# Patient Record
Sex: FEMALE | Race: WHITE | NOT HISPANIC OR LATINO | Employment: OTHER | URBAN - NONMETROPOLITAN AREA
[De-identification: names, ages, dates, MRNs, and addresses within clinical notes are randomized per-mention and may not be internally consistent; named-entity substitution may affect disease eponyms.]

---

## 2017-02-20 ENCOUNTER — HISTORY (OUTPATIENT)
Dept: EMERGENCY MEDICINE | Facility: OTHER | Age: 82
End: 2017-02-20

## 2017-05-26 ENCOUNTER — HISTORY (OUTPATIENT)
Dept: INTERNAL MEDICINE | Facility: OTHER | Age: 82
End: 2017-05-26

## 2017-05-26 ENCOUNTER — OFFICE VISIT - GICH (OUTPATIENT)
Dept: INTERNAL MEDICINE | Facility: OTHER | Age: 82
End: 2017-05-26

## 2017-05-26 DIAGNOSIS — I10 ESSENTIAL (PRIMARY) HYPERTENSION: ICD-10-CM

## 2017-05-26 DIAGNOSIS — N32.81 OVERACTIVE BLADDER: ICD-10-CM

## 2017-05-26 DIAGNOSIS — E03.9 HYPOTHYROIDISM: ICD-10-CM

## 2017-05-26 LAB
T4 FREE SERPL-MCNC: 0.81 NG/DL (ref 0.58–1.64)
TSH - HISTORICAL: 1.97 UIU/ML (ref 0.34–5.6)

## 2017-06-09 ENCOUNTER — COMMUNICATION - GICH (OUTPATIENT)
Dept: INTERNAL MEDICINE | Facility: OTHER | Age: 82
End: 2017-06-09

## 2017-06-20 ENCOUNTER — COMMUNICATION - GICH (OUTPATIENT)
Dept: INTERNAL MEDICINE | Facility: OTHER | Age: 82
End: 2017-06-20

## 2017-06-20 DIAGNOSIS — I10 ESSENTIAL (PRIMARY) HYPERTENSION: ICD-10-CM

## 2017-06-20 DIAGNOSIS — E03.9 HYPOTHYROIDISM: ICD-10-CM

## 2017-06-20 DIAGNOSIS — I25.10 ATHEROSCLEROTIC HEART DISEASE OF NATIVE CORONARY ARTERY WITHOUT ANGINA PECTORIS: ICD-10-CM

## 2017-07-24 ENCOUNTER — OFFICE VISIT - GICH (OUTPATIENT)
Dept: INTERNAL MEDICINE | Facility: OTHER | Age: 82
End: 2017-07-24

## 2017-07-24 ENCOUNTER — HISTORY (OUTPATIENT)
Dept: INTERNAL MEDICINE | Facility: OTHER | Age: 82
End: 2017-07-24

## 2017-07-24 DIAGNOSIS — I95.1 ORTHOSTATIC HYPOTENSION: ICD-10-CM

## 2017-07-24 DIAGNOSIS — I10 ESSENTIAL (PRIMARY) HYPERTENSION: ICD-10-CM

## 2017-07-24 DIAGNOSIS — I25.10 ATHEROSCLEROTIC HEART DISEASE OF NATIVE CORONARY ARTERY WITHOUT ANGINA PECTORIS: ICD-10-CM

## 2017-07-24 DIAGNOSIS — R26.81 UNSTEADINESS ON FEET: ICD-10-CM

## 2017-07-24 DIAGNOSIS — M16.0 PRIMARY OSTEOARTHRITIS OF BOTH HIPS: ICD-10-CM

## 2017-07-24 DIAGNOSIS — R39.15 URGENCY OF URINATION: ICD-10-CM

## 2017-07-25 ENCOUNTER — COMMUNICATION - GICH (OUTPATIENT)
Dept: INTERNAL MEDICINE | Facility: OTHER | Age: 82
End: 2017-07-25

## 2017-08-07 ENCOUNTER — AMBULATORY - GICH (OUTPATIENT)
Dept: SCHEDULING | Facility: OTHER | Age: 82
End: 2017-08-07

## 2017-08-14 ENCOUNTER — HISTORY (OUTPATIENT)
Dept: INTERNAL MEDICINE | Facility: OTHER | Age: 82
End: 2017-08-14

## 2017-08-14 ENCOUNTER — OFFICE VISIT - GICH (OUTPATIENT)
Dept: INTERNAL MEDICINE | Facility: OTHER | Age: 82
End: 2017-08-14

## 2017-08-14 DIAGNOSIS — R42 DIZZINESS AND GIDDINESS: ICD-10-CM

## 2017-08-14 DIAGNOSIS — R26.81 UNSTEADINESS ON FEET: ICD-10-CM

## 2017-08-14 DIAGNOSIS — T14.8XXA OTHER INJURY OF UNSPECIFIED BODY REGION: ICD-10-CM

## 2017-08-14 DIAGNOSIS — R39.15 URGENCY OF URINATION: ICD-10-CM

## 2017-08-14 DIAGNOSIS — I10 ESSENTIAL (PRIMARY) HYPERTENSION: ICD-10-CM

## 2017-08-21 ENCOUNTER — OFFICE VISIT - GICH (OUTPATIENT)
Dept: UROLOGY | Facility: OTHER | Age: 82
End: 2017-08-21

## 2017-08-21 ENCOUNTER — HISTORY (OUTPATIENT)
Dept: UROLOGY | Facility: OTHER | Age: 82
End: 2017-08-21

## 2017-08-21 ENCOUNTER — OFFICE VISIT - GICH (OUTPATIENT)
Dept: INTERNAL MEDICINE | Facility: OTHER | Age: 82
End: 2017-08-21

## 2017-08-21 ENCOUNTER — HISTORY (OUTPATIENT)
Dept: INTERNAL MEDICINE | Facility: OTHER | Age: 82
End: 2017-08-21

## 2017-08-21 DIAGNOSIS — R42 DIZZINESS AND GIDDINESS: ICD-10-CM

## 2017-08-21 DIAGNOSIS — R39.15 URGENCY OF URINATION: ICD-10-CM

## 2017-08-21 DIAGNOSIS — N39.0 URINARY TRACT INFECTION: ICD-10-CM

## 2017-08-21 DIAGNOSIS — T14.8XXA OTHER INJURY OF UNSPECIFIED BODY REGION: ICD-10-CM

## 2017-08-21 DIAGNOSIS — I10 ESSENTIAL (PRIMARY) HYPERTENSION: ICD-10-CM

## 2017-08-21 LAB
BACTERIA URINE: ABNORMAL BACTERIA/HPF
BILIRUB UR QL: NEGATIVE
CLARITY, URINE: ABNORMAL CLARITY
COLOR UR: YELLOW COLOR
EPITHELIAL CELLS: ABNORMAL EPI/HPF
GLUCOSE URINE: NEGATIVE MG/DL
KETONES UR QL: NEGATIVE MG/DL
LEUKOCYTE ESTERASE URINE: ABNORMAL
NITRITE UR QL STRIP: POSITIVE
OCCULT BLOOD,URINE - HISTORICAL: NEGATIVE
PH UR: 5.5 [PH]
PROTEIN QUALITATIVE,URINE - HISTORICAL: NEGATIVE MG/DL
RBC - HISTORICAL: ABNORMAL /HPF
SP GR UR STRIP: 1.01
UROBILINOGEN,QUALITATIVE - HISTORICAL: NORMAL EU/DL
WBC - HISTORICAL: ABNORMAL /HPF

## 2017-08-23 LAB
CULTURE - HISTORICAL: ABNORMAL
CULTURE - HISTORICAL: ABNORMAL
SUSCEPTIBILITY RESULT - HISTORICAL: ABNORMAL

## 2017-09-08 ENCOUNTER — AMBULATORY - GICH (OUTPATIENT)
Dept: SCHEDULING | Facility: OTHER | Age: 82
End: 2017-09-08

## 2017-10-06 ENCOUNTER — AMBULATORY - GICH (OUTPATIENT)
Dept: SCHEDULING | Facility: OTHER | Age: 82
End: 2017-10-06

## 2017-12-08 ENCOUNTER — COMMUNICATION - GICH (OUTPATIENT)
Dept: INTERNAL MEDICINE | Facility: OTHER | Age: 82
End: 2017-12-08

## 2017-12-08 DIAGNOSIS — R42 DIZZINESS AND GIDDINESS: ICD-10-CM

## 2017-12-27 NOTE — PROGRESS NOTES
Patient Information     Patient Name MRN Sex Geno Velasco [id number] Female 1926      Progress Notes by Shawn Cole MD at 2017 11:30 AM     Author:  Shawn Cole MD Service:  (none) Author Type:  Physician     Filed:  2017 12:56 PM Encounter Date:  2017 Status:  Signed     :  Shawn Cole MD (Physician)            Type of Visit  EST    Chief Complaint  Urgency and frequency    HPI  Ms. Horn is a 89 y.o. female who follows up with urgency and frequency.  She previously tried Detrol but suffered side effects which prompted her to discontinue.  She has seen me for this complaint in the past and was identified with a UTI at the time.  She has somewhat of a difficult time describing her symptoms on a timeframe.  It seems the urgency and frequency are a bit worse now than they were in the short-term past.  She denies fevers or chills.      Family History  No family history of urologic cancers    Review of Systems  I reviewed the ROS the patient today.    Nursing Notes:   Gely Piper  2017 12:12 PM  Addendum  Patient presents to the clinic for consult on urinary urgency and incontinence.  Gely Piper LPN........................2017  12:02 PM    Review of Systems:    Weight loss:    No     Recent fever/chills:  No   Night sweats:   No  Current skin rash:  No   Recent hair loss:  No  Heat intolerance:  No   Cold intolerance:  No  Chest pain:   No   Palpitations:   No  Shortness of breath:  No   Wheezing:   No  Constipation:    No   Diarrhea:   No   Nausea:   No   Vomiting:   No   Kidney/side pain:  No   Back pain:   No  Frequent headaches:  No   Dizziness:     No  Leg swelling:   No   Calf pain:    No    Gely Piper LPN........................2017  12:12 PM        Physical Exam  Vitals:     17 1213   BP: 126/68   Pulse: 76   Weight: 56.9 kg (125 lb 6.4 oz)      Constitutional: NAD, WDWN  Head: NCAT  Eyes: Conjunctivae normal  Cardiovascular: Regular  rate  Pulmonary/Chest: Respirations are even and non-labored bilaterally  Abdominal: Soft with no distension, tenderness, masses, guarding or CVA tenderness  Neurological: A + O x 3,  cranial nerves II-XII grossly intact  Extremities: BERTRAND x 4, warm, no clubbing, no cyanosis  Skin: Pink, warm, dry with no rash  Psychiatric:  Normal mood and affect  Genitourinary: nonpalpable bladder    Labs  CREATININE (mg/dL)    Date Value   11/16/2016 0.66 (L)         Post-Void Residual  A post-void residual was measured by ultrasonic bladder scanner.  97 mL  35 mL (previously recorded)    Assessment & Plan  Ms. Horn is a 89 y.o. female with frequency and urgency.  UA suggests possible UTI.  We'll send urine for culture.  Bactrim DS ×3 days  follow up as needed

## 2017-12-27 NOTE — PROGRESS NOTES
Patient Information     Patient Name MRN Sex Geno Velasco [id number] Female 1926      Progress Notes by Fransisca Rocha NP at 2017  9:20 AM     Author:  Fransisca Rocha NP Service:  (none) Author Type:  PHYS- Nurse Practitioner     Filed:  2017  9:49 AM Encounter Date:  2017 Status:  Signed     :  Fransisca Rocha NP (PHYS- Nurse Practitioner)            SUBJECTIVE:    Geno Horn is a 89 y.o. female who presents for follow-up    HPI  she stopped Cardizem 1 week ago. She has not noticed any improvement in the dizziness. She states she still feels a little lightheaded with ambulation. She does agree that she feels it is related to her gait instability. She has an order for physical therapy and now has an appointment with the physical therapist. She has not checked her blood pressure at home. She did find that the aspirin she was taking at home was a 325 mg tablet rather than an 81 mg tablet. She stopped taking aspirin completely and is wondering if she should restart.  Allergies      Allergen   Reactions     Detrol [Tolterodine]  Intolerance-Can't Take     Was unable to sleep at night    ,   Current Outpatient Prescriptions on File Prior to Visit       Medication  Sig Dispense Refill     acetaminophen (TYLENOL EXTRA STRENGTH) 500 mg tablet Take 1 tablet by mouth every 6 hours if needed. Max acetaminophen dose: 4000mg in 24 hrs.  0     aspirin chewable 81 mg chewable tablet Take 81 mg by mouth once daily with a meal.       levothyroxine (SYNTHROID) 100 mcg tablet TAKE 1 TABLET BY MOUTH BEFORE BREAKFAST. 90 tablet 2     medication order composer Aleida for Hearing evaluation 1 unit 0     No current facility-administered medications on file prior to visit.     and   Past Medical History:     Diagnosis  Date     Myocardial infarction (HC)     ; angioplasty        REVIEW OF SYSTEMS:  Review of Systems   Respiratory: Negative for shortness of breath.   "  Cardiovascular: Negative for chest pain and leg swelling.   Musculoskeletal: Negative for falls.   Neurological: Positive for dizziness.       OBJECTIVE:  /74  Pulse 72  Temp 97.6  F (36.4  C) (Tympanic)  Ht 1.575 m (5' 2\")  Wt 56.2 kg (124 lb)  Breastfeeding? No  BMI 22.68 kg/m2    EXAM:   Physical Exam   Constitutional: She is well-developed, well-nourished, and in no distress. No distress.   Cardiovascular: Normal rate and regular rhythm.    Pulmonary/Chest: Effort normal and breath sounds normal.   Musculoskeletal: She exhibits no edema.   Neurological: She is alert.   Broad-based gait, uses cane   Skin: She is not diaphoretic.   There is still some fading ecchymosis on her skin   Psychiatric: Mood, affect and judgment normal.   Nursing note and vitals reviewed.      ASSESSMENT/PLAN:    ICD-10-CM    1. Essential hypertension with goal blood pressure less than 140/90 I10    2. Dizziness R42    3. Bruising T14.8         Plan:  1. Blood pressure well controlled without Cardizem. She will remain off the Cardizem. 2. Still suspect dizziness is related to her gait instability. She will continue to use a cane. She will start physical therapy. 3. The bruising has improved. She discontinued aspirin. We'll start her back on aspirin 81 mg daily.        "

## 2017-12-27 NOTE — PROGRESS NOTES
Patient Information     Patient Name MRN Sex Geno Velasco [id number] Female 1926      Progress Notes by Fransisca Rocha NP at 2017  7:40 AM     Author:  Fransisca Rocha NP Service:  (none) Author Type:  PHYS- Nurse Practitioner     Filed:  2017  8:29 AM Encounter Date:  2017 Status:  Signed     :  Fransisca Rocha NP (PHYS- Nurse Practitioner)            SUBJECTIVE:    Geno Horn is a 89 y.o. female who presents for discussion on several issues    HPI  her daughter accompanies her to this appointment. She has several concerns which she would like addressed once again. These are all concerned that it been addressed in the past.  1. Urinary urgency: Patient has reported urinary urgency and frequency. She awakens 2 or 3 times a night to urinate. Occasionally she will leak urine if she doesn't make it to the restroom on time. She was prescribed oxybutynin but pharmacy would not refill it as a stated she had a reaction to this in the past however patient cannot recall and we had no documentation of this. She has never had history of anaphylaxis. She would like to try alternative medication.  2. Hypertension and coronary artery disease: Patient reports that she has been on Cardizem for 28 years now. She had a heart attack at that time. She did not recall that she had high blood pressure. She feels that the Cardizem could be causing bilateral eye pain, balance issues and dry mouth. She would like to try reducing the medication and possibly coming off the medication.  3. Gait instability and orthostasis: She once again reports that she has balance issues. She has trouble walking. She hasn't physical therapy in the past but that was a couple of years ago. It is been recommended that she restart this but patient states she does bone builders 3 times weekly. She would be willing to do physical therapy and her daughter also is encouraging this. She has been using a cane  for ambulation. She has history of osteoarthritis of her hips. Also reports that occasionally if she stands she will feel a little dizzy but that is different than the balance issues. She has not had any falls.  No Known Allergies,   Current Outpatient Prescriptions on File Prior to Visit       Medication  Sig Dispense Refill     acetaminophen (TYLENOL EXTRA STRENGTH) 500 mg tablet Take 1 tablet by mouth every 6 hours if needed. Max acetaminophen dose: 4000mg in 24 hrs.  0     aspirin chewable 81 mg chewable tablet Take 81 mg by mouth once daily with a meal.       levothyroxine (SYNTHROID) 100 mcg tablet TAKE 1 TABLET BY MOUTH BEFORE BREAKFAST. 90 tablet 2     medication order composer Aleida for Hearing evaluation 1 unit 0     No current facility-administered medications on file prior to visit.     and   Past Medical History:     Diagnosis  Date     Myocardial infarction (HC)     1981; angioplasty        REVIEW OF SYSTEMS:  ROS  see history of present illness  OBJECTIVE:  /68  Pulse 60  Wt 56.2 kg (123 lb 12.8 oz)  Breastfeeding? No  BMI 22.64 kg/m2    EXAM:   Physical Exam  pleasant female without acute distress. Affect normal. Alert and oriented. Mild memory difficulties. Daughter accompanies her to this appointment. Skin color pink. Mucous membranes moist. Sclera nonicteric. Conjunctiva noninflamed. Lung fields clear to auscultation. Cardiovascular regular rate and rhythm. Extremities without edema. She is using a cane. She takes very small shuffling steps. She does not  her feet every high with walking.    ASSESSMENT/PLAN:    ICD-10-CM    1. Urinary urgency R39.15 tolterodine (DETROL) 1 mg tablet   2. Essential hypertension with goal blood pressure less than 140/90 I10 diltiazem (DILACOR XR; DILTIA XT) 180 mg Extended-Release capsule   3. Coronary artery disease involving native coronary artery of native heart without angina pectoris I25.10    4. Gait instability R26.81 AMB CONSULT TO PHYSICAL  THERAPY      CANCELED: AMB CONSULT TO PHYSICAL THERAPY   5. Osteoarthritis of both hips, unspecified osteoarthritis type M16.0 AMB CONSULT TO PHYSICAL THERAPY   6. Orthostatic hypotension I95.1         Plan:  These are all issues which have been reviewed, evaluated and addressed in the past.  1. Start Detrol 1 mg twice daily. Side effects of medication reviewed and discussed with patient and her daughter. 2. Reduce Cardizem CD down to 180 mg daily area did do not feel the Cardizem is the cause of her balance issues however it may be contributing to some orthostasis. She will stand with caution a pump calves before Standing. She will see ophthalmologist for eye evaluation. Daughter states she has seen ophthalmologist and could find no cause of her eye pain. They do have a follow-up visit. 3. Patient has arthritis of her hips, shuffling gait and balance issues. She will be referred back to physical therapy and in the meantime continue to use a cane. Discussed with patient that alternative assistive device may be more beneficial with ambulation. This can be determined by physical therapy. She will follow-up in clinic in 3 weeks to see how she's doing, sooner with problems.

## 2017-12-27 NOTE — PROGRESS NOTES
"Patient Information     Patient Name MRN Sex Geno Velasco [id number] Female 1926      Progress Notes by Fransisca Rocha NP at 2017  8:00 AM     Author:  Fransisca Rocha NP Service:  (none) Author Type:  PHYS- Nurse Practitioner     Filed:  2017  8:36 AM Encounter Date:  2017 Status:  Signed     :  Fransisca Rocha NP (PHYS- Nurse Practitioner)            SUBJECTIVE:    Geno Horn is a 89 y.o. female who presents for follow-up    HPI  urinary urgency: Patient Detrol for 1 day and had confusion and felt like she was \"high\". She did not take it any longer. Her daughter also noticed that change when she was on the medication. She has also been on dig her panty the past and didn't tolerate that medication either. She saw urology about 2 years ago. She reports she has both stress incontinence and urge incontinence.  Hypertension: Cardizem was reduced at last visit. She hasn't noticed any improvement in her dizziness. Her blood pressure continues to be well controlled. She feels that her dizziness is secondary to taking the Cardizem. She states that she'll be fine in the morning but then she takes the Cardizem and then a couple hours later for most of the morning she feels dizzy and off-balance. She would like to try holding the Cardizem. Her daughter would also like her to try that.  Gait instability: She is working with physical therapy. She is doing exercises at home. She states she is only able to do 5 reps of each exercise rather than the 15. She is working up to that. She has a follow-up appointment with his goal therapy on Wednesday.  Bruising: She notices that she bruises easily and is wondering if that is from the aspirin. She takes 1 pill daily. She believes she takes a full strength aspirin and not an 81 mg dose. She has no other bleeding issues.  Allergies      Allergen   Reactions     Detrol [Tolterodine]  Intolerance-Can't Take     Was unable to sleep " "at night    ,   Current Outpatient Prescriptions on File Prior to Visit       Medication  Sig Dispense Refill     acetaminophen (TYLENOL EXTRA STRENGTH) 500 mg tablet Take 1 tablet by mouth every 6 hours if needed. Max acetaminophen dose: 4000mg in 24 hrs.  0     aspirin chewable 81 mg chewable tablet Take 81 mg by mouth once daily with a meal.       diltiazem (DILACOR XR; DILTIA XT) 180 mg Extended-Release capsule Take 1 capsule by mouth once daily. 90 capsule 3     levothyroxine (SYNTHROID) 100 mcg tablet TAKE 1 TABLET BY MOUTH BEFORE BREAKFAST. 90 tablet 2     medication order composer Aleida for Hearing evaluation 1 unit 0     No current facility-administered medications on file prior to visit.     and   Past Medical History:     Diagnosis  Date     Myocardial infarction (HC)     1981; angioplasty        REVIEW OF SYSTEMS:  ROS  see history of present illness  OBJECTIVE:  /70  Pulse 64  Temp 97.5  F (36.4  C) (Tympanic)  Ht 1.575 m (5' 2\")  Wt 55.8 kg (123 lb)  Breastfeeding? No  BMI 22.5 kg/m2    EXAM:   Physical Exam  pleasant female without acute distress. Affect normal. Alert and oriented ×4. Her daughter accompanies her to this appointment. Skin color pink. Mucous membranes moist. She has bruising on her arms and legs and many different stages. Skin is thin. No skin tears noted. Lung fields clear to auscultation. Cardiovascular regular rate and rhythm. Extremities with trace bilateral edema. She walks with the use of a cane    ASSESSMENT/PLAN:    ICD-10-CM    1. Urinary urgency R39.15 AMB CONSULT TO UROLOGY   2. Essential hypertension with goal blood pressure less than 140/90 I10    3. Dizziness R42    4. Gait instability R26.81    5. Bruising T14.8         Plan:  1. She is referred to urology to see if there are any other options since she did not tolerate the trip and or Detrol. She also has stress incontinence which I explained would not improve with the medication if there is an alternative. " 2. Hold Cardizem and follow-up in one week to have blood pressure rechecked and to see if the dizziness resolves. 3. Continue to work with physical therapy for gait training. 4. Discussed with patient that she should be taking aspirin 81 mg daily and not a full strength aspirin 325 mg. She will check on this and reduce the dose to 81 mg daily.

## 2017-12-28 NOTE — TELEPHONE ENCOUNTER
Patient Information     Patient Name MRN Sex Geno Velasco [id number] Female 1926      Telephone Encounter by Mady Richmond RN at 2017  3:41 PM     Author:  Mady Richmond RN Service:  (none) Author Type:  NURS- Registered Nurse     Filed:  2017  3:44 PM Encounter Date:  2017 Status:  Signed     :  Mady Richmond RN (NURS- Registered Nurse)            Calcium Channel Blockers    Office visit in the past 12 months or per provider note.    Last visit with ELAN PRESTON was on: 2017 in [facility]  Next visit with ELAN PRESTON is on: No future appointment listed with this provider  Next visit with Internal Medicine is on: No future appointment listed in this department    Review last provider visit note.  If BP reviewed and plan is noted, can refill.  Max refill for 12 months from last office visit or per provider note.  Hypothyroidism    Office visit in the past 12 months or per provider note.      Lab testing requirements:  TSH annually  TSH (uIU/mL)    Date Value   2017 1.97       Max refill for 12 months from last office visit or per provider note.    Prescription refilled per RN Medication Refill Policy.................... MADY RICHMOND RN ....................  2017   3:43 PM

## 2017-12-28 NOTE — PROGRESS NOTES
Patient Information     Patient Name MRN Geno Willard [id number] Female 1926      Progress Notes by Gely Piper at 2017 11:30 AM     Author:  Gely Piper Service:  (none) Author Type:  (none)     Filed:  2017  3:52 PM Encounter Date:  2017 Status:  Signed     :  Gely Piper            Patient notified  Gely Piper LPN........2017  3:52 PM

## 2017-12-28 NOTE — TELEPHONE ENCOUNTER
Patient Information     Patient Name MRN Geno Willard [id number] Female 1926      Telephone Encounter by Jd Laura RN at 2017  1:08 PM     Author:  Jd Laura RN Service:  (none) Author Type:  NURS- Registered Nurse     Filed:  2017  1:25 PM Encounter Date:  2017 Status:  Signed     :  Jd Laura RN (NURS- Registered Nurse)            Writer returned call to [facility] as requested. Spoke to [facility] initially, and then to [facility]. wallace Cam states that they have not dispensed patient's oxybutynin that was ordered on 17 by PCP as of yet. wallace Cam reports that they have in their record that patient has an allergy to oxybutynin. Our record shows [facility]. wallace Cam states that he has discussed with patient her allergy to oxybutynin x 3, and patient hasn't been able to identify her allergy symptoms in relation to her oxybutynin allergy. Patient recalls the name of the rx, but doesn't recall the allergic reaction associated with taking rx. [facility] is wanting clarification from patient's PCP that rx is ok to dispense prior to actually dispensing. Pharmacist states that patient is aware that wallace Cam is calling for clarification. Pharmacist states that patient is ok with waiting for clarification from PCP. Writer advised wallace Cam that PCP is out of the office until 17, and that clarification of initiation of Rx will be decided at that time. wallace Cam states understanding. Will expect a call back next week with regards to concerns as noted above. Writer will route this encounter to PCP for her consideration/approval.     Jd Laura RN ....................  2017   1:24 PM

## 2017-12-28 NOTE — TELEPHONE ENCOUNTER
Patient Information     Patient Name MRN Geno Willard [id number] Female 1926      Telephone Encounter by Fransisca Rocha NP at 2017  7:25 AM     Author:  Fransisca Rocha NP Service:  (none) Author Type:  PHYS- Nurse Practitioner     Filed:  2017  7:27 AM Encounter Date:  2017 Status:  Signed     :  Fransisca Rocha NP (PHYS- Nurse Practitioner)            I have no record of patient having an allergy to the oxybutynin. Patient does not recall allergy reaction. I recommend that it is left up to the patient to determine if she wants to try the medication again or not.  If she does try it, she is at risk for developing an allergy, could be mild or could be severe. If her bladder symptoms are not concerning enough to her then I would recommend that she probably not try the oxybutynin nor any other bladder medication at this time.

## 2017-12-28 NOTE — TELEPHONE ENCOUNTER
Patient Information     Patient Name MRN Geno Willard [id number] Female 1926      Telephone Encounter by Mayra Bagley at 2017 10:32 AM     Author:  Mayra Bagley Service:  (none) Author Type:  (none)     Filed:  2017 10:33 AM Encounter Date:  2017 Status:  Signed     :  Mayra Bagley            Patient stopped in with questions regarding the new medications she was put on yesterday.  She was extremely restless last night and couldn't sleep at all.  She wants to know if she should keep taking them or go back to the other medications she was on.  Please call to discuss and advise.  Mayra Bagley ....................  2017   10:32 AM

## 2017-12-28 NOTE — TELEPHONE ENCOUNTER
Patient Information     Patient Name MRN Geno Willard [id number] Female 1926      Telephone Encounter by Leo Sharif RN at 2017  8:15 AM     Author:  eLo Sharif RN Service:  (none) Author Type:  NURS- Registered Nurse     Filed:  2017  8:18 AM Encounter Date:  2017 Status:  Signed     :  Leo Sharif RN (NURS- Registered Nurse)            Patient states she is not going to try the medication, as she is unsure if she has an allergy or not.  She does not want to take a chance if she does.  She states her urinary symptoms at this time only include urgency, denies fevers or pain.  She states she will come in to the clinic if she feels she needs to be seen, but at this time she does not feel she needs any treatment.  LEO SHARIF RN ....................  2017   8:17 AM

## 2017-12-28 NOTE — PATIENT INSTRUCTIONS
Patient Information     Patient Name MRN Sex Geno Velasco [id number] Female 1926      Patient Instructions by Fransisca Rocha NP at 2017  8:00 AM     Author:  Fransisca Rocha NP Service:  (none) Author Type:  PHYS- Nurse Practitioner     Filed:  2017  8:29 AM Encounter Date:  2017 Status:  Signed     :  Fransisca Rocha NP (PHYS- Nurse Practitioner)            Hold the Diltiazem for 1 week and follow up in clinic for BP recheck.  You will also be referred back to urology for bladder issues.  Continue with physical therapy for balance issues.  Your aspirin dose should be 81 mg daily.

## 2017-12-28 NOTE — TELEPHONE ENCOUNTER
Patient Information     Patient Name MRN Sex Geno Velasco [id number] Female 1926      Telephone Encounter by Fransisca Rocha NP at 2017 12:40 PM     Author:  Franssica Rocha NP Service:  (none) Author Type:  PHYS- Nurse Practitioner     Filed:  2017 12:40 PM Encounter Date:  2017 Status:  Signed     :  Fransisca Rocha NP (PHYS- Nurse Practitioner)            ok

## 2017-12-28 NOTE — TELEPHONE ENCOUNTER
Patient Information     Patient Name MRN Geno Willard [id number] Female 1926      Telephone Encounter by Jd Laura RN at 2017 11:31 AM     Author:  Jd Laura RN Service:  (none) Author Type:  NURS- Registered Nurse     Filed:  2017 11:40 AM Encounter Date:  2017 Status:  Signed     :  Jd Laura RN (NURS- Registered Nurse)            Writer returned call to patient as requested. Patient states that she took one dose of her detrol yesterday as prescribed. Took it at about 1730. States she didn't get any sleep last night, as felt as though she was higher than a kite. States she felt as though she was drunk. Patient states that today she feels groggy, and is limping along. Patient is wondering what she should do. Should she continue to take detrol or not? Is this a normal side effect of detrol?    Reports that she has not taken detrol today, but has taken her other medications as prescribed.    Patient would like a call back from Yulissa or her nurse today if possible with a plan. Writer will route this encounter to PCP as requested by patient. Write did tell patient to not take detrol until she hears back from PCP's office.    Patient states understanding.    Jd Laura RN ....................  2017   11:40 AM

## 2017-12-28 NOTE — TELEPHONE ENCOUNTER
Patient Information     Patient Name MRN Geno Willard [id number] Female 1926      Telephone Encounter by Fransisca Rocha NP at 2017 11:46 AM     Author:  Fransisca Rohca NP Service:  (none) Author Type:  PHYS- Nurse Practitioner     Filed:  2017 11:46 AM Encounter Date:  2017 Status:  Signed     :  Fransisca Rocha NP (PHYS- Nurse Practitioner)            May be a side effect of the Detrol. She does not need to take this any longer. If she is still concerned about the urinary frequency, I can refer her to our urologist.

## 2017-12-28 NOTE — PROGRESS NOTES
Patient Information     Patient Name MRN Sex Geno Velasco [id number] Female 1926      Progress Notes by Shawn Cole MD at 2017 11:30 AM     Author:  Shawn Cole MD Service:  (none) Author Type:  Physician     Filed:  2017 12:56 PM Encounter Date:  2017 Status:  Signed     :  Shawn Cole MD (Physician)            Disregard

## 2017-12-28 NOTE — TELEPHONE ENCOUNTER
Patient Information     Patient Name MRN Sex Geno Velasco [id number] Female 1926      Telephone Encounter by Benita Hogan LPN at 2017  1:09 PM     Author:  Benita Hogan LPN Service:  (none) Author Type:  NURS- Licensed Practical Nurse     Filed:  2017  1:10 PM Encounter Date:  2017 Status:  Signed     :  Benita Hogan LPN (NURS- Licensed Practical Nurse)            Verified patient's last name and date of birth. Notified of the information below.  Benita Hogan LPN.........2017   1:10 PM

## 2017-12-30 NOTE — NURSING NOTE
Patient Information     Patient Name MRN Geno Willard [id number] Female 1926      Nursing Note by Benita Hogan LPN at 2017  9:20 AM     Author:  Benita Hogan LPN Service:  (none) Author Type:  NURS- Licensed Practical Nurse     Filed:  2017  9:39 AM Encounter Date:  2017 Status:  Signed     :  Benita Hogan LPN (NURS- Licensed Practical Nurse)            Geno Horn is a 89 y.o. female here today for follow up blood pressure.  Benita Hogan LPN.........2017   9:16 AM

## 2017-12-30 NOTE — NURSING NOTE
Patient Information     Patient Name MRN Sex Geno Velasco [id number] Female 1926      Nursing Note by Gely Piper at 2017 11:30 AM     Author:  Gely Piper  Service:  (none) Author Type:  (none)     Filed:  2017 12:12 PM  Encounter Date:  2017 Status:  Addendum     :  Gely Piper        Related Notes: Original Note by Gely Piper filed at 2017 12:03 PM            Patient presents to the clinic for consult on urinary urgency and incontinence.  Gely Piper LPN........................2017  12:02 PM    Review of Systems:    Weight loss:    No     Recent fever/chills:  No   Night sweats:   No  Current skin rash:  No   Recent hair loss:  No  Heat intolerance:  No   Cold intolerance:  No  Chest pain:   No   Palpitations:   No  Shortness of breath:  No   Wheezing:   No  Constipation:    No   Diarrhea:   No   Nausea:   No   Vomiting:   No   Kidney/side pain:  No   Back pain:   No  Frequent headaches:  No   Dizziness:     No  Leg swelling:   No   Calf pain:    No    Gely Piper LPN........................2017  12:12 PM

## 2017-12-30 NOTE — NURSING NOTE
Patient Information     Patient Name MRN Geno Willard [id number] Female 1926      Nursing Note by Erica Cast at 2017  7:40 AM     Author:  Erica Cast Service:  (none) Author Type:  (none)     Filed:  2017  8:05 AM Encounter Date:  2017 Status:  Signed     :  Erica Cast            Patient is here today with c/o urinary frequency, denies burning. Patient also states she has dizziness and drowsiness. Also pain in her eyes, she states she has an apt. At Dr. Lozoya's office.  Erica Cast LPN......................2017 7:58 AM

## 2017-12-30 NOTE — NURSING NOTE
Patient Information     Patient Name MRN Sex Geno Velasco [id number] Female 1926      Nursing Note by Benita Hogan LPN at 2017  8:00 AM     Author:  Benita Hogan LPN Service:  (none) Author Type:  NURS- Licensed Practical Nurse     Filed:  2017  8:18 AM Encounter Date:  2017 Status:  Signed     :  Benita Hogan LPN (NURS- Licensed Practical Nurse)            Geno Horn is a 89 y.o. female here today for follow up of balance and urinary urgency. Reports that she was unable to take the detrol as she was not able to sleep at night.  Benita Hogan LPN.........2017   8:05 AM

## 2017-12-30 NOTE — NURSING NOTE
Patient Information     Patient Name MRN Geno Willard [id number] Female 1926      Nursing Note by Milagro Hobson RN at 2017 11:30 AM     Author:  Milagro Hobson RN Service:  (none) Author Type:  NURS- Registered Nurse     Filed:  2017  3:52 PM Encounter Date:  2017 Status:  Signed     :  Milagro Hobson RN (NURS- Registered Nurse)            Lab notified to run a urine culture on patient's urine.  Milagro Hobson RN.........2017...3:51 PM

## 2018-01-03 ENCOUNTER — COMMUNICATION - GICH (OUTPATIENT)
Dept: INTERNAL MEDICINE | Facility: OTHER | Age: 83
End: 2018-01-03

## 2018-01-03 DIAGNOSIS — E03.9 HYPOTHYROIDISM: ICD-10-CM

## 2018-01-05 NOTE — NURSING NOTE
Patient Information     Patient Name MRN Sex Geno Velasco [id number] Female 1926      Nursing Note by Benita Hogan LPN at 2017  3:20 PM     Author:  Benita Hogna LPN Service:  (none) Author Type:  NURS- Licensed Practical Nurse     Filed:  2017  3:01 PM Encounter Date:  2017 Status:  Signed     :  Benita Hogan LPN (NURS- Licensed Practical Nurse)            Geno Horn is a 89 y.o. female here today for thyroid check. She has concerns about some bladder control, she is getting up 3 times a night to void. She also has concerns about feeling light headed and dizzy which is affecting her balance. She is currently going to exercise class 2 hours a week.  Benita Hogan LPN.........2017   2:52 PM

## 2018-01-05 NOTE — PATIENT INSTRUCTIONS
Patient Information     Patient Name MRN Sex Geno Velasco [id number] Female 1926      Patient Instructions by Fransisca Rocha NP at 2017  3:20 PM     Author:  Fransisca Rocha NP Service:  (none) Author Type:  PHYS- Nurse Practitioner     Filed:  2017  3:12 PM Encounter Date:  2017 Status:  Signed     :  Fransisca Rocha NP (PHYS- Nurse Practitioner)            Start oxybutynin 1 tablet twice daily for overactive bladder.  If this helps with the urinary urgency then let me know and I can give refills.  If not improvement within 2 weeks then stop taking the medication.  Side effects can be constipation and dry mouth.

## 2018-01-05 NOTE — PROGRESS NOTES
Patient Information     Patient Name MRN Sex Geno Velasco [id number] Female 1926      Progress Notes by Fransisca Rocha NP at 2017  3:20 PM     Author:  Fransisca Rocha NP Service:  (none) Author Type:  PHYS- Nurse Practitioner     Filed:  2017  3:17 PM Encounter Date:  2017 Status:  Signed     :  Fransisca Rocha NP (PHYS- Nurse Practitioner)            SUBJECTIVE:    Geno Horn is a 89 y.o. female who presents for follow-up    HPI  she is due for thyroid testing. She had dose adjusted last  and then had follow-up labs in July that were stable. She would like to have labs checked again. She states she still has some gait instability and she noticed this since the thyroid medication was adjusted. She would feel more comfortable to have her levels checked. She also reports that she has been working with bone builders 2 hours a day several days per week. She does these exercises at home. She has also done physical therapy for her gait and has orthotics. She is not had any falls or injuries. She does have arthritis in her hips and knees.  He has overactive bladder. She has frequency of urination. No dysuria. She gets up twice during the night to urinate. No hematuria. She would like to try something for the bladder and is also wondering if she should start wearing poise pads.  She has not been checking her blood pressure. She does take Cardizem. She is not needing medication refill. No cardiopulmonary symptoms. Denies chest pain, chest pressure and shortness of breath. No palpitations.    No Known Allergies,   Current Outpatient Prescriptions on File Prior to Visit       Medication  Sig Dispense Refill     acetaminophen (TYLENOL EXTRA STRENGTH) 500 mg tablet Take 1 tablet by mouth every 6 hours if needed. Max acetaminophen dose: 4000mg in 24 hrs.  0     aspirin chewable 81 mg chewable tablet Take 81 mg by mouth once daily with a meal.       diltiazem CD  "(CARDIZEM CD) 240 mg extended release 24 hr capsule Take 1 capsule by mouth once daily. 90 capsule 3     levothyroxine (SYNTHROID) 100 mcg tablet TAKE 1 TABLET BY MOUTH BEFORE BREAKFAST. 90 tablet 2     medication order composer Aleida for Hearing evaluation 1 unit 0     No current facility-administered medications on file prior to visit.     and   Past Medical History:     Diagnosis  Date     Myocardial infarction (HC)     1981; angioplasty        REVIEW OF SYSTEMS:  ROS  see history of present illness  OBJECTIVE:  /78  Temp 98.1  F (36.7  C) (Tympanic)  Ht 1.575 m (5' 2\")  Wt 57.2 kg (126 lb)  Breastfeeding? No  BMI 23.05 kg/m2    EXAM:   Physical Exam  pleasant female without acute distress. Affect normal. Alert and oriented ×4. Mild forgetfulness. Sclera nonicteric. Conjunctiva noninflamed. No exophthalmos. Neck supple without adenopathy or thyromegaly. Lung fields clear to auscultation. Cardiovascular regular rate and rhythm. No suprapubic tenderness. Extremities without edema. Slight antalgic gait. Overall stable.  Thyroid labs from June and July of last year reviewed and discussed with patient.  ASSESSMENT/PLAN:    ICD-10-CM    1. Hypothyroidism, unspecified type E03.9 TSH      T4,FREE   2. Overactive bladder N32.81 oxybutynin (DITROPAN) 5 mg tablet   3. Essential hypertension with goal blood pressure less than 140/90 I10         Plan:  1. Check TSH and free T4. Adjustment in medication made depending upon results. Do not feel this is the cause of her gait instability. She will continue to work with bone builders. 2. Trial of oxybutynin 5 mg twice daily for overactive bladder. Side effects reviewed and discussed with patient. 3. Blood pressure was initially elevated but recheck was normal. Continue with the Cardizem. Follow-up as needed.        "

## 2018-01-08 ENCOUNTER — COMMUNICATION - GICH (OUTPATIENT)
Dept: INTERNAL MEDICINE | Facility: OTHER | Age: 83
End: 2018-01-08

## 2018-01-09 ENCOUNTER — COMMUNICATION - GICH (OUTPATIENT)
Dept: INTERNAL MEDICINE | Facility: OTHER | Age: 83
End: 2018-01-09

## 2018-01-09 DIAGNOSIS — E03.9 HYPOTHYROIDISM: ICD-10-CM

## 2018-01-24 ENCOUNTER — DOCUMENTATION ONLY (OUTPATIENT)
Dept: FAMILY MEDICINE | Facility: OTHER | Age: 83
End: 2018-01-24

## 2018-01-24 PROBLEM — R39.15 URINARY URGENCY: Status: ACTIVE | Noted: 2017-07-24

## 2018-01-24 RX ORDER — SULFAMETHOXAZOLE/TRIMETHOPRIM 800-160 MG
1 TABLET ORAL 2 TIMES DAILY
COMMUNITY
Start: 2017-08-21 | End: 2018-04-04

## 2018-01-24 RX ORDER — ACETAMINOPHEN 500 MG
500 TABLET ORAL EVERY 6 HOURS PRN
COMMUNITY
Start: 2015-03-13 | End: 2018-07-24

## 2018-01-24 RX ORDER — LEVOTHYROXINE SODIUM 100 UG/1
100 TABLET ORAL
COMMUNITY
Start: 2018-01-09 | End: 2018-04-04

## 2018-01-24 RX ORDER — ASPIRIN 81 MG/1
81 TABLET, CHEWABLE ORAL
Status: ON HOLD | COMMUNITY
End: 2020-11-05

## 2018-01-26 VITALS
SYSTOLIC BLOOD PRESSURE: 128 MMHG | SYSTOLIC BLOOD PRESSURE: 130 MMHG | BODY MASS INDEX: 22.82 KG/M2 | HEART RATE: 64 BPM | TEMPERATURE: 97.5 F | HEIGHT: 62 IN | DIASTOLIC BLOOD PRESSURE: 70 MMHG | HEART RATE: 72 BPM | HEIGHT: 62 IN | WEIGHT: 123 LBS | TEMPERATURE: 97.6 F | WEIGHT: 124 LBS | DIASTOLIC BLOOD PRESSURE: 74 MMHG | BODY MASS INDEX: 22.63 KG/M2

## 2018-01-26 VITALS
SYSTOLIC BLOOD PRESSURE: 136 MMHG | DIASTOLIC BLOOD PRESSURE: 78 MMHG | WEIGHT: 126 LBS | TEMPERATURE: 98.1 F | BODY MASS INDEX: 23.19 KG/M2 | HEIGHT: 62 IN

## 2018-01-26 VITALS
HEART RATE: 76 BPM | SYSTOLIC BLOOD PRESSURE: 126 MMHG | BODY MASS INDEX: 22.94 KG/M2 | DIASTOLIC BLOOD PRESSURE: 68 MMHG | WEIGHT: 125.4 LBS

## 2018-01-26 VITALS
SYSTOLIC BLOOD PRESSURE: 130 MMHG | DIASTOLIC BLOOD PRESSURE: 68 MMHG | WEIGHT: 123.8 LBS | HEART RATE: 60 BPM | BODY MASS INDEX: 22.64 KG/M2

## 2018-02-12 NOTE — TELEPHONE ENCOUNTER
Patient Information     Patient Name MRN Sex Geno Velasco [id number] Female 1926      Telephone Encounter by Karen Cline RN at 2018  9:28 AM     Author:  Karen Cline RN Service:  (none) Author Type:  NURS- Registered Nurse     Filed:  2018  9:32 AM Encounter Date:  1/3/2018 Status:  Signed     :  Karen Cline RN (NURS- Registered Nurse)            [facility] pharmacy and pt request a refill Rx for levothyroxine (Synthroid).    Hypothyroidism    Office visit in the past 12 months or per provider note.    Last visit with ELAN PRESTON was on: 2017 in [facility]  Next visit with ELAN PRESTON is on: No future appointment listed with this provider  Next visit with Internal Medicine is on: No future appointment listed in this department    Lab testing requirements:  TSH annually  TSH (uIU/mL)    Date Value   2017 1.97       Max refill for 12 months from last office visit or per provider note.    This RN declined / refused Rx refill request due to request for refill too soon.  Rx order was placed on 17 with Qty 90 Refill 2.    Unable to complete prescription refill per RN Medication Refill Policy.................... Karen Cline RN ....................  2018   9:31 AM

## 2018-02-12 NOTE — TELEPHONE ENCOUNTER
Patient Information     Patient Name MRN Sex Geno Velasco [id number] Female 1926      Telephone Encounter by Fransisca Rocha NP at 2017  7:57 AM     Author:  Fransisca Rocha NP Service:  (none) Author Type:  PHYS- Nurse Practitioner     Filed:  2017  7:57 AM Encounter Date:  2017 Status:  Signed     :  Fransisca Rocha NP (PHYS- Nurse Practitioner)            Ok.  Referral placed

## 2018-02-12 NOTE — TELEPHONE ENCOUNTER
Patient Information     Patient Name MRN Sex Geno Velasco [id number] Female 1926      Telephone Encounter by Benita Hogan LPN at 2017  8:52 AM     Author:  Benita Hogan LPN Service:  (none) Author Type:  NURS- Licensed Practical Nurse     Filed:  2017  8:52 AM Encounter Date:  2017 Status:  Signed     :  Benita Hogan LPN (NURS- Licensed Practical Nurse)            Notified of the referral placed.  Benita Hogan LPN.........2017   8:52 AM

## 2018-02-12 NOTE — TELEPHONE ENCOUNTER
Patient Information     Patient Name MRN Sex Geno Velasco [id number] Female 1926      Telephone Encounter by Fransisca Rocha NP at 2018  7:24 AM     Author:  Fransisca Rocha NP Service:  (none) Author Type:  PHYS- Nurse Practitioner     Filed:  2018  7:24 AM Encounter Date:  2018 Status:  Signed     :  Fransisca Rocha NP (PHYS- Nurse Practitioner)            Please check on this

## 2018-02-12 NOTE — TELEPHONE ENCOUNTER
Patient Information     Patient Name MRN Geno Willard [id number] Female 1926      Telephone Encounter by Usha Reyes at 2018  2:09 PM     Author:  Usha Reyes Service:  (none) Author Type:  (none)     Filed:  2018  2:11 PM Encounter Date:  2018 Status:  Signed     :  Usha Reyes            Pharmacy ([facility]) states they have sent 3 refill requests - no response (per patient) -  Synthroid -  please check on this and advise patient.

## 2018-02-12 NOTE — TELEPHONE ENCOUNTER
Patient Information     Patient Name MRN Sex Geno Velasco [id number] Female 1926      Telephone Encounter by Usha Reyes at 2017  3:50 PM     Author:  Usha Reyes Service:  (none) Author Type:  (none)     Filed:  2017  3:51 PM Encounter Date:  2017 Status:  Signed     :  Usha Reyes A            Patient's daughter is requesting a referral to an ENT (here at [facility]) to address the ongoing equilibrium issues.

## 2018-02-13 NOTE — TELEPHONE ENCOUNTER
Patient Information     Patient Name MRN Geno Willard [id number] Female 1926      Telephone Encounter by Karen Jett RN at 2018  8:39 AM     Author:  Karen Jett RN Service:  (none) Author Type:  NURS- Registered Nurse     Filed:  2018  8:41 AM Encounter Date:  2018 Status:  Signed     :  Karen Jett RN (NURS- Registered Nurse)            Hypothyroidism    Office visit in the past 12 months or per provider note.    Last visit with ELAN PRESTON was on: 2017 in [facility]  Next visit with ELAN PRESTON is on: No future appointment listed with this provider  Next visit with Internal Medicine is on: No future appointment listed in this department    Lab testing requirements:  TSH annually  TSH (uIU/mL)    Date Value   2017 1.97       Max refill for 12 months from last office visit or per provider note.  Prescription refilled per RN Medication Refill Policy.................... Karen Jett RN ....................  2018   8:41 AM

## 2018-02-13 NOTE — TELEPHONE ENCOUNTER
Patient Information     Patient Name MRN Geno Willard [id number] Female 1926      Telephone Encounter by Benita Hogan LPN at 2018  8:12 AM     Author:  Benita Hogan LPN Service:  (none) Author Type:  NURS- Licensed Practical Nurse     Filed:  2018  8:15 AM Encounter Date:  2018 Status:  Signed     :  Benita Hogan LPN (NURS- Licensed Practical Nurse)            Refill request was entered in on 1/3/18 for the synthroid and was denied per RN stating that it was too early to refill. Medication was last refilled on 17 for a 6 month supply. Patient does not have any refills remaining at pharmacy. Please refill the synthroid.  Benita Hogan LPN.........2018   8:13 AM

## 2018-04-04 ENCOUNTER — OFFICE VISIT (OUTPATIENT)
Dept: INTERNAL MEDICINE | Facility: OTHER | Age: 83
End: 2018-04-04
Attending: NURSE PRACTITIONER
Payer: MEDICARE

## 2018-04-04 VITALS
DIASTOLIC BLOOD PRESSURE: 64 MMHG | WEIGHT: 127 LBS | BODY MASS INDEX: 23.37 KG/M2 | SYSTOLIC BLOOD PRESSURE: 138 MMHG | TEMPERATURE: 97.6 F | HEIGHT: 62 IN | HEART RATE: 64 BPM

## 2018-04-04 DIAGNOSIS — E03.9 HYPOTHYROIDISM, UNSPECIFIED TYPE: Primary | ICD-10-CM

## 2018-04-04 DIAGNOSIS — I25.10 CORONARY ARTERY DISEASE INVOLVING NATIVE CORONARY ARTERY OF NATIVE HEART WITHOUT ANGINA PECTORIS: ICD-10-CM

## 2018-04-04 DIAGNOSIS — R39.15 URINARY URGENCY: ICD-10-CM

## 2018-04-04 DIAGNOSIS — I10 ESSENTIAL HYPERTENSION: ICD-10-CM

## 2018-04-04 DIAGNOSIS — Z98.890 H/O BASAL CELL CARCINOMA EXCISION: ICD-10-CM

## 2018-04-04 DIAGNOSIS — Z85.828 H/O BASAL CELL CARCINOMA EXCISION: ICD-10-CM

## 2018-04-04 DIAGNOSIS — L98.9 SKIN LESION: ICD-10-CM

## 2018-04-04 LAB
ANION GAP SERPL CALCULATED.3IONS-SCNC: 4 MMOL/L (ref 3–14)
BUN SERPL-MCNC: 18 MG/DL (ref 7–25)
CALCIUM SERPL-MCNC: 9.6 MG/DL (ref 8.6–10.3)
CHLORIDE SERPL-SCNC: 101 MMOL/L (ref 98–107)
CO2 SERPL-SCNC: 30 MMOL/L (ref 21–31)
CREAT SERPL-MCNC: 0.72 MG/DL (ref 0.6–1.2)
GFR SERPL CREATININE-BSD FRML MDRD: 76 ML/MIN/1.7M2
GLUCOSE SERPL-MCNC: 101 MG/DL (ref 70–105)
POTASSIUM SERPL-SCNC: 4 MMOL/L (ref 3.5–5.1)
SODIUM SERPL-SCNC: 135 MMOL/L (ref 134–144)
T4 FREE SERPL-MCNC: 0.56 NG/DL (ref 0.6–1.6)
TSH SERPL DL<=0.05 MIU/L-ACNC: 8.19 IU/ML (ref 0.34–5.6)

## 2018-04-04 PROCEDURE — 80048 BASIC METABOLIC PNL TOTAL CA: CPT | Performed by: NURSE PRACTITIONER

## 2018-04-04 PROCEDURE — 99215 OFFICE O/P EST HI 40 MIN: CPT | Performed by: NURSE PRACTITIONER

## 2018-04-04 PROCEDURE — G0463 HOSPITAL OUTPT CLINIC VISIT: HCPCS | Performed by: NURSE PRACTITIONER

## 2018-04-04 PROCEDURE — 84443 ASSAY THYROID STIM HORMONE: CPT | Performed by: NURSE PRACTITIONER

## 2018-04-04 PROCEDURE — 36415 COLL VENOUS BLD VENIPUNCTURE: CPT | Performed by: NURSE PRACTITIONER

## 2018-04-04 PROCEDURE — 84439 ASSAY OF FREE THYROXINE: CPT | Performed by: NURSE PRACTITIONER

## 2018-04-04 RX ORDER — LEVOTHYROXINE SODIUM 100 UG/1
100 TABLET ORAL
Qty: 90 TABLET | Refills: 3 | Status: SHIPPED | OUTPATIENT
Start: 2018-04-04 | End: 2018-04-09

## 2018-04-04 RX ORDER — DILTIAZEM HYDROCHLORIDE 180 MG/1
180 CAPSULE, EXTENDED RELEASE ORAL DAILY
Qty: 90 CAPSULE | Refills: 3 | Status: SHIPPED | OUTPATIENT
Start: 2018-04-04 | End: 2019-04-25

## 2018-04-04 ASSESSMENT — PAIN SCALES - GENERAL: PAINLEVEL: NO PAIN (0)

## 2018-04-04 NOTE — PROGRESS NOTES
Subjective:  She is here today for chronic disease management.  Hypothyroidism: Patient still feels that the Synthroid 100 mcg dose is causing her dizziness.  She did not take the Synthroid this morning and did not feel dizzy yet.  She has not tried taking the medication at bedtime which has been discussed with her in the past.  She does have some forgetfulness.  Her niece accompanies her to this appointment.  She states it also makes her eyes burn.  She did not experience these problems when she was on Synthroid 88 mcg.  She is really hoping she can go back to that dose.  She is due for thyroid labs today.  Last checked in May 2017 was euthyroid.  This was only 100 mcg daily dose.  Hypertension: Patient restarted her Cardizem.  She tried stopping this to see if it helped the dizziness with out any changes.  She denies symptoms of orthostasis.  No chest pain, chest pressure, shortness of breath or edema.  She has history of coronary artery disease.  She is not on a statin but she does take aspirin 81 mg daily.  Skin lesion: Patient has history of basal cell carcinoma.  There is a lesion at the base of her neck.  She has been scratching at that.  It is rather sore.  There is been a small amount of bloody drainage recently.  Urinary urgency: She still has problems with urinary urgency.  She goes to the bathroom every couple of hours even at nighttime.  She had side effects from Detrol.  She saw urology and she needs to report it was recommended that she consider electrostimulation.  She would like to see him again to rediscuss this.    Patient Active Problem List   Diagnosis     Allergic rhinitis     Coronary artery disease involving native coronary artery of native heart without angina pectoris     Frequency     Frontal sinusitis     H/O basal cell carcinoma excision     Hearing loss     Hypertension     Hypothyroidism     Osteoarthritis of both hips     Urinary urgency     Past Medical History:   Diagnosis Date      "ST elevation (STEMI) myocardial infarction (H)     ; angioplasty     Past Surgical History:   Procedure Laterality Date     ARTHROPLASTY HIP      Hip Replacement, Total Right, Dr Rodriguez     ARTHROPLASTY HIP      2/23/15,Hip Replacement, total left, Dr. Rodriguez     Social History     Social History     Marital status:      Spouse name: N/A     Number of children: N/A     Years of education: N/A     Occupational History     Not on file.     Social History Main Topics     Smoking status: Never Smoker     Smokeless tobacco: Never Used     Alcohol use No     Drug use: No     Sexual activity: Not on file     Other Topics Concern     Not on file     Social History Narrative    , was admitted to [facility] following her hospitalization in [place] 2/26/15     Family History   Problem Relation Age of Onset     HEART DISEASE Mother 60     Heart Disease, of mi     HEART DISEASE Father 60     Heart Disease, of mi     Current Outpatient Prescriptions   Medication Sig Dispense Refill     levothyroxine (SYNTHROID/LEVOTHROID) 100 MCG tablet Take 1 tablet (100 mcg) by mouth every morning (before breakfast) 90 tablet 3     diltiazem (DILACOR XR) 180 MG 24 hr capsule Take 1 capsule (180 mg) by mouth daily 90 capsule 3     acetaminophen (TYLENOL) 500 MG tablet Take 500 mg by mouth every 6 hours as needed Max acetaminophen dose 4000 mg in 24 hrs       aspirin 81 MG chewable tablet Take 81 mg by mouth daily with food       [DISCONTINUED] levothyroxine (SYNTHROID/LEVOTHROID) 100 MCG tablet Take 100 mcg by mouth every morning (before breakfast)       Tolterodine      Review of Systems:  Review of Systems  See HPI  Objective:   /64 (BP Location: Right arm, Patient Position: Sitting, Cuff Size: Adult Regular)  Pulse 64  Temp 97.6  F (36.4  C) (Tympanic)  Ht 5' 2.25\" (1.581 m)  Wt 127 lb (57.6 kg)  Breastfeeding? No  BMI 23.04 kg/m2  Physical Exam  Pleasant female, no acute distress.  Affect normal.  Alert " and oriented ×4.  Sclera nonicteric.  Conjunctiva noninflamed.  Neck supple without adenopathy.  Base of her neck there is a 0.75 cm in diameter raised lesion with a nodular border and central irritation.  Scant amount of bloody drainage.  Suspicious for basal cell carcinoma.  Lung fields clear to auscultation.  Cardiovascular regular rate and rhythm, no S3 or murmur auscultated.  Abdomen is without tenderness.  Extremities without edema.    Assessment:    ICD-10-CM    1. Hypothyroidism, unspecified type E03.9 levothyroxine (SYNTHROID/LEVOTHROID) 100 MCG tablet     Thyrotropin GH     T4 free   2. Essential hypertension I10 diltiazem (DILACOR XR) 180 MG 24 hr capsule     Basic metabolic panel   3. Coronary artery disease involving native coronary artery of native heart without angina pectoris I25.10    4. Skin lesion L98.9 GENERAL SURG ADULT REFERRAL   5. H/O basal cell carcinoma excision Z85.828 GENERAL SURG ADULT REFERRAL    Z98.890    6. Urinary urgency R39.15 UROLOGY ADULT REFERRAL       Plan:   1.  She will switch Synthroid and other medications to take at nighttime.  She will try this for the next week.  If the dizziness resolved and she will continue with the nighttime dose.  If no change in the dizziness in 1 week and she will let me know and will try to reduce her back to 88 mcg daily but then she would need to have thyroid labs checked again in 6-8 weeks.  2.  Blood pressure currently well controlled on the Cardizem.  She will continue with Cardizem 180 mg daily.  Stopping this medication did not seem to change her dizziness.  She does have some chronic gait instability and does use a cane.  Explained to her that the change in medications will not affect her gait instability.  3.  Skin lesion does appear suspicious for basal cell carcinoma.  She has a history of this.  She is referred back to Dr. Phillip for excision.  4.  Patient has chronic urinary frequency and urgency.  She failed Detrol.  Other  medications likely will cause the same side effects.  She has seen Dr. Cole in the past and she would like to follow-up with him to discuss electrical stimulation which she states he had recommended at previous visit.  Appointment has been scheduled for patient.    40 minutes of face-to-face time spent with patient with greater than 50% in care coordination and counseling    DRU Salcedo   4/4/2018  8:32 AM

## 2018-04-04 NOTE — MR AVS SNAPSHOT
After Visit Summary   4/4/2018    Geno Horn    MRN: [id number]           Patient Information     Date Of Birth          9/21/1926        Visit Information        Provider Department      4/4/2018 7:40 AM Fransisca Rocha NP [facility]        Today's Diagnoses     Hypothyroidism, unspecified type    -  1    Essential hypertension        Coronary artery disease involving native coronary artery of native heart without angina pectoris        Skin lesion        H/O basal cell carcinoma excision        Urinary urgency          Care Instructions    Switch synthroid to nighttime for the next week and if dizziness resolves then continue taking it at night. If not better then call and let me know and we can try reducing the dose back to 88 mcg daily.  If dose is reduced then will need to check thyroid labs again in 6-8 weeks.          Follow-ups after your visit        Additional Services     GENERAL SURG ADULT REFERRAL           UROLOGY ADULT REFERRAL                 Who to contact     If you have questions or need follow up information about today's clinic visit or your schedule please contact [facility] directly at [phone].  Normal or non-critical lab and imaging results will be communicated to you by SquaredOuthart, letter or phone within 4 business days after the clinic has received the results. If you do not hear from us within 7 days, please contact the clinic through Marketwired or phone. If you have a critical or abnormal lab result, we will notify you by phone as soon as possible.  Submit refill requests through Marketwired or call your pharmacy and they will forward the refill request to us. Please allow 3 business days for your refill to be completed.          Additional Information About Your Visit        [employer] Information     Marketwired lets you send messages to your doctor, view your test results, renew your prescriptions, schedule appointments and more.  "To sign up, go to [email or web address] . Click on \"Log in\" on the left side of the screen, which will take you to the Welcome page. Then click on \"Sign up Now\" on the right side of the page.     You will be asked to enter the access code listed below, as well as some personal information. Please follow the directions to create your username and password.     Your access code is: [email or web address]  Expires: 7/3/2018  8:28 AM     Your access code will  in 90 days. If you need help or a new code, please call your [place] clinic or [phone].        Care EveryWhere ID     This is your Care EveryWhere ID. This could be used by other organizations to access your [place] medical records  [id number]        Your Vitals Were     Pulse Temperature Height Breastfeeding? BMI (Body Mass Index)       64 97.6  F (36.4  C) (Tympanic) 5' 2.25\" (1.581 m) No 23.04 kg/m2        Blood Pressure from Last 3 Encounters:   18 138/64   17 126/68   17 130/74    Weight from Last 3 Encounters:   18 127 lb (57.6 kg)   17 125 lb 6.4 oz (56.9 kg)   17 124 lb (56.2 kg)              We Performed the Following     Basic metabolic panel     GENERAL SURG ADULT REFERRAL     T4 free     Thyrotropin GH     UROLOGY ADULT REFERRAL          Today's Medication Changes          These changes are accurate as of 18  8:28 AM.  If you have any questions, ask your nurse or doctor.               Start taking these medicines.        Dose/Directions    diltiazem 180 MG 24 hr capsule   Commonly known as:  DILACOR XR   Used for:  Essential hypertension   Started by:  Fransisca Rocha NP        Dose:  180 mg   Take 1 capsule (180 mg) by mouth daily   Quantity:  90 capsule   Refills:  3            Where to get your medicines      These medications were sent to [id number] - [place] - [address]  [address] [place] [address]     Phone:  [phone]     diltiazem " 180 MG 24 hr capsule    levothyroxine 100 MCG tablet                Primary Care Provider Office Phone # Fax #    Fransisca Rocha [phone] [address] [facility] [address]        Equal Access to Services     NANI SHULTZ : Hadii liana ruiz pamelao Soheatherali, waaxda luqadaha, qaybta kaalmada adeegyada, waxyovani charlton hayjimn shay gallitoelise foley. So [facility] [phone].    ATENCIÓN: Si habla español, tiene a hatch disposición servicios gratuitos de asistencia lingüística. Llame al [phone].    We comply with applicable federal civil rights laws and [place] laws. We do not discriminate on the basis of race, color, national origin, age, disability, sex, sexual orientation, or gender identity.            Thank you!     Thank you for choosing [facility]  for your care. Our goal is always to provide you with excellent care. Hearing back from our patients is one way we can continue to improve our services. Please take a few minutes to complete the written survey that you may receive in the mail after your visit with us. Thank you!             Your Updated Medication List - Protect others around you: Learn how to safely use, store and throw away your medicines at www.disposemymeds.org.          This list is accurate as of 4/4/18  8:28 AM.  Always use your most recent med list.                   Brand Name Dispense Instructions for use Diagnosis    acetaminophen 500 MG tablet    TYLENOL     Take 500 mg by mouth every 6 hours as needed Max acetaminophen dose 4000 mg in 24 hrs        aspirin 81 MG chewable tablet      Take 81 mg by mouth daily with food        diltiazem 180 MG 24 hr capsule    DILACOR XR    90 capsule    Take 1 capsule (180 mg) by mouth daily    Essential hypertension       levothyroxine 100 MCG tablet    SYNTHROID/LEVOTHROID    90 tablet    Take 1 tablet (100 mcg) by mouth every morning (before breakfast)    Hypothyroidism, unspecified type

## 2018-04-04 NOTE — PATIENT INSTRUCTIONS
Switch synthroid to nighttime for the next week and if dizziness resolves then continue taking it at night. If not better then call and let me know and we can try reducing the dose back to 88 mcg daily.  If dose is reduced then will need to check thyroid labs again in 6-8 weeks.

## 2018-04-04 NOTE — NURSING NOTE
Geno Horn is a 89 year old female who presents today for annual review. Stated that since her synthroid was increased she has felt dizzy. She also has concerns about a skin tag and bladder control.  Benita Hogan LPN.......4/4/2018.......7:54 AM

## 2018-04-05 ASSESSMENT — PATIENT HEALTH QUESTIONNAIRE - PHQ9: SUM OF ALL RESPONSES TO PHQ QUESTIONS 1-9: 0

## 2018-04-06 ENCOUNTER — OFFICE VISIT (OUTPATIENT)
Dept: UROLOGY | Facility: OTHER | Age: 83
End: 2018-04-06
Attending: NURSE PRACTITIONER
Payer: MEDICARE

## 2018-04-06 VITALS
SYSTOLIC BLOOD PRESSURE: 130 MMHG | DIASTOLIC BLOOD PRESSURE: 72 MMHG | WEIGHT: 128.4 LBS | HEART RATE: 80 BPM | RESPIRATION RATE: 12 BRPM | HEIGHT: 62 IN | BODY MASS INDEX: 23.63 KG/M2

## 2018-04-06 DIAGNOSIS — R39.15 URINARY URGENCY: Primary | ICD-10-CM

## 2018-04-06 LAB
ALBUMIN UR-MCNC: NEGATIVE MG/DL
APPEARANCE UR: CLEAR
BACTERIA #/AREA URNS HPF: ABNORMAL /HPF
BILIRUB UR QL STRIP: NEGATIVE
COLOR UR AUTO: YELLOW
GLUCOSE UR STRIP-MCNC: NEGATIVE MG/DL
HGB UR QL STRIP: NEGATIVE
KETONES UR STRIP-MCNC: NEGATIVE MG/DL
LEUKOCYTE ESTERASE UR QL STRIP: ABNORMAL
NITRATE UR QL: POSITIVE
NON-SQ EPI CELLS #/AREA URNS LPF: ABNORMAL /LPF
PH UR STRIP: 7 PH (ref 5–7)
RBC #/AREA URNS AUTO: ABNORMAL /HPF
SOURCE: ABNORMAL
SP GR UR STRIP: 1.01 (ref 1–1.03)
UROBILINOGEN UR STRIP-ACNC: 0.2 EU/DL (ref 0.2–1)
WBC #/AREA URNS AUTO: ABNORMAL /HPF

## 2018-04-06 PROCEDURE — G0463 HOSPITAL OUTPT CLINIC VISIT: HCPCS

## 2018-04-06 PROCEDURE — 99213 OFFICE O/P EST LOW 20 MIN: CPT | Performed by: UROLOGY

## 2018-04-06 PROCEDURE — 81001 URINALYSIS AUTO W/SCOPE: CPT | Performed by: UROLOGY

## 2018-04-06 PROCEDURE — G0463 HOSPITAL OUTPT CLINIC VISIT: HCPCS | Mod: 25

## 2018-04-06 ASSESSMENT — PAIN SCALES - GENERAL: PAINLEVEL: NO PAIN (0)

## 2018-04-06 NOTE — MR AVS SNAPSHOT
"              After Visit Summary   4/6/2018    Geno Horn    MRN: [id number]           Patient Information     Date Of Birth          9/21/1926        Visit Information        Provider Department      4/6/2018 8:45 AM Shawn Cole MD [facility]        Today's Diagnoses     Urinary urgency    -  1       Follow-ups after your visit        Your next 10 appointments already scheduled     Apr 13, 2018  1:50 PM CDT   PROCEDURE with Roberto Fajardo MD,   SURGERY   [facility] ([facility])    [address]  [place] [id number] [phone]              Who to contact     If you have questions or need follow up information about today's clinic visit or your schedule please contact [facility] directly at [phone].  Normal or non-critical lab and imaging results will be communicated to you by [email or web address], letter or phone within 4 business days after the clinic has received the results. If you do not hear from us within 7 days, please contact the clinic through MyChart or phone. If you have a critical or abnormal lab result, we will notify you by phone as soon as possible.  Submit refill requests through Incanthera or call your pharmacy and they will forward the refill request to us. Please allow 3 business days for your refill to be completed.          Additional Information About Your Visit        MyChart Information     Incanthera lets you send messages to your doctor, view your test results, renew your prescriptions, schedule appointments and more. To sign up, go to [email or web address] . Click on \"Log in\" on the left side of the screen, which will take you to the Welcome page. Then click on \"Sign up Now\" on the right side of the page.     You will be asked to enter the access code listed below, as well as some personal information. Please follow the directions to create your username and password.     Your access " "code is: [email or web address]  Expires: 7/3/2018  8:28 AM     Your access code will  in 90 days. If you need help or a new code, please call your [place] [facility] clinic or [phone].        Care EveryWhere ID     This is your Care EveryWhere ID. This could be used by other organizations to access your [facility] medical records  [id number]        Your Vitals Were     Pulse Respirations Height BMI (Body Mass Index)          80 12 1.575 m (5' 2\") 23.48 kg/m2         Blood Pressure from Last 3 Encounters:   18 130/72   18 138/64   17 126/68    Weight from Last 3 Encounters:   18 58.2 kg (128 lb 6.4 oz)   18 57.6 kg (127 lb)   17 56.9 kg (125 lb 6.4 oz)              We Performed the Following     UA reflex to Microscopic     Urine Microscopic        Primary Care Provider Office Phone # Fax #    Fransisca Rocha [phone]       [address] [facility] [address]        Equal Access to Services     [facility]: Hadii aad ku hadasho Soheatherali, waaxda luqadaha, qaybta kaalmada adeegyada, rikki foley. So [facility] [phone].    ATENCIÓN: Si habla español, tiene a hatch disposición servicios gratuitos de asistencia lingüística. Llame al [phone].    We comply with applicable federal civil rights laws and [place] laws. We do not discriminate on the basis of race, color, national origin, age, disability, sex, sexual orientation, or gender identity.            Thank you!     Thank you for choosing [facility] AND [facility]  for your care. Our goal is always to provide you with excellent care. Hearing back from our patients is one way we can continue to improve our services. Please take a few minutes to complete the written survey that you may receive in the mail after your visit with us. Thank you!             Your Updated Medication List - Protect others around you: Learn how to safely use, store and throw away your medicines " at www.disposemymeds.org.          This list is accurate as of 4/6/18 10:20 AM.  Always use your most recent med list.                   Brand Name Dispense Instructions for use Diagnosis    acetaminophen 500 MG tablet    TYLENOL     Take 500 mg by mouth every 6 hours as needed Max acetaminophen dose 4000 mg in 24 hrs        aspirin 81 MG chewable tablet      Take 81 mg by mouth daily with food        diltiazem 180 MG 24 hr capsule    DILACOR XR    90 capsule    Take 1 capsule (180 mg) by mouth daily    Essential hypertension       levothyroxine 100 MCG tablet    SYNTHROID/LEVOTHROID    90 tablet    Take 1 tablet (100 mcg) by mouth every morning (before breakfast)    Hypothyroidism, unspecified type

## 2018-04-06 NOTE — PROGRESS NOTES
"Type of Visit  EST    Chief Complaint  Nocturia  Urgency and frequency    HPI  Ms. Horn is a 89 y.o. female who follows up with nocturia.  She has had long-standing overactive bladder symptoms for the last 5-10 years.  She has tried Detrol in the past without benefit.  She is somewhat sensitive to side effects.  She denies dysuria or hematuria today.  He also denies fevers and chills      Family History  No family history of urologic cancers    Review of Systems  I reviewed the ROS the patient today.    Nursing Notes:   Milagro Hobson RN  4/6/2018  8:57 AM  Signed  Review of Systems:    Weight loss:    No     Recent fever/chills:  No   Night sweats:   No  Current skin rash:  No   Recent hair loss:  No  Heat intolerance:  No   Cold intolerance:  No  Chest pain:   No   Palpitations:   No  Shortness of breath:  No   Wheezing:   No  Constipation:    No   Diarrhea:   No   Nausea:   No   Vomiting:   No   Kidney/side pain:  No   Back pain:   Yes  Frequent headaches:  Yes   Dizziness:     Yes  Leg swelling:   No   Calf pain:    No      Physical Exam  /72 (BP Location: Right arm, Patient Position: Sitting, Cuff Size: Adult Regular)  Pulse 80  Resp 12  Ht 1.575 m (5' 2\")  Wt 58.2 kg (128 lb 6.4 oz)  BMI 23.48 kg/m2  Constitutional: NAD, WDWN  Head: NCAT  Eyes: Conjunctivae normal  Cardiovascular: Regular rate  Pulmonary/Chest: Respirations are even and non-labored bilaterally  Abdominal: Soft with no distension, tenderness, masses, guarding or CVA tenderness  Neurological: A + O x 3,  cranial nerves II-XII grossly intact  Extremities: BERTRAND x 4, warm, no clubbing, no cyanosis  Skin: Pink, warm, dry with no rash  Psychiatric:  Normal mood and affect  Genitourinary: nonpalpable bladder    Labs  CREATININE (mg/dL)   Date Value   11/16/2016 0.66 (L)     Results for SUSHIL HORN (MRN [id number]) as of 4/6/2018 10:16   4/6/2018 08:57   Color Urine Yellow   Appearance Urine Clear   Glucose Urine Negative   Bilirubin " Urine Negative   Ketones Urine Negative   Specific Gravity Urine 1.015   pH Urine 7.0   Protein Albumin Urine Negative   Urobilinogen Urine 0.2   Nitrite Urine Positive (A)   Blood Urine Negative   Leukocyte Esterase Urine Small (A)   Source Midstream Urine   WBC Urine 0 - 5   RBC Urine O - 2   Bacteria Urine Moderate (A)   Squamous Epithelial /LPF Urine Few       Post-Void Residual  A post-void residual was measured by ultrasonic bladder scanner.  97 mL (previously recorded)  35 mL (previously recorded)    Assessment & Plan  Ms. Horn is a 89 y.o. female with frequency and urgency.  Urinalysis appears to be negative as I believe the positive nitrate is a false positive given the lack of pyuria and clinical symptoms.  I recommended against an anticholinergic for her baseline urinary complaints given the potential for side effects at her age and the lack of benefit with the Detrol trial in the past.  She will follow-up with me as needed.

## 2018-04-06 NOTE — NURSING NOTE
Review of Systems:    Weight loss:    No     Recent fever/chills:  No   Night sweats:   No  Current skin rash:  No   Recent hair loss:  No  Heat intolerance:  No   Cold intolerance:  No  Chest pain:   No   Palpitations:   No  Shortness of breath:  No   Wheezing:   No  Constipation:    No   Diarrhea:   No   Nausea:   No   Vomiting:   No   Kidney/side pain:  No   Back pain:   Yes  Frequent headaches:  Yes   Dizziness:     Yes  Leg swelling:   No   Calf pain:    No

## 2018-04-09 DIAGNOSIS — E03.9 HYPOTHYROIDISM, UNSPECIFIED TYPE: Primary | ICD-10-CM

## 2018-04-09 RX ORDER — LEVOTHYROXINE SODIUM 88 UG/1
88 TABLET ORAL DAILY
Qty: 60 TABLET | Refills: 0 | Status: SHIPPED | OUTPATIENT
Start: 2018-04-09 | End: 2018-06-22

## 2018-04-13 ENCOUNTER — OFFICE VISIT (OUTPATIENT)
Dept: SURGERY | Facility: OTHER | Age: 83
End: 2018-04-13
Attending: NURSE PRACTITIONER
Payer: MEDICARE

## 2018-04-13 VITALS — WEIGHT: 130 LBS | DIASTOLIC BLOOD PRESSURE: 82 MMHG | SYSTOLIC BLOOD PRESSURE: 120 MMHG | BODY MASS INDEX: 23.78 KG/M2

## 2018-04-13 DIAGNOSIS — L98.9 SKIN LESION: Primary | ICD-10-CM

## 2018-04-13 DIAGNOSIS — Z85.828 H/O BASAL CELL CARCINOMA EXCISION: ICD-10-CM

## 2018-04-13 DIAGNOSIS — Z98.890 H/O BASAL CELL CARCINOMA EXCISION: ICD-10-CM

## 2018-04-13 PROCEDURE — G0463 HOSPITAL OUTPT CLINIC VISIT: HCPCS | Mod: 25

## 2018-04-13 PROCEDURE — 99212 OFFICE O/P EST SF 10 MIN: CPT | Performed by: SURGERY

## 2018-04-13 PROCEDURE — G0463 HOSPITAL OUTPT CLINIC VISIT: HCPCS

## 2018-04-13 ASSESSMENT — PAIN SCALES - GENERAL: PAINLEVEL: NO PAIN (0)

## 2018-04-13 NOTE — PROGRESS NOTES
Subjective:  This is a patient with skin lesion on neck. The patient has a skin lesion that was raised. It fell off and feels OK now.  Objective:/82 (BP Location: Right arm, Patient Position: Sitting, Cuff Size: Adult Regular)  Wt 130 lb (59 kg)  Breastfeeding? No  BMI 23.78 kg/m2   There is a 3 mm scabbed area on back of neck.     Assessment:    Skin lesion resolving on nape of neck    Plan:  Given literature on skin cancer. If worsens or recurs, she will follow-up for excision.

## 2018-04-13 NOTE — MR AVS SNAPSHOT
"              After Visit Summary   2018    Geno Horn    MRN: [id number]           Patient Information     Date Of Birth          1926        Visit Information        Provider Department      2018 1:50 PM Roberto Fajardo MD;   SURGERY [facility] [place] [facility]        Today's Diagnoses     Skin lesion    -  1    H/O basal cell carcinoma excision           Follow-ups after your visit        Follow-up notes from your care team     Return if symptoms worsen or fail to improve.      Who to contact     If you have questions or need follow up information about today's clinic visit or your schedule please contact [facility] directly at [phone].  Normal or non-critical lab and imaging results will be communicated to you by MyChart, letter or phone within 4 business days after the clinic has received the results. If you do not hear from us within 7 days, please contact the clinic through MyChart or phone. If you have a critical or abnormal lab result, we will notify you by phone as soon as possible.  Submit refill requests through Ark[employer] or call your pharmacy and they will forward the refill request to us. Please allow 3 business days for your refill to be completed.          Additional Information About Your Visit        MyChart Information     Ark[employer] lets you send messages to your doctor, view your test results, renew your prescriptions, schedule appointments and more. To sign up, go to [email or web address] . Click on \"Log in\" on the left side of the screen, which will take you to the Welcome page. Then click on \"Sign up Now\" on the right side of the page.     You will be asked to enter the access code listed below, as well as some personal information. Please follow the directions to create your username and password.     Your access code is: [email or web address]  Expires: 7/3/2018  8:28 AM     Your access code will  in 90 days. If you need help or a new code, " please call your [facility] clinic or [phone].        Care EveryWhere ID     This is your Care EveryWhere ID. This could be used by other organizations to access your [facility] medical records  [id number]        Your Vitals Were     Breastfeeding? BMI (Body Mass Index)                No 23.78 kg/m2           Blood Pressure from Last 3 Encounters:   04/13/18 120/82   04/06/18 130/72   04/04/18 138/64    Weight from Last 3 Encounters:   04/13/18 130 lb (59 kg)   04/06/18 128 lb 6.4 oz (58.2 kg)   04/04/18 127 lb (57.6 kg)              Today, you had the following     No orders found for display       Primary Care Provider Office Phone # Fax #    Fransisca GENTRY Rocha [phone]       [address] [facility] [address]        Equal Access to Services     [facility]: Hadii aad ku hadasho Soomaali, waaxda luqadaha, qaybta kaalmada adeegyada, rikki charlton hayjimn shay mack . So [facility] [phone].    ATENCIÓN: Si habla español, tiene a hatch disposición servicios gratuitos de asistencia lingüística. Llame al [phone].    We comply with applicable federal civil rights laws and [place] laws. We do not discriminate on the basis of race, color, national origin, age, disability, sex, sexual orientation, or gender identity.            Thank you!     Thank you for choosing [facility] AND [facility]  for your care. Our goal is always to provide you with excellent care. Hearing back from our patients is one way we can continue to improve our services. Please take a few minutes to complete the written survey that you may receive in the mail after your visit with us. Thank you!             Your Updated Medication List - Protect others around you: Learn how to safely use, store and throw away your medicines at www.disposemymeds.org.          This list is accurate as of 4/13/18  1:57 PM.  Always use your most recent med list.                   Brand Name Dispense Instructions for  use Diagnosis    acetaminophen 500 MG tablet    TYLENOL     Take 500 mg by mouth every 6 hours as needed Max acetaminophen dose 4000 mg in 24 hrs        aspirin 81 MG chewable tablet      Take 81 mg by mouth daily with food        diltiazem 180 MG 24 hr capsule    DILACOR XR    90 capsule    Take 1 capsule (180 mg) by mouth daily    Essential hypertension       levothyroxine 88 MCG tablet    SYNTHROID/LEVOTHROID    60 tablet    Take 1 tablet (88 mcg) by mouth daily    Hypothyroidism, unspecified type

## 2018-05-02 ENCOUNTER — TELEPHONE (OUTPATIENT)
Dept: INTERNAL MEDICINE | Facility: OTHER | Age: 83
End: 2018-05-02

## 2018-05-02 DIAGNOSIS — R32 URINARY INCONTINENCE, UNSPECIFIED TYPE: Primary | ICD-10-CM

## 2018-05-02 DIAGNOSIS — R26.81 UNSTEADY GAIT: ICD-10-CM

## 2018-05-02 NOTE — TELEPHONE ENCOUNTER
"Patient stopped by asking if she can have a couple of things sent to [facility].    She is asking for some \"spikes\" for her cane to be sent in. She says that they have these there. (order pending if appropriate)    She is also asking for a rx to be sent to [facility] for some kind of either rubber or disposable underwear. (I did not pend yet-)    She is going to be traveling to see her grandson graduate.   She says to call if there are problems or questions.   Chantal Alfaro LPN...................5/2/2018   11:39 AM   "

## 2018-05-21 ENCOUNTER — OFFICE VISIT (OUTPATIENT)
Dept: INTERNAL MEDICINE | Facility: OTHER | Age: 83
End: 2018-05-21
Attending: NURSE PRACTITIONER
Payer: MEDICARE

## 2018-05-21 VITALS
HEIGHT: 62 IN | WEIGHT: 127.6 LBS | SYSTOLIC BLOOD PRESSURE: 172 MMHG | BODY MASS INDEX: 23.48 KG/M2 | DIASTOLIC BLOOD PRESSURE: 90 MMHG | HEART RATE: 68 BPM | TEMPERATURE: 96.4 F

## 2018-05-21 DIAGNOSIS — E03.9 HYPOTHYROIDISM, UNSPECIFIED TYPE: Primary | ICD-10-CM

## 2018-05-21 DIAGNOSIS — I10 ESSENTIAL HYPERTENSION: ICD-10-CM

## 2018-05-21 DIAGNOSIS — L98.9 SKIN LESION: ICD-10-CM

## 2018-05-21 DIAGNOSIS — R42 DIZZINESS AND GIDDINESS: ICD-10-CM

## 2018-05-21 PROBLEM — R26.81 UNSTEADY GAIT: Status: ACTIVE | Noted: 2018-05-21

## 2018-05-21 LAB
T4 FREE SERPL-MCNC: 0.66 NG/DL (ref 0.6–1.6)
TSH SERPL DL<=0.05 MIU/L-ACNC: 5.58 IU/ML (ref 0.34–5.6)

## 2018-05-21 PROCEDURE — 36415 COLL VENOUS BLD VENIPUNCTURE: CPT | Performed by: NURSE PRACTITIONER

## 2018-05-21 PROCEDURE — 84443 ASSAY THYROID STIM HORMONE: CPT | Performed by: NURSE PRACTITIONER

## 2018-05-21 PROCEDURE — 99214 OFFICE O/P EST MOD 30 MIN: CPT | Performed by: NURSE PRACTITIONER

## 2018-05-21 PROCEDURE — G0463 HOSPITAL OUTPT CLINIC VISIT: HCPCS

## 2018-05-21 PROCEDURE — 84439 ASSAY OF FREE THYROXINE: CPT | Performed by: NURSE PRACTITIONER

## 2018-05-21 ASSESSMENT — PAIN SCALES - GENERAL: PAINLEVEL: NO PAIN (0)

## 2018-05-21 NOTE — MR AVS SNAPSHOT
"              After Visit Summary   2018    Geno Horn    MRN: [id number]           Patient Information     Date Of Birth          1926        Visit Information        Provider Department      2018 9:00 AM Fransisca Rocha NP [facility]        Today's Diagnoses     Hypothyroidism, unspecified type    -  1    Essential hypertension        Dizziness and giddiness        Skin lesion           Follow-ups after your visit        Who to contact     If you have questions or need follow up information about today's clinic visit or your schedule please contact [facility] directly at [phone].  Normal or non-critical lab and imaging results will be communicated to you by Mythoshart, letter or phone within 4 business days after the clinic has received the results. If you do not hear from us within 7 days, please contact the clinic through Mythoshart or phone. If you have a critical or abnormal lab result, we will notify you by phone as soon as possible.  Submit refill requests through Pontaba or call your pharmacy and they will forward the refill request to us. Please allow 3 business days for your refill to be completed.          Additional Information About Your Visit        MyChart Information     Pontaba lets you send messages to your doctor, view your test results, renew your prescriptions, schedule appointments and more. To sign up, go to [email or web address] . Click on \"Log in\" on the left side of the screen, which will take you to the Welcome page. Then click on \"Sign up Now\" on the right side of the page.     You will be asked to enter the access code listed below, as well as some personal information. Please follow the directions to create your username and password.     Your access code is: [email or web address]  Expires: 7/3/2018  8:28 AM     Your access code will  in 90 days. If you need help or a new code, please call your [facility] clinic or " "[phone].        Care EveryWhere ID     This is your Care EveryWhere ID. This could be used by other organizations to access your [place] medical records  [id number]        Your Vitals Were     Pulse Temperature Height Breastfeeding? BMI (Body Mass Index)       68 96.4  F (35.8  C) (Tympanic) 5' 2\" (1.575 m) No 23.34 kg/m2        Blood Pressure from Last 3 Encounters:   05/21/18 172/90   04/13/18 120/82   04/06/18 130/72    Weight from Last 3 Encounters:   05/21/18 127 lb 9.6 oz (57.9 kg)   04/13/18 130 lb (59 kg)   04/06/18 128 lb 6.4 oz (58.2 kg)              We Performed the Following     T4 free     Thyrotropin GH        Primary Care Provider Office Phone # Fax #    Fransisca RINALDI YARY Rocha [phone] [address] [facility] [address]        Equal Access to Services     [facility]: Hadii aad ku hadasho Soomaali, waaxda luqadaha, qaybta kaalmada adeegyada, waxay latesha mack . So [facility] [phone].    ATENCIÓN: Si habla español, tiene a hatch disposición servicios gratuitos de asistencia lingüística. [facility] [phone].    We comply with applicable federal civil rights laws and [place] laws. We do not discriminate on the basis of race, color, national origin, age, disability, sex, sexual orientation, or gender identity.            Thank you!     Thank you for choosing [facility] [place]  for your care. Our goal is always to provide you with excellent care. Hearing back from our patients is one way we can continue to improve our services. Please take a few minutes to complete the written survey that you may receive in the mail after your visit with us. Thank you!             Your Updated Medication List - Protect others around you: Learn how to safely use, store and throw away your medicines at www.disposemymeds.org.          This list is accurate as of 5/21/18  9:37 AM.  Always use your most recent med list.                   Brand " Name Dispense Instructions for use Diagnosis    acetaminophen 500 MG tablet    TYLENOL     Take 500 mg by mouth every 6 hours as needed Max acetaminophen dose 4000 mg in 24 hrs        aspirin 81 MG chewable tablet      Take 81 mg by mouth daily with food        diltiazem 180 MG 24 hr capsule    DILACOR XR    90 capsule    Take 1 capsule (180 mg) by mouth daily    Essential hypertension       levothyroxine 88 MCG tablet    SYNTHROID/LEVOTHROID    60 tablet    Take 1 tablet (88 mcg) by mouth daily    Hypothyroidism, unspecified type       order for DME     1 Units    Equipment being ordered: plastic underpants    Urinary incontinence, unspecified type       order for DME     1 Units    Equipment being ordered: Spikes for cane    Unsteady gait

## 2018-05-21 NOTE — NURSING NOTE
Patient is here for lab work, and would like to talk about balance issues.   Chantal Alfaro LPN...................5/21/2018   9:07 AM

## 2018-05-21 NOTE — PROGRESS NOTES
Subjective:  She is here today for follow-up on dizziness.  She was seen back in April.  She was convinced that the Synthroid dose was the cause of her dizziness.  She tried switching over to taking Synthroid 100 mcg at bedtime for 2 weeks without any improvement.  She had wanted to return back to the 88 mcg tablet.  Since switching back to the 88 mcg tablet once daily she has not noticed any improvement in her dizziness.  She actually now is saying that it is unsteadiness.  Unsteadiness is what has been described in the past however patient was concerned it was medication related.  She is using a cane.  She has not had any falls.  She did have workup back in 2016 with normal CBC chemistry panel.  She did have a fall and hit her head back in 2017.  Head CT was completed and that was negative.  Symptoms have not worsened since that time and were occurring prior to that head injury.  Denies syncope and presyncope.  Denies ROM spinning.  Reports balance feels off and that she is unsteady on her feet.  She also adds that her eyes burn constantly.  She seen her eye doctor with no abnormal findings.  This is been a concern for several years now as well.  Her blood pressure is elevated today.  She did not take Cardizem this morning.  Blood pressure last 3 visits have been within normal range.  Denies chest pain, chest pressure, shortness of breath, weight gain and edema  She also has a skin lesion on the back of her neck that she would like to have checked.  She has seen Dr. Phillip for this and he recommend that it just be followed and follow-up with any changes.  She states it does not feel any different.    Patient Active Problem List   Diagnosis     Allergic rhinitis     Coronary artery disease involving native coronary artery of native heart without angina pectoris     Frequency     Frontal sinusitis     H/O basal cell carcinoma excision     Hearing loss     Hypertension     Hypothyroidism     Osteoarthritis of both hips  "    Urinary urgency     Unsteady gait     Past Medical History:   Diagnosis Date     ST elevation (STEMI) myocardial infarction (H)     ; angioplasty     Past Surgical History:   Procedure Laterality Date     ARTHROPLASTY HIP      Hip Replacement, Total Right, Dr Rodriguez     ARTHROPLASTY HIP      2/23/15,Hip Replacement, total left, Dr. Rodriguez     Social History     Social History     Marital status:      Spouse name: N/A     Number of children: N/A     Years of education: N/A     Occupational History     Not on file.     Social History Main Topics     Smoking status: Never Smoker     Smokeless tobacco: Never Used     Alcohol use No     Drug use: No     Sexual activity: Not on file     Other Topics Concern     Not on file     Social History Narrative    , was admitted to [facility] following her hospitalization in [place] 2/26/15     Family History   Problem Relation Age of Onset     HEART DISEASE Mother 60     Heart Disease, of mi     HEART DISEASE Father 60     Heart Disease, of mi     Current Outpatient Prescriptions   Medication Sig Dispense Refill     acetaminophen (TYLENOL) 500 MG tablet Take 500 mg by mouth every 6 hours as needed Max acetaminophen dose 4000 mg in 24 hrs       aspirin 81 MG chewable tablet Take 81 mg by mouth daily with food       diltiazem (DILACOR XR) 180 MG 24 hr capsule Take 1 capsule (180 mg) by mouth daily 90 capsule 3     levothyroxine (SYNTHROID/LEVOTHROID) 88 MCG tablet Take 1 tablet (88 mcg) by mouth daily 60 tablet 0     order for DME Equipment being ordered: plastic underpants 1 Units 1     order for DME Equipment being ordered: Spikes for cane 1 Units 1     Tolterodine      Review of Systems:  Review of Systems  See HPI    Objective:   /90  Pulse 68  Temp 96.4  F (35.8  C) (Tympanic)  Ht 5' 2\" (1.575 m)  Wt 127 lb 9.6 oz (57.9 kg)  Breastfeeding? No  BMI 23.34 kg/m2  Physical Exam  Pleasant female, no acute distress.  Affect normal.  Alert " and oriented ×4.  Sclera nonicteric.  Conjunctival noninflamed.  Mucous membranes moist.  Neck supple without adenopathy.  No thyromegaly.  Lung fields clear to auscultation.  Cardiovascular regular rate and rhythm, no S3 auscultated.  Extremities without edema.  She is walking with a cane.  Gait is slightly broad-based.  No ataxia area and there is a raised lesion on the back of her neck that is flesh color and somewhat nodular.  No ulceration or irritation.  No change in size when compared to previous  February 2017 head CT and lab workup from April 2018 and November 2016 reviewed and discussed with patient  Assessment:    ICD-10-CM    1. Hypothyroidism, unspecified type E03.9 Thyrotropin GH     T4 free   2. Essential hypertension I10    3. Dizziness and giddiness R42    4. Skin lesion L98.9        Plan:   1.  Patient is on agreement that the Synthroid is not the cause of her symptoms.  Check TSH and free T4 and make adjustments to dose depending upon results  2.  Blood pressure has been well controlled however she did not take her blood pressure medication today.  She will take this when she gets home.  3.  Dizziness actually seems to be more unsteady gait.  She is not having any falls.  Continue to use cane with ambulation.  She has done physical therapy without any changes.  4.  Skin lesion unchanged.  Continue to monitor.      DRU Salcedo   5/21/2018  9:32 AM

## 2018-05-22 ASSESSMENT — PATIENT HEALTH QUESTIONNAIRE - PHQ9: SUM OF ALL RESPONSES TO PHQ QUESTIONS 1-9: 0

## 2018-06-22 DIAGNOSIS — E03.9 HYPOTHYROIDISM, UNSPECIFIED TYPE: ICD-10-CM

## 2018-06-25 RX ORDER — LEVOTHYROXINE SODIUM 88 UG/1
TABLET ORAL
Qty: 180 TABLET | Refills: 3 | Status: SHIPPED | OUTPATIENT
Start: 2018-06-25 | End: 2019-08-12

## 2018-06-25 NOTE — TELEPHONE ENCOUNTER
This is a Refill request from: [id number]  Name of Medication and Dose:  levothyroxine (SYNTHROID) 100 mcg tablet     Quantity requested:  60  Last fill date: 4/9/2018  Last Office Visit:  5/21/2018  Narcotic Agreement Signed:  SANJAY  PCP:  Yulissa Rocha  Associated Diagnosis: Hypothyroidism, unspecified type    Glenda Euceda LPN Supervisor 6/25/2018   7:22 AM

## 2018-07-19 ENCOUNTER — TELEPHONE (OUTPATIENT)
Dept: INTERNAL MEDICINE | Facility: OTHER | Age: 83
End: 2018-07-19

## 2018-07-19 NOTE — TELEPHONE ENCOUNTER
Spoke with patient who confirmed her last name and birth date. Patient was calling to update Fransisca Rocha C-FNMARY on some concerns she has been having in the past couple weeks. Patient stated she has been having increased lightheadedness, pain behind her eyes, nausea, and low back pain. Patient believes these are due to her medications. Patient denies any pain with urination or dysuria, but does state she has lost some bladder control. Patient also stated she cut her leg on a card ochoa at [employer] yesterday, she is aware that Fransisca JETT-HEIDI is out of clinic today and is fine with waiting for her return tomorrow. Also scheduled patient an appointment on Tuesday to discuss her concerns.  Amanda Diaz LPN 7/19/2018 2:56 PM

## 2018-07-24 ENCOUNTER — OFFICE VISIT (OUTPATIENT)
Dept: INTERNAL MEDICINE | Facility: OTHER | Age: 83
End: 2018-07-24
Attending: NURSE PRACTITIONER
Payer: MEDICARE

## 2018-07-24 VITALS
DIASTOLIC BLOOD PRESSURE: 72 MMHG | WEIGHT: 126.4 LBS | SYSTOLIC BLOOD PRESSURE: 118 MMHG | TEMPERATURE: 98.3 F | BODY MASS INDEX: 23.12 KG/M2 | HEART RATE: 64 BPM

## 2018-07-24 DIAGNOSIS — R26.81 UNSTEADY GAIT: ICD-10-CM

## 2018-07-24 DIAGNOSIS — I10 ESSENTIAL HYPERTENSION: Primary | ICD-10-CM

## 2018-07-24 DIAGNOSIS — E03.9 HYPOTHYROIDISM, UNSPECIFIED TYPE: ICD-10-CM
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
## 2018-07-24 DIAGNOSIS — S81.811A SKIN TEAR OF RIGHT LOWER LEG WITHOUT COMPLICATION, INITIAL ENCOUNTER: ICD-10-CM

## 2018-07-24 DIAGNOSIS — R39.15 URINARY URGENCY: ICD-10-CM

## 2018-07-24 PROCEDURE — G0463 HOSPITAL OUTPT CLINIC VISIT: HCPCS

## 2018-07-24 PROCEDURE — 99214 OFFICE O/P EST MOD 30 MIN: CPT | Performed by: NURSE PRACTITIONER

## 2018-07-24 ASSESSMENT — PAIN SCALES - GENERAL: PAINLEVEL: MODERATE PAIN (5)

## 2018-07-24 NOTE — PROGRESS NOTES
Subjective:  She is here today for follow-up and chronic disease management.  She takes Cardizem for hypertension.  She feels that this may be the medication that is causing her to feel unsteady.  She states this normally occurs around noon after she takes her medication.  At one time she thought this was related to Synthroid and changes were made with no improvement.  She states she has held the Cardizem for a day but did not notice any improvement.  She has not had any falls.  Thyroid last checked in May and well controlled with replacement.  She does not need medication refills.  She does have an injury at the right calf.  This occurred over a week ago when she was at [facility] and pulled 1 of the carts out of the cart carrier and scraped her leg.  She got a skin tear.  She leaned the area right away and put a bandage on it.  There was a large bruise surrounding it and that is getting better.  She states that the skin tear seems to have closed.  There is been no drainage.  It is only tender where the bruising remains.  She has been afebrile.  Also would like me to look at a bite on the  fourth digit on the left foot.  This occurred last week.  She states she woke up during the night and it was itching terribly.  The next day she saw that it was red and a blister around the area.  She has been  the 2 toes with a cottonball at night and it is getting better.  It does not itch any longer and there is been no drainage.  She also brings up urinary urgency.  She had been on Detrol before but it was causing side effects.  She gets up around 4 times during the night to urinate.  She does not want to restart the Detrol but just wanted to mention this again.    Patient Active Problem List   Diagnosis     Allergic rhinitis     Coronary artery disease involving native coronary artery of native heart without angina pectoris     Frequency     Frontal sinusitis     H/O basal cell carcinoma excision     Hearing loss      Hypertension     Hypothyroidism     Osteoarthritis of both hips     Urinary urgency     Unsteady gait     Past Medical History:   Diagnosis Date     ST elevation (STEMI) myocardial infarction (H)     ; angioplasty     Past Surgical History:   Procedure Laterality Date     ARTHROPLASTY HIP      Hip Replacement, Total Right, Dr Rodriguez     ARTHROPLASTY HIP      2/23/15,Hip Replacement, total left, Dr. Rodriguez     Social History     Social History     Marital status:      Spouse name: N/A     Number of children: N/A     Years of education: N/A     Occupational History     Not on file.     Social History Main Topics     Smoking status: Never Smoker     Smokeless tobacco: Never Used     Alcohol use No     Drug use: No     Sexual activity: Not on file     Other Topics Concern     Not on file     Social History Narrative    , was admitted to [facility] following her hospitalization in [place] 2/26/15     Family History   Problem Relation Age of Onset     HEART DISEASE Mother 60     Heart Disease, of mi     HEART DISEASE Father 60     Heart Disease, of mi     Current Outpatient Prescriptions   Medication Sig Dispense Refill     aspirin 81 MG chewable tablet Take 81 mg by mouth daily with food       diltiazem (DILACOR XR) 180 MG 24 hr capsule Take 1 capsule (180 mg) by mouth daily 90 capsule 3     levothyroxine (SYNTHROID/LEVOTHROID) 88 MCG tablet TAKE 1 TABLET (88 MCG) BY MOUTH DAILY 180 tablet 3     order for DME Equipment being ordered: plastic underpants 1 Units 1     order for DME Equipment being ordered: Spikes for cane 1 Units 1     Tolterodine      Review of Systems:  Review of Systems  See HPI  Objective:   /72 (BP Location: Right arm, Patient Position: Chair, Cuff Size: Adult Regular)  Pulse 64  Temp 98.3  F (36.8  C) (Tympanic)  Wt 126 lb 6.4 oz (57.3 kg)  Breastfeeding? No  BMI 23.12 kg/m2  Physical Exam  Pleasant female no acute distress.  Affect normal.  Alert and oriented ×4  but she has some mild forgetfulness.  Skin color pink.  Sclera nonicteric.  Mucous members moist.  Neck supple without adenopathy.  Lung fields clear to auscultation.  Cardiovascular regular rate and rhythm, no murmur or S3 auscultated.  Extremities without out edema.  Outer aspect of left fourth digit has a red firm dry blister without drainage.  No tenderness.  Lateral right calf has ecchymosis in different stages of healing.  There is a closed skin tear along the posterior lateral calf that is without drainage.  Trace edema of the right lower extremity.  Laboratory studies from May reviewed and discussed with patient.    Assessment:    ICD-10-CM    1. Essential hypertension I10    2. Hypothyroidism, unspecified type E03.9    3. Unsteady gait R26.81    4. Skin tear of right lower leg without complication, initial encounter S81.811A    5. Bug bite, initial encounter W57.XXXA    6. Urinary urgency R39.15        Plan:   She continues to be concerned the Cardizem is causing her unsteady gait.  She will hold Cardizem for the next 3 days and take her blood pressure without medication and write it down.  See her back again in 1 week to see if holding the Cardizem made a difference and to see how her blood pressure was affected by not taking Cardizem.  Reassurance is provided.  She has had workup in the past for this.  She has had Synthroid adjusted with no improvement.  She has not had any falls.  She has a skin tear that is healing on the right posterior lateral calf.  Ecchymosis continues around the area.  No tenderness.  No evidence of cellulitis.  The bug bite at the fourth digit left foot seems to be improving.  No further treatment at this time but to watch if there is any drainage or worsening.  Discussed urge incontinence.  Discussed medication side effect.  She does not want to be on these medications.    DRU Salcedo   7/24/2018  9:03 AM

## 2018-07-24 NOTE — NURSING NOTE
Patient is here for medications, would like to discuss that they make her dizzy at times. Also injured right leg 2 days ago.   Chantal Alfaro LPN...................7/24/2018   8:42 AM

## 2018-07-24 NOTE — PATIENT INSTRUCTIONS
Start holding the Cardizem today and do not take it tomorrow or Thursday.  Keep record of how you are feeling and see me back next week so we can discuss if you felt any better by not taking them cardizem.  Check your blood pressure tomorrow and Thursday and write them down and bring to next appointment

## 2018-07-24 NOTE — MR AVS SNAPSHOT
After Visit Summary   7/24/2018    Geno Horn    MRN: [id number]           Patient Information     Date Of Birth          9/21/1926        Visit Information        Provider Department      7/24/2018 8:40 AM Fransisca Rocha NP [facility]        Today's Diagnoses     Essential hypertension    -  1    Hypothyroidism, unspecified type        Unsteady gait        Skin tear of right lower leg without complication, initial encounter        Bug bite, initial encounter        Urinary urgency          Care Instructions    Start holding the Cardizem today and do not take it tomorrow or Thursday.  Keep record of how you are feeling and see me back next week so we can discuss if you felt any better by not taking them cardizem.  Check your blood pressure tomorrow and Thursday and write them down and bring to next appointment          Follow-ups after your visit        Your next 10 appointments already scheduled     Aug 01, 2018  1:00 PM CDT   [facility] with [facility]    [address]  [place] [id number] [phone]              Who to contact     If you have questions or need follow up information about today's clinic visit or your schedule please contact [facility] directly at [phone].  Normal or non-critical lab and imaging results will be communicated to you by MyChart, letter or phone within 4 business days after the clinic has received the results. If you do not hear from us within 7 days, please contact the clinic through MyChart or phone. If you have a critical or abnormal lab result, we will notify you by phone as soon as possible.  Submit refill requests through MDSave or call your pharmacy and they will forward the refill request to us. Please allow 3 business days for your refill to be completed.          Additional Information About Your Visit         Care EveryWhere ID     This is your Care EveryWhere ID. This could be used by other organizations to access your [place] medical records  [id number]        Your Vitals Were     Pulse Temperature Breastfeeding? BMI (Body Mass Index)          64 98.3  F (36.8  C) (Tympanic) No 23.12 kg/m2         Blood Pressure from Last 3 Encounters:   07/24/18 118/72   05/21/18 172/90   04/13/18 120/82    Weight from Last 3 Encounters:   07/24/18 57.3 kg (126 lb 6.4 oz)   05/21/18 57.9 kg (127 lb 9.6 oz)   04/13/18 59 kg (130 lb)              Today, you had the following     No orders found for display       Primary Care Provider Office Phone # Fax #    Fransisca GENTRY Rocha [phone] [address] [facility] [address]        Equal Access to Services     [facility] : Hadii aad ku hadasho Soomaali, waaxda luqadaha, qaybta kaalmada adeegyada, waxay latesha mack . So [facility] [phone].    ATENCIÓN: Si habla español, tiene a hatch disposición servicios gratuitos de asistencia lingüística. Vida al [phone].    We comply with applicable federal civil rights laws and [place] laws. We do not discriminate on the basis of race, color, national origin, age, disability, sex, sexual orientation, or gender identity.            Thank you!     Thank you for choosing [facility] AND [facility]  for your care. Our goal is always to provide you with excellent care. Hearing back from our patients is one way we can continue to improve our services. Please take a few minutes to complete the written survey that you may receive in the mail after your visit with us. Thank you!             Your Updated Medication List - Protect others around you: Learn how to safely use, store and throw away your medicines at www.disposemymeds.org.          This list is accurate as of 7/24/18  9:04 AM.  Always use your most recent med list.                   Brand Name Dispense Instructions for use  Diagnosis    aspirin 81 MG chewable tablet      Take 81 mg by mouth daily with food        diltiazem 180 MG 24 hr capsule    DILACOR XR    90 capsule    Take 1 capsule (180 mg) by mouth daily    Essential hypertension       levothyroxine 88 MCG tablet    SYNTHROID/LEVOTHROID    180 tablet    TAKE 1 TABLET (88 MCG) BY MOUTH DAILY    Hypothyroidism, unspecified type       order for DME     1 Units    Equipment being ordered: plastic underpants    Urinary incontinence, unspecified type       order for DME     1 Units    Equipment being ordered: Spikes for cane    Unsteady gait

## 2018-07-25 ASSESSMENT — PATIENT HEALTH QUESTIONNAIRE - PHQ9: SUM OF ALL RESPONSES TO PHQ QUESTIONS 1-9: 0

## 2018-08-01 ENCOUNTER — OFFICE VISIT (OUTPATIENT)
Dept: INTERNAL MEDICINE | Facility: OTHER | Age: 83
End: 2018-08-01
Attending: NURSE PRACTITIONER
Payer: MEDICARE

## 2018-08-01 VITALS
BODY MASS INDEX: 23.36 KG/M2 | HEART RATE: 76 BPM | WEIGHT: 127.7 LBS | SYSTOLIC BLOOD PRESSURE: 136 MMHG | DIASTOLIC BLOOD PRESSURE: 70 MMHG | TEMPERATURE: 98.4 F

## 2018-08-01 DIAGNOSIS — R26.81 UNSTEADY GAIT: ICD-10-CM

## 2018-08-01 DIAGNOSIS — S81.811D SKIN TEAR OF LOWER LEG WITHOUT COMPLICATION, RIGHT, SUBSEQUENT ENCOUNTER: ICD-10-CM

## 2018-08-01 DIAGNOSIS — I10 ESSENTIAL HYPERTENSION: Primary | ICD-10-CM

## 2018-08-01 PROCEDURE — G0463 HOSPITAL OUTPT CLINIC VISIT: HCPCS

## 2018-08-01 PROCEDURE — 99214 OFFICE O/P EST MOD 30 MIN: CPT | Performed by: NURSE PRACTITIONER

## 2018-08-01 NOTE — MR AVS SNAPSHOT
After Visit Summary   8/1/2018    Geno Horn    MRN: [id number]           Patient Information     Date Of Birth          9/21/1926        Visit Information        Provider Department      8/1/2018 1:00 PM Fransisca Rocha NP [facility]        Today's Diagnoses     Essential hypertension    -  1    Unsteady gait        Skin tear of lower leg without complication, right, subsequent encounter           Follow-ups after your visit        Your next 10 appointments already scheduled     Aug 15, 2018  9:40 AM CDT   SHORT with Fransisca Rocha NP   [facility] ([facility])    [address]  [place] [address] [phone]              Who to contact     If you have questions or need follow up information about today's clinic visit or your schedule please contact [facility] directly at [phone].  Normal or non-critical lab and imaging results will be communicated to you by MyChart, letter or phone within 4 business days after the clinic has received the results. If you do not hear from us within 7 days, please contact the clinic through MyChart or phone. If you have a critical or abnormal lab result, we will notify you by phone as soon as possible.  Submit refill requests through [employer] or call your pharmacy and they will forward the refill request to us. Please allow 3 business days for your refill to be completed.          Additional Information About Your Visit        Care EveryWhere ID     This is your Care EveryWhere ID. This could be used by other organizations to access your [place] medical records  [id number]        Your Vitals Were     Pulse Temperature Breastfeeding? BMI (Body Mass Index)          76 98.4  F (36.9  C) (Tympanic) No 23.36 kg/m2         Blood Pressure from Last 3 Encounters:   08/01/18 136/70   07/24/18 118/72   05/21/18 172/90    Weight from Last 3 Encounters:    08/01/18 127 lb 11.2 oz (57.9 kg)   07/24/18 126 lb 6.4 oz (57.3 kg)   05/21/18 127 lb 9.6 oz (57.9 kg)              Today, you had the following     No orders found for display       Primary Care Provider Office Phone # Fax #    Fransisca Rocha [phone]       [address]  [place] [address]        Equal Access to Services     [facility] HospitalGEOVANNI : Hadii aad ku hadasho Soomaali, waaxda luqadaha, qaybta kaalmada adeegyada, waxay idiin hayaan adeeg brandiaraelise mack . So [facility] [phone].    ATENCIÓN: Si habla español, tiene a hatch disposición servicios gratuitos de asistencia lingüística. LlSelect [facility]ty [facility] [phone].    We comply with applicable federal civil rights laws and [place] laws. We do not discriminate on the basis of race, color, national origin, age, disability, sex, sexual orientation, or gender identity.            Thank you!     Thank you for choosing [facility]  for your care. Our goal is always to provide you with excellent care. Hearing back from our patients is one way we can continue to improve our services. Please take a few minutes to complete the written survey that you may receive in the mail after your visit with us. Thank you!             Your Updated Medication List - Protect others around you: Learn how to safely use, store and throw away your medicines at www.disposemymeds.org.          This list is accurate as of 8/1/18  1:19 PM.  Always use your most recent med list.                   Brand Name Dispense Instructions for use Diagnosis    aspirin 81 MG chewable tablet      Take 81 mg by mouth daily with food        diltiazem 180 MG 24 hr capsule    DILACOR XR    90 capsule    Take 1 capsule (180 mg) by mouth daily    Essential hypertension       levothyroxine 88 MCG tablet    SYNTHROID/LEVOTHROID    180 tablet    TAKE 1 TABLET (88 MCG) BY MOUTH DAILY    Hypothyroidism, unspecified type       order for DME     1 Units    Equipment being  ordered: plastic underpants    Urinary incontinence, unspecified type       order for DME     1 Units    Equipment being ordered: Spikes for cane    Unsteady gait

## 2018-08-01 NOTE — NURSING NOTE
Patient is here for a follow up and would like to talk about right leg  Chantal Alfaro LPN...................8/1/2018   1:02 PM

## 2018-08-01 NOTE — PROGRESS NOTES
Subjective:  She is here today for follow-up.  She was seen last week and requesting to hold the Cardizem to see if her high discomfort, headache and unsteady gait was caused by the Cardizem.  She held the medication for 3 days.  She only checked her blood pressure 1 day and it was still well controlled.  She did not notice that any of the symptoms she is experience stopped with holding the Cardizem.  We have also changed her thyroid dose without any improvement.  She seen an eye doctor.  She has had other workup that is been negative.  She did have a fall a few weeks ago and scraped the back of her leg.  She has been applying hydrogen peroxide but now it started draining.  The leg is swollen from the injury.  It is not red or warm.  The drainage is not purulent.  Denies fever and chills.    Patient Active Problem List   Diagnosis     Allergic rhinitis     Coronary artery disease involving native coronary artery of native heart without angina pectoris     Frequency     Frontal sinusitis     H/O basal cell carcinoma excision     Hearing loss     Hypertension     Hypothyroidism     Osteoarthritis of both hips     Urinary urgency     Unsteady gait     Past Medical History:   Diagnosis Date     ST elevation (STEMI) myocardial infarction (H)     1981; angioplasty     Past Surgical History:   Procedure Laterality Date     ARTHROPLASTY HIP      Hip Replacement, Total Right, Dr Rodriguez     ARTHROPLASTY HIP      2/23/15,Hip Replacement, total left, Dr. Rodriguez     Social History     Social History     Marital status:      Spouse name: N/A     Number of children: N/A     Years of education: N/A     Occupational History     Not on file.     Social History Main Topics     Smoking status: Never Smoker     Smokeless tobacco: Never Used     Alcohol use No     Drug use: No     Sexual activity: Not on file     Other Topics Concern     Not on file     Social History Narrative    , was admitted to [facility] following her  hospitalization in [place] 2/26/15     Family History   Problem Relation Age of Onset     HEART DISEASE Mother 60     Heart Disease, of mi     HEART DISEASE Father 60     Heart Disease, of mi     Current Outpatient Prescriptions   Medication Sig Dispense Refill     aspirin 81 MG chewable tablet Take 81 mg by mouth daily with food       diltiazem (DILACOR XR) 180 MG 24 hr capsule Take 1 capsule (180 mg) by mouth daily 90 capsule 3     levothyroxine (SYNTHROID/LEVOTHROID) 88 MCG tablet TAKE 1 TABLET (88 MCG) BY MOUTH DAILY 180 tablet 3     order for DME Equipment being ordered: plastic underpants 1 Units 1     order for DME Equipment being ordered: Spikes for cane 1 Units 1     Tolterodine      Review of Systems:  Review of Systems  See HPI    Objective:   /70 (BP Location: Right arm, Patient Position: Chair, Cuff Size: Adult Regular)  Pulse 76  Temp 98.4  F (36.9  C) (Tympanic)  Wt 127 lb 11.2 oz (57.9 kg)  Breastfeeding? No  BMI 23.36 kg/m2  Physical Exam  Pleasant female no acute distress.  Affect normal.  Alert and oriented.  Mild forgetfulness.  Sclera nonicteric.  Conjunctiva noninflamed.  Lung fields clear to auscultation.  Cardiovascular regular rate and rhythm.  Right lower extremity with trace edema.  Left lower extremity without edema.  Right calf has ecchymosis and a skin tear which is covered in yellow brown necrotic tissue and some dry granulation tissue.  Wound measures 4 x 7.5 cm.  Scant amount of serosanguineous nonodorous drainage.  Skin tear is cleansed with saline wash, triple antibiotic ointment applied, Telfa applied and secured with paper tape.    Assessment:    ICD-10-CM    1. Essential hypertension I10    2. Unsteady gait R26.81    3. Skin tear of lower leg without complication, right, subsequent encounter S81.811D        Plan:   1.  She did not notice any relief in her unsteady gait and visual dysfunction with holding the Cardizem.  She will continue with taking the  Cardizem.  Will also continue with the Synthroid as currently prescribed.  Explained to patient that her symptoms that have been occurring for years or not related to her medication.  Reassurance once again provided.  2.  She will discontinue hydrogen peroxide ointment to right skin leg skin tear.  Instead cleanse wound during showering.  Apply triple antibiotic ointment followed by Telfa and paper tape.  Follow-up in 2 weeks.  Monitor closely for evidence of infection and to be seen with any concerns.    DRU Salcedo   8/1/2018  1:15 PM

## 2018-08-21 ENCOUNTER — OFFICE VISIT (OUTPATIENT)
Dept: INTERNAL MEDICINE | Facility: OTHER | Age: 83
End: 2018-08-21
Attending: NURSE PRACTITIONER
Payer: MEDICARE

## 2018-08-21 VITALS
TEMPERATURE: 98.5 F | SYSTOLIC BLOOD PRESSURE: 124 MMHG | HEART RATE: 76 BPM | WEIGHT: 128.1 LBS | BODY MASS INDEX: 23.43 KG/M2 | DIASTOLIC BLOOD PRESSURE: 72 MMHG

## 2018-08-21 DIAGNOSIS — S81.811D SKIN TEAR OF LOWER LEG WITHOUT COMPLICATION, RIGHT, SUBSEQUENT ENCOUNTER: Primary | ICD-10-CM

## 2018-08-21 DIAGNOSIS — R26.81 UNSTEADY GAIT: ICD-10-CM

## 2018-08-21 PROCEDURE — 99213 OFFICE O/P EST LOW 20 MIN: CPT | Performed by: NURSE PRACTITIONER

## 2018-08-21 PROCEDURE — G0463 HOSPITAL OUTPT CLINIC VISIT: HCPCS

## 2018-08-21 ASSESSMENT — PAIN SCALES - GENERAL: PAINLEVEL: NO PAIN (0)

## 2018-08-21 NOTE — NURSING NOTE
"Chief Complaint   Patient presents with     Follow Up For     Medications,Nasal Issues, Right Leg        Initial /72 (BP Location: Right arm, Patient Position: Chair, Cuff Size: Adult Regular)  Pulse 76  Temp 98.5  F (36.9  C) (Tympanic)  Wt 128 lb 1.6 oz (58.1 kg)  BMI 23.43 kg/m2 Estimated body mass index is 23.43 kg/(m^2) as calculated from the following:    Height as of 5/21/18: 5' 2\" (1.575 m).    Weight as of this encounter: 128 lb 1.6 oz (58.1 kg).  Medication Reconciliation: complete         Chantal Alfaro LPN on 8/21/2018 at 1:45 PM    "

## 2018-08-21 NOTE — PROGRESS NOTES
Subjective:  She is here today to follow-up on skin tear of right lower leg.  She has been applying antibiotic ointment and a dry gauze.  She states is almost all resolved.  There is no pain or drainage.  There was a scab there 3 days ago but that fell off.  She continues to have the unsteady gait.  She has history of bilateral hip replacement in the past.  She states since that time her gait has been unsteady.  She uses a cane.  She wants to be reassured that the Synthroid and diltiazem are not causing the  unsteady gait.  She does not have blurred vision, double vision, lightheadedness nor room spinning.  She does not have any difficulty with driving.  She just passed a safe driving course a couple of months ago.    Patient Active Problem List   Diagnosis     Allergic rhinitis     Coronary artery disease involving native coronary artery of native heart without angina pectoris     Frequency     Frontal sinusitis     H/O basal cell carcinoma excision     Hearing loss     Hypertension     Hypothyroidism     Osteoarthritis of both hips     Urinary urgency     Unsteady gait     Past Medical History:   Diagnosis Date     ST elevation (STEMI) myocardial infarction (H)     1981; angioplasty     Past Surgical History:   Procedure Laterality Date     ARTHROPLASTY HIP      Hip Replacement, Total Right, Dr Rodriguez     ARTHROPLASTY HIP      2/23/15,Hip Replacement, total left, Dr. Rodriguez     Social History     Social History     Marital status:      Spouse name: N/A     Number of children: N/A     Years of education: N/A     Occupational History     Not on file.     Social History Main Topics     Smoking status: Never Smoker     Smokeless tobacco: Never Used     Alcohol use No     Drug use: No     Sexual activity: Not on file     Other Topics Concern     Not on file     Social History Narrative    , was admitted to [facility] following her hospitalization in [place] 2/26/15     Family History   Problem Relation  Age of Onset     HEART DISEASE Mother 60     Heart Disease, of mi     HEART DISEASE Father 60     Heart Disease, of mi     Current Outpatient Prescriptions   Medication Sig Dispense Refill     aspirin 81 MG chewable tablet Take 81 mg by mouth daily with food       diltiazem (DILACOR XR) 180 MG 24 hr capsule Take 1 capsule (180 mg) by mouth daily 90 capsule 3     levothyroxine (SYNTHROID/LEVOTHROID) 88 MCG tablet TAKE 1 TABLET (88 MCG) BY MOUTH DAILY 180 tablet 3     order for DME Equipment being ordered: plastic underpants 1 Units 1     order for DME Equipment being ordered: Spikes for cane 1 Units 1     Tolterodine      Review of Systems:  Review of Systems  See HPI    Objective:   /72 (BP Location: Right arm, Patient Position: Chair, Cuff Size: Adult Regular)  Pulse 76  Temp 98.5  F (36.9  C) (Tympanic)  Wt 128 lb 1.6 oz (58.1 kg)  BMI 23.43 kg/m2  Physical Exam  Pleasant female no acute distress.  Affect normal.  Alert and oriented ×4.  Skin color pink.  Mucous members moist.  Lung fields clear to auscultation.  Cardiovascular regular.  Right lateral leg wound has healed.  Small amount of ecchymosis remaining.    Assessment:    ICD-10-CM    1. Skin tear of lower leg without complication, right, subsequent encounter S81.811D    2. Unsteady gait R26.81        Plan:   1.  Skin tear has healed.  No further treatment warranted at this time.  2.  Reassurance provided that her medications or not the cause of her unsteady gait.  She should continue to use cane with ambulation.    DRU Salcedo   2018  3:51 PM

## 2018-10-08 ENCOUNTER — OFFICE VISIT (OUTPATIENT)
Dept: INTERNAL MEDICINE | Facility: OTHER | Age: 83
End: 2018-10-08
Attending: NURSE PRACTITIONER
Payer: MEDICARE

## 2018-10-08 VITALS
SYSTOLIC BLOOD PRESSURE: 154 MMHG | WEIGHT: 129.2 LBS | BODY MASS INDEX: 23.63 KG/M2 | TEMPERATURE: 96.4 F | DIASTOLIC BLOOD PRESSURE: 90 MMHG

## 2018-10-08 DIAGNOSIS — H57.13 EYE PAIN, BILATERAL: ICD-10-CM

## 2018-10-08 DIAGNOSIS — I10 ESSENTIAL HYPERTENSION: Primary | ICD-10-CM

## 2018-10-08 DIAGNOSIS — R42 DIZZINESS AND GIDDINESS: ICD-10-CM

## 2018-10-08 DIAGNOSIS — R26.81 UNSTEADY GAIT: ICD-10-CM

## 2018-10-08 DIAGNOSIS — H93.8X3 SENSATION OF PLUGGED EAR ON BOTH SIDES: ICD-10-CM

## 2018-10-08 PROCEDURE — 69210 REMOVE IMPACTED EAR WAX UNI: CPT | Performed by: NURSE PRACTITIONER

## 2018-10-08 PROCEDURE — 99214 OFFICE O/P EST MOD 30 MIN: CPT | Mod: 25 | Performed by: NURSE PRACTITIONER

## 2018-10-08 PROCEDURE — G0463 HOSPITAL OUTPT CLINIC VISIT: HCPCS

## 2018-10-08 PROCEDURE — G0463 HOSPITAL OUTPT CLINIC VISIT: HCPCS | Mod: 25

## 2018-10-08 ASSESSMENT — PAIN SCALES - GENERAL: PAINLEVEL: NO PAIN (0)

## 2018-10-08 NOTE — MR AVS SNAPSHOT
After Visit Summary   10/8/2018    Geno Horn    MRN: [id number]           Patient Information     Date Of Birth          9/21/1926        Visit Information        Provider Department      10/8/2018 8:00 AM Fransisca Rocha NP [facility]        Today's Diagnoses     Essential hypertension    -  1    Dizziness and giddiness        Unsteady gait        Eye pain, bilateral        Sensation of plugged ear on both sides           Follow-ups after your visit        Additional Services     INTERNAL MEDICINE REFERRAL       Dr Flores, second opinion                  Who to contact     If you have questions or need follow up information about today's clinic visit or your schedule please contact [facility] directly at [phone].  Normal or non-critical lab and imaging results will be communicated to you by MyChart, letter or phone within 4 business days after the clinic has received the results. If you do not hear from us within 7 days, please contact the clinic through MyChart or phone. If you have a critical or abnormal lab result, we will notify you by phone as soon as possible.  Submit refill requests through [employer] or call your pharmacy and they will forward the refill request to us. Please allow 3 business days for your refill to be completed.          Additional Information About Your Visit        Care EveryWhere ID     This is your Care EveryWhere ID. This could be used by other organizations to access your [place] medical records  [id number]        Your Vitals Were     Temperature Breastfeeding? BMI (Body Mass Index)             96.4  F (35.8  C) (Tympanic) No 23.63 kg/m2          Blood Pressure from Last 3 Encounters:   10/08/18 154/90   08/21/18 124/72   08/01/18 136/70    Weight from Last 3 Encounters:   10/08/18 129 lb 3.2 oz (58.6 kg)   08/21/18 128 lb 1.6 oz (58.1 kg)   08/01/18 127 lb 11.2 oz (57.9 kg)              We Performed the  Following     INTERNAL MEDICINE REFERRAL     REMOVE HARVEY OCONNELL        Primary Care Provider Office Phone # Fax #    Fransisca RochaYARY [phone]       [address] [facility] [address]        Equal Access to Services     NANI SHULTZ : Hadii liana ruiz hadsalazaro Soomaali, waaxda luqadaha, qaybta kaalmada adeegyada, waxyovani latesha germann shay pazkanikaelise mack . So [facility] [phone].    ATENCIÓN: Si habla español, tiene a hatch disposición servicios gratuitos de asistencia lingüística. Llame al [phone].    We comply with applicable federal civil rights laws and [place] laws. We do not discriminate on the basis of race, color, national origin, age, disability, sex, sexual orientation, or gender identity.            Thank you!     Thank you for choosing [facility]  for your care. Our goal is always to provide you with excellent care. Hearing back from our patients is one way we can continue to improve our services. Please take a few minutes to complete the written survey that you may receive in the mail after your visit with us. Thank you!             Your Updated Medication List - Protect others around you: Learn how to safely use, store and throw away your medicines at www.disposemymeds.org.          This list is accurate as of 10/8/18  8:38 AM.  Always use your most recent med list.                   Brand Name Dispense Instructions for use Diagnosis    aspirin 81 MG chewable tablet      Take 81 mg by mouth daily with food        diltiazem 180 MG 24 hr capsule    DILACOR XR    90 capsule    Take 1 capsule (180 mg) by mouth daily    Essential hypertension       levothyroxine 88 MCG tablet    SYNTHROID/LEVOTHROID    180 tablet    TAKE 1 TABLET (88 MCG) BY MOUTH DAILY    Hypothyroidism, unspecified type       order for DME     1 Units    Equipment being ordered: plastic underpants    Urinary incontinence, unspecified type       order for DME     1 Units     Equipment being ordered: Spikes for cane    Unsteady gait

## 2018-10-08 NOTE — PROGRESS NOTES
Subjective:  She is here today to have her ears washed.  She states that she wears hearing aids and was told she needed to come to the clinic to have these cleaned because of cerumen buildup.  She also reports that she stopped taking the Cardizem and Synthroid.  She is convinced that these have caused her to have lightheadedness.  She stopped these a couple weeks ago.  She did not notice any improvement in the lightheadedness.  The lightheadedness only occurs with ambulation.  She feels off balance.  It does not feel like the room is spinning.  It does not feel like she is going to pass out or lose consciousness.  She does not blackout.  She states her balance feels off and she needs to use a cane.  We have stopped Cardizem and Synthroid multiple times in the past without any improvement in her symptoms.  She has been referred to physical therapy but she did not feel that made any improvement.  She feels better if she uses the cane.  She began reporting this in July 2016 and it seemed to occur after a left ankle injury.  She has had negative CT of the head and unremarkable lab workup.  She also reports there is pain behind her eyes.  She has seen Dr. Emmanuel who could not find any cause for her eye pain.  She does not have visual changes or blurred vision.  She does report that her symptoms seem to worsen when she was on Benadryl and over-the-counter antihistamines.  She states that she felt like she was floating when she stood up.  She stopped taking all antihistamines and that sensation resolved.  She only feels this way with ambulation as far as the unstable gait and what she calls lightheadedness.  She has not had any falls.  There has been no chest pain, chest pressure or shortness of breath.  She has had no GI or  bleeding.  No head injuries.      Patient Active Problem List   Diagnosis     Allergic rhinitis     Coronary artery disease involving native coronary artery of native heart without angina pectoris      Frequency     Frontal sinusitis     H/O basal cell carcinoma excision     Hearing loss     Hypertension     Hypothyroidism     Osteoarthritis of both hips     Urinary urgency     Unsteady gait     Past Medical History:   Diagnosis Date     ST elevation (STEMI) myocardial infarction (H)     ; angioplasty     Past Surgical History:   Procedure Laterality Date     ARTHROPLASTY HIP      Hip Replacement, Total Right, Dr Rodriguez     ARTHROPLASTY HIP      2/23/15,Hip Replacement, total left, Dr. Rodriguez     Social History     Social History     Marital status:      Spouse name: N/A     Number of children: N/A     Years of education: N/A     Occupational History     Not on file.     Social History Main Topics     Smoking status: Never Smoker     Smokeless tobacco: Never Used     Alcohol use No     Drug use: No     Sexual activity: Not on file     Other Topics Concern     Not on file     Social History Narrative    , was admitted to [facility] following her hospitalization in [place] 2/26/15     Family History   Problem Relation Age of Onset     HEART DISEASE Mother 60     Heart Disease, of mi     HEART DISEASE Father 60     Heart Disease, of mi     Current Outpatient Prescriptions   Medication Sig Dispense Refill     aspirin 81 MG chewable tablet Take 81 mg by mouth daily with food       diltiazem (DILACOR XR) 180 MG 24 hr capsule Take 1 capsule (180 mg) by mouth daily 90 capsule 3     levothyroxine (SYNTHROID/LEVOTHROID) 88 MCG tablet TAKE 1 TABLET (88 MCG) BY MOUTH DAILY 180 tablet 3     order for DME Equipment being ordered: plastic underpants 1 Units 1     order for DME Equipment being ordered: Spikes for cane 1 Units 1     Tolterodine      Review of Systems:  Review of Systems  See HPI  Objective:   /90 (BP Location: Right arm, Patient Position: Chair, Cuff Size: Adult Regular)  Temp 96.4  F (35.8  C) (Tympanic)  Wt 129 lb 3.2 oz (58.6 kg)  Breastfeeding? No  BMI 23.63  kg/m2  Physical Exam  Pleasant female no acute distress.  Alert and oriented x4 but somewhat forgetful.  No pain with palpation to the eyes or the periorbital region.  No pain with palpation to the sinuses.  TMs clear bilaterally.  Nasal mucosa is pink without swelling.  Oral mucosa pink and moist.  Throat without erythema.  Neck supple without adenopathy.  No thyromegaly.  Lung fields clear to auscultation throughout.  Cardiovascular regular rate and rhythm with no murmurs, clicks or rubs.  No S3.  Bilateral knees and ankles with full range of motion.  No pain with palpation to the toes or the feet.  Normal range of motion.  She does walk with a slight limp and short step.    Assessment:    ICD-10-CM    1. Essential hypertension I10    2. Dizziness and giddiness R42 INTERNAL MEDICINE REFERRAL   3. Unsteady gait R26.81 INTERNAL MEDICINE REFERRAL   4. Eye pain, bilateral H57.13 INTERNAL MEDICINE REFERRAL   5. Sensation of plugged ear on both sides H93.8X3 REMOVE IMPACTED CERUMEN       Plan:   1.  Patient's ears are flushed at her request.  2.  I encouraged her to restart the Cardizem and the Synthroid as we know this is not the cause of her symptoms.  This seems to be more of a balance issue which she did not find improvement with physical therapy according to her reports.  I would like her to get a second opinion regarding the eye pain, unsteady gait, what she refers to as lightheadedness.  I will set her up to see Dr. Flores.  Patient is in agreement and would like to proceed with second opinion.  She will avoid any over-the-counter antihistamines especially Benadryl.      DRU Salcedo   10/8/2018  8:29 AM

## 2018-10-08 NOTE — NURSING NOTE
"Chief Complaint   Patient presents with     Dizziness     Lightheaded,Balance Issue        Initial /80 (BP Location: Right arm, Patient Position: Chair, Cuff Size: Adult Regular)  Temp 96.4  F (35.8  C) (Tympanic)  Wt 129 lb 3.2 oz (58.6 kg)  Breastfeeding? No  BMI 23.63 kg/m2 Estimated body mass index is 23.63 kg/(m^2) as calculated from the following:    Height as of 5/21/18: 5' 2\" (1.575 m).    Weight as of this encounter: 129 lb 3.2 oz (58.6 kg).  Medication Reconciliation: complete      Chantal Alfaro LPN on 10/8/2018 at 8:01 AM    "

## 2018-10-08 NOTE — NURSING NOTE
Both canals were irrigated with body-temperaturetap water with the jet of water directed superiorly.  Both ear canals were then re-examined and cleared of the impaction.  The patient tolerated the procedure well.  Chantal Alfaro .Anupama..................  10/8/2018   8:55 AM

## 2018-10-09 ASSESSMENT — PATIENT HEALTH QUESTIONNAIRE - PHQ9: SUM OF ALL RESPONSES TO PHQ QUESTIONS 1-9: 0

## 2018-10-12 ENCOUNTER — OFFICE VISIT (OUTPATIENT)
Dept: INTERNAL MEDICINE | Facility: OTHER | Age: 83
End: 2018-10-12
Attending: NURSE PRACTITIONER
Payer: MEDICARE

## 2018-10-12 VITALS
DIASTOLIC BLOOD PRESSURE: 72 MMHG | HEART RATE: 76 BPM | HEIGHT: 68 IN | BODY MASS INDEX: 19.7 KG/M2 | SYSTOLIC BLOOD PRESSURE: 110 MMHG | WEIGHT: 130 LBS

## 2018-10-12 DIAGNOSIS — R26.81 UNSTEADY GAIT: Primary | ICD-10-CM

## 2018-10-12 DIAGNOSIS — H57.13 OCULAR PAIN, BILATERAL: ICD-10-CM

## 2018-10-12 DIAGNOSIS — I25.10 CORONARY ARTERY DISEASE INVOLVING NATIVE CORONARY ARTERY OF NATIVE HEART WITHOUT ANGINA PECTORIS: ICD-10-CM

## 2018-10-12 DIAGNOSIS — E03.9 HYPOTHYROIDISM, UNSPECIFIED TYPE: ICD-10-CM

## 2018-10-12 LAB
ALBUMIN SERPL-MCNC: 4.4 G/DL (ref 3.5–5.7)
ALP SERPL-CCNC: 41 U/L (ref 34–104)
ALT SERPL W P-5'-P-CCNC: 14 U/L (ref 7–52)
ANION GAP SERPL CALCULATED.3IONS-SCNC: 7 MMOL/L (ref 3–14)
AST SERPL W P-5'-P-CCNC: 21 U/L (ref 13–39)
BILIRUB SERPL-MCNC: 0.5 MG/DL (ref 0.3–1)
BUN SERPL-MCNC: 16 MG/DL (ref 7–25)
CALCIUM SERPL-MCNC: 9.1 MG/DL (ref 8.6–10.3)
CHLORIDE SERPL-SCNC: 98 MMOL/L (ref 98–107)
CO2 SERPL-SCNC: 27 MMOL/L (ref 21–31)
CREAT SERPL-MCNC: 0.76 MG/DL (ref 0.6–1.2)
ERYTHROCYTE [DISTWIDTH] IN BLOOD BY AUTOMATED COUNT: 13.9 % (ref 10–15)
ERYTHROCYTE [SEDIMENTATION RATE] IN BLOOD BY WESTERGREN METHOD: 22 MM/H (ref 1–15)
GFR SERPL CREATININE-BSD FRML MDRD: 71 ML/MIN/1.7M2
GLUCOSE SERPL-MCNC: 119 MG/DL (ref 70–105)
HCT VFR BLD AUTO: 35.8 % (ref 35–47)
HGB BLD-MCNC: 11.9 G/DL (ref 11.7–15.7)
MCH RBC QN AUTO: 33 PG (ref 26.5–33)
MCHC RBC AUTO-ENTMCNC: 33.2 G/DL (ref 31.5–36.5)
MCV RBC AUTO: 99 FL (ref 78–100)
PLATELET # BLD AUTO: 266 10E9/L (ref 150–450)
POTASSIUM SERPL-SCNC: 3.8 MMOL/L (ref 3.5–5.1)
PROT SERPL-MCNC: 6.9 G/DL (ref 6.4–8.9)
RBC # BLD AUTO: 3.61 10E12/L (ref 3.8–5.2)
SODIUM SERPL-SCNC: 132 MMOL/L (ref 134–144)
T4 FREE SERPL-MCNC: 0.45 NG/DL (ref 0.6–1.6)
TSH SERPL DL<=0.05 MIU/L-ACNC: 37.88 IU/ML (ref 0.34–5.6)
VIT B12 SERPL-MCNC: 495 PG/ML (ref 180–914)
WBC # BLD AUTO: 7.7 10E9/L (ref 4–11)

## 2018-10-12 PROCEDURE — 99215 OFFICE O/P EST HI 40 MIN: CPT | Performed by: INTERNAL MEDICINE

## 2018-10-12 PROCEDURE — 80053 COMPREHEN METABOLIC PANEL: CPT | Performed by: INTERNAL MEDICINE

## 2018-10-12 PROCEDURE — 85652 RBC SED RATE AUTOMATED: CPT | Performed by: INTERNAL MEDICINE

## 2018-10-12 PROCEDURE — 84443 ASSAY THYROID STIM HORMONE: CPT | Performed by: INTERNAL MEDICINE

## 2018-10-12 PROCEDURE — 82607 VITAMIN B-12: CPT | Performed by: INTERNAL MEDICINE

## 2018-10-12 PROCEDURE — G0463 HOSPITAL OUTPT CLINIC VISIT: HCPCS

## 2018-10-12 PROCEDURE — 84439 ASSAY OF FREE THYROXINE: CPT | Performed by: INTERNAL MEDICINE

## 2018-10-12 PROCEDURE — 36415 COLL VENOUS BLD VENIPUNCTURE: CPT | Performed by: INTERNAL MEDICINE

## 2018-10-12 PROCEDURE — 85027 COMPLETE CBC AUTOMATED: CPT | Performed by: INTERNAL MEDICINE

## 2018-10-12 ASSESSMENT — ENCOUNTER SYMPTOMS
DIZZINESS: 1
SHORTNESS OF BREATH: 0
ABDOMINAL PAIN: 0
ADENOPATHY: 0
COUGH: 0
NAUSEA: 0
EYE PAIN: 1
PALPITATIONS: 0
VOMITING: 0
HEADACHES: 0
CHILLS: 0
DIARRHEA: 0
JOINT SWELLING: 0
HEMATURIA: 0
DIAPHORESIS: 0
BACK PAIN: 0
DYSURIA: 0
FATIGUE: 0
BRUISES/BLEEDS EASILY: 0
DIFFICULTY URINATING: 0
SINUS PRESSURE: 0
FEVER: 0
LIGHT-HEADEDNESS: 1
TROUBLE SWALLOWING: 0
ABDOMINAL DISTENTION: 0
TREMORS: 0
AGITATION: 0
FACIAL SWELLING: 0
CHEST TIGHTNESS: 0
FLANK PAIN: 0
EYE REDNESS: 0
NECK STIFFNESS: 0
FREQUENCY: 0
HALLUCINATIONS: 0
RHINORRHEA: 0
CONFUSION: 0
NUMBNESS: 0
SINUS PAIN: 0
BLOOD IN STOOL: 0
CONSTIPATION: 0
SLEEP DISTURBANCE: 0
WHEEZING: 0
WEAKNESS: 0
SORE THROAT: 0
SEIZURES: 0
NECK PAIN: 0

## 2018-10-12 NOTE — PROGRESS NOTES
Chief Complaint:  This patient is here for a consultation regarding concerns and symptoms.    HPI: Consultation is requested by Yulissa Rocha.  This patient has a couple of concerns.  Most of her symptoms seem to start about 6 months ago when she had a significant sinus infection and inflammation.  She used Benadryl and Flonase and seemed to get better.  Ever since then she tells me that she has had bilateral eye pain.  She tells me that her vision seems normal is just that her eyes seem to hurt.  They are not red and inflamed or itchy.  She does not have any previous eye history or problems.  She has not been to the eye doctor for over 2 years, telling me that the last time she went Dr. Emmanuel told her that everything was fine and normal so she figured she did not have to worry about going again.    She also complains of being dizzy and unsteady.  She then goes on to explain that this is more of a problem of a walking issue.  She just seems to walk dragging her right foot a little bit more than usual.  She does not actually have any vertigo or lightheadedness per se.  She is on diltiazem and she did try going off of this for a short time but it did not seem to make any difference.  She has also tried going off of her levothyroxine because she tells me that she does not feel well when she takes it.  This did not make any difference and she is now back on both medications.    She has had bilateral hip replacement but she tells me she does not have any pain at all in her joints including her hips and her knees when she walks or stands.  She does not have any other focal neurologic deficits or weakness that she can identify.    Medications are reconciled.  Past medical history, past surgical history, family history and social histories are reviewed and updated.    Past Medical History:   Diagnosis Date     Hypertension      Hypothyroid      Osteoarthritis      ST elevation (STEMI) myocardial infarction (H)     1981;  angioplasty       Past Surgical History:   Procedure Laterality Date     ARTHROPLASTY HIP Right     Dr Rodriguez     ARTHROPLASTY HIP Left     Dr. Rodriguez       Current Outpatient Prescriptions   Medication Sig Dispense Refill     aspirin 81 MG chewable tablet Take 81 mg by mouth daily with food       diltiazem (DILACOR XR) 180 MG 24 hr capsule Take 1 capsule (180 mg) by mouth daily 90 capsule 3     levothyroxine (SYNTHROID/LEVOTHROID) 88 MCG tablet TAKE 1 TABLET (88 MCG) BY MOUTH DAILY 180 tablet 3     order for DME Equipment being ordered: plastic underpants 1 Units 1     order for DME Equipment being ordered: Spikes for cane 1 Units 1       Allergies   Allergen Reactions     Tolterodine      Other reaction(s): Intolerance-Can't Take  Was unable to sleep at night       Family History   Problem Relation Age of Onset     HEART DISEASE Mother 60      of mi     HEART DISEASE Father 60      of mi     HEART DISEASE Brother      HEART DISEASE Brother        Social History     Social History     Marital status:      Spouse name: N/A     Number of children: N/A     Years of education: N/A     Occupational History     Not on file.     Social History Main Topics     Smoking status: Never Smoker     Smokeless tobacco: Never Used     Alcohol use No     Drug use: No     Sexual activity: Not on file     Other Topics Concern     Not on file     Social History Narrative    , lives in [place].  Previously from [place], [place].  Worked in drug store,  was a pharmacist.       Review of Systems   Constitutional: Negative for chills, diaphoresis, fatigue and fever.   HENT: Negative for congestion, ear pain, facial swelling, mouth sores, nosebleeds, rhinorrhea, sinus pain, sinus pressure, sore throat and trouble swallowing.    Eyes: Positive for pain. Negative for redness and visual disturbance.   Respiratory: Negative for cough, chest tightness, shortness of breath and wheezing.    Cardiovascular: Negative  for chest pain, palpitations and leg swelling.   Gastrointestinal: Negative for abdominal distention, abdominal pain, blood in stool, constipation, diarrhea, nausea and vomiting.   Endocrine: Negative for cold intolerance and heat intolerance.   Genitourinary: Negative for difficulty urinating, dysuria, flank pain, frequency, hematuria, pelvic pain, vaginal bleeding, vaginal discharge and vaginal pain.   Musculoskeletal: Negative for back pain, gait problem, joint swelling, neck pain and neck stiffness.   Skin: Negative for rash.   Neurological: Positive for dizziness and light-headedness. Negative for tremors, seizures, syncope, weakness, numbness and headaches.   Hematological: Negative for adenopathy. Does not bruise/bleed easily.   Psychiatric/Behavioral: Negative for agitation, confusion, hallucinations and sleep disturbance.       Physical Exam   Constitutional: She is oriented to person, place, and time. She appears well-developed and well-nourished. No distress.   HENT:   Head: Normocephalic.   Nose: Nose normal.   Mouth/Throat: Oropharynx is clear and moist. No oropharyngeal exudate.   Eyes: Conjunctivae are normal. Pupils are equal, round, and reactive to light. Right eye exhibits no discharge. Left eye exhibits no discharge. No scleral icterus.   Neck: Normal range of motion. Neck supple. Normal carotid pulses and no JVD present. Carotid bruit is not present. No tracheal deviation present. No thyromegaly present.   Cardiovascular: Normal rate, regular rhythm and normal heart sounds.  Exam reveals no gallop and no friction rub.    No murmur heard.  Pulmonary/Chest: Effort normal and breath sounds normal. No respiratory distress. She has no wheezes. She has no rales.   Abdominal: Soft. Bowel sounds are normal. She exhibits no distension and no mass. There is no tenderness. No hernia.   Musculoskeletal: Normal range of motion. She exhibits no edema.   Lymphadenopathy:     She has no cervical adenopathy.    Neurological: She is alert and oriented to person, place, and time. No cranial nerve deficit. She exhibits normal muscle tone. She displays a negative Romberg sign. Gait abnormal. Coordination normal.   Reflex Scores:       Tricep reflexes are 2+ on the right side and 2+ on the left side.       Bicep reflexes are 2+ on the right side and 2+ on the left side.       Patellar reflexes are 3+ on the right side and 2+ on the left side.       Achilles reflexes are 1+ on the right side and 1+ on the left side.  Her strength is 5 out of 5 symmetrically.  No pronator drift.  No coordination impairment.  Finger-nose-finger testing normal.  Vibratory sensation intact.   Skin: Skin is warm and dry. She is not diaphoretic.   Psychiatric: She has a normal mood and affect.   Nursing note and vitals reviewed.      Assessment:      ICD-10-CM    1. Unsteady gait R26.81 MR Brain w/o & w Contrast   2. Ocular pain, bilateral H57.13 CBC W PLT No Diff     Sedimentation Rate (ESR)     Vitamin B12     MR Brain w/o & w Contrast   3. Hypothyroidism, unspecified type E03.9 TSH     T4, Free   4. Coronary artery disease involving native coronary artery of native heart without angina pectoris I25.10 Comprehensive Metabolic Panel        Plan: This 89-year-old patient is complaining of dizziness and unsteadiness as well as ocular pain.  She has not been to the eye doctor for greater than 2 years, she needs to schedule an appointment for a complete eye evaluation as I am not sure what the eye pain is from.    In regards to her dizziness and unsteadiness, it seems as though what she is complaining of is her gait.  She showed me how her gait is somewhat unsteady and what she basically does is drags her right foot more so than her left foot.  Nonetheless, there is no obvious weakness on exam.  Question slight increased reflex on the right patella compared to the left but otherwise neuro exam is basically nonfocal.  Therefore I am not sure if her  problems are more musculoskeletal i.e. related to her hip replacement, etc. or whether this is more neurologic.  Cannot rule out previous CNS ischemic event or even space-occupying lesion.  Complete lab is drawn and pending which will include repeat thyroid studies, sedimentation rate and B12 levels and I will send her a letter with the results.  MRI of the brain is also scheduled, I will let her know the results.  I think if all of these tests are normal it is more likely that her gait problem which she describes as dizziness and unsteadiness is related to a musculoskeletal issue.

## 2018-10-12 NOTE — NURSING NOTE
"The patient is here today to be seen for her eye pain and balance concerns.  Azra Villafana LPN on 10/12/2018 at 1:46 PM  Chief Complaint   Patient presents with     Balance/ Vestibular       Initial /72 (BP Location: Right arm, Patient Position: Sitting, Cuff Size: Adult Small)  Pulse 76  Ht 5' 8\" (1.727 m)  Wt 130 lb (59 kg)  Breastfeeding? No  BMI 19.77 kg/m2 Estimated body mass index is 19.77 kg/(m^2) as calculated from the following:    Height as of this encounter: 5' 8\" (1.727 m).    Weight as of this encounter: 130 lb (59 kg).  Medication Reconciliation: complete    Azra Villafana LPN    "

## 2018-10-12 NOTE — LETTER
October 15, 2018      Geno Horn  [address] [facility]  [address]  [place] [address]        Dear Geno Horn,    Below are the results of your recent labs:    Results for orders placed or performed in visit on 10/12/18   TSH   Result Value Ref Range    Thyrotropin 37.88 (H) 0.34 - 5.60 IU/mL   T4, Free   Result Value Ref Range    T4 Free 0.45 (L) 0.60 - 1.60 ng/dL   Comprehensive Metabolic Panel   Result Value Ref Range    Sodium 132 (L) 134 - 144 mmol/L    Potassium 3.8 3.5 - 5.1 mmol/L    Chloride 98 98 - 107 mmol/L    Carbon Dioxide 27 21 - 31 mmol/L    Anion Gap 7 3 - 14 mmol/L    Glucose 119 (H) 70 - 105 mg/dL    Urea Nitrogen 16 7 - 25 mg/dL    Creatinine 0.76 0.60 - 1.20 mg/dL    GFR Estimate 71 >60 mL/min/1.7m2    GFR Estimate If Black 86 >60 mL/min/1.7m2    Calcium 9.1 8.6 - 10.3 mg/dL    Bilirubin Total 0.5 0.3 - 1.0 mg/dL    Albumin 4.4 3.5 - 5.7 g/dL    Protein Total 6.9 6.4 - 8.9 g/dL    Alkaline Phosphatase 41 34 - 104 U/L    ALT 14 7 - 52 U/L    AST 21 13 - 39 U/L   CBC W PLT No Diff   Result Value Ref Range    WBC 7.7 4.0 - 11.0 10e9/L    RBC Count 3.61 (L) 3.8 - 5.2 10e12/L    Hemoglobin 11.9 11.7 - 15.7 g/dL    Hematocrit 35.8 35.0 - 47.0 %    MCV 99 78 - 100 fl    MCH 33.0 26.5 - 33.0 pg    MCHC 33.2 31.5 - 36.5 g/dL    RDW 13.9 10.0 - 15.0 %    Platelet Count 266 150 - 450 10e9/L   Sedimentation Rate (ESR)   Result Value Ref Range    Sed Rate 22 (H) 1 - 15 mm/h   Vitamin B12   Result Value Ref Range    Vitamin B12 495 180 - 914 pg/mL        The only real abnormality with your blood tests are your thyroid levels.  Your values suggest that you have not been taking your thyroid medication on a regular basis.  Make sure that you do this.  Everything else looks normal.  If you have any questions about your results, feel free to contact me.  After being on your thyroid medication daily, you should have your thyroid rechecked in 6 weeks.    Sincerely,        Song Flores MD  Internal Medicine  [facility]

## 2018-10-12 NOTE — MR AVS SNAPSHOT
"              After Visit Summary   10/12/2018    Geno Horn    MRN: [id number]           Patient Information     Date Of Birth          9/21/1926        Visit Information        Provider Department      10/12/2018 2:00 PM Song Flores MD [facility]        Today's Diagnoses     Unsteady gait    -  1    Ocular pain, bilateral        Hypothyroidism, unspecified type        Coronary artery disease involving native coronary artery of native heart without angina pectoris           Follow-ups after your visit        Future tests that were ordered for you today     Open Future Orders        Priority Expected Expires Ordered    MR Brain w/o & w Contrast Routine  10/12/2019 10/12/2018            Who to contact     If you have questions or need follow up information about today's clinic visit or your schedule please contact [facility]"" directly at [phone].  Normal or non-critical lab and imaging results will be communicated to you by MyChart, letter or phone within 4 business days after the clinic has received the results. If you do not hear from us within 7 days, please contact the clinic through MyChart or phone. If you have a critical or abnormal lab result, we will notify you by phone as soon as possible.  Submit refill requests through [employer] or call your pharmacy and they will forward the refill request to us. Please allow 3 business days for your refill to be completed.          Additional Information About Your Visit        Care EveryWhere ID     This is your Care EveryWhere ID. This could be used by other organizations to access your [place] medical records  [id number]        Your Vitals Were     Pulse Height Breastfeeding? BMI (Body Mass Index)          76 5' 8\" (1.727 m) No 19.77 kg/m2         Blood Pressure from Last 3 Encounters:   10/12/18 110/72   10/08/18 154/90   08/21/18 124/72    Weight from Last 3 Encounters:   10/12/18 130 lb (59 kg)   10/08/18 129 " lb 3.2 oz (58.6 kg)   08/21/18 128 lb 1.6 oz (58.1 kg)              We Performed the Following     CBC W PLT No Diff     Comprehensive Metabolic Panel     Sedimentation Rate (ESR)     T4, Free     TSH     Vitamin B12        Primary Care Provider Office Phone # Fax Golden Rocha [phone]       [address]   [facility] [address]        Equal Access to Services     MIKE SHULTZ : Hadii aad ku hadasho Soomaali, waaxda luqadaha, qaybta kaalmada adeegyada, waxay idiin hayaan adeeg kharash lasintia . So [facility] [phone].    ATENCIÓN: Si libertadla chiquita, tiene a hatch disposición servicios gratuitos de asistencia lingüística. Llame al [phone].    We comply with applicable federal civil rights laws and [place] laws. We do not discriminate on the basis of race, color, national origin, age, disability, sex, sexual orientation, or gender identity.            Thank you!     Thank you for choosing [facility]  for your care. Our goal is always to provide you with excellent care. Hearing back from our patients is one way we can continue to improve our services. Please take a few minutes to complete the written survey that you may receive in the mail after your visit with us. Thank you!             Your Updated Medication List - Protect others around you: Learn how to safely use, store and throw away your medicines at www.disposemymeds.org.          This list is accurate as of 10/12/18  2:50 PM.  Always use your most recent med list.                   Brand Name Dispense Instructions for use Diagnosis    aspirin 81 MG chewable tablet      Take 81 mg by mouth daily with food        diltiazem 180 MG 24 hr capsule    DILACOR XR    90 capsule    Take 1 capsule (180 mg) by mouth daily    Essential hypertension       levothyroxine 88 MCG tablet    SYNTHROID/LEVOTHROID    180 tablet    TAKE 1 TABLET (88 MCG) BY MOUTH DAILY    Hypothyroidism, unspecified type       order for  DME     1 Units    Equipment being ordered: plastic underpants    Urinary incontinence, unspecified type       order for DME     1 Units    Equipment being ordered: Spikes for cane    Unsteady gait

## 2018-10-13 ASSESSMENT — PATIENT HEALTH QUESTIONNAIRE - PHQ9: SUM OF ALL RESPONSES TO PHQ QUESTIONS 1-9: 0

## 2018-10-17 ENCOUNTER — TRANSFERRED RECORDS (OUTPATIENT)
Dept: HEALTH INFORMATION MANAGEMENT | Facility: OTHER | Age: 83
End: 2018-10-17

## 2018-10-17 ENCOUNTER — HOSPITAL ENCOUNTER (OUTPATIENT)
Dept: MRI IMAGING | Facility: OTHER | Age: 83
Discharge: HOME | End: 2018-10-17
Attending: INTERNAL MEDICINE | Admitting: INTERNAL MEDICINE
Payer: MEDICARE

## 2018-10-17 DIAGNOSIS — H57.13 OCULAR PAIN, BILATERAL: ICD-10-CM

## 2018-10-17 DIAGNOSIS — R26.81 UNSTEADY GAIT: ICD-10-CM

## 2018-10-17 PROCEDURE — 70553 MRI BRAIN STEM W/O & W/DYE: CPT

## 2018-10-17 PROCEDURE — 25500064 ZZH RX 255 OP 636: Performed by: INTERNAL MEDICINE

## 2018-10-17 PROCEDURE — A9575 INJ GADOTERATE MEGLUMI 0.1ML: HCPCS | Performed by: INTERNAL MEDICINE

## 2018-10-17 RX ADMIN — GADOTERATE MEGLUMINE 11 ML: 376.9 INJECTION INTRAVENOUS at 10:30

## 2018-11-09 ENCOUNTER — TELEPHONE (OUTPATIENT)
Dept: INTERNAL MEDICINE | Facility: OTHER | Age: 83
End: 2018-11-09

## 2018-11-09 DIAGNOSIS — H57.13 OCULAR PAIN, BILATERAL: ICD-10-CM

## 2018-11-09 DIAGNOSIS — R26.81 UNSTEADY GAIT: Primary | ICD-10-CM

## 2018-11-09 NOTE — TELEPHONE ENCOUNTER
Discussed with her daughter that we have stopped the Synthroid for a week without any improvement.  She is welcome to stop it again for 7-10 days but if no improvement in eye pain and unsteady gait symptoms then it should be restarted.  Do not feel comfortable switching her to [place] Thyroid as it is much harder to control thyroid levels.  Would recommend considering starting acetaminophen 500 mg 2 tablets twice daily to see if that helped with the eye pain.  Will refer her to PT and OT for unsteady gait evaluation

## 2018-11-09 NOTE — TELEPHONE ENCOUNTER
"Spoke with daughter. She tells me that patient is still experiencing eye pain and balance issues. Feels that both are getting worse, more so the eye pain than anything. Daughter reports that patient is \"super sensitive\" to any medications, and feels that side effects could possibly be rare side effects from patient's synthroid. Wondering if you feel the same, or if it would be worth switching the synthroid to another medication? Does not want synthroid or any generic of the synthroid, but wishes to try something completely different. (mentioned armour thyroid? ) Please advise or does she need to be seen?   Chantal Alfaro LPN...................11/9/2018   9:07 AM   "

## 2018-11-12 ENCOUNTER — TELEPHONE (OUTPATIENT)
Dept: INTERNAL MEDICINE | Facility: OTHER | Age: 83
End: 2018-11-12

## 2018-11-12 DIAGNOSIS — R26.81 UNSTEADY GAIT: Primary | ICD-10-CM

## 2018-11-12 NOTE — TELEPHONE ENCOUNTER
Can you place OT referral? The other order that was placed was an OCC med order. Scheduling will cancel this, and once OT referral placed, will schedule patient.   Chantal Alfaro LPN...................11/12/2018   10:33 AM

## 2018-11-14 ENCOUNTER — HOSPITAL ENCOUNTER (OUTPATIENT)
Dept: PHYSICAL THERAPY | Facility: OTHER | Age: 83
Setting detail: THERAPIES SERIES
End: 2018-11-14
Attending: NURSE PRACTITIONER
Payer: MEDICARE

## 2018-11-14 DIAGNOSIS — R26.81 UNSTEADY GAIT: ICD-10-CM

## 2018-11-14 PROCEDURE — G8978 MOBILITY CURRENT STATUS: HCPCS | Mod: GP,CJ | Performed by: PHYSICAL THERAPIST

## 2018-11-14 PROCEDURE — 40000185 ZZHC STATISTIC PT OUTPT VISIT: Performed by: PHYSICAL THERAPIST

## 2018-11-14 PROCEDURE — G8979 MOBILITY GOAL STATUS: HCPCS | Mod: GP,CI | Performed by: PHYSICAL THERAPIST

## 2018-11-14 PROCEDURE — 97161 PT EVAL LOW COMPLEX 20 MIN: CPT | Mod: GP | Performed by: PHYSICAL THERAPIST

## 2018-11-14 PROCEDURE — 97110 THERAPEUTIC EXERCISES: CPT | Mod: GP | Performed by: PHYSICAL THERAPIST

## 2018-11-14 PROCEDURE — 97112 NEUROMUSCULAR REEDUCATION: CPT | Mod: GP | Performed by: PHYSICAL THERAPIST

## 2018-11-16 NOTE — INITIAL ASSESSMENTS
" 11/14/18 1000   General Information   Type of Visit Initial OP Ortho PT Evaluation   Start of Care Date 11/14/18   Referring Physician Aj Salter Evaluate and Treat   Insurance Type Medicare   Medical Diagnosis Unsteady gait R26.81    Surgical/Medical history reviewed Yes   Precautions/Limitations fall precautions   General Information Comments Lives in apartment alone, has 9 steps to get up to her floor. tries to walk the length of the talley daily and perform some sort of exercises daily.    Presentation and Etiology   Pertinent history of current problem (include personal factors and/or comorbidities that impact the POC) weakness in hips, dragging feet with walking, has fear of falling. she tends to list and at times \"limp\" while walking in halls at apartment.    Impairments G. Impaired balance;H. Impaired gait   Functional Limitations perform activities of daily living;perform desired leisure / sports activities   Symptom Location hips   Chronicity Chronic   Pain rating (0-10 point scale) Denies pain   Pain quality C. Aching   Frequency of pain/symptoms A. Constant   Pain/symptoms exacerbated by B. Walking   Pain/symptoms eased by C. Rest   Prior Level of Function   Prior Level of Function-Mobility has been trying to get better with walking for a few years, since latest LORA on left side.   Prior Level of Function-ADLs is able to walk up to 30 minutes per day. performs bone builders class 2x/wk.    Current Level of Function   Living environment Apartment/condo   Home/community accessibility 9 steps to enter   Current equipment-Gait/Locomotion Quad cane   Fall Risk Screen   Fall screen completed by PT   Have you fallen 2 or more times in the past year? No   Have you fallen and had an injury in the past year? Yes   Timed Up and Go score (seconds) 15   Is patient a fall risk? Yes   Fall screen comments gait pattern and strength causes falls risk.   Abuse Risk Screen   QUESTION TO PATIENT:  Has a member of " your family or a partner(now or in the past) intimidated, hurt, manipulated, or controlled you in any way? no   QUESTION TO PATIENT: Do you feel safe going back to the place where you are living? yes   OBSERVATION: Is there reason to believe there has been maltreatment of a vulnerable adult (ie. Physical/Sexual/Emotional abuse, self neglect, lack of adequate food, shelter, medical care, or financial exploitation)? no   Hip Objective Findings   Side (if bilateral, select both right and left) Right;Left   Observation walks with cane and sligth trendelenberg hip limp from left sided weakness, right shoe is worn more on bootom due to decreased cnfidence of left leg stance.   Integumentary  intact   Posture fair to good for 89. can work on standing more upright.   Gait/Locomotion trendelenberg, left hip weakness   Balance/Proprioception (Single Leg Stance) unable to safely, TUG 15.   Hip ROM Comments bilateral hip ROM is WFL and mostly pain free.    Lumbar ROM not assessed   Pelvic Screen NA   Hip/Knee Strength Comments left is weaker than right, and with functional pattern of gait it is noticable.   Hip Special Tests Comments most special test NT due to age and total hip in past   Right Hip Flexion Strength 4+   Right Hip Abduction Strength 4   Right Hip Extension Strength 4   Right Knee Flexion Strength 4+   Right Knee Extension Strength 4+   Right Hamstring Flexibility mild tight   Left Hip Flexion Strength 4   Left Hip Abduction Strength 3+   Left Hip Extension Strength 4   Left Knee Flexion Strength 4   Left Knee Extension Strength 4   Left Hamstring Flexibility mild tight   Planned Therapy Interventions   Planned Therapy Interventions balance training;gait training;manual therapy;ROM;strengthening;stretching   Planned Modality Interventions   Planned Modality Interventions Cryotherapy;Hot packs   Clinical Impression   Criteria for Skilled Therapeutic Interventions Met yes, treatment indicated   PT Diagnosis hip  weakness, causing gait impairments/balance impairments.   Functional limitations due to impairments walking   Clinical Presentation Stable/Uncomplicated   Clinical Decision Making (Complexity) Low complexity   Predicted Duration of Therapy Intervention (days/wks) 1-2x/wk   Risk & Benefits of therapy have been explained Yes   Patient, Family & other staff in agreement with plan of care Yes   Ortho Goal 1   Goal Identifier walking   Goal Description patient will walk with less trendelenberg to have less foot drag of right. TUG 14.   Target Date 11/30/18   Ortho Goal 2   Goal Identifier walking   Goal Description patient will tolerate walking up to 30 min a day with no cane.   Target Date 12/21/18   Ortho Goal 3   Goal Identifier hip strength   Goal Description hip strength abduction to improve to 4 or greater.   Target Date 12/21/18   Total Evaluation Time   Total Evaluation Time 30   Therapy Certification   Certification date from 11/14/18   Certification date to 01/11/19   Medical Diagnosis Unsteady gait R26.81

## 2018-11-16 NOTE — PROGRESS NOTES
[facility]          OUTPATIENT PHYSICAL THERAPY ORTHOPEDIC EVALUATION  PLAN OF TREATMENT FOR OUTPATIENT REHABILITATION  (COMPLETE FOR INITIAL CLAIMS ONLY)  Patient's Last Name, First Name, M.I.  YOB: 1926  KoryGeno FREY    Provider s Name:  [facility]   Medical Record No.  [id number]   Start of Care Date:  11/14/18   Onset Date:      Type:     _X__PT   ___OT   ___SLP Medical Diagnosis:  Unsteady gait R26.81      PT Diagnosis:  hip weakness, causing gait impairments/balance impairments.   Visits from SOC:  1      _________________________________________________________________________________  Plan of Treatment/Functional Goals:  balance training, gait training, manual therapy, ROM, strengthening, stretching     Cryotherapy, Hot packs     Goals  Goal Identifier: walking  Goal Description: patient will walk with less trendelenberg to have less foot drag of right. TUG 14.  Target Date: 11/30/18    Goal Identifier: walking  Goal Description: patient will tolerate walking up to 30 min a day with no cane.  Target Date: 12/21/18    Goal Identifier: hip strength  Goal Description: hip strength abduction to improve to 4 or greater.  Target Date: 12/21/18              Therapy Frequency:     Predicted Duration of Therapy Intervention:  1-2x/wk    Parrish Osuna, PT                 I CERTIFY THE NEED FOR THESE SERVICES FURNISHED UNDER        THIS PLAN OF TREATMENT AND WHILE UNDER MY CARE     (Physician co-signature of this document indicates review and certification of the therapy plan).                       Certification Date From:  11/14/18   Certification Date To:  01/11/19    Referring Provider:  Aj    Initial Assessment        See Epic Evaluation Start of Care Date: 11/14/18

## 2018-11-26 ENCOUNTER — HOSPITAL ENCOUNTER (OUTPATIENT)
Dept: PHYSICAL THERAPY | Facility: OTHER | Age: 83
Setting detail: THERAPIES SERIES
End: 2018-11-26
Attending: NURSE PRACTITIONER
Payer: MEDICARE

## 2018-11-26 PROCEDURE — 97110 THERAPEUTIC EXERCISES: CPT | Mod: GP

## 2018-11-26 PROCEDURE — 40000185 ZZHC STATISTIC PT OUTPT VISIT

## 2018-11-26 PROCEDURE — 97112 NEUROMUSCULAR REEDUCATION: CPT | Mod: GP

## 2018-11-28 ENCOUNTER — HOSPITAL ENCOUNTER (OUTPATIENT)
Dept: PHYSICAL THERAPY | Facility: OTHER | Age: 83
Setting detail: THERAPIES SERIES
End: 2018-11-28
Attending: NURSE PRACTITIONER
Payer: MEDICARE

## 2018-11-28 PROCEDURE — 40000185 ZZHC STATISTIC PT OUTPT VISIT: Performed by: PHYSICAL THERAPIST

## 2018-11-28 PROCEDURE — 97112 NEUROMUSCULAR REEDUCATION: CPT | Mod: GP | Performed by: PHYSICAL THERAPIST

## 2018-11-28 PROCEDURE — 97110 THERAPEUTIC EXERCISES: CPT | Mod: GP | Performed by: PHYSICAL THERAPIST

## 2018-12-05 ENCOUNTER — HOSPITAL ENCOUNTER (OUTPATIENT)
Dept: PHYSICAL THERAPY | Facility: OTHER | Age: 83
Setting detail: THERAPIES SERIES
End: 2018-12-05
Attending: NURSE PRACTITIONER
Payer: MEDICARE

## 2018-12-05 PROCEDURE — 97110 THERAPEUTIC EXERCISES: CPT | Mod: GP | Performed by: PHYSICAL THERAPIST

## 2018-12-05 PROCEDURE — 40000185 ZZHC STATISTIC PT OUTPT VISIT: Performed by: PHYSICAL THERAPIST

## 2018-12-05 PROCEDURE — 97112 NEUROMUSCULAR REEDUCATION: CPT | Mod: GP | Performed by: PHYSICAL THERAPIST

## 2018-12-07 ENCOUNTER — TELEPHONE (OUTPATIENT)
Dept: INTERNAL MEDICINE | Facility: OTHER | Age: 83
End: 2018-12-07

## 2018-12-07 NOTE — TELEPHONE ENCOUNTER
Patient presented to [facility]. She would like to let you know that she is still having balance issues, and she says that it is from her Synthroid. She says that the balance issues are getting worse. She is still having eye pain. She would like to stop the Synthroid but has not. Did review last phone note when she was having this same issue. She also tells me that her energy level has greatly decreased. She would like to know what to do, if she can stop this, or does she need to come in and discuss with you?  Chantal Alfaro LPN...................12/7/2018   10:26 AM

## 2018-12-07 NOTE — TELEPHONE ENCOUNTER
Please call and remind patient that we have stopped her Synthroid for a week at a time and she did not have any improvement with her balance or eye pain.  I did have a conversation with her daughter a few weeks ago regarding this issue.

## 2018-12-12 ENCOUNTER — HOSPITAL ENCOUNTER (OUTPATIENT)
Dept: PHYSICAL THERAPY | Facility: OTHER | Age: 83
Setting detail: THERAPIES SERIES
End: 2018-12-12
Attending: NURSE PRACTITIONER
Payer: MEDICARE

## 2018-12-12 PROCEDURE — 97110 THERAPEUTIC EXERCISES: CPT | Mod: GP | Performed by: PHYSICAL THERAPIST

## 2018-12-12 PROCEDURE — 97112 NEUROMUSCULAR REEDUCATION: CPT | Mod: GP | Performed by: PHYSICAL THERAPIST

## 2019-01-23 ENCOUNTER — OFFICE VISIT (OUTPATIENT)
Dept: INTERNAL MEDICINE | Facility: OTHER | Age: 84
End: 2019-01-23
Attending: NURSE PRACTITIONER
Payer: MEDICARE

## 2019-01-23 ENCOUNTER — HOSPITAL ENCOUNTER (OUTPATIENT)
Dept: PHYSICAL THERAPY | Facility: OTHER | Age: 84
Setting detail: THERAPIES SERIES
End: 2019-01-23
Attending: NURSE PRACTITIONER
Payer: MEDICARE

## 2019-01-23 VITALS
DIASTOLIC BLOOD PRESSURE: 82 MMHG | TEMPERATURE: 96.4 F | BODY MASS INDEX: 19.68 KG/M2 | WEIGHT: 129.4 LBS | SYSTOLIC BLOOD PRESSURE: 136 MMHG | RESPIRATION RATE: 12 BRPM | HEART RATE: 60 BPM

## 2019-01-23 DIAGNOSIS — E03.9 HYPOTHYROIDISM, UNSPECIFIED TYPE: Primary | ICD-10-CM

## 2019-01-23 DIAGNOSIS — I10 ESSENTIAL HYPERTENSION: ICD-10-CM

## 2019-01-23 DIAGNOSIS — R26.81 UNSTEADY GAIT: ICD-10-CM

## 2019-01-23 LAB
T4 FREE SERPL-MCNC: 0.61 NG/DL (ref 0.6–1.6)
TSH SERPL DL<=0.05 MIU/L-ACNC: 6.4 IU/ML (ref 0.34–5.6)

## 2019-01-23 PROCEDURE — 84443 ASSAY THYROID STIM HORMONE: CPT | Performed by: NURSE PRACTITIONER

## 2019-01-23 PROCEDURE — 97112 NEUROMUSCULAR REEDUCATION: CPT | Mod: GP | Performed by: PHYSICAL THERAPIST

## 2019-01-23 PROCEDURE — 84439 ASSAY OF FREE THYROXINE: CPT | Performed by: NURSE PRACTITIONER

## 2019-01-23 PROCEDURE — G0463 HOSPITAL OUTPT CLINIC VISIT: HCPCS

## 2019-01-23 PROCEDURE — 97140 MANUAL THERAPY 1/> REGIONS: CPT | Mod: GP | Performed by: PHYSICAL THERAPIST

## 2019-01-23 PROCEDURE — 97110 THERAPEUTIC EXERCISES: CPT | Mod: GP | Performed by: PHYSICAL THERAPIST

## 2019-01-23 PROCEDURE — 36415 COLL VENOUS BLD VENIPUNCTURE: CPT | Performed by: NURSE PRACTITIONER

## 2019-01-23 PROCEDURE — 99214 OFFICE O/P EST MOD 30 MIN: CPT | Performed by: NURSE PRACTITIONER

## 2019-01-23 ASSESSMENT — PAIN SCALES - GENERAL: PAINLEVEL: NO PAIN (0)

## 2019-01-23 ASSESSMENT — PATIENT HEALTH QUESTIONNAIRE - PHQ9: SUM OF ALL RESPONSES TO PHQ QUESTIONS 1-9: 0

## 2019-01-23 NOTE — NURSING NOTE
"Chief Complaint   Patient presents with     Follow Up       Initial /82 (BP Location: Right arm, Patient Position: Chair, Cuff Size: Adult Regular)   Pulse 60   Temp 96.4  F (35.8  C) (Tympanic)   Resp 12   Wt 58.7 kg (129 lb 6.4 oz)   Breastfeeding? No   BMI 19.68 kg/m   Estimated body mass index is 19.68 kg/m  as calculated from the following:    Height as of 10/12/18: 1.727 m (5' 8\").    Weight as of this encounter: 58.7 kg (129 lb 6.4 oz).  Medication Reconciliation: complete      Chantal Alfaro LPN on 1/23/2019 at 12:43 PM    "

## 2019-01-23 NOTE — PROGRESS NOTES
Subjective:  She is here today for follow-up on hypertension, hypothyroidism and unsteady gait.  She has been working with physical therapy and does feel that her gait is getting better which is improving her balance.  She has been taking Synthroid as prescribed except she did not take it today.  She feels that she still gets a little off balance when taking the medication.  She has stopped taking the medication at times without any improvement.  She does feel that maybe it is related to her gait and balance.  Her blood pressures been well controlled with taking the Cardizem.  She is due for thyroid labs.  Back in October she had thyroid labs checked but had not been taking the medication regularly.  Her TSH was elevated at 33.  Denies current symptoms of thyroid dysfunction.  Denies chest pain, chest pressure and shortness of breath.  No weight changes.    Patient Active Problem List   Diagnosis     Allergic rhinitis     Coronary artery disease involving native coronary artery of native heart without angina pectoris     H/O basal cell carcinoma excision     Hearing loss     Hypertension     Hypothyroidism     Osteoarthritis of both hips     Urinary urgency     Unsteady gait     Past Medical History:   Diagnosis Date     Hypertension      Hypothyroid      Osteoarthritis      ST elevation (STEMI) myocardial infarction (H)     1981; angioplasty     Past Surgical History:   Procedure Laterality Date     ARTHROPLASTY HIP Right     Dr Rodriguez     ARTHROPLASTY HIP Left 2015    Dr. Rodriguez     Social History     Socioeconomic History     Marital status:      Spouse name: Not on file     Number of children: Not on file     Years of education: Not on file     Highest education level: Not on file   Social Needs     Financial resource strain: Not on file     Food insecurity - worry: Not on file     Food insecurity - inability: Not on file     Transportation needs - medical: Not on file     Transportation needs -  non-medical: Not on file   Occupational History     Not on file   Tobacco Use     Smoking status: Never Smoker     Smokeless tobacco: Never Used   Substance and Sexual Activity     Alcohol use: No     Alcohol/week: 0.0 oz     Drug use: No     Sexual activity: Not Currently     Birth control/protection: Post-menopausal   Other Topics Concern     Not on file   Social History Narrative    , lives in [place].  Previously from [place], [place].  Worked in drug store,  was a pharmacist.     Family History   Problem Relation Age of Onset     Heart Disease Mother 60         of mi     Heart Disease Father 60         of mi     Heart Disease Brother      Heart Disease Brother      Current Outpatient Medications   Medication Sig Dispense Refill     aspirin 81 MG chewable tablet Take 81 mg by mouth daily with food       diltiazem (DILACOR XR) 180 MG 24 hr capsule Take 1 capsule (180 mg) by mouth daily 90 capsule 3     levothyroxine (SYNTHROID/LEVOTHROID) 88 MCG tablet TAKE 1 TABLET (88 MCG) BY MOUTH DAILY 180 tablet 3     order for DME Equipment being ordered: plastic underpants 1 Units 1     order for DME Equipment being ordered: Spikes for cane 1 Units 1     Tolterodine      Review of Systems:  Review of Systems  See HPI    Objective:   /82 (BP Location: Right arm, Patient Position: Chair, Cuff Size: Adult Regular)   Pulse 60   Temp 96.4  F (35.8  C) (Tympanic)   Resp 12   Wt 58.7 kg (129 lb 6.4 oz)   Breastfeeding? No   BMI 19.68 kg/m    Physical Exam  Pleasant female no acute distress.  Affect normal.  Alert and oriented x4.  Skin color pink.  Mucous members moist.  Lung fields clear to auscultation.  Cardiovascular regular rate and rhythm.  She is not using her cane today.  She is does have a Trendelenburg gait.    Assessment:    ICD-10-CM    1. Hypothyroidism, unspecified type E03.9 Thyrotropin GH     T4 free   2. Essential hypertension I10    3. Unsteady gait R26.81        Plan:   1.   Check TSH and free T4.  Continue with Synthroid, dose adjustment depending upon lab results.  May take this at bedtime if she wishes.  2.  Blood pressure currently well controlled.  Continue Cardizem.  3.  Her gait is improving.  She will continue to work with physical therapy.    DRU Salcedo   1/23/2019  12:59 PM

## 2019-04-23 ENCOUNTER — OFFICE VISIT (OUTPATIENT)
Dept: INTERNAL MEDICINE | Facility: OTHER | Age: 84
End: 2019-04-23
Attending: NURSE PRACTITIONER
Payer: MEDICARE

## 2019-04-23 VITALS
WEIGHT: 129.4 LBS | BODY MASS INDEX: 19.68 KG/M2 | TEMPERATURE: 97.4 F | SYSTOLIC BLOOD PRESSURE: 130 MMHG | DIASTOLIC BLOOD PRESSURE: 84 MMHG | HEART RATE: 80 BPM | RESPIRATION RATE: 16 BRPM

## 2019-04-23 DIAGNOSIS — M51.369 DEGENERATIVE DISC DISEASE, LUMBAR: ICD-10-CM

## 2019-04-23 DIAGNOSIS — N39.46 MIXED STRESS AND URGE URINARY INCONTINENCE: Primary | ICD-10-CM

## 2019-04-23 DIAGNOSIS — M19.042 PRIMARY OSTEOARTHRITIS OF BOTH HANDS: ICD-10-CM

## 2019-04-23 DIAGNOSIS — M19.041 PRIMARY OSTEOARTHRITIS OF BOTH HANDS: ICD-10-CM

## 2019-04-23 DIAGNOSIS — E03.9 HYPOTHYROIDISM, UNSPECIFIED TYPE: ICD-10-CM

## 2019-04-23 PROCEDURE — 99214 OFFICE O/P EST MOD 30 MIN: CPT | Performed by: NURSE PRACTITIONER

## 2019-04-23 PROCEDURE — G0463 HOSPITAL OUTPT CLINIC VISIT: HCPCS

## 2019-04-23 RX ORDER — DIAPER,BRIEF,ADULT, DISPOSABLE
1 EACH MISCELLANEOUS DAILY
Qty: 30 EACH | Refills: 11 | Status: SHIPPED | OUTPATIENT
Start: 2019-04-23 | End: 2019-08-19

## 2019-04-23 ASSESSMENT — PATIENT HEALTH QUESTIONNAIRE - PHQ9: SUM OF ALL RESPONSES TO PHQ QUESTIONS 1-9: 0

## 2019-04-23 NOTE — PROGRESS NOTES
Subjective:  She is here today for a few concerns.  She states that she has urinary incontinence.  She has tried Detrol but it caused her to be dizzy and lightheaded.  She was having some memory difficulties with taking it as well.  She states she urinates about every 2 hours while she is awake.  A lot of times she will dribble urine before she makes it to the restroom.  She is wondering if she can get a prescription for urinary incontinence pads.  She only needs to wear maybe 1/day.  Also has hypothyroidism.  She had been taking her thyroid medication several months ago and then restarted the medication.  She was thinking it was causing some side effects.  She did not find those side effects cleared when she stopped taking the medication.  She has been taking it faithfully.  She just had her labs checked this past winter and they were back to baseline.  Currently feels she is euthyroid.  Also has arthritis of her hands and some pain at her low back.  She takes Tylenol half to 1 tablet at bedtime and that helps her to sleep and helps with the discomfort.  She cannot take Tylenol during the day because it makes her tired.  She does not want to take ibuprofen.  She is wondering what else she can do for her back in her hands.  She has not tried any topical analgesics.  Her back pain is across the lower back and does not new or worse than usual.  She does use a cane for ambulation.  She states it does not matter if she is sitting lying or walking the pain is all the same in her back.  It does not radiate down into her legs.    Patient Active Problem List   Diagnosis     Allergic rhinitis     Coronary artery disease involving native coronary artery of native heart without angina pectoris     H/O basal cell carcinoma excision     Hearing loss     Hypertension     Hypothyroidism     Osteoarthritis of both hips     Urinary urgency     Unsteady gait     Past Medical History:   Diagnosis Date     Hypertension      Hypothyroid       Osteoarthritis      ST elevation (STEMI) myocardial infarction (H)     ; angioplasty     Past Surgical History:   Procedure Laterality Date     ARTHROPLASTY HIP Right     Dr Rodriguez     ARTHROPLASTY HIP Left     Dr. Rodriguez     Social History     Socioeconomic History     Marital status:      Spouse name: Not on file     Number of children: Not on file     Years of education: Not on file     Highest education level: Not on file   Occupational History     Not on file   Social Needs     Financial resource strain: Not on file     Food insecurity:     Worry: Not on file     Inability: Not on file     Transportation needs:     Medical: Not on file     Non-medical: Not on file   Tobacco Use     Smoking status: Never Smoker     Smokeless tobacco: Never Used   Substance and Sexual Activity     Alcohol use: No     Alcohol/week: 0.0 oz     Drug use: No     Sexual activity: Not Currently     Birth control/protection: Post-menopausal   Lifestyle     Physical activity:     Days per week: Not on file     Minutes per session: Not on file     Stress: Not on file   Relationships     Social connections:     Talks on phone: Not on file     Gets together: Not on file     Attends Protestant service: Not on file     Active member of club or organization: Not on file     Attends meetings of clubs or organizations: Not on file     Relationship status: Not on file     Intimate partner violence:     Fear of current or ex partner: Not on file     Emotionally abused: Not on file     Physically abused: Not on file     Forced sexual activity: Not on file   Other Topics Concern     Not on file   Social History Narrative    , lives in [place].  Previously from [place], [place].  Worked in drug store,  was a pharmacist.     Family History   Problem Relation Age of Onset     Heart Disease Mother 60         of mi     Heart Disease Father 60         of mi     Heart Disease Brother      Heart Disease Brother       Current Outpatient Medications   Medication Sig Dispense Refill     aspirin 81 MG chewable tablet Take 81 mg by mouth daily with food       diltiazem (DILACOR XR) 180 MG 24 hr capsule Take 1 capsule (180 mg) by mouth daily 90 capsule 3     Incontinence Supply Disposable (POISE PAD) PADS 1 Units daily 30 each 11     levothyroxine (SYNTHROID/LEVOTHROID) 88 MCG tablet TAKE 1 TABLET (88 MCG) BY MOUTH DAILY 180 tablet 3     order for DME Equipment being ordered: plastic underpants 1 Units 1     order for DME Equipment being ordered: Spikes for cane 1 Units 1     Tolterodine      Review of Systems:  Review of Systems  See HPI  Objective:   /84 (BP Location: Right arm, Patient Position: Chair, Cuff Size: Adult Regular)   Pulse 80   Temp 97.4  F (36.3  C) (Tympanic)   Resp 16   Wt 58.7 kg (129 lb 6.4 oz)   Breastfeeding? No   BMI 19.68 kg/m    Physical Exam  Pleasant female in no acute distress.  Affect normal.  Alert and oriented x4.  Skin color pink.  Mucous memories moist.  Neck supple without adenopathy.  No thyromegaly.  No thyroid tenderness.  Lung fields clear to auscultation throughout.  Cardiovascular regular rate and rhythm.  Tenderness with palpation to the lower lumbar spine along the L4 L5-S1 region.  No sacroiliac tenderness.  She does have an impaired gait with the left foot turning inward.  She is a cane for ambulation.  She ambulates slowly.  This is not different than previous.  She also has Heberden and Yojana nodes of both hands with no active synovitis.    Assessment:    ICD-10-CM    1. Mixed stress and urge urinary incontinence N39.46 Incontinence Supply Disposable (POISE PAD) PADS   2. Hypothyroidism, unspecified type E03.9    3. Degenerative disc disease, lumbar M51.36    4. Primary osteoarthritis of both hands M19.041     M19.042        Plan:   1.  Prescription written for poise pads to use 1 daily and as needed.  Sent to pharmacy.  If these are not effective may need to move up to  an incontinent brief.  Avoid anticholinergics for urinary incontinence.  2.  Thyroid labs checked in January and back to baseline.  Continue with thyroid medication as currently prescribed and take daily.  3.  She may continue to take the acetaminophen at bedtime for the hand and back pain.  Continue to use the cane for ambulation.  Offered physical therapy however at this time she declines.  Recommend either BenGay or Biofreeze to her hands and low back to help with the pain.  She did not want to take any medication in the morning but is okay with using a topical.  She will avoid her eyes that she places on her hands.    DRU Salcedo   4/23/2019  11:23 AM

## 2019-04-23 NOTE — NURSING NOTE
"Chief Complaint   Patient presents with     Urinary Problem     Urinary Issues        Initial /84 (BP Location: Right arm, Patient Position: Chair, Cuff Size: Adult Regular)   Pulse 80   Temp 97.4  F (36.3  C) (Tympanic)   Resp 16   Wt 58.7 kg (129 lb 6.4 oz)   Breastfeeding? No   BMI 19.68 kg/m   Estimated body mass index is 19.68 kg/m  as calculated from the following:    Height as of 10/12/18: 1.727 m (5' 8\").    Weight as of this encounter: 58.7 kg (129 lb 6.4 oz).  Medication Reconciliation: complete      Chantal Alfaro LPN on 4/23/2019 at 11:02 AM    "

## 2019-04-25 DIAGNOSIS — I10 ESSENTIAL HYPERTENSION: ICD-10-CM

## 2019-04-25 RX ORDER — DILTIAZEM HYDROCHLORIDE 180 MG/1
180 CAPSULE, EXTENDED RELEASE ORAL DAILY
Qty: 90 CAPSULE | Refills: 3 | Status: SHIPPED | OUTPATIENT
Start: 2019-04-25 | End: 2020-03-02

## 2019-05-21 ENCOUNTER — OFFICE VISIT (OUTPATIENT)
Dept: FAMILY MEDICINE | Facility: OTHER | Age: 84
End: 2019-05-21
Attending: NURSE PRACTITIONER
Payer: MEDICARE

## 2019-05-21 VITALS
DIASTOLIC BLOOD PRESSURE: 72 MMHG | HEART RATE: 70 BPM | BODY MASS INDEX: 19.26 KG/M2 | OXYGEN SATURATION: 97 % | RESPIRATION RATE: 16 BRPM | HEIGHT: 68 IN | SYSTOLIC BLOOD PRESSURE: 130 MMHG | WEIGHT: 127.1 LBS | TEMPERATURE: 96.8 F

## 2019-05-21 DIAGNOSIS — L60.0 INGROWING NAIL, RIGHT GREAT TOE: Primary | ICD-10-CM

## 2019-05-21 PROCEDURE — G0463 HOSPITAL OUTPT CLINIC VISIT: HCPCS | Mod: 25

## 2019-05-21 PROCEDURE — G0463 HOSPITAL OUTPT CLINIC VISIT: HCPCS

## 2019-05-21 PROCEDURE — 99213 OFFICE O/P EST LOW 20 MIN: CPT | Mod: 25 | Performed by: PHYSICIAN ASSISTANT

## 2019-05-21 PROCEDURE — 11730 AVULSION NAIL PLATE SIMPLE 1: CPT | Performed by: PHYSICIAN ASSISTANT

## 2019-05-21 ASSESSMENT — PAIN SCALES - GENERAL: PAINLEVEL: EXTREME PAIN (8)

## 2019-05-21 ASSESSMENT — MIFFLIN-ST. JEOR: SCORE: 1035.02

## 2019-05-21 NOTE — PATIENT INSTRUCTIONS
Ingrown toe nail with infection great toe on right  Nail removed in clinic today  Foot soaks per AVS  Cover with topical antibiotic ointment and bandage to protect  Follow up with PCP if symptoms persist or worsen  Seek immediate care for    Increasing redness, pain, or swelling of the toe    Red streaks in the skin leading away from the wound    Continued pus or fluid drainage for more than 24 hours    Fever of 100.4 F (38 C) or higher, or as directed by your provider      Patient Education     Ingrown Toenail (Excised)  An ingrown toenail occurs when the nail grows sideways into the skin next to the nail. This can cause pain and may lead to an infection with redness, swelling, and sometimes drainage.  The most common cause of an ingrown toenail is trimming your toenails wrong. Most people trim the nails too close to the skin and try to round the nail too tightly around the shape of the toe. When you do this, the nail can grow into the skin of the toe. While it may look nice, your toenail can grow into the skin and cause infection. It is safer to trim the nail to end in a straight line rather than a curve.  Other causes include injury or wearing shoes that are too short or tight. This can cause the same problem that happens when trimming your toenails. Sometimes you are born with a toenail that grows too large for your toe.  The most common symptoms of an ingrown toenail include:    Pain    Redness    Swelling    Drainage  Treatment  It's important to treat an ingrown toenail as soon as you notice there is a problem. If the infection is mild, you may be able to take care of it at home. Home care includes:    Frequent warm water soaks    Keeping the nail clean    Wearing loose, comfortable shoes or open toe sandals  Another method to help the toe heal is to use a small piece of cotton or waxed dental floss to gently lift the corner of the problem nail. Change the cotton or floss frequently, especially if it gets  dirty.  If your infection is mild but home care isn't working, or the toenail is getting worse, see your healthcare provider. Signs of worsening infection include:    Swelling    Redness     Pus drainage  In some cases, part of the toenail needs to be removed by your healthcare provider so that the infection can be drained.  If there is a lot of redness and swelling, then an antibiotic may also be used. The redness and pain should go away within 48 hours. It will take about 2 weeks for the exposed nail bed to become dry and for the swelling to go down.  If only the side of the nail was removed, it will begin to grow back in a few months. To prevent recurrence, sometimes the side of the nail bed may be treated with a strong chemical to prevent the nail from growing back.  Home care  Wound care    Twice a day for the first 3 days, clean and soak the toe as follows:  ? Soak your foot in a tub of warm water for 5 minutes. Or, hold your toe under a faucet of warm running water for 5 minutes.  ? Clean any remaining crust away with soap and water using a cotton swab.  ? Put a small amount of antibiotic ointment on the infected area.  ? Cover with a bandage until the exposed nail bed is dry and there is no more drainage.    Change the dressing or bandage every time you soak or clean it, or whenever it becomes wet or dirty.    If you were prescribed antibiotics, take them as directed until they are all gone.    While your toe is healing wear comfortable shoes with a lot of toe room. Or wear open-toe sandals.  Medicines    You can take over-the-counter medicine for pain, unless you were given a different pain medicine to use. Note: Talk with your healthcare provider before using these medicines if you have chronic liver or kidney disease, have ever had a stomach ulcer or GI (gastrointestinal) bleeding, or are taking blood thinner medicines.    If you were given antibiotics, take them until they are all gone. It is important  to finish the antibiotics even if the wound looks better. This ensures that the infection clears.  Prevention  To prevent ingrown toenails:    Wear shoes that fit well. Avoid shoes that pinch the toes together.    When you trim your toenails, don t cut them too short. Cut straight across at the top and don t round the edges.    Don t use a sharp object to clean under your nail since this might cause an infection.    If the toenail starts to grow into the skin again, put a small piece of cotton under that side of the nail to help it grow out straight.  Follow-up care  Follow up as advised by your healthcare provider. If the ingrown toenail recurs, follow up with a foot specialist (podiatrist) for nail bed ablation.  When to seek medical care  Call your healthcare provider right away if any of these occur:    Increasing redness, pain, or swelling of the toe    Red streaks in the skin leading away from the wound    Continued pus or fluid drainage for more than 24 hours    Fever of 100.4 F (38 C) or higher, or as directed by your provider  Date Last Reviewed: 11/1/2016 2000-2018 [employer]. [address] [place] [id number]. All rights reserved. This information is not intended as a substitute for professional medical care. Always follow your healthcare professional's instructions.

## 2019-05-21 NOTE — NURSING NOTE
Patient presents to the clinic for right great toe ingrown toenail.     Medication Reconciliation: complete   Lor Garzon LPN............. May 21, 2019 3:54 PM

## 2019-05-21 NOTE — NURSING NOTE
Lidocaine ordered by AICHA Johnson.  Medication administered per order in [facility]   Lot # [id number]  Exp. 10/2022  NDC [id number]  Patient tolerated well. All 5 mL's used for procedure.   Lor Garzon LPN............. May 21, 2019 4:42 PM       Prior to the start of the procedure and with procedural staff participation, I verbally confirmed the patient s identity using two indicators, relevant allergies, that the procedure was appropriate and matched the consent or emergent situation, and that the correct equipment/implants were available. Immediately prior to starting the procedure I conducted the Time Out with the procedural staff and re-confirmed the patient s name, procedure, and site/side. (The Joint Commission universal protocol was followed.)  Yes    Sedation (Moderate or Deep): None

## 2019-05-21 NOTE — PROGRESS NOTES
"HPI:    Geno Horn is a 89 year old female who presents to clinic today for evaluation of toe infection from an ingrown toe nail  Location right great toe  Onset - onset a week ago, course is gradually worsening  Precipitating: she has been trying to trim her nail  Associated symptoms: redness, swelling, pain 9/10  No prior episodes    Past Medical History:   Diagnosis Date     Hypertension      Hypothyroid      Osteoarthritis      ST elevation (STEMI) myocardial infarction (H)     1981; angioplasty         Current Outpatient Medications   Medication Sig Dispense Refill     aspirin 81 MG chewable tablet Take 81 mg by mouth daily with food       diltiazem ER (DILT-XR) 180 MG 24 hr capsule Take 1 capsule (180 mg) by mouth daily 90 capsule 3     Incontinence Supply Disposable (POISE PAD) PADS 1 Units daily 30 each 11     levothyroxine (SYNTHROID/LEVOTHROID) 88 MCG tablet TAKE 1 TABLET (88 MCG) BY MOUTH DAILY 180 tablet 3     order for DME Equipment being ordered: plastic underpants 1 Units 1     order for DME Equipment being ordered: Spikes for cane 1 Units 1       Allergies   Allergen Reactions     Tolterodine      Other reaction(s): Intolerance-Can't Take  Was unable to sleep at night       ROS:  General: feels well, no fever  Musculoskeletal: ingrown nail with suspected infection  Per HPI    EXAM:  Vitals:    05/21/19 1555   BP: 130/72   BP Location: Left arm   Patient Position: Sitting   Cuff Size: Adult Regular   Pulse: 70   Resp: 16   Temp: 96.8  F (36  C)   TempSrc: Tympanic   SpO2: 97%   Weight: 57.7 kg (127 lb 1.6 oz)   Height: 1.727 m (5' 8\")     General appearance: well appearing female, in no acute distress  Musculoskeletal: ingrown nail on great toe right foot, lateral edge. Redness, swelling next to nail and over distal phalanx.  TTP. No drainage or fluctuance noted  Psychological: normal affect, alert and pleasant    ASSESSMENT AND PLAN:    1. Ingrowing nail, right great toe      Toe nail removal in " clinic today  Provided written and verbal consent after discussing risks and benefits  Toe block - great toe on right with 5 ml lidocaine without epi  Wedge toe nail resection completed without complication  Nursing dressed nail with mupirocin and non adherent dressing  Dicussed at home nail care  Follow up with PCP if symptoms persist or worsen  Patient received verbal and written instruction including review of warning signs    Maria Randall PA-C on 5/23/2019 at 10:37 AM

## 2019-06-06 ENCOUNTER — OFFICE VISIT (OUTPATIENT)
Dept: UROLOGY | Facility: OTHER | Age: 84
End: 2019-06-06
Attending: UROLOGY
Payer: MEDICARE

## 2019-06-06 VITALS
BODY MASS INDEX: 19.28 KG/M2 | WEIGHT: 126.8 LBS | DIASTOLIC BLOOD PRESSURE: 80 MMHG | SYSTOLIC BLOOD PRESSURE: 132 MMHG | RESPIRATION RATE: 16 BRPM

## 2019-06-06 DIAGNOSIS — R39.15 URINARY URGENCY: Primary | ICD-10-CM

## 2019-06-06 DIAGNOSIS — N39.41 URGE INCONTINENCE: ICD-10-CM

## 2019-06-06 LAB
ALBUMIN UR-MCNC: NEGATIVE MG/DL
APPEARANCE UR: CLEAR
BACTERIA #/AREA URNS HPF: ABNORMAL /HPF
BILIRUB UR QL STRIP: NEGATIVE
COLOR UR AUTO: YELLOW
GLUCOSE UR STRIP-MCNC: NEGATIVE MG/DL
HGB UR QL STRIP: NEGATIVE
KETONES UR STRIP-MCNC: NEGATIVE MG/DL
LEUKOCYTE ESTERASE UR QL STRIP: ABNORMAL
NITRATE UR QL: NEGATIVE
PH UR STRIP: 6.5 PH (ref 5–9)
RBC #/AREA URNS AUTO: ABNORMAL /HPF
SOURCE: ABNORMAL
SP GR UR STRIP: <1.005 (ref 1–1.03)
UROBILINOGEN UR STRIP-ACNC: 0.2 EU/DL (ref 0.2–1)
WBC #/AREA URNS AUTO: ABNORMAL /HPF

## 2019-06-06 PROCEDURE — 51798 US URINE CAPACITY MEASURE: CPT | Performed by: UROLOGY

## 2019-06-06 PROCEDURE — G0463 HOSPITAL OUTPT CLINIC VISIT: HCPCS | Mod: 25

## 2019-06-06 PROCEDURE — 81001 URINALYSIS AUTO W/SCOPE: CPT | Mod: ZL | Performed by: UROLOGY

## 2019-06-06 PROCEDURE — 99214 OFFICE O/P EST MOD 30 MIN: CPT | Performed by: UROLOGY

## 2019-06-06 RX ORDER — MIRABEGRON 25 MG/1
25 TABLET, FILM COATED, EXTENDED RELEASE ORAL DAILY
Qty: 90 TABLET | Refills: 3 | Status: ON HOLD | OUTPATIENT
Start: 2019-06-06 | End: 2019-06-17

## 2019-06-06 NOTE — PROGRESS NOTES
Type of Visit  EST    Chief Complaint  Nocturia  Urgency and frequency    HPI  Ms. Horn is a 89 y.o. female who follows up with nocturia, urgency and frequency.  She has had long-standing overactive bladder symptoms for over 10 years.  She is somewhat sensitive to side effects.  At the last visit I had recommended observation with follow-up as needed, however today she follows up with progressively worsening symptoms.  She would like to discuss the next steps in therapy.    She presents today complaining of progressively worsening urgency and urge incontinence.  She has tried both oxybutynin and Detrol in the past.  She is unable to tolerate the medications even at lower doses.  She discontinued Detrol after 1 dose due to the development of delirium.  Oxybutynin caused severe constipation and dry mouth.      Family History  No family history of urologic cancers    Review of Systems  I reviewed the ROS the patient today.    Nursing Notes:   iKm Hull LPN  6/6/2019 11:45 AM  Addendum  Pt presents to clinic for follow up for incontinence    Review of Systems:    Weight loss:    No     Recent fever/chills:  No   Night sweats:   No  Current skin rash:  No   Recent hair loss:  No  Heat intolerance:  No   Cold intolerance:  No  Chest pain:   No   Palpitations:   No  Shortness of breath:  No   Wheezing:   No  Constipation:    No   Diarrhea:   No   Nausea:   No   Vomiting:   No   Kidney/side pain:  No   Back pain:   No  Frequent headaches:  No   Dizziness:     No  Leg swelling:   No   Calf pain:    No    Post-Void Residual  A post-void residual was measured by ultrasonic bladder scanner.  98 mL      Physical Exam  /80 (BP Location: Right arm, Patient Position: Sitting, Cuff Size: Adult Regular)   Resp 16   Wt 57.5 kg (126 lb 12.8 oz)   BMI 19.28 kg/m    Constitutional: NAD, WDWN  Head: NCAT  Eyes: Conjunctivae normal  Cardiovascular: Regular rate  Pulmonary/Chest: Respirations are even and non-labored  bilaterally  Abdominal: Soft with no distension, tenderness, masses, guarding or CVA tenderness  Neurological: A + O x 3,  cranial nerves II-XII grossly intact  Extremities: BERTRAND x 4, warm, no clubbing, no cyanosis  Skin: Pink, warm, dry with no rash  Psychiatric:  Normal mood and affect  Genitourinary: nonpalpable bladder    Labs  CREATININE (mg/dL)   Date Value   11/16/2016 0.66 (L)     Results for SUSHIL HORN (MRN [id number]) as of 4/6/2018 10:16   4/6/2018 08:57   Color Urine Yellow   Appearance Urine Clear   Glucose Urine Negative   Bilirubin Urine Negative   Ketones Urine Negative   Specific Gravity Urine 1.015   pH Urine 7.0   Protein Albumin Urine Negative   Urobilinogen Urine 0.2   Nitrite Urine Positive (A)   Blood Urine Negative   Leukocyte Esterase Urine Small (A)   Source Midstream Urine   WBC Urine 0 - 5   RBC Urine O - 2   Bacteria Urine Moderate (A)   Squamous Epithelial /LPF Urine Few       Post-Void Residual  A post-void residual was measured by ultrasonic bladder scanner.  98 mL today  97 mL (previously recorded)  35 mL (previously recorded)    Assessment & Plan  Ms. Horn is a 89 y.o. female with progressively worsening urgent continence, frequency and urgency.    We discussed treatment options for irritative voiding symptoms such as urinary urgency and frequency and urge incontinence.    We discussed 3 steps of therapy.  The first step includes conservative measures such as weight loss, timed voiding, avoidance of constipation and avoidance of dietary triggers (elimination diet).  The second step includes medications such as anticholinergics and/or mirabegron.  The third step includes Botox intradetrusor injections, Interstim and/or PTNS therapy.    We discussed the importance of clinical bother when deciding the approach to treatment.    Patient complains of significant urinary frequency, urgency and urge incontinence.    Prior treatments include attempted behavior modification and  anticholinergic medication.  Failure of anticholinergics due to poor efficacy and intolerable side effects.  Treatment options were discussed: Botox injection, posterior tibial nerve stimulation, and Interstim neuromodulation.      Patient elected to proceed with intradetrusor Botox injections.  Discussed risks of UTI and/or urinary retention (6%) requiring self cath or indwelling catheter for a temporary period of time.  The benefit lasts about 6 months on average and I explained the need for retreatment.    Plan  Intravesical Botox 100 units in the clinic

## 2019-06-06 NOTE — NURSING NOTE
Pt presents to clinic for follow up for incontinence    Review of Systems:    Weight loss:    No     Recent fever/chills:  No   Night sweats:   No  Current skin rash:  No   Recent hair loss:  No  Heat intolerance:  No   Cold intolerance:  No  Chest pain:   No   Palpitations:   No  Shortness of breath:  No   Wheezing:   No  Constipation:    No   Diarrhea:   No   Nausea:   No   Vomiting:   No   Kidney/side pain:  No   Back pain:   No  Frequent headaches:  No   Dizziness:     No  Leg swelling:   No   Calf pain:    No    Post-Void Residual  A post-void residual was measured by ultrasonic bladder scanner.  98 mL

## 2019-06-17 ENCOUNTER — ANESTHESIA (OUTPATIENT)
Dept: SURGERY | Facility: OTHER | Age: 84
End: 2019-06-17

## 2019-06-17 ENCOUNTER — ANESTHESIA (OUTPATIENT)
Dept: INTENSIVE CARE | Facility: OTHER | Age: 84
DRG: 853 | End: 2019-06-17
Payer: MEDICARE

## 2019-06-17 ENCOUNTER — APPOINTMENT (OUTPATIENT)
Dept: GENERAL RADIOLOGY | Facility: OTHER | Age: 84
DRG: 853 | End: 2019-06-17
Attending: FAMILY MEDICINE
Payer: MEDICARE

## 2019-06-17 ENCOUNTER — APPOINTMENT (OUTPATIENT)
Dept: CT IMAGING | Facility: OTHER | Age: 84
DRG: 853 | End: 2019-06-17
Attending: FAMILY MEDICINE
Payer: MEDICARE

## 2019-06-17 ENCOUNTER — ANESTHESIA EVENT (OUTPATIENT)
Dept: SURGERY | Facility: OTHER | Age: 84
End: 2019-06-17

## 2019-06-17 ENCOUNTER — ANESTHESIA EVENT (OUTPATIENT)
Dept: INTENSIVE CARE | Facility: OTHER | Age: 84
DRG: 853 | End: 2019-06-17
Payer: MEDICARE

## 2019-06-17 ENCOUNTER — APPOINTMENT (OUTPATIENT)
Dept: GENERAL RADIOLOGY | Facility: OTHER | Age: 84
DRG: 853 | End: 2019-06-17
Attending: UROLOGY
Payer: MEDICARE

## 2019-06-17 ENCOUNTER — HOSPITAL ENCOUNTER (INPATIENT)
Facility: OTHER | Age: 84
LOS: 2 days | Discharge: HOME HEALTH | DRG: 853 | End: 2019-06-19
Attending: FAMILY MEDICINE | Admitting: FAMILY MEDICINE
Payer: MEDICARE

## 2019-06-17 DIAGNOSIS — A41.89 SEPSIS DUE TO OTHER ETIOLOGY (H): ICD-10-CM

## 2019-06-17 DIAGNOSIS — I10 ESSENTIAL HYPERTENSION, BENIGN: ICD-10-CM

## 2019-06-17 DIAGNOSIS — R41.0 DELIRIUM: ICD-10-CM

## 2019-06-17 DIAGNOSIS — I10 ESSENTIAL HYPERTENSION: ICD-10-CM

## 2019-06-17 DIAGNOSIS — I25.10 ATHEROSCLEROSIS OF NATIVE CORONARY ARTERY WITHOUT ANGINA PECTORIS, UNSPECIFIED WHETHER NATIVE OR TRANSPLANTED HEART: ICD-10-CM

## 2019-06-17 DIAGNOSIS — H91.93 BILATERAL DEAFNESS: ICD-10-CM

## 2019-06-17 DIAGNOSIS — H91.93 BILATERAL HEARING LOSS, UNSPECIFIED HEARING LOSS TYPE: ICD-10-CM

## 2019-06-17 DIAGNOSIS — N10 ACUTE PYELONEPHRITIS: Primary | ICD-10-CM

## 2019-06-17 DIAGNOSIS — E03.9 HYPOTHYROIDISM, UNSPECIFIED TYPE: ICD-10-CM

## 2019-06-17 DIAGNOSIS — E03.8 OTHER SPECIFIED HYPOTHYROIDISM: ICD-10-CM

## 2019-06-17 DIAGNOSIS — I25.10 CORONARY ARTERY DISEASE INVOLVING NATIVE CORONARY ARTERY OF NATIVE HEART WITHOUT ANGINA PECTORIS: ICD-10-CM

## 2019-06-17 DIAGNOSIS — R41.0 DELIRIUM, ACUTE: ICD-10-CM

## 2019-06-17 PROBLEM — A41.9 SEPSIS (H): Status: ACTIVE | Noted: 2019-06-17

## 2019-06-17 LAB
ALBUMIN SERPL-MCNC: 3.9 G/DL (ref 3.5–5.7)
ALBUMIN UR-MCNC: NEGATIVE MG/DL
ALP SERPL-CCNC: 66 U/L (ref 34–104)
ALT SERPL W P-5'-P-CCNC: 34 U/L (ref 7–52)
ANION GAP SERPL CALCULATED.3IONS-SCNC: 15 MMOL/L (ref 3–14)
APPEARANCE UR: CLEAR
AST SERPL W P-5'-P-CCNC: 54 U/L (ref 13–39)
BACTERIA #/AREA URNS HPF: ABNORMAL /HPF
BASOPHILS # BLD AUTO: 0 10E9/L (ref 0–0.2)
BASOPHILS NFR BLD AUTO: 0 %
BILIRUB SERPL-MCNC: 1.2 MG/DL (ref 0.3–1)
BILIRUB UR QL STRIP: NEGATIVE
BUN SERPL-MCNC: 18 MG/DL (ref 7–25)
CALCIUM SERPL-MCNC: 8.8 MG/DL (ref 8.6–10.3)
CHLORIDE SERPL-SCNC: 100 MMOL/L (ref 98–107)
CO2 SERPL-SCNC: 18 MMOL/L (ref 21–31)
COLOR UR AUTO: YELLOW
CREAT SERPL-MCNC: 0.98 MG/DL (ref 0.6–1.2)
CREAT SERPL-MCNC: 1.12 MG/DL (ref 0.6–1.2)
DEPRECATED S PYO AG THROAT QL EIA: NORMAL
DIFFERENTIAL METHOD BLD: ABNORMAL
EOSINOPHIL # BLD AUTO: 0 10E9/L (ref 0–0.7)
EOSINOPHIL NFR BLD AUTO: 0 %
ERYTHROCYTE [DISTWIDTH] IN BLOOD BY AUTOMATED COUNT: 13.2 % (ref 10–15)
GFR SERPL CREATININE-BSD FRML MDRD: 46 ML/MIN/{1.73_M2}
GFR SERPL CREATININE-BSD FRML MDRD: 53 ML/MIN/{1.73_M2}
GLUCOSE SERPL-MCNC: 167 MG/DL (ref 70–105)
GLUCOSE UR STRIP-MCNC: NEGATIVE MG/DL
HCT VFR BLD AUTO: 33.6 % (ref 35–47)
HGB BLD-MCNC: 11.3 G/DL (ref 11.7–15.7)
HGB UR QL STRIP: ABNORMAL
INR PPP: 1.1 (ref 0–1.3)
KETONES UR STRIP-MCNC: NEGATIVE MG/DL
LACTATE SERPL-SCNC: 2.1 MMOL/L (ref 0.5–2.2)
LACTATE SERPL-SCNC: 5.7 MMOL/L (ref 0.5–2.2)
LEUKOCYTE ESTERASE UR QL STRIP: ABNORMAL
LYMPHOCYTES # BLD AUTO: 0.5 10E9/L (ref 0.8–5.3)
LYMPHOCYTES NFR BLD AUTO: 3 %
MCH RBC QN AUTO: 33.6 PG (ref 26.5–33)
MCHC RBC AUTO-ENTMCNC: 33.6 G/DL (ref 31.5–36.5)
MCV RBC AUTO: 100 FL (ref 78–100)
MONOCYTES # BLD AUTO: 0.2 10E9/L (ref 0–1.3)
MONOCYTES NFR BLD AUTO: 1 %
NEUTROPHILS # BLD AUTO: 15.6 10E9/L (ref 1.6–8.3)
NEUTROPHILS NFR BLD AUTO: 96 %
NITRATE UR QL: NEGATIVE
NON-SQ EPI CELLS #/AREA URNS LPF: ABNORMAL /LPF
PH UR STRIP: 5 PH (ref 5–9)
PLATELET # BLD AUTO: 134 10E9/L (ref 150–450)
PLATELET # BLD AUTO: 197 10E9/L (ref 150–450)
PLATELET # BLD EST: ABNORMAL 10*3/UL
POTASSIUM SERPL-SCNC: 3.9 MMOL/L (ref 3.5–5.1)
PROT SERPL-MCNC: 6.3 G/DL (ref 6.4–8.9)
RBC # BLD AUTO: 3.36 10E12/L (ref 3.8–5.2)
RBC #/AREA URNS AUTO: ABNORMAL /HPF
RBC MORPH BLD: ABNORMAL
SODIUM SERPL-SCNC: 133 MMOL/L (ref 134–144)
SOURCE: ABNORMAL
SP GR UR STRIP: 1.02 (ref 1–1.03)
SPECIMEN SOURCE: NORMAL
TOXIC GRANULES BLD QL SMEAR: PRESENT
TROPONIN I SERPL-MCNC: 0.04 UG/L (ref 0–0.03)
TSH SERPL DL<=0.05 MIU/L-ACNC: 3.51 IU/ML (ref 0.34–5.6)
UROBILINOGEN UR STRIP-ACNC: 1 EU/DL (ref 0.2–1)
WBC # BLD AUTO: 16.3 10E9/L (ref 4–11)
WBC #/AREA URNS AUTO: ABNORMAL /HPF

## 2019-06-17 PROCEDURE — 87880 STREP A ASSAY W/OPTIC: CPT | Performed by: FAMILY MEDICINE

## 2019-06-17 PROCEDURE — C2617 STENT, NON-COR, TEM W/O DEL: HCPCS | Performed by: UROLOGY

## 2019-06-17 PROCEDURE — BT1D1ZZ FLUOROSCOPY OF RIGHT KIDNEY, URETER AND BLADDER USING LOW OSMOLAR CONTRAST: ICD-10-PCS | Performed by: UROLOGY

## 2019-06-17 PROCEDURE — 87040 BLOOD CULTURE FOR BACTERIA: CPT | Performed by: FAMILY MEDICINE

## 2019-06-17 PROCEDURE — C1758 CATHETER, URETERAL: HCPCS | Performed by: UROLOGY

## 2019-06-17 PROCEDURE — 0T768DZ DILATION OF RIGHT URETER WITH INTRALUMINAL DEVICE, VIA NATURAL OR ARTIFICIAL OPENING ENDOSCOPIC: ICD-10-PCS | Performed by: UROLOGY

## 2019-06-17 PROCEDURE — 93010 ELECTROCARDIOGRAM REPORT: CPT | Performed by: INTERNAL MEDICINE

## 2019-06-17 PROCEDURE — 84484 ASSAY OF TROPONIN QUANT: CPT | Performed by: FAMILY MEDICINE

## 2019-06-17 PROCEDURE — 71045 X-RAY EXAM CHEST 1 VIEW: CPT

## 2019-06-17 PROCEDURE — 81001 URINALYSIS AUTO W/SCOPE: CPT | Performed by: FAMILY MEDICINE

## 2019-06-17 PROCEDURE — C1769 GUIDE WIRE: HCPCS | Performed by: UROLOGY

## 2019-06-17 PROCEDURE — 20000006 ZZH R&B ICU - GICH

## 2019-06-17 PROCEDURE — 87086 URINE CULTURE/COLONY COUNT: CPT | Performed by: UROLOGY

## 2019-06-17 PROCEDURE — 25800030 ZZH RX IP 258 OP 636: Performed by: FAMILY MEDICINE

## 2019-06-17 PROCEDURE — 99223 1ST HOSP IP/OBS HIGH 75: CPT | Mod: AI | Performed by: FAMILY MEDICINE

## 2019-06-17 PROCEDURE — 99291 CRITICAL CARE FIRST HOUR: CPT | Mod: Z6 | Performed by: FAMILY MEDICINE

## 2019-06-17 PROCEDURE — 96366 THER/PROPH/DIAG IV INF ADDON: CPT | Performed by: FAMILY MEDICINE

## 2019-06-17 PROCEDURE — 96365 THER/PROPH/DIAG IV INF INIT: CPT | Performed by: FAMILY MEDICINE

## 2019-06-17 PROCEDURE — 36000060 ZZH SURGERY LEVEL 3 W FLUORO 1ST 30 MIN: Performed by: UROLOGY

## 2019-06-17 PROCEDURE — 74177 CT ABD & PELVIS W/CONTRAST: CPT

## 2019-06-17 PROCEDURE — 25800025 ZZH RX 258: Performed by: UROLOGY

## 2019-06-17 PROCEDURE — 99291 CRITICAL CARE FIRST HOUR: CPT | Mod: 25 | Performed by: FAMILY MEDICINE

## 2019-06-17 PROCEDURE — 74420 UROGRAPHY RTRGR +-KUB: CPT | Mod: 26 | Performed by: UROLOGY

## 2019-06-17 PROCEDURE — 82565 ASSAY OF CREATININE: CPT | Performed by: FAMILY MEDICINE

## 2019-06-17 PROCEDURE — 25000128 H RX IP 250 OP 636: Performed by: FAMILY MEDICINE

## 2019-06-17 PROCEDURE — 25000128 H RX IP 250 OP 636: Performed by: NURSE ANESTHETIST, CERTIFIED REGISTERED

## 2019-06-17 PROCEDURE — 36415 COLL VENOUS BLD VENIPUNCTURE: CPT | Performed by: FAMILY MEDICINE

## 2019-06-17 PROCEDURE — 25000125 ZZHC RX 250: Performed by: FAMILY MEDICINE

## 2019-06-17 PROCEDURE — 83605 ASSAY OF LACTIC ACID: CPT | Performed by: FAMILY MEDICINE

## 2019-06-17 PROCEDURE — A9270 NON-COVERED ITEM OR SERVICE: HCPCS | Mod: GY | Performed by: FAMILY MEDICINE

## 2019-06-17 PROCEDURE — 25000132 ZZH RX MED GY IP 250 OP 250 PS 637: Mod: GY | Performed by: FAMILY MEDICINE

## 2019-06-17 PROCEDURE — 93005 ELECTROCARDIOGRAM TRACING: CPT | Mod: 76

## 2019-06-17 PROCEDURE — 25500064 ZZH RX 255 OP 636: Performed by: UROLOGY

## 2019-06-17 PROCEDURE — 84443 ASSAY THYROID STIM HORMONE: CPT | Performed by: FAMILY MEDICINE

## 2019-06-17 PROCEDURE — 96368 THER/DIAG CONCURRENT INF: CPT | Performed by: FAMILY MEDICINE

## 2019-06-17 PROCEDURE — 85049 AUTOMATED PLATELET COUNT: CPT | Performed by: FAMILY MEDICINE

## 2019-06-17 PROCEDURE — 27210794 ZZH OR GENERAL SUPPLY STERILE: Performed by: UROLOGY

## 2019-06-17 PROCEDURE — 99223 1ST HOSP IP/OBS HIGH 75: CPT | Mod: 57 | Performed by: UROLOGY

## 2019-06-17 PROCEDURE — 80053 COMPREHEN METABOLIC PANEL: CPT | Performed by: FAMILY MEDICINE

## 2019-06-17 PROCEDURE — 85025 COMPLETE CBC W/AUTO DIFF WBC: CPT | Performed by: FAMILY MEDICINE

## 2019-06-17 PROCEDURE — 25800030 ZZH RX IP 258 OP 636: Performed by: NURSE ANESTHETIST, CERTIFIED REGISTERED

## 2019-06-17 PROCEDURE — 87077 CULTURE AEROBIC IDENTIFY: CPT | Performed by: FAMILY MEDICINE

## 2019-06-17 PROCEDURE — 36000058 ZZH SURGERY LEVEL 3 EA 15 ADDTL MIN: Performed by: UROLOGY

## 2019-06-17 PROCEDURE — 52332 CYSTOSCOPY AND TREATMENT: CPT | Performed by: UROLOGY

## 2019-06-17 PROCEDURE — 25500064 ZZH RX 255 OP 636: Performed by: FAMILY MEDICINE

## 2019-06-17 PROCEDURE — 93005 ELECTROCARDIOGRAM TRACING: CPT | Performed by: FAMILY MEDICINE

## 2019-06-17 PROCEDURE — 85610 PROTHROMBIN TIME: CPT | Performed by: FAMILY MEDICINE

## 2019-06-17 PROCEDURE — 25000125 ZZHC RX 250: Performed by: NURSE ANESTHETIST, CERTIFIED REGISTERED

## 2019-06-17 DEVICE — STENT URETERAL CONTOUR SOFT PERCUFLEX 6FRX26CM
Type: IMPLANTABLE DEVICE | Site: KIDNEY | Status: NON-FUNCTIONAL
Removed: 2019-07-23

## 2019-06-17 RX ORDER — SODIUM CHLORIDE 9 MG/ML
INJECTION, SOLUTION INTRAVENOUS CONTINUOUS PRN
Status: DISCONTINUED | OUTPATIENT
Start: 2019-06-17 | End: 2019-06-19 | Stop reason: HOSPADM

## 2019-06-17 RX ORDER — NOREPINEPHRINE BITARTRATE 0.06 MG/ML
.03-.4 INJECTION, SOLUTION INTRAVENOUS CONTINUOUS
Status: DISCONTINUED | OUTPATIENT
Start: 2019-06-17 | End: 2019-06-18

## 2019-06-17 RX ORDER — PROCHLORPERAZINE 25 MG
12.5 SUPPOSITORY, RECTAL RECTAL EVERY 12 HOURS PRN
Status: DISCONTINUED | OUTPATIENT
Start: 2019-06-17 | End: 2019-06-19 | Stop reason: HOSPADM

## 2019-06-17 RX ORDER — HYDROMORPHONE HYDROCHLORIDE 1 MG/ML
0.2 INJECTION, SOLUTION INTRAMUSCULAR; INTRAVENOUS; SUBCUTANEOUS
Status: DISCONTINUED | OUTPATIENT
Start: 2019-06-17 | End: 2019-06-19 | Stop reason: HOSPADM

## 2019-06-17 RX ORDER — AMOXICILLIN 250 MG
2 CAPSULE ORAL 2 TIMES DAILY PRN
Status: DISCONTINUED | OUTPATIENT
Start: 2019-06-17 | End: 2019-06-19 | Stop reason: HOSPADM

## 2019-06-17 RX ORDER — FENTANYL CITRATE 50 UG/ML
25-50 INJECTION, SOLUTION INTRAMUSCULAR; INTRAVENOUS
Status: CANCELLED | OUTPATIENT
Start: 2019-06-17

## 2019-06-17 RX ORDER — IODIXANOL 320 MG/ML
100 INJECTION, SOLUTION INTRAVASCULAR ONCE
Status: COMPLETED | OUTPATIENT
Start: 2019-06-17 | End: 2019-06-17

## 2019-06-17 RX ORDER — SODIUM CHLORIDE 9 MG/ML
1000 INJECTION, SOLUTION INTRAVENOUS CONTINUOUS
Status: DISCONTINUED | OUTPATIENT
Start: 2019-06-17 | End: 2019-06-17

## 2019-06-17 RX ORDER — ACETAMINOPHEN 325 MG/1
975 TABLET ORAL ONCE
Status: DISCONTINUED | OUTPATIENT
Start: 2019-06-17 | End: 2019-06-17

## 2019-06-17 RX ORDER — NALOXONE HYDROCHLORIDE 0.4 MG/ML
.1-.4 INJECTION, SOLUTION INTRAMUSCULAR; INTRAVENOUS; SUBCUTANEOUS
Status: DISCONTINUED | OUTPATIENT
Start: 2019-06-17 | End: 2019-06-19 | Stop reason: HOSPADM

## 2019-06-17 RX ORDER — LIDOCAINE HYDROCHLORIDE 20 MG/ML
INJECTION, SOLUTION INFILTRATION; PERINEURAL PRN
Status: DISCONTINUED | OUTPATIENT
Start: 2019-06-17 | End: 2019-06-17

## 2019-06-17 RX ORDER — PROPOFOL 10 MG/ML
INJECTION, EMULSION INTRAVENOUS CONTINUOUS PRN
Status: DISCONTINUED | OUTPATIENT
Start: 2019-06-17 | End: 2019-06-17

## 2019-06-17 RX ORDER — ONDANSETRON 4 MG/1
4 TABLET, ORALLY DISINTEGRATING ORAL EVERY 30 MIN PRN
Status: CANCELLED | OUTPATIENT
Start: 2019-06-17

## 2019-06-17 RX ORDER — PROPOFOL 10 MG/ML
INJECTION, EMULSION INTRAVENOUS PRN
Status: DISCONTINUED | OUTPATIENT
Start: 2019-06-17 | End: 2019-06-17

## 2019-06-17 RX ORDER — CEFTRIAXONE SODIUM 1 G/50ML
1 INJECTION, SOLUTION INTRAVENOUS ONCE
Status: COMPLETED | OUTPATIENT
Start: 2019-06-17 | End: 2019-06-17

## 2019-06-17 RX ORDER — ACETAMINOPHEN 500 MG
500 TABLET ORAL EVERY 4 HOURS PRN
Status: DISCONTINUED | OUTPATIENT
Start: 2019-06-17 | End: 2019-06-19 | Stop reason: HOSPADM

## 2019-06-17 RX ORDER — ACETAMINOPHEN 500 MG
500 TABLET ORAL EVERY 6 HOURS PRN
COMMUNITY
End: 2020-11-20

## 2019-06-17 RX ORDER — ACETAMINOPHEN 650 MG/1
650 SUPPOSITORY RECTAL ONCE
Status: COMPLETED | OUTPATIENT
Start: 2019-06-17 | End: 2019-06-17

## 2019-06-17 RX ORDER — POLYETHYLENE GLYCOL 3350 17 G/17G
17 POWDER, FOR SOLUTION ORAL DAILY PRN
Status: DISCONTINUED | OUTPATIENT
Start: 2019-06-17 | End: 2019-06-19 | Stop reason: HOSPADM

## 2019-06-17 RX ORDER — SODIUM CHLORIDE, SODIUM LACTATE, POTASSIUM CHLORIDE, CALCIUM CHLORIDE 600; 310; 30; 20 MG/100ML; MG/100ML; MG/100ML; MG/100ML
INJECTION, SOLUTION INTRAVENOUS CONTINUOUS
Status: DISCONTINUED | OUTPATIENT
Start: 2019-06-17 | End: 2019-06-19

## 2019-06-17 RX ORDER — SODIUM CHLORIDE 9 MG/ML
INJECTION, SOLUTION INTRAVENOUS CONTINUOUS
Status: CANCELLED | OUTPATIENT
Start: 2019-06-17

## 2019-06-17 RX ORDER — ONDANSETRON 2 MG/ML
4 INJECTION INTRAMUSCULAR; INTRAVENOUS EVERY 30 MIN PRN
Status: CANCELLED | OUTPATIENT
Start: 2019-06-17

## 2019-06-17 RX ORDER — ONDANSETRON 4 MG/1
4 TABLET, ORALLY DISINTEGRATING ORAL EVERY 6 HOURS PRN
Status: DISCONTINUED | OUTPATIENT
Start: 2019-06-17 | End: 2019-06-19 | Stop reason: HOSPADM

## 2019-06-17 RX ORDER — ASPIRIN 81 MG/1
81 TABLET, CHEWABLE ORAL
Status: DISCONTINUED | OUTPATIENT
Start: 2019-06-17 | End: 2019-06-19 | Stop reason: HOSPADM

## 2019-06-17 RX ORDER — HYDROCODONE BITARTRATE AND ACETAMINOPHEN 5; 325 MG/1; MG/1
1-2 TABLET ORAL EVERY 4 HOURS PRN
Status: DISCONTINUED | OUTPATIENT
Start: 2019-06-17 | End: 2019-06-19 | Stop reason: HOSPADM

## 2019-06-17 RX ORDER — ONDANSETRON 2 MG/ML
4 INJECTION INTRAMUSCULAR; INTRAVENOUS EVERY 6 HOURS PRN
Status: DISCONTINUED | OUTPATIENT
Start: 2019-06-17 | End: 2019-06-19 | Stop reason: HOSPADM

## 2019-06-17 RX ORDER — PROCHLORPERAZINE MALEATE 5 MG
5 TABLET ORAL EVERY 6 HOURS PRN
Status: DISCONTINUED | OUTPATIENT
Start: 2019-06-17 | End: 2019-06-19 | Stop reason: HOSPADM

## 2019-06-17 RX ORDER — AMOXICILLIN 250 MG
1 CAPSULE ORAL 2 TIMES DAILY PRN
Status: DISCONTINUED | OUTPATIENT
Start: 2019-06-17 | End: 2019-06-19 | Stop reason: HOSPADM

## 2019-06-17 RX ORDER — SODIUM CHLORIDE 9 MG/ML
INJECTION, SOLUTION INTRAVENOUS CONTINUOUS
Status: DISCONTINUED | OUTPATIENT
Start: 2019-06-17 | End: 2019-06-17

## 2019-06-17 RX ORDER — NALOXONE HYDROCHLORIDE 0.4 MG/ML
.1-.4 INJECTION, SOLUTION INTRAMUSCULAR; INTRAVENOUS; SUBCUTANEOUS
Status: CANCELLED | OUTPATIENT
Start: 2019-06-17 | End: 2019-06-18

## 2019-06-17 RX ADMIN — PROPOFOL 40 MG: 10 INJECTION, EMULSION INTRAVENOUS at 18:34

## 2019-06-17 RX ADMIN — ACETAMINOPHEN 650 MG: 650 SUPPOSITORY RECTAL at 08:11

## 2019-06-17 RX ADMIN — VANCOMYCIN HYDROCHLORIDE 1000 MG: 1 INJECTION, POWDER, LYOPHILIZED, FOR SOLUTION INTRAVENOUS at 09:47

## 2019-06-17 RX ADMIN — PROPOFOL 50 MCG/KG/MIN: 10 INJECTION, EMULSION INTRAVENOUS at 18:27

## 2019-06-17 RX ADMIN — ACETAMINOPHEN 500 MG: 500 TABLET, FILM COATED ORAL at 16:57

## 2019-06-17 RX ADMIN — PROPOFOL 50 MG: 10 INJECTION, EMULSION INTRAVENOUS at 18:27

## 2019-06-17 RX ADMIN — SODIUM CHLORIDE 0.09 MCG/KG/MIN: 900 INJECTION, SOLUTION INTRAVENOUS at 18:24

## 2019-06-17 RX ADMIN — ASPIRIN 81 MG 81 MG: 81 TABLET ORAL at 12:26

## 2019-06-17 RX ADMIN — LIDOCAINE HYDROCHLORIDE 40 MG: 20 INJECTION, SOLUTION INFILTRATION; PERINEURAL at 18:27

## 2019-06-17 RX ADMIN — SODIUM CHLORIDE, POTASSIUM CHLORIDE, SODIUM LACTATE AND CALCIUM CHLORIDE: 600; 310; 30; 20 INJECTION, SOLUTION INTRAVENOUS at 21:32

## 2019-06-17 RX ADMIN — CEFTRIAXONE SODIUM 1 G: 1 INJECTION, SOLUTION INTRAVENOUS at 08:29

## 2019-06-17 RX ADMIN — SODIUM CHLORIDE, POTASSIUM CHLORIDE, SODIUM LACTATE AND CALCIUM CHLORIDE: 600; 310; 30; 20 INJECTION, SOLUTION INTRAVENOUS at 12:13

## 2019-06-17 RX ADMIN — TAZOBACTAM SODIUM AND PIPERACILLIN SODIUM 3.38 G: 375; 3 INJECTION, SOLUTION INTRAVENOUS at 23:56

## 2019-06-17 RX ADMIN — SODIUM CHLORIDE 1000 ML: 9 INJECTION, SOLUTION INTRAVENOUS at 07:46

## 2019-06-17 RX ADMIN — Medication 0.03 MCG/KG/MIN: at 13:09

## 2019-06-17 RX ADMIN — FAMOTIDINE 20 MG: 10 INJECTION, SOLUTION INTRAVENOUS at 12:26

## 2019-06-17 RX ADMIN — IODIXANOL 100 ML: 320 INJECTION, SOLUTION INTRAVASCULAR at 13:41

## 2019-06-17 RX ADMIN — ENOXAPARIN SODIUM 40 MG: 40 INJECTION SUBCUTANEOUS at 12:26

## 2019-06-17 RX ADMIN — TAZOBACTAM SODIUM AND PIPERACILLIN SODIUM 3.38 G: 375; 3 INJECTION, SOLUTION INTRAVENOUS at 11:59

## 2019-06-17 RX ADMIN — SODIUM CHLORIDE 1000 ML: 9 INJECTION, SOLUTION INTRAVENOUS at 09:29

## 2019-06-17 RX ADMIN — TAZOBACTAM SODIUM AND PIPERACILLIN SODIUM 3.38 G: 375; 3 INJECTION, SOLUTION INTRAVENOUS at 17:43

## 2019-06-17 ASSESSMENT — ACTIVITIES OF DAILY LIVING (ADL)
RETIRED_COMMUNICATION: 0-->UNDERSTANDS/COMMUNICATES WITHOUT DIFFICULTY
ADLS_ACUITY_SCORE: 22
RETIRED_EATING: 0-->INDEPENDENT
AMBULATION: 0-->INDEPENDENT
TRANSFERRING: 0-->INDEPENDENT
TOILETING: 0-->INDEPENDENT
DRESS: 0-->INDEPENDENT
SWALLOWING: 0-->SWALLOWS FOODS/LIQUIDS WITHOUT DIFFICULTY
ADLS_ACUITY_SCORE: 16
FALL_HISTORY_WITHIN_LAST_SIX_MONTHS: NO
COGNITION: 0 - NO COGNITION ISSUES REPORTED
BATHING: 0-->INDEPENDENT
ADLS_ACUITY_SCORE: 22

## 2019-06-17 ASSESSMENT — ENCOUNTER SYMPTOMS
CONFUSION: 1
BACK PAIN: 1
FEVER: 1
COUGH: 0
GASTROINTESTINAL NEGATIVE: 1
CARDIOVASCULAR NEGATIVE: 1
RESPIRATORY NEGATIVE: 1
EYES NEGATIVE: 1

## 2019-06-17 ASSESSMENT — MIFFLIN-ST. JEOR: SCORE: 1039.56

## 2019-06-17 NOTE — PROGRESS NOTES
BP's trending down.  Dr Stockton notified.  Orders to proceed with levofed.  See correlating flow sheets

## 2019-06-17 NOTE — PROGRESS NOTES
Over from ER via cart.  Transferred to bed with 3.  Pantera well.  Drowsy but arouses easily.  Daughter Adri here.  Admit data completed with pt and her.  Oriented to room.  No c/o at this time.

## 2019-06-17 NOTE — PROGRESS NOTES
Met with Geno and her daughter and son.  Discussed CT results.  There is a large obstructing stone on the right with hydro-nephrosis.  Spoke with Dr. Cole.  He will come in for emergent stent placement.  Patient and family aware of anticipated procedure.  We will make her n.p.o. for now.

## 2019-06-17 NOTE — ED NOTES
Patient report given to HAILEY Meyer on ICU. Patient transferred with Iv fluids infusing and on monitors. Patient tolerated transfer.

## 2019-06-17 NOTE — PHARMACY-VANCOMYCIN DOSING SERVICE
"Pharmacy Consult- Vancomycin Assessment, Day # 1     Geno Horn is a 89 year old female admitted on 2019.    Vancomycin has been ordered per MD, for the indication of: Sepsis    Current Antibiotic Regimen Includes: Vancomycin & Zosyn    Patient Active Problem List   Diagnosis     Allergic rhinitis     Coronary artery disease involving native coronary artery of native heart without angina pectoris     H/O basal cell carcinoma excision     Hearing loss     Hypertension     Hypothyroidism     Osteoarthritis of both hips     Urinary urgency     Unsteady gait     Sepsis (H)       Allergies (and reaction): Tolterodine    Most recent flowsheet Weight: 58.1 kg (128 lb 1.6 oz)  Most recent flowsheet Height: 172.7 cm (5' 8\")    No intake or output data in the 24 hours ending 19 1151    Tmax = Temp (24hrs), Av.2  F (39  C), Min:100  F (37.8  C), Max:104.2  F (40.1  C)      estimated creatinine clearance is 29.4 mL/min (based on SCr of 1.12 mg/dL).    Creatinine for last 3 days  2019:  7:50 AM Creatinine 1.12 mg/dL     Nephrotoxins and other renal medications (From now, onward)    Start     Dose/Rate Route Frequency Ordered Stop    19 1200  piperacillin-tazobactam (ZOSYN) infusion 3.375 g      3.375 g  100 mL/hr over 30 Minutes Intravenous EVERY 6 HOURS 19 1126      19 1130  norepinephrine (LEVOPHED) 16 mg in  mL infusion      0.03-0.4 mcg/kg/min × 57.2 kg  1.6-21.5 mL/hr  Intravenous CONTINUOUS 19 1126            Contrast Orders - past 72 hours (72h ago, onward)    None          Vancomycin IV Administrations (past 72 hours)                   vancomycin (VANCOCIN) 1,000 mg in sodium chloride 0.9 % 250 mL intermittent infusion (mg) 1,000 mg New Bag 19 0947                Cultures Pending:   (19) Blood    Culture Results:   (19) Rapid strep negative    Assessment/Plan: Patient admitted from home with probable sepsis (lactic acid = 5.7). Loading dose of 1000 mg " (17 mg/kg) given in ED.     Recommended Dose: 750 mg, which provides 13 mg/kg/dose  Interval:Q24H  Goal Trough Level: 15-20 mg/L       Thank You for the consult. Will continue to follow.    Reagan Dillon [facility] ....................  6/17/2019   11:51 AM

## 2019-06-17 NOTE — ED PROVIDER NOTES
History     Chief Complaint   Patient presents with     Generalized Weakness     Fever     Fall     HPI  Geno Horn is a 89 year old female who lives at home with daughter reporting she was up all night and couldn't sleep - reporting right sided LBP and sciatica and suprapubic pain.  She tried to get up this morning and slid down from bed to floor apparently without injury.  She couldn't get up.  ?her neighbor heard her and found her and called EMS.  She is febrile to 102.  She has stool soiling of her underpants.     Allergies:  Allergies   Allergen Reactions     Tolterodine      Other reaction(s): Intolerance-Can't Take  Was unable to sleep at night       Problem List:    Patient Active Problem List    Diagnosis Date Noted     Unsteady gait 2018     Priority: Medium     Urinary urgency 2017     Priority: Medium     H/O basal cell carcinoma excision 2016     Priority: Medium     Coronary artery disease involving native coronary artery of native heart without angina pectoris 2016     Priority: Medium     Hearing loss 2015     Priority: Medium     Osteoarthritis of both hips 2015     Priority: Medium     Allergic rhinitis 2012     Priority: Medium     Hypertension 2012     Priority: Medium     Hypothyroidism 2012     Priority: Medium     Overview:   tsh and t4 normal on 2015           Past Medical History:    Past Medical History:   Diagnosis Date     Hypertension      Hypothyroid      Osteoarthritis      ST elevation (STEMI) myocardial infarction (H)        Past Surgical History:    Past Surgical History:   Procedure Laterality Date     ARTHROPLASTY HIP Right     Dr Rodriguez     ARTHROPLASTY HIP Left     Dr. Rodriguez       Family History:    Family History   Problem Relation Age of Onset     Heart Disease Mother 60         of mi     Heart Disease Father 60         of mi     Heart Disease Brother      Heart Disease Brother        Social  History:  Marital Status:   [5]  Social History     Tobacco Use     Smoking status: Never Smoker     Smokeless tobacco: Never Used   Substance Use Topics     Alcohol use: No     Alcohol/week: 0.0 oz     Drug use: No        Medications:      aspirin 81 MG chewable tablet   diltiazem ER (DILT-XR) 180 MG 24 hr capsule   Incontinence Supply Disposable (POISE PAD) PADS   levothyroxine (SYNTHROID/LEVOTHROID) 88 MCG tablet   mirabegron (MYRBETRIQ) 25 MG 24 hr tablet   order for DME   order for DME         Review of Systems   Constitutional: Positive for fever.   HENT: Negative.    Eyes: Negative.    Respiratory: Negative.  Negative for cough.    Cardiovascular: Negative.    Gastrointestinal: Negative.    Genitourinary: Positive for pelvic pain (suprapubic pain/tenderness.).   Musculoskeletal: Positive for back pain.   Psychiatric/Behavioral: Positive for confusion.       Physical Exam   BP: (!) 143/93  Pulse: 106  Temp: 102.6  F (39.2  C)  Resp: 16  Weight: 57.2 kg (126 lb)  SpO2: 90 %    Vitals:    06/17/19 0743 06/17/19 0843 06/17/19 0912   BP: (!) 143/93     Pulse: 106     Resp: 16     Temp: 102.6  F (39.2  C) 104.2  F (40.1  C) 101.9  F (38.8  C)   TempSrc: Tympanic     SpO2: 90%     Weight: 57.2 kg (126 lb)       Physical Exam   Constitutional: She appears well-developed and well-nourished. She appears distressed.   Elderly female with some confusion and unable to provide consistent/detailed history.  Stool soiled underpants/PJs.   HENT:   Head: Normocephalic and atraumatic.   Right Ear: External ear normal.   Left Ear: External ear normal.   Mouth/Throat: Oropharynx is clear and moist.   Eyes: Pupils are equal, round, and reactive to light. Conjunctivae and EOM are normal. Right eye exhibits no discharge. Left eye exhibits no discharge. No scleral icterus.   Neck: Normal range of motion. Neck supple. No tracheal deviation present. No thyromegaly present.   Cardiovascular: Normal rate, regular rhythm and normal  heart sounds.   Pulmonary/Chest: Effort normal and breath sounds normal. No respiratory distress.   Abdominal: Soft. Bowel sounds are normal. She exhibits no distension. There is tenderness (mild suprapubic tenderness.).   Musculoskeletal: Normal range of motion. She exhibits tenderness (LBP). She exhibits no edema or deformity.   Lymphadenopathy:     She has no cervical adenopathy.   Skin: Skin is warm. She is not diaphoretic.   Nursing note and vitals reviewed.      ED Course        Procedures          EKG: Sinus tachycardia at 110 bpm with occasional PVCs left axis deviation left bundle branch block    Critical Care time:  was 45 minutes for this patient excluding procedures.               Results for orders placed or performed during the hospital encounter of 06/17/19 (from the past 24 hour(s))   CBC with platelets differential   Result Value Ref Range    WBC 16.3 (H) 4.0 - 11.0 10e9/L    RBC Count 3.36 (L) 3.8 - 5.2 10e12/L    Hemoglobin 11.3 (L) 11.7 - 15.7 g/dL    Hematocrit 33.6 (L) 35.0 - 47.0 %     78 - 100 fl    MCH 33.6 (H) 26.5 - 33.0 pg    MCHC 33.6 31.5 - 36.5 g/dL    RDW 13.2 10.0 - 15.0 %    Platelet Count 197 150 - 450 10e9/L    Diff Method Manual Differential     % Neutrophils 96.0 %    % Lymphocytes 3.0 %    % Monocytes 1.0 %    % Eosinophils 0.0 %    % Basophils 0.0 %    Absolute Neutrophil 15.6 (H) 1.6 - 8.3 10e9/L    Absolute Lymphocytes 0.5 (L) 0.8 - 5.3 10e9/L    Absolute Monocytes 0.2 0.0 - 1.3 10e9/L    Absolute Eosinophils 0.0 0.0 - 0.7 10e9/L    Absolute Basophils 0.0 0.0 - 0.2 10e9/L    Platelet Estimate       Automated count confirmed.  Platelet morphology is normal.    RBC Morphology Consistent with reported results     Toxic Granulation Present    INR   Result Value Ref Range    INR 1.10 0 - 1.3   Comprehensive metabolic panel   Result Value Ref Range    Sodium 133 (L) 134 - 144 mmol/L    Potassium 3.9 3.5 - 5.1 mmol/L    Chloride 100 98 - 107 mmol/L    Carbon Dioxide 18 (L)  21 - 31 mmol/L    Anion Gap 15 (H) 3 - 14 mmol/L    Glucose 167 (H) 70 - 105 mg/dL    Urea Nitrogen 18 7 - 25 mg/dL    Creatinine 1.12 0.60 - 1.20 mg/dL    GFR Estimate 46 (L) >60 mL/min/[1.73_m2]    GFR Estimate If Black 55 (L) >60 mL/min/[1.73_m2]    Calcium 8.8 8.6 - 10.3 mg/dL    Bilirubin Total 1.2 (H) 0.3 - 1.0 mg/dL    Albumin 3.9 3.5 - 5.7 g/dL    Protein Total 6.3 (L) 6.4 - 8.9 g/dL    Alkaline Phosphatase 66 34 - 104 U/L    ALT 34 7 - 52 U/L    AST 54 (H) 13 - 39 U/L   Lactic acid   Result Value Ref Range    Lactic Acid 5.7 (HH) 0.5 - 2.2 mmol/L   Troponin I   Result Value Ref Range    Troponin I ES 0.039 (H) 0.000 - 0.034 ug/L   TSH Reflex GH   Result Value Ref Range    TSH Reflex 3.51 0.34 - 5.60 IU/mL   UA reflex to Microscopic and Culture   Result Value Ref Range    Color Urine Yellow     Appearance Urine Clear     Glucose Urine Negative NEG^Negative mg/dL    Bilirubin Urine Negative NEG^Negative    Ketones Urine Negative NEG^Negative mg/dL    Specific Gravity Urine 1.020 1.000 - 1.030    Blood Urine Large (A) NEG^Negative    pH Urine 5.0 5.0 - 9.0 pH    Protein Albumin Urine Negative NEG^Negative mg/dL    Urobilinogen Urine 1.0 0.2 - 1.0 EU/dL    Nitrite Urine Negative NEG^Negative    Leukocyte Esterase Urine Trace (A) NEG^Negative    Source Catheterized Urine    Urine Microscopic   Result Value Ref Range    WBC Urine 5-10 (A) OTO5^0 - 5 /HPF    RBC Urine 2-5 (A) OTO2^O - 2 /HPF    Squamous Epithelial /LPF Urine Few FEW^Few /LPF    Bacteria Urine Many (A) NEG^Negative /HPF   XR Chest Port 1 View    Narrative    PROCEDURE: XR CHEST PORT 1 VW 6/17/2019 8:57 AM    HISTORY: sepsis.    COMPARISONS: 2/26/2016.    TECHNIQUE: Single view.    FINDINGS: Heart is not enlarged. There is some mild patchy atelectasis  or infiltrate at both lung bases. Upper lungs are relatively clear.  There is no pleural effusion.    There is a stable nodule at the right lung base laterally.         Impression    IMPRESSION: Mild  atelectasis or infiltrate at both lung bases.    SUSHIL BHANDARI MD       Medications   0.9% sodium chloride BOLUS (1,000 mLs Intravenous Started 6/17/19 0746)     Followed by   sodium chloride 0.9% infusion (has no administration in time range)   0.9% sodium chloride BOLUS (has no administration in time range)   sodium chloride 0.9% infusion (has no administration in time range)   acetaminophen (TYLENOL) Suppository 650 mg (650 mg Rectal Given 6/17/19 0811)   cefTRIAXone in d5w (ROCEPHIN) intermittent infusion 1 g (1 g Intravenous New Bag 6/17/19 0829)     8:22 AM lab calls with critical lactate of 5.7.    9:21 AM I discussed patient with Dr. Stockton, Hospitalist accepting patient for admission for sepsis.  Reviewed no obvious source and will add vancomycin.      Assessments & Plan (with Medical Decision Making)   89-year-old female previously living at home and doing well now with acute fever up to 104 some delirium and sepsis without obvious cause but consider urinary versus lung source.  Patient is started on antibiotics and IV fluid resuscitation and will be admitted to the hospital for continued cares.    I have reviewed the nursing notes.    I have reviewed the findings, diagnosis, plan and need for follow up with the patient.          Medication List      There are no discharge medications for this visit.         Final diagnoses:   Sepsis due to other etiology (H)   Delirium   Other specified hypothyroidism   Essential hypertension   Coronary artery disease involving native coronary artery of native heart without angina pectoris   Bilateral hearing loss, unspecified hearing loss type       6/17/2019   [facility] AND [facility]     Porfirio Kc MD  06/17/19 6740       Porfirio Kc MD  06/17/19 8678

## 2019-06-17 NOTE — CONSULTS
I was asked to see this patient by Dr Stockton and provide my opinion about the following:  Ureteral stone with sepsis    Type of Visit  Consult    Chief Complaint  Ureteral stone with sepsis    HPI  Ms. Horn is a 89 year old female who presents with symptoms of UTI.   Patient underwent CT scan revealing an obstructing right ureteral stone  The patient initially presented to the ED today days with lightheadedness and right flank pain.  Patient is having fevers, chills, nausea and vomiting.  The patient has not undergone surgery in the past for stones.    She has been admitted to the ICU for medical mgmt including pressors.  She has been started on Zosyn and IV fluids as well.    Pain ROS  Location:  Right flank  Quality:    Dull  Provocative factors:  Nothing makes it worse  Palliative factors:   Nothing makes it better  Radiation:   None  Severity:   3/10 currently and 6/10 at its worst  Time:     Pain started 2-3 weeks ago      Past Medical History  She  has a past medical history of Hypertension, Hypothyroid, Osteoarthritis, and ST elevation (STEMI) myocardial infarction (H).  Patient Active Problem List   Diagnosis     Allergic rhinitis     Coronary artery disease involving native coronary artery of native heart without angina pectoris     H/O basal cell carcinoma excision     Hearing loss     Hypertension     Hypothyroidism     Osteoarthritis of both hips     Urinary urgency     Unsteady gait     Sepsis (H)       Past Surgical History  She  has a past surgical history that includes Arthroplasty hip (Right) and Arthroplasty hip (Left, 2015).    Medications    Current Facility-Administered Medications:      acetaminophen (TYLENOL) tablet 500 mg, 500 mg, Oral, Q4H PRN, Anitra Stockton DO, 500 mg at 06/17/19 1657     aspirin (ASA) chewable tablet 81 mg, 81 mg, Oral, Daily with breakfast, Anitra Stockton DO, 81 mg at 06/17/19 1226     enoxaparin (LOVENOX) injection 40 mg, 40 mg, Subcutaneous, Daily, Anitra Stockton  DO, 40 mg at 06/17/19 1226     famotidine (PEPCID) injection 20 mg, 20 mg, Intravenous, Daily, Anitra Stockton, DO, 20 mg at 06/17/19 1226     HYDROcodone-acetaminophen (NORCO) 5-325 MG per tablet 1-2 tablet, 1-2 tablet, Oral, Q4H PRN, Anitra Stockton, DO     HYDROmorphone (PF) (DILAUDID) injection 0.2 mg, 0.2 mg, Intravenous, Q2H PRN, Anitra Stockton, DO     lactated ringers infusion, , Intravenous, Continuous, Anitra Stockton, DO, Last Rate: 100 mL/hr at 06/17/19 1213     naloxone (NARCAN) injection 0.1-0.4 mg, 0.1-0.4 mg, Intravenous, Q2 Min PRN, Anitra Stockton,      norepinephrine (LEVOPHED) 16 mg in  mL infusion, 0.03-0.4 mcg/kg/min, Intravenous, Continuous, Anitra Stockton, DO, Last Rate: 4.8 mL/hr at 06/17/19 1526, 0.09 mcg/kg/min at 06/17/19 1526     ondansetron (ZOFRAN-ODT) ODT tab 4 mg, 4 mg, Oral, Q6H PRN **OR** ondansetron (ZOFRAN) injection 4 mg, 4 mg, Intravenous, Q6H PRN, Anitra Stockton,      piperacillin-tazobactam (ZOSYN) infusion 3.375 g, 3.375 g, Intravenous, Q6H, Anitra Stockton, DO, Last Rate: 100 mL/hr at 06/17/19 1159, 3.375 g at 06/17/19 1159     polyethylene glycol (MIRALAX/GLYCOLAX) Packet 17 g, 17 g, Oral, Daily PRN, Anitra Stockton, DO     prochlorperazine (COMPAZINE) injection 5 mg, 5 mg, Intravenous, Q6H PRN **OR** prochlorperazine (COMPAZINE) tablet 5 mg, 5 mg, Oral, Q6H PRN **OR** prochlorperazine (COMPAZINE) Suppository 12.5 mg, 12.5 mg, Rectal, Q12H PRN, Anitra Stockton DO     senna-docusate (SENOKOT-S/PERICOLACE) 8.6-50 MG per tablet 1 tablet, 1 tablet, Oral, BID PRN **OR** senna-docusate (SENOKOT-S/PERICOLACE) 8.6-50 MG per tablet 2 tablet, 2 tablet, Oral, BID PRN, Anitra Stockton DO     sodium chloride 0.9% infusion, , Intravenous, Continuous PRN, Anitra Stockton DO     [START ON 6/18/2019] vancomycin (VANCOCIN) 750 mg in sodium chloride 0.9 % 250 mL intermittent infusion, 750 mg, Intravenous, Q24H, Anitra Stockton DO    Allergies  Allergies   Allergen Reactions      Tolterodine      Other reaction(s): Intolerance-Can't Take  Was unable to sleep at night       Social History  She  reports that she has never smoked. She has never used smokeless tobacco. She reports that she does not drink alcohol or use drugs.  No drug abuse.    Family History  Family History   Problem Relation Age of Onset     Heart Disease Mother 60         of mi     Heart Disease Father 60         of mi     Heart Disease Brother      Heart Disease Brother        Review of Systems  I personally reviewed the ROS with the patient.    Unintentional weight loss:  No  Recent fever/chills: Yes  Night sweats: No   Current skin rash: No   Recent hair loss: No   Heat intolerance: No   Cold intolerance: No   Chest pain: No   Palpitations: No   Shortness of breath: No  Wheezing: No   Constipation: No   Diarrhea: No   Nausea: Yes    Vomiting: Yes   Kidney/side pain: Yes   Back pain: No  Frequent headaches: No  Dizziness: Yes   Leg swelling: No   Calf pain: No    Physical Exam  Vitals:    19 1645 19 1700 19 1715 19 1730   BP: 97/48 94/49 95/59 95/48   Pulse: 87 90 87 90   Resp: 20 16     Temp:       TempSrc:       SpO2: 97% 96% 98% 98%   Weight:       Height:         Constitutional: Nasal cannula.  Alert and cooperative   Head: NCAT  Eyes: Conjunctivae normal  Cardiovascular: Regular rate.  Pulmonary/Chest: Respirations are even and non-labored bilaterally, no audible wheezing  Abdominal: Soft. No distension, tenderness, masses or guarding.   Neurological: A + O x 3.  Cranial Nerves II-XII grossly intact.  Extremities: BERTRAND x 4, Warm. No clubbing.  No cyanosis.    Skin: Pink, warm and dry.  No visible rashes noted.  Psychiatric:  Normal mood and affect  Back:  - left CVA tenderness.  + right CVA tenderness.  Genitourinary: nonpalpable bladder    Labs  Results for orders placed or performed during the hospital encounter of 19   XR Chest Port 1 View    Narrative    PROCEDURE: XR CHEST PORT  1 VW 6/17/2019 8:57 AM    HISTORY: sepsis.    COMPARISONS: 2/26/2016.    TECHNIQUE: Single view.    FINDINGS: Heart is not enlarged. There is some mild patchy atelectasis  or infiltrate at both lung bases. Upper lungs are relatively clear.  There is no pleural effusion.    There is a stable nodule at the right lung base laterally.         Impression    IMPRESSION: Mild atelectasis or infiltrate at both lung bases.    SUSHIL BHANDARI MD   CT Abdomen Pelvis w Contrast    Narrative    PROCEDURE:  CT ABDOMEN PELVIS W CONTRAST    HISTORY: Abd pain, appendicitis suspected    TECHNIQUE:  Helical CT of the abdomen and pelvis was performed  following injection of intravenous contrast.  Ingested oral contrast  partially opacifies the bowel.     COMPARISON:  None.    MEDS/CONTRAST: Visipaque 320 100 mL    FINDINGS:      Limited images through the lung bases demonstrate trace pleural fluid,  mostly on the right. There is a small hiatal hernia. The heart size is  within normal limits.    There is mild right hydronephrosis with urothelial thickening and  hyperenhancement proximal to a 8 x 5 mm proximal right ureteral stone.  There is a slightly delayed right nephrogram. The left kidney is  unremarkable.    There is mild periportal edema. The gallbladder is distended,  containing some nonspecific dense material, potentially stones. The  pancreas and spleen are unremarkable. There is nodularity of both  adrenal glands. The small bowel is normal in caliber. Diffuse  prominent formed stool is present throughout the colon and rectum. The  appendix is normal.    A small amount of free fluid is nonspecific. No free air or gross  adenopathy is identified. No suspicious osseous lesions are  identified.      Impression    IMPRESSION:      Mild right hydronephrosis as well as right urothelial thickening are  present secondary to a 8 x 5 mm proximal right ureteral stone. The  latter can be seen in ascending infection. There is a slightly  delayed  right nephrogram.     Probable constipation. Normal appendix.    KATELIN CLEMENTS MD   CBC with platelets differential   Result Value Ref Range    WBC 16.3 (H) 4.0 - 11.0 10e9/L    RBC Count 3.36 (L) 3.8 - 5.2 10e12/L    Hemoglobin 11.3 (L) 11.7 - 15.7 g/dL    Hematocrit 33.6 (L) 35.0 - 47.0 %     78 - 100 fl    MCH 33.6 (H) 26.5 - 33.0 pg    MCHC 33.6 31.5 - 36.5 g/dL    RDW 13.2 10.0 - 15.0 %    Platelet Count 197 150 - 450 10e9/L    Diff Method Manual Differential     % Neutrophils 96.0 %    % Lymphocytes 3.0 %    % Monocytes 1.0 %    % Eosinophils 0.0 %    % Basophils 0.0 %    Absolute Neutrophil 15.6 (H) 1.6 - 8.3 10e9/L    Absolute Lymphocytes 0.5 (L) 0.8 - 5.3 10e9/L    Absolute Monocytes 0.2 0.0 - 1.3 10e9/L    Absolute Eosinophils 0.0 0.0 - 0.7 10e9/L    Absolute Basophils 0.0 0.0 - 0.2 10e9/L    Platelet Estimate       Automated count confirmed.  Platelet morphology is normal.    RBC Morphology Consistent with reported results     Toxic Granulation Present    INR   Result Value Ref Range    INR 1.10 0 - 1.3   Comprehensive metabolic panel   Result Value Ref Range    Sodium 133 (L) 134 - 144 mmol/L    Potassium 3.9 3.5 - 5.1 mmol/L    Chloride 100 98 - 107 mmol/L    Carbon Dioxide 18 (L) 21 - 31 mmol/L    Anion Gap 15 (H) 3 - 14 mmol/L    Glucose 167 (H) 70 - 105 mg/dL    Urea Nitrogen 18 7 - 25 mg/dL    Creatinine 1.12 0.60 - 1.20 mg/dL    GFR Estimate 46 (L) >60 mL/min/[1.73_m2]    GFR Estimate If Black 55 (L) >60 mL/min/[1.73_m2]    Calcium 8.8 8.6 - 10.3 mg/dL    Bilirubin Total 1.2 (H) 0.3 - 1.0 mg/dL    Albumin 3.9 3.5 - 5.7 g/dL    Protein Total 6.3 (L) 6.4 - 8.9 g/dL    Alkaline Phosphatase 66 34 - 104 U/L    ALT 34 7 - 52 U/L    AST 54 (H) 13 - 39 U/L   Lactic acid   Result Value Ref Range    Lactic Acid 5.7 (HH) 0.5 - 2.2 mmol/L   Troponin I   Result Value Ref Range    Troponin I ES 0.039 (H) 0.000 - 0.034 ug/L   TSH Reflex GH   Result Value Ref Range    TSH Reflex 3.51 0.34 -  5.60 IU/mL   UA reflex to Microscopic and Culture   Result Value Ref Range    Color Urine Yellow     Appearance Urine Clear     Glucose Urine Negative NEG^Negative mg/dL    Bilirubin Urine Negative NEG^Negative    Ketones Urine Negative NEG^Negative mg/dL    Specific Gravity Urine 1.020 1.000 - 1.030    Blood Urine Large (A) NEG^Negative    pH Urine 5.0 5.0 - 9.0 pH    Protein Albumin Urine Negative NEG^Negative mg/dL    Urobilinogen Urine 1.0 0.2 - 1.0 EU/dL    Nitrite Urine Negative NEG^Negative    Leukocyte Esterase Urine Trace (A) NEG^Negative    Source Catheterized Urine    Urine Microscopic   Result Value Ref Range    WBC Urine 5-10 (A) OTO5^0 - 5 /HPF    RBC Urine 2-5 (A) OTO2^O - 2 /HPF    Squamous Epithelial /LPF Urine Few FEW^Few /LPF    Bacteria Urine Many (A) NEG^Negative /HPF   Lactic acid   Result Value Ref Range    Lactic Acid 2.1 0.5 - 2.2 mmol/L   Platelet count   Result Value Ref Range    Platelet Count 134 (L) 150 - 450 10e9/L   Creatinine   Result Value Ref Range    Creatinine 0.98 0.60 - 1.20 mg/dL    GFR Estimate 53 (L) >60 mL/min/[1.73_m2]    GFR Estimate If Black 64 >60 mL/min/[1.73_m2]   Blood culture   Result Value Ref Range    Specimen Description Blood     Culture Micro Gram negative rods (A)     Culture Micro 4 BOTTLES OUT OF 4 BOTTLES ARE POSITIVE    Blood culture   Result Value Ref Range    Specimen Description Blood     Culture Micro (A)      Gram negative rods  SEE OTHER BLOOD CULTURE FOR IDENTIFICATION AND SENITIVITIES      Culture Micro 4 BOTTLES OUT OF 4 BOTTLES ARE POSITIVE    Rapid strep screen   Result Value Ref Range    Specimen Description Throat     Rapid Strep A Screen       Negative presumptive for Group A Beta Streptococcus       Imaging  I personally reviewed and interpreted the images and report.  CT a/p   6/17/2019  IMPRESSION:    Mild right hydronephrosis as well as right urothelial thickening are  present secondary to a 8 x 5 mm proximal right ureteral stone.  The  latter can be seen in ascending infection. There is a slightly delayed  right nephrogram.      Probable constipation. Normal appendix.    Assessment  Ms. Horn is a 89 year old female who presents with obstructing right ureteral stone in the presence of sepsis with source - urinary tract.  Explained that this situation needs to be managed urgently due to risks of progression of the infection.  Explained that options include stents and/or nephrostomy tubes.  I recommend urgent placement of a ureteral stent    Plan  Emergent placement of a right ureteral stent in the OR under MAC sedation.  The stone will be treated definitively on a delayed timeframe after culture appropriate antibiotics have been administered.

## 2019-06-17 NOTE — ED TRIAGE NOTES
Patient was found by neighbor sitting on floor by bed. Patient stating she slipped out of bed, didn't fall. Patient reporting increased weakness and fever that has been progressively getting worse. IV started by EMS.  Patient neighbor called ambulance. Patient reporting chronic pain, otherwise denies pain or hitting head. Patient resting comfortably in room. EKG done, patient placed on monitors.

## 2019-06-17 NOTE — ANESTHESIA PREPROCEDURE EVALUATION
Anesthesia Pre-Procedure Evaluation    Patient: Geno Horn   MRN: [id number] : 1926          Preoperative Diagnosis: Hydronephrosis    Procedure(s):  CYSTOSCOPY, WITH RETROGRADE PYELOGRAM AND URETERAL STENT INSERTION    Past Medical History:   Diagnosis Date     Hypertension      Hypothyroid      Osteoarthritis      ST elevation (STEMI) myocardial infarction (H)     ; angioplasty     Past Surgical History:   Procedure Laterality Date     ARTHROPLASTY HIP Right     Dr Rodriguez     ARTHROPLASTY HIP Left     Dr. Rodriguez       Anesthesia Evaluation     . Pt has had prior anesthetic.     No history of anesthetic complications          ROS/MED HX    ENT/Pulmonary:     (+)allergic rhinitis, , . .    Neurologic:  - neg neurologic ROS     Cardiovascular:     (+) hypertension--CAD, -past MI,-. : . . . :. .       METS/Exercise Tolerance:     Hematologic:  - neg hematologic  ROS       Musculoskeletal:   (+) arthritis,  -       GI/Hepatic:  - neg GI/hepatic ROS       Renal/Genitourinary:     (+) Nephrolithiasis ,       Endo:     (+) thyroid problem hypothyroidism, .      Psychiatric:  - neg psychiatric ROS       Infectious Disease:   (+) Other Infectious Disease Sepsis      Malignancy:   (+) Malignancy History of Skin  Skin CA Remission status post,         Other:                          Physical Exam  Normal systems: pulmonary and dental    Airway   Mallampati: II  TM distance: >3 FB  Neck ROM: full    Dental     Cardiovascular   Rhythm and rate: regular and normal      Pulmonary    breath sounds clear to auscultation            Lab Results   Component Value Date    WBC 16.3 (H) 2019    HGB 11.3 (L) 2019    HCT 33.6 (L) 2019     (L) 2019    SED 22 (H) 10/12/2018     (L) 2019    POTASSIUM 3.9 2019    CHLORIDE 100 2019    CO2 18 (L) 2019    BUN 18 2019    CR 0.98 2019     (H) 2019    BARB 8.8 2019    MAG 2.0 2016     "ALBUMIN 3.9 06/17/2019    PROTTOTAL 6.3 (L) 06/17/2019    ALT 34 06/17/2019    AST 54 (H) 06/17/2019    ALKPHOS 66 06/17/2019    BILITOTAL 1.2 (H) 06/17/2019    INR 1.10 06/17/2019    T4 0.61 01/23/2019       Preop Vitals  BP Readings from Last 3 Encounters:   06/17/19 (!) 87/65   06/06/19 132/80   05/21/19 130/72    Pulse Readings from Last 3 Encounters:   06/17/19 87   05/21/19 70   04/23/19 80      Resp Readings from Last 3 Encounters:   06/17/19 16   06/06/19 16   05/21/19 16    SpO2 Readings from Last 3 Encounters:   06/17/19 97%   05/21/19 97%   02/18/15 97%      Temp Readings from Last 1 Encounters:   06/17/19 98  F (36.7  C) (Temporal)    Ht Readings from Last 1 Encounters:   06/17/19 1.727 m (5' 8\")      Wt Readings from Last 1 Encounters:   06/17/19 58.1 kg (128 lb 1.6 oz)    Estimated body mass index is 19.48 kg/m  as calculated from the following:    Height as of this encounter: 1.727 m (5' 8\").    Weight as of this encounter: 58.1 kg (128 lb 1.6 oz).       Anesthesia Plan      History & Physical Review      ASA Status:  4 emergent.    NPO Status:  > 6 hours    Plan for MAC          Postoperative Care      Consents  Anesthetic plan, risks, benefits and alternatives discussed with:  Patient.  Use of blood products discussed: Yes.   Use of blood products discussed with Patient.  Consented to blood products.  .                 LARISA Lindsey CRNA  "

## 2019-06-17 NOTE — OP NOTE
Preoperative diagnosis  Right hydronephrosis, obstructing stone and sepsis    Postoperative diagnosis  Right hydronephrosis, obstructing stone and sepsis    Procedure performed  Cystoscopy with right retrograde pyelogram and ureteral stent placement, 6 x 26  Interpretation of retrograde    Surgeon  Shawn Cole MD    Surgeon(s)/Proceduralist(s) and Assistants (if any)  Surgeon(s):  Shawn Cole MD  Circulator: Adri Molina RN  Scrub Person: Curtis Laura  2nd Scrub: Felipa Hobson    Specimen(s)  Yes - urine culture    (EBL) Estimated blood loss (ml)  0    Anesthesia  General    Complications  None    Findings  Cystoscopy revealed no tumors, stones or other mucosal abnormalities.  Right  retrograde pyelogram revealed a moderately dilated system with a filling defect c/w stone on CT scan.    Indications  89 year old female agreed to undergo the above named procedure after discussion of the alternatives, risks and benefits.  Informed consent was obtained.      Procedure  The patient was taken to the operating room and placed supine on the operating table.  Pre-operative antibiotics were administered.  Bilateral lower extremity SCDs were placed.  After induction of general anesthesia the patient was positioned in dorsal lithotomy, prepped and draped in a sterile fashion.  A time-out was performed.      I passed a [id number] Guamanian flexible cystoscope via urethra into the bladder.  A Sensor wire was passed retrograde through the right ureteral orifice.  A 5-Guamanian open-ended catheter was passed over the wire and urine was aspirated for culture and to decrease intra-renal pressure.  A retrograde pyelogram was performed by slowly injecting 3mL of Omnipaque contrast via the 5 Guamanian catheter with findings described above. A superstiff wire was then placed into the upper pole and a 6 x 26 Gu[place]ian ureteral stent was placed in retrograde fashion under fluoroscopic guidance with good coil confirmed to be in the  upper pole of the kidney and in the bladder. Indwelling catheter was placed for monitoring.  The patient tolerated the procedure well.

## 2019-06-17 NOTE — PROGRESS NOTES
Sat up at edge of bed for ~ 15 mins.  Pantera well then resettled per request.  Conts lower abd pain with some relief after sitting up.  Doesn't want narcotics at this time.  Will try tylenol.  See MAR.

## 2019-06-17 NOTE — ED NOTES
Attempted to give oral tylenol, patient unable to follow commands to swallow pills. Patient was switched to suppository tylenol. Patient was impacted with hard stool when attempted to insert suppository. Digitally removed impaction, patient tolerated. Patient given suppository.

## 2019-06-17 NOTE — H&P
[facility]    History and Physical  Hospitalist       Date of Admission:  6/17/2019    Assessment & Plan   Geno Horn is a 89 year old female who presents with mental status change and fever.    Fever  Encephalopathy from fever and infection  Sepsis with shock  Sepsis (H).  Source unclear.  Patient has had vomiting and abdominal pain with right lower quadrant pain.  I have ordered additional CT of the abdomen and pelvis imaging which is pending at this time.  Initial chest x-ray negative for infection.  She has had recent issues with her urinary incontinence and has been followed by Dr. Cole of urology.  Her urinalysis is concerning for infection with 5-10 white blood cells and many bacteria.  She was given a dose of Rocephin along with vancomycin in the emergency room.  -Admit to inpatient ICU  -Follow-up blood cultures  -Continue broad-spectrum antibiotics with vancomycin and Zosyn until blood cultures positive or source of infection is identified  Oxygen as needed to maintain sats greater than 92%-  -anti-emetics and antipyretics as needed  -CT of abdomen and pelvis pending.  I will follow-up results.  -Continue IV fluids.  -Pressors ordered if needed.  If blood pressure is unable to maintain map with IV fluids we will start levo fed.  Patient and family aware of the plan.  -Vital signs per unit  -Ambulate with assistance      Hypertension.  Blood pressure trending down in the emergency room.  She did not receive her home dose of medication this morning.  -Continue to hold home antihypertensive medications given hypotension  -Admit to intensive care unit.  Levophed as needed  -Continue IV fluid for now.       DVT Prophylaxis: Enoxaparin (Lovenox) SQ  Code Status: No Order    Anitra Stockton    Primary Care Physician   Fransisca Rocha    Chief Complaint   Found by neighbor unable to get out of bed with altered mental status.    History is obtained from the patient and her daughter  and chart review.    History of Present Illness   Geno Horn is a 89 year old female who presents from home via EMS after neighbor found her today.  She had a follow-up appointment scheduled with Yulissa Rocha.  Daughter was going to pick her up.  Normally she is functional independent walks with a cane.  When friend went to make sure she was up and out of bed ready for her appointment she found her seemingly lethargic and slightly confused from her normal baseline.  EMS was called and patient was brought to the hospital.  Temperature here was noted to be greater than 104.  Initially patient was not alert or awake on my exam.  Daughter was at bedside.  History from daughter states that yesterday she complained of abdominal pain back pain and vomited one time.  The pain kept her up much of the night and so she is feeling more sleepy this morning.  She does not complain of any other infectious type symptoms recently.  Specifically denies tick bite, recent travel, cough, sore throat, runny nose,.  She does have a history of urinary issues including urinary incontinence is and is followed by urology.  Throughout exam patient does become more alert and is able to give me more history.  Tells me that currently she is not having any back pain.  Complains more of bilateral hip pain.  Of note she is had bilateral hip replacements.  She does complain of ongoing right lower quadrant abdominal pain.  She has not had any nausea or vomiting here but should be noted she has not yet had anything to eat.  She has no prior bowel surgeries or abdominal surgeries.  She is more alert and able to follow commands on my exam.    Past Medical History    I have reviewed this patient's medical history and updated it with pertinent information if needed.   Past Medical History:   Diagnosis Date     Hypertension      Hypothyroid      Osteoarthritis      ST elevation (STEMI) myocardial infarction (H)     1981; angioplasty       Past  Surgical History   I have reviewed this patient's surgical history and updated it with pertinent information if needed.  Past Surgical History:   Procedure Laterality Date     ARTHROPLASTY HIP Right     Dr Rodriguez     ARTHROPLASTY HIP Left     Dr. Rodriguez       Prior to Admission Medications   Prior to Admission Medications   Prescriptions Last Dose Informant Patient Reported? Taking?   Incontinence Supply Disposable (POISE PAD) PADS NA at NA  No No   Si Units daily   aspirin 81 MG chewable tablet   Yes No   Sig: Take 81 mg by mouth daily with food   diltiazem ER (DILT-XR) 180 MG 24 hr capsule   No No   Sig: Take 1 capsule (180 mg) by mouth daily   levothyroxine (SYNTHROID/LEVOTHROID) 88 MCG tablet   No No   Sig: TAKE 1 TABLET (88 MCG) BY MOUTH DAILY   mirabegron (MYRBETRIQ) 25 MG 24 hr tablet   No No   Sig: Take 1 tablet (25 mg) by mouth daily   order for DME NA at NA  No No   Sig: Equipment being ordered: plastic underpants   order for DME NA at NA  No No   Sig: Equipment being ordered: Spikes for cane      Facility-Administered Medications: None     Allergies   Allergies   Allergen Reactions     Tolterodine      Other reaction(s): Intolerance-Can't Take  Was unable to sleep at night       Social History   I have reviewed this patient's social history and updated it with pertinent information if needed. Geno Horn  reports that she has never smoked. She has never used smokeless tobacco. She reports that she does not drink alcohol or use drugs.    Family History   I have reviewed this patient's family history and updated it with pertinent information if needed.   Family History   Problem Relation Age of Onset     Heart Disease Mother 60         of mi     Heart Disease Father 60         of mi     Heart Disease Brother      Heart Disease Brother        Review of Systems     REVIEW OF SYSTEMS:    Constitutional: Decreased energy and appetite.  No recent sick contacts  Eyes: no changes in vision  Ears,  nose, mouth, throat, and face: no mouth sores, dysphagia, or odynophagia  Respiratory: no shortness of breath, cough, or wheezing. No aspiration symptoms.   Cardiovascular: no chest pain, palpitations, orthopnea, increased lower extremity edema, or syncope.   Gastrointestinal: no constipation, diarrhea.  Positive for right lower quadrant abdominal pain and nausea and vomiting.  Genitourinary: no dysuria, hematuria, urgency or frequency.   Hematologic/lymphatic: no unintentional weight loss or night sweats.  Musculoskeletal: no pain to extremities or falls.   Neurological: no new weakness, tingling, numbness.   Psychiatric: no hallucinations ordelusions.  Endocrine:  not a known diabetic.     Additions to the above include: see HPI.    Physical Exam   Temp: 101.9  F (38.8  C) Temp src: Tympanic BP: 113/62 Pulse: 101 Heart Rate: 99 Resp: 10 SpO2: 93 % O2 Device: None (Room air)    Vital Signs with Ranges  Temp:  [101.9  F (38.8  C)-104.2  F (40.1  C)] 101.9  F (38.8  C)  Pulse:  [] 101  Heart Rate:  [] 99  Resp:  [8-40] 10  BP: ()/(56-93) 113/62  SpO2:  [90 %-95 %] 93 %  126 lbs 0 oz    Constitutional: Initially somnolent but throughout exam becomes more arousable, alert, cooperative, and interactive.  Appears stated age.  Does appear acutely ill.  Eyes: Extraocular muscles intact.  Nonicteric, noninjected.  Pupils equal round reactive to light.  HEENT: Moist mucous members.  No oral pharyngeal lesions.  Tongue protrudes midline.  Respiratory: Clear to auscultation bilaterally.  No wheezing, rhonchi, or rales.  Good inspiratory effort.  She is breathing comfortably at room air oxygen.  Cardiovascular: Regular rate  to tachycardic.  Positive systolic ejection murmur.  No edema.  No JVD.  GI: Abdomen is soft, nondistended.  There is tenderness to palpation of the right lower quadrant.  There is no rebound, guarding, rigidity.  Bowel sounds are present.  Lymph/Hematologic: No cervical  adenopathy  Genitourinary: Deferred  Skin: Very warm with tactile fever.  No concerning rashes or lesions.  Musculoskeletal: Moves arms and legs equally normally.  Neurologic: No focal deficits.  Psychiatric: Appropriate affect.    Data   Data reviewed today:  I personally reviewed the chest x-ray image(s) showing atelectasis vs infiltrate at lung bases.   Recent Labs   Lab 06/17/19  0750   WBC 16.3*   HGB 11.3*         INR 1.10   *   POTASSIUM 3.9   CHLORIDE 100   CO2 18*   BUN 18   CR 1.12   ANIONGAP 15*   BARB 8.8   *   ALBUMIN 3.9   PROTTOTAL 6.3*   BILITOTAL 1.2*   ALKPHOS 66   ALT 34   AST 54*   TROPI 0.039*       Recent Results (from the past 24 hour(s))   XR Chest Port 1 View    Narrative    PROCEDURE: XR CHEST PORT 1 VW 6/17/2019 8:57 AM    HISTORY: sepsis.    COMPARISONS: 2/26/2016.    TECHNIQUE: Single view.    FINDINGS: Heart is not enlarged. There is some mild patchy atelectasis  or infiltrate at both lung bases. Upper lungs are relatively clear.  There is no pleural effusion.    There is a stable nodule at the right lung base laterally.         Impression    IMPRESSION: Mild atelectasis or infiltrate at both lung bases.    SUSHIL BHANDARI MD

## 2019-06-17 NOTE — PHARMACY-ADMISSION MEDICATION HISTORY
Pharmacy -- Admission Medication Reconciliation    Prior to admission (PTA) medications were reviewed and the patient's PTA medication list was updated.    Sources Consulted: Daughter interview, [facility] refill history    The reliability of this Medication Reconciliation is: Reliability: Reliable    The following significant changes were made:    Added APAP-takes for wrist pain, daughter not sure of strength, only knows she can only take one tablet at a time or else she gets knocked out and very sleepy throughout the day    Removed Myrbetriq--this was prescribed 6/6 but not covered under insurance.  Patient has not taken it, removed from file due to inactivity.    In addition, the patient's allergies were reviewed with the patient and updated as follows:   Allergies: Tolterodine    The pharmacist has reviewed with the patient that all personal medications should be removed from the building or locked in the belongings safe.  Patient shall only take medications ordered by the physician and administered by the nursing staff.       Medication barriers identified: yes, insurance not covering myrbetriq, patient having urinary problems    Medication adherence concerns: none   Understanding of emergency medications: SANJAY Ledesma RP, 6/17/2019,  2:46 PM

## 2019-06-18 ENCOUNTER — APPOINTMENT (OUTPATIENT)
Dept: PHYSICAL THERAPY | Facility: OTHER | Age: 84
DRG: 853 | End: 2019-06-18
Attending: FAMILY MEDICINE
Payer: MEDICARE

## 2019-06-18 LAB
ANION GAP SERPL CALCULATED.3IONS-SCNC: 9 MMOL/L (ref 3–14)
BUN SERPL-MCNC: 21 MG/DL (ref 7–25)
CALCIUM SERPL-MCNC: 7.7 MG/DL (ref 8.6–10.3)
CHLORIDE SERPL-SCNC: 100 MMOL/L (ref 98–107)
CO2 SERPL-SCNC: 23 MMOL/L (ref 21–31)
CREAT SERPL-MCNC: 1.12 MG/DL (ref 0.6–1.2)
ERYTHROCYTE [DISTWIDTH] IN BLOOD BY AUTOMATED COUNT: 13.5 % (ref 10–15)
GFR SERPL CREATININE-BSD FRML MDRD: 46 ML/MIN/{1.73_M2}
GLUCOSE SERPL-MCNC: 106 MG/DL (ref 70–105)
HCT VFR BLD AUTO: 31 % (ref 35–47)
HGB BLD-MCNC: 10.3 G/DL (ref 11.7–15.7)
MCH RBC QN AUTO: 33.7 PG (ref 26.5–33)
MCHC RBC AUTO-ENTMCNC: 33.2 G/DL (ref 31.5–36.5)
MCV RBC AUTO: 101 FL (ref 78–100)
PLATELET # BLD AUTO: 124 10E9/L (ref 150–450)
POTASSIUM SERPL-SCNC: 4.2 MMOL/L (ref 3.5–5.1)
RBC # BLD AUTO: 3.06 10E12/L (ref 3.8–5.2)
SODIUM SERPL-SCNC: 132 MMOL/L (ref 134–144)
WBC # BLD AUTO: 44.3 10E9/L (ref 4–11)

## 2019-06-18 PROCEDURE — A9270 NON-COVERED ITEM OR SERVICE: HCPCS | Mod: GY | Performed by: FAMILY MEDICINE

## 2019-06-18 PROCEDURE — 25000128 H RX IP 250 OP 636: Performed by: FAMILY MEDICINE

## 2019-06-18 PROCEDURE — 99233 SBSQ HOSP IP/OBS HIGH 50: CPT | Performed by: FAMILY MEDICINE

## 2019-06-18 PROCEDURE — 80048 BASIC METABOLIC PNL TOTAL CA: CPT | Performed by: FAMILY MEDICINE

## 2019-06-18 PROCEDURE — 12000000 ZZH R&B MED SURG/OB

## 2019-06-18 PROCEDURE — 85027 COMPLETE CBC AUTOMATED: CPT | Performed by: FAMILY MEDICINE

## 2019-06-18 PROCEDURE — 25000132 ZZH RX MED GY IP 250 OP 250 PS 637: Mod: GY | Performed by: FAMILY MEDICINE

## 2019-06-18 PROCEDURE — 36415 COLL VENOUS BLD VENIPUNCTURE: CPT | Performed by: FAMILY MEDICINE

## 2019-06-18 PROCEDURE — 97161 PT EVAL LOW COMPLEX 20 MIN: CPT | Mod: GP

## 2019-06-18 PROCEDURE — 97530 THERAPEUTIC ACTIVITIES: CPT | Mod: GP

## 2019-06-18 PROCEDURE — 25000125 ZZHC RX 250: Performed by: FAMILY MEDICINE

## 2019-06-18 PROCEDURE — 25000131 ZZH RX MED GY IP 250 OP 636 PS 637: Mod: GY | Performed by: FAMILY MEDICINE

## 2019-06-18 PROCEDURE — 97116 GAIT TRAINING THERAPY: CPT | Mod: GP

## 2019-06-18 PROCEDURE — 25800030 ZZH RX IP 258 OP 636: Performed by: FAMILY MEDICINE

## 2019-06-18 RX ADMIN — FAMOTIDINE 20 MG: 10 INJECTION, SOLUTION INTRAVENOUS at 09:06

## 2019-06-18 RX ADMIN — TAZOBACTAM SODIUM AND PIPERACILLIN SODIUM 3.38 G: 375; 3 INJECTION, SOLUTION INTRAVENOUS at 11:54

## 2019-06-18 RX ADMIN — ASPIRIN 81 MG 81 MG: 81 TABLET ORAL at 09:06

## 2019-06-18 RX ADMIN — ENOXAPARIN SODIUM 40 MG: 40 INJECTION SUBCUTANEOUS at 09:06

## 2019-06-18 RX ADMIN — TAZOBACTAM SODIUM AND PIPERACILLIN SODIUM 3.38 G: 375; 3 INJECTION, SOLUTION INTRAVENOUS at 17:21

## 2019-06-18 RX ADMIN — SENNOSIDES,DOCUSATE SODIUM 2 TABLET: 8.6; 5 TABLET, FILM COATED ORAL at 12:28

## 2019-06-18 RX ADMIN — HYDROCODONE BITARTRATE AND ACETAMINOPHEN 1 TABLET: 5; 325 TABLET ORAL at 04:18

## 2019-06-18 RX ADMIN — POLYETHYLENE GLYCOL 3350 17 G: 17 POWDER, FOR SOLUTION ORAL at 12:28

## 2019-06-18 RX ADMIN — ONDANSETRON 4 MG: 4 TABLET, ORALLY DISINTEGRATING ORAL at 16:40

## 2019-06-18 RX ADMIN — TAZOBACTAM SODIUM AND PIPERACILLIN SODIUM 3.38 G: 375; 3 INJECTION, SOLUTION INTRAVENOUS at 06:03

## 2019-06-18 RX ADMIN — SODIUM CHLORIDE, POTASSIUM CHLORIDE, SODIUM LACTATE AND CALCIUM CHLORIDE: 600; 310; 30; 20 INJECTION, SOLUTION INTRAVENOUS at 17:24

## 2019-06-18 RX ADMIN — SODIUM CHLORIDE, POTASSIUM CHLORIDE, SODIUM LACTATE AND CALCIUM CHLORIDE: 600; 310; 30; 20 INJECTION, SOLUTION INTRAVENOUS at 08:24

## 2019-06-18 ASSESSMENT — ACTIVITIES OF DAILY LIVING (ADL)
ADLS_ACUITY_SCORE: 20
ADLS_ACUITY_SCORE: 21
ADLS_ACUITY_SCORE: 16
ADLS_ACUITY_SCORE: 22
ADLS_ACUITY_SCORE: 21
ADLS_ACUITY_SCORE: 13

## 2019-06-18 ASSESSMENT — MIFFLIN-ST. JEOR: SCORE: 1071.31

## 2019-06-18 NOTE — PROGRESS NOTES
Pt resting comfortably at this time with no complaints of pain or nausea. Riggs patent... riddish colored urine out.

## 2019-06-18 NOTE — PROGRESS NOTES
1. Have you been to the ER, urgent care clinic or hospitalized since your last visit? NO.     2. Have you seen or consulted any other health care providers outside of the [address] [place] [address] since your last visit (Include any pap smears or colon screening)? NO      Do you have an Advanced Directive? NO    Would you like information on Advanced Directives?  YES Pt transferred from ICU to Room 331, Report received from Mitch HART. Pt orientated to the room and safety.

## 2019-06-18 NOTE — ANESTHESIA CARE TRANSFER NOTE
Patient: Geno Horn    * No procedures listed *    Diagnosis: * No pre-op diagnosis entered *  Diagnosis Additional Information: No value filed.    Anesthesia Type:   No value filed.     Note:  Airway :Nasal Cannula  Patient transferred to:ICU  ICU Handoff: Call for PAUSE to initiate/utilize ICU HANDOFF, Identified Patient, Identified Responsible Provider, Reviewed the Pertinent Medical History, Discussed Surgical Course, Reviewed Intra-OP Anesthesia Management and Issues during Anesthesia, Set Expectations for Post Procedure Period and Allowed Opportunity for Questions and Acknowledgement of Understanding      Vitals: (Last set prior to Anesthesia Care Transfer)    CRNA VITALS  6/17/2019 1817 - 6/17/2019 1904      6/17/2019             Pulse:  84    Ht Rate:  84    SpO2:  98 %    Resp Rate (set):  10                Electronically Signed By: LARISA Lindsey CRNA  June 17, 2019  7:04 PM

## 2019-06-18 NOTE — PHARMACY
Pharmacy - Transfer Medication Reconciliation     The patient's transfer medication orders have been compared to the medication administration record and to the Prior to Admissions Medications list - any noted discrepancies were resolved with the MD.     Thank you. Pharmacy will continue to monitor.     Reagan Dillon [facility] ....................  6/18/2019   2:18 PM

## 2019-06-18 NOTE — PLAN OF CARE
Pt with dark ruiz pink tinged urine in catheter bag. Denies pain. Iv running in Rt forearm. Daughter here patient and daughter orientated to room and floor.

## 2019-06-18 NOTE — PROGRESS NOTES
"Up to BSC x2 this am with with no results.  \"Feels like I have to but I can't get anything out.\"  See MAR for bowel meds given.  Pt without c/o otherwise.  MSP charge aware of transfer orders.  "

## 2019-06-18 NOTE — PROGRESS NOTES
Pt returns from Surgery with Mauro FLORES and Oliva Yeh to transport. Pt has no complaints of pain or nausea at this time

## 2019-06-18 NOTE — PLAN OF CARE
Discharge Planner PT   Patient plan for discharge: return home  Current status: minimal assist for bed mobilities; CGA for transfers and ambulation with Fww for stability and energy conservation  Barriers to return to prior living situation: fatigue  Recommendations for discharge: patient may benefit from home care PT  Rationale for recommendations: to promote strength, stability and return to safe mobility       Entered by: Garland Ramos 06/18/2019 4:31 PM

## 2019-06-18 NOTE — PROGRESS NOTES
Up to w/c with 1 assist.  C/O slight nausea. Gemini HART notified to let Zeina know.  Off floor to MSP

## 2019-06-18 NOTE — PROGRESS NOTES
Pt resting in bed.  Denies pain.  Small bites of breakfast but not very hungry. Tired.  Just wants to sleep.  Dtr here.  Updated on pt plan of care.  No questions. Cont to monitor.

## 2019-06-18 NOTE — PROGRESS NOTES
Pt has no complaints at this time. States she is comfortable. No pain. Urine lightening up to a light pink color. Riggs patent

## 2019-06-18 NOTE — PROGRESS NOTES
[facility]  Hospitalist Progress Note      Assessment & Plan   Geno Horn is a 89 year old female who was admitted on 6/17/2019 for abdominal pain, CT consistent with stone, sepsis, underwent cystoscopy with ureteral stent placement on 6/17/19 POD#1.     Fever, improved overnight after surgery.   Encephalopathy from fever and infection, improved.   Sepsis with shock, BP improved with pressors yesterday.   Sepsis (H).  4/4 bottles positive for lactose fermenting gm- rods. Final C and S pending. Sepsis due to UTI with infected stone and hydronephrosis.   -Follow-up blood cultures  -stop vancomycin. Continue zosyn.   -Oxygen as needed to maintain sats greater than 92%  -anti-emetics and antipyretics as needed  -Continue IV fluids.  -Ambulate with assistance  -White blood cells are higher today likely in part due to procedure from yesterday as well as ongoing infection.  Repeat labs in the morning.     Hypertension.  BP low normal overnight.   -Continue to hold home antihypertensive medications given hypotension  -She is off the Levophed drip.  Will stop telemetry and transfer to neuro medical floor.   -Continue IV fluid for now.      DVT Prophylaxis: Enoxaparin (Lovenox) SQ  Code Status: DNR/DNI    Anitra Stockton    Interval History   Did well overnight.  Went to the OR.  Pain improved after surgery.  Has no pain this morning.  Tells me that she slept well all night long.  Not yet ready to eat but does not feel nauseated or have any abdominal or back pain this morning.  No fevers overnight since her admission.  Riggs was left in place after procedure.  She only otherwise complains of stiffness from being in the bed.  No other new acute concerns.  No chest pain or shortness of breath.    -Data reviewed today: I reviewed all new labs and imaging results over the last 24 hours. I personally reviewed tele.   Physical Exam   Temp: 97.9  F (36.6  C) Temp src: Tympanic BP: (!) 84/46 Pulse: 74 Heart  Rate: 73 Resp: 22 SpO2: 93 % O2 Device: None (Room air) Oxygen Delivery: 1 LPM  Vitals:    06/17/19 0743 06/17/19 1120   Weight: 57.2 kg (126 lb) 58.1 kg (128 lb 1.6 oz)     Vital Signs with Ranges  Temp:  [97.8  F (36.6  C)-104.2  F (40.1  C)] 97.9  F (36.6  C)  Pulse:  [] 74  Heart Rate:  [] 73  Resp:  [8-33] 22  BP: ()/(39-81) 84/46  SpO2:  [82 %-99 %] 93 %  I/O last 3 completed shifts:  In: 6504.4 [I.V.:5504.4; Other:1000]  Out: 525 [Urine:525]    Constitutional: Sleeping on my exam but she easily arouses.  Oriented.  Appears stated age.  No acute distress.  Comfortable.  Respiratory: Clear to auscultation bilaterally.  No wheezing, rhonchi, rales.  Cardiovascular: Regular rate.  No murmur.  GI: Soft, nontender, nondistended.  There is significant stool burden on palpation  Skin/Integumen: Warm, dry, intact.  : No CVA tenderness.  Riggs in place.  Other:      Medications     lactated ringers 100 mL/hr at 06/17/19 2132     norepinephrine Stopped (06/18/19 0334)     sodium chloride         aspirin  81 mg Oral Daily with breakfast     enoxaparin  40 mg Subcutaneous Daily     famotidine  20 mg Intravenous Daily     piperacillin-tazobactam  3.375 g Intravenous Q6H       Data   Recent Labs   Lab 06/18/19  0400 06/17/19  1140 06/17/19  0750   WBC 44.3*  --  16.3*   HGB 10.3*  --  11.3*   *  --  100   * 134* 197   INR  --   --  1.10   *  --  133*   POTASSIUM 4.2  --  3.9   CHLORIDE 100  --  100   CO2 23  --  18*   BUN 21  --  18   CR 1.12 0.98 1.12   ANIONGAP 9  --  15*   BARB 7.7*  --  8.8   *  --  167*   ALBUMIN  --   --  3.9   PROTTOTAL  --   --  6.3*   BILITOTAL  --   --  1.2*   ALKPHOS  --   --  66   ALT  --   --  34   AST  --   --  54*   TROPI  --   --  0.039*       Recent Results (from the past 24 hour(s))   XR Chest Port 1 View    Narrative    PROCEDURE: XR CHEST PORT 1 VW 6/17/2019 8:57 AM    HISTORY: sepsis.    COMPARISONS: 2/26/2016.    TECHNIQUE: Single  view.    FINDINGS: Heart is not enlarged. There is some mild patchy atelectasis  or infiltrate at both lung bases. Upper lungs are relatively clear.  There is no pleural effusion.    There is a stable nodule at the right lung base laterally.         Impression    IMPRESSION: Mild atelectasis or infiltrate at both lung bases.    SUSHIL BHANDARI MD   CT Abdomen Pelvis w Contrast    Narrative    PROCEDURE:  CT ABDOMEN PELVIS W CONTRAST    HISTORY: Abd pain, appendicitis suspected    TECHNIQUE:  Helical CT of the abdomen and pelvis was performed  following injection of intravenous contrast.  Ingested oral contrast  partially opacifies the bowel.     COMPARISON:  None.    MEDS/CONTRAST: Visipaque 320 100 mL    FINDINGS:      Limited images through the lung bases demonstrate trace pleural fluid,  mostly on the right. There is a small hiatal hernia. The heart size is  within normal limits.    There is mild right hydronephrosis with urothelial thickening and  hyperenhancement proximal to a 8 x 5 mm proximal right ureteral stone.  There is a slightly delayed right nephrogram. The left kidney is  unremarkable.    There is mild periportal edema. The gallbladder is distended,  containing some nonspecific dense material, potentially stones. The  pancreas and spleen are unremarkable. There is nodularity of both  adrenal glands. The small bowel is normal in caliber. Diffuse  prominent formed stool is present throughout the colon and rectum. The  appendix is normal.    A small amount of free fluid is nonspecific. No free air or gross  adenopathy is identified. No suspicious osseous lesions are  identified.      Impression    IMPRESSION:      Mild right hydronephrosis as well as right urothelial thickening are  present secondary to a 8 x 5 mm proximal right ureteral stone. The  latter can be seen in ascending infection. There is a slightly delayed  right nephrogram.     Probable constipation. Normal appendix.    KATELIN  MD TYREE   XR Surgery MITCHELL L/T 5 Min Fluoro    Narrative    This exam was marked as non-reportable because it will not be read by a   radiologist or a [place] non-radiologist provider.

## 2019-06-18 NOTE — PROGRESS NOTES
06/18/19 1600   Quick Adds   Type of Visit Initial PT Evaluation   Living Environment   Lives With alone   Living Arrangements apartment   Home Accessibility stairs within home   Number of Stairs, Within Home, Primary other (see comments)  (one flight)   Stair Railings, Within Home, Primary railings on both sides of stairs   Transportation Anticipated family or friend will provide   Self-Care   Usual Activity Tolerance moderate   Current Activity Tolerance moderate   Regular Exercise Yes   Activity/Exercise Type   (bone builders)   Exercise Amount/Frequency 2 times/wk   Functional Level Prior   Ambulation 1-uses a SEC   Transferring 0-->independent   Toileting 0-->independent   Bathing 0-->independent   Communication 0-->understands/communicates without difficulty   Cognition 1 - attention or memory deficits   Fall history within last six months yes   Number of times patient has fallen within last six months 1   Which of the above functional risks had a recent onset or change? ambulation   General Information   Referring Physician Kath   Patient/Family Goals Statement return home   Precautions/Limitations fall precautions  (Riggs catheter)   Weight-Bearing Status - LUE full weight-bearing   Weight-Bearing Status - RUE full weight-bearing   Weight-Bearing Status - LLE full weight-bearing   Weight-Bearing Status - RLE full weight-bearing   Cognitive Status Examination   Orientation person;place   Level of Consciousness alert   Follows Commands and Answers Questions 100% of the time   Personal Safety and Judgment intact   Memory impaired   Pain Assessment   Patient Currently in Pain   (minimal left hip pain with ambulation)   Integumentary/Edema   Integumentary/Edema Comments appears intact without notable edema in LEs   Posture    Posture Forward head position;Protracted shoulders   Range of Motion (ROM)   ROM Comment WFL LEs   Strength   Strength Comments WFL LEs   Bed Mobility   Bed Mobility Comments minimal  assist   Transfer Skills   Transfer Comments CGA   Gait   Gait Comments CGA with Fww   Balance   Balance Comments fair with and without use of Fww   Sensory Examination   Sensory Perception Comments appears intact to light touch in LEs   Coordination   Coordination no deficits were identified   General Therapy Interventions   Planned Therapy Interventions bed mobility training;gait training;transfer training   Clinical Impression   Criteria for Skilled Therapeutic Intervention yes, treatment indicated   PT Diagnosis impaired mobility   Influenced by the following impairments fatigue   Functional limitations due to impairments activity tolerance   Clinical Presentation Stable/Uncomplicated   Clinical Decision Making (Complexity) Low complexity   Therapy Frequency Daily   Predicted Duration of Therapy Intervention (days/wks) 2-3 days   Anticipated Equipment Needs at Discharge walker   Anticipated Discharge Disposition Home with Assist;Home with Home Therapy   Risk & Benefits of therapy have been explained Yes   Patient, Family & other staff in agreement with plan of care Yes   Total Evaluation Time   Total Evaluation Time (Minutes) 15

## 2019-06-19 ENCOUNTER — APPOINTMENT (OUTPATIENT)
Dept: OCCUPATIONAL THERAPY | Facility: OTHER | Age: 84
DRG: 853 | End: 2019-06-19
Attending: FAMILY MEDICINE
Payer: MEDICARE

## 2019-06-19 ENCOUNTER — APPOINTMENT (OUTPATIENT)
Dept: PHYSICAL THERAPY | Facility: OTHER | Age: 84
DRG: 853 | End: 2019-06-19
Payer: MEDICARE

## 2019-06-19 VITALS
SYSTOLIC BLOOD PRESSURE: 135 MMHG | RESPIRATION RATE: 18 BRPM | HEART RATE: 80 BPM | BODY MASS INDEX: 20.88 KG/M2 | OXYGEN SATURATION: 95 % | WEIGHT: 137.79 LBS | HEIGHT: 68 IN | TEMPERATURE: 98.7 F | DIASTOLIC BLOOD PRESSURE: 62 MMHG

## 2019-06-19 LAB
ANION GAP SERPL CALCULATED.3IONS-SCNC: 7 MMOL/L (ref 3–14)
BACTERIA SPEC CULT: ABNORMAL
BACTERIA SPEC CULT: NORMAL
BUN SERPL-MCNC: 22 MG/DL (ref 7–25)
CALCIUM SERPL-MCNC: 7.7 MG/DL (ref 8.6–10.3)
CHLORIDE SERPL-SCNC: 100 MMOL/L (ref 98–107)
CO2 SERPL-SCNC: 24 MMOL/L (ref 21–31)
CREAT SERPL-MCNC: 0.84 MG/DL (ref 0.6–1.2)
ERYTHROCYTE [DISTWIDTH] IN BLOOD BY AUTOMATED COUNT: 13.4 % (ref 10–15)
GFR SERPL CREATININE-BSD FRML MDRD: 63 ML/MIN/{1.73_M2}
GLUCOSE SERPL-MCNC: 87 MG/DL (ref 70–105)
HCT VFR BLD AUTO: 29.1 % (ref 35–47)
HGB BLD-MCNC: 10.1 G/DL (ref 11.7–15.7)
MCH RBC QN AUTO: 33.8 PG (ref 26.5–33)
MCHC RBC AUTO-ENTMCNC: 34.7 G/DL (ref 31.5–36.5)
MCV RBC AUTO: 97 FL (ref 78–100)
PLATELET # BLD AUTO: 105 10E9/L (ref 150–450)
POTASSIUM SERPL-SCNC: 3.7 MMOL/L (ref 3.5–5.1)
RBC # BLD AUTO: 2.99 10E12/L (ref 3.8–5.2)
SODIUM SERPL-SCNC: 131 MMOL/L (ref 134–144)
SPECIMEN SOURCE: ABNORMAL
SPECIMEN SOURCE: ABNORMAL
SPECIMEN SOURCE: NORMAL
WBC # BLD AUTO: 35 10E9/L (ref 4–11)

## 2019-06-19 PROCEDURE — 97535 SELF CARE MNGMENT TRAINING: CPT | Mod: GO

## 2019-06-19 PROCEDURE — 80048 BASIC METABOLIC PNL TOTAL CA: CPT | Performed by: FAMILY MEDICINE

## 2019-06-19 PROCEDURE — A9270 NON-COVERED ITEM OR SERVICE: HCPCS | Mod: GY | Performed by: FAMILY MEDICINE

## 2019-06-19 PROCEDURE — 36415 COLL VENOUS BLD VENIPUNCTURE: CPT | Performed by: FAMILY MEDICINE

## 2019-06-19 PROCEDURE — 97116 GAIT TRAINING THERAPY: CPT | Mod: GP

## 2019-06-19 PROCEDURE — 85027 COMPLETE CBC AUTOMATED: CPT | Performed by: FAMILY MEDICINE

## 2019-06-19 PROCEDURE — 25000128 H RX IP 250 OP 636: Performed by: FAMILY MEDICINE

## 2019-06-19 PROCEDURE — 25000125 ZZHC RX 250: Performed by: FAMILY MEDICINE

## 2019-06-19 PROCEDURE — 97165 OT EVAL LOW COMPLEX 30 MIN: CPT | Mod: GO

## 2019-06-19 PROCEDURE — 97530 THERAPEUTIC ACTIVITIES: CPT | Mod: GO

## 2019-06-19 PROCEDURE — 25000132 ZZH RX MED GY IP 250 OP 250 PS 637: Mod: GY | Performed by: FAMILY MEDICINE

## 2019-06-19 PROCEDURE — 97530 THERAPEUTIC ACTIVITIES: CPT | Mod: GP

## 2019-06-19 PROCEDURE — 25800030 ZZH RX IP 258 OP 636: Performed by: FAMILY MEDICINE

## 2019-06-19 PROCEDURE — 99239 HOSP IP/OBS DSCHRG MGMT >30: CPT | Performed by: FAMILY MEDICINE

## 2019-06-19 RX ORDER — LEVOFLOXACIN 750 MG/1
750 TABLET, FILM COATED ORAL
Status: DISCONTINUED | OUTPATIENT
Start: 2019-06-19 | End: 2019-06-19 | Stop reason: HOSPADM

## 2019-06-19 RX ORDER — LEVOTHYROXINE SODIUM 88 UG/1
88 TABLET ORAL
Status: DISCONTINUED | OUTPATIENT
Start: 2019-06-19 | End: 2019-06-19 | Stop reason: HOSPADM

## 2019-06-19 RX ORDER — LEVOFLOXACIN 750 MG/1
750 TABLET, FILM COATED ORAL
Qty: 5 TABLET | Refills: 0 | Status: SHIPPED | OUTPATIENT
Start: 2019-06-21 | End: 2019-07-03

## 2019-06-19 RX ORDER — LEVOFLOXACIN 750 MG/1
750 TABLET, FILM COATED ORAL
Qty: 5 TABLET | Refills: 0 | OUTPATIENT
Start: 2019-06-21 | End: 2024-08-08

## 2019-06-19 RX ADMIN — TAZOBACTAM SODIUM AND PIPERACILLIN SODIUM 3.38 G: 375; 3 INJECTION, SOLUTION INTRAVENOUS at 05:09

## 2019-06-19 RX ADMIN — LEVOFLOXACIN 750 MG: 750 TABLET, FILM COATED ORAL at 09:47

## 2019-06-19 RX ADMIN — ASPIRIN 81 MG 81 MG: 81 TABLET ORAL at 07:59

## 2019-06-19 RX ADMIN — FAMOTIDINE 20 MG: 10 INJECTION, SOLUTION INTRAVENOUS at 09:47

## 2019-06-19 RX ADMIN — ENOXAPARIN SODIUM 40 MG: 40 INJECTION SUBCUTANEOUS at 09:47

## 2019-06-19 RX ADMIN — ACETAMINOPHEN 500 MG: 500 TABLET, FILM COATED ORAL at 00:47

## 2019-06-19 RX ADMIN — TAZOBACTAM SODIUM AND PIPERACILLIN SODIUM 3.38 G: 375; 3 INJECTION, SOLUTION INTRAVENOUS at 01:16

## 2019-06-19 RX ADMIN — SODIUM CHLORIDE, POTASSIUM CHLORIDE, SODIUM LACTATE AND CALCIUM CHLORIDE: 600; 310; 30; 20 INJECTION, SOLUTION INTRAVENOUS at 05:09

## 2019-06-19 RX ADMIN — LEVOTHYROXINE SODIUM 88 MCG: 88 TABLET ORAL at 08:34

## 2019-06-19 ASSESSMENT — ACTIVITIES OF DAILY LIVING (ADL)
ADLS_ACUITY_SCORE: 12
ADLS_ACUITY_SCORE: 14
ADLS_ACUITY_SCORE: 12
ADLS_ACUITY_SCORE: 13

## 2019-06-19 ASSESSMENT — MIFFLIN-ST. JEOR: SCORE: 1083.5

## 2019-06-19 NOTE — DISCHARGE SUMMARY
[facility]    Discharge Summary  Hospitalist    Date of Admission:  6/17/2019  Date of Discharge:  6/19/2019  Discharging Provider: Anitra Stockton  Date of Service (when I saw the patient): 06/19/19    Discharge Diagnoses   Principal Problem:    Sepsis (H) (6/17/2019), and on admission.  Active Problems:    Hypertension (3/16/2012)  Infected knee stone.  Present on admission.  Urinary tract infection.  Present on admission.    History of Present Illness   Geno Horn is an 89 year old female who presented with abdominal pain and fevers.    Hospital Course   Geno Horn was admitted on 6/17/2019.  The following problems were addressed during her hospitalization:    Geno Horn is a 89 year old female who was admitted on 6/17/2019 for abdominal pain, CT consistent with stone, sepsis, underwent cystoscopy with ureteral stent placement on 6/17/19.      Fever, improved overnight after surgery.   Encephalopathy from fever and infection, improved.   Sepsis with shock.  Patient initially required admission to the intensive care unit with levophed pressors.   4/4 bottles positive for lactose fermenting gm- rods.  Final culture and sensitivity was E. coli pansensitive.  She was initially given vancomycin and Zosyn prior to knowing source of infection.  Given her abdominal pain on admission I did check a CT.  CT showed a large obstructing stone with hydronephrosis.  Urology was consulted.  Dr. Cole saw patient and took her to the OR on 6/17/2019 for cystoscopy and ureteral stent placement.  Riggs catheter was placed after her procedure.  She tolerated void trial and will follow-up with urology as scheduled.  She will be prescribed Levaquin to complete course of antibiotic therapy in the outpatient setting.  She had significant improvement in fevers and pain after procedure.  She is able to come off of pressors.  She was tolerating regular diet at time of discharge.        Anitra Stockton,  DO    Significant Results and Procedures   cystoscopy with ureteral stent placement on 6/17/19  Consulting: Dr. Cole    Pending Results   These results will be followed up by PCP and Dr. Cole  Unresulted Labs Ordered in the Past 30 Days of this Admission     No orders found from 4/18/2019 to 6/18/2019.          Code Status   DNR / DNI       Primary Care Physician   Fransisca Rocha    Physical Exam   Temp: 98.7  F (37.1  C) Temp src: Tympanic BP: 135/62 Pulse: 80 Heart Rate: 78 Resp: 18 SpO2: 95 % O2 Device: None (Room air)    Vitals:    06/17/19 1120 06/18/19 0900 06/19/19 0700   Weight: 58.1 kg (128 lb 1.6 oz) 61.3 kg (135 lb 1.6 oz) 62.5 kg (137 lb 12.6 oz)     Vital Signs with Ranges  Temp:  [96.2  F (35.7  C)-98.7  F (37.1  C)] 98.7  F (37.1  C)  Pulse:  [76-82] 80  Heart Rate:  [75-84] 78  Resp:  [14-28] 18  BP: ()/(51-87) 135/62  SpO2:  [92 %-97 %] 95 %  I/O last 3 completed shifts:  In: 2779 [P.O.:480; I.V.:2299]  Out: 1000 [Urine:1000]    Constitutional: Awake alert and oriented.  No acute distress.  Bright affect.  Eyes: Extraocular muscles intact.  Lids normal.  Nonicteric, noninjected.  ENT: Moist mucous membranes.  No rhinorrhea.  Respiratory: Clear to auscultation bilaterally.  No wheezing, rhonchi, rales.  Cardiovascular: Regular rate.  No murmur.  GI: Abdomen soft, nontender, nondistended.  Skin: Warm, dry, intact.    Discharge Disposition   Discharged to home  Condition at discharge: Stable    Consultations This Hospital Stay   PHARMACY TO DOSE VANCO  PHYSICAL THERAPY ADULT IP CONSULT  PHARMACY TO DOSE VANCO  PHARMACY TO DOSE MEDICATION  OCCUPATIONAL THERAPY ADULT IP CONSULT  SOCIAL WORK IP CONSULT    Time Spent on this Encounter   IAnitra, personally saw the patient today and spent greater than 30 minutes discharging this patient.    Discharge Orders      Home care nursing referral      Reason for your hospital stay    Kidney stone infection     Follow-up and recommended labs  and tests     Follow up with primary care provider, Fransisca Rocha, within 7 days for hospital follow- up.  No follow up labs or test are needed.  Follow-up with Dr. Cole  as scheduled     Activity    Your activity upon discharge: activity as tolerated     MD face to face encounter    Documentation of Face to Face and Certification for Home Health Services    I certify that patient: Geno oHrn is under my care and that I, or a nurse practitioner or physician's assistant working with me, had a face-to-face encounter that meets the physician face-to-face encounter requirements with this patient on: 6/19/2019.    This encounter with the patient was in whole, or in part, for the following medical condition, which is the primary reason for home health care: infection, weakness.    I certify that, based on my findings, the following services are medically necessary home health services: Nursing and Physical Therapy.    My clinical findings support the need for the above services because: Nurse is needed: Recent sepsis from infected kidney stone status post urologic procedure requiring ongoing education. and Physical Therapy Services are needed to assess and treat the following functional impairments: Weakness and deconditioning requiring walker which is new for patient.    Further, I certify that my clinical findings support that this patient is homebound (i.e. absences from home require considerable and taxing effort and are for medical reasons or Jew services or infrequently or of short duration when for other reasons) because: Leaving home is medically contraindicated for the following reason(s): Unable to ambulate more than 100 feet without requiring to stop    Based on the above findings. I certify that this patient is confined to the home and needs intermittent skilled nursing care, physical therapy and/or speech therapy.  The patient is under my care, and I have initiated the establishment of the  plan of care.  This patient will be followed by a physician who will periodically review the plan of care.  Physician/Provider to provide follow up care: Fransisca Rocha    Attending hospital physician (the Medicare certified [facility] provider): Anitra Stockton  Physician Signature: See electronic signature associated with these discharge orders.  Date: 6/19/2019     DNR/DNI     Diet    Follow this diet upon discharge: Orders Placed This Encounter      Regular Diet Adult     Discharge Medications   Current Discharge Medication List      START taking these medications    Details   levofloxacin (LEVAQUIN) 750 MG tablet Take 1 tablet (750 mg) by mouth every 48 hours for 10 days  Qty: 5 tablet, Refills: 0    Associated Diagnoses: Acute pyelonephritis         CONTINUE these medications which have NOT CHANGED    Details   acetaminophen (TYLENOL) 500 MG tablet Take 500 mg by mouth every 6 hours as needed for mild pain      aspirin 81 MG chewable tablet Take 81 mg by mouth daily with food      diltiazem ER (DILT-XR) 180 MG 24 hr capsule Take 1 capsule (180 mg) by mouth daily  Qty: 90 capsule, Refills: 3    Associated Diagnoses: Essential hypertension      levothyroxine (SYNTHROID/LEVOTHROID) 88 MCG tablet TAKE 1 TABLET (88 MCG) BY MOUTH DAILY  Qty: 180 tablet, Refills: 3    Associated Diagnoses: Hypothyroidism, unspecified type      Incontinence Supply Disposable (POISE PAD) PADS 1 Units daily  Qty: 30 each, Refills: 11    Associated Diagnoses: Mixed stress and urge urinary incontinence      !! order for DME Equipment being ordered: plastic underpants  Qty: 1 Units, Refills: 1    Associated Diagnoses: Urinary incontinence, unspecified type      !! order for DME Equipment being ordered: Spikes for cane  Qty: 1 Units, Refills: 1    Associated Diagnoses: Unsteady gait       !! - Potential duplicate medications found. Please discuss with provider.        Allergies   Allergies   Allergen Reactions     Tolterodine      Other  reaction(s): Intolerance-Can't Take  Was unable to sleep at night     Data   Most Recent 3 CBC's:  Recent Labs   Lab Test 06/19/19  0424 06/18/19  0400 06/17/19  1140 06/17/19  0750   WBC 35.0* 44.3*  --  16.3*   HGB 10.1* 10.3*  --  11.3*   MCV 97 101*  --  100   * 124* 134* 197      Most Recent 3 BMP's:  Recent Labs   Lab Test 06/19/19  0424 06/18/19  0400 06/17/19  1140 06/17/19  0750   * 132*  --  133*   POTASSIUM 3.7 4.2  --  3.9   CHLORIDE 100 100  --  100   CO2 24 23  --  18*   BUN 22 21  --  18   CR 0.84 1.12 0.98 1.12   ANIONGAP 7 9  --  15*   BARB 7.7* 7.7*  --  8.8   GLC 87 106*  --  167*     Most Recent 2 LFT's:  Recent Labs   Lab Test 06/17/19  0750 10/12/18  1425   AST 54* 21   ALT 34 14   ALKPHOS 66 41   BILITOTAL 1.2* 0.5     Most Recent INR's and Anticoagulation Dosing History:  Anticoagulation Dose History     Recent Dosing and Labs Latest Ref Rng & Units 6/17/2019    INR 0 - 1.3 1.10        Most Recent 3 Troponin's:  Recent Labs   Lab Test 06/17/19  0750   TROPI 0.039*     Most Recent Cholesterol Panel:No lab results found.  Most Recent 6 Bacteria Isolates From Any Culture (See EPIC Reports for Culture Details):  Recent Labs   Lab Test 06/17/19  1845 06/17/19  0750   CULT 10,000 to 50,000 colonies/mL  mixed urogenital kojo   Gram negative rods  SEE OTHER BLOOD CULTURE FOR IDENTIFICATION AND SENITIVITIES  *  4 BOTTLES OUT OF 4 BOTTLES ARE POSITIVE  Escherichia coli*  4 BOTTLES OUT OF 4 BOTTLES ARE POSITIVE     Most Recent TSH, T4 and A1c Labs:  Recent Labs   Lab Test 01/23/19  1304   T4 0.61     Results for orders placed or performed during the hospital encounter of 06/17/19   XR Chest Port 1 View    Narrative    PROCEDURE: XR CHEST PORT 1 VW 6/17/2019 8:57 AM    HISTORY: sepsis.    COMPARISONS: 2/26/2016.    TECHNIQUE: Single view.    FINDINGS: Heart is not enlarged. There is some mild patchy atelectasis  or infiltrate at both lung bases. Upper lungs are relatively clear.  There is  no pleural effusion.    There is a stable nodule at the right lung base laterally.         Impression    IMPRESSION: Mild atelectasis or infiltrate at both lung bases.    SUSHIL BHANDARI MD   CT Abdomen Pelvis w Contrast    Narrative    PROCEDURE:  CT ABDOMEN PELVIS W CONTRAST    HISTORY: Abd pain, appendicitis suspected    TECHNIQUE:  Helical CT of the abdomen and pelvis was performed  following injection of intravenous contrast.  Ingested oral contrast  partially opacifies the bowel.     COMPARISON:  None.    MEDS/CONTRAST: Visipaque 320 100 mL    FINDINGS:      Limited images through the lung bases demonstrate trace pleural fluid,  mostly on the right. There is a small hiatal hernia. The heart size is  within normal limits.    There is mild right hydronephrosis with urothelial thickening and  hyperenhancement proximal to a 8 x 5 mm proximal right ureteral stone.  There is a slightly delayed right nephrogram. The left kidney is  unremarkable.    There is mild periportal edema. The gallbladder is distended,  containing some nonspecific dense material, potentially stones. The  pancreas and spleen are unremarkable. There is nodularity of both  adrenal glands. The small bowel is normal in caliber. Diffuse  prominent formed stool is present throughout the colon and rectum. The  appendix is normal.    A small amount of free fluid is nonspecific. No free air or gross  adenopathy is identified. No suspicious osseous lesions are  identified.      Impression    IMPRESSION:      Mild right hydronephrosis as well as right urothelial thickening are  present secondary to a 8 x 5 mm proximal right ureteral stone. The  latter can be seen in ascending infection. There is a slightly delayed  right nephrogram.     Probable constipation. Normal appendix.    KATELIN CLEMENTS MD   XR Surgery [facility]CHELL L/T 5 Min Fluoro    Narrative    This exam was marked as non-reportable because it will not be read by a   radiologist or a [facility]  non-radiologist provider.

## 2019-06-19 NOTE — PROGRESS NOTES
Per Dr. Cole, will complete void trial prior to discharge in order to remove miller cath.     Vic Kc RN 06/19/19 10:49 AM

## 2019-06-19 NOTE — PROGRESS NOTES
:    Further coordination with Zeina from home care.  They can admit patient to home care tomorrow.      Met with patient and updated on home care.  Reviewed home bound status.  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  Her daughter in law Diana was present and is aware of plans.  She will transport patient home today.    No further discharge planning needs.      MADELYN Peoples on 6/19/2019 at 1:45 PM

## 2019-06-19 NOTE — PROGRESS NOTES
Void trial completed. Instilled 360 mL's into the bladder. Stopped per patient request. Removed miller and patient able to go 250 mL's. Bladder scan with 23 mL's in bladder. Provider aware. Okay to discharge.     Vic Kc RN 06/19/19 2:41 PM

## 2019-06-19 NOTE — PLAN OF CARE
"Patient here POD#2 ureteral stent placement. Tolerating fluids. Urine is yellow in color and clear. Riggs still intact. Will perform void trial prior to discharge today. Patient able to take a shower today. Lung sounds are clear. Bowel sounds are active. Denies any pain. Able to eat breakfast and sipping on fluids. Patient reports that she anticipates discharging home today. Vitals stable, will continue to monitor. /62   Pulse 80   Temp 98.7  F (37.1  C) (Tympanic)   Resp 18   Ht 1.727 m (5' 8\")   Wt 62.5 kg (137 lb 12.6 oz)   SpO2 95%   Breastfeeding? No   BMI 20.95 kg/m      Vic Kc RN 06/19/19 11:21 AM      "

## 2019-06-19 NOTE — PROGRESS NOTES
Discharge Note      Data:  Geno Horn discharged to home at 2:35 pm via wheel chair. Accompanied by daughter and staff.    Action:  Written discharge/follow-up instructions were provided to patient. Prescriptions sent to patients preferred pharmacy. All belongings sent with patient.    Response:  Geno verbalized understanding of discharge instructions, reason for discharge, and necessary follow-up appointments. No further questions or concerns at this time.    Vic Kc RN 06/19/19 2:40 PM

## 2019-06-19 NOTE — PLAN OF CARE
Physical Therapy Discharge Summary    Reason for therapy discharge:    Discharged to home with home therapy.    Progress towards therapy goal(s). See goals on Care Plan in [facility] electronic health record for goal details.  Goals partially met.  Barriers to achieving goals:   discharge from facility.    Therapy recommendation(s):    Continued therapy is recommended.  Rationale/Recommendations:  to promote strength, stability and safe mobility including up/down stairs and new use of Fww.

## 2019-06-19 NOTE — PHARMACY - DISCHARGE MEDICATION RECONCILIATION AND EDUCATION
Pharmacy:  Discharge Counseling and Medication Reconciliation    Geno Horn  [address]   [facility] [id number] [phone] (home)   89 year old female  PCP: Fransisca Rocha    Allergies: Tolterodine    Discharge Counseling:    Pharmacist met with patient (and/or family) today to review the medication portion of the After Visit Summary (with an emphasis on NEW medications) and to address patient's questions/concerns.    Summary of Education: Provided education with patient and daughter on Levaquin    Materials Provided:  MedCounselor sheets printed from Clinical Pharmacology on: Levaquin    Discharge Medication Reconciliation:    Reagan Dillon [facility] has reviewed the patient's discharge medication orders and has compared them to the inpatient medication administration record and to what the patient was taking prior to admission - any discrepancies have been resolved.    It has been determined that the patient has an adequate supply of medications available or which can be obtained from the patient's preferred pharmacy, which HE/SHE has confirmed as: [facility]    Thank you for the consult.    Reagan Dillon RPH........June 19, 2019 2:38 PM

## 2019-06-19 NOTE — PLAN OF CARE
Discharge Planner OT   Patient plan for discharge: Pt discharging today to apartment with support of family and homecare therapy  Current status: Pt is a 89 y.o. female who resides alone with 9 steps up to her 2nd story apartment, pt had been indep with ADL an dmobility prior to hospitalization. In OT session today pt was assisted with shower, dressing, transfers and walker mobility. She demonstrated mild weakness and unsteadiness with mobility, and moments of impulsiveness which compromised her balance during ADL. Pt would benefit from therapy to address safety and strengthening following discharge from hospital.  Barriers to return to prior living situation: weakness, decline in ADL and mobility  Recommendations for discharge: homecare therapy  Rationale for recommendations: pt not at baseline       Entered by: Kamilla Sadler 06/19/2019 12:04 PM

## 2019-06-19 NOTE — PROGRESS NOTES
:    Met with patient to discuss discharge planning.  Offered home care services and patient was agreeable.  Patient was provided with a list of local options and selected [facility].     Faxed referral to [facility] and left a message for Zeina notifying of new referral.      MADELYN Peoples on 6/19/2019 at 10:07 AM      Addendum:    [facility] accepted patient.  They cannot see patient until early next week.      OT mentioned patient would like information on Meals on Wheels.  Met with patient and she wasn't sure if they wanted to give up cooking yet, but was receptive to taking information the program if needed.      MADELYN Peoples on 6/19/2019 at 11:29 AM

## 2019-06-19 NOTE — PROGRESS NOTES
"   06/19/19 1100   Quick Adds   Type of Visit Initial Occupational Therapy Evaluation   Living Environment   Lives With alone   Living Arrangements apartment   Home Accessibility stairs within home   Stair Railings, Within Home, Primary railings on both sides of stairs   Transportation Anticipated family or friend will provide   Self-Care   Usual Activity Tolerance moderate   Current Activity Tolerance moderate   Regular Exercise Yes   Exercise Amount/Frequency 2 times/wk   Functional Level   Ambulation 0-->independent   Transferring 0-->independent   Toileting 0-->independent   Bathing 0-->independent   Dressing 0-->independent   Eating 0-->independent   Which of the above functional risks had a recent onset or change? toileting;bathing;dressing;ambulation;transferring   General Information   Patient/Family Goals Statement Pt goal is to return to apartment with family support and homecare therapy   General Observations Pt is a 89 y.o. female who resides alone with 9 steps up to her 2nd story apartment, pt had been indep with ADL an dmobility prior to hospitalization. In OT session today pt was assisted with shower, dressing, transfers and walker mobility. She demonstrated mild weakness and unsteadiness with mobility, and moments of impulsiveness which compromised her balance during ADL. P twould benefit from therapy to address safety and strengthening following discharge from hospital.   Cognitive Status Examination   Orientation orientation to person, place and time   Level of Consciousness alert   Follows Commands (Cognition) follows two step commands   Memory intact   Attention No deficits were identified   Organization/Problem Solving   (mild difficulty w/ sequencing and safety awareness, min A)   Pain Assessment   Patient Currently in Pain   (pan in L hip \"stiffness\" with mobility)   Range of Motion (ROM)   ROM Comment BUE WFL   Coordination   Coordination Comments mild unsteadiness with mob while turning and " transferring sit<>stand   Transfer Skills   Transfer Transfer Skill: Stand to Sit   Transfer Comments CGA   Tub/Shower Transfer   Tub/Shower Transfer Comments   (shower transfer with CGA)   Balance   Balance Comments   (periodic unsteadiness with transitions, turning, sit<>stand)   Upper Body Dressing   Level of [place]: Dress Upper Body minimum assist (75% patients effort)   Physical Assist/Nonphysical Assist: Dress Upper Body   (assist needed due to BUE IV access)   Lower Body Dressing   Level of [place]: Dress Lower Body maximum assist (25% patients effort)   Physical Assist/Nonphysical Assist: Dress Lower Body 1 person assist   Grooming   Level of [place]: Grooming stand-by assist   Physical Assist/Nonphysical Assist: Grooming set-up required   Clinical Impression   Criteria for Skilled Therapeutic Interventions Met yes, treatment indicated   OT Diagnosis decline in ADL function   Influenced by the following impairments weakness   Assessment of Occupational Performance 1-3 Performance Deficits   Identified Performance Deficits self cares, mobility   Clinical Decision Making (Complexity) Low complexity   Therapy Frequency Daily   Predicted Duration of Therapy Intervention (days/wks) LOS   Anticipated Equipment Needs at Discharge other (see comments)  (4WW)   Anticipated Discharge Disposition Home with Home Therapy   Risks and Benefits of Treatment have been explained. Yes   Patient, Family & other staff in agreement with plan of care Yes   Total Evaluation Time   Total Evaluation Time (Minutes) 15

## 2019-06-20 ENCOUNTER — TELEPHONE (OUTPATIENT)
Dept: INTERNAL MEDICINE | Facility: OTHER | Age: 84
End: 2019-06-20

## 2019-06-20 PROCEDURE — G0180 MD CERTIFICATION HHA PATIENT: HCPCS | Performed by: INTERNAL MEDICINE

## 2019-06-20 NOTE — TELEPHONE ENCOUNTER
"Request for Home Care Occupational Therapy orders as follows:    OT eval and treat date:  6/21/19        Interventions include:      Eval and treat  Bathing Program                                    Acknowledging this order with an \"OK\" will suffice. Thank you for your time.  Please call if you have any questions or concerns.      Zeina Avilez LPN  "

## 2019-06-20 NOTE — TELEPHONE ENCOUNTER
Request for Home Care Skilled Nursing orders as follows:    SN eval and treat date: 6-  Continuation frequency =  _1 X week X 1weeks , 2 x week x 2 weeks, 3 PRN            Effective date=6-    Code status = full    Interventions include:    Assessment  Medication management  O2 sat monitoring (all disciplines as needed)  Patient/caregiver education  Fall risk: instruct on fall prevention strategies, home safety, assess environment Observe for signs and symptoms of depression, assess effectiveness of medication, if at risk  Instruct on pain relief measures and assess pain with each visit, if has pain. Assess effectiveness of medications.  Instruct on pressure relief methods to prevent pressure ulcers    Physician specific parameters as follows:---------------------------------------------------------------------------------------------------------------------------------------------------------------------------------------------------------------------------------------------------------------------------------------------------------------------------------------      Thank you for your time,     Eden David RN on 6/20/2019 at 2:30 PM

## 2019-06-20 NOTE — TELEPHONE ENCOUNTER
"Patient's daughter, Adri, called back to say that her mother has swollen ankles and feet. Appears like \"2 rolls around ankles\", both sides equal. Feet with mild-mod swelling - tennis shoes fit but not so comfortably. Skin on feet look whitish. Denies pain or soreness. No redness, SOB or chest pains.     Patient is not active right now, sitting all the time. They are trying elevation of feet. Will call back if worsens or other new symptoms. Karen Jett RN on 6/20/2019 at 3:34 PM      Transitional Care Management Phone Call    Summary of hospitalization:  [facility] discharge summary reviewed    DISCHARGE DIAGNOSIS: kidney stone infection    DATE OF DISCHARGE: 6-    Diagnostic Tests/Treatments reviewed.  Follow up needed: Urology/Dr. Cole scheduled on 7-3-19    Post Discharge Medication Reconciliation: discharge medications reconciled, continue medications without change.    Medications reviewed by: by myself    Problems taking medications regularly:  None    Problems adhering to non-medication therapy:  None    Other Healthcare Providers Involved in Patient s Care:         Homecare, Physical Therapy and Urology     Update since discharge: improved. Reports feeling better. Started Levaquin every other day as ordered. Had home care consult today to start with PT and using new walker. No current complaints    Plan of care communicated with patient and daughterAdri as requested by patient    Just a friendly reminder that you appointment is   Next 5 appointments (look out 90 days)    Jul 03, 2019 11:30 AM CDT  [facility] [address]  [place] [address]  [phone]   Jul 03, 2019 12:40 PM CDT  Office Visit with Fransisca Rocha NP  [facility] [address]  [place] [address]  [phone]    "   .  We encourage you to keep this appointment.    Please remember to bring all of your pills in their bottles (including any vitamins or over the counter pills) with you to your appointment.     The patient indicates understanding of these issues and agrees with the plan of care.   Yes, plans to follow plan of care and keep the scheduled appointment.    Was the patient contacted within the 2 business days or other approved timeframe?  Yes    Was the Medication reconciliation and management done since the patient was discharged? Yes    Karen Jett RN  6/20/2019 3:15 PM

## 2019-06-24 ENCOUNTER — TELEPHONE (OUTPATIENT)
Dept: INTERNAL MEDICINE | Facility: OTHER | Age: 84
End: 2019-06-24

## 2019-06-24 NOTE — TELEPHONE ENCOUNTER
"Request for Home Care Physical Therapy orders as follows:    PT eval and treat date:  6/21/19( Eval)    Continuation frequency =  2 x week x 4____ weeks              Effective date = 6/24/19    Interventions include:    Therapeutic Exercise  Gait Training  Gait/Balance/Dysfunction  Home Exercise Program  Home Safety Assessment      Acknowledging this order with an \"OK\" will suffice. Thank you for your time.  Please call if you have any questions or concerns.    Therapist: Felipa Mckeon PT    Request for Home Care Occupational Therapy orders as follows:    OT eval and treat date:  6/21/19 (eval)    Continuation frequency =  2 x week x _4___ weeks               Effective date = 6/24/19    Interventions include:    Therapeutic Exercise  ADL training  Adaptive equipment  Home safety assessment                                     Acknowledging this order with an \"OK\" will suffice. Thank you for your time.  Please call if you have any questions or concerns.    For therapist: Kamilla Sadler, OT  "

## 2019-06-28 ENCOUNTER — TELEPHONE (OUTPATIENT)
Dept: INTERNAL MEDICINE | Facility: OTHER | Age: 84
End: 2019-06-28

## 2019-06-28 NOTE — TELEPHONE ENCOUNTER
Patient had skilled nurse visit today and reported she had not had a good bowel movement for two weeks.  She has had no complaints about constipation before today's visit.  She has no current pain or abdominal discomfort.  Does admit to intermittent cramping and passing 5 small hard stools about 5 days ago.      Patient has Bisacodyl 5mg tabs in the home that she has taken previously.  Instructed patient to take 2 tabs tonight and if no results may use her glycerin suppository tomorrow if no results by 10am.  Instructed to repeat 2 tabs of Bisacodyl Saturday evening if she continues with no results.  Reviewed  Diet choices to promote regular bowel movements.  Patient is receptive to plan.     Assessment: BP: 112/57, pulse: 78, Temp. 96.4 oral.  Mild lower abdominal distention.  Bowel sounds positive x4.  Positive for flatus. No acute distress.    Next home care skilled nurse visit will be Monday July 1st.     Please respond with any change in treatments you may recommend.  Thank you for your time,     Eden David RN on 6/28/2019 at 1:37 PM

## 2019-07-02 DIAGNOSIS — N10: Primary | ICD-10-CM

## 2019-07-02 DIAGNOSIS — N17.2: Primary | ICD-10-CM

## 2019-07-03 ENCOUNTER — OFFICE VISIT (OUTPATIENT)
Dept: UROLOGY | Facility: OTHER | Age: 84
End: 2019-07-03
Attending: UROLOGY
Payer: MEDICARE

## 2019-07-03 ENCOUNTER — OFFICE VISIT (OUTPATIENT)
Dept: INTERNAL MEDICINE | Facility: OTHER | Age: 84
End: 2019-07-03
Attending: NURSE PRACTITIONER
Payer: MEDICARE

## 2019-07-03 VITALS
RESPIRATION RATE: 16 BRPM | WEIGHT: 120.6 LBS | DIASTOLIC BLOOD PRESSURE: 74 MMHG | BODY MASS INDEX: 18.34 KG/M2 | SYSTOLIC BLOOD PRESSURE: 134 MMHG | HEART RATE: 84 BPM

## 2019-07-03 VITALS
HEIGHT: 68 IN | HEART RATE: 65 BPM | WEIGHT: 120 LBS | OXYGEN SATURATION: 98 % | DIASTOLIC BLOOD PRESSURE: 60 MMHG | TEMPERATURE: 98.7 F | SYSTOLIC BLOOD PRESSURE: 112 MMHG | RESPIRATION RATE: 16 BRPM | BODY MASS INDEX: 18.19 KG/M2

## 2019-07-03 DIAGNOSIS — I25.10 CORONARY ARTERY DISEASE INVOLVING NATIVE CORONARY ARTERY OF NATIVE HEART WITHOUT ANGINA PECTORIS: ICD-10-CM

## 2019-07-03 DIAGNOSIS — N17.2: Primary | ICD-10-CM

## 2019-07-03 DIAGNOSIS — A41.51 SEPSIS DUE TO ESCHERICHIA COLI (H): ICD-10-CM

## 2019-07-03 DIAGNOSIS — I10 ESSENTIAL HYPERTENSION: ICD-10-CM

## 2019-07-03 DIAGNOSIS — N20.1 RIGHT URETERAL STONE: Primary | ICD-10-CM

## 2019-07-03 DIAGNOSIS — N17.2: ICD-10-CM

## 2019-07-03 DIAGNOSIS — N10: Primary | ICD-10-CM

## 2019-07-03 DIAGNOSIS — N10: ICD-10-CM

## 2019-07-03 LAB
ALBUMIN UR-MCNC: 30 MG/DL
ANION GAP SERPL CALCULATED.3IONS-SCNC: 6 MMOL/L (ref 3–14)
APPEARANCE UR: CLEAR
BILIRUB UR QL STRIP: NEGATIVE
BUN SERPL-MCNC: 11 MG/DL (ref 7–25)
CALCIUM SERPL-MCNC: 9.1 MG/DL (ref 8.6–10.3)
CHLORIDE SERPL-SCNC: 99 MMOL/L (ref 98–107)
CO2 SERPL-SCNC: 28 MMOL/L (ref 21–31)
COLOR UR AUTO: YELLOW
CREAT SERPL-MCNC: 0.66 MG/DL (ref 0.6–1.2)
ERYTHROCYTE [DISTWIDTH] IN BLOOD BY AUTOMATED COUNT: 14.4 % (ref 10–15)
GFR SERPL CREATININE-BSD FRML MDRD: 84 ML/MIN/{1.73_M2}
GLUCOSE SERPL-MCNC: 111 MG/DL (ref 70–105)
GLUCOSE UR STRIP-MCNC: NEGATIVE MG/DL
HCT VFR BLD AUTO: 32.9 % (ref 35–47)
HGB BLD-MCNC: 10.8 G/DL (ref 11.7–15.7)
HGB UR QL STRIP: ABNORMAL
KETONES UR STRIP-MCNC: NEGATIVE MG/DL
LEUKOCYTE ESTERASE UR QL STRIP: ABNORMAL
MCH RBC QN AUTO: 33 PG (ref 26.5–33)
MCHC RBC AUTO-ENTMCNC: 32.8 G/DL (ref 31.5–36.5)
MCV RBC AUTO: 101 FL (ref 78–100)
NITRATE UR QL: NEGATIVE
PH UR STRIP: 5.5 PH (ref 5–9)
PLATELET # BLD AUTO: 423 10E9/L (ref 150–450)
POTASSIUM SERPL-SCNC: 3.8 MMOL/L (ref 3.5–5.1)
RBC # BLD AUTO: 3.27 10E12/L (ref 3.8–5.2)
RBC #/AREA URNS AUTO: ABNORMAL /HPF
SODIUM SERPL-SCNC: 133 MMOL/L (ref 134–144)
SOURCE: ABNORMAL
SP GR UR STRIP: 1.01 (ref 1–1.03)
UROBILINOGEN UR STRIP-ACNC: 0.2 EU/DL (ref 0.2–1)
WBC # BLD AUTO: 7 10E9/L (ref 4–11)
WBC #/AREA URNS AUTO: ABNORMAL /HPF

## 2019-07-03 PROCEDURE — 36415 COLL VENOUS BLD VENIPUNCTURE: CPT | Mod: ZL | Performed by: NURSE PRACTITIONER

## 2019-07-03 PROCEDURE — 80048 BASIC METABOLIC PNL TOTAL CA: CPT | Mod: ZL | Performed by: NURSE PRACTITIONER

## 2019-07-03 PROCEDURE — G0463 HOSPITAL OUTPT CLINIC VISIT: HCPCS

## 2019-07-03 PROCEDURE — 87088 URINE BACTERIA CULTURE: CPT | Mod: ZL | Performed by: NURSE PRACTITIONER

## 2019-07-03 PROCEDURE — 87086 URINE CULTURE/COLONY COUNT: CPT | Mod: ZL | Performed by: NURSE PRACTITIONER

## 2019-07-03 PROCEDURE — G0463 HOSPITAL OUTPT CLINIC VISIT: HCPCS | Mod: 25

## 2019-07-03 PROCEDURE — 99214 OFFICE O/P EST MOD 30 MIN: CPT | Performed by: UROLOGY

## 2019-07-03 PROCEDURE — 99495 TRANSJ CARE MGMT MOD F2F 14D: CPT | Performed by: NURSE PRACTITIONER

## 2019-07-03 PROCEDURE — 85027 COMPLETE CBC AUTOMATED: CPT | Mod: ZL | Performed by: NURSE PRACTITIONER

## 2019-07-03 PROCEDURE — 81001 URINALYSIS AUTO W/SCOPE: CPT | Mod: ZL | Performed by: NURSE PRACTITIONER

## 2019-07-03 ASSESSMENT — ENCOUNTER SYMPTOMS
CHEST TIGHTNESS: 0
VOMITING: 0
FLANK PAIN: 0
CHILLS: 0
UNEXPECTED WEIGHT CHANGE: 0
NAUSEA: 0
DIFFICULTY URINATING: 0
DIAPHORESIS: 0
COUGH: 0
APPETITE CHANGE: 0
FATIGUE: 1
DYSPHORIC MOOD: 0
FEVER: 0
ABDOMINAL DISTENTION: 0
DIZZINESS: 0
DIARRHEA: 0
ABDOMINAL PAIN: 0
HEMATURIA: 0
CARDIOVASCULAR NEGATIVE: 1
DYSURIA: 0
ACTIVITY CHANGE: 1
WEAKNESS: 1
SHORTNESS OF BREATH: 0

## 2019-07-03 ASSESSMENT — PAIN SCALES - GENERAL
PAINLEVEL: NO PAIN (1)
PAINLEVEL: NO PAIN (0)

## 2019-07-03 ASSESSMENT — MIFFLIN-ST. JEOR: SCORE: 1002.82

## 2019-07-03 NOTE — PROGRESS NOTES
Type of Visit  Established    Chief Complaint  Ureteral stone  Pyelonephritis    HPI  Ms. Honr is a 89 year old female who follows up after recent hospitalization in the ICU due to the development of pyelonephritis in the setting of an obstructing stone.  A stent was placed urgently and she was managed medically with supportive care.  She has completed a culture specific course of antibiotics.  Today she states she is close to her baseline may be 80%.  She feels well overall and denies fevers and chills.  She follows up to discuss definitive treatment plan for the ureteral stone.  She presents today with her daughter who is her primary caregiver.      Review of Systems  I reviewed the ROS with the patient.    Nursing Notes:   Milagro Hobson RN  7/3/2019 11:40 AM  Signed  Review of Systems:    Weight loss:    No     Recent fever/chills:  No   Night sweats:   No  Current skin rash:  No   Recent hair loss:  No  Heat intolerance:  No   Cold intolerance:  No  Chest pain:   No   Palpitations:   No  Shortness of breath:  No   Wheezing:   No  Constipation:    No   Diarrhea:   No   Nausea:   No   Vomiting:   No   Kidney/side pain:  No   Back pain:   Yes  Frequent headaches:  No   Dizziness:     No  Leg swelling:   No   Calf pain:    No    Family History  Family History   Problem Relation Age of Onset     Heart Disease Mother 60         of mi     Heart Disease Father 60         of mi     Heart Disease Brother      Heart Disease Brother        Physical Exam  Vitals:    19 1136   BP: 134/74   BP Location: Left arm   Patient Position: Sitting   Cuff Size: Adult Regular   Pulse: 84   Resp: 16   Weight: 54.7 kg (120 lb 9.6 oz)     Constitutional: NAD, WDWN.   Cardiovascular: Regular rate.  Pulmonary/Chest: Respirations are even and non-labored bilaterally.  Abdominal: Soft. No distension, tenderness, masses or guarding. No CVA tenderness.  Genitourinary: Nonpalpable bladder.  Extremities: BERTRAND x 4, Warm. No  clubbing.  No cyanosis.    Skin: Pink, warm and dry.  No rashes noted.    Labs  Results for orders placed or performed during the hospital encounter of 06/17/19   XR Chest Port 1 View    Narrative    PROCEDURE: XR CHEST PORT 1 VW 6/17/2019 8:57 AM    HISTORY: sepsis.    COMPARISONS: 2/26/2016.    TECHNIQUE: Single view.    FINDINGS: Heart is not enlarged. There is some mild patchy atelectasis  or infiltrate at both lung bases. Upper lungs are relatively clear.  There is no pleural effusion.    There is a stable nodule at the right lung base laterally.         Impression    IMPRESSION: Mild atelectasis or infiltrate at both lung bases.    SUSHIL BHANDARI MD   CT Abdomen Pelvis w Contrast    Narrative    PROCEDURE:  CT ABDOMEN PELVIS W CONTRAST    HISTORY: Abd pain, appendicitis suspected    TECHNIQUE:  Helical CT of the abdomen and pelvis was performed  following injection of intravenous contrast.  Ingested oral contrast  partially opacifies the bowel.     COMPARISON:  None.    MEDS/CONTRAST: Visipaque 320 100 mL    FINDINGS:      Limited images through the lung bases demonstrate trace pleural fluid,  mostly on the right. There is a small hiatal hernia. The heart size is  within normal limits.    There is mild right hydronephrosis with urothelial thickening and  hyperenhancement proximal to a 8 x 5 mm proximal right ureteral stone.  There is a slightly delayed right nephrogram. The left kidney is  unremarkable.    There is mild periportal edema. The gallbladder is distended,  containing some nonspecific dense material, potentially stones. The  pancreas and spleen are unremarkable. There is nodularity of both  adrenal glands. The small bowel is normal in caliber. Diffuse  prominent formed stool is present throughout the colon and rectum. The  appendix is normal.    A small amount of free fluid is nonspecific. No free air or gross  adenopathy is identified. No suspicious osseous lesions are  identified.       Impression    IMPRESSION:      Mild right hydronephrosis as well as right urothelial thickening are  present secondary to a 8 x 5 mm proximal right ureteral stone. The  latter can be seen in ascending infection. There is a slightly delayed  right nephrogram.     Probable constipation. Normal appendix.    KATELIN CLEMENTS MD   XR Surgery [facility] L/T 5 Min Fluoro    Narrative    This exam was marked as non-reportable because it will not be read by a   radiologist or a [place] non-radiologist provider.             CBC with platelets differential   Result Value Ref Range    WBC 16.3 (H) 4.0 - 11.0 10e9/L    RBC Count 3.36 (L) 3.8 - 5.2 10e12/L    Hemoglobin 11.3 (L) 11.7 - 15.7 g/dL    Hematocrit 33.6 (L) 35.0 - 47.0 %     78 - 100 fl    MCH 33.6 (H) 26.5 - 33.0 pg    MCHC 33.6 31.5 - 36.5 g/dL    RDW 13.2 10.0 - 15.0 %    Platelet Count 197 150 - 450 10e9/L    Diff Method Manual Differential     % Neutrophils 96.0 %    % Lymphocytes 3.0 %    % Monocytes 1.0 %    % Eosinophils 0.0 %    % Basophils 0.0 %    Absolute Neutrophil 15.6 (H) 1.6 - 8.3 10e9/L    Absolute Lymphocytes 0.5 (L) 0.8 - 5.3 10e9/L    Absolute Monocytes 0.2 0.0 - 1.3 10e9/L    Absolute Eosinophils 0.0 0.0 - 0.7 10e9/L    Absolute Basophils 0.0 0.0 - 0.2 10e9/L    Platelet Estimate       Automated count confirmed.  Platelet morphology is normal.    RBC Morphology Consistent with reported results     Toxic Granulation Present    INR   Result Value Ref Range    INR 1.10 0 - 1.3   Comprehensive metabolic panel   Result Value Ref Range    Sodium 133 (L) 134 - 144 mmol/L    Potassium 3.9 3.5 - 5.1 mmol/L    Chloride 100 98 - 107 mmol/L    Carbon Dioxide 18 (L) 21 - 31 mmol/L    Anion Gap 15 (H) 3 - 14 mmol/L    Glucose 167 (H) 70 - 105 mg/dL    Urea Nitrogen 18 7 - 25 mg/dL    Creatinine 1.12 0.60 - 1.20 mg/dL    GFR Estimate 46 (L) >60 mL/min/[1.73_m2]    GFR Estimate If Black 55 (L) >60 mL/min/[1.73_m2]    Calcium 8.8 8.6 - 10.3 mg/dL    Bilirubin  Total 1.2 (H) 0.3 - 1.0 mg/dL    Albumin 3.9 3.5 - 5.7 g/dL    Protein Total 6.3 (L) 6.4 - 8.9 g/dL    Alkaline Phosphatase 66 34 - 104 U/L    ALT 34 7 - 52 U/L    AST 54 (H) 13 - 39 U/L   Lactic acid   Result Value Ref Range    Lactic Acid 5.7 (HH) 0.5 - 2.2 mmol/L   Troponin I   Result Value Ref Range    Troponin I ES 0.039 (H) 0.000 - 0.034 ug/L   TSH Reflex GH   Result Value Ref Range    TSH Reflex 3.51 0.34 - 5.60 IU/mL   UA reflex to Microscopic and Culture   Result Value Ref Range    Color Urine Yellow     Appearance Urine Clear     Glucose Urine Negative NEG^Negative mg/dL    Bilirubin Urine Negative NEG^Negative    Ketones Urine Negative NEG^Negative mg/dL    Specific Gravity Urine 1.020 1.000 - 1.030    Blood Urine Large (A) NEG^Negative    pH Urine 5.0 5.0 - 9.0 pH    Protein Albumin Urine Negative NEG^Negative mg/dL    Urobilinogen Urine 1.0 0.2 - 1.0 EU/dL    Nitrite Urine Negative NEG^Negative    Leukocyte Esterase Urine Trace (A) NEG^Negative    Source Catheterized Urine    Urine Microscopic   Result Value Ref Range    WBC Urine 5-10 (A) OTO5^0 - 5 /HPF    RBC Urine 2-5 (A) OTO2^O - 2 /HPF    Squamous Epithelial /LPF Urine Few FEW^Few /LPF    Bacteria Urine Many (A) NEG^Negative /HPF   Lactic acid   Result Value Ref Range    Lactic Acid 2.1 0.5 - 2.2 mmol/L   Platelet count   Result Value Ref Range    Platelet Count 134 (L) 150 - 450 10e9/L   Creatinine   Result Value Ref Range    Creatinine 0.98 0.60 - 1.20 mg/dL    GFR Estimate 53 (L) >60 mL/min/[1.73_m2]    GFR Estimate If Black 64 >60 mL/min/[1.73_m2]   CBC with platelets   Result Value Ref Range    WBC 44.3 (H) 4.0 - 11.0 10e9/L    RBC Count 3.06 (L) 3.8 - 5.2 10e12/L    Hemoglobin 10.3 (L) 11.7 - 15.7 g/dL    Hematocrit 31.0 (L) 35.0 - 47.0 %     (H) 78 - 100 fl    MCH 33.7 (H) 26.5 - 33.0 pg    MCHC 33.2 31.5 - 36.5 g/dL    RDW 13.5 10.0 - 15.0 %    Platelet Count 124 (L) 150 - 450 10e9/L   Basic metabolic panel   Result Value Ref Range     Sodium 132 (L) 134 - 144 mmol/L    Potassium 4.2 3.5 - 5.1 mmol/L    Chloride 100 98 - 107 mmol/L    Carbon Dioxide 23 21 - 31 mmol/L    Anion Gap 9 3 - 14 mmol/L    Glucose 106 (H) 70 - 105 mg/dL    Urea Nitrogen 21 7 - 25 mg/dL    Creatinine 1.12 0.60 - 1.20 mg/dL    GFR Estimate 46 (L) >60 mL/min/[1.73_m2]    GFR Estimate If Black 55 (L) >60 mL/min/[1.73_m2]    Calcium 7.7 (L) 8.6 - 10.3 mg/dL   CBC with platelets   Result Value Ref Range    WBC 35.0 (H) 4.0 - 11.0 10e9/L    RBC Count 2.99 (L) 3.8 - 5.2 10e12/L    Hemoglobin 10.1 (L) 11.7 - 15.7 g/dL    Hematocrit 29.1 (L) 35.0 - 47.0 %    MCV 97 78 - 100 fl    MCH 33.8 (H) 26.5 - 33.0 pg    MCHC 34.7 31.5 - 36.5 g/dL    RDW 13.4 10.0 - 15.0 %    Platelet Count 105 (L) 150 - 450 10e9/L   Basic metabolic panel   Result Value Ref Range    Sodium 131 (L) 134 - 144 mmol/L    Potassium 3.7 3.5 - 5.1 mmol/L    Chloride 100 98 - 107 mmol/L    Carbon Dioxide 24 21 - 31 mmol/L    Anion Gap 7 3 - 14 mmol/L    Glucose 87 70 - 105 mg/dL    Urea Nitrogen 22 7 - 25 mg/dL    Creatinine 0.84 0.60 - 1.20 mg/dL    GFR Estimate 63 >60 mL/min/[1.73_m2]    GFR Estimate If Black 77 >60 mL/min/[1.73_m2]    Calcium 7.7 (L) 8.6 - 10.3 mg/dL   EKG 12-lead, tracing only    Impression    Sinus tachycardia.  Rate 110 bpm.  Left axis deviation.  PVCs noted.  Normal CO interval.  Prolonged QTC measuring 476 ms.  Left bundle branch block.  Abnormal EKG.  Compared to previous tracing dated November 16, 2016 tachycardia is new.  Left bundle branch block is new  Owen Mcmahon MD   EKG 12-lead, tracing only    Impression    Sinus rhythm.  Rate 97.  Left axis deviation.  PVCs noted.  Nonspecific ST-T changes.  Prolonged QTC measuring 447 ms.  Abnormal EKG.  Compared to tracing earlier today at 7:49 AM, left bundle branch block no longer noted, tachycardia has resolved.    Owen Mcmahon MD   Blood culture   Result Value Ref Range    Specimen Description Blood     Culture Micro Escherichia  coli (A)     Culture Micro 4 BOTTLES OUT OF 4 BOTTLES ARE POSITIVE        Susceptibility    Escherichia coli - XIN     AMPICILLIN <=8 Sensitive ug/mL     AZTREONAM <=8 Sensitive ug/mL     CEFEPIME <=8 Sensitive ug/mL     CEFTRIAXONE <=8 Sensitive ug/mL     CEFTAZIDIME <=1 Sensitive ug/mL     CEFOTAXIME <=2 Sensitive ug/mL     CIPROFLOXACIN <=1 Sensitive ug/mL     CEFUROXIME <=4 Sensitive ug/mL     GENTAMICIN <=4 Sensitive ug/mL     IMIPENEM <=1 Sensitive ug/mL     LEVOFLOXACIN <=2 Sensitive ug/mL     Piperacillin/Tazo <=16 Sensitive ug/mL     Trimethoprim/Sulfa <=2/38 Sensitive ug/mL     TETRACYCLINE <=4 Sensitive ug/mL   Blood culture   Result Value Ref Range    Specimen Description Blood     Culture Micro (A)      Gram negative rods  SEE OTHER BLOOD CULTURE FOR IDENTIFICATION AND SENITIVITIES      Culture Micro 4 BOTTLES OUT OF 4 BOTTLES ARE POSITIVE    Rapid strep screen   Result Value Ref Range    Specimen Description Throat     Rapid Strep A Screen       Negative presumptive for Group A Beta Streptococcus   Urine Culture Aerobic Bacterial   Result Value Ref Range    Specimen Description Catheterized Urine     Culture Micro       10,000 to 50,000 colonies/mL  mixed urogenital kojo         Assessment  Ms. Horn is a 89 year old female with recently placed right ureteral stent for an obstructing stone in the setting of pyelonephritis.  She is close to her baseline in terms of recovery.    Discussed the treatment options for the right stone including ESWL and ureteroscopy with laser lithotripsy.    After explaining the risks, benefits, and alternatives a decision was made to proceed with ureteroscopy/laser lithotripsy.    The patient was explained the specific risks of bleeding, pain, infection, and ureteral injury.    In addition, the patient was told a stent may need to be placed which could result in urinary frequency, urgency, dysuria, and flank pain with voiding.    It would require an office based procedure  "to remove it at a later date.    Finally, the patient was told if the stone was very impacted, that we would place a stent and return at a later date to treat the stone.      Plan  The patient was scheduled and consented for \"right ureteroscopy with holmium laser lithotripsy and stent placement\" in the OR   (LMA general anesthesia).  "

## 2019-07-03 NOTE — NURSING NOTE
Review of Systems:    Weight loss:    No     Recent fever/chills:  No   Night sweats:   No  Current skin rash:  No   Recent hair loss:  No  Heat intolerance:  No   Cold intolerance:  No  Chest pain:   No   Palpitations:   No  Shortness of breath:  No   Wheezing:   No  Constipation:    No   Diarrhea:   No   Nausea:   No   Vomiting:   No   Kidney/side pain:  No   Back pain:   Yes  Frequent headaches:  No   Dizziness:     No  Leg swelling:   No   Calf pain:    No

## 2019-07-05 DIAGNOSIS — B96.20 E-COLI UTI: Primary | ICD-10-CM

## 2019-07-05 DIAGNOSIS — N39.0 E-COLI UTI: Primary | ICD-10-CM

## 2019-07-05 LAB
BACTERIA SPEC CULT: ABNORMAL
SPECIMEN SOURCE: ABNORMAL

## 2019-07-05 RX ORDER — SULFAMETHOXAZOLE/TRIMETHOPRIM 800-160 MG
1 TABLET ORAL DAILY
Qty: 7 TABLET | Refills: 0 | Status: SHIPPED | OUTPATIENT
Start: 2019-07-05 | End: 2019-07-17

## 2019-07-05 NOTE — PROGRESS NOTES
Triage forward urine culture results from 7/3.  Resistance E. coli UTI to Levaquin/ciprofloxacin.    Start Bactrim DS 1 tablet daily x7 days.  Prescription sent to pharmacy.    She was recently hospitalized with E. coli sepsis.  Discharged on oral Levaquin.    Owen Mcmahon MD

## 2019-07-10 ENCOUNTER — OFFICE VISIT (OUTPATIENT)
Dept: INTERNAL MEDICINE | Facility: OTHER | Age: 84
End: 2019-07-10
Attending: NURSE PRACTITIONER
Payer: MEDICARE

## 2019-07-10 VITALS
DIASTOLIC BLOOD PRESSURE: 70 MMHG | HEIGHT: 62 IN | HEART RATE: 78 BPM | WEIGHT: 120.5 LBS | BODY MASS INDEX: 22.18 KG/M2 | SYSTOLIC BLOOD PRESSURE: 122 MMHG | TEMPERATURE: 97.8 F | OXYGEN SATURATION: 92 % | RESPIRATION RATE: 16 BRPM

## 2019-07-10 DIAGNOSIS — I25.10 CORONARY ARTERY DISEASE INVOLVING NATIVE CORONARY ARTERY OF NATIVE HEART WITHOUT ANGINA PECTORIS: ICD-10-CM

## 2019-07-10 DIAGNOSIS — I10 ESSENTIAL HYPERTENSION: ICD-10-CM

## 2019-07-10 DIAGNOSIS — R79.89 ELEVATED TROPONIN: ICD-10-CM

## 2019-07-10 DIAGNOSIS — A41.51 SEPSIS DUE TO ESCHERICHIA COLI (H): ICD-10-CM

## 2019-07-10 DIAGNOSIS — E03.8 OTHER SPECIFIED HYPOTHYROIDISM: ICD-10-CM

## 2019-07-10 DIAGNOSIS — B96.20 E-COLI UTI: ICD-10-CM

## 2019-07-10 DIAGNOSIS — Z01.818 PRE-OP EXAM: Primary | ICD-10-CM

## 2019-07-10 DIAGNOSIS — N39.0 E-COLI UTI: ICD-10-CM

## 2019-07-10 PROCEDURE — G0463 HOSPITAL OUTPT CLINIC VISIT: HCPCS

## 2019-07-10 PROCEDURE — 99214 OFFICE O/P EST MOD 30 MIN: CPT | Performed by: NURSE PRACTITIONER

## 2019-07-10 ASSESSMENT — MIFFLIN-ST. JEOR: SCORE: 916.21

## 2019-07-10 ASSESSMENT — PAIN SCALES - GENERAL: PAINLEVEL: SEVERE PAIN (6)

## 2019-07-10 NOTE — PATIENT INSTRUCTIONS
Hold aspirin 81 mg 1 week prior to surgery.  Avoid NSAIDs.  Acetaminophen ok for pain if needed.  You will be scheduled for a stress test of your heart prior to having your surgical procedure.

## 2019-07-10 NOTE — PROGRESS NOTES
----------------- PREOPERATIVE EXAM ------------------  7/10/2019    SUBJECTIVE:  Geno Horn is a 89 year old female here for preoperative optimization.    I was asked to see Geno Horn by Dr. Cole for a preoperative optimization for cystoscopy, ureteroscopy and laser stone removal on July 23, 2019.    Patient has a history of recent sepsis due to E. coli urinary tract infection.  She was hospitalized and was placed on Levaquin at the time of discharge.  She had follow-up UA UC on July 5 and was started on Bactrim as there was still some bacteria present.  She currently has no symptoms of dysuria, hematuria, flank pain, fever or chills.  She states she is feeling back to her baseline and appetite is good and she is getting stronger.  She continues to ambulate with a cane.  She still is not driving and is hoping in the near future that she will be able to return back to driving.  She has known coronary artery disease and had STEMI back in the 1990s with angioplasty completed.  While hospitalized with sepsis she did have a slightly elevated troponin at 0.039.  She denies chest pain, chest pressure and shortness of breath.  She does have some stairs in her apartment which she is able to walk up without cardiopulmonary symptoms.  She has known hypertension which is well controlled with diltiazem daily.  She also has a known history of hypothyroidism with recent normal thyroid labs during hospitalization.  CBC and basic metabolic panel were completed last week at hospital follow-up appointment.  Hemoglobin had improved up to 10.8 g/dL and remaining CBC and basic metabolic panel were otherwise unremarkable.  She does not take NSAIDs but does take aspirin 81 mg a daily.    Nursing Notes:   Vla Cardenas LPN  7/10/2019 10:12 AM  Signed  Chief Complaint   Patient presents with     Pre-Op Exam       Medication Reconciliation: complete  Val Cardenas LPN  ..................7/10/2019   9:48 AM   Date of  "Surgery: 7-23-19  Type of Surgery: kidney stone  Surgeon: Dr. Cole  Hospital:  [facility]    Fever/Chills or otherinfectious symptoms in past month: no  >10lb weight loss in past two months: no    Health Care Directive/Code status:  yes  Hx of bloodtransfusions:   no   Td up to date:  unknown  History of VRE/MRSA:  no    Preoperative Evaluation: Obstructive Sleep Apnea screening    S:Snore -  Do you snore loudly? (louder than talking or loud enough to be heard through closed doors)no  T: Tired - Do you often feeltired, fatigued, or sleepy during the daytime?no  O: Observed - Has anyone ever observed you stop breathing during your sleep?no  P: Pressure - Do you have or areyou being treated for high blood pressure? yes  B: BMI - BMI greater than 35kg/m2?no 21.8  A: Age - Age over 50 years old?yes  N: Neck - Neck circumferencegreater than 40 cm?no 33  G:Gender - Gender: Male?no    Totalnumber of \"YES\" responses:  2    Scoring: Low risk of KATHLEEN 0-2  At Risk of KATHLEEN: >3 High Risk ofOSA: 5-8    Val Cardenas LPN....................  7/10/2019   9:51 AM        Patient Active Problem List    Diagnosis Date Noted     Sepsis (H) 06/17/2019     Priority: Medium     Unsteady gait 05/21/2018     Priority: Medium     Urinary urgency 07/24/2017     Priority: Medium     H/O basal cell carcinoma excision 07/20/2016     Priority: Medium     Coronary artery disease involving native coronary artery of native heart without angina pectoris 06/21/2016     Priority: Medium     Hearing loss 01/21/2015     Priority: Medium     Osteoarthritis of both hips 01/21/2015     Priority: Medium     Allergic rhinitis 03/16/2012     Priority: Medium     Hypertension 03/16/2012     Priority: Medium     Hypothyroidism 03/16/2012     Priority: Medium     Overview:   tsh and t4 normal on 1/2015          Past Medical History:   Diagnosis Date     Hypertension      Hypothyroid      Osteoarthritis      ST elevation (STEMI) myocardial infarction (H)     1981; " angioplasty       Past Surgical History:   Procedure Laterality Date     ARTHROPLASTY HIP Right     Dr Rodriguez     ARTHROPLASTY HIP Left     Dr. Rodriguez     CYSTOSCOPY, RETROGRADES, INSERT STENT URETER(S), COMBINED Right 2019    Procedure: CYSTOSCOPY, WITH RETROGRADE PYELOGRAM AND URETERAL STENT INSERTION;  Surgeon: Shawn Cole MD;  Location: [facility] OR       Current Outpatient Medications   Medication Sig Dispense Refill     acetaminophen (TYLENOL) 500 MG tablet Take 500 mg by mouth every 6 hours as needed for mild pain       aspirin 81 MG chewable tablet Take 81 mg by mouth daily with food       diltiazem ER (DILT-XR) 180 MG 24 hr capsule Take 1 capsule (180 mg) by mouth daily 90 capsule 3     Incontinence Supply Disposable (POISE PAD) PADS 1 Units daily 30 each 11     levothyroxine (SYNTHROID/LEVOTHROID) 88 MCG tablet TAKE 1 TABLET (88 MCG) BY MOUTH DAILY 180 tablet 3     order for DME Equipment being ordered: plastic underpants 1 Units 1     order for DME Equipment being ordered: Spikes for cane 1 Units 1     sulfamethoxazole-trimethoprim (BACTRIM DS/SEPTRA DS) 800-160 MG tablet Take 1 tablet by mouth daily for 7 days - for Ecoli UTI 7 tablet 0       Recent use of ibuprofen, aspirin or aleve: Yes, aspirin    Allergies:  Allergies   Allergen Reactions     Tolterodine      Other reaction(s): Intolerance-Can't Take  Was unable to sleep at night       Latex allergy  No    Family History   Problem Relation Age of Onset     Heart Disease Mother 60         of mi     Heart Disease Father 60         of mi     Heart Disease Brother      Heart Disease Brother        Denies family hx of bleeding tendencies, anesthesia complications, or other problems with surgery.      Social History     Socioeconomic History     Marital status:      Spouse name: Not on file     Number of children: Not on file     Years of education: Not on file     Highest education level: Not on file   Occupational History     Not on  file   Social Needs     Financial resource strain: Not on file     Food insecurity:     Worry: Not on file     Inability: Not on file     Transportation needs:     Medical: Not on file     Non-medical: Not on file   Tobacco Use     Smoking status: Never Smoker     Smokeless tobacco: Never Used   Substance and Sexual Activity     Alcohol use: No     Alcohol/week: 0.0 oz     Drug use: No     Sexual activity: Not Currently     Partners: Male     Birth control/protection: Post-menopausal   Lifestyle     Physical activity:     Days per week: Not on file     Minutes per session: Not on file     Stress: Not on file   Relationships     Social connections:     Talks on phone: Not on file     Gets together: Not on file     Attends Jewish service: Not on file     Active member of club or organization: Not on file     Attends meetings of clubs or organizations: Not on file     Relationship status: Not on file     Intimate partner violence:     Fear of current or ex partner: Not on file     Emotionally abused: Not on file     Physically abused: Not on file     Forced sexual activity: Not on file   Other Topics Concern     Not on file   Social History Narrative    , lives in [place].  Previously from [place], [place].  Worked in drug store,  was a pharmacist.         Surgical ROS:    surgical:  patient denies previous complications from prior surgeries including but not limited to prolonged bleeding, anesthesia complications, dysrhythmias, surgical wound infections, or prolonged hospital stay.      Comprehensive REVIEW OF SYSTEMS:      Constitutional: Negative for fever, chills and fatigue .   Eyes: Negative for irritation and redness .  Ears, nose, mouth, throat, and face: Negative for ear drainage, nasal congestion, sore throat and hoarseness .  Respiratory: Negative for cough, sputum production , hemoptysis, dyspnea  and wheezing .  Cardiovascular: Negative for chest discomfort, palpitations and  "lightheadedness .  Gastrointestinal: Negative for dysphagia, nausea, vomiting, melena, diarrhea, constipation and abdominal pain.  Genitourinary: Negative for frequency, dysuria, urinary incontinence, hematuria.  Integument/breast: Negative for rash.   Hematologic/lymphatic: Negative for easy bruising, bleeding, lymphadenopathy and petechiae.   Musculoskeletal: Negative for myalgias and arthralgias .  Neurological: Negative for headaches, dizziness, vertigo, seizures and weakness.   Psychiatric: Negative for anxiety, depression, panic attacks and suicidal ideations.       -------------------------------------------------------------    PHYSICAL EXAM:  /70 (BP Location: Right arm, Patient Position: Sitting, Cuff Size: Adult Regular)   Pulse 78   Temp 97.8  F (36.6  C) (Temporal)   Resp 16   Ht 1.585 m (5' 2.4\")   Wt 54.7 kg (120 lb 8 oz)   SpO2 92%   BMI 21.76 kg/m      EXAM:  General Appearance: Pleasant, alert, appropriate appearance for age. No acute distress  Head Exam: Normal.Normocephalic, atraumatic.  Eye Exam: Sclera non-icteric. Conjunctiva non-inflamed.  Ear Exam: Normal TM's bilaterally. Normal auditory canals and external ears.   OroPharynx Exam: Oral mucosa pink and moist. No erythema or exudate of throat.  Neck Exam: Supple, no masses or lymphadenopathy  Thyroid Exam:  No nodules or thyromegaly, non-tender.  Chest/Respiratory Exam: Normal chest wall and respirations. Clear to auscultation.  Cardiovascular Exam: Regular rate and rhythm. S1, S2, no murmur or S3 gallop.  Gastrointestinal Exam: Soft, nontender, no masses or organomegaly. BS x 4. No abdominal bruit or pulsatile masses.  Lymphatic Exam: No axillary or inguinal adenopathy  Musculoskeletal Exam: No active synovitis  Skin: no rash or abnormalities  Neurologic Exam: symmetric DTRs, normal gross motor movement, tone, strength and coordination. No tremor.  Psychiatric Exam: Alert and oriented, appropriate affect.    PHQ Depression " Screen  PHQ-9 SCORE 10/12/2018 1/23/2019 4/23/2019   PHQ-9 Total Score 0 0 0       Patient can walk up a flight of stairs without becoming short of breath or having chest pain: YES       LABS:    Results for orders placed or performed in visit on 07/03/19   Basic metabolic panel   Result Value Ref Range    Sodium 133 (L) 134 - 144 mmol/L    Potassium 3.8 3.5 - 5.1 mmol/L    Chloride 99 98 - 107 mmol/L    Carbon Dioxide 28 21 - 31 mmol/L    Anion Gap 6 3 - 14 mmol/L    Glucose 111 (H) 70 - 105 mg/dL    Urea Nitrogen 11 7 - 25 mg/dL    Creatinine 0.66 0.60 - 1.20 mg/dL    GFR Estimate 84 >60 mL/min/[1.73_m2]    GFR Estimate If Black >90 >60 mL/min/[1.73_m2]    Calcium 9.1 8.6 - 10.3 mg/dL   CBC with platelets   Result Value Ref Range    WBC 7.0 4.0 - 11.0 10e9/L    RBC Count 3.27 (L) 3.8 - 5.2 10e12/L    Hemoglobin 10.8 (L) 11.7 - 15.7 g/dL    Hematocrit 32.9 (L) 35.0 - 47.0 %     (H) 78 - 100 fl    MCH 33.0 26.5 - 33.0 pg    MCHC 32.8 31.5 - 36.5 g/dL    RDW 14.4 10.0 - 15.0 %    Platelet Count 423 150 - 450 10e9/L              EKG: Sinus rhythm.  Rate 97.  Left axis deviation.  PVCs noted.  Nonspecific ST-T changes.  Prolonged QTC measuring 447 ms.  Abnormal EKG.  Compared to tracing earlier today at 7:49 AM, left bundle branch block no longer noted, tachycardia has resolved.    Owen Mcmahon MD    ---------------------------------------------------------------    No family or personal history of problems with bleeding or anesthetia. Patient's perioperative risk is minimized and  further cardiopulmonary workup is neccesary due to elevated troponin at the time of hospital stay and history of coronary disease.      ICD-10-CM    1. Pre-op exam Z01.818 NM Lexiscan stress test   2. Sepsis due to Escherichia coli (H) A41.51    3. E-coli UTI N39.0     B96.20    4. Coronary artery disease involving native coronary artery of native heart without angina pectoris I25.10 NM Lexiscan stress test   5. Elevated troponin  R74.8 NM Lexiscan stress test   6. Essential hypertension I10    7. Other specified hypothyroidism E03.8          ASSESSEMNT AND PLAN:  Patient will need to have cardiac stress testing completed prior to surgical approval.  She is scheduled for a Lexiscan and this should be done before surgery.  She otherwise will continue with routine medications as prescribed except will hold the aspirin 1 week prior to surgical procedure.  She has 1 dose of Bactrim left for treatment of urinary tract infection and currently asymptomatic.    Patient Instructions   Hold aspirin 81 mg 1 week prior to surgery.  Avoid NSAIDs.  Acetaminophen ok for pain if needed.  You will be scheduled for a stress test of your heart prior to having your surgical procedure.      Other:  Patient was advised to call our office and the surgical services with any change in condition or new symptoms if they were to develop between today and their surgical date.  Especially any cardiopulmonary symptoms or symptoms concerning for an infection.       Greater than 25 minutes were spent in counseling andcoordination of care.    Fransisca Rocha..................7/10/2019 10:26 AM

## 2019-07-10 NOTE — H&P (VIEW-ONLY)
----------------- PREOPERATIVE EXAM ------------------  7/10/2019    SUBJECTIVE:  Geno Horn is a 89 year old female here for preoperative optimization.    I was asked to see Geno Horn by Dr. Cole for a preoperative optimization for cystoscopy, ureteroscopy and laser stone removal on July 23, 2019.    Patient has a history of recent sepsis due to E. coli urinary tract infection.  She was hospitalized and was placed on Levaquin at the time of discharge.  She had follow-up UA UC on July 5 and was started on Bactrim as there was still some bacteria present.  She currently has no symptoms of dysuria, hematuria, flank pain, fever or chills.  She states she is feeling back to her baseline and appetite is good and she is getting stronger.  She continues to ambulate with a cane.  She still is not driving and is hoping in the near future that she will be able to return back to driving.  She has known coronary artery disease and had STEMI back in the 1990s with angioplasty completed.  While hospitalized with sepsis she did have a slightly elevated troponin at 0.039.  She denies chest pain, chest pressure and shortness of breath.  She does have some stairs in her apartment which she is able to walk up without cardiopulmonary symptoms.  She has known hypertension which is well controlled with diltiazem daily.  She also has a known history of hypothyroidism with recent normal thyroid labs during hospitalization.  CBC and basic metabolic panel were completed last week at hospital follow-up appointment.  Hemoglobin had improved up to 10.8 g/dL and remaining CBC and basic metabolic panel were otherwise unremarkable.  She does not take NSAIDs but does take aspirin 81 mg a daily.    Nursing Notes:   Val Cardenas LPN  7/10/2019 10:12 AM  Signed  Chief Complaint   Patient presents with     Pre-Op Exam       Medication Reconciliation: complete  Val Cardenas LPN  ..................7/10/2019   9:48 AM   Date of  "Surgery: 7-23-19  Type of Surgery: kidney stone  Surgeon: Dr. Cole  Hospital[facility]  [facility]    Fever/Chills or otherinfectious symptoms in past month: no  >10lb weight loss in past two months: no    Health Care Directive/Code status:  yes  Hx of bloodtransfusions:   no   Td up to date:  unknown  History of VRE/MRSA:  no    Preoperative Evaluation: Obstructive Sleep Apnea screening    S:Snore -  Do you snore loudly? (louder than talking or loud enough to be heard through closed doors)no  T: Tired - Do you often feeltired, fatigued, or sleepy during the daytime?no  O: Observed - Has anyone ever observed you stop breathing during your sleep?no  P: Pressure - Do you have or areyou being treated for high blood pressure? yes  B: BMI - BMI greater than 35kg/m2?no 21.8  A: Age - Age over 50 years old?yes  N: Neck - Neck circumferencegreater than 40 cm?no 33  G:Gender - Gender: Male?no    Totalnumber of \"YES\" responses:  2    Scoring: Low risk of KATHLEEN 0-2  At Risk of KATHLEEN: >3 High Risk ofOSA: 5-8    Val Cardenas LPN....................  7/10/2019   9:51 AM        Patient Active Problem List    Diagnosis Date Noted     Sepsis (H) 06/17/2019     Priority: Medium     Unsteady gait 05/21/2018     Priority: Medium     Urinary urgency 07/24/2017     Priority: Medium     H/O basal cell carcinoma excision 07/20/2016     Priority: Medium     Coronary artery disease involving native coronary artery of native heart without angina pectoris 06/21/2016     Priority: Medium     Hearing loss 01/21/2015     Priority: Medium     Osteoarthritis of both hips 01/21/2015     Priority: Medium     Allergic rhinitis 03/16/2012     Priority: Medium     Hypertension 03/16/2012     Priority: Medium     Hypothyroidism 03/16/2012     Priority: Medium     Overview:   tsh and t4 normal on 1/2015          Past Medical History:   Diagnosis Date     Hypertension      Hypothyroid      Osteoarthritis      ST elevation (STEMI) myocardial infarction (H)     1981; " angioplasty       Past Surgical History:   Procedure Laterality Date     ARTHROPLASTY HIP Right     Dr Rodriguez     ARTHROPLASTY HIP Left     Dr. Rodriguez     CYSTOSCOPY, RETROGRADES, INSERT STENT URETER(S), COMBINED Right 2019    Procedure: CYSTOSCOPY, WITH RETROGRADE PYELOGRAM AND URETERAL STENT INSERTION;  Surgeon: Shawn Cole MD;  Location: [facility] OR       Current Outpatient Medications   Medication Sig Dispense Refill     acetaminophen (TYLENOL) 500 MG tablet Take 500 mg by mouth every 6 hours as needed for mild pain       aspirin 81 MG chewable tablet Take 81 mg by mouth daily with food       diltiazem ER (DILT-XR) 180 MG 24 hr capsule Take 1 capsule (180 mg) by mouth daily 90 capsule 3     Incontinence Supply Disposable (POISE PAD) PADS 1 Units daily 30 each 11     levothyroxine (SYNTHROID/LEVOTHROID) 88 MCG tablet TAKE 1 TABLET (88 MCG) BY MOUTH DAILY 180 tablet 3     order for DME Equipment being ordered: plastic underpants 1 Units 1     order for DME Equipment being ordered: Spikes for cane 1 Units 1     sulfamethoxazole-trimethoprim (BACTRIM DS/SEPTRA DS) 800-160 MG tablet Take 1 tablet by mouth daily for 7 days - for Ecoli UTI 7 tablet 0       Recent use of ibuprofen, aspirin or aleve: Yes, aspirin    Allergies:  Allergies   Allergen Reactions     Tolterodine      Other reaction(s): Intolerance-Can't Take  Was unable to sleep at night       Latex allergy  No    Family History   Problem Relation Age of Onset     Heart Disease Mother 60         of mi     Heart Disease Father 60         of mi     Heart Disease Brother      Heart Disease Brother        Denies family hx of bleeding tendencies, anesthesia complications, or other problems with surgery.      Social History     Socioeconomic History     Marital status:      Spouse name: Not on file     Number of children: Not on file     Years of education: Not on file     Highest education level: Not on file   Occupational History     Not on  file   Social Needs     Financial resource strain: Not on file     Food insecurity:     Worry: Not on file     Inability: Not on file     Transportation needs:     Medical: Not on file     Non-medical: Not on file   Tobacco Use     Smoking status: Never Smoker     Smokeless tobacco: Never Used   Substance and Sexual Activity     Alcohol use: No     Alcohol/week: 0.0 oz     Drug use: No     Sexual activity: Not Currently     Partners: Male     Birth control/protection: Post-menopausal   Lifestyle     Physical activity:     Days per week: Not on file     Minutes per session: Not on file     Stress: Not on file   Relationships     Social connections:     Talks on phone: Not on file     Gets together: Not on file     Attends Synagogue service: Not on file     Active member of club or organization: Not on file     Attends meetings of clubs or organizations: Not on file     Relationship status: Not on file     Intimate partner violence:     Fear of current or ex partner: Not on file     Emotionally abused: Not on file     Physically abused: Not on file     Forced sexual activity: Not on file   Other Topics Concern     Not on file   Social History Narrative    , lives in [place].  Previously from [place], [place].  Worked in drug store,  was a pharmacist.         Surgical ROS:    surgical:  patient denies previous complications from prior surgeries including but not limited to prolonged bleeding, anesthesia complications, dysrhythmias, surgical wound infections, or prolonged hospital stay.      Comprehensive REVIEW OF SYSTEMS:      Constitutional: Negative for fever, chills and fatigue .   Eyes: Negative for irritation and redness .  Ears, nose, mouth, throat, and face: Negative for ear drainage, nasal congestion, sore throat and hoarseness .  Respiratory: Negative for cough, sputum production , hemoptysis, dyspnea  and wheezing .  Cardiovascular: Negative for chest discomfort, palpitations and  "lightheadedness .  Gastrointestinal: Negative for dysphagia, nausea, vomiting, melena, diarrhea, constipation and abdominal pain.  Genitourinary: Negative for frequency, dysuria, urinary incontinence, hematuria.  Integument/breast: Negative for rash.   Hematologic/lymphatic: Negative for easy bruising, bleeding, lymphadenopathy and petechiae.   Musculoskeletal: Negative for myalgias and arthralgias .  Neurological: Negative for headaches, dizziness, vertigo, seizures and weakness.   Psychiatric: Negative for anxiety, depression, panic attacks and suicidal ideations.       -------------------------------------------------------------    PHYSICAL EXAM:  /70 (BP Location: Right arm, Patient Position: Sitting, Cuff Size: Adult Regular)   Pulse 78   Temp 97.8  F (36.6  C) (Temporal)   Resp 16   Ht 1.585 m (5' 2.4\")   Wt 54.7 kg (120 lb 8 oz)   SpO2 92%   BMI 21.76 kg/m      EXAM:  General Appearance: Pleasant, alert, appropriate appearance for age. No acute distress  Head Exam: Normal.Normocephalic, atraumatic.  Eye Exam: Sclera non-icteric. Conjunctiva non-inflamed.  Ear Exam: Normal TM's bilaterally. Normal auditory canals and external ears.   OroPharynx Exam: Oral mucosa pink and moist. No erythema or exudate of throat.  Neck Exam: Supple, no masses or lymphadenopathy  Thyroid Exam:  No nodules or thyromegaly, non-tender.  Chest/Respiratory Exam: Normal chest wall and respirations. Clear to auscultation.  Cardiovascular Exam: Regular rate and rhythm. S1, S2, no murmur or S3 gallop.  Gastrointestinal Exam: Soft, nontender, no masses or organomegaly. BS x 4. No abdominal bruit or pulsatile masses.  Lymphatic Exam: No axillary or inguinal adenopathy  Musculoskeletal Exam: No active synovitis  Skin: no rash or abnormalities  Neurologic Exam: symmetric DTRs, normal gross motor movement, tone, strength and coordination. No tremor.  Psychiatric Exam: Alert and oriented, appropriate affect.    PHQ Depression " Screen  PHQ-9 SCORE 10/12/2018 1/23/2019 4/23/2019   PHQ-9 Total Score 0 0 0       Patient can walk up a flight of stairs without becoming short of breath or having chest pain: YES       LABS:    Results for orders placed or performed in visit on 07/03/19   Basic metabolic panel   Result Value Ref Range    Sodium 133 (L) 134 - 144 mmol/L    Potassium 3.8 3.5 - 5.1 mmol/L    Chloride 99 98 - 107 mmol/L    Carbon Dioxide 28 21 - 31 mmol/L    Anion Gap 6 3 - 14 mmol/L    Glucose 111 (H) 70 - 105 mg/dL    Urea Nitrogen 11 7 - 25 mg/dL    Creatinine 0.66 0.60 - 1.20 mg/dL    GFR Estimate 84 >60 mL/min/[1.73_m2]    GFR Estimate If Black >90 >60 mL/min/[1.73_m2]    Calcium 9.1 8.6 - 10.3 mg/dL   CBC with platelets   Result Value Ref Range    WBC 7.0 4.0 - 11.0 10e9/L    RBC Count 3.27 (L) 3.8 - 5.2 10e12/L    Hemoglobin 10.8 (L) 11.7 - 15.7 g/dL    Hematocrit 32.9 (L) 35.0 - 47.0 %     (H) 78 - 100 fl    MCH 33.0 26.5 - 33.0 pg    MCHC 32.8 31.5 - 36.5 g/dL    RDW 14.4 10.0 - 15.0 %    Platelet Count 423 150 - 450 10e9/L              EKG: Sinus rhythm.  Rate 97.  Left axis deviation.  PVCs noted.  Nonspecific ST-T changes.  Prolonged QTC measuring 447 ms.  Abnormal EKG.  Compared to tracing earlier today at 7:49 AM, left bundle branch block no longer noted, tachycardia has resolved.    Owen Mcmahon MD    ---------------------------------------------------------------    No family or personal history of problems with bleeding or anesthetia. Patient's perioperative risk is minimized and  further cardiopulmonary workup is neccesary due to elevated troponin at the time of hospital stay and history of coronary disease.      ICD-10-CM    1. Pre-op exam Z01.818 NM Lexiscan stress test   2. Sepsis due to Escherichia coli (H) A41.51    3. E-coli UTI N39.0     B96.20    4. Coronary artery disease involving native coronary artery of native heart without angina pectoris I25.10 NM Lexiscan stress test   5. Elevated troponin  R74.8 NM Lexiscan stress test   6. Essential hypertension I10    7. Other specified hypothyroidism E03.8          ASSESSEMNT AND PLAN:  Patient will need to have cardiac stress testing completed prior to surgical approval.  She is scheduled for a Lexiscan and this should be done before surgery.  She otherwise will continue with routine medications as prescribed except will hold the aspirin 1 week prior to surgical procedure.  She has 1 dose of Bactrim left for treatment of urinary tract infection and currently asymptomatic.    Patient Instructions   Hold aspirin 81 mg 1 week prior to surgery.  Avoid NSAIDs.  Acetaminophen ok for pain if needed.  You will be scheduled for a stress test of your heart prior to having your surgical procedure.      Other:  Patient was advised to call our office and the surgical services with any change in condition or new symptoms if they were to develop between today and their surgical date.  Especially any cardiopulmonary symptoms or symptoms concerning for an infection.       Greater than 25 minutes were spent in counseling andcoordination of care.    Fransisca Rocha..................7/10/2019 10:26 AM

## 2019-07-10 NOTE — NURSING NOTE
"Chief Complaint   Patient presents with     Pre-Op Exam       Medication Reconciliation: complete  Val Cardenas LPN  ..................7/10/2019   9:48 AM   Date of Surgery: 7-23-19  Type of Surgery: kidney stone  Surgeon: Dr. Cole  Hospital:  [facility]    Fever/Chills or otherinfectious symptoms in past month: no  >10lb weight loss in past two months: no    Health Care Directive/Code status:  yes  Hx of bloodtransfusions:   no   Td up to date:  unknown  History of VRE/MRSA:  no    Preoperative Evaluation: Obstructive Sleep Apnea screening    S:Snore -  Do you snore loudly? (louder than talking or loud enough to be heard through closed doors)no  T: Tired - Do you often feeltired, fatigued, or sleepy during the daytime?no  O: Observed - Has anyone ever observed you stop breathing during your sleep?no  P: Pressure - Do you have or areyou being treated for high blood pressure? yes  B: BMI - BMI greater than 35kg/m2?no 21.8  A: Age - Age over 50 years old?yes  N: Neck - Neck circumferencegreater than 40 cm?no 33  G:Gender - Gender: Male?no    Totalnumber of \"YES\" responses:  2    Scoring: Low risk of KATHLEEN 0-2  At Risk of KATHLEEN: >3 High Risk ofOSA: 5-8    Val Cardenas LPN....................  7/10/2019   9:51 AM      "

## 2019-07-16 ENCOUNTER — TELEPHONE (OUTPATIENT)
Dept: CARDIAC REHAB | Facility: OTHER | Age: 84
End: 2019-07-16

## 2019-07-16 NOTE — TELEPHONE ENCOUNTER
Called to remind patient of stress test and review instructions. Patient aware not to have breakfast or caffeine and to check in at diagnostics. Our direct phone number given for questions.

## 2019-07-17 ENCOUNTER — TELEPHONE (OUTPATIENT)
Dept: CARDIOLOGY | Facility: OTHER | Age: 84
End: 2019-07-17

## 2019-07-17 ENCOUNTER — HOSPITAL ENCOUNTER (OUTPATIENT)
Dept: NUCLEAR MEDICINE | Facility: OTHER | Age: 84
Discharge: HOME | End: 2019-07-17
Attending: NURSE PRACTITIONER | Admitting: NURSE PRACTITIONER
Payer: MEDICARE

## 2019-07-17 ENCOUNTER — HOSPITAL ENCOUNTER (OUTPATIENT)
Dept: NUCLEAR MEDICINE | Facility: OTHER | Age: 84
End: 2019-07-17
Attending: NURSE PRACTITIONER
Payer: MEDICARE

## 2019-07-17 VITALS — SYSTOLIC BLOOD PRESSURE: 130 MMHG | HEART RATE: 54 BPM | DIASTOLIC BLOOD PRESSURE: 64 MMHG

## 2019-07-17 DIAGNOSIS — R79.89 ELEVATED TROPONIN: ICD-10-CM

## 2019-07-17 DIAGNOSIS — I25.10 CORONARY ARTERY DISEASE INVOLVING NATIVE CORONARY ARTERY OF NATIVE HEART WITHOUT ANGINA PECTORIS: ICD-10-CM

## 2019-07-17 DIAGNOSIS — Z01.818 PRE-OP EXAM: ICD-10-CM

## 2019-07-17 PROCEDURE — 25000128 H RX IP 250 OP 636: Performed by: NURSE PRACTITIONER

## 2019-07-17 PROCEDURE — 78452 HT MUSCLE IMAGE SPECT MULT: CPT

## 2019-07-17 PROCEDURE — A9500 TC99M SESTAMIBI: HCPCS | Performed by: NURSE PRACTITIONER

## 2019-07-17 PROCEDURE — 93017 CV STRESS TEST TRACING ONLY: CPT

## 2019-07-17 PROCEDURE — 34300033 ZZH RX 343: Performed by: NURSE PRACTITIONER

## 2019-07-17 PROCEDURE — 93016 CV STRESS TEST SUPVJ ONLY: CPT

## 2019-07-17 PROCEDURE — 93018 CV STRESS TEST I&R ONLY: CPT

## 2019-07-17 RX ORDER — AMINOPHYLLINE 25 MG/ML
50 INJECTION, SOLUTION INTRAVENOUS
Status: DISCONTINUED | OUTPATIENT
Start: 2019-07-17 | End: 2019-07-18 | Stop reason: HOSPADM

## 2019-07-17 RX ORDER — REGADENOSON 0.08 MG/ML
0.4 INJECTION, SOLUTION INTRAVENOUS ONCE
Status: COMPLETED | OUTPATIENT
Start: 2019-07-17 | End: 2019-07-17

## 2019-07-17 RX ADMIN — KIT FOR THE PREPARATION OF TECHNETIUM TC99M SESTAMIBI 32.3 MILLICURIE: 1 INJECTION, POWDER, LYOPHILIZED, FOR SOLUTION PARENTERAL at 09:55

## 2019-07-17 RX ADMIN — REGADENOSON 0.4 MG: 0.08 INJECTION, SOLUTION INTRAVENOUS at 09:54

## 2019-07-17 RX ADMIN — KIT FOR THE PREPARATION OF TECHNETIUM TC99M SESTAMIBI 7.84 MILLICURIE: 1 INJECTION, POWDER, LYOPHILIZED, FOR SOLUTION PARENTERAL at 08:10

## 2019-07-17 NOTE — TELEPHONE ENCOUNTER
Patient called re: her surgical instructions.  She verified her  and stated she needed clarifications on her surgical instructions.  She gave me verbal ok to have nurse talk to her daughter Adri Chaudhary at [phone].  I informed her I would have surgical department nurse call the daughter to update her on instructions.    RN called surgical department and given message to Flor Wang RN she will call daughter.

## 2019-07-17 NOTE — PROGRESS NOTES
0800The patient arrived for a Lexiscan Cardiolite stress test.  The procedure, risks, and benefits were discussed with the patient and her son,and the consent was signed.  A saline lock was started,and the Cardiolite was injected by x-ray.  The patient was taken to the waiting area, to await resting images at 0840.  0940The patient returned from x-ray and was prepped for the stress test.   Yulissa Rocha NP arrived, and the patient was administered the Lexiscan per procedure.  The patient felt a little light headed after the Lexiscan, which resolved with drinking some Coke.  She was given a snack and taken to x-ray in stable condition for stress images.  The saline lock will be removed by x-ray for proper disposal.  The patient was instructed that the ordering MD will call with results in one to two days. She has 2 follow up appointments with Urology. I gave her a card with times and dates, for them. Please see the chart for the complete test results.

## 2019-07-19 ENCOUNTER — TELEPHONE (OUTPATIENT)
Dept: INTERNAL MEDICINE | Facility: OTHER | Age: 84
End: 2019-07-19

## 2019-07-19 NOTE — TELEPHONE ENCOUNTER
"Request for Home Care Physical Therapy orders as follows:    PT eval and treat date:  6/21/19    Continuation frequency =  2 x week x __2__ weeks              Effective date = 7/22/19    Interventions include:    Therapeutic Exercise  Gait Training  Gait/Balance/Dysfunction  Home Exercise Program  Home Safety Assessment  Neuro Rehab  Manual Therapy  Ultrasound  Eval & Treat                                    Lymphedema          Wound Care     Acknowledging this order with an \"OK\" will suffice. Thank you for your time.  Please call if you have any questions or concerns.    Therapist: Felipa Mckeon, PT  "

## 2019-07-22 ENCOUNTER — ANESTHESIA EVENT (OUTPATIENT)
Dept: SURGERY | Facility: OTHER | Age: 84
End: 2019-07-22
Payer: MEDICARE

## 2019-07-22 ENCOUNTER — TELEPHONE (OUTPATIENT)
Dept: INTERNAL MEDICINE | Facility: OTHER | Age: 84
End: 2019-07-22

## 2019-07-22 NOTE — TELEPHONE ENCOUNTER
"Request for Home Care Occupational Therapy orders as follows:    OT eval and treat date:      Continuation frequency =  2 x week x __4__ weeks               Effective date = 7/22/19    Interventions include:    Therapeutic Exercise  ADL training  Adaptive equipment  Home safety assessment                                     Acknowledging this order with an \"OK\" will suffice. Thank you for your time.  Please call if you have any questions or concerns.    For therapist:Kamilla Sadler, OT  "

## 2019-07-23 ENCOUNTER — HOSPITAL ENCOUNTER (OUTPATIENT)
Dept: GENERAL RADIOLOGY | Facility: OTHER | Age: 84
End: 2019-07-23
Attending: UROLOGY | Admitting: UROLOGY
Payer: MEDICARE

## 2019-07-23 ENCOUNTER — ANESTHESIA (OUTPATIENT)
Dept: SURGERY | Facility: OTHER | Age: 84
End: 2019-07-23
Payer: MEDICARE

## 2019-07-23 ENCOUNTER — HOSPITAL ENCOUNTER (OUTPATIENT)
Facility: OTHER | Age: 84
Discharge: HOME | End: 2019-07-23
Attending: UROLOGY | Admitting: UROLOGY
Payer: MEDICARE

## 2019-07-23 VITALS
TEMPERATURE: 97.9 F | DIASTOLIC BLOOD PRESSURE: 77 MMHG | SYSTOLIC BLOOD PRESSURE: 164 MMHG | RESPIRATION RATE: 16 BRPM | HEART RATE: 62 BPM | OXYGEN SATURATION: 96 %

## 2019-07-23 DIAGNOSIS — Z85.51 HX OF BLADDER CANCER: ICD-10-CM

## 2019-07-23 PROCEDURE — 25000128 H RX IP 250 OP 636: Performed by: NURSE ANESTHETIST, CERTIFIED REGISTERED

## 2019-07-23 PROCEDURE — 52356 CYSTO/URETERO W/LITHOTRIPSY: CPT | Performed by: UROLOGY

## 2019-07-23 PROCEDURE — 37000008 ZZH ANESTHESIA TECHNICAL FEE, 1ST 30 MIN: Performed by: UROLOGY

## 2019-07-23 PROCEDURE — 25800025 ZZH RX 258: Performed by: UROLOGY

## 2019-07-23 PROCEDURE — 71000027 ZZH RECOVERY PHASE 2 EACH 15 MINS: Performed by: UROLOGY

## 2019-07-23 PROCEDURE — C1769 GUIDE WIRE: HCPCS | Performed by: UROLOGY

## 2019-07-23 PROCEDURE — C2617 STENT, NON-COR, TEM W/O DEL: HCPCS | Performed by: UROLOGY

## 2019-07-23 PROCEDURE — C1758 CATHETER, URETERAL: HCPCS | Performed by: UROLOGY

## 2019-07-23 PROCEDURE — 74420 UROGRAPHY RTRGR +-KUB: CPT | Mod: 26 | Performed by: UROLOGY

## 2019-07-23 PROCEDURE — 25000125 ZZHC RX 250: Performed by: NURSE ANESTHETIST, CERTIFIED REGISTERED

## 2019-07-23 PROCEDURE — 36000060 ZZH SURGERY LEVEL 3 W FLUORO 1ST 30 MIN: Performed by: UROLOGY

## 2019-07-23 PROCEDURE — 25800030 ZZH RX IP 258 OP 636: Performed by: NURSE ANESTHETIST, CERTIFIED REGISTERED

## 2019-07-23 PROCEDURE — 36000058 ZZH SURGERY LEVEL 3 EA 15 ADDTL MIN: Performed by: UROLOGY

## 2019-07-23 PROCEDURE — 52356 CYSTO/URETERO W/LITHOTRIPSY: CPT | Performed by: NURSE ANESTHETIST, CERTIFIED REGISTERED

## 2019-07-23 PROCEDURE — C1894 INTRO/SHEATH, NON-LASER: HCPCS | Performed by: UROLOGY

## 2019-07-23 PROCEDURE — 40000306 ZZH STATISTIC PRE PROC ASSESS II: Performed by: UROLOGY

## 2019-07-23 PROCEDURE — 74420 UROGRAPHY RTRGR +-KUB: CPT

## 2019-07-23 PROCEDURE — 37000009 ZZH ANESTHESIA TECHNICAL FEE, EACH ADDTL 15 MIN: Performed by: UROLOGY

## 2019-07-23 PROCEDURE — 27210794 ZZH OR GENERAL SUPPLY STERILE: Performed by: UROLOGY

## 2019-07-23 PROCEDURE — 25000128 H RX IP 250 OP 636: Performed by: UROLOGY

## 2019-07-23 PROCEDURE — 71000014 ZZH RECOVERY PHASE 1 LEVEL 2 FIRST HR: Performed by: UROLOGY

## 2019-07-23 PROCEDURE — 25500064 ZZH RX 255 OP 636: Performed by: UROLOGY

## 2019-07-23 PROCEDURE — 99100 ANES PT EXTEME AGE<1 YR&>70: CPT | Performed by: NURSE ANESTHETIST, CERTIFIED REGISTERED

## 2019-07-23 PROCEDURE — 76499 UNLISTED DX RADIOGRAPHIC PX: CPT

## 2019-07-23 DEVICE — STENT URETERAL CONTOUR SOFT PERCUFLEX 6FRX26CM: Type: IMPLANTABLE DEVICE | Site: KIDNEY | Status: FUNCTIONAL

## 2019-07-23 RX ORDER — ONDANSETRON 4 MG/1
4 TABLET, ORALLY DISINTEGRATING ORAL EVERY 30 MIN PRN
Status: DISCONTINUED | OUTPATIENT
Start: 2019-07-23 | End: 2019-07-23 | Stop reason: HOSPADM

## 2019-07-23 RX ORDER — ONDANSETRON 2 MG/ML
4 INJECTION INTRAMUSCULAR; INTRAVENOUS EVERY 30 MIN PRN
Status: DISCONTINUED | OUTPATIENT
Start: 2019-07-23 | End: 2019-07-23 | Stop reason: HOSPADM

## 2019-07-23 RX ORDER — LIDOCAINE HYDROCHLORIDE 20 MG/ML
INJECTION, SOLUTION INFILTRATION; PERINEURAL PRN
Status: DISCONTINUED | OUTPATIENT
Start: 2019-07-23 | End: 2019-07-23

## 2019-07-23 RX ORDER — LIDOCAINE 40 MG/G
CREAM TOPICAL
Status: DISCONTINUED | OUTPATIENT
Start: 2019-07-23 | End: 2019-07-23 | Stop reason: HOSPADM

## 2019-07-23 RX ORDER — SODIUM CHLORIDE 9 MG/ML
INJECTION, SOLUTION INTRAVENOUS CONTINUOUS
Status: DISCONTINUED | OUTPATIENT
Start: 2019-07-23 | End: 2019-07-23 | Stop reason: HOSPADM

## 2019-07-23 RX ORDER — PROPOFOL 10 MG/ML
INJECTION, EMULSION INTRAVENOUS PRN
Status: DISCONTINUED | OUTPATIENT
Start: 2019-07-23 | End: 2019-07-23

## 2019-07-23 RX ORDER — FENTANYL CITRATE 50 UG/ML
25-50 INJECTION, SOLUTION INTRAMUSCULAR; INTRAVENOUS
Status: DISCONTINUED | OUTPATIENT
Start: 2019-07-23 | End: 2019-07-23 | Stop reason: HOSPADM

## 2019-07-23 RX ORDER — ONDANSETRON 2 MG/ML
INJECTION INTRAMUSCULAR; INTRAVENOUS PRN
Status: DISCONTINUED | OUTPATIENT
Start: 2019-07-23 | End: 2019-07-23

## 2019-07-23 RX ORDER — SODIUM CHLORIDE, SODIUM LACTATE, POTASSIUM CHLORIDE, CALCIUM CHLORIDE 600; 310; 30; 20 MG/100ML; MG/100ML; MG/100ML; MG/100ML
INJECTION, SOLUTION INTRAVENOUS CONTINUOUS
Status: DISCONTINUED | OUTPATIENT
Start: 2019-07-23 | End: 2019-07-23 | Stop reason: HOSPADM

## 2019-07-23 RX ORDER — PROPOFOL 10 MG/ML
INJECTION, EMULSION INTRAVENOUS CONTINUOUS PRN
Status: DISCONTINUED | OUTPATIENT
Start: 2019-07-23 | End: 2019-07-23

## 2019-07-23 RX ORDER — OXYCODONE AND ACETAMINOPHEN 5; 325 MG/1; MG/1
1-2 TABLET ORAL EVERY 4 HOURS PRN
Status: DISCONTINUED | OUTPATIENT
Start: 2019-07-23 | End: 2019-07-23 | Stop reason: HOSPADM

## 2019-07-23 RX ORDER — NALOXONE HYDROCHLORIDE 0.4 MG/ML
.1-.4 INJECTION, SOLUTION INTRAMUSCULAR; INTRAVENOUS; SUBCUTANEOUS
Status: DISCONTINUED | OUTPATIENT
Start: 2019-07-23 | End: 2019-07-23 | Stop reason: HOSPADM

## 2019-07-23 RX ORDER — ACETAMINOPHEN 10 MG/ML
INJECTION, SOLUTION INTRAVENOUS PRN
Status: DISCONTINUED | OUTPATIENT
Start: 2019-07-23 | End: 2019-07-23

## 2019-07-23 RX ORDER — CEFTRIAXONE SODIUM 1 G/50ML
1 INJECTION, SOLUTION INTRAVENOUS
Status: COMPLETED | OUTPATIENT
Start: 2019-07-23 | End: 2019-07-23

## 2019-07-23 RX ADMIN — ONDANSETRON 4 MG: 2 INJECTION INTRAMUSCULAR; INTRAVENOUS at 09:40

## 2019-07-23 RX ADMIN — ACETAMINOPHEN 1000 MG: 10 INJECTION, SOLUTION INTRAVENOUS at 09:51

## 2019-07-23 RX ADMIN — LIDOCAINE HYDROCHLORIDE 40 MG: 20 INJECTION, SOLUTION INFILTRATION; PERINEURAL at 09:40

## 2019-07-23 RX ADMIN — PROPOFOL 160 MCG/KG/MIN: 10 INJECTION, EMULSION INTRAVENOUS at 09:43

## 2019-07-23 RX ADMIN — PROPOFOL 90 MG: 10 INJECTION, EMULSION INTRAVENOUS at 09:40

## 2019-07-23 RX ADMIN — SODIUM CHLORIDE: 9 INJECTION, SOLUTION INTRAVENOUS at 09:20

## 2019-07-23 RX ADMIN — CEFTRIAXONE SODIUM 1 G: 1 INJECTION, SOLUTION INTRAVENOUS at 09:40

## 2019-07-23 NOTE — ANESTHESIA PREPROCEDURE EVALUATION
Anesthesia Pre-Procedure Evaluation    Patient: Geno Horn   MRN: [id number] : 1926          Preoperative Diagnosis: kidney/Ureteral stone    Procedure(s):  COMBINED CYSTOSCOPY, URETEROSCOPY, LASER HOLMIUM LITHOTRIPSY URETER(S)    Past Medical History:   Diagnosis Date     Hypertension      Hypothyroid      Osteoarthritis      ST elevation (STEMI) myocardial infarction (H)     ; angioplasty     Past Surgical History:   Procedure Laterality Date     ARTHROPLASTY HIP Right     Dr Rodriguez     ARTHROPLASTY HIP Left     Dr. Rodriguez     CYSTOSCOPY, RETROGRADES, INSERT STENT URETER(S), COMBINED Right 2019    Procedure: CYSTOSCOPY, WITH RETROGRADE PYELOGRAM AND URETERAL STENT INSERTION;  Surgeon: Shawn Cole MD;  Location:  OR       Anesthesia Evaluation     . Pt has had prior anesthetic.     No history of anesthetic complications          ROS/MED HX    ENT/Pulmonary:  - neg pulmonary ROS     Neurologic:  - neg neurologic ROS     Cardiovascular:     (+) hypertension--CAD, --. : . . . :. .       METS/Exercise Tolerance:  4 - Raking leaves, gardening   Hematologic:  - neg hematologic  ROS       Musculoskeletal:  - neg musculoskeletal ROS       GI/Hepatic:  - neg GI/hepatic ROS       Renal/Genitourinary:     (+) Nephrolithiasis ,       Endo:     (+) thyroid problem hypothyroidism, .      Psychiatric:  - neg psychiatric ROS       Infectious Disease:  - neg infectious disease ROS       Malignancy:      - no malignancy   Other:    - neg other ROS                      Physical Exam  Normal systems: cardiovascular, pulmonary and dental    Airway   Mallampati: I  TM distance: >3 FB  Neck ROM: full    Dental     Cardiovascular   Rhythm and rate: regular and normal      Pulmonary    breath sounds clear to auscultation            Lab Results   Component Value Date    WBC 7.0 2019    HGB 10.8 (L) 2019    HCT 32.9 (L) 2019     2019    SED 22 (H) 10/12/2018     (L) 2019  "   POTASSIUM 3.8 07/03/2019    CHLORIDE 99 07/03/2019    CO2 28 07/03/2019    BUN 11 07/03/2019    CR 0.66 07/03/2019     (H) 07/03/2019    BARB 9.1 07/03/2019    MAG 2.0 02/26/2016    ALBUMIN 3.9 06/17/2019    PROTTOTAL 6.3 (L) 06/17/2019    ALT 34 06/17/2019    AST 54 (H) 06/17/2019    ALKPHOS 66 06/17/2019    BILITOTAL 1.2 (H) 06/17/2019    INR 1.10 06/17/2019    T4 0.61 01/23/2019       Preop Vitals  BP Readings from Last 3 Encounters:   07/17/19 130/64   07/10/19 122/70   07/03/19 112/60    Pulse Readings from Last 3 Encounters:   07/17/19 54   07/10/19 78   07/03/19 65      Resp Readings from Last 3 Encounters:   07/10/19 16   07/03/19 16   07/03/19 16    SpO2 Readings from Last 3 Encounters:   07/10/19 92%   07/03/19 98%   06/19/19 95%      Temp Readings from Last 1 Encounters:   07/10/19 97.8  F (36.6  C) (Temporal)    Ht Readings from Last 1 Encounters:   07/10/19 1.585 m (5' 2.4\")      Wt Readings from Last 1 Encounters:   07/10/19 54.7 kg (120 lb 8 oz)    Estimated body mass index is 21.76 kg/m  as calculated from the following:    Height as of 7/10/19: 1.585 m (5' 2.4\").    Weight as of 7/10/19: 54.7 kg (120 lb 8 oz).       Anesthesia Plan      History & Physical Review      ASA Status:  3 .    NPO Status:  > 6 hours    Plan for General and LMA with Intravenous induction. Maintenance will be Balanced.    PONV prophylaxis:  Ondansetron (or other 5HT-3)       Postoperative Care      Consents  Anesthetic plan, risks, benefits and alternatives discussed with:  Patient..                 LARISA AMBROSIO CRNA  "

## 2019-07-23 NOTE — OR NURSING
PACU Transfer Note    Geno Horn was transferred to DSU via cart.  Equipment used for transport:  None.  Accompanied by:  Mary Kate Garibay RN  Prescriptions were: None    PACU Respiratory Event Documentation     1) Episodes of Apnea greater than or equal to 10 seconds: 0    2) Bradypnea - less than 8 breaths per minute: 0    3) Pain score on 0 to 10 scale: 0    4) Pain-sedation mismatch (yes or no): No    5) Repeated 02 desaturation less than 90% (yes or no): No    Anesthesia notified? (yes or no): No    Any of the above events occuring repeatedly in separate 30 minute intervals may be considered recurrent PACU respiratory events.    Patient stable and meets phase 1 discharge criteria for transport from PACU.

## 2019-07-23 NOTE — DISCHARGE INSTRUCTIONS
[facility]  Adult Discharge Orders & Instructions      For 24 hours after surgery:  1. Get plenty of rest.  A responsible adult must stay with you for at least 24 hours after you leave the hospital.   2. You may feel lightheaded.  IF so, sit for a few minutes before standing.  Have someone help you get up.   3. You may have a slight fever. Call the doctor if your fever is over 101 F (38.3 C) (taken under the tongue) or lasts longer than 24 hours.  4. You may have a dry mouth, a sore throat, muscle aches or trouble sleeping.  These should go away after 24 hours.  5. Do not make important or legal decisions.  6.   Do not drive or use heavy equipment.  If you have weakness or tingling, don't drive or use heavy equipment until this feeling goes away.                                                                                                                                                                         To contact a doctor, call    [phone]______________

## 2019-07-23 NOTE — OP NOTE
Preoperative diagnosis  Right  ureteral stone    Postoperative diagnosis  Right  ureteral stone    Procedure performed  Right  ureteroscopy with holmium-YAG laser lithotripsy and basket extraction of stone fragments  Cystoscopy with right retrograde pyelogram and ureteral stent placement  Interpretation of retrograde    Surgeon  Shawn Cole MD    Surgeon(s)/Proceduralist(s) and Assistants (if any)  Surgeon(s):  Shawn Cole MD  Circulator: Adri Molina RN  : Loulou Pappas  Scrub Person: Carrie Small RN  2nd Scrub: Curtis Laura  Pre-Op Nurse: Felicita Maza RN    Specimen(s)  Yes  Stone fragments for biochemical analysis    (EBL) Estimated blood loss (ml)  0    Anesthesia  General    Complications  None    Findings  Cystoscopy revealed no tumors, stones or other mucosal abnormalities.  Retrograde pyelogram revealed a delicate system with identification of the stones seen on imaging.    Indications  89 year old female agreed to undergo the above named procedure after discussion of the alternatives, risks and benefits.    Informed consent was obtained.      Procedure  The patient was taken to the operating room and placed supine on the operating table.  Pre-operative antibiotics were administered.  Bilateral lower extremity SCDs were placed.  After induction of general anesthesia the patient was positioned in dorsal lithotomy, prepped and draped in a sterile fashion.  A time-out was performed.      I passed a 14 Greek flexible cystoscope carefully via urethra into the bladder.  The right ureteral orifice was identified and a Sensor wire was passed retrograde to the level of the kidney and confirmed by fluoroscopy.  The flexible scope was off-loaded and the bladder emptied with a straight catheter.  An 8-10 coaxial dilator was passed without difficulty and then removed.  The MR6A semirigid ureteroscope was passed carefully along the Sensor wire through the urethra and into the  distal ureter.  The stone was encountered and fragmented with a 200-micron holmium YAG laser fiber at laser settings of 0.8 joules and a frequency of 8 Hz.  The fragments were basket extracted.  I performed a retrograde pyelogram through the semirigid scope then removed the scope over a Superstff wire which was passed to the level of the kidney confirmed by fluoroscopy.  The ureteroscope was withdrawn.      A 11-13, 36-cm access sheath was advanced over the super-stiff wire to level of the UPJ under direct fluoroscopic guidance. The inner stylet and super-stiff wire were removed.  The kidney was entered with the Olympus URF-P6 flexible ureteroscope.  The kidney stone was identified and fragmented with a 200-micron holmium YAG laser fiber at laser settings of 0.8 joules and a frequency of 8 Hz. The fragments were basket extracted. At the completion of the procedure, all clinically significant fragments were removed and only dust-like fragments remained.  The access sheath was removed under direct vision.  Ureteral edema but no obvious obstruction was present.  A 5-Ukrainian catheter was passed over the safety wire and the wire withdrawn.   Contrast was injected into the renal pelvis via the 5-Ukrainian open-ended catheter.  A super-stiff wire was placed and confirmed by fluoroscopy.  A 6 x 26 Ukrainian stent was positioned with the upper end in the upper pole and the lower in the bladder confirmed by fluoroscopy. The bladder was emptied and the procedure was complete. The patient tolerated the procedure well and was stable throughout    Plan  Follow up in clinic for stent pull in the next week or so.

## 2019-07-23 NOTE — ANESTHESIA POSTPROCEDURE EVALUATION
Patient: Geno Horn    Procedure(s):  CYSTOSCOPY, RIGHT STENT REMOVAL, URETEROSCOPY, LASER HOLMIUM LITHOTRIPSY RIGHT URETER WITH STENT REPLACEMENT    Diagnosis:kidney/Ureteral stone  Diagnosis Additional Information: No value filed.    Anesthesia Type:  General, LMA    Note:  Anesthesia Post Evaluation    Patient location during evaluation: PACU  Patient participation: Able to fully participate in evaluation  Level of consciousness: awake and alert  Pain management: adequate  Airway patency: patent  Cardiovascular status: acceptable  Respiratory status: acceptable  Hydration status: acceptable  PONV: none     Anesthetic complications: None          Last vitals:  Vitals:    07/23/19 1052 07/23/19 1100 07/23/19 1115   BP: 154/86 122/68 164/77   Pulse: 62 62 62   Resp: 16 16 16   Temp: 96.9  F (36.1  C)  97.9  F (36.6  C)   SpO2: 97%           Electronically Signed By: LARISA Lindsey CRNA  July 23, 2019  12:24 PM

## 2019-07-23 NOTE — ANESTHESIA CARE TRANSFER NOTE
Patient: Geno Horn    Procedure(s):  CYSTOSCOPY, RIGHT STENT REMOVAL, URETEROSCOPY, LASER HOLMIUM LITHOTRIPSY RIGHT URETER WITH STENT REPLACEMENT    Diagnosis: kidney/Ureteral stone  Diagnosis Additional Information: No value filed.    Anesthesia Type:   General, LMA     Note:  Airway :Face Mask  Patient transferred to:PACU  Handoff Report: Identifed the Patient, Identified the Reponsible Provider, Reviewed the pertinent medical history, Discussed the surgical course, Reviewed Intra-OP anesthesia mangement and issues during anesthesia, Set expectations for post-procedure period and Allowed opportunity for questions and acknowledgement of understanding      Vitals: (Last set prior to Anesthesia Care Transfer)    CRNA VITALS  7/23/2019 0951 - 7/23/2019 1024      7/23/2019             Resp Rate (set):  10                Electronically Signed By: LARISA Lindsey CRNA  July 23, 2019  10:24 AM

## 2019-07-31 ENCOUNTER — OFFICE VISIT (OUTPATIENT)
Dept: UROLOGY | Facility: OTHER | Age: 84
End: 2019-07-31
Attending: UROLOGY
Payer: MEDICARE

## 2019-07-31 VITALS — WEIGHT: 118.6 LBS | HEART RATE: 76 BPM | BODY MASS INDEX: 21.41 KG/M2 | RESPIRATION RATE: 12 BRPM

## 2019-07-31 DIAGNOSIS — N20.0 KIDNEY STONES: Primary | ICD-10-CM

## 2019-07-31 PROCEDURE — 52310 CYSTOSCOPY AND TREATMENT: CPT | Performed by: UROLOGY

## 2019-07-31 PROCEDURE — G0463 HOSPITAL OUTPT CLINIC VISIT: HCPCS | Mod: 25

## 2019-07-31 PROCEDURE — 99212 OFFICE O/P EST SF 10 MIN: CPT | Mod: 25 | Performed by: UROLOGY

## 2019-07-31 ASSESSMENT — PAIN SCALES - GENERAL: PAINLEVEL: MODERATE PAIN (5)

## 2019-07-31 NOTE — PATIENT INSTRUCTIONS
Home Care after Cystoscopy  Follow these guidelines for your care after your procedure.    Activity  No limitations    Bathing or showering  No limitations    Symptoms  You may notice some burning with urination but this usually resolves after 1-2 days.  You may also notice small amounts of blood in your urine.  Please increase water intake for the next few days to help with these symptoms.    Contacts  General Questions: [phone]  Appointments:  [phone]  Emergencies:  [phone]    When to call the clinic  If you develop any of the following symptoms please call the clinic immediately.  If the clinic is closed please be seen at an urgent care clinic or the Emergency Department.  - Burning with urination that worsens after 2 days  - Unable to urinate causing severe pelvic pain  - Fevers of greater than 101 degrees F  - Flank pain that is not responding to pain medication    Follow up  Please follow up as discussed at the appointment.      Please follow the below generic recommendations to help prevent stones.    If you are interested in undergoing a work up (including blood and urine tests) to determine your patient specific factors for why you form stones and ways to specifically prevent stones in the future, ask Dr Cole if he hasn't already discussed this with you.      Fluids (Increase)  Please increase your fluid intake   Recommend about ten 10-ounce glasses of fluid per day (avoid dark paulina).  Please try and keep your urine clear or pale yellow.    If it is dark yellow or cloudy it is concentrated and you need to drink more fluid.  Try drinking a tall glass of water prior to every snack/meal.    Citrate (Increase)  Citrate prevents stone formation and is naturally found in urine.    It can be increased by adding concentrated lemon juice (4 ounces daily) and/or drinking diluted orange juice (50/50 with water).    Both MinuteMaid Light and Crystal Light also contain citrate and can be helpful for stone  prevention.    If you plan to drink sodas, I would recommend Diet/Sunkist and/or Diet/7UP as they contain the most citrate (which is good) compared to the paulina such as Coke/Pepsi.      Salt (Decrease)  Try to limit salt intake.  Total daily sodium intake should be less than 1500mg.  If you use salt with cooking don't add any additional salt at the table.    Protein  Limit protein intake to small to moderate portions.  For instance, a steak should be no larger than about the size of a deck of cards.  High protein intake leads to stone formation.

## 2019-07-31 NOTE — PROGRESS NOTES
Preoperative diagnosis  Nephrolithiasis    Postoperative diagnosis  Nephrolithiasis    Procedure  Flexible cystourethroscopy with stent removal    Surgeon  Shawn Cole MD    Anesthesia  2% lidocaine jelly intraurethrally    Complications  None    Indications  89 year old female who is status post ureteroscopy with holmium laser lithotripsy who presents for stent removal.    Procedure  The patient was given one dose of antibiotics. The patient was placed in supine position and was prepped and draped in sterile fashion.  2% lidocaine jelly was bluntly injected per urethra without difficulty. I passed the 14 French flexible cystoscope through the urethra and into the bladder.  With the aid of a stent grasper I grasped and removed the stent in its entirety.  The patient tolerated the procedure well.    Plan  Discussed generic dietary approach to stone prevention- provided hand out  F/u as needed              I spent 10 minutes on this patient's visit (exclusive of separately billed services/procedures) and over half of this time was spent in face-to-face counseling regarding stone prevention:  Prevention strategies with fluids and diet, rationale for a metabolic work up, prognosis and importance of compliance.

## 2019-07-31 NOTE — NURSING NOTE
Cefuroxime 500mg tablet (2-250mg tablets with same lot # and expiration date) by mouth one time now ordered by Shawn Cole MD.  Medication administered per verbal order   Lot # [id number]  Exp. 2/28/2020  Patient tolerated well.  Milagro Hobson RN......July 31, 2019...9:08 AM

## 2019-07-31 NOTE — NURSING NOTE
Patient positioned in frog leg position prior to Shawn Cole MD prepping patient with chlorhexidine gluconate cleanser.    [facility] Protocol    A. Pre-procedure verification complete Yes   1-relevant information / documentation available, reviewed and properly matched to the patient; 2-consent accurate and complete, 3-equipment and supplies available    B. Site marking complete N/A  Site marked if not in continuous attendance with patient    C. TIME OUT completed Yes  Time Out was conducted just prior to starting procedure to verify the eight required elements: 1-patient identity, 2-consent accurate and complete, 3-position, 4-correct side/site marked (if applicable), 5-procedure, 6-relevant images / results properly labeled and displayed (if applicable), 7-antibiotics / irrigation fluids (if applicable), 8-safety precautions.    After procedure perineum area rinsed. Discharge instructions reviewed with patient. Patient verbalized understanding of discharge instructions and discharged ambulatory.  Milagro Hobson..................7/31/2019  9:42 AM

## 2019-08-08 ENCOUNTER — TELEPHONE (OUTPATIENT)
Dept: INTERNAL MEDICINE | Facility: OTHER | Age: 84
End: 2019-08-08

## 2019-08-08 NOTE — TELEPHONE ENCOUNTER
"Request for Home Care Physical Therapy orders as follows:    PT eval and treat date:      Continuation frequency =  2 x week x __2__ weeks              Effective date = 8/5/19    Interventions include:    Therapeutic Exercise  Gait Training  Gait/Balance/Dysfunction  Home Exercise Program  Home Safety Assessment  Manual Therapy      Acknowledging this order with an \"OK\" will suffice. Thank you for your time.  Please call if you have any questions or concerns.    Therapist: Felipa Mckeon, PT  "

## 2019-08-12 DIAGNOSIS — E03.9 HYPOTHYROIDISM, UNSPECIFIED TYPE: ICD-10-CM

## 2019-08-14 RX ORDER — LEVOTHYROXINE SODIUM 88 UG/1
TABLET ORAL
Qty: 90 TABLET | Refills: 2 | Status: SHIPPED | OUTPATIENT
Start: 2019-08-14 | End: 2020-02-17

## 2019-08-14 NOTE — TELEPHONE ENCOUNTER
Prescription approved per [facility] [place] [place] Refill Protocol.  LOV: 7/10/19  Last TSH: 6/17/19  Mady Richmond RN on 8/14/2019 at 2:54 PM

## 2019-08-15 ENCOUNTER — MEDICAL CORRESPONDENCE (OUTPATIENT)
Dept: HEALTH INFORMATION MANAGEMENT | Facility: OTHER | Age: 84
End: 2019-08-15

## 2019-08-19 ENCOUNTER — HOSPITAL ENCOUNTER (OUTPATIENT)
Dept: GENERAL RADIOLOGY | Facility: OTHER | Age: 84
Discharge: HOME | End: 2019-08-19
Attending: NURSE PRACTITIONER | Admitting: NURSE PRACTITIONER
Payer: MEDICARE

## 2019-08-19 ENCOUNTER — OFFICE VISIT (OUTPATIENT)
Dept: INTERNAL MEDICINE | Facility: OTHER | Age: 84
End: 2019-08-19
Attending: NURSE PRACTITIONER
Payer: MEDICARE

## 2019-08-19 VITALS
DIASTOLIC BLOOD PRESSURE: 78 MMHG | TEMPERATURE: 97.5 F | RESPIRATION RATE: 16 BRPM | WEIGHT: 122.4 LBS | SYSTOLIC BLOOD PRESSURE: 128 MMHG | BODY MASS INDEX: 22.1 KG/M2 | HEART RATE: 76 BPM

## 2019-08-19 DIAGNOSIS — R26.81 UNSTEADY GAIT: ICD-10-CM

## 2019-08-19 DIAGNOSIS — G89.29 CHRONIC MIDLINE LOW BACK PAIN WITHOUT SCIATICA: ICD-10-CM

## 2019-08-19 DIAGNOSIS — L98.9 SKIN LESION: ICD-10-CM

## 2019-08-19 DIAGNOSIS — M54.50 CHRONIC MIDLINE LOW BACK PAIN WITHOUT SCIATICA: ICD-10-CM

## 2019-08-19 DIAGNOSIS — N39.46 MIXED STRESS AND URGE URINARY INCONTINENCE: Primary | ICD-10-CM

## 2019-08-19 DIAGNOSIS — R32 URINARY INCONTINENCE, UNSPECIFIED TYPE: ICD-10-CM

## 2019-08-19 PROCEDURE — 72100 X-RAY EXAM L-S SPINE 2/3 VWS: CPT

## 2019-08-19 PROCEDURE — 99214 OFFICE O/P EST MOD 30 MIN: CPT | Performed by: NURSE PRACTITIONER

## 2019-08-19 PROCEDURE — G0463 HOSPITAL OUTPT CLINIC VISIT: HCPCS

## 2019-08-19 PROCEDURE — G0463 HOSPITAL OUTPT CLINIC VISIT: HCPCS | Mod: 25

## 2019-08-19 RX ORDER — DIAPER,BRIEF,ADULT, DISPOSABLE
1 EACH MISCELLANEOUS DAILY
Qty: 30 EACH | Refills: 11 | Status: SHIPPED | OUTPATIENT
Start: 2019-08-19 | End: 2021-09-02

## 2019-08-19 ASSESSMENT — PAIN SCALES - GENERAL: PAINLEVEL: MODERATE PAIN (5)

## 2019-08-19 NOTE — PROGRESS NOTES
Subjective:  She is here today for follow-up.  She has mixed incontinence.  She needs incontinence supplies.  She is decided not to have Botox injections.  She recently had a kidney stone that was removed through urology.  She does not have right flank pain any longer but has chronic low back pain.  Her daughter is concerned she has arthritis and is requesting an x-ray.  She does not take anything for pain except for some acetaminophen.  She uses a cane to assist with ambulation.  She reports the pain is located across the low back and does not go down into the buttocks or down the back of her legs.  Also has a skin lesion on the back of her neck which is been there quite some time.  She seen Dr. Phillip in the past who did not feel that it needed to be removed however this continues to bother her.  It open spontaneously and then closes completely.    Patient Active Problem List   Diagnosis     Allergic rhinitis     Coronary artery disease involving native coronary artery of native heart without angina pectoris     H/O basal cell carcinoma excision     Hearing loss     Hypertension     Hypothyroidism     Osteoarthritis of both hips     Urinary urgency     Unsteady gait     Sepsis (H)     Past Medical History:   Diagnosis Date     Hypertension      Hypothyroid      Osteoarthritis      ST elevation (STEMI) myocardial infarction (H)     1981; angioplasty     Past Surgical History:   Procedure Laterality Date     ARTHROPLASTY HIP Right     Dr Rodriguez     ARTHROPLASTY HIP Left 2015    Dr. Rodriguez     COMBINED CYSTOSCOPY, URETEROSCOPY, LASER HOLMIUM LITHOTRIPSY URETER(S) Right 7/23/2019    Procedure: CYSTOSCOPY, RIGHT STENT REMOVAL, URETEROSCOPY, LASER HOLMIUM LITHOTRIPSY RIGHT URETER WITH STENT REPLACEMENT;  Surgeon: Shawn Cole MD;  Location:  OR     CYSTOSCOPY, RETROGRADES, INSERT STENT URETER(S), COMBINED Right 6/17/2019    Procedure: CYSTOSCOPY, WITH RETROGRADE PYELOGRAM AND URETERAL STENT INSERTION;  Surgeon: Douglas  Shawn CAMPBELL MD;  Location:  OR     Social History     Socioeconomic History     Marital status:      Spouse name: Not on file     Number of children: Not on file     Years of education: Not on file     Highest education level: Not on file   Occupational History     Not on file   Social Needs     Financial resource strain: Not on file     Food insecurity:     Worry: Not on file     Inability: Not on file     Transportation needs:     Medical: Not on file     Non-medical: Not on file   Tobacco Use     Smoking status: Never Smoker     Smokeless tobacco: Never Used   Substance and Sexual Activity     Alcohol use: Not Currently     Alcohol/week: 0.0 oz     Drug use: No     Sexual activity: Not Currently     Partners: Male     Birth control/protection: Post-menopausal   Lifestyle     Physical activity:     Days per week: Not on file     Minutes per session: Not on file     Stress: Not on file   Relationships     Social connections:     Talks on phone: Not on file     Gets together: Not on file     Attends Confucianist service: Not on file     Active member of club or organization: Not on file     Attends meetings of clubs or organizations: Not on file     Relationship status: Not on file     Intimate partner violence:     Fear of current or ex partner: Not on file     Emotionally abused: Not on file     Physically abused: Not on file     Forced sexual activity: Not on file   Other Topics Concern     Parent/sibling w/ CABG, MI or angioplasty before 65F 55M? Not Asked   Social History Narrative    , lives in [place].  Previously from [place], [place].  Worked in drug store,  was a pharmacist.     Family History   Problem Relation Age of Onset     Heart Disease Mother 60         of mi     Heart Disease Father 60         of mi     Heart Disease Brother      Heart Disease Brother      Current Outpatient Medications   Medication Sig Dispense Refill     Incontinence Supply Disposable (POISE PAD) PADS 1  Units daily 30 each 11     order for DME Equipment being ordered: Spikes for cane 1 Units 1     order for DME Equipment being ordered: plastic underpants 1 Units 1     acetaminophen (TYLENOL) 500 MG tablet Take 500 mg by mouth every 6 hours as needed for mild pain       aspirin 81 MG chewable tablet Take 81 mg by mouth daily with food       diltiazem ER (DILT-XR) 180 MG 24 hr capsule Take 1 capsule (180 mg) by mouth daily 90 capsule 3     levothyroxine (SYNTHROID/LEVOTHROID) 88 MCG tablet TAKE 1 TABLET (88 MCG) BY MOUTH DAILY 90 tablet 2     Tolterodine      Review of Systems:  Review of Systems  See HPI  Objective:   /78 (BP Location: Right arm, Patient Position: Sitting, Cuff Size: Adult Regular)   Pulse 76   Temp 97.5  F (36.4  C) (Tympanic)   Resp 16   Wt 55.5 kg (122 lb 6.4 oz)   BMI 22.10 kg/m    Physical Exam  Pleasant elderly female no acute distress.  Affect normal.  Alert and oriented x4.  Skin color pink.  Mucous memories moist.  Neck supple without adenopathy.  Lung fields clear to auscultation throughout.  Cardiovascular regular rate and rhythm.  Abdomen is without tenderness.  No CVA tenderness.  There is tenderness with palpation to the umbo sacral spine along the L4-L5 and S1 region.  No sacroiliac tenderness.  At the back of the neck there is a raised slightly ulcerated lesion with serosanguineous drainage.    Assessment:    ICD-10-CM    1. Mixed stress and urge urinary incontinence N39.46 Incontinence Supply Disposable (POISE PAD) PADS   2. Unsteady gait R26.81 order for DME   3. Urinary incontinence, unspecified type R32 order for DME   4. Chronic midline low back pain without sciatica M54.5 XR Lumbar Spine 2/3 Views    G89.29    5. Skin lesion L98.9 GENERAL SURG ADULT REFERRAL       Plan:   1.  Incontinence supplies renewed.  Supplies for her walking cane also renewed.  2.  She is decided not to have Botox injections for the bladder.  3.  Will obtain x-ray of the lumbosacral spine.   Likely has arthritis and some spinal stenosis.  She has no symptoms of nerve entrapment.  This should be treated with acetaminophen.  She has done physical therapy.  4.  She has a skin lesion on the back of her neck.  It is having some bloody drainage at this time.  Refer to surgeon to have this removed.    DRU Salcedo   8/19/2019  8:36 AM

## 2019-08-19 NOTE — NURSING NOTE
Chief Complaint   Patient presents with     Back Pain     WOUND CARE     neck       Medication Reconciliation: complete  Val Cardenas LPN  ..................8/19/2019   8:04 AM

## 2019-08-20 ENCOUNTER — TELEPHONE (OUTPATIENT)
Dept: INTERNAL MEDICINE | Facility: OTHER | Age: 84
End: 2019-08-20

## 2019-08-20 NOTE — TELEPHONE ENCOUNTER
----- Message from Fransisca Rocha NP sent at 8/19/2019 10:39 AM CDT -----  Please call and let her know that she has moderate arthritis of the lower back

## 2019-08-26 ENCOUNTER — OFFICE VISIT (OUTPATIENT)
Dept: SURGERY | Facility: OTHER | Age: 84
End: 2019-08-26
Attending: NURSE PRACTITIONER
Payer: MEDICARE

## 2019-08-26 VITALS
RESPIRATION RATE: 20 BRPM | SYSTOLIC BLOOD PRESSURE: 110 MMHG | DIASTOLIC BLOOD PRESSURE: 70 MMHG | WEIGHT: 124 LBS | BODY MASS INDEX: 22.39 KG/M2 | HEART RATE: 65 BPM | TEMPERATURE: 97 F

## 2019-08-26 DIAGNOSIS — L98.9 SKIN LESION: Primary | ICD-10-CM

## 2019-08-26 PROCEDURE — 12042 INTMD RPR N-HF/GENIT2.6-7.5: CPT | Performed by: SURGERY

## 2019-08-26 PROCEDURE — 88305 TISSUE EXAM BY PATHOLOGIST: CPT

## 2019-08-26 PROCEDURE — G0463 HOSPITAL OUTPT CLINIC VISIT: HCPCS

## 2019-08-26 PROCEDURE — 11424 EXC H-F-NK-SP B9+MARG 3.1-4: CPT | Performed by: SURGERY

## 2019-08-26 PROCEDURE — 11622 EXC S/N/H/F/G MAL+MRG 1.1-2: CPT | Mod: 51 | Performed by: SURGERY

## 2019-08-26 ASSESSMENT — PAIN SCALES - GENERAL: PAINLEVEL: NO PAIN (0)

## 2019-08-26 NOTE — PROGRESS NOTES
Procedure Note     Pre/Post Operative Diagnosis:   Posterior neck skin lesion     Procedure:    Excision of  Posterior neck skin lesion      Surgeon: RAFAELA Cruz MD     Local Anesthesia: 1% lidocaine with0.25%Marcaine with epinephrine    Indication for the procedure:    This is a 89 year old female patient with posterior neck skin lesion. This presents as a non-healing wound that bleeds. No history of skin lesion excision or skin cancer.   After explaining the risks to include bleeding, infection, recurrence or need for re-excision, and scarring the patient wished to proceed.    Procedure:   The area was prepped and draped in usual sterile fashion with ChloraPrep. After adequate local anesthesia, an elliptical skin incision was made to encompass the lesion, 3.1cm x 1.8 cm with margins. The subcutaneous tissues were reapproximated with 3-0 vicryl in inverted interrupted fashion. The skin was closed with 4-0 monocryl suture in a running fashion.  Dermabond was applied.     Plan:  The patient will be called with pathology results.  Patient will followup if there any problems with the wound including redness or drainage.      RAFAELA Cruz MD           
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

## 2019-08-26 NOTE — PATIENT INSTRUCTIONS
Your incision was closed with stitches that will dissolve.      The stitches will take about 6 weeks to fully dissolve.      The wound is covered with a glue.  Do not put any gel (ie: neosporin, polysporin) on the wound.    It is ok to get the incision wet in the shower on the day after your procedure.     Don't soak in a tub, pool or lake for 14 days.

## 2019-08-26 NOTE — NURSING NOTE
"Chief Complaint   Patient presents with     Lesion Removal     from the back of her neck       Initial /70 (BP Location: Right arm, Patient Position: Sitting, Cuff Size: Adult Regular)   Pulse 65   Temp 97  F (36.1  C) (Temporal)   Resp 20   Wt 56.2 kg (124 lb)   Breastfeeding? No   BMI 22.39 kg/m   Estimated body mass index is 22.39 kg/m  as calculated from the following:    Height as of 7/10/19: 1.585 m (5' 2.4\").    Weight as of this encounter: 56.2 kg (124 lb).  Medication Reconciliation: complete  TIMEOUT  Universal Protocol    A. Pre-procedure verification complete: yes   1-relevant information / documentation available, reviewed and properly matched to the patient; 2-consent accurate and complete, 3-equipment and supplies available    B. Site marking complete: N/A  Site marked if not in continuous attendance with patient    C. TIME OUT completed:  Yes  Time Out was conducted just prior to starting procedure to verify the eight required elements: 1-patient identity, 2-consent accurate and complete, 3-position, 4-correct side/site marked (if applicable), 5-procedure, 6-relevant images / results properly labeled and displayed (if applicable), 7-antibiotics / irrigation fluids (if applicable), 8-safety precautions.    Usha Warren LPN  "

## 2019-09-25 ENCOUNTER — TELEPHONE (OUTPATIENT)
Dept: INTERNAL MEDICINE | Facility: OTHER | Age: 84
End: 2019-09-25

## 2019-09-25 DIAGNOSIS — M54.50 CHRONIC MIDLINE LOW BACK PAIN WITHOUT SCIATICA: Primary | ICD-10-CM

## 2019-09-25 DIAGNOSIS — G89.29 CHRONIC MIDLINE LOW BACK PAIN WITHOUT SCIATICA: Primary | ICD-10-CM

## 2019-09-25 RX ORDER — CELECOXIB 100 MG/1
100 CAPSULE ORAL DAILY
Qty: 90 CAPSULE | Refills: 0 | Status: SHIPPED | OUTPATIENT
Start: 2019-09-25 | End: 2019-11-15 | Stop reason: SINTOL

## 2019-09-25 NOTE — TELEPHONE ENCOUNTER
Patient called stating her back pain is still as bas as before and wondering if Celebrex is something she should/could try.     Please call patient back at [phone]

## 2019-09-25 NOTE — TELEPHONE ENCOUNTER
Prescription was sent over, side effects of Celexa will be on package insert.  She can always schedule an appointment with me and we can discuss further.

## 2019-09-25 NOTE — TELEPHONE ENCOUNTER
Patient is concerned over the side effects. Patient is wanting something to take care of her arthritis but unsure of what.   Patient would like to try the lowest dose possible and also a small quanity as a trial.  Patient uses [facility]. Val Cardenas LPN .............9/25/2019  3:09 PM

## 2019-11-01 ENCOUNTER — TELEPHONE (OUTPATIENT)
Dept: INTERNAL MEDICINE | Facility: OTHER | Age: 84
End: 2019-11-01

## 2019-11-01 NOTE — TELEPHONE ENCOUNTER
Contacted the patient she reports that she would like to get physical therapy for her lower back due to arthritis. The order has been teed up below.  Azra Villafana LPN on 11/1/2019 at 9:09 AM

## 2019-11-01 NOTE — TELEPHONE ENCOUNTER
Tried to contact the patients EC and no answer and no voicemail to leave a message.  Azra Villafana LPN on 11/1/2019 at 8:56 AM

## 2019-11-04 NOTE — TELEPHONE ENCOUNTER
I think that would be fine however she likely will need an appointment with me so that we can get this set up

## 2019-11-04 NOTE — TELEPHONE ENCOUNTER
Patient notified and transferred to scheduling.  Gely Piper LPN........................11/4/2019  8:38 AM

## 2019-11-15 ENCOUNTER — OFFICE VISIT (OUTPATIENT)
Dept: INTERNAL MEDICINE | Facility: OTHER | Age: 84
End: 2019-11-15
Attending: NURSE PRACTITIONER
Payer: MEDICARE

## 2019-11-15 VITALS
OXYGEN SATURATION: 98 % | DIASTOLIC BLOOD PRESSURE: 80 MMHG | HEART RATE: 70 BPM | SYSTOLIC BLOOD PRESSURE: 130 MMHG | TEMPERATURE: 97.4 F | BODY MASS INDEX: 22.12 KG/M2 | WEIGHT: 122.5 LBS | RESPIRATION RATE: 16 BRPM

## 2019-11-15 DIAGNOSIS — R32 URINARY INCONTINENCE, UNSPECIFIED TYPE: ICD-10-CM

## 2019-11-15 DIAGNOSIS — Z23 NEED FOR IMMUNIZATION AGAINST INFLUENZA: ICD-10-CM

## 2019-11-15 DIAGNOSIS — R26.81 UNSTEADY GAIT: Primary | ICD-10-CM

## 2019-11-15 DIAGNOSIS — H90.6 MIXED HEARING LOSS, BILATERAL: ICD-10-CM

## 2019-11-15 DIAGNOSIS — Z23 NEED FOR PNEUMOCOCCAL VACCINATION: ICD-10-CM

## 2019-11-15 PROCEDURE — 99214 OFFICE O/P EST MOD 30 MIN: CPT | Performed by: NURSE PRACTITIONER

## 2019-11-15 PROCEDURE — 90662 IIV NO PRSV INCREASED AG IM: CPT

## 2019-11-15 PROCEDURE — G0463 HOSPITAL OUTPT CLINIC VISIT: HCPCS | Mod: 25

## 2019-11-15 PROCEDURE — 69209 REMOVE IMPACTED EAR WAX UNI: CPT | Mod: 50 | Performed by: NURSE PRACTITIONER

## 2019-11-15 PROCEDURE — 90670 PCV13 VACCINE IM: CPT

## 2019-11-15 PROCEDURE — G0463 HOSPITAL OUTPT CLINIC VISIT: HCPCS

## 2019-11-15 PROCEDURE — G0008 ADMIN INFLUENZA VIRUS VAC: HCPCS

## 2019-11-15 PROCEDURE — G0009 ADMIN PNEUMOCOCCAL VACCINE: HCPCS

## 2019-11-15 ASSESSMENT — ENCOUNTER SYMPTOMS
SHORTNESS OF BREATH: 0
LIGHT-HEADEDNESS: 0
BACK PAIN: 1
CHEST TIGHTNESS: 0

## 2019-11-15 ASSESSMENT — PAIN SCALES - GENERAL: PAINLEVEL: EXTREME PAIN (8)

## 2019-11-15 NOTE — NURSING NOTE
The bilateral canal was irrigated with body-temperature tap water with the jet of water directed superiorly.  The ear canal was then re-examined and cleared of the impaction.  The patient tolerated the procedure well.   Val Cardenas LPN .............11/15/2019  9:34 AM

## 2019-11-15 NOTE — NURSING NOTE
Chief Complaint   Patient presents with     Back Pain       Medication Reconciliation: complete  Val Cardenas LPN  ..................11/15/2019   8:37 AM

## 2019-11-15 NOTE — PROGRESS NOTES
Immunization Documentation    Prior to Immunization administration, verified patients identity using patient's name and date of birth. Please see IMMUNIZATIONS  and order for additional information.  Patient / Parent instructed to remain in clinic for 15 minutes and report any adverse reaction to staff immediately.    Was entire vial of medication used? Yes  Vial/Syringe: Syringe    Val Cardenas LPN  11/15/2019   9:36 AM

## 2019-11-15 NOTE — PROGRESS NOTES
Subjective:  She is here today with a few concerns.  She has a chronic unsteady gait.  She has chronic low back and hip pain.  She did work with physical therapy last year and did find it effective.  She is found that her balance has become more poor.  He would like to physical therapy once again.  She has not had any falls or injuries.  She uses a cane for ambulation.  She also states she stopped Celebrex as she felt lightheaded when taking it.  Also has urinary incontinence.  She is trying to decide if she should have Botox.  She is concerned of the potential side effect of urinary retention.  She is planning to discuss this further with Dr. Cole.  She also has reduction of her hearing.  She is noticed this over the past several months.  She does wear hearing aids.  She is concerned that she needs her ears clears.  No pain or drainage from the ears.  She is in need of influenza vaccine and Prevnar 13.  Has not had Tdap nor Shingrix.      Patient Active Problem List   Diagnosis     Allergic rhinitis     Coronary artery disease involving native coronary artery of native heart without angina pectoris     H/O basal cell carcinoma excision     Hearing loss     Hypertension     Hypothyroidism     Osteoarthritis of both hips     Urinary urgency     Unsteady gait     Sepsis (H)     Past Medical History:   Diagnosis Date     Hypertension      Hypothyroid      Osteoarthritis      ST elevation (STEMI) myocardial infarction (H)     1981; angioplasty     Past Surgical History:   Procedure Laterality Date     ARTHROPLASTY HIP Right     Dr Rodriguez     ARTHROPLASTY HIP Left 2015    Dr. Rodriguez     COMBINED CYSTOSCOPY, URETEROSCOPY, LASER HOLMIUM LITHOTRIPSY URETER(S) Right 7/23/2019    Procedure: CYSTOSCOPY, RIGHT STENT REMOVAL, URETEROSCOPY, LASER HOLMIUM LITHOTRIPSY RIGHT URETER WITH STENT REPLACEMENT;  Surgeon: Shawn Cole MD;  Location:  OR     CYSTOSCOPY, RETROGRADES, INSERT STENT URETER(S), COMBINED Right 6/17/2019     Procedure: CYSTOSCOPY, WITH RETROGRADE PYELOGRAM AND URETERAL STENT INSERTION;  Surgeon: Shawn Cole MD;  Location:  OR     Social History     Socioeconomic History     Marital status:      Spouse name: Not on file     Number of children: Not on file     Years of education: Not on file     Highest education level: Not on file   Occupational History     Not on file   Social Needs     Financial resource strain: Not on file     Food insecurity:     Worry: Not on file     Inability: Not on file     Transportation needs:     Medical: Not on file     Non-medical: Not on file   Tobacco Use     Smoking status: Never Smoker     Smokeless tobacco: Never Used   Substance and Sexual Activity     Alcohol use: Not Currently     Alcohol/week: 0.0 standard drinks     Drug use: No     Sexual activity: Not Currently     Partners: Male     Birth control/protection: Post-menopausal   Lifestyle     Physical activity:     Days per week: Not on file     Minutes per session: Not on file     Stress: Not on file   Relationships     Social connections:     Talks on phone: Not on file     Gets together: Not on file     Attends Shinto service: Not on file     Active member of club or organization: Not on file     Attends meetings of clubs or organizations: Not on file     Relationship status: Not on file     Intimate partner violence:     Fear of current or ex partner: Not on file     Emotionally abused: Not on file     Physically abused: Not on file     Forced sexual activity: Not on file   Other Topics Concern     Parent/sibling w/ CABG, MI or angioplasty before 65F 55M? Not Asked   Social History Narrative    , lives in [place].  Previously from [place], [place].  Worked in drug store,  was a pharmacist.     Family History   Problem Relation Age of Onset     Heart Disease Mother 60         of mi     Heart Disease Father 60         of mi     Heart Disease Brother      Heart Disease Brother      Current  Outpatient Medications   Medication Sig Dispense Refill     acetaminophen (TYLENOL) 500 MG tablet Take 500 mg by mouth every 6 hours as needed for mild pain       aspirin 81 MG chewable tablet Take 81 mg by mouth daily with food       diltiazem ER (DILT-XR) 180 MG 24 hr capsule Take 1 capsule (180 mg) by mouth daily 90 capsule 3     Incontinence Supply Disposable (POISE PAD) PADS 1 Units daily 30 each 11     levothyroxine (SYNTHROID/LEVOTHROID) 88 MCG tablet TAKE 1 TABLET (88 MCG) BY MOUTH DAILY 90 tablet 2     order for DME Equipment being ordered: Spikes for cane 1 Units 1     order for DME Equipment being ordered: plastic underpants 1 Units 1     Celebrex [celecoxib] and Tolterodine      Review of Systems:  Review of Systems   Respiratory: Negative for chest tightness and shortness of breath.    Musculoskeletal: Positive for back pain and gait problem.   Neurological: Negative for syncope and light-headedness.       Objective:   /80 (BP Location: Right arm, Patient Position: Sitting, Cuff Size: Adult Regular)   Pulse 70   Temp 97.4  F (36.3  C) (Tympanic)   Resp 16   Wt 55.6 kg (122 lb 8 oz)   SpO2 98%   BMI 22.12 kg/m    Physical Exam  Pleasant female no acute distress accompanied by her daughter.  Alert and oriented.  Skin color pink.  Mucous memories moist.  Lung fields clear to auscultation.  Cardiovascular regular.  She has chronic Trendelenburg gait.  Uses cane for ambulation.  Moderate cerumen in both ear canals, erythema without redness.  No drainage.  Ears are flushed by nursing and well-tolerated by patient with good results.    Assessment:    ICD-10-CM    1. Unsteady gait R26.81 PHYSICAL THERAPY REFERRAL   2. Urinary incontinence, unspecified type R32    3. Mixed hearing loss, bilateral H90.6    4. Need for immunization against influenza Z23 HC FLU VACCINE, INCREASED ANTIGEN, PRESV FREE   5. Need for pneumococcal vaccination Z23 PNEUMOCOCCAL CONJ VACCINE 13 VALENT IM       Plan:   1.   Referral back to physical therapy.  2.  Follow-up with Dr. Cole to discuss treatment plan for urinary incontinence.  3.  Continue hearing aid use and follow-up as needed for cerumen issues.  4.  Influenza vaccine and Prevnar 13 provided.  Consider Tdap and Shingrix, may receive this at local pharmacy.      DRU Salcedo   11/15/2019  9:10 AM

## 2019-12-10 ENCOUNTER — TELEPHONE (OUTPATIENT)
Dept: UROLOGY | Facility: OTHER | Age: 84
End: 2019-12-10

## 2019-12-10 NOTE — TELEPHONE ENCOUNTER
Patient was scheduled for botox on 08/07/19 but had to cancel this appointment. She would like to proceed with the appointment at this time. Please advise.    Taina Green on 12/10/2019 at 3:17 PM

## 2019-12-11 ENCOUNTER — HOSPITAL ENCOUNTER (OUTPATIENT)
Dept: PHYSICAL THERAPY | Facility: OTHER | Age: 84
Setting detail: THERAPIES SERIES
End: 2019-12-11
Attending: NURSE PRACTITIONER
Payer: MEDICARE

## 2019-12-11 DIAGNOSIS — R26.81 UNSTEADY GAIT: ICD-10-CM

## 2019-12-11 PROCEDURE — 97110 THERAPEUTIC EXERCISES: CPT | Mod: GP | Performed by: PHYSICAL THERAPIST

## 2019-12-11 PROCEDURE — 97161 PT EVAL LOW COMPLEX 20 MIN: CPT | Mod: GP | Performed by: PHYSICAL THERAPIST

## 2019-12-11 NOTE — TELEPHONE ENCOUNTER
6/6/19  Plan  Intravesical Botox 100 units in the clinic    Prior authorization faxed to  [facility] 12/11/19  Kim Hull LPN.......12/11/2019 7:52 AM'

## 2019-12-12 DIAGNOSIS — G89.29 CHRONIC MIDLINE LOW BACK PAIN WITHOUT SCIATICA: ICD-10-CM

## 2019-12-12 DIAGNOSIS — M54.50 CHRONIC MIDLINE LOW BACK PAIN WITHOUT SCIATICA: ICD-10-CM

## 2019-12-12 NOTE — PROGRESS NOTES
[facility]          OUTPATIENT PHYSICAL THERAPY ORTHOPEDIC EVALUATION  PLAN OF TREATMENT FOR OUTPATIENT REHABILITATION  (COMPLETE FOR INITIAL CLAIMS ONLY)  Patient's Last Name, First Name, M.I.  YOB: 1926  Geno Horn    Provider s Name:  [facility]   Medical Record No.  [id number]   Start of Care Date:  12/11/19   Onset Date:      Type:     _X__PT   ___OT   ___SLP Medical Diagnosis:        PT Diagnosis:  weakness, gait/balance issues, decreased tolerance to activities.   Visits from SOC:  1      _________________________________________________________________________________  Plan of Treatment/Functional Goals:  balance training, gait training, joint mobilization, manual therapy, neuromuscular re-education, ROM, strengthening, stretching     Cryotherapy, Hot packs, Ultrasound     Goals  Goal Identifier: hip strength  Goal Description: patietn will improve hip strength to 4 or 4+ abduction/ext, as to reduce falls risk and hip pain and shifting when wlaking  Target Date: 02/12/20    Goal Identifier: walk  Goal Description: patient will walk in [place] with walker 1000ft. to improve [place] to outings to store  Target Date: 01/24/20    Goal Identifier: pain  Goal Description: pain will decrease by 50% with kitchen tasks.  Target Date: 01/31/20              Therapy Frequency:     Predicted Duration of Therapy Intervention:  1-2x/wk    Parrish Osuna, PT                 I CERTIFY THE NEED FOR THESE SERVICES FURNISHED UNDER        THIS PLAN OF TREATMENT AND WHILE UNDER MY CARE     (Physician co-signature of this document indicates review and certification of the therapy plan).                       Certification Date From:  12/11/19   Certification Date To:  02/12/20    Referring Provider:  Aj    Initial Assessment        See Epic Evaluation Start of Care Date: 12/11/19

## 2019-12-12 NOTE — INITIAL ASSESSMENTS
12/12/19 0800   General Information   Type of Visit Initial OP Ortho PT Evaluation   Start of Care Date 12/11/19   Referring Physician Aj   Patient/Family Goals Statement walk better, less hip/back pain   Orders Evaluate and Treat   Certification Required? Yes   Medical Diagnosis weakness   Surgical/Medical history reviewed Yes   Precautions/Limitations fall precautions   General Information Comments patient lives in apartment, alone, but has good neighbors that check on her regularly.    Body Part(s)   Body Part(s) Hip   Presentation and Etiology   Pertinent history of current problem (include personal factors and/or comorbidities that impact the POC) history of weakness, fall, kidney stone removal. daughter and maribeth check on her often, she uses walker for walking but also uses cane at times.    Impairments A. Pain   Functional Limitations perform activities of daily living;perform desired leisure / sports activities   Symptom Location back and hips   Chronicity Chronic   Pain rating (0-10 point scale) Best (/10);Worst (/10)   Best (/10) 2   Worst (/10) 7   Pain quality C. Aching   Frequency of pain/symptoms A. Constant   Pain/symptoms are: Worse during the day   Pain/symptoms exacerbated by B. Walking;G. Certain positions   Pain/symptoms eased by E. Changing positions;G. Heat   Prior Level of Function   Prior Level of Function-Mobility walking in halls for exercise, with walker.   Functional Level Prior Comment baking cookies, helps her neighbors.   Current Level of Function   Current Community Support Family/friend caregiver   Patient role/employment history F. Retired   Living environment Apartment/condo   Home/community accessibility aprtement, stairs to other levels   Current equipment-Gait/Locomotion Walker;Standard cane   Fall Risk Screen   Fall screen completed by PT   Have you fallen 2 or more times in the past year? Yes   Have you fallen and had an injury in the past year? No   Is patient  a fall risk? Yes   Fall screen comments TUG 19   Abuse Screen (yes response referral indicated)   Feels Unsafe at Home or Work/School no   Feels Threatened by Someone no   Does Anyone Try to Keep You From Having Contact with Others or Doing Things Outside Your Home? no   Physical Signs of Abuse Present no   Hip Objective Findings   Side (if bilateral, select both right and left) Right;Left   Observation walks with walker, has bilateral shifting during gait to offset weakness of hips.    Integumentary  NT   Hip ROM Comments She has good ROM, minimal pain with ROM   Lumbar ROM decreased extension to netural in standing.   Hip/Knee Strength Comments left is weaker than right   Palpation tender at GT and tendon/bursa   Right Hip Flexion Strength 4   Right Hip Abduction Strength 3+   Right Hip Extension Strength 4   Right Knee Flexion Strength 4   Right Knee Extension Strength 4+   Right Hamstring Flexibility good   Left Hip Flexion Strength 3+   Left Hip Abduction Strength 3+   Left Hip Extension Strength 3+   Left Knee Flexion Strength 3+   Left Knee Extension Strength 3+   Left Hamstring Flexibility good   Planned Therapy Interventions   Planned Therapy Interventions balance training;gait training;joint mobilization;manual therapy;neuromuscular re-education;ROM;strengthening;stretching   Planned Modality Interventions   Planned Modality Interventions Cryotherapy;Hot packs;Ultrasound   Clinical Impression   Criteria for Skilled Therapeutic Interventions Met yes, treatment indicated   PT Diagnosis weakness, gait/balance issues, decreased tolerance to activities.   Influenced by the following impairments balance, gait, ADLs, home tasks   Clinical Presentation Stable/Uncomplicated   Clinical Decision Making (Complexity) Low complexity   Predicted Duration of Therapy Intervention (days/wks) 1-2x/wk   Risk & Benefits of therapy have been explained Yes   Patient, Family & other staff in agreement with plan of care Yes    ORTHO GOALS   PT Ortho Eval Goals 1;2;3   Ortho Goal 1   Goal Identifier hip strength   Goal Description patietn will improve hip strength to 4 or 4+ abduction/ext, as to reduce falls risk and hip pain and shifting when wlaking   Target Date 02/12/20   Ortho Goal 2   Goal Identifier walk   Goal Description patient will walk in [place] with walker 1000ft. to improve [place] to outings to store   Target Date 01/24/20   Ortho Goal 3   Goal Identifier pain   Goal Description pain will decrease by 50% with kitchen tasks.   Target Date 01/31/20   Total Evaluation Time   PT Eval, Low Complexity Minutes (91484) 30   Therapy Certification   Certification date from 12/11/19   Certification date to 02/12/20

## 2019-12-13 ENCOUNTER — HOSPITAL ENCOUNTER (OUTPATIENT)
Dept: PHYSICAL THERAPY | Facility: OTHER | Age: 84
Setting detail: THERAPIES SERIES
End: 2019-12-13
Attending: NURSE PRACTITIONER
Payer: MEDICARE

## 2019-12-13 PROCEDURE — 97110 THERAPEUTIC EXERCISES: CPT | Mod: GP | Performed by: PHYSICAL THERAPIST

## 2019-12-16 RX ORDER — CELECOXIB 100 MG/1
100 CAPSULE ORAL DAILY
Qty: 90 CAPSULE | Refills: 0 | OUTPATIENT
Start: 2019-12-16

## 2019-12-16 NOTE — TELEPHONE ENCOUNTER
"Celebrex noted as discontinued on 11/15/19  Per LOV  \"She also states she stopped Celebrex as she felt lightheaded when taking it.\"  Mady Richmond RN on 12/16/2019 at 2:12 PM    "

## 2019-12-18 ENCOUNTER — HOSPITAL ENCOUNTER (OUTPATIENT)
Dept: PHYSICAL THERAPY | Facility: OTHER | Age: 84
Setting detail: THERAPIES SERIES
End: 2019-12-18
Attending: NURSE PRACTITIONER
Payer: MEDICARE

## 2019-12-18 PROCEDURE — 97140 MANUAL THERAPY 1/> REGIONS: CPT | Mod: GP | Performed by: PHYSICAL THERAPIST

## 2019-12-18 PROCEDURE — 97110 THERAPEUTIC EXERCISES: CPT | Mod: GP | Performed by: PHYSICAL THERAPIST

## 2019-12-20 ENCOUNTER — TELEPHONE (OUTPATIENT)
Dept: INTERNAL MEDICINE | Facility: OTHER | Age: 84
End: 2019-12-20

## 2019-12-20 ENCOUNTER — HOSPITAL ENCOUNTER (OUTPATIENT)
Dept: PHYSICAL THERAPY | Facility: OTHER | Age: 84
Setting detail: THERAPIES SERIES
End: 2019-12-20
Attending: NURSE PRACTITIONER
Payer: MEDICARE

## 2019-12-20 PROCEDURE — 97110 THERAPEUTIC EXERCISES: CPT | Mod: GP | Performed by: PHYSICAL THERAPIST

## 2019-12-20 NOTE — TELEPHONE ENCOUNTER
ALICEV-spoke with daughter-Adri. Pt has been complaining of severe back pain. Daughter seeking advise because of mothers age. Please call Adri. Thank you.  Felipa Cardona

## 2019-12-23 NOTE — TELEPHONE ENCOUNTER
If she is having a lot of pain in the back then a steroid injection may help.  She would need an MRI in order to get a steroid injection.  If she is not having significant pain in the back then I would not recommend an injection

## 2019-12-23 NOTE — TELEPHONE ENCOUNTER
Patients daughter is wondering if she should have a different scan. Patient has since see PT and was informed degenerative disc with bone on bone. They are satisfied about this.  PT was wondering if spinal injections would help. Val Cardenas LPN .............12/23/2019  9:09 AM

## 2020-01-07 ENCOUNTER — TELEPHONE (OUTPATIENT)
Dept: INTERNAL MEDICINE | Facility: OTHER | Age: 85
End: 2020-01-07

## 2020-01-07 NOTE — TELEPHONE ENCOUNTER
Spoke with daughter. Patient does not drive but could use a disability parking certificate for the days she goes shopping with her daughter. Val Cardenas LPN .............1/7/2020  3:25 PM

## 2020-01-15 ENCOUNTER — OFFICE VISIT (OUTPATIENT)
Dept: UROLOGY | Facility: OTHER | Age: 85
End: 2020-01-15
Attending: UROLOGY
Payer: MEDICARE

## 2020-01-15 VITALS — BODY MASS INDEX: 21.96 KG/M2 | WEIGHT: 121.6 LBS | HEART RATE: 80 BPM | RESPIRATION RATE: 12 BRPM

## 2020-01-15 DIAGNOSIS — N32.81 OVERACTIVE BLADDER: Primary | ICD-10-CM

## 2020-01-15 LAB
ALBUMIN UR-MCNC: NEGATIVE MG/DL
APPEARANCE UR: CLEAR
BILIRUB UR QL STRIP: NEGATIVE
COLOR UR AUTO: YELLOW
GLUCOSE UR STRIP-MCNC: NEGATIVE MG/DL
HGB UR QL STRIP: NEGATIVE
KETONES UR STRIP-MCNC: NEGATIVE MG/DL
LEUKOCYTE ESTERASE UR QL STRIP: NEGATIVE
NITRATE UR QL: NEGATIVE
PH UR STRIP: 6 PH (ref 5–9)
SOURCE: NORMAL
SP GR UR STRIP: <1.005 (ref 1–1.03)
UROBILINOGEN UR STRIP-ACNC: 0.2 EU/DL (ref 0.2–1)

## 2020-01-15 PROCEDURE — 99213 OFFICE O/P EST LOW 20 MIN: CPT | Mod: 25 | Performed by: UROLOGY

## 2020-01-15 PROCEDURE — 81003 URINALYSIS AUTO W/O SCOPE: CPT | Mod: ZL | Performed by: UROLOGY

## 2020-01-15 PROCEDURE — 25000576 ZZH RX IP 250 OP 636 J0585: Performed by: UROLOGY

## 2020-01-15 PROCEDURE — G0463 HOSPITAL OUTPT CLINIC VISIT: HCPCS | Mod: 25

## 2020-01-15 PROCEDURE — 52287 CYSTOSCOPY CHEMODENERVATION: CPT | Performed by: UROLOGY

## 2020-01-15 RX ADMIN — ONABOTULINUMTOXINA 100 UNITS: 100 INJECTION, POWDER, LYOPHILIZED, FOR SOLUTION INTRADERMAL; INTRAMUSCULAR at 11:48

## 2020-01-15 ASSESSMENT — PAIN SCALES - GENERAL: PAINLEVEL: NO PAIN (0)

## 2020-01-15 NOTE — NURSING NOTE
Cefuroxime 500mg tablet (2-250mg tablets with same lot # and expiration date) by mouth one time now ordered by Shawn Cole MD.  Medication administered per verbal order   Lot # [id number]  Exp. 06/30/2020  Patient tolerated well.  Milagro Hobson RN..................1/15/2020  11:32 AM

## 2020-01-15 NOTE — PATIENT INSTRUCTIONS
Home Care after Botox Treatment  Follow these guidelines for your care after your procedure.    Activity  No limitations    Bathing or showering  No limitations    Symptoms  You may notice some burning with urination but this usually resolves after 1-2 days.  You may also notice small amounts of blood in your urine.  Please increase water intake for the next few days to help with these symptoms.    Contacts  General Questions: [phone]  Appointments:  [phone]  Emergencies:  [phone]    When to call the clinic  If you develop any of the following symptoms please call the clinic immediately.  If the clinic is closed please be seen at an urgent care clinic or the Emergency Department.  - Burning with urination that worsens after 2 days  - Unable to urinate causing severe pelvic pain  - Fevers of greater than 101 degrees F  - Flank pain that is not responding to pain medication    Follow up  If you are currently taking an anticholinergic for urgency (such as oxybutynin, Detrol or Sanctura) please continue for 1 week then stop.  Please follow up in 6 weeks for a bladder scan.

## 2020-01-15 NOTE — NURSING NOTE
Patient unable to void for urine sample.  Straight catheterized to obtain urine sample with 185mL out.  Milagro Hobson RN......January 15, 2020...11:20 AM

## 2020-01-15 NOTE — NURSING NOTE
Botox  Verbal Order per Shawn Cole MD Read Back to prep patient.  Perineum area prepped with chlorhexidene Gluconate and patient draped per sterile technique.    Sodium Chloride 0.9%, 10mL mixed with Botox  Lot #: [id number]  Expiration date: 1/1/2021  : [employer]  NDC: 8103-6442-94    [place] Protocol    A. Pre-procedure verification complete Yes  1-relevant information / documentation available, reviewed and properly matched to the patient; 2-consent accurate and complete, 3-equipment and supplies available    B. Site marking complete N/A  Site marked if not in continuous attendance with patient    C. TIME OUT completed Yes  Time Out was conducted just prior to starting procedure to verify the eight required elements: 1-patient identity, 2-consent accurate and complete, 3-position, 4-correct side/site marked (if applicable), 5-procedure, 6-relevant images / results properly labeled and displayed (if applicable), 7-antibiotics / irrigation fluids (if applicable), 8-safety precautions.    After procedure perineum area rinsed.  Discharge instructions reviewed with patient.  Patient verbalized understanding of discharge instructions and discharged ambulatory.

## 2020-01-15 NOTE — PROGRESS NOTES
Type of Visit  Established    Chief Complaint  Urge incontinence    HPI  Ms. Horn is a 89 year old female with urge incontinence.  The patient had failed anticholinergic approach to her urgency and urge incontinence.  We discussed treatment options and she elected to proceed with Botox.  She presents today for Botox retreatment.  She denies dysuria or hematuria today.      Review of Systems  I reviewed the ROS the patient today.    Unintentional weight loss:  No  Recent fever/chills: No  Night sweats: No   Current skin rash: No   Recent hair loss: No   Heat intolerance: No   Cold intolerance: No   Chest pain: No   Palpitations: No   Shortness of breath: No  Wheezing: No   Constipation: No   Diarrhea: No   Nausea: No    Vomiting: No   Kidney/side pain: No    Back pain: No  Frequent headaches: No  Dizziness: No   Leg swelling: No   Calf pain: No    Nursing Notes:   Milagro Hobson RN  1/15/2020 11:21 AM  Signed  Patient unable to void for urine sample.  Straight catheterized to obtain urine sample with 185mL out.  Milagro Hobson RN......January 15, 2020...11:20 AM     Milagro Hobson RN  1/15/2020 11:34 AM  Signed  Cefuroxime 500mg tablet (2-250mg tablets with same lot # and expiration date) by mouth one time now ordered by Shawn Cole MD.  Medication administered per verbal order   Lot # [id number]  Exp. 06/30/2020  Patient tolerated well.  Milagro Hobson RN..................1/15/2020  11:32 AM    Milagro Hobson RN  1/15/2020 11:48 AM  Signed  Botox  Verbal Order per Shawn Cole MD Read Back to prep patient.  Perineum area prepped with chlorhexidene Gluconate and patient draped per sterile technique.    Sodium Chloride 0.9%, 10mL mixed with Botox  Lot #: [id number]  Expiration date: 1/1/2021  : [employer]  NDC: 7158-8405-26    [place] Protocol    A. Pre-procedure verification complete Yes  1-relevant information / documentation available, reviewed and properly matched to the patient;  2-consent accurate and complete, 3-equipment and supplies available    B. Site marking complete N/A  Site marked if not in continuous attendance with patient    C. TIME OUT completed Yes  Time Out was conducted just prior to starting procedure to verify the eight required elements: 1-patient identity, 2-consent accurate and complete, 3-position, 4-correct side/site marked (if applicable), 5-procedure, 6-relevant images / results properly labeled and displayed (if applicable), 7-antibiotics / irrigation fluids (if applicable), 8-safety precautions.    After procedure perineum area rinsed.  Discharge instructions reviewed with patient.  Patient verbalized understanding of discharge instructions and discharged ambulatory.     Physical Exam  Vitals:    01/15/20 1118   Pulse: 80   Resp: 12   Weight: 55.2 kg (121 lb 9.6 oz)     Constitutional: NAD, WDWN.   Cardiovascular: Regular rate.  Pulmonary/Chest: Respirations are even and non-labored bilaterally.  Abdominal: Soft. No distension, tenderness, masses or guarding. No CVA tenderness.  Genitourinary: Nonpalpable bladder.  Extremities: BERTRAND x 4, Warm. No clubbing.  No cyanosis.    Skin: Pink, warm and dry.  No rashes noted.      ^^^^^^^^^^^^^^^^^^^^^^^^^^^^^^^^^^^^^^^^^^^^^^^^^^^^^^^^^^^^^^^  Preprocedure diagnosis  Urge incontinence with hypertonicity of bladder    Postprocedure diagnosis  Urge incontinence with hypertonicity of bladder    Procedure  Cystourethroscopy with intradetrusor injection of Botox, 100 units    Surgeon  Shawn Cole MD    Anesthesia  None    Complications  None    Indications  The patient previously failed anticholinergic medication for treatment of the above named indication.  Informed consent was obtained.  We discussed the risks of Botox including, but not limited to, the risk of urinary retention and UTI.  Discussed performing the procedure in the OR versus clinic- risks and benefits.    Findings  Cystoscopy revealed one right and left ureteral  orifice in the normal anatomic position, normal bladder mucosa and no tumors, masses or stones.    Procedure  The patient was taken to the procedure room and placed supine on the procedure table.    The patient was positioned in dorsal lithotomy, prepped and draped in a sterile fashion.    A time-out was performed.    The cystoscope was passed through the urethra and into the bladder.    The injection needle was passed through the working port of the cystoscope and 10 units/mL/injection x 10 injections for a total of 100 international units of Botox was injected submucosally.   I injected 4 doses from the left to right lateral wall just above the trigonal ridge.    Two radial injections of 3 doses were then used rising toward the dome.    I encountered minimal bleeding from the sites and avoided injection of the trigone.   Once the injections were completed, the bladder was emptied and the scope was removed.    ^^^^^^^^^^^^^^^^^^^^^^^^^^^^^^^^^^^^^^^^^^^^^^^^^^^^^^^^^^^^^^^    UA  Results for orders placed or performed in visit on 01/15/20   *UA reflex to Microscopic     Status: None   Result Value Ref Range    Color Urine Yellow     Appearance Urine Clear     Glucose Urine Negative NEG^Negative mg/dL    Bilirubin Urine Negative NEG^Negative    Ketones Urine Negative NEG^Negative mg/dL    Specific Gravity Urine <1.005 1.000 - 1.030    Blood Urine Negative NEG^Negative    pH Urine 6.0 5.0 - 9.0 pH    Protein Albumin Urine Negative NEG^Negative mg/dL    Urobilinogen Urine 0.2 0.2 - 1.0 EU/dL    Nitrite Urine Negative NEG^Negative    Leukocyte Esterase Urine Negative NEG^Negative    Source Midstream Urine      Post-Void Residual  A post-void residual was measured by ultrasonic bladder scanner.  90 mL    Assessment & Plan  Ms. Horn is a 89 year old female with urge incontinence who underwent repeat Botox treatment.  UA and PVR were appropriate for retreatment today.  Follow up when symptoms return for repeat  treatment

## 2020-01-17 ENCOUNTER — HOSPITAL ENCOUNTER (OUTPATIENT)
Dept: PHYSICAL THERAPY | Facility: OTHER | Age: 85
Setting detail: THERAPIES SERIES
End: 2020-01-17
Attending: NURSE PRACTITIONER
Payer: MEDICARE

## 2020-01-17 PROCEDURE — 97140 MANUAL THERAPY 1/> REGIONS: CPT | Mod: GP | Performed by: PHYSICAL THERAPIST

## 2020-01-17 PROCEDURE — 97110 THERAPEUTIC EXERCISES: CPT | Mod: GP | Performed by: PHYSICAL THERAPIST

## 2020-01-22 ENCOUNTER — HOSPITAL ENCOUNTER (OUTPATIENT)
Dept: PHYSICAL THERAPY | Facility: OTHER | Age: 85
Setting detail: THERAPIES SERIES
End: 2020-01-22
Attending: NURSE PRACTITIONER
Payer: MEDICARE

## 2020-01-22 PROCEDURE — 97110 THERAPEUTIC EXERCISES: CPT | Mod: GP | Performed by: PHYSICAL THERAPIST

## 2020-01-24 ENCOUNTER — HOSPITAL ENCOUNTER (OUTPATIENT)
Dept: PHYSICAL THERAPY | Facility: OTHER | Age: 85
Setting detail: THERAPIES SERIES
End: 2020-01-24
Attending: NURSE PRACTITIONER
Payer: MEDICARE

## 2020-01-24 ENCOUNTER — TELEPHONE (OUTPATIENT)
Dept: INTERNAL MEDICINE | Facility: OTHER | Age: 85
End: 2020-01-24

## 2020-01-24 PROCEDURE — 97110 THERAPEUTIC EXERCISES: CPT | Mod: GP | Performed by: PHYSICAL THERAPIST

## 2020-01-24 PROCEDURE — 97140 MANUAL THERAPY 1/> REGIONS: CPT | Mod: GP | Performed by: PHYSICAL THERAPIST

## 2020-01-24 NOTE — TELEPHONE ENCOUNTER
Received refill request from [facility] for Celebrex.  Per current med list and OV on 11/15/19  Patient no longer taking Celebrex due to side effects.    Mady Richmond RN on 1/24/2020 at 9:55 AM

## 2020-01-29 ENCOUNTER — HOSPITAL ENCOUNTER (OUTPATIENT)
Dept: PHYSICAL THERAPY | Facility: OTHER | Age: 85
Setting detail: THERAPIES SERIES
End: 2020-01-29
Attending: NURSE PRACTITIONER
Payer: MEDICARE

## 2020-01-29 PROCEDURE — 97140 MANUAL THERAPY 1/> REGIONS: CPT | Mod: GP | Performed by: PHYSICAL THERAPIST

## 2020-01-29 PROCEDURE — 97112 NEUROMUSCULAR REEDUCATION: CPT | Mod: GP | Performed by: PHYSICAL THERAPIST

## 2020-01-29 PROCEDURE — 97110 THERAPEUTIC EXERCISES: CPT | Mod: GP | Performed by: PHYSICAL THERAPIST

## 2020-01-31 ENCOUNTER — HOSPITAL ENCOUNTER (OUTPATIENT)
Dept: PHYSICAL THERAPY | Facility: OTHER | Age: 85
Setting detail: THERAPIES SERIES
End: 2020-01-31
Attending: NURSE PRACTITIONER
Payer: MEDICARE

## 2020-01-31 PROCEDURE — 97110 THERAPEUTIC EXERCISES: CPT | Mod: GP | Performed by: PHYSICAL THERAPIST

## 2020-01-31 PROCEDURE — 97140 MANUAL THERAPY 1/> REGIONS: CPT | Mod: GP | Performed by: PHYSICAL THERAPIST

## 2020-02-12 ENCOUNTER — HOSPITAL ENCOUNTER (OUTPATIENT)
Dept: PHYSICAL THERAPY | Facility: OTHER | Age: 85
Setting detail: THERAPIES SERIES
End: 2020-02-12
Attending: NURSE PRACTITIONER
Payer: MEDICARE

## 2020-02-12 PROCEDURE — 97110 THERAPEUTIC EXERCISES: CPT | Mod: GP | Performed by: PHYSICAL THERAPIST

## 2020-02-12 PROCEDURE — 97140 MANUAL THERAPY 1/> REGIONS: CPT | Mod: GP | Performed by: PHYSICAL THERAPIST

## 2020-02-14 ENCOUNTER — HOSPITAL ENCOUNTER (OUTPATIENT)
Dept: PHYSICAL THERAPY | Facility: OTHER | Age: 85
Setting detail: THERAPIES SERIES
End: 2020-02-14
Attending: NURSE PRACTITIONER
Payer: MEDICARE

## 2020-02-14 PROCEDURE — 97110 THERAPEUTIC EXERCISES: CPT | Mod: GP | Performed by: PHYSICAL THERAPIST

## 2020-02-14 PROCEDURE — 97012 MECHANICAL TRACTION THERAPY: CPT | Mod: GP | Performed by: PHYSICAL THERAPIST

## 2020-02-15 DIAGNOSIS — E03.9 HYPOTHYROIDISM, UNSPECIFIED TYPE: ICD-10-CM

## 2020-02-17 ENCOUNTER — OFFICE VISIT (OUTPATIENT)
Dept: INTERNAL MEDICINE | Facility: OTHER | Age: 85
End: 2020-02-17
Attending: NURSE PRACTITIONER
Payer: MEDICARE

## 2020-02-17 VITALS
OXYGEN SATURATION: 99 % | DIASTOLIC BLOOD PRESSURE: 80 MMHG | WEIGHT: 125.4 LBS | SYSTOLIC BLOOD PRESSURE: 128 MMHG | TEMPERATURE: 97.4 F | RESPIRATION RATE: 16 BRPM | HEART RATE: 74 BPM | BODY MASS INDEX: 22.64 KG/M2

## 2020-02-17 DIAGNOSIS — R29.898 WEAKNESS OF RIGHT FOOT: ICD-10-CM

## 2020-02-17 DIAGNOSIS — R26.81 GAIT INSTABILITY: ICD-10-CM

## 2020-02-17 DIAGNOSIS — E03.9 HYPOTHYROIDISM, UNSPECIFIED TYPE: ICD-10-CM

## 2020-02-17 DIAGNOSIS — G89.29 CHRONIC BILATERAL LOW BACK PAIN WITHOUT SCIATICA: ICD-10-CM

## 2020-02-17 DIAGNOSIS — R42 LIGHTHEADEDNESS: Primary | ICD-10-CM

## 2020-02-17 DIAGNOSIS — M54.50 CHRONIC BILATERAL LOW BACK PAIN WITHOUT SCIATICA: ICD-10-CM

## 2020-02-17 PROCEDURE — G0463 HOSPITAL OUTPT CLINIC VISIT: HCPCS

## 2020-02-17 PROCEDURE — 99214 OFFICE O/P EST MOD 30 MIN: CPT | Performed by: NURSE PRACTITIONER

## 2020-02-17 RX ORDER — LEVOTHYROXINE SODIUM 88 UG/1
TABLET ORAL
Qty: 90 TABLET | Refills: 2 | OUTPATIENT
Start: 2020-02-17

## 2020-02-17 RX ORDER — LEVOTHYROXINE SODIUM 88 UG/1
TABLET ORAL
Qty: 90 TABLET | Refills: 3 | Status: SHIPPED | OUTPATIENT
Start: 2020-02-17 | End: 2021-01-15

## 2020-02-17 ASSESSMENT — PAIN SCALES - GENERAL: PAINLEVEL: MODERATE PAIN (5)

## 2020-02-17 NOTE — NURSING NOTE
"Patient presents to the clinic today to follow up on lightheadedness and dizzy.  Debora Davison LPN 2/17/2020   8:59 AM    Chief Complaint   Patient presents with     RECHECK     Lightheaded       Initial /80 (BP Location: Right arm, Patient Position: Sitting, Cuff Size: Adult Regular)   Pulse 74   Temp 97.4  F (36.3  C) (Tympanic)   Resp 16   Wt 56.9 kg (125 lb 6.4 oz)   SpO2 99%   Breastfeeding No   BMI 22.64 kg/m   Estimated body mass index is 22.64 kg/m  as calculated from the following:    Height as of 7/10/19: 1.585 m (5' 2.4\").    Weight as of this encounter: 56.9 kg (125 lb 6.4 oz).  Medication Reconciliation: complete  Debora Davison LPN    "

## 2020-02-17 NOTE — PROGRESS NOTES
Subjective:  She is here today for follow-up on lightheadedness.  She has been evaluated by this provider and also has had a second opinion by Dr. Flores and both agreed that this is related to her gait instability.  She states it does not occur until she stands up.  If she is standing and walking she feels off balance.  She does not feel like the room is spinning or that she is going to blackout.  She just feels off balance.  She does have gait instability and is working with physical therapy.  She drags her right foot slightly.  This is chronic and has been occurring since she had hip surgery years ago.  She does have some chronic low back pain.  Her daughter accompanies her to this appointment and is concerned maybe she has obstructive carotids.  Her uncle needed to have carotid surgery.  Patient has not had any unilateral weakness, facial drooping, paresthesias, etc.  The (lightheadedness) always occurs with walking.  She states she just does not feel strong on her feet.  She denies heart palpitation, chest pain and chest pressure.  She did try reaching for an object above her head and started walking and tripped over her foot.  She was able to grab the wall and her entire arm has hurt since.  It hurts from the wrist all the way up to the shoulder.  Usually only hurts if she puts pressure on it.  Does not hurt with movement.  She does feel that her back pain is limiting to her activity level.  Denies pain radiating down her back or legs.  It is across the low back and into the buttocks.    Patient Active Problem List   Diagnosis     Allergic rhinitis     Coronary artery disease involving native coronary artery of native heart without angina pectoris     H/O basal cell carcinoma excision     Hearing loss     Hypertension     Hypothyroidism     Osteoarthritis of both hips     Urinary urgency     Unsteady gait     Sepsis (H)     Past Medical History:   Diagnosis Date     Hypertension      Hypothyroid       Osteoarthritis      ST elevation (STEMI) myocardial infarction (H)     1981; angioplasty     Past Surgical History:   Procedure Laterality Date     ARTHROPLASTY HIP Right     Dr Rodriguez     ARTHROPLASTY HIP Left 2015    Dr. Rodriguez     COMBINED CYSTOSCOPY, URETEROSCOPY, LASER HOLMIUM LITHOTRIPSY URETER(S) Right 7/23/2019    Procedure: CYSTOSCOPY, RIGHT STENT REMOVAL, URETEROSCOPY, LASER HOLMIUM LITHOTRIPSY RIGHT URETER WITH STENT REPLACEMENT;  Surgeon: Shawn Cole MD;  Location:  OR     CYSTOSCOPY, RETROGRADES, INSERT STENT URETER(S), COMBINED Right 6/17/2019    Procedure: CYSTOSCOPY, WITH RETROGRADE PYELOGRAM AND URETERAL STENT INSERTION;  Surgeon: Shawn Cole MD;  Location:  OR     Social History     Socioeconomic History     Marital status:      Spouse name: Not on file     Number of children: Not on file     Years of education: Not on file     Highest education level: Not on file   Occupational History     Not on file   Social Needs     Financial resource strain: Not on file     Food insecurity:     Worry: Not on file     Inability: Not on file     Transportation needs:     Medical: Not on file     Non-medical: Not on file   Tobacco Use     Smoking status: Never Smoker     Smokeless tobacco: Never Used   Substance and Sexual Activity     Alcohol use: Not Currently     Alcohol/week: 0.0 standard drinks     Drug use: No     Sexual activity: Not Currently     Partners: Male     Birth control/protection: Post-menopausal   Lifestyle     Physical activity:     Days per week: Not on file     Minutes per session: Not on file     Stress: Not on file   Relationships     Social connections:     Talks on phone: Not on file     Gets together: Not on file     Attends Islam service: Not on file     Active member of club or organization: Not on file     Attends meetings of clubs or organizations: Not on file     Relationship status: Not on file     Intimate partner violence:     Fear of current or ex partner:  Not on file     Emotionally abused: Not on file     Physically abused: Not on file     Forced sexual activity: Not on file   Other Topics Concern     Parent/sibling w/ CABG, MI or angioplasty before 65F 55M? Not Asked   Social History Narrative    , lives in [place].  Previously from [place], [place].  Worked in drug store,  was a pharmacist.     Family History   Problem Relation Age of Onset     Heart Disease Mother 60         of mi     Heart Disease Father 60         of mi     Heart Disease Brother      Heart Disease Brother      Current Outpatient Medications   Medication Sig Dispense Refill     acetaminophen (TYLENOL) 500 MG tablet Take 500 mg by mouth every 6 hours as needed for mild pain       aspirin 81 MG chewable tablet Take 81 mg by mouth daily with food       diltiazem ER (DILT-XR) 180 MG 24 hr capsule Take 1 capsule (180 mg) by mouth daily 90 capsule 3     Incontinence Supply Disposable (POISE PAD) PADS 1 Units daily 30 each 11     levothyroxine (SYNTHROID/LEVOTHROID) 88 MCG tablet TAKE 1 TABLET (88 MCG) BY MOUTH DAILY 90 tablet 3     order for DME Equipment being ordered: Spikes for cane 1 Units 1     order for DME Equipment being ordered: plastic underpants 1 Units 1     Celebrex [celecoxib] and Tolterodine      Review of Systems:  Review of Systems  See HPI  Objective:   /80 (BP Location: Right arm, Patient Position: Sitting, Cuff Size: Adult Regular)   Pulse 74   Temp 97.4  F (36.3  C) (Tympanic)   Resp 16   Wt 56.9 kg (125 lb 6.4 oz)   SpO2 99%   Breastfeeding No   BMI 22.64 kg/m    Physical Exam  Blood pressure checked by this provider with patient sitting is 128/78 and standing 140/80.  After patient is standing and when she starts walking is when she states she feels unsafe on her feet and (lightheadedness).  Skin color pink.  Mucous membranes moist.  No carotid bruits.  Lung fields clear to auscultation.  Cardiovascular regular rate and rhythm.  She does have  an antalgic gait and does drag the right foot slightly with ambulation.  Assessment:    ICD-10-CM    1. Lightheadedness R42 US Carotid Bilateral   2. Chronic bilateral low back pain without sciatica M54.5 MR Lumbar Spine w/o & w Contrast    G89.29    3. Gait instability R26.81 MR Lumbar Spine w/o & w Contrast   4. Weakness of right foot R29.898 MR Lumbar Spine w/o & w Contrast   5. Hypothyroidism, unspecified type E03.9 levothyroxine (SYNTHROID/LEVOTHROID) 88 MCG tablet       Plan:   Do not feel that her symptoms are cardiogenic nor vascular in origin.  Will obtain ultrasound of carotid as her daughter would like to have this evaluated further.  She did have an MRI of her brain which showed chronic age-related changes.  She may continue to work with physical therapy.  No evidence of orthostasis on examination today.  Will schedule an MRI of her low back as her back pain could be contributing to her gait instability.  May need steroid injections.  We will see her back in 2-3 weeks to follow-up on carotid ultrasound and MRI.  Also would recommend that she be evaluated by a neurologist as she still feels that her symptoms are best described as being lightheaded and not related to her gait.  I personally do not feel that is the case however.    DRU Salcedo   2/17/2020  11:34 AM

## 2020-02-18 ENCOUNTER — HOSPITAL ENCOUNTER (OUTPATIENT)
Dept: MRI IMAGING | Facility: OTHER | Age: 85
Discharge: HOME | End: 2020-02-18
Attending: NURSE PRACTITIONER | Admitting: NURSE PRACTITIONER
Payer: MEDICARE

## 2020-02-18 DIAGNOSIS — R29.898 WEAKNESS OF RIGHT FOOT: ICD-10-CM

## 2020-02-18 DIAGNOSIS — G89.29 CHRONIC BILATERAL LOW BACK PAIN WITHOUT SCIATICA: ICD-10-CM

## 2020-02-18 DIAGNOSIS — M54.50 CHRONIC BILATERAL LOW BACK PAIN WITHOUT SCIATICA: ICD-10-CM

## 2020-02-18 DIAGNOSIS — R26.81 GAIT INSTABILITY: ICD-10-CM

## 2020-02-18 PROCEDURE — 72148 MRI LUMBAR SPINE W/O DYE: CPT

## 2020-02-19 ENCOUNTER — HOSPITAL ENCOUNTER (OUTPATIENT)
Dept: ULTRASOUND IMAGING | Facility: OTHER | Age: 85
Discharge: HOME | End: 2020-02-19
Attending: NURSE PRACTITIONER | Admitting: UROLOGY
Payer: MEDICARE

## 2020-02-19 ENCOUNTER — OFFICE VISIT (OUTPATIENT)
Dept: UROLOGY | Facility: OTHER | Age: 85
End: 2020-02-19
Attending: UROLOGY
Payer: MEDICARE

## 2020-02-19 ENCOUNTER — HOSPITAL ENCOUNTER (OUTPATIENT)
Dept: PHYSICAL THERAPY | Facility: OTHER | Age: 85
Setting detail: THERAPIES SERIES
End: 2020-02-19
Attending: NURSE PRACTITIONER
Payer: MEDICARE

## 2020-02-19 VITALS
WEIGHT: 125 LBS | BODY MASS INDEX: 22.57 KG/M2 | SYSTOLIC BLOOD PRESSURE: 122 MMHG | DIASTOLIC BLOOD PRESSURE: 78 MMHG | HEART RATE: 78 BPM | RESPIRATION RATE: 16 BRPM

## 2020-02-19 DIAGNOSIS — N32.81 OVERACTIVE BLADDER: Primary | ICD-10-CM

## 2020-02-19 DIAGNOSIS — R42 LIGHTHEADEDNESS: ICD-10-CM

## 2020-02-19 DIAGNOSIS — M48.061 SPINAL STENOSIS OF LUMBAR REGION, UNSPECIFIED WHETHER NEUROGENIC CLAUDICATION PRESENT: Primary | ICD-10-CM

## 2020-02-19 PROCEDURE — G0463 HOSPITAL OUTPT CLINIC VISIT: HCPCS | Mod: 25

## 2020-02-19 PROCEDURE — 51798 US URINE CAPACITY MEASURE: CPT | Performed by: UROLOGY

## 2020-02-19 PROCEDURE — 99213 OFFICE O/P EST LOW 20 MIN: CPT | Performed by: UROLOGY

## 2020-02-19 PROCEDURE — 97012 MECHANICAL TRACTION THERAPY: CPT | Mod: GP | Performed by: PHYSICAL THERAPIST

## 2020-02-19 PROCEDURE — 93880 EXTRACRANIAL BILAT STUDY: CPT

## 2020-02-19 PROCEDURE — 97110 THERAPEUTIC EXERCISES: CPT | Mod: GP | Performed by: PHYSICAL THERAPIST

## 2020-02-19 ASSESSMENT — PAIN SCALES - GENERAL: PAINLEVEL: MILD PAIN (3)

## 2020-02-19 NOTE — NURSING NOTE
Pt presents to clinic for follow up after Botox    Review of Systems:    Weight loss:    No     Recent fever/chills:  No   Night sweats:   No  Current skin rash:  No   Recent hair loss:  No  Heat intolerance:  No   Cold intolerance:  No  Chest pain:   No   Palpitations:   No  Shortness of breath:  No   Wheezing:   No  Constipation:    No   Diarrhea:   No   Nausea:   No   Vomiting:   No   Kidney/side pain:  No   Back pain:   Yes  Frequent headaches:  No   Dizziness:     Yes  Leg swelling:   No   Calf pain:    No    Post-Void Residual  A post-void residual was measured by ultrasonic bladder scanner.  >476 mL

## 2020-02-19 NOTE — PROGRESS NOTES
Type of Visit  Established    Chief Complaint  Urgency  Urge incontinence    HPI  Ms. Horn is a 89 year old female with history of urge urinary incontinence s/p Botox 100 units 5 weeks ago performed in clinic.  She reports significant improvement since undergoing the Botox treatment.  She reports her incontinence has improved by 90% since the Botox treatment.  She did not experience post-op retention or UTIs.  She denies difficulty emptying bladder and weak stream.      Review of Systems  I reviewed the ROS with the patient.    Nursing Notes:   Kim Hull LPN  2/19/2020 11:35 AM  Signed  Pt presents to clinic for follow up after Botox    Review of Systems:    Weight loss:    No     Recent fever/chills:  No   Night sweats:   No  Current skin rash:  No   Recent hair loss:  No  Heat intolerance:  No   Cold intolerance:  No  Chest pain:   No   Palpitations:   No  Shortness of breath:  No   Wheezing:   No  Constipation:    No   Diarrhea:   No   Nausea:   No   Vomiting:   No   Kidney/side pain:  No   Back pain:   Yes  Frequent headaches:  No   Dizziness:     Yes  Leg swelling:   No   Calf pain:    No    Post-Void Residual  A post-void residual was measured by ultrasonic bladder scanner.  >476 mL          Physical Exam  Vitals:    02/19/20 1103   BP: 122/78   BP Location: Right arm   Patient Position: Sitting   Cuff Size: Adult Regular   Pulse: 78   Resp: 16   Weight: 56.7 kg (125 lb)     Constitutional: NAD, WDWN.  Cardiovascular: Regular rate.  Pulmonary/Chest: Respirations are even and non-labored bilaterally.  Abdominal: Soft. No distension, tenderness, masses or guarding. No CVA tenderness.  Extremities: BERTRAND x 4, Warm. No clubbing.  No cyanosis.    Skin: Pink, warm and dry.  No rashes noted.    Labs  Results for orders placed or performed during the hospital encounter of 02/19/20   US Carotid Bilateral     Status: None    Narrative    US CAROTID BILATERAL    HISTORY:  Presyncope.    TECHNIQUE: Grayscale,  color Doppler and spectral Doppler assessment of  the major cervical arteries was performed.     COMPARISON: None.    FINDINGS:    The caliber of the common carotid arteries is preserved. No elevation  of systolic velocities within the common carotids is seen.    There is mild mixed atherosclerotic plaque at the carotid  bifurcations.    Peak systolic velocity within the proximal internal carotid arteries  measures up to 81 cm/s on the right and 83 cm/s on the left. No  downstream tardus parvus waveforms are identified.    Antegrade flow is seen in the vertebral and external carotid arteries.      Impression    IMPRESSION:     No evidence of flow-limiting atherosclerotic disease of the neck.      KATELIN CLEMENTS MD       Assessment  Ms. Horn is a 89 year old female s/p Botox 100 units 5 weeks ago  Her symptoms are improving.    Plan  Continue Botox therapy  Follow up when symptoms return to schedule retreatment

## 2020-02-19 NOTE — PROGRESS NOTES
[facility]          OUTPATIENT PHYSICAL THERAPY ORTHOPEDIC EVALUATION  PLAN OF TREATMENT FOR OUTPATIENT REHABILITATION  (COMPLETE FOR INITIAL CLAIMS ONLY)  Patient's Last Name, First Name, M.I.  YOB: 1926  KoryGeno  F    Provider s Name:  [facility]   Medical Record No.  [id number]   Start of Care Date:      Onset Date:      Type:     _X__PT   ___OT   ___SLP Medical Diagnosis:        PT Diagnosis:  weakness, gait/balance issues, decreased tolerance to activities.   Visits from SOC:  1      _________________________________________________________________________________  Plan of Treatment/Functional Goals:              Goals  Goal Identifier: hip strength  Goal Description: patietn will improve hip strength to 4 or 4+ abduction/ext, as to reduce falls risk and hip pain and shifting when wlaking  Target Date: 03/13/20(goal extended, slow progress)    Goal Identifier: walk  Goal Description: patient will walk in [place] with walker 1000ft. to improve [place] to outings to store  Target Date: 03/13/20    Goal Identifier: pain  Goal Description: pain will decrease by 50% with kitchen tasks.  Target Date: 03/13/20(improving, but still sore in lumbar spine. )           Therapy Frequency:   1-2x/wk  Predicted Duration of Therapy Intervention:   60 days from cert.     Parrish Osuna, PT                 I CERTIFY THE NEED FOR THESE SERVICES FURNISHED UNDER        THIS PLAN OF TREATMENT AND WHILE UNDER MY CARE     (Physician co-signature of this document indicates review and certification of the therapy plan).                       Certification Date From:    2/12/20  Certification Date To:   4/10/20    Referring Provider:   Aj    Initial Assessment        See Epic Evaluation

## 2020-02-24 ENCOUNTER — HOSPITAL ENCOUNTER (OUTPATIENT)
Dept: PHYSICAL THERAPY | Facility: OTHER | Age: 85
Setting detail: THERAPIES SERIES
End: 2020-02-24
Attending: NURSE PRACTITIONER
Payer: MEDICARE

## 2020-02-24 PROCEDURE — 97110 THERAPEUTIC EXERCISES: CPT | Mod: GP,CQ

## 2020-02-24 PROCEDURE — 97012 MECHANICAL TRACTION THERAPY: CPT | Mod: GP,CQ

## 2020-02-25 ENCOUNTER — HOSPITAL ENCOUNTER (OUTPATIENT)
Dept: GENERAL RADIOLOGY | Facility: OTHER | Age: 85
Discharge: HOME | End: 2020-02-25
Attending: NURSE PRACTITIONER | Admitting: NURSE PRACTITIONER
Payer: MEDICARE

## 2020-02-25 DIAGNOSIS — M48.061 SPINAL STENOSIS OF LUMBAR REGION, UNSPECIFIED WHETHER NEUROGENIC CLAUDICATION PRESENT: ICD-10-CM

## 2020-02-25 PROCEDURE — 25000125 ZZHC RX 250: Performed by: RADIOLOGY

## 2020-02-25 PROCEDURE — 25500064 ZZH RX 255 OP 636: Performed by: RADIOLOGY

## 2020-02-25 PROCEDURE — 25000128 H RX IP 250 OP 636: Performed by: RADIOLOGY

## 2020-02-25 PROCEDURE — 62323 NJX INTERLAMINAR LMBR/SAC: CPT

## 2020-02-25 RX ORDER — METHYLPREDNISOLONE ACETATE 80 MG/ML
80 INJECTION, SUSPENSION INTRA-ARTICULAR; INTRALESIONAL; INTRAMUSCULAR; SOFT TISSUE ONCE
Status: COMPLETED | OUTPATIENT
Start: 2020-02-25 | End: 2020-02-25

## 2020-02-25 RX ORDER — LIDOCAINE HYDROCHLORIDE 10 MG/ML
2 INJECTION, SOLUTION EPIDURAL; INFILTRATION; INTRACAUDAL; PERINEURAL ONCE
Status: COMPLETED | OUTPATIENT
Start: 2020-02-25 | End: 2020-02-25

## 2020-02-25 RX ADMIN — IOHEXOL 2 ML: 240 INJECTION, SOLUTION INTRATHECAL; INTRAVASCULAR; INTRAVENOUS; ORAL at 11:33

## 2020-02-25 RX ADMIN — LIDOCAINE HYDROCHLORIDE 2 ML: 10 INJECTION, SOLUTION EPIDURAL; INFILTRATION; INTRACAUDAL; PERINEURAL at 11:34

## 2020-02-25 RX ADMIN — METHYLPREDNISOLONE ACETATE 80 MG: 80 INJECTION, SUSPENSION INTRA-ARTICULAR; INTRALESIONAL; INTRAMUSCULAR; SOFT TISSUE at 11:34

## 2020-02-25 RX ADMIN — LIDOCAINE HYDROCHLORIDE 2 ML: 10 INJECTION, SOLUTION INFILTRATION; PERINEURAL at 11:34

## 2020-02-27 ENCOUNTER — HOSPITAL ENCOUNTER (OUTPATIENT)
Dept: PHYSICAL THERAPY | Facility: OTHER | Age: 85
Setting detail: THERAPIES SERIES
End: 2020-02-27
Attending: NURSE PRACTITIONER
Payer: MEDICARE

## 2020-02-27 PROCEDURE — 97110 THERAPEUTIC EXERCISES: CPT | Mod: GP,CQ

## 2020-02-27 PROCEDURE — 97012 MECHANICAL TRACTION THERAPY: CPT | Mod: GP,CQ

## 2020-03-02 ENCOUNTER — OFFICE VISIT (OUTPATIENT)
Dept: INTERNAL MEDICINE | Facility: OTHER | Age: 85
End: 2020-03-02
Attending: NURSE PRACTITIONER
Payer: MEDICARE

## 2020-03-02 VITALS
HEART RATE: 72 BPM | RESPIRATION RATE: 16 BRPM | SYSTOLIC BLOOD PRESSURE: 138 MMHG | OXYGEN SATURATION: 97 % | TEMPERATURE: 96.2 F | DIASTOLIC BLOOD PRESSURE: 72 MMHG

## 2020-03-02 DIAGNOSIS — M48.061 SPINAL STENOSIS OF LUMBAR REGION, UNSPECIFIED WHETHER NEUROGENIC CLAUDICATION PRESENT: ICD-10-CM

## 2020-03-02 DIAGNOSIS — J30.89 NON-SEASONAL ALLERGIC RHINITIS, UNSPECIFIED TRIGGER: ICD-10-CM

## 2020-03-02 DIAGNOSIS — I10 ESSENTIAL HYPERTENSION: Primary | ICD-10-CM

## 2020-03-02 PROCEDURE — 99213 OFFICE O/P EST LOW 20 MIN: CPT | Performed by: NURSE PRACTITIONER

## 2020-03-02 PROCEDURE — G0463 HOSPITAL OUTPT CLINIC VISIT: HCPCS

## 2020-03-02 RX ORDER — DILTIAZEM HYDROCHLORIDE 120 MG/1
120 CAPSULE, EXTENDED RELEASE ORAL DAILY
Qty: 30 CAPSULE | Refills: 3 | Status: SHIPPED | OUTPATIENT
Start: 2020-03-02 | End: 2020-03-11

## 2020-03-02 RX ORDER — CETIRIZINE HYDROCHLORIDE 10 MG/1
10 TABLET ORAL DAILY
Qty: 90 TABLET | Refills: 3 | Status: SHIPPED | OUTPATIENT
Start: 2020-03-02 | End: 2020-03-11

## 2020-03-02 ASSESSMENT — PAIN SCALES - GENERAL: PAINLEVEL: EXTREME PAIN (8)

## 2020-03-02 NOTE — PROGRESS NOTES
Subjective:  She is here today to follow-up on gait instability and lightheadedness.  She is finding improvement with physical therapy as far as low back pain.  There is really been no change in her gait or lightheadedness.  She did have an epidural lumbar injection and did not find any improvement with her back pain.  She is convinced that the Cardizem is causing symptoms of fatigue and lightheadedness.  She states it is written on her bottle.  This medication has been held before with no improvement in her symptoms.  Her daughter is wondering if at least the dose can be reduced.  She also has allergic rhinitis with nasal congestion and drainage and a scratchy throat.  She would like a prescription for an over-the-counter allergy medication.    Patient Active Problem List   Diagnosis     Allergic rhinitis     Coronary artery disease involving native coronary artery of native heart without angina pectoris     H/O basal cell carcinoma excision     Hearing loss     Hypertension     Hypothyroidism     Osteoarthritis of both hips     Urinary urgency     Unsteady gait     Sepsis (H)     Spinal stenosis of lumbar region, unspecified whether neurogenic claudication present     Past Medical History:   Diagnosis Date     Hypertension      Hypothyroid      Osteoarthritis      ST elevation (STEMI) myocardial infarction (H)     1981; angioplasty     Past Surgical History:   Procedure Laterality Date     ARTHROPLASTY HIP Right     Dr Rodriguez     ARTHROPLASTY HIP Left 2015    Dr. Rodriguez     COMBINED CYSTOSCOPY, URETEROSCOPY, LASER HOLMIUM LITHOTRIPSY URETER(S) Right 7/23/2019    Procedure: CYSTOSCOPY, RIGHT STENT REMOVAL, URETEROSCOPY, LASER HOLMIUM LITHOTRIPSY RIGHT URETER WITH STENT REPLACEMENT;  Surgeon: Shawn Cole MD;  Location: [facility] OR     CYSTOSCOPY, RETROGRADES, INSERT STENT URETER(S), COMBINED Right 6/17/2019    Procedure: CYSTOSCOPY, WITH RETROGRADE PYELOGRAM AND URETERAL STENT INSERTION;  Surgeon: Shawn Cole MD;   Location:  OR     Social History     Socioeconomic History     Marital status:      Spouse name: Not on file     Number of children: Not on file     Years of education: Not on file     Highest education level: Not on file   Occupational History     Not on file   Social Needs     Financial resource strain: Not on file     Food insecurity:     Worry: Not on file     Inability: Not on file     Transportation needs:     Medical: Not on file     Non-medical: Not on file   Tobacco Use     Smoking status: Never Smoker     Smokeless tobacco: Never Used     Tobacco comment: no ecig   Substance and Sexual Activity     Alcohol use: Not Currently     Alcohol/week: 0.0 standard drinks     Drug use: No     Sexual activity: Not Currently     Partners: Male     Birth control/protection: Post-menopausal   Lifestyle     Physical activity:     Days per week: Not on file     Minutes per session: Not on file     Stress: Not on file   Relationships     Social connections:     Talks on phone: Not on file     Gets together: Not on file     Attends Adventism service: Not on file     Active member of club or organization: Not on file     Attends meetings of clubs or organizations: Not on file     Relationship status: Not on file     Intimate partner violence:     Fear of current or ex partner: Not on file     Emotionally abused: Not on file     Physically abused: Not on file     Forced sexual activity: Not on file   Other Topics Concern     Parent/sibling w/ CABG, MI or angioplasty before 65F 55M? Not Asked   Social History Narrative    , lives in [place].  Previously from [place], [place].  Worked in drug store,  was a pharmacist.     Family History   Problem Relation Age of Onset     Heart Disease Mother 60         of mi     Heart Disease Father 60         of mi     Heart Disease Brother      Heart Disease Brother      Current Outpatient Medications   Medication Sig Dispense Refill     cetirizine (ZYRTEC) 10  MG tablet Take 1 tablet (10 mg) by mouth daily 90 tablet 3     diltiazem ER (DILT-XR) 120 MG 24 hr capsule Take 1 capsule (120 mg) by mouth daily 30 capsule 3     acetaminophen (TYLENOL) 500 MG tablet Take 500 mg by mouth every 6 hours as needed for mild pain       aspirin 81 MG chewable tablet Take 81 mg by mouth daily with food       Incontinence Supply Disposable (POISE PAD) PADS 1 Units daily 30 each 11     levothyroxine (SYNTHROID/LEVOTHROID) 88 MCG tablet TAKE 1 TABLET (88 MCG) BY MOUTH DAILY 90 tablet 3     order for DME Equipment being ordered: Spikes for cane 1 Units 1     order for DME Equipment being ordered: plastic underpants 1 Units 1     Celebrex [celecoxib] and Tolterodine      Review of Systems:  Review of Systems  See HPI  Objective:   /72 (BP Location: Right arm, Patient Position: Sitting, Cuff Size: Adult Regular)   Pulse 72   Temp 96.2  F (35.7  C) (Temporal)   Resp 16   SpO2 97%   Physical Exam  Pleasant female no acute distress.  Affect normal.  Alert and pleasant.  Skin color pink.  Mucous memories moist.  Lung fields clear to auscultation.  Cardiovascular regular.  She has antalgic gait and a slightly drag of the right foot that is chronic.  She is using a cane today.    Assessment:    ICD-10-CM    1. Essential hypertension I10 diltiazem ER (DILT-XR) 120 MG 24 hr capsule   2. Non-seasonal allergic rhinitis, unspecified trigger J30.89 cetirizine (ZYRTEC) 10 MG tablet   3. Spinal stenosis of lumbar region, unspecified whether neurogenic claudication present M48.061        Plan:   1.  She is concerned by the symptoms of drowsiness and lightheadedness that are on the bottle of the Cardizem.  Her daughter would like a trial of dose reduction.  We will reduce this down to diltiazem  mg daily.  She was taking 180 mg daily.  She has a nurse who is in her building who will check her blood pressure twice weekly and she will bring these into follow-up visit in a couple of weeks.  2.   Start Zyrtec 10 mg daily.  3.  Did not notice any improvement with back injection.  Continue with physical therapy.  Did not want neurology appointment for her lightheadedness and gait impairment nor did she want follow-up visit with Dr. He to see if a different area would be appropriate for injection.    DRU Salcedo   3/2/2020  9:35 AM

## 2020-03-02 NOTE — NURSING NOTE
Chief Complaint   Patient presents with     Follow Up     light headed       Medication Reconciliation complete.   Val Cardenas LPN  ..................3/2/2020   8:59 AM

## 2020-03-05 ENCOUNTER — HOSPITAL ENCOUNTER (OUTPATIENT)
Dept: PHYSICAL THERAPY | Facility: OTHER | Age: 85
Setting detail: THERAPIES SERIES
End: 2020-03-05
Attending: NURSE PRACTITIONER
Payer: MEDICARE

## 2020-03-05 PROCEDURE — 97110 THERAPEUTIC EXERCISES: CPT | Mod: GP,CQ

## 2020-03-05 PROCEDURE — 97012 MECHANICAL TRACTION THERAPY: CPT | Mod: GP,CQ

## 2020-03-06 ENCOUNTER — ALLIED HEALTH/NURSE VISIT (OUTPATIENT)
Dept: FAMILY MEDICINE | Facility: OTHER | Age: 85
End: 2020-03-06
Attending: NURSE PRACTITIONER
Payer: MEDICARE

## 2020-03-06 VITALS — SYSTOLIC BLOOD PRESSURE: 159 MMHG | DIASTOLIC BLOOD PRESSURE: 76 MMHG

## 2020-03-06 DIAGNOSIS — I10 HYPERTENSION: Primary | ICD-10-CM

## 2020-03-06 NOTE — NURSING NOTE
"Chief Complaint   Patient presents with     Hypertension     Nurse only BP check    Initial There were no vitals taken for this visit. Estimated body mass index is 22.57 kg/m  as calculated from the following:    Height as of 7/10/19: 1.585 m (5' 2.4\").    Weight as of 2/19/20: 56.7 kg (125 lb).    Medication Reconciliation: complete      Matt Hull LPN  "

## 2020-03-11 ENCOUNTER — OFFICE VISIT (OUTPATIENT)
Dept: INTERNAL MEDICINE | Facility: OTHER | Age: 85
End: 2020-03-11
Attending: NURSE PRACTITIONER
Payer: MEDICARE

## 2020-03-11 ENCOUNTER — HOSPITAL ENCOUNTER (OUTPATIENT)
Dept: PHYSICAL THERAPY | Facility: OTHER | Age: 85
Setting detail: THERAPIES SERIES
End: 2020-03-11
Attending: NURSE PRACTITIONER
Payer: MEDICARE

## 2020-03-11 VITALS
OXYGEN SATURATION: 98 % | DIASTOLIC BLOOD PRESSURE: 78 MMHG | HEART RATE: 72 BPM | SYSTOLIC BLOOD PRESSURE: 142 MMHG | RESPIRATION RATE: 16 BRPM | TEMPERATURE: 97.9 F

## 2020-03-11 DIAGNOSIS — R42 LIGHTHEADEDNESS: ICD-10-CM

## 2020-03-11 DIAGNOSIS — I10 ESSENTIAL HYPERTENSION: Primary | ICD-10-CM

## 2020-03-11 DIAGNOSIS — R26.81 UNSTEADY GAIT: ICD-10-CM

## 2020-03-11 PROCEDURE — G0463 HOSPITAL OUTPT CLINIC VISIT: HCPCS

## 2020-03-11 PROCEDURE — 99213 OFFICE O/P EST LOW 20 MIN: CPT | Performed by: NURSE PRACTITIONER

## 2020-03-11 PROCEDURE — 97112 NEUROMUSCULAR REEDUCATION: CPT | Mod: GP | Performed by: PHYSICAL THERAPIST

## 2020-03-11 PROCEDURE — 97110 THERAPEUTIC EXERCISES: CPT | Mod: GP | Performed by: PHYSICAL THERAPIST

## 2020-03-11 PROCEDURE — 97140 MANUAL THERAPY 1/> REGIONS: CPT | Mod: GP | Performed by: PHYSICAL THERAPIST

## 2020-03-11 RX ORDER — LORATADINE 10 MG/1
10 TABLET ORAL DAILY
Status: ON HOLD | COMMUNITY
End: 2020-11-02

## 2020-03-11 RX ORDER — DILTIAZEM HYDROCHLORIDE 180 MG/1
180 CAPSULE, EXTENDED RELEASE ORAL DAILY
Qty: 90 CAPSULE | Refills: 3 | Status: SHIPPED | OUTPATIENT
Start: 2020-03-11 | End: 2021-09-07

## 2020-03-11 ASSESSMENT — PAIN SCALES - GENERAL: PAINLEVEL: SEVERE PAIN (7)

## 2020-03-11 NOTE — PROGRESS NOTES
Subjective:  She is here today for follow-up on hypertension, lightheadedness and unsteady gait.  She has lumbar spinal stenosis, arthritis and walks with a limp due to slightly dragging her right foot.  Her daughter wanted us to try a lower dose of diltiazem to make sure that was not the cause of her symptoms.  Patient continues to describe this as an off balance.  She calls it lightheadedness but with further discussion it is more of a balance issue.  The diltiazem dose was dropped from 180 mg down to 120 mg.  She has been checking her blood pressures and systolic blood pressure ranges from 142-159.  Her reported symptoms did not improve at all with reduction of the dose.  A couple of years ago at patient's request her diltiazem actually was placed on hold for 2 weeks and she did not have improvement in symptoms at that time either.  Her daughter is requesting that she see a neurologist.  She is concerned that the reports of lightheadedness could be neurologic.  She also continues to complain of eye discomfort which is also been a chronic complaint over the last several years with negative eye exams.    Patient Active Problem List   Diagnosis     Allergic rhinitis     Coronary artery disease involving native coronary artery of native heart without angina pectoris     H/O basal cell carcinoma excision     Hearing loss     Hypertension     Hypothyroidism     Osteoarthritis of both hips     Urinary urgency     Unsteady gait     Sepsis (H)     Spinal stenosis of lumbar region, unspecified whether neurogenic claudication present     Past Medical History:   Diagnosis Date     Hypertension      Hypothyroid      Osteoarthritis      ST elevation (STEMI) myocardial infarction (H)     1981; angioplasty     Past Surgical History:   Procedure Laterality Date     ARTHROPLASTY HIP Right     Dr Rodriguez     ARTHROPLASTY HIP Left 2015    Dr. Rodriguez     COMBINED CYSTOSCOPY, URETEROSCOPY, LASER HOLMIUM LITHOTRIPSY URETER(S) Right  7/23/2019    Procedure: CYSTOSCOPY, RIGHT STENT REMOVAL, URETEROSCOPY, LASER HOLMIUM LITHOTRIPSY RIGHT URETER WITH STENT REPLACEMENT;  Surgeon: Shawn Cole MD;  Location:  OR     CYSTOSCOPY, RETROGRADES, INSERT STENT URETER(S), COMBINED Right 6/17/2019    Procedure: CYSTOSCOPY, WITH RETROGRADE PYELOGRAM AND URETERAL STENT INSERTION;  Surgeon: Shawn Cole MD;  Location:  OR     Social History     Socioeconomic History     Marital status:      Spouse name: Not on file     Number of children: Not on file     Years of education: Not on file     Highest education level: Not on file   Occupational History     Not on file   Social Needs     Financial resource strain: Not on file     Food insecurity     Worry: Not on file     Inability: Not on file     Transportation needs     Medical: Not on file     Non-medical: Not on file   Tobacco Use     Smoking status: Never Smoker     Smokeless tobacco: Never Used     Tobacco comment: no ecig   Substance and Sexual Activity     Alcohol use: Not Currently     Alcohol/week: 0.0 standard drinks     Drug use: No     Sexual activity: Not Currently     Partners: Male     Birth control/protection: Post-menopausal   Lifestyle     Physical activity     Days per week: Not on file     Minutes per session: Not on file     Stress: Not on file   Relationships     Social connections     Talks on phone: Not on file     Gets together: Not on file     Attends [employer] service: Not on file     Active member of club or organization: Not on file     Attends meetings of clubs or organizations: Not on file     Relationship status: Not on file     Intimate partner violence     Fear of current or ex partner: Not on file     Emotionally abused: Not on file     Physically abused: Not on file     Forced sexual activity: Not on file   Other Topics Concern     Parent/sibling w/ CABG, MI or angioplasty before 65F 55M? Not Asked   Social History Narrative    , lives in [place].   Previously from [place], [place].  Worked in drug store,  was a pharmacist.     Family History   Problem Relation Age of Onset     Heart Disease Mother 60         of mi     Heart Disease Father 60         of mi     Heart Disease Brother      Heart Disease Brother      Current Outpatient Medications   Medication Sig Dispense Refill     diltiazem ER (DILT-XR) 180 MG 24 hr capsule Take 1 capsule (180 mg) by mouth daily 90 capsule 3     loratadine (CLARITIN) 10 MG tablet Take 10 mg by mouth daily       acetaminophen (TYLENOL) 500 MG tablet Take 500 mg by mouth every 6 hours as needed for mild pain       aspirin 81 MG chewable tablet Take 81 mg by mouth daily with food       Incontinence Supply Disposable (POISE PAD) PADS 1 Units daily 30 each 11     levothyroxine (SYNTHROID/LEVOTHROID) 88 MCG tablet TAKE 1 TABLET (88 MCG) BY MOUTH DAILY 90 tablet 3     order for DME Equipment being ordered: Spikes for cane 1 Units 1     order for DME Equipment being ordered: plastic underpants 1 Units 1     Celebrex [celecoxib] and Tolterodine      Review of Systems:  Review of Systems  See HPI  Objective:   BP (!) 142/78 (BP Location: Right arm, Patient Position: Sitting, Cuff Size: Adult Regular)   Pulse 72   Temp 97.9  F (36.6  C) (Tympanic)   Resp 16   SpO2 98%   Physical Exam  Pleasant female no acute distress.  Affect normal.  Alert and oriented x4.  Skin color pink.  Mucous membranes moist.  Lung fields clear to auscultation.  Cardiovascular regular rate and rhythm.  Assessment:    ICD-10-CM    1. Essential hypertension  I10 diltiazem ER (DILT-XR) 180 MG 24 hr capsule   2. Lightheadedness  R42 NEUROLOGY ADULT REFERRAL   3. Unsteady gait  R26.81 NEUROLOGY ADULT REFERRAL       Plan:   1.  Reported symptoms did not improve with lowering the dose of diltiazem ER therefore will go back to the 180 mg daily dose.  2.  She continues to complain of lightheadedness, unsteady gait and eye pain with negative findings after  "extensive work-up.  It is been felt that her \" lightheadedness\" is due to her gait instability from spinal stenosis, arthritis etc. at her request I did place a referral to neurology for a consulting opinion.    DRU Salcedo   3/11/2020  8:59 AM  "

## 2020-03-11 NOTE — NURSING NOTE
Chief Complaint   Patient presents with     Follow Up     blood pressure       Medication Reconciliation complete.   Val Cradenas LPN  ..................3/11/2020   8:17 AM

## 2020-03-12 ENCOUNTER — TELEPHONE (OUTPATIENT)
Dept: INTERNAL MEDICINE | Facility: OTHER | Age: 85
End: 2020-03-12

## 2020-03-12 DIAGNOSIS — H90.5 SENSORINEURAL HEARING LOSS (SNHL), UNSPECIFIED LATERALITY: Primary | ICD-10-CM

## 2020-03-13 ENCOUNTER — HOSPITAL ENCOUNTER (OUTPATIENT)
Dept: PHYSICAL THERAPY | Facility: OTHER | Age: 85
Setting detail: THERAPIES SERIES
End: 2020-03-13
Attending: NURSE PRACTITIONER
Payer: MEDICARE

## 2020-03-13 PROCEDURE — 97112 NEUROMUSCULAR REEDUCATION: CPT | Mod: GP | Performed by: PHYSICAL THERAPIST

## 2020-03-13 PROCEDURE — 97140 MANUAL THERAPY 1/> REGIONS: CPT | Mod: GP,XU | Performed by: PHYSICAL THERAPIST

## 2020-03-13 PROCEDURE — 97110 THERAPEUTIC EXERCISES: CPT | Mod: GP | Performed by: PHYSICAL THERAPIST

## 2020-03-13 PROCEDURE — 97012 MECHANICAL TRACTION THERAPY: CPT | Mod: GP | Performed by: PHYSICAL THERAPIST

## 2020-03-17 ENCOUNTER — TELEPHONE (OUTPATIENT)
Dept: INTERNAL MEDICINE | Facility: OTHER | Age: 85
End: 2020-03-17
Payer: MEDICARE

## 2020-03-17 DIAGNOSIS — R26.81 UNSTEADY GAIT: Primary | ICD-10-CM

## 2020-03-17 NOTE — TELEPHONE ENCOUNTER
I put the order in for a referral.  Not positive this is the correct order.    Please review and sign if okay.    Usha Warren LPN  3/17/2020  4:00 PM

## 2020-03-17 NOTE — TELEPHONE ENCOUNTER
"Left message for staff at the \"dizziness and balance center\" at [facility] to call us back to tell us how to put the order into Epic.    Usha Warren LPN  3/17/2020  2:15 PM    "

## 2020-03-17 NOTE — TELEPHONE ENCOUNTER
[facility] faxed back the referral that had been sent to their neurology department.  They are requesting that a new one be placed for their balance center.  Would Yulissa please place?  Thank you!  Mayra Bagley on 3/17/2020 at 9:06 AM

## 2020-04-10 NOTE — MR AVS SNAPSHOT
After Visit Summary   8/21/2018    Geno Horn    MRN: [id number]           Patient Information     Date Of Birth          9/21/1926        Visit Information        Provider Department      8/21/2018 1:40 PM Fransisca Rocha NP [facility]        Today's Diagnoses     Skin tear of lower leg without complication, right, subsequent encounter    -  1    Unsteady gait           Follow-ups after your visit        Who to contact     If you have questions or need follow up information about today's clinic visit or your schedule please contact [facility] directly at [phone].  Normal or non-critical lab and imaging results will be communicated to you by MyChart, letter or phone within 4 business days after the clinic has received the results. If you do not hear from us within 7 days, please contact the clinic through MyChart or phone. If you have a critical or abnormal lab result, we will notify you by phone as soon as possible.  Submit refill requests through [employer] or call your pharmacy and they will forward the refill request to us. Please allow 3 business days for your refill to be completed.          Additional Information About Your Visit        Care EveryWhere ID     This is your Care EveryWhere ID. This could be used by other organizations to access your [facility] medical records  [id number]        Your Vitals Were     Pulse Temperature BMI (Body Mass Index)             76 98.5  F (36.9  C) (Tympanic) 23.43 kg/m2          Blood Pressure from Last 3 Encounters:   08/21/18 124/72   08/01/18 136/70   07/24/18 118/72    Weight from Last 3 Encounters:   08/21/18 128 lb 1.6 oz (58.1 kg)   08/01/18 127 lb 11.2 oz (57.9 kg)   07/24/18 126 lb 6.4 oz (57.3 kg)              Today, you had the following     No orders found for display       Primary Care Provider Office Phone # Fax #    Fransisca Rocha [phone] [address]   [facility] [address]        Equal Access to Services     NANI SHULTZ : Hadii aad ku hadsalazarkimberly Altamirano, waservandoda keiko, qaybta katiuskamarikki chadwick. So [facility] [phone].    ATENCIÓN: Si habla español, tiene a hatch disposición servicios gratuitos de asistencia lingüística. Llame al [phone].    We comply with applicable federal civil rights laws and [place] laws. We do not discriminate on the basis of race, color, national origin, age, disability, sex, sexual orientation, or gender identity.            Thank you!     Thank you for choosing [facility] AND [facility]  for your care. Our goal is always to provide you with excellent care. Hearing back from our patients is one way we can continue to improve our services. Please take a few minutes to complete the written survey that you may receive in the mail after your visit with us. Thank you!             Your Updated Medication List - Protect others around you: Learn how to safely use, store and throw away your medicines at www.disposemymeds.org.          This list is accurate as of 8/21/18  3:52 PM.  Always use your most recent med list.                   Brand Name Dispense Instructions for use Diagnosis    aspirin 81 MG chewable tablet      Take 81 mg by mouth daily with food        diltiazem 180 MG 24 hr capsule    DILACOR XR    90 capsule    Take 1 capsule (180 mg) by mouth daily    Essential hypertension       levothyroxine 88 MCG tablet    SYNTHROID/LEVOTHROID    180 tablet    TAKE 1 TABLET (88 MCG) BY MOUTH DAILY    Hypothyroidism, unspecified type       order for DME     1 Units    Equipment being ordered: plastic underpants    Urinary incontinence, unspecified type       order for DME     1 Units    Equipment being ordered: Spikes for cane    Unsteady gait          comfort care, no further HD

## 2020-04-23 ENCOUNTER — TELEPHONE (OUTPATIENT)
Dept: INTERNAL MEDICINE | Facility: OTHER | Age: 85
End: 2020-04-23

## 2020-04-23 DIAGNOSIS — M48.061 SPINAL STENOSIS OF LUMBAR REGION, UNSPECIFIED WHETHER NEUROGENIC CLAUDICATION PRESENT: Primary | ICD-10-CM

## 2020-04-23 NOTE — TELEPHONE ENCOUNTER
RKV-Patients daughter called and stated her mom wants to try another back injection with Dr. Valdovinos and would need an order.  Ha Iglesias on 4/23/2020 at 10:52 AM

## 2020-04-27 NOTE — TELEPHONE ENCOUNTER
Order has been placed, radiology will decide if she is a candidate to have one done now.  Sometimes they recommend waiting 3 months.

## 2020-05-04 ENCOUNTER — HOSPITAL ENCOUNTER (OUTPATIENT)
Dept: GENERAL RADIOLOGY | Facility: OTHER | Age: 85
Discharge: HOME | End: 2020-05-04
Attending: NURSE PRACTITIONER | Admitting: NURSE PRACTITIONER
Payer: MEDICARE

## 2020-05-04 DIAGNOSIS — M48.061 SPINAL STENOSIS OF LUMBAR REGION, UNSPECIFIED WHETHER NEUROGENIC CLAUDICATION PRESENT: ICD-10-CM

## 2020-05-04 PROCEDURE — 25500064 ZZH RX 255 OP 636: Performed by: RADIOLOGY

## 2020-05-04 PROCEDURE — 25000128 H RX IP 250 OP 636: Performed by: RADIOLOGY

## 2020-05-04 PROCEDURE — 25000125 ZZHC RX 250: Performed by: RADIOLOGY

## 2020-05-04 PROCEDURE — 62323 NJX INTERLAMINAR LMBR/SAC: CPT

## 2020-05-04 RX ORDER — LIDOCAINE HYDROCHLORIDE 10 MG/ML
2 INJECTION, SOLUTION EPIDURAL; INFILTRATION; INTRACAUDAL; PERINEURAL ONCE
Status: COMPLETED | OUTPATIENT
Start: 2020-05-04 | End: 2020-05-04

## 2020-05-04 RX ORDER — METHYLPREDNISOLONE ACETATE 80 MG/ML
80 INJECTION, SUSPENSION INTRA-ARTICULAR; INTRALESIONAL; INTRAMUSCULAR; SOFT TISSUE ONCE
Status: COMPLETED | OUTPATIENT
Start: 2020-05-04 | End: 2020-05-04

## 2020-05-04 RX ORDER — LIDOCAINE HYDROCHLORIDE 10 MG/ML
2 INJECTION, SOLUTION INFILTRATION; PERINEURAL ONCE
Status: COMPLETED | OUTPATIENT
Start: 2020-05-04 | End: 2020-05-04

## 2020-05-04 RX ADMIN — LIDOCAINE HYDROCHLORIDE 2 ML: 10 INJECTION, SOLUTION EPIDURAL; INFILTRATION; INTRACAUDAL; PERINEURAL at 11:39

## 2020-05-04 RX ADMIN — IOHEXOL 2 ML: 240 INJECTION, SOLUTION INTRATHECAL; INTRAVASCULAR; INTRAVENOUS; ORAL at 11:39

## 2020-05-04 RX ADMIN — METHYLPREDNISOLONE ACETATE 80 MG: 80 INJECTION, SUSPENSION INTRA-ARTICULAR; INTRALESIONAL; INTRAMUSCULAR; SOFT TISSUE at 11:39

## 2020-05-04 RX ADMIN — LIDOCAINE HYDROCHLORIDE 2 ML: 10 INJECTION, SOLUTION INFILTRATION; PERINEURAL at 11:39

## 2020-05-19 ENCOUNTER — TRANSFERRED RECORDS (OUTPATIENT)
Dept: HEALTH INFORMATION MANAGEMENT | Facility: OTHER | Age: 85
End: 2020-05-19

## 2020-05-27 ENCOUNTER — HOSPITAL ENCOUNTER (OUTPATIENT)
Dept: PHYSICAL THERAPY | Facility: OTHER | Age: 85
Setting detail: THERAPIES SERIES
End: 2020-05-27
Attending: NURSE PRACTITIONER
Payer: MEDICARE

## 2020-05-27 PROCEDURE — 97110 THERAPEUTIC EXERCISES: CPT | Mod: GP | Performed by: PHYSICAL THERAPIST

## 2020-05-27 PROCEDURE — 97140 MANUAL THERAPY 1/> REGIONS: CPT | Mod: GP | Performed by: PHYSICAL THERAPIST

## 2020-05-27 PROCEDURE — 97112 NEUROMUSCULAR REEDUCATION: CPT | Mod: GP | Performed by: PHYSICAL THERAPIST

## 2020-05-27 NOTE — PROGRESS NOTES
[facility]      OUTPATIENT PHYSICAL THERAPY  PLAN OF TREATMENT FOR OUTPATIENT REHABILITATION    Patient's Last Name, First Name, M.I.                YOB: 1926  Geno Horn                        Provider's Name  [facility] Medical Record No.  [id number]                               Onset Date: 12/12/19   Start of Care Date: 12/12/29   Type:     _X_PT   ___OT   ___SLP Medical Diagnosis: deconditioning, weakness, hip weakness, falls risk.                       PT Diagnosis: weakness, falls risk.      _________________________________________________________________________________  Plan of Treatment:    Frequency/Duration: 1-2x/wk     Goals:  Goal Identifier hip strength   Goal Description patient will improve hip strength to 4 abduction/ext, as to reduce falls risk and hip pain and shifting when walking   Target Date 06/26/20   Date Met      Progress:     Goal Identifier walk   Goal Description patient will walk in [place] with walker 1000ft. to improve [place] to outings to store   Target Date 06/26/20   Date Met      Progress:     Goal Identifier pain   Goal Description pain will decrease by 50% with kitchen tasks.   Target Date 06/26/20   Date Met      Progress:     Goal Identifier     Goal Description     Target Date     Date Met      Progress:     Goal Identifier     Goal Description     Target Date     Date Met      Progress:     Goal Identifier     Goal Description     Target Date     Date Met      Progress:     Goal Identifier     Goal Description     Target Date     Date Met      Progress:     Goal Identifier     Goal Description     Target Date     Date Met      Progress:     Progress Toward Goals:   Progress limited due to COVID 19 pause in PT          Certification date from 5/27/2020  to 7/25/20            Parrish Osuna, PT          I CERTIFY THE NEED FOR THESE  SERVICES FURNISHED UNDER        THIS PLAN OF TREATMENT AND WHILE UNDER MY CARE     (Physician co-signature of this document indicates review and certification of the therapy plan).                Referring Provider: Yulissa Rocha

## 2020-05-28 ENCOUNTER — OFFICE VISIT (OUTPATIENT)
Dept: UROLOGY | Facility: OTHER | Age: 85
End: 2020-05-28
Attending: UROLOGY
Payer: MEDICARE

## 2020-05-28 VITALS
SYSTOLIC BLOOD PRESSURE: 138 MMHG | WEIGHT: 129 LBS | BODY MASS INDEX: 23.29 KG/M2 | DIASTOLIC BLOOD PRESSURE: 72 MMHG | HEART RATE: 78 BPM

## 2020-05-28 DIAGNOSIS — R39.15 URINARY URGENCY: ICD-10-CM

## 2020-05-28 DIAGNOSIS — N32.81 OVERACTIVE BLADDER: Primary | ICD-10-CM

## 2020-05-28 LAB
ALBUMIN UR-MCNC: NEGATIVE MG/DL
APPEARANCE UR: CLEAR
BILIRUB UR QL STRIP: NEGATIVE
COLOR UR AUTO: NORMAL
GLUCOSE UR STRIP-MCNC: NEGATIVE MG/DL
HGB UR QL STRIP: NEGATIVE
KETONES UR STRIP-MCNC: NEGATIVE MG/DL
LEUKOCYTE ESTERASE UR QL STRIP: NEGATIVE
NITRATE UR QL: NEGATIVE
PH UR STRIP: 6.5 PH (ref 5–7)
SOURCE: NORMAL
SP GR UR STRIP: 1.01 (ref 1–1.03)
UROBILINOGEN UR STRIP-MCNC: NORMAL MG/DL (ref 0–2)

## 2020-05-28 PROCEDURE — G0463 HOSPITAL OUTPT CLINIC VISIT: HCPCS

## 2020-05-28 PROCEDURE — G0463 HOSPITAL OUTPT CLINIC VISIT: HCPCS | Mod: 25

## 2020-05-28 PROCEDURE — 99213 OFFICE O/P EST LOW 20 MIN: CPT | Performed by: UROLOGY

## 2020-05-28 PROCEDURE — 81003 URINALYSIS AUTO W/O SCOPE: CPT | Mod: ZL | Performed by: UROLOGY

## 2020-05-28 PROCEDURE — 51798 US URINE CAPACITY MEASURE: CPT | Performed by: UROLOGY

## 2020-05-28 ASSESSMENT — PAIN SCALES - GENERAL: PAINLEVEL: NO PAIN (0)

## 2020-05-28 NOTE — NURSING NOTE
Pt presents today for a 3 month follow up after Botox, also may have bladder infection    Review of Systems:    Weight loss:    No     Recent fever/chills:  No   Night sweats:   No  Current skin rash:  No   Recent hair loss:  No  Heat intolerance:  No   Cold intolerance:  No  Chest pain:   No   Palpitations:   No  Shortness of breath:  No   Wheezing:   No  Constipation:    No   Diarrhea:   No   Nausea:   No   Vomiting:   No   Kidney/side pain:  Yes   Back pain:   Yes  Frequent headaches:  No   Dizziness:     Yes  Leg swelling:   No   Calf pain:    No  Post-Void Residual  A post-void residual was measured by ultrasonic bladder scanner.  498 mL

## 2020-05-28 NOTE — PROGRESS NOTES
Type of Visit  Established    Chief Complaint  Urgency  Urge incontinence    HPI  Ms. Horn is a 89 year old female with history of urge urinary incontinence s/p Botox 100 units 5 months ago performed in clinic.  Following treatment she reported approximately 90% improvement in symptoms.  In the last few weeks she has noticed progressively worsening symptoms similar to her baseline irritative urinary symptoms including urinary urgency and urge incontinence.  She denies dysuria and gross hematuria.  She denies fevers and chills.      Review of Systems  I reviewed the ROS with the patient.    Nursing Notes:   Kim Hull, LPN  5/28/2020 11:49 AM  Signed  Pt presents today for a 3 month follow up after Botox, also may have bladder infection    Review of Systems:    Weight loss:    No     Recent fever/chills:  No   Night sweats:   No  Current skin rash:  No   Recent hair loss:  No  Heat intolerance:  No   Cold intolerance:  No  Chest pain:   No   Palpitations:   No  Shortness of breath:  No   Wheezing:   No  Constipation:    No   Diarrhea:   No   Nausea:   No   Vomiting:   No   Kidney/side pain:  Yes   Back pain:   Yes  Frequent headaches:  No   Dizziness:     Yes  Leg swelling:   No   Calf pain:    No  Post-Void Residual  A post-void residual was measured by ultrasonic bladder scanner.  498 mL            Physical Exam  Vitals:    05/28/20 1141   BP: 138/72   BP Location: Left arm   Patient Position: Sitting   Cuff Size: Adult Regular   Pulse: 78   Weight: 58.5 kg (129 lb)     Constitutional: NAD, WDWN.  Cardiovascular: Regular rate.  Pulmonary/Chest: Respirations are even and non-labored bilaterally.  Abdominal: Soft. No distension, tenderness, masses or guarding. No CVA tenderness.  Extremities: BERTRAND x 4, Warm. No clubbing.  No cyanosis.    Skin: Pink, warm and dry.  No rashes noted.    Labs  Results for orders placed or performed in visit on 05/28/20   UA reflex to Microscopic     Status: None   Result Value  Ref Range    Color Urine Light Yellow     Appearance Urine Clear     Glucose Urine Negative NEG^Negative mg/dL    Bilirubin Urine Negative NEG^Negative    Ketones Urine Negative NEG^Negative mg/dL    Specific Gravity Urine 1.011 1.003 - 1.035    Blood Urine Negative NEG^Negative    pH Urine 6.5 5.0 - 7.0 pH    Protein Albumin Urine Negative NEG^Negative mg/dL    Urobilinogen mg/dL Normal 0.0 - 2.0 mg/dL    Nitrite Urine Negative NEG^Negative    Leukocyte Esterase Urine Negative NEG^Negative    Source Midstream Urine      Post-Void Residual  A post-void residual was measured by ultrasonic bladder scanner.  498 mL today    Assessment  Ms. Horn is a 89 year old female s/p Botox 100 units 5 months ago  She responded quite well to Botox which was assessed at her follow-up 1 month appointment.  The progression in symptoms differential includes UTI, post Botox retention, diminished benefit from Botox as it wears off and her return to baseline urinary symptoms.    The UA today rules out a UTI.  It is quite rare to develop symptomatic Botox retention 5 months after treatment when she was doing so well 1 month after treatment.  I am unsure if the Botox has led to her newer symptoms today however she does have a significantly elevated residual.    She is not interested in intermittent catheterization or undergoing indwelling catheter placement.    Plan  Patient will follow-up if symptoms progress

## 2020-06-10 ENCOUNTER — HOSPITAL ENCOUNTER (OUTPATIENT)
Dept: PHYSICAL THERAPY | Facility: OTHER | Age: 85
Setting detail: THERAPIES SERIES
End: 2020-06-10
Attending: NURSE PRACTITIONER
Payer: MEDICARE

## 2020-06-10 PROCEDURE — 97110 THERAPEUTIC EXERCISES: CPT | Mod: GP | Performed by: PHYSICAL THERAPIST

## 2020-06-10 PROCEDURE — 97140 MANUAL THERAPY 1/> REGIONS: CPT | Mod: GP | Performed by: PHYSICAL THERAPIST

## 2020-06-10 PROCEDURE — 97012 MECHANICAL TRACTION THERAPY: CPT | Mod: GP | Performed by: PHYSICAL THERAPIST

## 2020-06-12 ENCOUNTER — HOSPITAL ENCOUNTER (OUTPATIENT)
Dept: PHYSICAL THERAPY | Facility: OTHER | Age: 85
Setting detail: THERAPIES SERIES
End: 2020-06-12
Attending: NURSE PRACTITIONER
Payer: MEDICARE

## 2020-06-12 PROCEDURE — 97012 MECHANICAL TRACTION THERAPY: CPT | Mod: GP | Performed by: PHYSICAL THERAPIST

## 2020-06-12 PROCEDURE — 97110 THERAPEUTIC EXERCISES: CPT | Mod: GP | Performed by: PHYSICAL THERAPIST

## 2020-06-17 ENCOUNTER — HOSPITAL ENCOUNTER (OUTPATIENT)
Dept: PHYSICAL THERAPY | Facility: OTHER | Age: 85
Setting detail: THERAPIES SERIES
End: 2020-06-17
Attending: NURSE PRACTITIONER
Payer: MEDICARE

## 2020-06-17 PROCEDURE — 97110 THERAPEUTIC EXERCISES: CPT | Mod: GP | Performed by: PHYSICAL THERAPIST

## 2020-06-17 PROCEDURE — 97140 MANUAL THERAPY 1/> REGIONS: CPT | Mod: GP | Performed by: PHYSICAL THERAPIST

## 2020-06-17 NOTE — PROGRESS NOTES
Outpatient Physical Therapy Progress Note     Patient: Geno Horn  : 1926    Beginning/End Dates of Reporting Period:  20 to 2020    Referring Provider: Aj Calvert Diagnosis: weakness, falls risk, back pain     Client Self Report: back still hurts, shoulder is sore, especially during sleep, always feeling slightly light headed.     Objective Measurements:  Objective Measure: Subjective pain rating   Details: 7/10 - back          Goals:  Goal Identifier hip strength   Goal Description patient will improve hip strength to 4 abduction/ext, as to reduce falls risk and hip pain and shifting when walking   Target Date 20   Date Met      Progress:     Goal Identifier walk   Goal Description patient will walk in [place] with walker 1000ft. to improve [place] to outings to store   Target Date 20   Date Met      Progress:     Goal Identifier pain   Goal Description pain will decrease by 50% with kitchen tasks.   Target Date 20   Date Met      Progress:         Progress Toward Goals:   Progress this reporting period: limited due to break in PT from COVID 19 pause in care.          Plan:  Continue therapy per current plan of care.    Discharge:  No

## 2020-06-19 ENCOUNTER — HOSPITAL ENCOUNTER (OUTPATIENT)
Dept: PHYSICAL THERAPY | Facility: OTHER | Age: 85
Setting detail: THERAPIES SERIES
End: 2020-06-19
Attending: NURSE PRACTITIONER
Payer: MEDICARE

## 2020-06-19 PROCEDURE — 97140 MANUAL THERAPY 1/> REGIONS: CPT | Mod: GP | Performed by: PHYSICAL THERAPIST

## 2020-06-19 PROCEDURE — 97110 THERAPEUTIC EXERCISES: CPT | Mod: GP | Performed by: PHYSICAL THERAPIST

## 2020-06-26 ENCOUNTER — TELEPHONE (OUTPATIENT)
Dept: INTERNAL MEDICINE | Facility: OTHER | Age: 85
End: 2020-06-26

## 2020-06-26 NOTE — TELEPHONE ENCOUNTER
I do not have a lot of experience with CBD oil, there are many different variations in formulations.  This may be something that she wants to discuss with her pharmacist.

## 2020-07-25 ENCOUNTER — OFFICE VISIT (OUTPATIENT)
Dept: FAMILY MEDICINE | Facility: OTHER | Age: 85
End: 2020-07-25
Attending: NURSE PRACTITIONER
Payer: MEDICARE

## 2020-07-25 VITALS
TEMPERATURE: 98.2 F | DIASTOLIC BLOOD PRESSURE: 72 MMHG | OXYGEN SATURATION: 100 % | RESPIRATION RATE: 16 BRPM | HEIGHT: 61 IN | WEIGHT: 124.2 LBS | HEART RATE: 62 BPM | BODY MASS INDEX: 23.45 KG/M2 | SYSTOLIC BLOOD PRESSURE: 136 MMHG

## 2020-07-25 DIAGNOSIS — J30.9 ALLERGIC RHINITIS, UNSPECIFIED SEASONALITY, UNSPECIFIED TRIGGER: Primary | ICD-10-CM

## 2020-07-25 DIAGNOSIS — R42 DIZZINESS: ICD-10-CM

## 2020-07-25 PROCEDURE — G0463 HOSPITAL OUTPT CLINIC VISIT: HCPCS

## 2020-07-25 PROCEDURE — 99213 OFFICE O/P EST LOW 20 MIN: CPT | Performed by: NURSE PRACTITIONER

## 2020-07-25 RX ORDER — DILTIAZEM HYDROCHLORIDE 120 MG/1
CAPSULE, EXTENDED RELEASE ORAL
COMMUNITY
Start: 2020-03-02 | End: 2020-11-01

## 2020-07-25 RX ORDER — CETIRIZINE HYDROCHLORIDE 5 MG/1
TABLET ORAL
Qty: 60 TABLET | Refills: 0 | Status: ON HOLD | OUTPATIENT
Start: 2020-07-25 | End: 2020-11-02

## 2020-07-25 RX ORDER — TRIAMCINOLONE ACETONIDE 55 UG/1
2 SPRAY, METERED NASAL DAILY
Qty: 1 BOTTLE | Refills: 0 | Status: SHIPPED | OUTPATIENT
Start: 2020-07-25 | End: 2020-08-08

## 2020-07-25 ASSESSMENT — VISUAL ACUITY: OU: 1

## 2020-07-25 ASSESSMENT — PAIN SCALES - GENERAL: PAINLEVEL: MODERATE PAIN (5)

## 2020-07-25 ASSESSMENT — MIFFLIN-ST. JEOR: SCORE: 905.75

## 2020-07-25 NOTE — PROGRESS NOTES
"SUBJECTIVE:   Geno Horn is a 89 year old female presenting with a chief complaint of   Chief Complaint   Patient presents with     Dizziness     Throat Problem     scratchy       She is a new patient of [facility].    URI Adult    Onset of symptoms was 2 week(s) ago.  Course of illness is same.    Severity moderately severe at times, denies falls  Current and Associated symptoms: sore throat, facial pain/pressure and diarrhea   Treatment measures tried include None tried.  Predisposing factors include None eye pain is not new.   Lives in an apartment complex independently, but had water therapy at [facility] for PT for the first time 48 hrs ago. Also noted slight upset stomach/diarrhea Monday for the day, but resolved. Also with scratchy throat for last 2  days. Also ears have been plugged on and off. Eyes hurt, \"for years\" but has seen eye care professional for this and eyes were fine. Dizziness is worse some times and improved others.  Has a referral in for a neurology appointment but has not been called. No fever and no chills.       Review of Systems   Constitutional: Negative for chills, diaphoresis, fatigue and fever.        Denies falls, or near falls. Moves slow.    HENT: Positive for sinus pressure and sore throat. Negative for dental problem, ear discharge, ear pain, facial swelling, hearing loss, mouth sores, tinnitus, trouble swallowing and voice change.         Reports pressure at inner canthus both eyes as a long standing symptom. Throat feels scratchy. Is [place] and wears bilateral hearing aides.   Eyes: Positive for pain. Negative for photophobia, discharge, itching and visual disturbance.   Respiratory: Negative.  Negative for cough, shortness of breath and wheezing.    Cardiovascular: Negative.    Gastrointestinal: Positive for diarrhea. Negative for abdominal distention, abdominal pain, constipation, nausea and vomiting.        Recent diarrhea 6 days ago but resolved.    Genitourinary: Negative " for dysuria and frequency.   Musculoskeletal: Negative for arthralgias and myalgias.   Skin: Negative for rash.   Neurological: Positive for dizziness and light-headedness. Negative for syncope, facial asymmetry, speech difficulty, weakness and numbness.        Denies vertigo, light headed at times. Sylvester this is consistent with standing.    Hematological:        Has bruising on lower extremities. Reports this is not new nor have the areas changed.    Psychiatric/Behavioral: Negative for agitation, behavioral problems and confusion.       Past Medical History:   Diagnosis Date     Hypertension      Hypothyroid      Osteoarthritis      ST elevation (STEMI) myocardial infarction (H)     ; angioplasty     Family History   Problem Relation Age of Onset     Heart Disease Mother 60         of mi     Heart Disease Father 60         of mi     Heart Disease Brother      Heart Disease Brother      Current Outpatient Medications   Medication Sig Dispense Refill     acetaminophen (TYLENOL) 500 MG tablet Take 500 mg by mouth every 6 hours as needed for mild pain       aspirin 81 MG chewable tablet Take 81 mg by mouth daily with food       cetirizine (ZYRTEC) 5 MG tablet Take daily for 14 days as a trial, and then as needed. You can continue to take it daily if it helps. 60 tablet 0     DILT- MG 24 hr capsule        diltiazem ER (DILT-XR) 180 MG 24 hr capsule Take 1 capsule (180 mg) by mouth daily 90 capsule 3     Incontinence Supply Disposable (POISE PAD) PADS 1 Units daily 30 each 11     levothyroxine (SYNTHROID/LEVOTHROID) 88 MCG tablet TAKE 1 TABLET (88 MCG) BY MOUTH DAILY 90 tablet 3     loratadine (CLARITIN) 10 MG tablet Take 10 mg by mouth daily       order for DME Equipment being ordered: Spikes for cane 1 Units 1     order for DME Equipment being ordered: plastic underpants 1 Units 1     triamcinolone (NASACORT) 55 MCG/ACT nasal aerosol Spray 2 sprays into both nostrils daily for 14 days 1 Bottle 0  "    Social History     Tobacco Use     Smoking status: Never Smoker     Smokeless tobacco: Never Used     Tobacco comment: no ecig   Substance Use Topics     Alcohol use: Not Currently     Alcohol/week: 0.0 standard drinks       OBJECTIVE  /72 (BP Location: Right arm, Patient Position: Sitting, Cuff Size: Adult Regular)   Pulse 62   Temp 98.2  F (36.8  C) (Temporal)   Resp 16   Ht 1.549 m (5' 1\")   Wt 56.3 kg (124 lb 3.2 oz)   SpO2 100%   Breastfeeding No   BMI 23.47 kg/m      Physical Exam  Vitals signs and nursing note reviewed. Exam conducted with a chaperone present.   Constitutional:       General: She is not in acute distress.     Appearance: She is normal weight. She is not ill-appearing, toxic-appearing or diaphoretic.   HENT:      Head: Normocephalic and atraumatic.      Right Ear: External ear normal. A middle ear effusion is present. There is no impacted cerumen.      Left Ear: Tympanic membrane and external ear normal. There is no impacted cerumen.      Nose: No congestion or rhinorrhea.      Right Sinus: No maxillary sinus tenderness or frontal sinus tenderness.      Left Sinus: No maxillary sinus tenderness or frontal sinus tenderness.      Comments: Does have ethmoid area tenderness. Orbits are non tender.  Nasal turbinates with blue hue.      Mouth/Throat:      Mouth: Mucous membranes are moist.      Pharynx: Posterior oropharyngeal erythema present.      Comments: Mild erythema, no exudate or overt swelling.   Eyes:      General: Lids are normal. Vision grossly intact.         Right eye: No discharge.         Left eye: No discharge.      Extraocular Movements: Extraocular movements intact.      Conjunctiva/sclera: Conjunctivae normal.      Comments: Minimal ethmoid tenderness bilaterally. No tenderness of eye directly.    Neck:      Musculoskeletal: Neck supple. No muscular tenderness.   Cardiovascular:      Rate and Rhythm: Normal rate and regular rhythm.      Pulses: Normal pulses. "      Heart sounds: Normal heart sounds.      Comments: No pedal edema bilaterally.   Pulmonary:      Effort: Pulmonary effort is normal.      Breath sounds: Normal breath sounds.   Lymphadenopathy:      Cervical: No cervical adenopathy.   Skin:     General: Skin is warm and dry.      Findings: Bruising present.      Comments: Echymosis bilateral LE reports these are very old, developed following a procedure but never resolved.    Neurological:      General: No focal deficit present.      Mental Status: She is alert and oriented to person, place, and time.      Sensory: No sensory deficit.      Motor: No weakness.   Psychiatric:         Mood and Affect: Mood normal.         Behavior: Behavior normal.         Thought Content: Thought content normal.         Labs:  No results found for this or any previous visit (from the past 24 hour(s)).    X-Ray was not done.    ASSESSMENT:      ICD-10-CM    1. Allergic rhinitis, unspecified seasonality, unspecified trigger  J30.9 cetirizine (ZYRTEC) 5 MG tablet     triamcinolone (NASACORT) 55 MCG/ACT nasal aerosol        Medical Decision Making:    Differential Diagnosis:  URI Adult/Peds:  Allergic rhinitis    Serious Comorbid Conditions:  Adult:  Hx of heart disease    PLAN:    URI Adult:  Fluids, OTC 2nd generation antihistamine, nasocort, saline gargles and saline nasal spray.  Discussed care measures with pt and daughter. Pt inquired about use of benadryl and instructed not to use that as it can cause issues in elders.  I will send a not to her PCP about no follow up call from neurologist.   Pt and daughter have no further questions and verbalize agreement with the plan.     Followup:    If not improving or if condition worsens, follow up with your Primary Care Provider    Patient Instructions   I think that this may be allergic symptoms that are impacting your throat, and maybe your ears contributing to the dizzy feeling.     I want you to do a trial of Zyrtec take one daily  for at least 14 days and see if it helps your sore throat and dizziness.     I will send a note to Yulissa and also put a referal in for the neurologist too.     I also want you to get some nasal saline and put that in your nose twice a day.    I also ordered nasocort, 1 spray each nostril daily after the saline on time a day.     Do a salt water gargle at least twice a day.

## 2020-07-25 NOTE — PATIENT INSTRUCTIONS
I think that this may be allergic symptoms that are impacting your throat, and maybe your ears contributing to the dizzy feeling.     I want you to do a trial of Zyrtec take one daily for at least 14 days and see if it helps your sore throat and dizziness.     I will send a note to Yulissa and also put a referal in for the neurologist too.     I also want you to get some nasal saline and put that in your nose twice a day.    I also ordered nasocort, 1 spray each nostril daily after the saline one time a day.     Do a salt water gargle at least twice a day.

## 2020-07-25 NOTE — NURSING NOTE
Patient in clinic for dizziness that is ongoing worsening today and referred to neurology by pcp. Patient has throat problem x 2 days. Monday patient was experiencing diarrhea and vomiting.   Tx with dramamine for dizziness and ibuprofen without relief.    Sheila Palencia LPN LPN....................  7/25/2020   1:06 PM    Chief Complaint   Patient presents with     Dizziness     Throat Problem     scratchy       Medication Reconciliation: complete    Sheila Palencia LPN

## 2020-07-25 NOTE — Clinical Note
Yulissa, I had the privlidge of meeting this gene lady over the weekend. Her new symptoms was a scratchy throat. She had diarrhea 5 days prior also. She indicated you had sent in a referral to Neurology, but no one had contacted them yet.  I reordered it to see if it would go through. I just wanted you to know to be able to follow up. Thank you so much.   Sincerely,     Adelia HOWELL .............7/27/2020    6:28 PM

## 2020-07-27 ASSESSMENT — ENCOUNTER SYMPTOMS
ARTHRALGIAS: 0
RESPIRATORY NEGATIVE: 1
EYE PAIN: 1
DYSURIA: 0
DIZZINESS: 1
VOMITING: 0
WHEEZING: 0
SINUS PRESSURE: 1
CARDIOVASCULAR NEGATIVE: 1
COUGH: 0
TROUBLE SWALLOWING: 0
DIARRHEA: 1
ABDOMINAL DISTENTION: 0
FATIGUE: 0
FREQUENCY: 0
CONFUSION: 0
FEVER: 0
CHILLS: 0
AGITATION: 0
EYE DISCHARGE: 0
FACIAL SWELLING: 0
NUMBNESS: 0
SORE THROAT: 1
FACIAL ASYMMETRY: 0
LIGHT-HEADEDNESS: 1
VOICE CHANGE: 0
EYE ITCHING: 0
ABDOMINAL PAIN: 0
SPEECH DIFFICULTY: 0
SHORTNESS OF BREATH: 0
CONSTIPATION: 0
DIAPHORESIS: 0
MYALGIAS: 0
NAUSEA: 0
WEAKNESS: 0
PHOTOPHOBIA: 0

## 2020-08-12 ENCOUNTER — TELEPHONE (OUTPATIENT)
Dept: INTERNAL MEDICINE | Facility: OTHER | Age: 85
End: 2020-08-12

## 2020-08-12 DIAGNOSIS — R42 DIZZINESS: Primary | ICD-10-CM

## 2020-08-12 DIAGNOSIS — R26.89 BALANCE PROBLEMS: ICD-10-CM

## 2020-08-12 NOTE — TELEPHONE ENCOUNTER
Reason for call: Request for a referral.    Referral requested for what concern?  Dizziness and balance clinic    Have you already been seen by the specialty you need the referral for?      If yes, Date:   ,  Location:   ,  Provider:       If no,  Where do you want to go?   [facility]    Additional comments:       Preferred method for responding to this message: Telephone Call    Phone number patient can be reached at? Cell number on file:    Telephone Information:   Mobile [phone]    or Other phone number:  [phone]    If we can't reach you directly, may we leave a detailed response at the number you provided? Yes

## 2020-08-13 NOTE — TELEPHONE ENCOUNTER
I tried to call patient.  Unable to leave message to let her know referral was made.  Isabel Wolf LPN ....................8/13/2020   5:20 PM

## 2020-10-06 ENCOUNTER — ALLIED HEALTH/NURSE VISIT (OUTPATIENT)
Dept: FAMILY MEDICINE | Facility: OTHER | Age: 85
End: 2020-10-06
Payer: MEDICARE

## 2020-10-06 DIAGNOSIS — Z23 NEED FOR PROPHYLACTIC VACCINATION AND INOCULATION AGAINST INFLUENZA: Primary | ICD-10-CM

## 2020-10-06 PROCEDURE — G0008 ADMIN INFLUENZA VIRUS VAC: HCPCS

## 2020-10-06 NOTE — NURSING NOTE
Immunization Documentation    Prior to Immunization administration, verified patients identity using patient's name and date of birth. Please see IMMUNIZATIONS  and order for additional information.  Patient / Parent instructed to remain in clinic for 15 minutes and report any adverse reaction to staff immediately.    Was entire vial of medication used? Yes  Vial/Syringe: [facility]  10/6/2020   11:45 AM

## 2020-10-12 ENCOUNTER — THERAPY VISIT (OUTPATIENT)
Dept: CHIROPRACTIC MEDICINE | Facility: OTHER | Age: 85
End: 2020-10-12
Attending: CHIROPRACTOR
Payer: MEDICARE

## 2020-10-12 DIAGNOSIS — G89.29 CHRONIC BILATERAL LOW BACK PAIN, UNSPECIFIED WHETHER SCIATICA PRESENT: ICD-10-CM

## 2020-10-12 DIAGNOSIS — M54.50 CHRONIC BILATERAL LOW BACK PAIN, UNSPECIFIED WHETHER SCIATICA PRESENT: ICD-10-CM

## 2020-10-12 DIAGNOSIS — M48.061 SPINAL STENOSIS OF LUMBAR REGION, UNSPECIFIED WHETHER NEUROGENIC CLAUDICATION PRESENT: ICD-10-CM

## 2020-10-12 DIAGNOSIS — M99.04 SEGMENTAL AND SOMATIC DYSFUNCTION OF SACRAL REGION: Primary | ICD-10-CM

## 2020-10-12 DIAGNOSIS — M41.9 SCOLIOSIS: ICD-10-CM

## 2020-10-12 PROCEDURE — 98940 CHIROPRACT MANJ 1-2 REGIONS: CPT | Performed by: CHIROPRACTOR

## 2020-10-12 PROCEDURE — G0463 HOSPITAL OUTPT CLINIC VISIT: HCPCS | Mod: 25

## 2020-10-12 PROCEDURE — 97810 ACUP 1/> WO ESTIM 1ST 15 MIN: CPT | Mod: GA | Performed by: CHIROPRACTOR

## 2020-10-12 PROCEDURE — 99213 OFFICE O/P EST LOW 20 MIN: CPT | Mod: GY | Performed by: CHIROPRACTOR

## 2020-10-12 PROCEDURE — G0463 HOSPITAL OUTPT CLINIC VISIT: HCPCS

## 2020-10-12 NOTE — PROGRESS NOTES
PATIENT:  Geno Horn is a 89 year old female presenting for chronic low back pain    PROBLEM:   Date of Initial Visit for this Episode:  10/12/2020     Visit #1    SUBJECTIVE / HPI: Patient presents for chronic low back pain.  Patient states that symptoms have been present and ongoing for years and have been progressively worsening.  In that timeframe patient has attempted many courses of treatment without much success.  Patient worked with physical therapy both at [facility] and at [facility].  Patient is also attempted injections which provided little if any relief of symptoms.    It was recommended to the patient by her neighbors to consider chiropractic acupuncture which is why she is presenting to our office today.    Patient is accompanied by her daughter on today's visit    Description and onset:  Duration and Frequency of Pain: years and constantly hurting quality  Radiation of pain: none reported  Pain rated at it's worst: 8/10  Pain rated currently:  8/10  Pain course: Gradually getting worse  Worse with:  General movement  Improved by:  Laying supine on a heating pad  Additional Features: balance issues. Patient has been evaluated by [facility] [place].  Other Health Care Providers seen for this: Mitch Shah PT, Yulissa Rocha NP, [facility] PT   Previous treatment: physical therapy, injection  Previous injury:on going history         See flowsheets in chart for details.  10/12/2020    Oswestry (TINY) Questionnaire    OSWESTRY DISABILITY INDEX 10/12/2020   Count 9   Sum 23   Oswestry Score (%) 51.11   Some recent data might be hidden        Functional limitations:  walking, sitting >30 minutes, standing >10 minutes      Past D.C. Care: no         PAST MEDICAL HISTORY:  Past Medical History:   Diagnosis Date     Hypertension      Hypothyroid      Osteoarthritis      ST elevation (STEMI) myocardial infarction (H)     1981; angioplasty       PAST SURGICAL HISTORY:  Past Surgical History:    Procedure Laterality Date     ARTHROPLASTY HIP Right     Dr Rodriguez     ARTHROPLASTY HIP Left 2015    Dr. Rodriguez     COMBINED CYSTOSCOPY, URETEROSCOPY, LASER HOLMIUM LITHOTRIPSY URETER(S) Right 7/23/2019    Procedure: CYSTOSCOPY, RIGHT STENT REMOVAL, URETEROSCOPY, LASER HOLMIUM LITHOTRIPSY RIGHT URETER WITH STENT REPLACEMENT;  Surgeon: Shawn Cole MD;  Location:  OR     CYSTOSCOPY, RETROGRADES, INSERT STENT URETER(S), COMBINED Right 6/17/2019    Procedure: CYSTOSCOPY, WITH RETROGRADE PYELOGRAM AND URETERAL STENT INSERTION;  Surgeon: Shawn Cole MD;  Location: [facility] OR       ALLERGIES:  Allergies   Allergen Reactions     Celebrex [Celecoxib] Other (See Comments)     Light headed     Tolterodine      Other reaction(s): Intolerance-Can't Take  Was unable to sleep at night       CURRENT MEDICATIONS:  Current Outpatient Medications   Medication Sig Dispense Refill     acetaminophen (TYLENOL) 500 MG tablet Take 500 mg by mouth every 6 hours as needed for mild pain       aspirin 81 MG chewable tablet Take 81 mg by mouth daily with food       cetirizine (ZYRTEC) 5 MG tablet Take daily for 14 days as a trial, and then as needed. You can continue to take it daily if it helps. 60 tablet 0     DILT- MG 24 hr capsule        diltiazem ER (DILT-XR) 180 MG 24 hr capsule Take 1 capsule (180 mg) by mouth daily 90 capsule 3     Incontinence Supply Disposable (POISE PAD) PADS 1 Units daily 30 each 11     levothyroxine (SYNTHROID/LEVOTHROID) 88 MCG tablet TAKE 1 TABLET (88 MCG) BY MOUTH DAILY 90 tablet 3     loratadine (CLARITIN) 10 MG tablet Take 10 mg by mouth daily       order for DME Equipment being ordered: Spikes for cane 1 Units 1     order for DME Equipment being ordered: plastic underpants 1 Units 1       SOCIAL HISTORY:  Marital Status: .  Children: yes.  Occupation: Retired.  Alcohol use:none.  Tobacco use: Smoker: no.      FAMILY HISTORY:  Family History   Problem Relation Age of Onset     Heart Disease  Mother 60         of mi     Heart Disease Father 60         of mi     Heart Disease Brother      Heart Disease Brother        Patient Active Problem List   Diagnosis     Allergic rhinitis     Coronary artery disease involving native coronary artery of native heart without angina pectoris     H/O basal cell carcinoma excision     Hearing loss     Hypertension     Hypothyroidism     Osteoarthritis of both hips     Urinary urgency     Unsteady gait     Sepsis (H)     Spinal stenosis of lumbar region, unspecified whether neurogenic claudication present         ROS:  The patient denies any fevers, chills, nausea, vomiting, diarrhea, constipation,dysuria, hematuria, or urinary hesitancy or incontinence.  No shortness of breath, chest pain, or rashes.    OBJECTIVE:    DIAGNOSTICS:  No current spinal imaging taken.   Study Result    PROCEDURE: MR LUMBAR SPINE W/O CONTRAST, 2020 3:39 PM     HISTORY:Female, age 89 years, Back pain, > 6wks conservative tx,  persistent sx; Chronic bilateral low back pain without sciatica;  Chronic bilateral low back pain without sciatica; Gait instability;  Weakness of right foot     COMPARISON: Lumbar spine x-rays 2019     TECHNIQUE: Sagittal T1, proton density, T2 with fat saturation; axial  T2.     FINDINGS:  Curvature: Lumbar spine demonstrates normal lordotic curvature.  Scoliotic curvature however is seen, convex right with the apex at L2.     Conus Medullaris: Normal in contour.      Mild to moderate disc and endplate degenerative changes are seen at  T10-11, T11-12 and minimal disc and endplate degenerative changes seen  at T12-L1. Facet joints at these levels appear to be grossly normal.     L1-2: Mild disc bulge. Mild endplate and minimal facet joint  degenerative changes.              L2-3: Mild annular bulge. Left lateral disc osteophyte complex mildly  narrows the left neural foramen with left L2 ganglionic abutment. Mild  bilateral facet joint degenerative  changes.             L3-4: Chronic disc bulge. Mild/moderate endplate osteophytic spurring.  Mild/moderate bilateral facet joint degeneration with ligamentum  flavum hypertrophy. Right L4 foraminal narrowing with right L3  ganglionic encroachment.     L4-5: Chronic annular bulge with broad-based right lateral disc  osteophyte complex. The neural foramen is moderate to severely  narrowed with right L4 ganglionic impingement. Severe degenerative  changes are seen within the facet joints with lateral recess narrowing  and compression of the intrathecal portions of the L5 nerves, worse on  the right. Central canal is also moderate to severely narrowed.            L5-S1: Mild disc degeneration with slight bulge and right foraminal  narrowing with right L5 ganglionic abutment. Severe degenerative  changes are seen within the facet joints with small effusions.     SI Joints: Congruent. Mild degenerative changes.     Paraspinous musculature: Mildly atrophic.     Retroperitoneal soft tissues: Large cystic structure in the  retroperitoneal region of the pelvis may represents fluid-filled  distended urinary bladder. Cortical cyst is seen on the right kidney.                                                                      IMPRESSION:   1.  Severe facet joint degenerative changes at L4-5 and L5-S1  contributes to moderately severe to severe central canal stenosis and  lateral recess narrowing at L4-5 with mass effect upon the intrathecal  and exiting L5 nerve roots.     2.  Chronic disc bulge and right lateral disc osteophyte complex  narrows the right neural foramen at L4-5 with right L4 ganglionic  impingement.     3.  Chronic bulge and endplate spurring narrows the right neural  foramen at L5-S1 with right L5 ganglionic abutment.     4.  Prominent scoliosis of the lumbar spine, convex right with the  apex at L2 is not significantly changed dating back to the lumbar  spine x-rays from 8/19/2019.     LUKE FORBES MD      Study Result    XR LUMBAR SPINE 2-3 VIEWS     HISTORY: low back pain; Chronic midline low back pain without  sciatica; Chronic midline low back pain without sciatica .     TECHNIQUE: 3 views of the lumbosacral spine.     COMPARISON: CT abdomen pelvis 6/17/2019.     FINDINGS:     There is moderate to advanced rotatory dextroscoliosis of the lumbar  spine. There are associated asymmetric endplate degenerative changes  related to asymmetric disc height loss. There is diffuse facet  degeneration. No acute fracture is identified although  osteopenia/osteoporosis limits assessment. Right greater than left SI  joint degeneration is seen. Bilateral total hip arthroplasties are  partially visualized.                                                                        IMPRESSION:      Diffuse degenerative changes in part reflecting rotatory  dextroscoliosis of the lumbar spine.       KATELIN CLEMENTS MD         PHYSICAL EXAM:     GENERAL APPEARANCE: healthy, alert, mild distress, moderate distress and cooperative   GAIT: walker, short steps, slight limping      MUSCULOSKELETAL:   Posture: Anterior head carriage, rounded shoulders.  Right lateral curvature of the lumbar spine noted   Gait: see prior comment          Thoracic and Lumbar performed actively, measured approximately  ROM:  50/60 flexion 50/60 extension    30/45 RLF    40/45 LLF   40/45 RR      35/45 LR    +Kemps:left SI joint  - Straight leg raise  +Leg length inequality: negative Restricted left heel to buttock   Other:  Ely's: -bilaterally    +Tenderness: left PSIS  +Muscle spasm: taut/tender fibers noted along left quadratus lumborum and lumbar paraspinals bilaterally into the lower thoracic region  +Joint asymmetry and restriction: Sacrum P-L listing    ASSESSMENT: Geno Horn is a 89 year old female presenting with chronic low back pain.  There is evidence to suggest segmental and somatic dysfunction present of the sacrum.  Based off of patient's  history I do believe that she is a candidate for acupuncture therapy.  Due to advanced stages of degeneration I am unsure of how much effect it will have however.  I did inform this to the patient and her daughter today but stated that within 2 weeks we would know if treatment was providing any benefit.  No contraindications present precluding patient from receiving care today     1. Segmental and somatic dysfunction of sacral region    2. Chronic bilateral low back pain, unspecified whether sciatica present    3. Spinal stenosis of lumbar region, unspecified whether neurogenic claudication present    4. Scoliosis        PLAN    Evaluation and Management:  75844 Moderate exam established patient 20 min    Procedures:  Modalities:  56446: Acupuncture, for 15 minutes:  Points: Bl 23, 24, 25, 26, 46, 47  For 15 minutes    CMT:  11611 Chiropractic manipulative treatment 1-2 regions performed   Pelvis: Drop Table, Sacrum , Prone    Therapeutic procedures:  None    Response to Treatment  Reduction in symptoms as reported by patient    Prognosis: Guarded    10/12/2020 Plan of Care:  6-8 visits of Chiropractic Care including Spinal Adjustments, acupuncture and/or physiotherapy and active rehabilitation, to include exercises in the office and/or at home to meet care plan goals.     Frequency: 2xweek for up 2weeks, if progress is being seen continue 2x for an additional 2 weeks. A reevaluation would be clinically appropriate in 6-8 visits, to determine progress and further course of care.    POC discussed and patient agreeable to plan of care.      10/12/2020 Goals:      Patient will report improved lower back pain.   Patient will report able to sit >30 minutes.   Patient will report able to stand >10 minutes.   Patient will demonstrate an improved ability to complete Activities of Daily Living  as shown by a reported 10-30% reduced score on  back index.    Patient will demonstrate improved ROM.        INSTRUCTIONS   heat 15  minutes every other hour as needed    Follow-up:  Return to care in 3 days.        Disclaimer: This note consists of symbols derived from keyboarding, dictation and/or voice recognition software. As a result, there may be errors in the script that have gone undetected. Please consider this when interpreting information found in this chart.

## 2020-10-14 ENCOUNTER — THERAPY VISIT (OUTPATIENT)
Dept: CHIROPRACTIC MEDICINE | Facility: OTHER | Age: 85
End: 2020-10-14
Attending: CHIROPRACTOR
Payer: MEDICARE

## 2020-10-14 DIAGNOSIS — G89.29 CHRONIC BILATERAL LOW BACK PAIN, UNSPECIFIED WHETHER SCIATICA PRESENT: ICD-10-CM

## 2020-10-14 DIAGNOSIS — M99.04 SEGMENTAL AND SOMATIC DYSFUNCTION OF SACRAL REGION: Primary | ICD-10-CM

## 2020-10-14 DIAGNOSIS — M54.50 CHRONIC BILATERAL LOW BACK PAIN, UNSPECIFIED WHETHER SCIATICA PRESENT: ICD-10-CM

## 2020-10-14 DIAGNOSIS — M41.9 SCOLIOSIS: ICD-10-CM

## 2020-10-14 DIAGNOSIS — M48.061 SPINAL STENOSIS OF LUMBAR REGION, UNSPECIFIED WHETHER NEUROGENIC CLAUDICATION PRESENT: ICD-10-CM

## 2020-10-14 PROCEDURE — G0463 HOSPITAL OUTPT CLINIC VISIT: HCPCS | Mod: 25

## 2020-10-14 PROCEDURE — 97810 ACUP 1/> WO ESTIM 1ST 15 MIN: CPT | Mod: GA | Performed by: CHIROPRACTOR

## 2020-10-14 PROCEDURE — 98940 CHIROPRACT MANJ 1-2 REGIONS: CPT | Performed by: CHIROPRACTOR

## 2020-10-14 NOTE — PROGRESS NOTES
Visit #:  2    Subjective:  Geno Horn is a 89 year old female who is seen in f/u up for:        Segmental and somatic dysfunction of sacral region  Chronic bilateral low back pain, unspecified whether sciatica present  Spinal stenosis of lumbar region, unspecified whether neurogenic claudication present  Scoliosis.     Since last visit on 10/12/2020,  Geno Horn reports: Noticing little if any change following last visit.  Patient is accompanied by her daughter again today.    Initially following treatment patient symptoms did show some signs of improvement.  Does not appear that these improvements lasted however.    Area of chief complaint:  Lumbar :  Symptoms are graded at 8/10. The quality is described as constantly hurting.      Objective:  The following was observed:    P: palpatory tenderness left PSIS:    A: static palpation demonstrates intersegmental asymmetry , pelvis  R: motion palpation notes restricted motion, Sacrum   T: No spasms apparent.  Taut and tender fibers noted of the lumbar paraspinals and quadratus lumborum bilaterally left greater than right    Segmental spinal dysfunction/restrictions found at:  :  Sacrum P-L listing.      Assessment: Symptoms showing little if any change at this time.  Form patient and her daughter that this can be the case.  We will continue at twice a week care for 1 additional week.  Just as planned originally if symptoms fail to improve within 1 week discontinue course of treatment.  If symptoms are showing signs of improvement recommend continuation of treatment.    Diagnoses:      1. Segmental and somatic dysfunction of sacral region    2. Chronic bilateral low back pain, unspecified whether sciatica present    3. Spinal stenosis of lumbar region, unspecified whether neurogenic claudication present    4. Scoliosis        Patient's condition:  Symptoms are unchanged    Treatment effectiveness:  Post manipulation there is better intersegmental movement and  Patient claims to feel looser post manipulation      Procedures:  CMT:  08399 Chiropractic manipulative treatment 1-2 regions performed   Pelvis: Drop Table and SOT blocks, Sacrum , Prone    Modalities:  70083: Acupuncture, for 15 minutes:  Points: Bl 23, 24, 25, 26, 46, 47  For 15 minutes    Therapeutic procedures:  None    Response to Treatment  Reduction in symptoms patient reports feeling slight improvement after treatment.    Prognosis: Guarded    Progress towards Goals:Patient will report improved lower back pain.              Patient will report able to sit >30 minutes.              Patient will report able to stand >10 minutes.              Patient will demonstrate an improved ability to complete Activities of Daily Living   as shown by a reported 10-30% reduced score on  back index.               Patient will demonstrate improved ROM.      Recommendations:    Instructions:heat 15 minutes every other hour as needed    Follow-up:  Return to care in 5 days.

## 2020-10-19 ENCOUNTER — THERAPY VISIT (OUTPATIENT)
Dept: CHIROPRACTIC MEDICINE | Facility: OTHER | Age: 85
End: 2020-10-19
Attending: CHIROPRACTOR
Payer: MEDICARE

## 2020-10-19 DIAGNOSIS — M99.04 SEGMENTAL AND SOMATIC DYSFUNCTION OF SACRAL REGION: Primary | ICD-10-CM

## 2020-10-19 DIAGNOSIS — M54.50 CHRONIC BILATERAL LOW BACK PAIN, UNSPECIFIED WHETHER SCIATICA PRESENT: ICD-10-CM

## 2020-10-19 DIAGNOSIS — M48.061 SPINAL STENOSIS OF LUMBAR REGION, UNSPECIFIED WHETHER NEUROGENIC CLAUDICATION PRESENT: ICD-10-CM

## 2020-10-19 DIAGNOSIS — M41.9 SCOLIOSIS: ICD-10-CM

## 2020-10-19 DIAGNOSIS — G89.29 CHRONIC BILATERAL LOW BACK PAIN, UNSPECIFIED WHETHER SCIATICA PRESENT: ICD-10-CM

## 2020-10-19 PROCEDURE — 98940 CHIROPRACT MANJ 1-2 REGIONS: CPT | Performed by: CHIROPRACTOR

## 2020-10-19 PROCEDURE — 97810 ACUP 1/> WO ESTIM 1ST 15 MIN: CPT | Mod: GA | Performed by: CHIROPRACTOR

## 2020-10-19 PROCEDURE — G0463 HOSPITAL OUTPT CLINIC VISIT: HCPCS | Mod: 25

## 2020-10-19 NOTE — PROGRESS NOTES
Visit #:  3    Subjective:  Geno Horn is a 89 year old female who is seen in f/u up for:        Segmental and somatic dysfunction of sacral region  Chronic bilateral low back pain, unspecified whether sciatica present  Spinal stenosis of lumbar region, unspecified whether neurogenic claudication present  Scoliosis.     Since last visit on 10/14/2020,  Geno Horn reports: Not noticing much change in improvement of symptoms since last visit.  Patient states that she often will have relief of symptoms temporarily on the day of treatment.    Patient's daughter is accompanying the patient on today's visit.  Both patient and her daughter inquire about alternative treatments should acupuncture and chiropractic care fail to improve.  Patient's daughter interested in medical cannabis.    Area of chief complaint:  Lumbar :  Symptoms are graded at 8/10. The quality is described as constantly hurts.      Objective:       The following was observed:     P: palpatory tenderness left PSIS:    A: static palpation demonstrates intersegmental asymmetry , pelvis  R: motion palpation notes restricted motion, Sacrum   T: No spasms apparent.  Taut and tender fibers noted of the lumbar paraspinals and quadratus lumborum bilaterally left greater than right     Segmental spinal dysfunction/restrictions found at:  :  Sacrum P-L listing.      Assessment: Limited improvement if any is being shown by the patient at this time.  Encouraged patient to discuss alternative treatment options with primary care provider Yulissa Rocha NP, especially regarding medical cannabis. Also encouraged patient to ask about PRP injection treatment. We will follow up with patient for one additional visit this week. If symptoms are showing signs of improvement recommend continuation of chiropractic acupuncture course of treatment, if no improvement is noted recommend discontinue course of treatment.    Diagnoses:      1. Segmental and somatic dysfunction of  sacral region    2. Chronic bilateral low back pain, unspecified whether sciatica present    3. Spinal stenosis of lumbar region, unspecified whether neurogenic claudication present    4. Scoliosis        Patient's condition:  Patient had restrictions pre-manipulation    Treatment effectiveness:  Post manipulation there is better intersegmental movement and Patient claims to feel looser post manipulation      CMT:  25960 Chiropractic manipulative treatment 1-2 regions performed   Pelvis: Drop Table and SOT blocks, Sacrum , Prone     Modalities:  95357: Acupuncture, for 15 minutes:  Points: Bl 23, 24, 25, 26, 46, 47  For 15 minutes     Therapeutic procedures:  None     Response to Treatment  Reduction in symptoms patient reports feeling slight improvement after treatment.     Prognosis: Guarded     Progress towards Goals:Patient will report improved lower back pain.              Patient will report able to sit >30 minutes.              Patient will report able to stand >10 minutes.              Patient will demonstrate an improved ability to complete Activities of Daily Living   as shown by a reported 10-30% reduced score on  back index.               Patient will demonstrate improved ROM.       Recommendations:     Instructions:heat 15 minutes every other hour as needed     Follow-up:  Return to care in 3 days.

## 2020-10-22 ENCOUNTER — THERAPY VISIT (OUTPATIENT)
Dept: CHIROPRACTIC MEDICINE | Facility: OTHER | Age: 85
End: 2020-10-22
Attending: CHIROPRACTOR
Payer: MEDICARE

## 2020-10-22 DIAGNOSIS — M54.50 CHRONIC BILATERAL LOW BACK PAIN, UNSPECIFIED WHETHER SCIATICA PRESENT: Primary | ICD-10-CM

## 2020-10-22 DIAGNOSIS — M41.9 SCOLIOSIS: ICD-10-CM

## 2020-10-22 DIAGNOSIS — G89.29 CHRONIC BILATERAL LOW BACK PAIN, UNSPECIFIED WHETHER SCIATICA PRESENT: Primary | ICD-10-CM

## 2020-10-22 DIAGNOSIS — M48.061 SPINAL STENOSIS OF LUMBAR REGION, UNSPECIFIED WHETHER NEUROGENIC CLAUDICATION PRESENT: ICD-10-CM

## 2020-10-22 PROCEDURE — 97810 ACUP 1/> WO ESTIM 1ST 15 MIN: CPT | Mod: GA | Performed by: CHIROPRACTOR

## 2020-10-22 PROCEDURE — G0463 HOSPITAL OUTPT CLINIC VISIT: HCPCS

## 2020-10-22 NOTE — PROGRESS NOTES
Visit #:  4    Subjective:  Geno Horn is a 89 year old female who is seen in f/u up for:        Chronic bilateral low back pain, unspecified whether sciatica present  Spinal stenosis of lumbar region, unspecified whether neurogenic claudication present  Scoliosis.     Since last visit on 10/19/2020,  Geno Horn reports: Not noticing much change in symptoms since last visit.  Patient stated noticing improvement of back pain for approximately half a day before symptoms began to return.  Patient continues to walk with a walker and have various balance related issues.    Patient accompanied by her daughter on today's visit    Area of chief complaint:  Lumbar :  Symptoms are graded at 10/10. The quality is described as constant.       Objective:  The following was observed:.  Oswestry (TINY) Questionnaire    OSWESTRY DISABILITY INDEX 10/22/2020   Count 9   Sum 23   Oswestry Score (%) 51.11   Some recent data might be hidden      Score is identical. Index score shows improvement with sitting, however traveling appears to be more difficult    P: palpatory tendernessPresent lumbar spinal processes L1-5.  None elicited over the PSIS:    A: static palpation demonstrates intersegmental asymmetry , none apparent  R: motion palpation notes restricted motion, None apparent  T: Taut and tender fibers noted lumbar paraspinals and quadratus lumborum bilaterally.    Segmental spinal dysfunction/restrictions found at:  None apparent.      Assessment: Patient continues to show a little if any progress with course of chiropractic acupuncture.  Discussed other treatment options again on today's visit.  Encourage patient to follow-up with primary care provider Yulissa Rocha NP regarding medication questions.    I was able to consult with patient's previous physical therapist Mitch Shah PT regarding course of treatment for the patient.  At this time I do not believe that additional visits will provide any more benefit the  patient.    Diagnoses:      1. Chronic bilateral low back pain, unspecified whether sciatica present    2. Spinal stenosis of lumbar region, unspecified whether neurogenic claudication present    3. Scoliosis        Patient's condition:  Symptoms are unchanged    Treatment effectiveness:  Intersegmental motion appears to be improving left SI joint      Procedures:  CMT:  None performed    Modalities:  80904: Acupuncture, for 15 minutes:  Points: BL 23, 24, 25, 26, 46, 47  For 15 minutes    Therapeutic procedures:  None    Response to Treatment  Reduction in symptoms as reported by patient    Prognosis: Guarded    Progress towards Goals: Patient has not made progress towards goal.     Recommendations:    Instructions:ice 20 minutes every other hour as needed    Follow-up:  Return to care if symptoms persist.

## 2020-10-29 ENCOUNTER — HOSPITAL ENCOUNTER (EMERGENCY)
Facility: OTHER | Age: 85
Discharge: HOME | DRG: 177 | End: 2020-10-29
Attending: PHYSICIAN ASSISTANT | Admitting: PHYSICIAN ASSISTANT
Payer: MEDICARE

## 2020-10-29 ENCOUNTER — APPOINTMENT (OUTPATIENT)
Dept: CT IMAGING | Facility: OTHER | Age: 85
DRG: 177 | End: 2020-10-29
Attending: PHYSICIAN ASSISTANT
Payer: MEDICARE

## 2020-10-29 VITALS
HEART RATE: 70 BPM | RESPIRATION RATE: 18 BRPM | OXYGEN SATURATION: 90 % | DIASTOLIC BLOOD PRESSURE: 53 MMHG | SYSTOLIC BLOOD PRESSURE: 113 MMHG | WEIGHT: 124 LBS | BODY MASS INDEX: 23.43 KG/M2 | TEMPERATURE: 98 F

## 2020-10-29 DIAGNOSIS — R42 DIZZINESS: ICD-10-CM

## 2020-10-29 DIAGNOSIS — E86.0 DEHYDRATION: ICD-10-CM
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
## 2020-10-29 DIAGNOSIS — E83.42 HYPOMAGNESEMIA: ICD-10-CM

## 2020-10-29 DIAGNOSIS — E87.1 HYPONATREMIA: ICD-10-CM

## 2020-10-29 DIAGNOSIS — S81.811A SKIN TEAR OF LOWER LEG WITHOUT COMPLICATION, RIGHT, INITIAL ENCOUNTER: ICD-10-CM

## 2020-10-29 LAB
ALBUMIN SERPL-MCNC: 3.9 G/DL (ref 3.5–5.7)
ALBUMIN UR-MCNC: NEGATIVE MG/DL
ALP SERPL-CCNC: 62 U/L (ref 34–104)
ALT SERPL W P-5'-P-CCNC: 17 U/L (ref 7–52)
ANION GAP SERPL CALCULATED.3IONS-SCNC: 9 MMOL/L (ref 3–14)
APPEARANCE UR: ABNORMAL
AST SERPL W P-5'-P-CCNC: 28 U/L (ref 13–39)
BACTERIA #/AREA URNS HPF: ABNORMAL /HPF
BASOPHILS # BLD AUTO: 0 10E9/L (ref 0–0.2)
BASOPHILS NFR BLD AUTO: 0.2 %
BILIRUB SERPL-MCNC: 0.3 MG/DL (ref 0.3–1)
BILIRUB UR QL STRIP: NEGATIVE
BUN SERPL-MCNC: 15 MG/DL (ref 7–25)
CALCIUM SERPL-MCNC: 8.6 MG/DL (ref 8.6–10.3)
CHLORIDE SERPL-SCNC: 93 MMOL/L (ref 98–107)
CO2 SERPL-SCNC: 25 MMOL/L (ref 21–31)
COLOR UR AUTO: YELLOW
CREAT SERPL-MCNC: 0.63 MG/DL (ref 0.6–1.2)
DIFFERENTIAL METHOD BLD: ABNORMAL
EOSINOPHIL # BLD AUTO: 0 10E9/L (ref 0–0.7)
EOSINOPHIL NFR BLD AUTO: 0 %
ERYTHROCYTE [DISTWIDTH] IN BLOOD BY AUTOMATED COUNT: 14 % (ref 10–15)
GFR SERPL CREATININE-BSD FRML MDRD: 88 ML/MIN/{1.73_M2}
GLUCOSE SERPL-MCNC: 119 MG/DL (ref 70–105)
GLUCOSE UR STRIP-MCNC: NEGATIVE MG/DL
HCT VFR BLD AUTO: 31.4 % (ref 35–47)
HGB BLD-MCNC: 10.7 G/DL (ref 11.7–15.7)
HGB UR QL STRIP: NEGATIVE
IMM GRANULOCYTES # BLD: 0 10E9/L (ref 0–0.4)
IMM GRANULOCYTES NFR BLD: 0.6 %
KETONES UR STRIP-MCNC: 10 MG/DL
LEUKOCYTE ESTERASE UR QL STRIP: NEGATIVE
LYMPHOCYTES # BLD AUTO: 0.4 10E9/L (ref 0.8–5.3)
LYMPHOCYTES NFR BLD AUTO: 9.1 %
MAGNESIUM SERPL-MCNC: 1.7 MG/DL (ref 1.9–2.7)
MCH RBC QN AUTO: 33.2 PG (ref 26.5–33)
MCHC RBC AUTO-ENTMCNC: 34.1 G/DL (ref 31.5–36.5)
MCV RBC AUTO: 98 FL (ref 78–100)
MONOCYTES # BLD AUTO: 0.2 10E9/L (ref 0–1.3)
MONOCYTES NFR BLD AUTO: 4.3 %
MUCOUS THREADS #/AREA URNS LPF: PRESENT /LPF
NEUTROPHILS # BLD AUTO: 4 10E9/L (ref 1.6–8.3)
NEUTROPHILS NFR BLD AUTO: 85.8 %
NITRATE UR QL: NEGATIVE
PH UR STRIP: 6.5 PH (ref 5–7)
PLATELET # BLD AUTO: 213 10E9/L (ref 150–450)
POTASSIUM SERPL-SCNC: 3.9 MMOL/L (ref 3.5–5.1)
PROT SERPL-MCNC: 6.3 G/DL (ref 6.4–8.9)
RBC # BLD AUTO: 3.22 10E12/L (ref 3.8–5.2)
RBC #/AREA URNS AUTO: 7 /HPF (ref 0–2)
SODIUM SERPL-SCNC: 127 MMOL/L (ref 134–144)
SOURCE: ABNORMAL
SP GR UR STRIP: 1.02 (ref 1–1.03)
TROPONIN I SERPL-MCNC: 5 PG/ML
UROBILINOGEN UR STRIP-MCNC: NORMAL MG/DL (ref 0–2)
WBC # BLD AUTO: 4.7 10E9/L (ref 4–11)
WBC #/AREA URNS AUTO: 7 /HPF (ref 0–5)

## 2020-10-29 PROCEDURE — U0003 INFECTIOUS AGENT DETECTION BY NUCLEIC ACID (DNA OR RNA); SEVERE ACUTE RESPIRATORY SYNDROME CORONAVIRUS 2 (SARS-COV-2) (CORONAVIRUS DISEASE [COVID-19]), AMPLIFIED PROBE TECHNIQUE, MAKING USE OF HIGH THROUGHPUT TECHNOLOGIES AS DESCRIBED BY CMS-2020-01-R: HCPCS | Performed by: PHYSICIAN ASSISTANT

## 2020-10-29 PROCEDURE — 90715 TDAP VACCINE 7 YRS/> IM: CPT | Performed by: PHYSICIAN ASSISTANT

## 2020-10-29 PROCEDURE — 70450 CT HEAD/BRAIN W/O DYE: CPT

## 2020-10-29 PROCEDURE — 97161 PT EVAL LOW COMPLEX 20 MIN: CPT | Mod: GP

## 2020-10-29 PROCEDURE — 93005 ELECTROCARDIOGRAM TRACING: CPT | Performed by: PHYSICIAN ASSISTANT

## 2020-10-29 PROCEDURE — C9803 HOPD COVID-19 SPEC COLLECT: HCPCS | Performed by: PHYSICIAN ASSISTANT

## 2020-10-29 PROCEDURE — 97116 GAIT TRAINING THERAPY: CPT | Mod: GP

## 2020-10-29 PROCEDURE — 36415 COLL VENOUS BLD VENIPUNCTURE: CPT | Performed by: PHYSICIAN ASSISTANT

## 2020-10-29 PROCEDURE — 99284 EMERGENCY DEPT VISIT MOD MDM: CPT | Performed by: PHYSICIAN ASSISTANT

## 2020-10-29 PROCEDURE — 84484 ASSAY OF TROPONIN QUANT: CPT | Performed by: PHYSICIAN ASSISTANT

## 2020-10-29 PROCEDURE — 80053 COMPREHEN METABOLIC PANEL: CPT | Performed by: PHYSICIAN ASSISTANT

## 2020-10-29 PROCEDURE — 99285 EMERGENCY DEPT VISIT HI MDM: CPT | Mod: 25 | Performed by: PHYSICIAN ASSISTANT

## 2020-10-29 PROCEDURE — 81001 URINALYSIS AUTO W/SCOPE: CPT | Performed by: PHYSICIAN ASSISTANT

## 2020-10-29 PROCEDURE — 250N000013 HC RX MED GY IP 250 OP 250 PS 637: Mod: XU | Performed by: PHYSICIAN ASSISTANT

## 2020-10-29 PROCEDURE — 93010 ELECTROCARDIOGRAM REPORT: CPT | Performed by: INTERNAL MEDICINE

## 2020-10-29 PROCEDURE — 250N000011 HC RX IP 250 OP 636: Performed by: PHYSICIAN ASSISTANT

## 2020-10-29 PROCEDURE — 96365 THER/PROPH/DIAG IV INF INIT: CPT | Performed by: PHYSICIAN ASSISTANT

## 2020-10-29 PROCEDURE — 85025 COMPLETE CBC W/AUTO DIFF WBC: CPT | Performed by: PHYSICIAN ASSISTANT

## 2020-10-29 PROCEDURE — 83735 ASSAY OF MAGNESIUM: CPT | Performed by: PHYSICIAN ASSISTANT

## 2020-10-29 PROCEDURE — 90471 IMMUNIZATION ADMIN: CPT | Performed by: PHYSICIAN ASSISTANT

## 2020-10-29 PROCEDURE — 74176 CT ABD & PELVIS W/O CONTRAST: CPT

## 2020-10-29 PROCEDURE — 258N000003 HC RX IP 258 OP 636: Performed by: PHYSICIAN ASSISTANT

## 2020-10-29 RX ORDER — ACETAMINOPHEN 500 MG
1000 TABLET ORAL ONCE
Status: COMPLETED | OUTPATIENT
Start: 2020-10-29 | End: 2020-10-29

## 2020-10-29 RX ORDER — MAGNESIUM SULFATE HEPTAHYDRATE 40 MG/ML
2 INJECTION, SOLUTION INTRAVENOUS ONCE
Status: COMPLETED | OUTPATIENT
Start: 2020-10-29 | End: 2020-10-29

## 2020-10-29 RX ADMIN — ACETAMINOPHEN 1000 MG: 500 TABLET, FILM COATED ORAL at 13:53

## 2020-10-29 RX ADMIN — SODIUM CHLORIDE 1000 ML: 9 INJECTION, SOLUTION INTRAVENOUS at 12:17

## 2020-10-29 RX ADMIN — MAGNESIUM SULFATE HEPTAHYDRATE 2 G: 40 INJECTION, SOLUTION INTRAVENOUS at 12:18

## 2020-10-29 RX ADMIN — TETANUS TOXOID, REDUCED DIPHTHERIA TOXOID AND ACELLULAR PERTUSSIS VACCINE, ADSORBED 0.5 ML: 5; 2.5; 8; 8; 2.5 SUSPENSION INTRAMUSCULAR at 12:50

## 2020-10-29 NOTE — ED NOTES
Social Service to see pt, pt states she wants to go home and thinks she can navigate just fine.  Social service states will contact pt daughter.  PT will be contacted to evaluate pt.

## 2020-10-29 NOTE — ED AVS SNAPSHOT
[facility]  [address]  [place] [address]  Phone: [phone]  Fax: [phone]                                    Geno Horn   MRN: [id number]    Department: [facility]   Date of Visit: 10/29/2020           After Visit Summary Signature Page    I have received my discharge instructions, and my questions have been answered. I have discussed any challenges I see with this plan with the nurse or doctor.    ..........................................................................................................................................  Patient/Patient Representative Signature      ..........................................................................................................................................  Patient Representative Print Name and Relationship to Patient    ..................................................               ................................................  Date                                   Time    ..........................................................................................................................................  Reviewed by Signature/Title    ...................................................              ..............................................  Date                                               Time          22EPIC Rev 08/18

## 2020-10-29 NOTE — ED NOTES
[facility] reports pt daughter removed her life alert bracelet prior to transport, leaving in on a table in pt apartment.  Pt informed of this.

## 2020-10-29 NOTE — ED NOTES
Update called to daughter, will come to  pt, and states she will stay in pts home for the next couple days for safety.

## 2020-10-29 NOTE — ED NOTES
Social work and PT contacted re: consults. Daughter Maria Antonia, ([phone]) updated.  Daughter states pt has [facility] , [facility].  Daughter had been looking into NH placement, as pt has 9 stairs to get into her home.

## 2020-10-29 NOTE — PROGRESS NOTES
10/29/20 1700   Quick Adds   Type of Visit Initial PT Evaluation   Living Environment   People in home alone   Current Living Arrangements apartment   Home Accessibility stairs within home   Number of Stairs, Within Home, Primary 9   Stair Railings, Within Home, Primary railings on both sides of stairs   Transportation Anticipated family or friend will provide   Living Environment Comments patient lives in apartment building where neighbors are attentive and helpful to patient   Self-Care   Usual Activity Tolerance moderate   Current Activity Tolerance moderate   Regular Exercise No   Equipment Currently Used at Home walker, rolling   Disability/Function   Hearing Difficulty or Deaf yes   Describe hearing loss bilateral hearing loss   Wear Glasses or Blind no   Concentrating, Remembering or Making Decisions Difficulty no   Difficulty Communicating no   Walking or Climbing Stairs Difficulty no   Fall history within last six months yes   Number of times patient has fallen within last six months 3   General Information   Onset of Illness/Injury or Date of Surgery 10/29/20   Referring Physician Prakash   Patient/Family Therapy Goals Statement (PT) return home   Weight-Bearing Status - LLE full weight-bearing   Weight-Bearing Status - RLE full weight-bearing   Cognition   Orientation Status (Cognition) oriented x 4   Affect/Mental Status (Cognition) WFL   Follows Commands (Cognition) WFL   Memory Deficit (Cognition) minimal deficit   Pain Assessment   Patient Currently in Pain No   Integumentary/Edema   Integumentary/Edema Comments appears intact without notable LE edema   Posture    Posture Forward head position;Protracted shoulders   Range of Motion (ROM)   ROM Quick Adds ROM WFL   Strength   Manual Muscle Testing Quick Adds Strength WFL   Strength Comments however, patient fatigues with increased activity   Bed Mobility   Bed Mobility supine-sit;sit-supine   Supine-Sit [place] (Bed Mobility) supervision    Sit-Supine [place] (Bed Mobility) supervision   Transfers   Transfers sit-stand transfer;bed-chair transfer   Bed-Chair Transfer   Bed-Chair [place] (Transfers) supervision   Assistive Device (Bed-Chair Transfers) walker, 4-wheeled   Sit-Stand Transfer   Sit-Stand [place] (Transfers) supervision   Assistive Device (Sit-Stand Transfers) walker, 4-wheeled   Gait/Stairs (Locomotion)   [place] Level (Gait) supervision   Assistive Device (Gait) walker, 4-wheeled   Distance in Feet (Required for LE Total Joints) 150    Pattern (Gait) 3-point   Maintains Weight-bearing Status (Gait) able to maintain   Balance   Balance Comments fair with 4ww   Sensory Examination   Sensory Perception Comments appears intact to light touch in LEs   Coordination   Coordination no deficits were identified   Muscle Tone   Muscle Tone no deficits were identified   Clinical Impression   Criteria for Skilled Therapeutic Intervention evaluation only   PT Diagnosis (PT) impaired mobility   Influenced by the following impairments fatigue   Functional limitations due to impairments activity/gait tolerance   Clinical Presentation Stable/Uncomplicated   Clinical Decision Making (Complexity) low complexity   Therapy Frequency (PT) Evaluation only   Anticipated Equipment Needs at Discharge (PT) walker, rolling   Risk & Benefits of therapy have been explained risks/benefits reviewed   PT Discharge Planning    PT Discharge Recommendation (DC Rec) home with assist;home with home care physical therapy   PT Rationale for DC Rec to promote strength,incrased gait stability and safe mobility   Total Evaluation Time   Total Evaluation Time (Minutes) 15

## 2020-10-29 NOTE — ED NOTES
Pt seems to be getting more confused and is able to be redirected. Bed alarm is on and monitoring continued.

## 2020-10-29 NOTE — PROGRESS NOTES
10/29/20 1700   Signing Clinician's Name / Credentials   Signing clinician's name / credentials Bro Ramos MPT   Quick Adds   Rehab Discipline PT   Gait Training   Distance in Feet (Required for LE Total Joints) 150    PT Discharge Planning    PT Discharge Recommendation (DC Rec) home with assist;home with home care physical therapy   PT Rationale for DC Rec to promote strength,incrased gait stability and safe mobility   PT Brief overview of current status  patient demonstrating functional mobility and fair stability with her own 4ww, however, she does fatigue with activity   Additional Documentation   Rehab Comments patient will benefit from continued PT through home care; PT strongly recommended that patient's family provide close supervision over the next few days to ensure patient's return to her prior level of functional mobility   PT Plan PT assessment given to provider

## 2020-10-29 NOTE — ED PROVIDER NOTES
History     Chief Complaint   Patient presents with     Dizziness     Fall     HPI  Geno Horn is a 89 year old female who presents to the emergency department of this evening for evaluation because she has not been feeling well for quite some time with some intermittent dizziness and she states her balance is off.  She says that she is always been dizzy spent forever but at times she cannot walk appropriately because of the dizziness.  She has had multiple falls at home.  Family members advised that she has had some kidney stones previous and would like that to be looked at as well although the patient does not complain in back pain or abdominal pain.  Does have a skin tear to the right lower extremity the patient is afebrile no headaches visual disturbances no runny nose nasal congestion sinus pressure sore throat or cough no chest pain or shortness of breath no abdominal pain diarrhea loss of bowel bladder function or additional trauma that was described above appreciate memory is brisk and she is doing quite well at this time and answers questions appropriate.  Allergies:  Allergies   Allergen Reactions     Celebrex [Celecoxib] Other (See Comments)     Light headed     Tolterodine      Other reaction(s): Intolerance-Can't Take  Was unable to sleep at night       Problem List:    Patient Active Problem List    Diagnosis Date Noted     Spinal stenosis of lumbar region, unspecified whether neurogenic claudication present 02/19/2020     Priority: Medium     Sepsis (H) 06/17/2019     Priority: Medium     Unsteady gait 05/21/2018     Priority: Medium     Urinary urgency 07/24/2017     Priority: Medium     H/O basal cell carcinoma excision 07/20/2016     Priority: Medium     Coronary artery disease involving native coronary artery of native heart without angina pectoris 06/21/2016     Priority: Medium     Hearing loss 01/21/2015     Priority: Medium     Osteoarthritis of both hips 01/21/2015     Priority: Medium      Allergic rhinitis 2012     Priority: Medium     Hypertension 2012     Priority: Medium     Hypothyroidism 2012     Priority: Medium     Overview:   tsh and t4 normal on 2015           Past Medical History:    Past Medical History:   Diagnosis Date     Hypertension      Hypothyroid      Osteoarthritis      ST elevation (STEMI) myocardial infarction (H)        Past Surgical History:    Past Surgical History:   Procedure Laterality Date     ARTHROPLASTY HIP Right     Dr Rodriguez     ARTHROPLASTY HIP Left     Dr. Rodriguez     COMBINED CYSTOSCOPY, URETEROSCOPY, LASER HOLMIUM LITHOTRIPSY URETER(S) Right 2019    Procedure: CYSTOSCOPY, RIGHT STENT REMOVAL, URETEROSCOPY, LASER HOLMIUM LITHOTRIPSY RIGHT URETER WITH STENT REPLACEMENT;  Surgeon: Shawn Cole MD;  Location:  OR     CYSTOSCOPY, RETROGRADES, INSERT STENT URETER(S), COMBINED Right 2019    Procedure: CYSTOSCOPY, WITH RETROGRADE PYELOGRAM AND URETERAL STENT INSERTION;  Surgeon: Shawn Cole MD;  Location:  OR       Family History:    Family History   Problem Relation Age of Onset     Heart Disease Mother 60         of mi     Heart Disease Father 60         of mi     Heart Disease Brother      Heart Disease Brother        Social History:  Marital Status:   [5]  Social History     Tobacco Use     Smoking status: Never Smoker     Smokeless tobacco: Never Used     Tobacco comment: no ecig   Substance Use Topics     Alcohol use: Not Currently     Alcohol/week: 0.0 standard drinks     Drug use: No        Medications:         acetaminophen (TYLENOL) 500 MG tablet       aspirin 81 MG chewable tablet       cetirizine (ZYRTEC) 5 MG tablet       diltiazem ER (DILT-XR) 180 MG 24 hr capsule       levothyroxine (SYNTHROID/LEVOTHROID) 88 MCG tablet       loratadine (CLARITIN) 10 MG tablet       DILT- MG 24 hr capsule       Incontinence Supply Disposable (POISE PAD) PADS       order for DME       order for  DME          Review of Systems     Pertinent positives and negatives are as above in the HPI. 10 point review of systems is otherwise negative.      Physical Exam   BP: 130/89  Pulse: 76  Temp: 98.9  F (37.2  C)  Resp: 20  Weight: 56.2 kg (124 lb)  SpO2: 94 %      Physical Exam  Exam:  Constitutional: healthy, alert and no distress  Head: Normocephalic. No masses, lesions, tenderness or abnormalities  Neck: Neck supple. No adenopathy. Thyroid symmetric, normal size,, Carotids without bruits.  ENT: ENT exam normal, no neck nodes or sinus tenderness  Cardiovascular: negative, PMI normal. No lifts, heaves, or thrills. RRR. No murmurs, clicks gallops or rub  Respiratory: negative, Percussion normal. Good diaphragmatic excursion. Lungs clear  Gastrointestinal: Abdomen soft, non-tender. BS normal. No masses, organomegaly  : Deferred  Musculoskeletal: extremities normal- no gross deformities noted, gait normal and normal muscle tone  Skin: Skin tear noted right lower extremity superficial.  Neurologic: Gait normal. Reflexes normal and symmetric. Sensation grossly WNL.  Psychiatric: mentation appears normal and affect normal/bright  Hematologic/Lymphatic/Immunologic: Normal cervical lymph nodes    ED Course        Procedures               EKG Interpretation:      Interpreted by Cedrick Randall PA-C  Time reviewed: 11:21  Symptoms at time of EKG: Dizzy   Rhythm: NSR   Rate: 76  ST Segments/ T Waves: No ST-T wave changes  Clinical Impression: NSR, age undetermined anteroseptal infarct.           Results for orders placed or performed during the hospital encounter of 10/29/20 (from the past 24 hour(s))   CBC with platelets differential   Result Value Ref Range    WBC 4.7 4.0 - 11.0 10e9/L    RBC Count 3.22 (L) 3.8 - 5.2 10e12/L    Hemoglobin 10.7 (L) 11.7 - 15.7 g/dL    Hematocrit 31.4 (L) 35.0 - 47.0 %    MCV 98 78 - 100 fl    MCH 33.2 (H) 26.5 - 33.0 pg    MCHC 34.1 31.5 - 36.5 g/dL    RDW 14.0 10.0 - 15.0 %     Platelet Count 213 150 - 450 10e9/L    Diff Method Automated Method     % Neutrophils 85.8 %    % Lymphocytes 9.1 %    % Monocytes 4.3 %    % Eosinophils 0.0 %    % Basophils 0.2 %    % Immature Granulocytes 0.6 %    Absolute Neutrophil 4.0 1.6 - 8.3 10e9/L    Absolute Lymphocytes 0.4 (L) 0.8 - 5.3 10e9/L    Absolute Monocytes 0.2 0.0 - 1.3 10e9/L    Absolute Eosinophils 0.0 0.0 - 0.7 10e9/L    Absolute Basophils 0.0 0.0 - 0.2 10e9/L    Abs Immature Granulocytes 0.0 0 - 0.4 10e9/L   Comprehensive metabolic panel   Result Value Ref Range    Sodium 127 (L) 134 - 144 mmol/L    Potassium 3.9 3.5 - 5.1 mmol/L    Chloride 93 (L) 98 - 107 mmol/L    Carbon Dioxide 25 21 - 31 mmol/L    Anion Gap 9 3 - 14 mmol/L    Glucose 119 (H) 70 - 105 mg/dL    Urea Nitrogen 15 7 - 25 mg/dL    Creatinine 0.63 0.60 - 1.20 mg/dL    GFR Estimate 88 >60 mL/min/[1.73_m2]    GFR Estimate If Black >90 >60 mL/min/[1.73_m2]    Calcium 8.6 8.6 - 10.3 mg/dL    Bilirubin Total 0.3 0.3 - 1.0 mg/dL    Albumin 3.9 3.5 - 5.7 g/dL    Protein Total 6.3 (L) 6.4 - 8.9 g/dL    Alkaline Phosphatase 62 34 - 104 U/L    ALT 17 7 - 52 U/L    AST 28 13 - 39 U/L   Magnesium   Result Value Ref Range    Magnesium 1.7 (L) 1.9 - 2.7 mg/dL   Troponin GH (now)   Result Value Ref Range    Troponin 5.0 <34.0 pg/mL   CT Head w/o Contrast    Narrative    PROCEDURE: CT HEAD W/O CONTRAST     HISTORY: dizzy.    COMPARISON: 10/17/2019    TECHNIQUE:  Helical images of the head from the foramen magnum to the  vertex were obtained without contrast.    FINDINGS: The ventricles and sulci are prominent, compatible with  moderate, generalized volume loss. No acute intracranial hemorrhage,  mass effect, midline shift, hydrocephalus or basilar cystern  effacement are present.    The grey-white matter interface is preserved. Confluent  hypoattenuation within the supratentorial subcortical and  periventricular white matter is most compatible with advanced chronic  microvascular ischemic  change.     The calvarium is intact.       Impression    IMPRESSION: Severe chronic microvascular ischemic changes.      KATELIN CLEMENTS MD   CT Abdomen Pelvis w/o Contrast    Narrative    CT ABDOMEN PELVIS W/O CONTRAST    CLINICAL HISTORY: Female, age 89 years,  flank pain ';    Comparison:  CT scan abdomen pelvis 6/17/2019    TECHNIQUE:  CT was performed of the abdomen and pelvis without   contrast. Sagittal, coronal and axial reconstructions were reviewed.     FINDINGS:  Very mild centrilobular emphysema within the lung bases is not  significantly different. Minimal atelectasis persists in the dependent  portions of the right lung. There is improved aeration of both the  left and right lower lobe.    Moderate size hiatal hernia is not significantly changed. No acute  abnormality.    Radiodense gallstone/sludge is not significantly changed. The liver  and biliary tree are grossly normal.    Spleen, pancreas are normal. Enlargement of the adrenal glands is not  significantly different.    The kidneys and visualized portions of the left and right ureter are  grossly normal. Streak artifact limits evaluation of the distal  ureters and urinary bladder. No hydronephrosis.    Large volume of stool is seen throughout the colon. Visualized  portions of the small bowel are unremarkable. The appendix is not  reliably identified.    Retroperitoneal and mesenteric nodes are normal.    Generalized osteopenia and scattered degenerative changes are again  seen throughout the lumbar spine. Scoliotic curvature is not  significant changed, convex right with the apex at L2.    Inguinal lymph nodes are normal.      Impression    IMPRESSION:   Large volume of stool throughout the colon without distinct evidence  of an acute inflammatory process.    No evidence of radiodense calculus of the kidneys or visualized  portions of the ureters.    Unchanged radio dense gallstones/sludge.    Improved aeration at the lung bases. Minimal  atelectasis persists at  the right lung base. No evidence of pneumonia. Pleural effusions have  resolved.    LUKE FORBES MD   UA reflex to Microscopic and Culture    Specimen: Urine clean catch; Unspecified Urine   Result Value Ref Range    Color Urine Yellow     Appearance Urine Slightly Cloudy     Glucose Urine Negative NEG^Negative mg/dL    Bilirubin Urine Negative NEG^Negative    Ketones Urine 10 (A) NEG^Negative mg/dL    Specific Gravity Urine 1.019 1.003 - 1.035    Blood Urine Negative NEG^Negative    pH Urine 6.5 5.0 - 7.0 pH    Protein Albumin Urine Negative NEG^Negative mg/dL    Urobilinogen mg/dL Normal 0.0 - 2.0 mg/dL    Nitrite Urine Negative NEG^Negative    Leukocyte Esterase Urine Negative NEG^Negative    Source Unspecified Urine     RBC Urine 7 (H) 0 - 2 /HPF    WBC Urine 7 (H) 0 - 5 /HPF    Bacteria Urine Few (A) NEG^Negative /HPF    Mucous Urine Present (A) NEG^Negative /LPF       Medications   Tdap (tetanus-diptheria-acell pertussis) (BOOSTRIX) injection 0.5 mL (0.5 mLs Intramuscular Given 10/29/20 1250)   magnesium sulfate 2 g in water intermittent infusion (0 g Intravenous Stopped 10/29/20 1339)   0.9% sodium chloride BOLUS (0 mLs Intravenous Stopped 10/29/20 1441)   acetaminophen (TYLENOL) tablet 1,000 mg (1,000 mg Oral Given 10/29/20 1353)       Assessments & Plan (with Medical Decision Making)     I have reviewed the nursing notes.    I have reviewed the findings, diagnosis, plan and need for follow up with the patient.  Differential diagnosis considerations included concussion, intracranial bleed, migraine headache, tension headache, meningitis/encephalitis, cephalgia, hypoglycemia, metabolic derangements, overdose-substance abuse, seizure, CVA-TIA, sepsis, hepatic encephalopathy, Bell's palsy, labyrinthitis, cardiac dysrhythmias, vasovagal episode, dementia-delirium, acute blood loss.  Pleasant female who presents for evaluation of intermittent dizzy when I evaluate the bedside does not  have any dizziness.  She says she has had dizziness intermittently for quite some time.  It is also noted that she has been having some falls.  Her CT scan is nonrevealing for any acute pathology she does have a large burden of stool noted on imaging.  Patient was found to have some hyponatremia, hypomagnesemia and she received a bolus of saline with 2 g of magnesium IV.  Patient's wounds were cleansed dressing was applied to the skin tear she received a tetanus shot.  We will follow cultures.  The patient was evaluated by both the  here in the emergency department as well as physical therapy patient will need further assistance via nursing home placement family is aware of that and they have been coordinating that on an outpatient basis and they will continue to coordinate that based on the above evaluation at this time the patient can be discharged home in the care of family members the family members will stay with her. I would encourage fall precautions at home.  Close follow-up with the clinic and return if symptoms worsen.              Documentation of a Face-to-Face Physician Encounter October 29, 2020    Geno Horn  9/21/1926  [id number]    I certify that this patient is under my care and that I, or a nurse practitioner or physician's assistant working with me, had a face-to-face encounter that meets the physician face-to-face encounter requirements with this patient on: 10/29/2020.    The encounter with the patient was in whole, or in part, for the following medical condition, which is the primary reason for home health care:  Patient Active Problem List   Diagnosis     Allergic rhinitis     Coronary artery disease involving native coronary artery of native heart without angina pectoris     H/O basal cell carcinoma excision     Hearing loss     Hypertension     Hypothyroidism     Osteoarthritis of both hips     Urinary urgency     Unsteady gait     Sepsis (H)     Spinal stenosis of lumbar  region, unspecified whether neurogenic claudication present       I certify that, based on my findings, the following services are medically necessary home health services: Nursing    My clinical findings support the need for the above services because: As noted in chart and documentation from  and physical therapy    Further, I certify that my clinical findings support that this patient is homebound (i.e. absences from home require considerable and taxing effort and are for medical reasons or [facility] services or infrequently or of short duration when for other reasons) because: The patient is frail and needs further assistance prior to skilled nursing facility placement.    Physician signature ___________________________________   October 29, 2020  Physician name: Cedrick Randall PA-C  Fax ([phone]) or scan/email ([email or web address]) this completed document to [facility] within 24 hours of the referral date.  Questions: [phone].  I explained my diagnostic considerations and recommendations and the patient voiced an understanding and was in agreement with the treatment plan. All questions were answered. We discussed potential side effects of any prescribed or recommended therapies, as well as expectations for response to treatments.      New Prescriptions    No medications on file       Final diagnoses:   Hypomagnesemia   Hyponatremia   Dehydration   Dizziness   Fall, initial encounter   Skin tear of lower leg without complication, right, initial encounter       10/29/2020   [facility]     Cedrick Randall PA-C  10/29/20 5889

## 2020-10-29 NOTE — PROGRESS NOTES
:     Consulted with Beth at [facility] on patient's needs and possible need for SNF.  Sounds like patient would not meet medicare criteria and would likely be a social admit.  They have no ability to admit today.    Met with patient in the emergency department to discuss discharge planning.  Patient lives alone in her own apartment.  Patient stated she has a very supportive daughter.  Per patient daughter checks on her daily.  Daughter helps with shopping.  She stated that she has a PALS unit and housekeeping services.   Discussed discharging to an SNF for rehab.  Patient wants to go home.  She stated that she thinks she can get around her apartment okay and that she can get up the 9 steps into her apartment.  She denies feeling dizzy.  Discussed home care services for home PT.  Patient is agreeable to Home Care.  Updated RN.  PT to evaluate mobility as patient wants to go home.    Phone contact with patient's daughter Maria Antonia to discuss discharge planning.  Maria Antonia is able to transport if patient can safely return home.  Discussion regarding short term rehab, assisted living, and long term care resources.  Maria Antonia is aware that if patient needs to go to SNF it would likely be private pay as a social admit due to not meeting medicare criteria.    Awaiting PT evaluation.    MADELYN Peoples on 10/29/2020 at 3:14 PM

## 2020-10-29 NOTE — ED NOTES
Pt has equal strength to all 4 extremities. Pt is A&Ox4 and is very appropriate. Pt sat up and denied feeling dizzy.

## 2020-10-29 NOTE — ED NOTES
Daughter updated on pt condition.  Daughter states concern about pt having kidney stones as she has had significant back pain and hx of stones.

## 2020-10-29 NOTE — ED TRIAGE NOTES
"Pt arrives to the ED via [facility].  Pt's dtr called 911 because the pt has not been feeling well and dizzy.  Pt describes it as \"my balance is off\".  Pt states she has been dizzy \"oh, forever\".  Pt states that the difference now is that she cannot walk appropriately, which is contributing to her falls.  EMS VS-BP-170/92, 93% on RA, HR-78, 22g PIV in right wrist.  Pt is [place] but does have her hearing aids in.  Pt denies blood thinners.  "

## 2020-10-29 NOTE — ED NOTES
Pt up with 2 RNs and walker to attempt ambulation.  Pt states she feels very weak and c/o severe low back pain, she states doesn't feel like she can walk safely. Not able to take more than one step. Provider aware, see MAR, will continue to monitor.

## 2020-10-29 NOTE — ED NOTES
"Enter room to find pt sitting on edge of bed.  Pt c/o chronic back pain, states \"I just have to get out of here, Stephanie been laying in this bed for 1.5 hours!\" States she cant find her wrist life alert band, searched room, will call EMS.   "

## 2020-10-30 LAB — INTERPRETATION ECG - MUSE: NORMAL

## 2020-10-31 LAB
SARS-COV-2 RNA SPEC QL NAA+PROBE: ABNORMAL
SPECIMEN SOURCE: ABNORMAL

## 2020-11-01 ENCOUNTER — APPOINTMENT (OUTPATIENT)
Dept: GENERAL RADIOLOGY | Facility: OTHER | Age: 85
DRG: 177 | End: 2020-11-01
Attending: PHYSICIAN ASSISTANT
Payer: MEDICARE

## 2020-11-01 ENCOUNTER — TELEPHONE (OUTPATIENT)
Dept: LAB | Facility: OTHER | Age: 85
End: 2020-11-01

## 2020-11-01 ENCOUNTER — NURSE TRIAGE (OUTPATIENT)
Dept: NURSING | Facility: CLINIC | Age: 85
End: 2020-11-01

## 2020-11-01 ENCOUNTER — HOSPITAL ENCOUNTER (INPATIENT)
Facility: OTHER | Age: 85
LOS: 4 days | Discharge: HOME HEALTH | DRG: 177 | End: 2020-11-05
Attending: PHYSICIAN ASSISTANT | Admitting: INTERNAL MEDICINE
Payer: MEDICARE

## 2020-11-01 DIAGNOSIS — E83.51 HYPOCALCEMIA: ICD-10-CM

## 2020-11-01 DIAGNOSIS — E87.1 HYPONATREMIA: ICD-10-CM

## 2020-11-01 DIAGNOSIS — U07.1 PNEUMONIA DUE TO 2019 NOVEL CORONAVIRUS: ICD-10-CM

## 2020-11-01 DIAGNOSIS — E83.42 HYPOMAGNESEMIA: ICD-10-CM

## 2020-11-01 DIAGNOSIS — U07.1 COVID-19 VIRUS INFECTION: Primary | ICD-10-CM

## 2020-11-01 DIAGNOSIS — J12.82 PNEUMONIA DUE TO 2019 NOVEL CORONAVIRUS: ICD-10-CM

## 2020-11-01 DIAGNOSIS — U07.1 2019 NOVEL CORONAVIRUS DISEASE (COVID-19): ICD-10-CM

## 2020-11-01 LAB
ABO + RH BLD: NORMAL
ABO + RH BLD: NORMAL
ANION GAP SERPL CALCULATED.3IONS-SCNC: 6 MMOL/L (ref 3–14)
APTT PPP: 35 SEC (ref 22–37)
BASOPHILS # BLD AUTO: 0 10E9/L (ref 0–0.2)
BASOPHILS NFR BLD AUTO: 0.2 %
BUN SERPL-MCNC: 9 MG/DL (ref 7–25)
CALCIUM SERPL-MCNC: 7.7 MG/DL (ref 8.6–10.3)
CHLORIDE SERPL-SCNC: 92 MMOL/L (ref 98–107)
CK SERPL-CCNC: 37 U/L (ref 30–223)
CO2 SERPL-SCNC: 26 MMOL/L (ref 21–31)
CREAT SERPL-MCNC: 0.5 MG/DL (ref 0.6–1.2)
CRP SERPL-MCNC: 124.7 MG/L
D DIMER PPP FEU-MCNC: 1.9 UG/ML FEU (ref 0–0.5)
DIFFERENTIAL METHOD BLD: ABNORMAL
EOSINOPHIL # BLD AUTO: 0 10E9/L (ref 0–0.7)
EOSINOPHIL NFR BLD AUTO: 0 %
ERYTHROCYTE [DISTWIDTH] IN BLOOD BY AUTOMATED COUNT: 14.4 % (ref 10–15)
FERRITIN SERPL-MCNC: 225 NG/ML (ref 23.9–336.2)
FIBRINOGEN PPP-MCNC: 569 MG/DL (ref 200–420)
GFR SERPL CREATININE-BSD FRML MDRD: >90 ML/MIN/{1.73_M2}
GLUCOSE SERPL-MCNC: 105 MG/DL (ref 70–105)
HCT VFR BLD AUTO: 31 % (ref 35–47)
HGB BLD-MCNC: 10.6 G/DL (ref 11.7–15.7)
IMM GRANULOCYTES # BLD: 0.1 10E9/L (ref 0–0.4)
IMM GRANULOCYTES NFR BLD: 1.1 %
INR PPP: 0.96 (ref 0.86–1.14)
LACTATE BLD-SCNC: 0.7 MMOL/L (ref 0.7–2)
LDH SERPL L TO P-CCNC: 228 U/L (ref 140–271)
LYMPHOCYTES # BLD AUTO: 0.6 10E9/L (ref 0.8–5.3)
LYMPHOCYTES NFR BLD AUTO: 13.7 %
MAGNESIUM SERPL-MCNC: 1.6 MG/DL (ref 1.9–2.7)
MAGNESIUM SERPL-MCNC: 1.7 MG/DL (ref 1.9–2.7)
MCH RBC QN AUTO: 33.1 PG (ref 26.5–33)
MCHC RBC AUTO-ENTMCNC: 34.2 G/DL (ref 31.5–36.5)
MCV RBC AUTO: 97 FL (ref 78–100)
MONOCYTES # BLD AUTO: 0.2 10E9/L (ref 0–1.3)
MONOCYTES NFR BLD AUTO: 4.6 %
NEUTROPHILS # BLD AUTO: 3.7 10E9/L (ref 1.6–8.3)
NEUTROPHILS NFR BLD AUTO: 80.4 %
PLATELET # BLD AUTO: 274 10E9/L (ref 150–450)
POTASSIUM SERPL-SCNC: 3.5 MMOL/L (ref 3.5–5.1)
POTASSIUM SERPL-SCNC: 3.5 MMOL/L (ref 3.5–5.1)
RBC # BLD AUTO: 3.2 10E12/L (ref 3.8–5.2)
RETICS # AUTO: 23 10E9/L (ref 25–95)
RETICS/RBC NFR AUTO: 0.7 % (ref 0.5–2)
SODIUM SERPL-SCNC: 124 MMOL/L (ref 134–144)
SPECIMEN EXP DATE BLD: NORMAL
TROPONIN I SERPL-MCNC: 6.4 PG/ML
WBC # BLD AUTO: 4.5 10E9/L (ref 4–11)

## 2020-11-01 PROCEDURE — 36415 COLL VENOUS BLD VENIPUNCTURE: CPT | Mod: XU | Performed by: INTERNAL MEDICINE

## 2020-11-01 PROCEDURE — XW033E5 INTRODUCTION OF REMDESIVIR ANTI-INFECTIVE INTO PERIPHERAL VEIN, PERCUTANEOUS APPROACH, NEW TECHNOLOGY GROUP 5: ICD-10-PCS | Performed by: INTERNAL MEDICINE

## 2020-11-01 PROCEDURE — 99285 EMERGENCY DEPT VISIT HI MDM: CPT | Mod: 25 | Performed by: PHYSICIAN ASSISTANT

## 2020-11-01 PROCEDURE — 93005 ELECTROCARDIOGRAM TRACING: CPT | Performed by: PHYSICIAN ASSISTANT

## 2020-11-01 PROCEDURE — 83520 IMMUNOASSAY QUANT NOS NONAB: CPT | Performed by: INTERNAL MEDICINE

## 2020-11-01 PROCEDURE — 80048 BASIC METABOLIC PNL TOTAL CA: CPT | Performed by: PHYSICIAN ASSISTANT

## 2020-11-01 PROCEDURE — 250N000009 HC RX 250: Performed by: INTERNAL MEDICINE

## 2020-11-01 PROCEDURE — 83615 LACTATE (LD) (LDH) ENZYME: CPT | Performed by: INTERNAL MEDICINE

## 2020-11-01 PROCEDURE — 84132 ASSAY OF SERUM POTASSIUM: CPT | Performed by: INTERNAL MEDICINE

## 2020-11-01 PROCEDURE — 86901 BLOOD TYPING SEROLOGIC RH(D): CPT | Performed by: INTERNAL MEDICINE

## 2020-11-01 PROCEDURE — 87040 BLOOD CULTURE FOR BACTERIA: CPT | Mod: 91 | Performed by: PHYSICIAN ASSISTANT

## 2020-11-01 PROCEDURE — 83605 ASSAY OF LACTIC ACID: CPT | Performed by: PHYSICIAN ASSISTANT

## 2020-11-01 PROCEDURE — 99223 1ST HOSP IP/OBS HIGH 75: CPT | Mod: AI | Performed by: INTERNAL MEDICINE

## 2020-11-01 PROCEDURE — 85300 ANTITHROMBIN III ACTIVITY: CPT | Performed by: INTERNAL MEDICINE

## 2020-11-01 PROCEDURE — 85384 FIBRINOGEN ACTIVITY: CPT | Performed by: INTERNAL MEDICINE

## 2020-11-01 PROCEDURE — 96374 THER/PROPH/DIAG INJ IV PUSH: CPT | Performed by: PHYSICIAN ASSISTANT

## 2020-11-01 PROCEDURE — 99285 EMERGENCY DEPT VISIT HI MDM: CPT | Performed by: PHYSICIAN ASSISTANT

## 2020-11-01 PROCEDURE — 85045 AUTOMATED RETICULOCYTE COUNT: CPT | Performed by: INTERNAL MEDICINE

## 2020-11-01 PROCEDURE — 85379 FIBRIN DEGRADATION QUANT: CPT | Performed by: INTERNAL MEDICINE

## 2020-11-01 PROCEDURE — 258N000003 HC RX IP 258 OP 636: Performed by: PHYSICIAN ASSISTANT

## 2020-11-01 PROCEDURE — 82550 ASSAY OF CK (CPK): CPT | Performed by: INTERNAL MEDICINE

## 2020-11-01 PROCEDURE — 85730 THROMBOPLASTIN TIME PARTIAL: CPT | Performed by: INTERNAL MEDICINE

## 2020-11-01 PROCEDURE — 258N000003 HC RX IP 258 OP 636: Performed by: INTERNAL MEDICINE

## 2020-11-01 PROCEDURE — 120N000001 HC R&B MED SURG/OB

## 2020-11-01 PROCEDURE — 83735 ASSAY OF MAGNESIUM: CPT | Performed by: INTERNAL MEDICINE

## 2020-11-01 PROCEDURE — 85025 COMPLETE CBC W/AUTO DIFF WBC: CPT | Performed by: PHYSICIAN ASSISTANT

## 2020-11-01 PROCEDURE — 86140 C-REACTIVE PROTEIN: CPT | Performed by: INTERNAL MEDICINE

## 2020-11-01 PROCEDURE — 250N000011 HC RX IP 250 OP 636: Performed by: PHYSICIAN ASSISTANT

## 2020-11-01 PROCEDURE — 84484 ASSAY OF TROPONIN QUANT: CPT | Performed by: PHYSICIAN ASSISTANT

## 2020-11-01 PROCEDURE — 86900 BLOOD TYPING SEROLOGIC ABO: CPT | Performed by: INTERNAL MEDICINE

## 2020-11-01 PROCEDURE — 83735 ASSAY OF MAGNESIUM: CPT | Performed by: PHYSICIAN ASSISTANT

## 2020-11-01 PROCEDURE — 250N000013 HC RX MED GY IP 250 OP 250 PS 637: Performed by: INTERNAL MEDICINE

## 2020-11-01 PROCEDURE — 250N000013 HC RX MED GY IP 250 OP 250 PS 637: Performed by: PHYSICIAN ASSISTANT

## 2020-11-01 PROCEDURE — 85610 PROTHROMBIN TIME: CPT | Performed by: INTERNAL MEDICINE

## 2020-11-01 PROCEDURE — 250N000011 HC RX IP 250 OP 636: Performed by: INTERNAL MEDICINE

## 2020-11-01 PROCEDURE — 71045 X-RAY EXAM CHEST 1 VIEW: CPT

## 2020-11-01 PROCEDURE — 93010 ELECTROCARDIOGRAM REPORT: CPT | Performed by: INTERNAL MEDICINE

## 2020-11-01 PROCEDURE — 82728 ASSAY OF FERRITIN: CPT | Performed by: INTERNAL MEDICINE

## 2020-11-01 RX ORDER — LEVOTHYROXINE SODIUM 88 UG/1
88 TABLET ORAL
Status: DISCONTINUED | OUTPATIENT
Start: 2020-11-02 | End: 2020-11-05 | Stop reason: HOSPADM

## 2020-11-01 RX ORDER — LORATADINE 10 MG/1
10 TABLET ORAL DAILY
Status: DISCONTINUED | OUTPATIENT
Start: 2020-11-02 | End: 2020-11-02

## 2020-11-01 RX ORDER — SODIUM CHLORIDE 9 MG/ML
INJECTION, SOLUTION INTRAVENOUS CONTINUOUS
Status: DISCONTINUED | OUTPATIENT
Start: 2020-11-01 | End: 2020-11-05 | Stop reason: HOSPADM

## 2020-11-01 RX ORDER — LIDOCAINE 40 MG/G
CREAM TOPICAL
Status: DISCONTINUED | OUTPATIENT
Start: 2020-11-01 | End: 2020-11-05 | Stop reason: HOSPADM

## 2020-11-01 RX ORDER — PROCHLORPERAZINE MALEATE 5 MG
5 TABLET ORAL EVERY 6 HOURS PRN
Status: DISCONTINUED | OUTPATIENT
Start: 2020-11-01 | End: 2020-11-05 | Stop reason: HOSPADM

## 2020-11-01 RX ORDER — AZITHROMYCIN 250 MG/1
500 TABLET, FILM COATED ORAL ONCE
Status: COMPLETED | OUTPATIENT
Start: 2020-11-01 | End: 2020-11-01

## 2020-11-01 RX ORDER — ACETAMINOPHEN 325 MG/1
650 TABLET ORAL EVERY 4 HOURS PRN
Status: DISCONTINUED | OUTPATIENT
Start: 2020-11-01 | End: 2020-11-05 | Stop reason: HOSPADM

## 2020-11-01 RX ORDER — IBUPROFEN 600 MG/1
600 TABLET, FILM COATED ORAL EVERY 6 HOURS PRN
Status: DISCONTINUED | OUTPATIENT
Start: 2020-11-01 | End: 2020-11-02

## 2020-11-01 RX ORDER — ASPIRIN 81 MG/1
81 TABLET, CHEWABLE ORAL DAILY
Status: DISCONTINUED | OUTPATIENT
Start: 2020-11-02 | End: 2020-11-05 | Stop reason: HOSPADM

## 2020-11-01 RX ORDER — ALBUTEROL SULFATE 90 UG/1
2 AEROSOL, METERED RESPIRATORY (INHALATION) EVERY 4 HOURS PRN
Status: DISCONTINUED | OUTPATIENT
Start: 2020-11-01 | End: 2020-11-05 | Stop reason: HOSPADM

## 2020-11-01 RX ORDER — AMOXICILLIN 250 MG
1 CAPSULE ORAL 2 TIMES DAILY
Status: DISCONTINUED | OUTPATIENT
Start: 2020-11-01 | End: 2020-11-05 | Stop reason: HOSPADM

## 2020-11-01 RX ORDER — AZITHROMYCIN 250 MG/1
500 TABLET, FILM COATED ORAL DAILY
Status: DISCONTINUED | OUTPATIENT
Start: 2020-11-02 | End: 2020-11-05 | Stop reason: HOSPADM

## 2020-11-01 RX ORDER — ACETAMINOPHEN 500 MG
500 TABLET ORAL ONCE
Status: COMPLETED | OUTPATIENT
Start: 2020-11-01 | End: 2020-11-01

## 2020-11-01 RX ORDER — ONDANSETRON 2 MG/ML
4 INJECTION INTRAMUSCULAR; INTRAVENOUS EVERY 6 HOURS PRN
Status: DISCONTINUED | OUTPATIENT
Start: 2020-11-01 | End: 2020-11-05 | Stop reason: HOSPADM

## 2020-11-01 RX ORDER — ONDANSETRON 4 MG/1
4 TABLET, ORALLY DISINTEGRATING ORAL EVERY 6 HOURS PRN
Status: DISCONTINUED | OUTPATIENT
Start: 2020-11-01 | End: 2020-11-05 | Stop reason: HOSPADM

## 2020-11-01 RX ORDER — FENTANYL CITRATE 50 UG/ML
25 INJECTION, SOLUTION INTRAMUSCULAR; INTRAVENOUS ONCE
Status: COMPLETED | OUTPATIENT
Start: 2020-11-01 | End: 2020-11-01

## 2020-11-01 RX ORDER — AMOXICILLIN 250 MG
2 CAPSULE ORAL 2 TIMES DAILY
Status: DISCONTINUED | OUTPATIENT
Start: 2020-11-01 | End: 2020-11-05 | Stop reason: HOSPADM

## 2020-11-01 RX ORDER — POLYETHYLENE GLYCOL 3350 17 G/17G
17 POWDER, FOR SOLUTION ORAL DAILY
Status: DISCONTINUED | OUTPATIENT
Start: 2020-11-01 | End: 2020-11-05 | Stop reason: HOSPADM

## 2020-11-01 RX ORDER — PROCHLORPERAZINE 25 MG
12.5 SUPPOSITORY, RECTAL RECTAL EVERY 12 HOURS PRN
Status: DISCONTINUED | OUTPATIENT
Start: 2020-11-01 | End: 2020-11-05 | Stop reason: HOSPADM

## 2020-11-01 RX ADMIN — ACETAMINOPHEN 500 MG: 500 TABLET, FILM COATED ORAL at 14:56

## 2020-11-01 RX ADMIN — REMDESIVIR 200 MG: 100 INJECTION, POWDER, LYOPHILIZED, FOR SOLUTION INTRAVENOUS at 18:00

## 2020-11-01 RX ADMIN — SODIUM CHLORIDE: 9 INJECTION, SOLUTION INTRAVENOUS at 18:07

## 2020-11-01 RX ADMIN — SODIUM CHLORIDE 1000 ML: 9 INJECTION, SOLUTION INTRAVENOUS at 16:10

## 2020-11-01 RX ADMIN — IBUPROFEN 600 MG: 600 TABLET ORAL at 18:33

## 2020-11-01 RX ADMIN — AZITHROMYCIN MONOHYDRATE 500 MG: 250 TABLET ORAL at 16:09

## 2020-11-01 RX ADMIN — DOCUSATE SODIUM 50 MG AND SENNOSIDES 8.6 MG 1 TABLET: 8.6; 5 TABLET, FILM COATED ORAL at 21:22

## 2020-11-01 RX ADMIN — ACETAMINOPHEN 650 MG: 325 TABLET, FILM COATED ORAL at 18:33

## 2020-11-01 RX ADMIN — FENTANYL CITRATE 25 MCG: 50 INJECTION, SOLUTION INTRAMUSCULAR; INTRAVENOUS at 16:10

## 2020-11-01 RX ADMIN — ENOXAPARIN SODIUM 40 MG: 40 INJECTION SUBCUTANEOUS at 21:22

## 2020-11-01 ASSESSMENT — ENCOUNTER SYMPTOMS
CHEST TIGHTNESS: 0
BACK PAIN: 0
BRUISES/BLEEDS EASILY: 0
SHORTNESS OF BREATH: 0
WEAKNESS: 1
FEVER: 0
CHILLS: 0
HEMATURIA: 0
ABDOMINAL PAIN: 0
ADENOPATHY: 0
CONFUSION: 0
WOUND: 0

## 2020-11-01 ASSESSMENT — ACTIVITIES OF DAILY LIVING (ADL)
TOILETING_ISSUES: YES
THE_FOLLOWING_AIDS_WERE_PROVIDED;: PATIENT DECLINED OFFER OF AUXILIARY AIDS
NUMBER_OF_TIMES_PATIENT_HAS_FALLEN_WITHIN_LAST_SIX_MONTHS: 2
HEARING_MANAGEMENT: AIDS
EQUIPMENT_CURRENTLY_USED_AT_HOME: CANE, QUAD;WALKER, ROLLING
DOING_ERRANDS_INDEPENDENTLY_DIFFICULTY: YES
WALKING_OR_CLIMBING_STAIRS_DIFFICULTY: YES
TOILETING_ASSISTANCE: TOILETING DIFFICULTY, ASSISTANCE 1 PERSON
DIFFICULTY_EATING/SWALLOWING: NO
DESCRIBE_HEARING_LOSS: BILATERAL HEARING LOSS
WERE_AUXILIARY_AIDS_OFFERED?: NO
WALKING_OR_CLIMBING_STAIRS: AMBULATION DIFFICULTY, ASSISTANCE 1 PERSON;STAIR CLIMBING DIFFICULTY, ASSISTANCE 1 PERSON;TRANSFERRING DIFFICULTY, ASSISTANCE 1 PERSON
DRESSING/BATHING_DIFFICULTY: YES
CONCENTRATING,_REMEMBERING_OR_MAKING_DECISIONS_DIFFICULTY: NO
ADLS_ACUITY_SCORE: 14
PATIENT'S_PREFERRED_MEANS_OF_COMMUNICATION: OTHER
USE_OF_HEARING_ASSISTIVE_DEVICES: BILATERAL HEARING AIDS
TOILETING_MANAGEMENT: ASSIST
FALL_HISTORY_WITHIN_LAST_SIX_MONTHS: YES
WEAR_GLASSES_OR_BLIND: NO
DIFFICULTY_COMMUNICATING: NO
HEARING_DIFFICULTY_OR_DEAF: YES

## 2020-11-01 ASSESSMENT — MIFFLIN-ST. JEOR
SCORE: 942.93
SCORE: 928.25

## 2020-11-01 NOTE — TELEPHONE ENCOUNTER
"Coronavirus (COVID-19) Notification    Caller Name (Patient, parent, daughter/son, grandparent, etc)  daughter    Reason for call  Notify of Positive Coronavirus (COVID-19) lab results, assess symptoms,  review [facility] recommendations    Lab Result    Lab test:  2019-nCoV rRt-PCR or SARS-CoV-2 PCR    Oropharyngeal AND/OR nasopharyngeal swabs is POSITIVE for 2019-nCoV RNA/SARS-COV-2 PCR (COVID-19 virus)    RN Recommendations/Instructions per [facility] Coronavirus COVID-19 recommendations    Brief introduction script  Introduce self then review script:  \"I am calling on behalf of [email or web address].  We were notified that your Coronavirus test (COVID-19) for was POSITIVE for the virus.  I have some information to relay to you but first I wanted to mention that the [place] Dept of Health will be contacting you shortly [it's possible MD already called Patient] to talk to you more about how you are feeling and other people you have had contact with who might now also have the virus.  Also, [facility] is Partnering with the [facility] for Covid-19 research, you may be contacted directly by research staff.\"    Assessment (Inquire about Patient's current symptoms)   Assessment   Current Symptoms at time of phone call: (if no symptoms, document No symptoms] Sore throat,   Symptoms onset (if applicable) Tuesday     If at time of call, Patients symptoms hare worsened, the Patient should contact 911 or have someone drive them to Emergency Dept promptly:      If Patient calling 911, inform 911 personal that you have tested positive for the Coronavirus (COVID-19).  Place mask on and await 911 to arrive.    If Emergency Dept, If possible, please have another adult drive you to the Emergency Dept but you need to wear mask when in contact with other people.      Review information with Patient    Your result was positive. This means you have COVID-19 (coronavirus).  We have sent you a letter that reviews the " information that I'll be reviewing with you now.    How can I protect others?    If you have symptoms: stay home and away from others (self-isolate) until:    You've had no fever--and no medicine that reduces fever--for 1 full day (24 hours). And       Your other symptoms have gotten better. For example, your cough or breathing has improved. And     At least 10 days have passed since your symptoms started. (If you've been told by a doctor that you have a weak immune system, wait 20 days.)     If you don't have symptoms: Stay home and away from others (self-isolate) until at least 10 days have passed since your first positive COVID-19 test. (Date test collected)    During this time:    Stay in your own room, including for meals. Use your own bathroom if you can.    Stay away from others in your home. No hugging, kissing or shaking hands. No visitors.     Don't go to work, school or anywhere else.     Clean  high touch  surfaces often (doorknobs, counters, handles, etc.). Use a household cleaning spray or wipes. You'll find a full list on the EPA website at www.epa.gov/pesticide-registration/list-n-disinfectants-use-against-sars-cov-2.     Cover your mouth and nose with a mask, tissue or other face covering to avoid spreading germs.    Wash your hands and face often with soap and water.    Caregivers in these groups are at risk for severe illness due to COVID-19:  o People 65 years and older  o People who live in a nursing home or long-term care facility  o People with chronic disease (lung, heart, cancer, diabetes, kidney, liver, immunologic)  o People who have a weakened immune system, including those who:  - Are in cancer treatment  - Take medicine that weakens the immune system, such as corticosteroids  - Had a bone marrow or organ transplant  - Have an immune deficiency  - Have poorly controlled HIV or AIDS  - Are obese (body mass index of 40 or higher)  - Smoke regularly    Caregivers should wear gloves while  washing dishes, handling laundry and cleaning bedrooms and bathrooms.    Wash and dry laundry with special caution. Don't shake dirty laundry, and use the warmest water setting you can.    If you have a weakened immune system, ask your doctor about other actions you should take.    For more tips, go to www.cdc.gov/coronavirus/2019-ncov/downloads/10Things.pdf.    You should not go back to work until you meet the guidelines above for ending your home isolation. You don't need to be retested for COVID-19 before going back to work--studies show that you won't spread the virus if it's been at least 10 days since your symptoms started (or 20 days, if you have a weak immune system).    Employers: This document serves as formal notice of your employee's medical guidelines for going back to work. They must meet the above guidelines before going back to work in person.    How can I take care of myself?    1. Get lots of rest. Drink extra fluids (unless a doctor has told you not to).    2. Take Tylenol (acetaminophen) for fever or pain. If you have liver or kidney problems, ask your family doctor if it's okay to take Tylenol.     Take either:     650 mg (two 325 mg pills) every 4 to 6 hours, or     1,000 mg (two 500 mg pills) every 8 hours as needed.     Note: Don't take more than 3,000 mg in one day. Acetaminophen is found in many medicines (both prescribed and over-the-counter medicines). Read all labels to be sure you don't take too much.    For children, check the Tylenol bottle for the right dose (based on their age or weight).    3. If you have other health problems (like cancer, heart failure, an organ transplant or severe kidney disease): Call your specialty clinic if you don't feel better in the next 2 days.    4. Know when to call 911: Emergency warning signs include:    Trouble breathing or shortness of breath    Pain or pressure in the chest that doesn't go away    Feeling confused like you haven't felt before, or  not being able to wake up    Bluish-colored lips or face    5. Sign up for [employer]. We know it's scary to hear that you have COVID-19. We want to track your symptoms to make sure you're okay over the next 2 weeks. Please look for an email from [employer]--this is a free, online program that we'll use to keep in touch. To sign up, follow the link in the email. Learn more at [email or web address].    Where can I get more information?    [facility]: [email or web address]    Coronavirus Basics: www.health.[facility].[place]./diseases/coronavirus/basics.html    What to Do If You're Sick: www.cdc.gov/coronavirus/2019-ncov/about/steps-when-sick.html    Ending Home Isolation: www.cdc.gov/coronavirus/2019-ncov/hcp/disposition-in-home-patients.html     Caring for Someone with COVID-19: www.cdc.gov/coronavirus/2019-ncov/if-you-are-sick/care-for-someone.html     [facility] clinical trials (COVID-19 research studies): [email or web address].[email or web address]-clinical-trials     A Positive COVID-19 letter will be sent via [employer] or the mail. (Exception, no letters sent to Presurgerical/Preprocedure Patients)    [Name]  Nicolasa Farr RN on 11/1/2020 at 9:13 AM

## 2020-11-01 NOTE — ED PROVIDER NOTES
History     Chief Complaint   Patient presents with     positive covid     Generalized Weakness     HPI  Geno Horn is a 89 year old female who presents to the ED today for evaluation of generalized weakness, decreased appetite.  She was seen recently in the emergency department for similar symptoms and at that time was tested for Covid.  Her result returned positive today.  She lives at home alone.  Her daughter who was visiting her says that she seemed very weak and had not been eating and was very unsteady on her feet.  She also noted that and had some elevated temperatures.  For the most part patient does not have specific complaints for me.  She denies any shortness of breath, chest pain, abdominal pain, dysuria.    Allergies:  Allergies   Allergen Reactions     Celebrex [Celecoxib] Other (See Comments)     Light headed     Tolterodine      Other reaction(s): Intolerance-Can't Take  Was unable to sleep at night       Problem List:    Patient Active Problem List    Diagnosis Date Noted     Pneumonia due to 2019 novel coronavirus 11/01/2020     Priority: Medium     Hyponatremia 11/01/2020     Priority: Medium     COVID-19 virus infection 11/01/2020     Priority: Medium     Spinal stenosis of lumbar region, unspecified whether neurogenic claudication present 02/19/2020     Priority: Medium     Sepsis (H) 06/17/2019     Priority: Medium     Unsteady gait 05/21/2018     Priority: Medium     Urinary urgency 07/24/2017     Priority: Medium     H/O basal cell carcinoma excision 07/20/2016     Priority: Medium     Coronary artery disease involving native coronary artery of native heart without angina pectoris 06/21/2016     Priority: Medium     Hearing loss 01/21/2015     Priority: Medium     Osteoarthritis of both hips 01/21/2015     Priority: Medium     Allergic rhinitis 03/16/2012     Priority: Medium     Hypertension 03/16/2012     Priority: Medium     Hypothyroidism 03/16/2012     Priority: Medium      Overview:   tsh and t4 normal on 2015           Past Medical History:    Past Medical History:   Diagnosis Date     Hypertension      Hypothyroid      Osteoarthritis      ST elevation (STEMI) myocardial infarction (H)        Past Surgical History:    Past Surgical History:   Procedure Laterality Date     ARTHROPLASTY HIP Right     Dr Rodriguez     ARTHROPLASTY HIP Left     Dr. Rodriguez     COMBINED CYSTOSCOPY, URETEROSCOPY, LASER HOLMIUM LITHOTRIPSY URETER(S) Right 2019    Procedure: CYSTOSCOPY, RIGHT STENT REMOVAL, URETEROSCOPY, LASER HOLMIUM LITHOTRIPSY RIGHT URETER WITH STENT REPLACEMENT;  Surgeon: Shawn Cole MD;  Location:  OR     CYSTOSCOPY, RETROGRADES, INSERT STENT URETER(S), COMBINED Right 2019    Procedure: CYSTOSCOPY, WITH RETROGRADE PYELOGRAM AND URETERAL STENT INSERTION;  Surgeon: Shawn Cole MD;  Location:  OR       Family History:    Family History   Problem Relation Age of Onset     Heart Disease Mother 60         of mi     Heart Disease Father 60         of mi     Heart Disease Brother      Heart Disease Brother        Social History:  Marital Status:   [5]  Social History     Tobacco Use     Smoking status: Never Smoker     Smokeless tobacco: Never Used     Tobacco comment: no ecig   Substance Use Topics     Alcohol use: Not Currently     Alcohol/week: 0.0 standard drinks     Drug use: No        Medications:    No current outpatient medications on file.        Review of Systems   Constitutional: Negative for chills and fever.   HENT: Negative for congestion.    Eyes: Negative for visual disturbance.   Respiratory: Negative for chest tightness and shortness of breath.    Cardiovascular: Negative for chest pain.   Gastrointestinal: Negative for abdominal pain.   Genitourinary: Negative for hematuria.   Musculoskeletal: Negative for back pain.   Skin: Negative for rash and wound.   Neurological: Positive for weakness. Negative for syncope.   Hematological: Negative  "for adenopathy. Does not bruise/bleed easily.   Psychiatric/Behavioral: Negative for confusion.       Physical Exam   BP: (!) 168/94  Pulse: 88  Temp: 100.2  F (37.9  C)  Resp: 17  Height: 157.5 cm (5' 2\")  Weight: 59 kg (130 lb)  SpO2: 95 %      Physical Exam  Constitutional:       General: She is not in acute distress.     Appearance: She is well-developed. She is not diaphoretic.   HENT:      Head: Normocephalic and atraumatic.   Eyes:      General: No scleral icterus.     Conjunctiva/sclera: Conjunctivae normal.   Neck:      Musculoskeletal: Neck supple.   Cardiovascular:      Rate and Rhythm: Normal rate and regular rhythm.   Pulmonary:      Effort: Pulmonary effort is normal.      Breath sounds: Normal breath sounds.   Abdominal:      Palpations: Abdomen is soft.      Tenderness: There is no abdominal tenderness.   Musculoskeletal:         General: No deformity.   Lymphadenopathy:      Cervical: No cervical adenopathy.   Skin:     General: Skin is warm and dry.      Findings: No rash.   Neurological:      Mental Status: She is alert and oriented to person, place, and time. Mental status is at baseline.   Psychiatric:         Mood and Affect: Mood normal.         Behavior: Behavior normal.         Thought Content: Thought content normal.         Judgment: Judgment normal.         ED Course        Procedures          EKG read at 1608. Sinus rhythm with occasional PAC. No ST changes.    Critical Care time:  none               Results for orders placed or performed during the hospital encounter of 11/01/20 (from the past 24 hour(s))   XR Chest Port 1 View    Narrative    PROCEDURE:  XR CHEST PORT 1 VW    HISTORY:  +covid, weakness.     COMPARISON:  June 2019    FINDINGS:   The cardiac silhouette is normal in size. The pulmonary vasculature is  normal.  There is some confluent right upper lobe opacities consistent  with pneumonia. Millimeter diameter nodule in the left lung. There is  a 7 mm diameter nodule in the " right lung. The right lung nodule is  unchanged as compared to 2019. The right upper lobe opacity in the  left lung nodule was not seen previously. No pleural effusion or  pneumothorax.      Impression    IMPRESSION:  Right upper lobe opacity consistent with pneumonia      FLORIAN PENDLETON MD   CBC with platelets differential   Result Value Ref Range    WBC 4.5 4.0 - 11.0 10e9/L    RBC Count 3.20 (L) 3.8 - 5.2 10e12/L    Hemoglobin 10.6 (L) 11.7 - 15.7 g/dL    Hematocrit 31.0 (L) 35.0 - 47.0 %    MCV 97 78 - 100 fl    MCH 33.1 (H) 26.5 - 33.0 pg    MCHC 34.2 31.5 - 36.5 g/dL    RDW 14.4 10.0 - 15.0 %    Platelet Count 274 150 - 450 10e9/L    Diff Method Automated Method     % Neutrophils 80.4 %    % Lymphocytes 13.7 %    % Monocytes 4.6 %    % Eosinophils 0.0 %    % Basophils 0.2 %    % Immature Granulocytes 1.1 %    Absolute Neutrophil 3.7 1.6 - 8.3 10e9/L    Absolute Lymphocytes 0.6 (L) 0.8 - 5.3 10e9/L    Absolute Monocytes 0.2 0.0 - 1.3 10e9/L    Absolute Eosinophils 0.0 0.0 - 0.7 10e9/L    Absolute Basophils 0.0 0.0 - 0.2 10e9/L    Abs Immature Granulocytes 0.1 0 - 0.4 10e9/L   Magnesium   Result Value Ref Range    Magnesium 1.6 (L) 1.9 - 2.7 mg/dL   Basic metabolic panel   Result Value Ref Range    Sodium 124 (L) 134 - 144 mmol/L    Potassium 3.5 3.5 - 5.1 mmol/L    Chloride 92 (L) 98 - 107 mmol/L    Carbon Dioxide 26 21 - 31 mmol/L    Anion Gap 6 3 - 14 mmol/L    Glucose 105 70 - 105 mg/dL    Urea Nitrogen 9 7 - 25 mg/dL    Creatinine 0.50 (L) 0.60 - 1.20 mg/dL    GFR Estimate >90 >60 mL/min/[1.73_m2]    GFR Estimate If Black >90 >60 mL/min/[1.73_m2]    Calcium 7.7 (L) 8.6 - 10.3 mg/dL   Troponin GH   Result Value Ref Range    Troponin 6.4 <34.0 pg/mL   Lactic acid whole blood   Result Value Ref Range    Lactic Acid 0.7 0.7 - 2.0 mmol/L   Reticulocyte count   Result Value Ref Range    % Retic 0.7 0.5 - 2.0 %    Absolute Retic 23.0 (L) 25 - 95 10e9/L   Lactate Dehydrogenase   Result Value Ref Range    Lactate  Dehydrogenase 228 140 - 271 U/L   CK total   Result Value Ref Range    CK Total 37 30 - 223 U/L   INR   Result Value Ref Range    INR 0.96 0.86 - 1.14   Partial thromboplastin time   Result Value Ref Range    PTT 35 22 - 37 sec   Fibrinogen activity   Result Value Ref Range    Fibrinogen 569 (H) 200 - 420 mg/dL   D dimer quantitative   Result Value Ref Range    D Dimer 1.9 (H) 0.0 - 0.50 ug/ml FEU   Ferritin   Result Value Ref Range    Ferritin 225 23.9 - 336.2 ng/mL   CRP inflammation   Result Value Ref Range    CRP Inflammation 124.7 (H) <10.0 mg/L   ABO and Rh   Result Value Ref Range    ABO A     RH(D) Pos     Specimen Expires 11/04/2020    Potassium   Result Value Ref Range    Potassium 3.5 3.5 - 5.1 mmol/L   Magnesium   Result Value Ref Range    Magnesium 1.7 (L) 1.9 - 2.7 mg/dL       Medications   aspirin (ASA) chewable tablet 81 mg (has no administration in time range)   levothyroxine (SYNTHROID/LEVOTHROID) tablet 88 mcg (has no administration in time range)   loratadine (CLARITIN) tablet 10 mg (has no administration in time range)   lidocaine 1 % 0.1-1 mL (has no administration in time range)   lidocaine (LMX4) cream (has no administration in time range)   sodium chloride (PF) 0.9% PF flush 3 mL (has no administration in time range)   sodium chloride (PF) 0.9% PF flush 3 mL (3 mLs Intracatheter Not Given 11/1/20 2121)   senna-docusate (SENOKOT-S/PERICOLACE) 8.6-50 MG per tablet 1 tablet (1 tablet Oral Given 11/1/20 2122)     Or   senna-docusate (SENOKOT-S/PERICOLACE) 8.6-50 MG per tablet 2 tablet ( Oral See Alternative 11/1/20 2122)   polyethylene glycol (MIRALAX) Packet 17 g (17 g Oral Not Given 11/1/20 1806)   Medication instructions: Do NOT use nebulized medications (has no administration in time range)   enoxaparin ANTICOAGULANT (LOVENOX) injection 40 mg (40 mg Subcutaneous Given 11/1/20 2122)   sodium chloride 0.9% infusion ( Intravenous New Bag 11/1/20 1807)   acetaminophen (TYLENOL) tablet 650 mg (  Oral Canceled Entry 11/1/20 2246)   ibuprofen (ADVIL/MOTRIN) tablet 600 mg (600 mg Oral Given 11/1/20 1833)   prochlorperazine (COMPAZINE) injection 5 mg (has no administration in time range)     Or   prochlorperazine (COMPAZINE) tablet 5 mg (has no administration in time range)     Or   prochlorperazine (COMPAZINE) suppository 12.5 mg (has no administration in time range)   ondansetron (ZOFRAN-ODT) ODT tab 4 mg (has no administration in time range)     Or   ondansetron (ZOFRAN) injection 4 mg (has no administration in time range)   albuterol (PROAIR HFA/PROVENTIL HFA/VENTOLIN HFA) 108 (90 Base) MCG/ACT inhaler 2 puff (has no administration in time range)   remdesivir 200 mg in sodium chloride 0.9 % 250 mL intermittent infusion (200 mg Intravenous New Bag 11/1/20 1800)     Followed by   remdesivir 100 mg in sodium chloride 0.9 % 250 mL intermittent infusion (has no administration in time range)   azithromycin (ZITHROMAX) tablet 500 mg (has no administration in time range)   acetaminophen (TYLENOL) tablet 500 mg (500 mg Oral Given 11/1/20 1456)   0.9% sodium chloride BOLUS (1,000 mLs Intravenous New Bag 11/1/20 1610)   azithromycin (ZITHROMAX) tablet 500 mg (500 mg Oral Given 11/1/20 1609)   fentaNYL (PF) (SUBLIMAZE) injection 25 mcg (25 mcg Intravenous Given 11/1/20 1610)       Assessments & Plan (with Medical Decision Making)   Pt nontoxic in NAD. Heart, lung, bowel sounds normal, abd soft, nontender to palpation, nondistended. VSS, febrile. She is COVID +.    Patient did have a few abnormalities on lab work including hypocalcemia, hypomagnesia, hyponatremia.  Otherwise lab work appeared normal for her.    CXR read as:  Right upper lobe opacity consistent with pneumonia.    I discussed her case with Dr. Schmitz, hospitalist.  We will admit the patient for further management.  She remained stable while in the ED.    Zachary Rodriguez PA-C          I have reviewed the nursing notes.    I have reviewed the findings,  diagnosis, plan and need for follow up with the patient.       Current Discharge Medication List          Final diagnoses:   Hyponatremia   Hypocalcemia   Hypomagnesemia   2019 novel coronavirus disease (COVID-19)       11/1/2020   [facility]     Zachary Rodriguez PA  11/01/20 2797

## 2020-11-01 NOTE — TELEPHONE ENCOUNTER
Coronavirus (COVID-19) Notification    Reason for call  Notify of POSITIVE  COVID-19 lab result, assess symptoms,  review [facility] recommendations    Lab Result   Lab test for 2019-nCoV rRt-PCR or SARS-COV-2 PCR  Oropharyngeal AND/OR nasopharyngeal swabs were POSITIVE for 2019-nCoV RNA [OR] SARS-COV-2 RNA (COVID-19) RNA     We have been unable to reach Patient by phone at this time to notify of their Positive COVID-19 result.  Left voicemail message requesting a call back to [phone] [facility] for results.        POSITIVE COVID-19 Letter sent.    Lakeshia Hobson, MSN, RN

## 2020-11-01 NOTE — ED TRIAGE NOTES
EMS Arrival Note  ________________________________  Geno Horn is a 89 year old Female that arrives via [facility]  Pre hospital clinical presentation per patient  includes feeling dizzy and weak and at risk of falling.  Found out result of her covid test was positive and daughter called 911 for transport to ED.  Daughter wants pt placed for safety due to living alone.  Pre hospital personnel report vital signs of:  B/P 169/83; HR 90, RR 16;SpO2 98  Pre Hospital Cardiac rhythm reported as Normal Sinus  Pre hospital care included:  Patient arrives with:  GCS Total = 15  Airway intact  Breathing Assessment Normal.    Circulation Assessment Normal.  Patient arrives with NO IV upon arrival.    Placed in room 901, gowned, warm blanket provided, side rails up,  ID verified and band placed, and call light within reach.       Previous living situation Alone

## 2020-11-01 NOTE — TELEPHONE ENCOUNTER
"Daughter of 89 yo pt calls in   Was just at >     10/29/2020 (6 hours)  [facility]  ED   Hypomagnesemia +5 more   Cedrick Randall, PA-C      Daughter says now she is running a fever 100.9 (po)  Is too weak to get to bathroom by herself    Breathing seems > \"ok\" per daughter    But pt is 89 yo - covid positive - has a fever - too weak to get up on own     Daughter says she lives by her self in her own apt    Daughter says she just cannot take care of her - with the pt being so weak     After discussion - daughter agrees - will have pt brought back to ED - will speak to  in regards to pt cannot stay at home on her own     Protocol and care advice reviewed  Caller states understanding of the recommended disposition  Advised to call back if further questions or concerns    Taiwo Dutton , RN / [facility]                                    Reason for Disposition    Patient sounds very sick or weak to the triager  (Exception: mild weakness and hasn't taken fever medicine)    Additional Information    Negative: Difficult to awaken or acting confused (e.g., disoriented, slurred speech)    Negative: [1] Headache AND [2] stiff neck (can't touch chin to chest)    Negative: Difficulty breathing    Negative: IV drug abuse    Negative: [1] Drinking very little AND [2] dehydration suspected (e.g., no urine > 12 hours, very dry mouth, very lightheaded)    Negative: Shock suspected (e.g., cold/pale/clammy skin, too weak to stand, low BP, rapid pulse)    Negative: [1] Difficulty breathing AND [2] bluish lips, tongue or face    Negative: New onset rash with multiple purple (or blood-colored) spots or dots    Negative: Sounds like a life-threatening emergency to the triager    Protocols used: FEVER-A-AH      "

## 2020-11-01 NOTE — H&P
[facility]    History and Physical  Hospitalist       Date of Admission:  11/1/2020    Assessment & Plan   Geno Horn is a 89 year old female who presents with viral versus bacterial pneumonia due to coronavirus.    Principal Problem:    Pneumonia due to 2019 novel coronavirus    Assessment: PCR positive on 10/28, increasing weakness, dehydration, oxygen saturation 94% on room air.  After discussion of risks and benefits patient has agreed to remdesivir.    Plan:   -Azithromycin day 1  -Remdesivir day 1  -Covid order set implemented  -Follow-up blood cultures urine strep and Legionella    Active Problems:    Hypertension    Assessment: Stable    Plan:   -Hold home diltiazem and restart pending clinical course      Hyponatremia    Assessment: Acute on chronic, asymptomatic likely from dehydration.    Plan:   -Normal saline overnight  -Daily    FEN: ADAT, normal saline at 100 mL/hr, mg/k replacement protocol  PPX: Lovenox  Code Status: Prior    [facility]    Primary Care Physician   Fransisca Rocha    Chief Complaint   weakness    History is obtained from the patient and chart review.    History of Present Illness   Geno Horn is a 89 year old female who presents with bacterial versus viral pneumonia due to COVID-19.  Patient has been feeling poorly for over a week and presented to the ER on 10/28 for feeling weak.  She was sent home but Covid returned positive today.  Over the last 2 days she is felt increasingly weak with a poor appetite and was brought back to the ER today after call from family.    In the ER she had a low-grade temp, infiltrate on chest x-ray but no supplemental oxygen requirement and she was subsequently admitted for further management given high risk for decompensation.    Past Medical History    I have reviewed this patient's medical history and updated it with pertinent information if needed.   Past Medical History:   Diagnosis Date     Hypertension       Hypothyroid      Osteoarthritis      ST elevation (STEMI) myocardial infarction (H)     ; angioplasty       Past Surgical History   I have reviewed this patient's surgical history and updated it with pertinent information if needed.  Past Surgical History:   Procedure Laterality Date     ARTHROPLASTY HIP Right     Dr Rodriguez     ARTHROPLASTY HIP Left     Dr. Rodriguez     COMBINED CYSTOSCOPY, URETEROSCOPY, LASER HOLMIUM LITHOTRIPSY URETER(S) Right 2019    Procedure: CYSTOSCOPY, RIGHT STENT REMOVAL, URETEROSCOPY, LASER HOLMIUM LITHOTRIPSY RIGHT URETER WITH STENT REPLACEMENT;  Surgeon: Shawn Cole MD;  Location:  OR     CYSTOSCOPY, RETROGRADES, INSERT STENT URETER(S), COMBINED Right 2019    Procedure: CYSTOSCOPY, WITH RETROGRADE PYELOGRAM AND URETERAL STENT INSERTION;  Surgeon: Shawn Cole MD;  Location:  OR       Prior to Admission Medications   Prior to Admission Medications   Prescriptions Last Dose Informant Patient Reported? Taking?   Incontinence Supply Disposable (POISE PAD) PADS   No No   Si Units daily   acetaminophen (TYLENOL) 500 MG tablet   Yes No   Sig: Take 500 mg by mouth every 6 hours as needed for mild pain   aspirin 81 MG chewable tablet  Daughter Yes No   Sig: Take 81 mg by mouth daily with food   cetirizine (ZYRTEC) 5 MG tablet   No No   Sig: Take daily for 14 days as a trial, and then as needed. You can continue to take it daily if it helps.   diltiazem ER (DILT-XR) 180 MG 24 hr capsule   No No   Sig: Take 1 capsule (180 mg) by mouth daily   levothyroxine (SYNTHROID/LEVOTHROID) 88 MCG tablet   No No   Sig: TAKE 1 TABLET (88 MCG) BY MOUTH DAILY   loratadine (CLARITIN) 10 MG tablet   Yes No   Sig: Take 10 mg by mouth daily   order for DME   No No   Sig: Equipment being ordered: Spikes for cane   order for DME   No No   Sig: Equipment being ordered: plastic underpants      Facility-Administered Medications: None     Allergies   Allergies   Allergen Reactions     Celebrex  [Celecoxib] Other (See Comments)     Light headed     Tolterodine      Other reaction(s): Intolerance-Can't Take  Was unable to sleep at night       Social History   I have reviewed this patient's social history and updated it with pertinent information if needed. Geno Horn  reports that she has never smoked. She has never used smokeless tobacco. She reports previous alcohol use. She reports that she does not use drugs.    Family History   I have reviewed this patient's family history and updated it with pertinent information if needed.   Family History   Problem Relation Age of Onset     Heart Disease Mother 60         of mi     Heart Disease Father 60         of mi     Heart Disease Brother      Heart Disease Brother        Review of Systems     REVIEW OF SYSTEMS:    Constitutional: Poor energy and appetite, no recent known sick contacts  Eyes: no changes in vision  Ears, nose, mouth, throat, and face: no mouth sores, dysphagia, has a mildly sore throat.    Respiratory: no shortness of breath, cough, or wheezing. No aspiration symptoms.   Cardiovascular: no chest pain, palpitations, orthopnea, increased lower extremity edema, or syncope.   Gastrointestinal: no constipation, diarrhea, nausea, vomiting or abdominal pain.  Genitourinary: no dysuria, hematuria, urgency or frequency.   Musculoskeletal: no pain to extremities or falls.   Endocrine: not a known diabetic.     Physical Exam   Temp: 100.2  F (37.9  C) Temp src: Tympanic BP: (!) 164/82 Pulse: 78   Resp: 18 SpO2: 95 % O2 Device: None (Room air)    Vital Signs with Ranges  Temp:  [100.2  F (37.9  C)] 100.2  F (37.9  C)  Pulse:  [] 78  Resp:  [9-56] 18  BP: (124-170)/() 164/82  SpO2:  [92 %-95 %] 95 %  130 lbs 0 oz    Exam:  GENERAL: Talkative, laying in bed, in no apparent distress.  Head: normocephalic and atraumatic  Eyes: anicteric and non-injected sclera  Nose: no rhinorrhea or epistaxis.   Throat: moist mucous membranes with no  active oral lesions.  NECK: Supple, jugular venous distension not present.  CARDIOVASCULAR: regular rate and rhythm, no murmurs, rubs, or gallops. Normal S1/S2. No lower extremity edema.   RESPIRATORY: Scant right rhonchi at the right lateral lung field otherwise diffuse air movement in all fields, no significant wheezing, no crackles, no accessory muscle use or evidence of respiratory distress.   GI: soft, non-tender, non-distended, normoactive bowel sounds.  MUSCULOSKELETAL: warm and well perfused, 2+ dorsalis pedis pulses.    SKIN: no pallor, jaundice or rashes.  NEUROLOGY: AAOx3, follows commands, speech and language without focal deficits.        Data     Recent Labs   Lab 11/01/20  1454 10/29/20  1108   WBC 4.5 4.7   HGB 10.6* 10.7*   MCV 97 98    213   * 127*   POTASSIUM 3.5 3.9   CHLORIDE 92* 93*   CO2 26 25   BUN 9 15   CR 0.50* 0.63   ANIONGAP 6 9   BARB 7.7* 8.6    119*   ALBUMIN  --  3.9   PROTTOTAL  --  6.3*   BILITOTAL  --  0.3   ALKPHOS  --  62   ALT  --  17   AST  --  28       Recent Results (from the past 24 hour(s))   XR Chest Port 1 View    Narrative    PROCEDURE:  XR CHEST PORT 1 VW    HISTORY:  +covid, weakness.     COMPARISON:  June 2019    FINDINGS:   The cardiac silhouette is normal in size. The pulmonary vasculature is  normal.  There is some confluent right upper lobe opacities consistent  with pneumonia. Millimeter diameter nodule in the left lung. There is  a 7 mm diameter nodule in the right lung. The right lung nodule is  unchanged as compared to 2019. The right upper lobe opacity in the  left lung nodule was not seen previously. No pleural effusion or  pneumothorax.      Impression    IMPRESSION:  Right upper lobe opacity consistent with pneumonia      FLORIAN PENDLETON MD

## 2020-11-02 LAB
ALBUMIN SERPL-MCNC: 3.2 G/DL (ref 3.5–5.7)
ALBUMIN UR-MCNC: NEGATIVE MG/DL
ALP SERPL-CCNC: 57 U/L (ref 34–104)
ALT SERPL W P-5'-P-CCNC: 16 U/L (ref 7–52)
ANION GAP SERPL CALCULATED.3IONS-SCNC: 8 MMOL/L (ref 3–14)
ANION GAP SERPL CALCULATED.3IONS-SCNC: 9 MMOL/L (ref 3–14)
APPEARANCE UR: CLEAR
AST SERPL W P-5'-P-CCNC: 29 U/L (ref 13–39)
BASOPHILS # BLD AUTO: 0 10E9/L (ref 0–0.2)
BASOPHILS NFR BLD AUTO: 0.4 %
BILIRUB SERPL-MCNC: 0.3 MG/DL (ref 0.3–1)
BILIRUB UR QL STRIP: NEGATIVE
BUN SERPL-MCNC: 10 MG/DL (ref 7–25)
BUN SERPL-MCNC: 10 MG/DL (ref 7–25)
CALCIUM SERPL-MCNC: 7.8 MG/DL (ref 8.6–10.3)
CALCIUM SERPL-MCNC: 7.9 MG/DL (ref 8.6–10.3)
CHLORIDE SERPL-SCNC: 97 MMOL/L (ref 98–107)
CHLORIDE SERPL-SCNC: 97 MMOL/L (ref 98–107)
CO2 SERPL-SCNC: 23 MMOL/L (ref 21–31)
CO2 SERPL-SCNC: 24 MMOL/L (ref 21–31)
COLOR UR AUTO: ABNORMAL
CREAT SERPL-MCNC: 0.45 MG/DL (ref 0.6–1.2)
CREAT SERPL-MCNC: 0.47 MG/DL (ref 0.6–1.2)
CRP SERPL-MCNC: 122 MG/L
D DIMER PPP FEU-MCNC: 1.6 UG/ML FEU (ref 0–0.5)
DIFFERENTIAL METHOD BLD: ABNORMAL
EOSINOPHIL # BLD AUTO: 0 10E9/L (ref 0–0.7)
EOSINOPHIL NFR BLD AUTO: 0 %
ERYTHROCYTE [DISTWIDTH] IN BLOOD BY AUTOMATED COUNT: 14.5 % (ref 10–15)
FIBRINOGEN PPP-MCNC: 499 MG/DL (ref 200–420)
GFR SERPL CREATININE-BSD FRML MDRD: >90 ML/MIN/{1.73_M2}
GFR SERPL CREATININE-BSD FRML MDRD: >90 ML/MIN/{1.73_M2}
GLUCOSE SERPL-MCNC: 86 MG/DL (ref 70–105)
GLUCOSE SERPL-MCNC: 89 MG/DL (ref 70–105)
GLUCOSE UR STRIP-MCNC: NEGATIVE MG/DL
HCT VFR BLD AUTO: 27.7 % (ref 35–47)
HGB BLD-MCNC: 9.5 G/DL (ref 11.7–15.7)
HGB UR QL STRIP: NEGATIVE
IMM GRANULOCYTES # BLD: 0.1 10E9/L (ref 0–0.4)
IMM GRANULOCYTES NFR BLD: 2 %
INR PPP: 1.08 (ref 0.86–1.14)
INTERPRETATION ECG - MUSE: NORMAL
KETONES UR STRIP-MCNC: 10 MG/DL
L PNEUMO1 AG UR QL IA: NORMAL
LDH SERPL L TO P-CCNC: 226 U/L (ref 140–271)
LEUKOCYTE ESTERASE UR QL STRIP: NEGATIVE
LYMPHOCYTES # BLD AUTO: 0.5 10E9/L (ref 0.8–5.3)
LYMPHOCYTES NFR BLD AUTO: 17.6 %
MAGNESIUM SERPL-MCNC: 1.7 MG/DL (ref 1.9–2.7)
MCH RBC QN AUTO: 33.5 PG (ref 26.5–33)
MCHC RBC AUTO-ENTMCNC: 34.3 G/DL (ref 31.5–36.5)
MCV RBC AUTO: 98 FL (ref 78–100)
MONOCYTES # BLD AUTO: 0.1 10E9/L (ref 0–1.3)
MONOCYTES NFR BLD AUTO: 5.5 %
NEUTROPHILS # BLD AUTO: 1.9 10E9/L (ref 1.6–8.3)
NEUTROPHILS NFR BLD AUTO: 74.5 %
NITRATE UR QL: NEGATIVE
PH UR STRIP: 6.5 PH (ref 5–7)
PLATELET # BLD AUTO: 244 10E9/L (ref 150–450)
POTASSIUM SERPL-SCNC: 3.3 MMOL/L (ref 3.5–5.1)
POTASSIUM SERPL-SCNC: 3.3 MMOL/L (ref 3.5–5.1)
PROT SERPL-MCNC: 5.4 G/DL (ref 6.4–8.9)
RBC # BLD AUTO: 2.84 10E12/L (ref 3.8–5.2)
RETICS # AUTO: 21 10E9/L (ref 25–95)
RETICS/RBC NFR AUTO: 0.7 % (ref 0.5–2)
SODIUM SERPL-SCNC: 129 MMOL/L (ref 134–144)
SODIUM SERPL-SCNC: 129 MMOL/L (ref 134–144)
SOURCE: ABNORMAL
SP GR UR STRIP: 1.01 (ref 1–1.03)
SPECIMEN SOURCE: NORMAL
TROPONIN I SERPL-MCNC: 6.4 PG/ML
UROBILINOGEN UR STRIP-MCNC: NORMAL MG/DL (ref 0–2)
WBC # BLD AUTO: 2.8 10E9/L (ref 4–11)

## 2020-11-02 PROCEDURE — 250N000013 HC RX MED GY IP 250 OP 250 PS 637: Performed by: INTERNAL MEDICINE

## 2020-11-02 PROCEDURE — 250N000009 HC RX 250: Performed by: FAMILY MEDICINE

## 2020-11-02 PROCEDURE — 120N000001 HC R&B MED SURG/OB

## 2020-11-02 PROCEDURE — 85045 AUTOMATED RETICULOCYTE COUNT: CPT | Performed by: INTERNAL MEDICINE

## 2020-11-02 PROCEDURE — 83615 LACTATE (LD) (LDH) ENZYME: CPT | Performed by: INTERNAL MEDICINE

## 2020-11-02 PROCEDURE — 80048 BASIC METABOLIC PNL TOTAL CA: CPT | Performed by: INTERNAL MEDICINE

## 2020-11-02 PROCEDURE — 36415 COLL VENOUS BLD VENIPUNCTURE: CPT | Performed by: INTERNAL MEDICINE

## 2020-11-02 PROCEDURE — 85384 FIBRINOGEN ACTIVITY: CPT | Performed by: INTERNAL MEDICINE

## 2020-11-02 PROCEDURE — 85025 COMPLETE CBC W/AUTO DIFF WBC: CPT | Performed by: INTERNAL MEDICINE

## 2020-11-02 PROCEDURE — 258N000003 HC RX IP 258 OP 636: Performed by: FAMILY MEDICINE

## 2020-11-02 PROCEDURE — 84484 ASSAY OF TROPONIN QUANT: CPT | Performed by: INTERNAL MEDICINE

## 2020-11-02 PROCEDURE — 99232 SBSQ HOSP IP/OBS MODERATE 35: CPT | Performed by: FAMILY MEDICINE

## 2020-11-02 PROCEDURE — 250N000013 HC RX MED GY IP 250 OP 250 PS 637: Performed by: FAMILY MEDICINE

## 2020-11-02 PROCEDURE — 83735 ASSAY OF MAGNESIUM: CPT | Performed by: INTERNAL MEDICINE

## 2020-11-02 PROCEDURE — 86140 C-REACTIVE PROTEIN: CPT | Performed by: INTERNAL MEDICINE

## 2020-11-02 PROCEDURE — 85379 FIBRIN DEGRADATION QUANT: CPT | Performed by: INTERNAL MEDICINE

## 2020-11-02 PROCEDURE — 87899 AGENT NOS ASSAY W/OPTIC: CPT | Performed by: INTERNAL MEDICINE

## 2020-11-02 PROCEDURE — 250N000011 HC RX IP 250 OP 636: Performed by: INTERNAL MEDICINE

## 2020-11-02 PROCEDURE — 258N000003 HC RX IP 258 OP 636: Performed by: INTERNAL MEDICINE

## 2020-11-02 PROCEDURE — 80053 COMPREHEN METABOLIC PANEL: CPT | Performed by: FAMILY MEDICINE

## 2020-11-02 PROCEDURE — 81003 URINALYSIS AUTO W/O SCOPE: CPT | Performed by: PHYSICIAN ASSISTANT

## 2020-11-02 PROCEDURE — 85610 PROTHROMBIN TIME: CPT | Performed by: INTERNAL MEDICINE

## 2020-11-02 RX ORDER — MULTIVIT-MIN/IRON/FOLIC ACID/K 18-600-40
1 CAPSULE ORAL DAILY
COMMUNITY
End: 2020-11-09

## 2020-11-02 RX ORDER — DILTIAZEM HYDROCHLORIDE 180 MG/1
180 CAPSULE, COATED, EXTENDED RELEASE ORAL DAILY
Status: DISCONTINUED | OUTPATIENT
Start: 2020-11-02 | End: 2020-11-05 | Stop reason: HOSPADM

## 2020-11-02 RX ORDER — MULTIVITAMIN,THERAPEUTIC
1 TABLET ORAL DAILY
COMMUNITY
End: 2022-01-19

## 2020-11-02 RX ORDER — IBUPROFEN 200 MG
200 TABLET ORAL EVERY 6 HOURS PRN
Status: DISCONTINUED | OUTPATIENT
Start: 2020-11-02 | End: 2020-11-05 | Stop reason: HOSPADM

## 2020-11-02 RX ORDER — POTASSIUM CHLORIDE 1500 MG/1
20 TABLET, EXTENDED RELEASE ORAL ONCE
Status: COMPLETED | OUTPATIENT
Start: 2020-11-02 | End: 2020-11-02

## 2020-11-02 RX ADMIN — SODIUM CHLORIDE: 9 INJECTION, SOLUTION INTRAVENOUS at 08:23

## 2020-11-02 RX ADMIN — POTASSIUM CHLORIDE 20 MEQ: 1500 TABLET, EXTENDED RELEASE ORAL at 11:29

## 2020-11-02 RX ADMIN — LEVOTHYROXINE SODIUM 88 MCG: 88 TABLET ORAL at 08:23

## 2020-11-02 RX ADMIN — ACETAMINOPHEN 650 MG: 325 TABLET, FILM COATED ORAL at 09:39

## 2020-11-02 RX ADMIN — SODIUM CHLORIDE: 9 INJECTION, SOLUTION INTRAVENOUS at 22:30

## 2020-11-02 RX ADMIN — ACETAMINOPHEN 650 MG: 325 TABLET, FILM COATED ORAL at 22:29

## 2020-11-02 RX ADMIN — ASPIRIN 81 MG CHEWABLE TABLET 81 MG: 81 TABLET CHEWABLE at 09:37

## 2020-11-02 RX ADMIN — AZITHROMYCIN 500 MG: 250 TABLET, FILM COATED ORAL at 09:38

## 2020-11-02 RX ADMIN — REMDESIVIR 100 MG: 100 INJECTION, POWDER, LYOPHILIZED, FOR SOLUTION INTRAVENOUS at 17:07

## 2020-11-02 RX ADMIN — DOCUSATE SODIUM 50 MG AND SENNOSIDES 8.6 MG 1 TABLET: 8.6; 5 TABLET, FILM COATED ORAL at 09:37

## 2020-11-02 RX ADMIN — ACETAMINOPHEN 650 MG: 325 TABLET, FILM COATED ORAL at 18:00

## 2020-11-02 RX ADMIN — POLYETHYLENE GLYCOL 3350 17 G: 17 POWDER, FOR SOLUTION ORAL at 09:39

## 2020-11-02 RX ADMIN — ENOXAPARIN SODIUM 40 MG: 40 INJECTION SUBCUTANEOUS at 22:30

## 2020-11-02 RX ADMIN — LORATADINE 10 MG: 10 TABLET ORAL at 09:36

## 2020-11-02 RX ADMIN — DILTIAZEM HYDROCHLORIDE 180 MG: 180 CAPSULE, COATED, EXTENDED RELEASE ORAL at 14:50

## 2020-11-02 RX ADMIN — IBUPROFEN 200 MG: 200 TABLET, FILM COATED ORAL at 20:19

## 2020-11-02 RX ADMIN — ACETAMINOPHEN 650 MG: 325 TABLET, FILM COATED ORAL at 00:59

## 2020-11-02 ASSESSMENT — ACTIVITIES OF DAILY LIVING (ADL)
ADLS_ACUITY_SCORE: 17
ADLS_ACUITY_SCORE: 17
ADLS_ACUITY_SCORE: 20
ADLS_ACUITY_SCORE: 19
ADLS_ACUITY_SCORE: 19
ADLS_ACUITY_SCORE: 17

## 2020-11-02 ASSESSMENT — MIFFLIN-ST. JEOR: SCORE: 930.25

## 2020-11-02 NOTE — PHARMACY-ADMISSION MEDICATION HISTORY
Pharmacy -- Admission Medication Reconciliation    Prior to admission (PTA) medications were reviewed and the patient's PTA medication list was updated.    Sources Consulted: patient phone interview, sure scripts, chart review    The reliability of this Medication Reconciliation is: Reliability: Reliable    The following significant changes were made:    Added discharge pharmacy    Removed cetirizine--not taking    Removed loratadine--not taking    Added ocean spray nasal spray--as needed    Added daily vitamin c    Added daily MVI    In addition, the patient's allergies were reviewed with the patient and updated as follows:   Allergies: Celebrex [celecoxib] and Tolterodine    The pharmacist has reviewed with the patient that all personal medications should be removed from the building or locked in the belongings safe.  Patient shall only take medications ordered by the physician and administered by the nursing staff.       Medication barriers identified: none   Medication adherence concerns: none   Understanding of emergency medications: SANJAY Ledesma RPH, 11/2/2020,  8:48 AM

## 2020-11-02 NOTE — PROGRESS NOTES
[facility]    Medicine Progress Note - Hospitalist Service       Date of Admission:  11/1/2020  Assessment & Plan     Geno Horn is a 89 year old female who presents with viral versus bacterial pneumonia due to coronavirus.    Principal Problem:    Pneumonia due to 2019 novel coronavirus    Assessment: PCR positive on 10/28, presented with increasing weakness, dehydration, oxygen saturation 94% on room air.  Started Remdesivir on 11/1.    Plan:   -Azithromycin day 2  -Remdesivir day 2  -Covid order set implemented  -Follow-up blood cultures urine strep and Legionella    Active Problems:    Hypertension    Assessment: Stable    Plan:   -Restart diltiazem.      Hyponatremia    Assessment: Acute on chronic, asymptomatic likely from dehydration.    Plan:   -Reduce IV fluid to 50 mL/h.  -Daily               Diet: Advance Diet as Tolerated: Regular Diet Adult    DVT Prophylaxis: Lovenox 40 mg once daily per [facility] adult Covid anticoagulation guide  Riggs Catheter: not present  Code Status: No CPR- Do NOT Intubate           Disposition Plan   Expected discharge: 2 - 3 days, recommended to prior living arrangement once antibiotic plan established and SIRS/Sepsis treated.  Entered: Corey Pelayo MD 11/02/2020, 1:24 PM       The patient's care was discussed with the Patient and Patient's Family.    Corey Pelayo MD  Hospitalist Service  [facility]  Contact information available via [facility] Paging/Directory    ______________________________________________________________________    Interval History   Patient reports that she feels a little better.  She is not having any shortness of breath today.  Report cough seems improved.  She is still very tired and fatigues easily with any activity in bed.  Appetite is mediocre.  No fevers.  No chest pain.  No new issues.    Data reviewed today: I reviewed all medications, new labs and imaging results over the last 24  hours. I personally reviewed no images or EKG's today.    Physical Exam   Vital Signs: Temp: 99.1  F (37.3  C) Temp src: Tympanic BP: (!) 164/78 Pulse: 72   Resp: 16 SpO2: 96 % O2 Device: None (Room air)    Weight: 127 lbs 3.29 oz  Constitutional: awake, alert, cooperative, no apparent distress, and appears stated age  Respiratory: No increased work of breathing, good air exchange, loud rhonchi in the right upper lobe that  improved with deep inspiration.  Cardiovascular: Regular rate and rhythm.  No lower extremity edema  GI: Abdomen soft, nontender  Musculoskeletal: No synovitis.  Muscle strength age and body habitus appropriateas well as equal and even.     Data   Recent Labs   Lab 11/02/20  0545 11/01/20  1745 11/01/20  1454 10/29/20  1108   WBC 2.8*  --  4.5 4.7   HGB 9.5*  --  10.6* 10.7*   MCV 98  --  97 98     --  274 213   INR 1.08 0.96  --   --    *  129*  --  124* 127*   POTASSIUM 3.3*  3.3* 3.5 3.5 3.9   CHLORIDE 97*  97*  --  92* 93*   CO2 23  24  --  26 25   BUN 10  10  --  9 15   CR 0.45*  0.47*  --  0.50* 0.63   ANIONGAP 9  8  --  6 9   BARB 7.8*  7.9*  --  7.7* 8.6   GLC 86  89  --  105 119*   ALBUMIN 3.2*  --   --  3.9   PROTTOTAL 5.4*  --   --  6.3*   BILITOTAL 0.3  --   --  0.3   ALKPHOS 57  --   --  62   ALT 16  --   --  17   AST 29  --   --  28     Recent Results (from the past 24 hour(s))   XR Chest Port 1 View    Narrative    PROCEDURE:  XR CHEST PORT 1 VW    HISTORY:  +covid, weakness.     COMPARISON:  June 2019    FINDINGS:   The cardiac silhouette is normal in size. The pulmonary vasculature is  normal.  There is some confluent right upper lobe opacities consistent  with pneumonia. Millimeter diameter nodule in the left lung. There is  a 7 mm diameter nodule in the right lung. The right lung nodule is  unchanged as compared to 2019. The right upper lobe opacity in the  left lung nodule was not seen previously. No pleural effusion or  pneumothorax.      Impression     IMPRESSION:  Right upper lobe opacity consistent with pneumonia      FLORIAN PENDLETON MD

## 2020-11-02 NOTE — PROGRESS NOTES
:     Patient lives alone in her own apartment in [place].  Her daughter Maria Antonia is involved in her care and supportive.  Patient was recently referred for home care services with [facility].  She was set to open for services but was hospitalized.  Anticipated patient will return home in 2-3 days.          MADELYN Peoples on 11/2/2020 at 1:37 PM

## 2020-11-02 NOTE — PLAN OF CARE
"Pt doing well, denies discomfort at this time. Her only complaint today is \"I am dizzy, when I stand up\". She has eaten fair today. A&O, afebrile. Lungs clear at this time. HRRR. Of note pt has had 4 BM's today, 2 moderate soft formed and 2 lg soft formed. Continue to monitor weakness and ambulation. Maintain safety and comfort. BP (!) 148/74   Pulse 74   Temp 98.8  F (37.1  C) (Tympanic)   Resp 16   Ht 1.575 m (5' 2\")   Wt 57.7 kg (127 lb 3.3 oz)   SpO2 96%   Breastfeeding No   BMI 23.27 kg/m      "

## 2020-11-02 NOTE — PLAN OF CARE
"BP (!) 160/79   Pulse 71   Temp 98.2  F (36.8  C) (Tympanic)   Resp 18   Ht 1.575 m (5' 2\")   Wt 57.5 kg (126 lb 12.2 oz)   SpO2 96%   Breastfeeding No   BMI 23.19 kg/m    Pt is AxO, is forgetful at times. Reorientation provided. LS noted to have fine crackles in right lower lobe, HR reg, BS active. Pt has skin tear to right leg. Dressing applied for comfort per pt. Dressing CDI. Pt is up to the bathroom frequently, incontinent of urine at times. Purwick placed. Pt reports pain 8/10 in back. Pt reports that is chronic arthritis pain. PRN tylenol given and ibuprofen, with relief. Pt is resting comfortably.  Bed alarm on. Will continue to monitor.   "

## 2020-11-02 NOTE — PLAN OF CARE
"Other than being weak with chronic low back pain pt states she is fine. Afebrile and A&O. Tylenol given for back discomfort. Lungs with fine crackle RLL. Sats' WDL on Room Air. HRRR. Abdomen soft non tender. Pt has had 2 moderate soft formed stools so far this shift. She is incontinent of urine. Monitor temp and respiratory status. Maintain safety and comfort. BP (!) 164/78   Pulse 72   Temp 99.1  F (37.3  C) (Tympanic)   Resp 16   Ht 1.575 m (5' 2\")   Wt 57.7 kg (127 lb 3.3 oz)   SpO2 96%   Breastfeeding No   BMI 23.27 kg/m      "

## 2020-11-02 NOTE — PROGRESS NOTES
" NS ADMISSION NOTE    Patient admitted to room 357 at approximately 1745 via wheel chair from emergency room. Patient was accompanied by nurse.     Verbal SBAR report received from HAILEY Humphrey prior to patient arrival.     Patient ambulated to bed with one assist. Patient alert and oriented X 3. Pain is controlled with current analgesics.  Medication(s) being used: acetaminophen and ibuprofen (OTC). 0-10 Pain Scale: 8. Admission vital signs: Blood pressure (!) 153/82, pulse 81, temperature 99.2  F (37.3  C), temperature source Tympanic, resp. rate 18, height 1.575 m (5' 2\"), weight 57.5 kg (126 lb 12.2 oz), SpO2 95 %, not currently breastfeeding. Patient was oriented to plan of care, call light, bed controls, tv, telephone, bathroom and visiting hours.     Risk Assessment    The following safety risks were identified during admission: fall, skin and isolation. Yellow risk band applied: YES.     Skin Initial Assessment    This writer admitted this patient and completed a full skin assessment and Nicholas score in the Adult PCS flowsheet. Appropriate interventions initiated as needed.     Secondary skin check completed by Jessica Burgos RN on 11/1/2020 at 6:32 PM  .    Nicholas Risk Assessment  Sensory Perception: 4-->no impairment  Moisture: 3-->occasionally moist  Activity: 3-->walks occasionally  Mobility: 3-->slightly limited  Nutrition: 2-->probably inadequate  Friction and Shear: 2-->potential problem  Nicholas Score: 17    Education    Patient has a [place] to Observation order: No  Observation education completed and documented: N/A      Jessica Burgos RN      "

## 2020-11-03 LAB
AT III ACT/NOR PPP CHRO: 104 % (ref 85–135)
BASOPHILS # BLD AUTO: 0 10E9/L (ref 0–0.2)
BASOPHILS NFR BLD AUTO: 0.3 %
CRP SERPL-MCNC: 121 MG/L
D DIMER PPP FEU-MCNC: 1.6 UG/ML FEU (ref 0–0.5)
DIFFERENTIAL METHOD BLD: ABNORMAL
EOSINOPHIL # BLD AUTO: 0 10E9/L (ref 0–0.7)
EOSINOPHIL NFR BLD AUTO: 0.3 %
ERYTHROCYTE [DISTWIDTH] IN BLOOD BY AUTOMATED COUNT: 14.6 % (ref 10–15)
FIBRINOGEN PPP-MCNC: 468 MG/DL (ref 200–420)
HCT VFR BLD AUTO: 30.4 % (ref 35–47)
HGB BLD-MCNC: 10.4 G/DL (ref 11.7–15.7)
IL6 SERPL-MCNC: 41.6 PG/ML
IMM GRANULOCYTES # BLD: 0.1 10E9/L (ref 0–0.4)
IMM GRANULOCYTES NFR BLD: 2.3 %
INR PPP: 1.14 (ref 0.86–1.14)
LDH SERPL L TO P-CCNC: 240 U/L (ref 140–271)
LYMPHOCYTES # BLD AUTO: 0.9 10E9/L (ref 0.8–5.3)
LYMPHOCYTES NFR BLD AUTO: 29.1 %
MAGNESIUM SERPL-MCNC: 1.6 MG/DL (ref 1.9–2.7)
MCH RBC QN AUTO: 33.2 PG (ref 26.5–33)
MCHC RBC AUTO-ENTMCNC: 34.2 G/DL (ref 31.5–36.5)
MCV RBC AUTO: 97 FL (ref 78–100)
MONOCYTES # BLD AUTO: 0.2 10E9/L (ref 0–1.3)
MONOCYTES NFR BLD AUTO: 6.5 %
NEUTROPHILS # BLD AUTO: 1.9 10E9/L (ref 1.6–8.3)
NEUTROPHILS NFR BLD AUTO: 61.5 %
PLATELET # BLD AUTO: 262 10E9/L (ref 150–450)
POTASSIUM SERPL-SCNC: 3.3 MMOL/L (ref 3.5–5.1)
POTASSIUM SERPL-SCNC: 3.4 MMOL/L (ref 3.5–5.1)
POTASSIUM SERPL-SCNC: 3.5 MMOL/L (ref 3.5–5.1)
RBC # BLD AUTO: 3.13 10E12/L (ref 3.8–5.2)
RETICS # AUTO: 20 10E9/L (ref 25–95)
RETICS/RBC NFR AUTO: 0.6 % (ref 0.5–2)
WBC # BLD AUTO: 3.1 10E9/L (ref 4–11)

## 2020-11-03 PROCEDURE — 258N000003 HC RX IP 258 OP 636: Performed by: FAMILY MEDICINE

## 2020-11-03 PROCEDURE — 84132 ASSAY OF SERUM POTASSIUM: CPT | Performed by: FAMILY MEDICINE

## 2020-11-03 PROCEDURE — 250N000013 HC RX MED GY IP 250 OP 250 PS 637: Performed by: FAMILY MEDICINE

## 2020-11-03 PROCEDURE — 36415 COLL VENOUS BLD VENIPUNCTURE: CPT | Performed by: FAMILY MEDICINE

## 2020-11-03 PROCEDURE — 85384 FIBRINOGEN ACTIVITY: CPT | Performed by: INTERNAL MEDICINE

## 2020-11-03 PROCEDURE — 85045 AUTOMATED RETICULOCYTE COUNT: CPT | Performed by: INTERNAL MEDICINE

## 2020-11-03 PROCEDURE — 84132 ASSAY OF SERUM POTASSIUM: CPT | Performed by: INTERNAL MEDICINE

## 2020-11-03 PROCEDURE — 83735 ASSAY OF MAGNESIUM: CPT | Performed by: INTERNAL MEDICINE

## 2020-11-03 PROCEDURE — 36415 COLL VENOUS BLD VENIPUNCTURE: CPT | Performed by: INTERNAL MEDICINE

## 2020-11-03 PROCEDURE — 85379 FIBRIN DEGRADATION QUANT: CPT | Performed by: INTERNAL MEDICINE

## 2020-11-03 PROCEDURE — 120N000001 HC R&B MED SURG/OB

## 2020-11-03 PROCEDURE — 99232 SBSQ HOSP IP/OBS MODERATE 35: CPT | Performed by: FAMILY MEDICINE

## 2020-11-03 PROCEDURE — 85610 PROTHROMBIN TIME: CPT | Performed by: INTERNAL MEDICINE

## 2020-11-03 PROCEDURE — 250N000013 HC RX MED GY IP 250 OP 250 PS 637: Performed by: INTERNAL MEDICINE

## 2020-11-03 PROCEDURE — 250N000011 HC RX IP 250 OP 636: Performed by: INTERNAL MEDICINE

## 2020-11-03 PROCEDURE — 250N000009 HC RX 250: Performed by: FAMILY MEDICINE

## 2020-11-03 PROCEDURE — 86140 C-REACTIVE PROTEIN: CPT | Performed by: INTERNAL MEDICINE

## 2020-11-03 PROCEDURE — 83615 LACTATE (LD) (LDH) ENZYME: CPT | Performed by: INTERNAL MEDICINE

## 2020-11-03 PROCEDURE — 85025 COMPLETE CBC W/AUTO DIFF WBC: CPT | Performed by: INTERNAL MEDICINE

## 2020-11-03 RX ORDER — POTASSIUM CHLORIDE 1500 MG/1
20 TABLET, EXTENDED RELEASE ORAL ONCE
Status: COMPLETED | OUTPATIENT
Start: 2020-11-03 | End: 2020-11-03

## 2020-11-03 RX ADMIN — POTASSIUM CHLORIDE 20 MEQ: 1500 TABLET, EXTENDED RELEASE ORAL at 17:39

## 2020-11-03 RX ADMIN — AZITHROMYCIN 500 MG: 250 TABLET, FILM COATED ORAL at 09:30

## 2020-11-03 RX ADMIN — LEVOTHYROXINE SODIUM 88 MCG: 88 TABLET ORAL at 07:26

## 2020-11-03 RX ADMIN — IBUPROFEN 200 MG: 200 TABLET, FILM COATED ORAL at 04:13

## 2020-11-03 RX ADMIN — ACETAMINOPHEN 650 MG: 325 TABLET, FILM COATED ORAL at 14:48

## 2020-11-03 RX ADMIN — ENOXAPARIN SODIUM 40 MG: 40 INJECTION SUBCUTANEOUS at 23:21

## 2020-11-03 RX ADMIN — DILTIAZEM HYDROCHLORIDE 180 MG: 180 CAPSULE, COATED, EXTENDED RELEASE ORAL at 09:31

## 2020-11-03 RX ADMIN — ACETAMINOPHEN 650 MG: 325 TABLET, FILM COATED ORAL at 23:21

## 2020-11-03 RX ADMIN — IBUPROFEN 200 MG: 200 TABLET, FILM COATED ORAL at 20:52

## 2020-11-03 RX ADMIN — ACETAMINOPHEN 650 MG: 325 TABLET, FILM COATED ORAL at 04:13

## 2020-11-03 RX ADMIN — ACETAMINOPHEN 650 MG: 325 TABLET, FILM COATED ORAL at 19:03

## 2020-11-03 RX ADMIN — POTASSIUM CHLORIDE 20 MEQ: 1500 TABLET, EXTENDED RELEASE ORAL at 06:24

## 2020-11-03 RX ADMIN — REMDESIVIR 100 MG: 100 INJECTION, POWDER, LYOPHILIZED, FOR SOLUTION INTRAVENOUS at 17:39

## 2020-11-03 RX ADMIN — ASPIRIN 81 MG CHEWABLE TABLET 81 MG: 81 TABLET CHEWABLE at 09:30

## 2020-11-03 ASSESSMENT — MIFFLIN-ST. JEOR: SCORE: 938.25

## 2020-11-03 ASSESSMENT — ACTIVITIES OF DAILY LIVING (ADL)
ADLS_ACUITY_SCORE: 19
ADLS_ACUITY_SCORE: 19
ADLS_ACUITY_SCORE: 21
ADLS_ACUITY_SCORE: 19
ADLS_ACUITY_SCORE: 21
ADLS_ACUITY_SCORE: 19

## 2020-11-03 NOTE — PLAN OF CARE
"Refuses to get up and be active, states \"im too weak\". A&O, afebrile. LCTAB, HRRR. BS active. Has chronic back pain, takes occasional tylenol per request and order, like heat packs. Encourage increased activity, continue to monitor safety and comfort. /73   Pulse 62   Temp 97.3  F (36.3  C) (Tympanic)   Resp 16   Ht 1.575 m (5' 2\")   Wt 58.5 kg (128 lb 15.5 oz)   SpO2 94%   Breastfeeding No   BMI 23.59 kg/m      "

## 2020-11-03 NOTE — PROGRESS NOTES
Patient admitted for pneumonia due to 2019 novel coronavirus. VSS and WNL. Patient incontinent during the shift, pure wick placed around 0300, patient unable to use call light appropriately. Pain managed with PRN Tylenol and Ibuprofen. Heat pack applied to back and abdomen x1 during shift. Patient unsteady and bedside and requests using bedside commode. Will continue to monitor. Temp: 96.6  F (35.9  C) Temp src: Tympanic BP: 132/63 Pulse: 60   Resp: 16 SpO2: 93 % O2 Device: None (Room air)      Jessica Howard RN on 11/3/2020 at 5:41 AM

## 2020-11-03 NOTE — PLAN OF CARE
"Patient admitted for pneumonia due to 2019 novel coronavirus. VSS and WNL. Patient up to commode in room with assist x2. Encouraged multiple times to call staff to use the bathroom but patient is incontinent of urine. Patient states she is \"unable to make it to the bathroom so she just goes in her brief because her brief will hold it all.\" Ambulation to bathroom encouraged by staff but patient states that she feels too weak. Lung sounds clear with fine crackles in bases. Oral fluid intake encouraged. Patient c/o of back pain, PRN Tylenol and Ibuprofen given with relief. Will continue to monitor. Temp: 98.6  F (37  C) Temp src: Tympanic BP: 122/68 Pulse: 66   Resp: 16 SpO2: 93 % O2 Device: None (Room air)      Jessica Howard RN on 11/2/2020 at 11:44 PM    "

## 2020-11-03 NOTE — PROGRESS NOTES
[facility]    Medicine Progress Note - Hospitalist Service       Date of Admission:  11/1/2020  Assessment & Plan           Geno Horn is a 89 year old female who presents with viral versus bacterial pneumonia due to coronavirus.  Patient has had improvement in respiratory symptoms but is still quite fatigued.    Principal Problem:    Pneumonia due to 2019 novel coronavirus    Assessment: PCR positive on 10/28, presented with increasing weakness, dehydration, oxygen saturation 95% on room air.  Started Remdesivir on 11/1.    Plan:   -Azithromycin day 3  -Remdesivir day 3  -Covid order set implemented  -Follow-up blood cultures remain negative growth to date.  Legionella negative.  Strep pneumo needs to be collected.    Active Problems:    Hypertension    Assessment: Stable    Plan:   -Continue diltiazem      Hyponatremia    Assessment: Acute on chronic, asymptomatic likely from dehydration.    Plan:   -Reduce IV fluid to TKO  -Daily                 Diet: Advance Diet as Tolerated: Regular Diet Adult    DVT Prophylaxis: Enoxaparin (Lovenox) SQ  Riggs Catheter: not present  Code Status: No CPR- Do NOT Intubate           Disposition Plan   Expected discharge: Tomorrow, recommended to transitional care unit once safe disposition plan/ TCU bed available and SIRS/Sepsis treated.  Entered: Corey Pelayo MD 11/03/2020, 2:06 PM       The patient's care was discussed with the Patient and Patient's Family.    Corey Pelayo MD  Hospitalist Service  [facility]  Contact information available via [facility] Paging/Directory    ______________________________________________________________________    Interval History   Reports that she feels better today.  She very much wants to return home.  She understands however that she is still too weak to return home.  Respiratory symptoms seem to have improved.  She reports she has not been coughing now for over a day.  She did not have any  fevers.  Nursing notes reviewed.  She does not feel short of breath at all.  Weakness is generalized with no focal deficits.  Appetite slowly improving.    Data reviewed today: I reviewed all medications, new labs and imaging results over the last 24 hours. I personally reviewed no images or EKG's today.    Physical Exam   Vital Signs: Temp: 97.1  F (36.2  C) Temp src: Tympanic BP: 134/60 Pulse: 60   Resp: 16 SpO2: 95 % O2 Device: None (Room air)    Weight: 128 lbs 15.51 oz  Constitutional: awake, alert, cooperative, no apparent distress, and appears stated age  Respiratory: Clear toascultation bilaterally.  No increased respiratory effort.   Cardiovascular: Regular rate and rhythm.  No murmurs rubs or gallops heard.   GI: Abdomen Soft, non-tender.  No masses, rebound or guarding.   Musculoskeletal: No synovitis.  Muscle strength age and body habitus appropriateas well as equal and even.   Neuropsychiatric: General: normal, calm and normal eye contact  Orientation: oriented to self, place, time and situation  Memory and insight: memory for past and recent events intact and thought process normal    Data   Recent Labs   Lab 11/03/20  0402 11/02/20  0545 11/01/20  1745 11/01/20  1454 10/29/20  1108   WBC 3.1* 2.8*  --  4.5 4.7   HGB 10.4* 9.5*  --  10.6* 10.7*   MCV 97 98  --  97 98    244  --  274 213   INR 1.14 1.08 0.96  --   --    NA  --  129*  129*  --  124* 127*   POTASSIUM 3.4* 3.3*  3.3* 3.5 3.5 3.9   CHLORIDE  --  97*  97*  --  92* 93*   CO2  --  23  24  --  26 25   BUN  --  10  10  --  9 15   CR  --  0.45*  0.47*  --  0.50* 0.63   ANIONGAP  --  9  8  --  6 9   BARB  --  7.8*  7.9*  --  7.7* 8.6   GLC  --  86  89  --  105 119*   ALBUMIN  --  3.2*  --   --  3.9   PROTTOTAL  --  5.4*  --   --  6.3*   BILITOTAL  --  0.3  --   --  0.3   ALKPHOS  --  57  --   --  62   ALT  --  16  --   --  17   AST  --  29  --   --  28     Medications     - MEDICATION INSTRUCTIONS -       sodium chloride 50 mL/hr at  11/02/20 2230       aspirin  81 mg Oral Daily     azithromycin  500 mg Oral Daily     diltiazem ER COATED BEADS  180 mg Oral Daily     enoxaparin ANTICOAGULANT  40 mg Subcutaneous Q24H     levothyroxine  88 mcg Oral QAM AC     polyethylene glycol  17 g Oral Daily     remdesivir  100 mg Intravenous Q24H     senna-docusate  1 tablet Oral BID    Or     senna-docusate  2 tablet Oral BID     sodium chloride (PF)  3 mL Intracatheter Q8H

## 2020-11-03 NOTE — PLAN OF CARE
"Pt's maintains discomfort is from chronic back pain, managed with heat this am. This writer will give tylenol shortly. A&O, Afebrile, Lungs are clear this am. HRRR, Pt states she is dizzy when sitting up will check orthostatic's, abdomen soft non tender. Pt brushed teeth and washed up this am. Check on dizziness, maintain safety and comfort. /60   Pulse 60   Temp 97.1  F (36.2  C) (Tympanic)   Resp 16   Ht 1.575 m (5' 2\")   Wt 58.5 kg (128 lb 15.5 oz)   SpO2 95%   Breastfeeding No   BMI 23.59 kg/m      "

## 2020-11-03 NOTE — PROGRESS NOTES
:    Called Zeina at [facility] and provided discharge update. Anticipated discharge tomorrow.  will continue to follow.     SHANICE Guerrero on 11/3/2020 at 12:24 PM    Addendum:    Received report that patient's daughter had concerns about patient discharging to home. Called patient's daughter, Maria Antonia to discuss discharge plans. Maria Antonia inquired about SNF options. Notified her that local SNFs are not accepting COVID positive patients at this time. Discussed option of patient going to a SNF in the [facility]. Maria Antonia reports that patient would not agree to non-local options. Maria Antonia states that she will just need to check on patient more often. Informed Maria Antonia that patient will be seen by [facility] for PT/OT services. Maria Antonia also reports that patient will have homemaking services resume once she is out of her quarantine period. Per Maria Antonia, patient also has PAL medical alert device. Maria Antonia states that she can provide transportation at discharge as long as patient is able to get into a private vehicle and up 9 steps into her apartment, with Maria Antonia's assistance.  will continue to follow.     SHANICE Guerrero on 11/3/2020 at 2:52 PM

## 2020-11-04 ENCOUNTER — APPOINTMENT (OUTPATIENT)
Dept: PHYSICAL THERAPY | Facility: OTHER | Age: 85
DRG: 177 | End: 2020-11-04
Attending: FAMILY MEDICINE
Payer: MEDICARE

## 2020-11-04 ENCOUNTER — APPOINTMENT (OUTPATIENT)
Dept: OCCUPATIONAL THERAPY | Facility: OTHER | Age: 85
DRG: 177 | End: 2020-11-04
Attending: FAMILY MEDICINE
Payer: MEDICARE

## 2020-11-04 LAB
ANION GAP SERPL CALCULATED.3IONS-SCNC: 5 MMOL/L (ref 3–14)
BASOPHILS # BLD AUTO: 0 10E9/L (ref 0–0.2)
BASOPHILS NFR BLD AUTO: 0.5 %
BUN SERPL-MCNC: 9 MG/DL (ref 7–25)
CALCIUM SERPL-MCNC: 8 MG/DL (ref 8.6–10.3)
CHLORIDE SERPL-SCNC: 97 MMOL/L (ref 98–107)
CO2 SERPL-SCNC: 27 MMOL/L (ref 21–31)
CREAT SERPL-MCNC: 0.46 MG/DL (ref 0.6–1.2)
CRP SERPL-MCNC: 96 MG/L
D DIMER PPP FEU-MCNC: 1.4 UG/ML FEU (ref 0–0.5)
DIFFERENTIAL METHOD BLD: ABNORMAL
EOSINOPHIL # BLD AUTO: 0 10E9/L (ref 0–0.7)
EOSINOPHIL NFR BLD AUTO: 0.5 %
ERYTHROCYTE [DISTWIDTH] IN BLOOD BY AUTOMATED COUNT: 14.6 % (ref 10–15)
FIBRINOGEN PPP-MCNC: 482 MG/DL (ref 200–420)
GFR SERPL CREATININE-BSD FRML MDRD: >90 ML/MIN/{1.73_M2}
GLUCOSE SERPL-MCNC: 97 MG/DL (ref 70–105)
HCT VFR BLD AUTO: 31.7 % (ref 35–47)
HGB BLD-MCNC: 11.1 G/DL (ref 11.7–15.7)
IMM GRANULOCYTES # BLD: 0.1 10E9/L (ref 0–0.4)
IMM GRANULOCYTES NFR BLD: 2.2 %
INR PPP: 1.12 (ref 0.86–1.14)
LDH SERPL L TO P-CCNC: 247 U/L (ref 140–271)
LYMPHOCYTES # BLD AUTO: 1 10E9/L (ref 0.8–5.3)
LYMPHOCYTES NFR BLD AUTO: 25.8 %
MAGNESIUM SERPL-MCNC: 1.6 MG/DL (ref 1.9–2.7)
MCH RBC QN AUTO: 33.3 PG (ref 26.5–33)
MCHC RBC AUTO-ENTMCNC: 35 G/DL (ref 31.5–36.5)
MCV RBC AUTO: 95 FL (ref 78–100)
MONOCYTES # BLD AUTO: 0.3 10E9/L (ref 0–1.3)
MONOCYTES NFR BLD AUTO: 7 %
NEUTROPHILS # BLD AUTO: 2.4 10E9/L (ref 1.6–8.3)
NEUTROPHILS NFR BLD AUTO: 64 %
PLATELET # BLD AUTO: 316 10E9/L (ref 150–450)
POTASSIUM SERPL-SCNC: 3.5 MMOL/L (ref 3.5–5.1)
RBC # BLD AUTO: 3.33 10E12/L (ref 3.8–5.2)
RETICS # AUTO: 24 10E9/L (ref 25–95)
RETICS/RBC NFR AUTO: 0.7 % (ref 0.5–2)
SODIUM SERPL-SCNC: 129 MMOL/L (ref 134–144)
TROPONIN I SERPL-MCNC: 5.7 PG/ML
WBC # BLD AUTO: 3.7 10E9/L (ref 4–11)

## 2020-11-04 PROCEDURE — 85610 PROTHROMBIN TIME: CPT | Performed by: INTERNAL MEDICINE

## 2020-11-04 PROCEDURE — 97161 PT EVAL LOW COMPLEX 20 MIN: CPT | Mod: GP

## 2020-11-04 PROCEDURE — 97530 THERAPEUTIC ACTIVITIES: CPT | Mod: GO | Performed by: OCCUPATIONAL THERAPIST

## 2020-11-04 PROCEDURE — 84484 ASSAY OF TROPONIN QUANT: CPT | Performed by: INTERNAL MEDICINE

## 2020-11-04 PROCEDURE — 120N000001 HC R&B MED SURG/OB

## 2020-11-04 PROCEDURE — 250N000013 HC RX MED GY IP 250 OP 250 PS 637: Performed by: FAMILY MEDICINE

## 2020-11-04 PROCEDURE — 97165 OT EVAL LOW COMPLEX 30 MIN: CPT | Mod: GO | Performed by: OCCUPATIONAL THERAPIST

## 2020-11-04 PROCEDURE — 250N000011 HC RX IP 250 OP 636: Performed by: INTERNAL MEDICINE

## 2020-11-04 PROCEDURE — 258N000003 HC RX IP 258 OP 636: Performed by: FAMILY MEDICINE

## 2020-11-04 PROCEDURE — 80048 BASIC METABOLIC PNL TOTAL CA: CPT | Performed by: INTERNAL MEDICINE

## 2020-11-04 PROCEDURE — 97116 GAIT TRAINING THERAPY: CPT | Mod: GP

## 2020-11-04 PROCEDURE — 99232 SBSQ HOSP IP/OBS MODERATE 35: CPT | Performed by: FAMILY MEDICINE

## 2020-11-04 PROCEDURE — 83615 LACTATE (LD) (LDH) ENZYME: CPT | Performed by: INTERNAL MEDICINE

## 2020-11-04 PROCEDURE — 83735 ASSAY OF MAGNESIUM: CPT | Performed by: INTERNAL MEDICINE

## 2020-11-04 PROCEDURE — 85379 FIBRIN DEGRADATION QUANT: CPT | Performed by: INTERNAL MEDICINE

## 2020-11-04 PROCEDURE — 250N000013 HC RX MED GY IP 250 OP 250 PS 637: Performed by: INTERNAL MEDICINE

## 2020-11-04 PROCEDURE — 36415 COLL VENOUS BLD VENIPUNCTURE: CPT | Performed by: INTERNAL MEDICINE

## 2020-11-04 PROCEDURE — 85045 AUTOMATED RETICULOCYTE COUNT: CPT | Performed by: INTERNAL MEDICINE

## 2020-11-04 PROCEDURE — 250N000009 HC RX 250: Performed by: FAMILY MEDICINE

## 2020-11-04 PROCEDURE — 85384 FIBRINOGEN ACTIVITY: CPT | Performed by: INTERNAL MEDICINE

## 2020-11-04 PROCEDURE — 86140 C-REACTIVE PROTEIN: CPT | Performed by: INTERNAL MEDICINE

## 2020-11-04 PROCEDURE — 85025 COMPLETE CBC W/AUTO DIFF WBC: CPT | Performed by: INTERNAL MEDICINE

## 2020-11-04 RX ADMIN — POLYETHYLENE GLYCOL 3350 17 G: 17 POWDER, FOR SOLUTION ORAL at 10:39

## 2020-11-04 RX ADMIN — ACETAMINOPHEN 650 MG: 325 TABLET, FILM COATED ORAL at 06:11

## 2020-11-04 RX ADMIN — LEVOTHYROXINE SODIUM 88 MCG: 88 TABLET ORAL at 07:43

## 2020-11-04 RX ADMIN — ACETAMINOPHEN 650 MG: 325 TABLET, FILM COATED ORAL at 20:54

## 2020-11-04 RX ADMIN — ASPIRIN 81 MG CHEWABLE TABLET 81 MG: 81 TABLET CHEWABLE at 10:39

## 2020-11-04 RX ADMIN — AZITHROMYCIN 500 MG: 250 TABLET, FILM COATED ORAL at 10:39

## 2020-11-04 RX ADMIN — DILTIAZEM HYDROCHLORIDE 180 MG: 180 CAPSULE, COATED, EXTENDED RELEASE ORAL at 10:39

## 2020-11-04 RX ADMIN — REMDESIVIR 100 MG: 100 INJECTION, POWDER, LYOPHILIZED, FOR SOLUTION INTRAVENOUS at 17:21

## 2020-11-04 RX ADMIN — ENOXAPARIN SODIUM 40 MG: 40 INJECTION SUBCUTANEOUS at 21:01

## 2020-11-04 ASSESSMENT — ACTIVITIES OF DAILY LIVING (ADL)
ADLS_ACUITY_SCORE: 21
ADLS_ACUITY_SCORE: 19
ADLS_ACUITY_SCORE: 21
ADLS_ACUITY_SCORE: 19
ADLS_ACUITY_SCORE: 21
ADLS_ACUITY_SCORE: 19

## 2020-11-04 ASSESSMENT — MIFFLIN-ST. JEOR: SCORE: 932.25

## 2020-11-04 NOTE — PROGRESS NOTES
"Patient is alert and orientated. No complaints of pain. Been using heat packs on back. Incontinent bladder dribbling at times. SBA with walker, generalized weakness. Hard of hearing. Lungs clear. Heart regular. Bilat shin skin tears. VSS. Will continue to monitor.     /74 (BP Location: Left arm)   Pulse 67   Temp 96.7  F (35.9  C) (Tympanic)   Resp 17   Ht 1.575 m (5' 2\")   Wt 57.9 kg (127 lb 10.3 oz)   SpO2 93%   Breastfeeding No   BMI 23.35 kg/m      "

## 2020-11-04 NOTE — PROGRESS NOTES
Pure wick removed. Patient encouraged to call. Education provided on the need for patient to ambulate today. Will continue to monitor.

## 2020-11-04 NOTE — PROGRESS NOTES
[facility]    Medicine Progress Note - Hospitalist Service       Date of Admission:  11/1/2020  Assessment & Plan                 Geno Horn is a 89 year old female who presented with viral versus bacterial pneumonia due to coronavirus.  Patient now had essentially resolution of respiratory symptoms but still has some generalized weakness and is a fall risk per PT/OT.    Principal Problem:    Pneumonia due to 2019 novel coronavirus    Assessment: PCR positive on 10/28, presented with increasing weakness, dehydration, oxygen saturation 93-95% on room air.  Started Remdesivir on 11/1.    Plan:   -Azithromycin day 4  -Remdesivir day 4  -Covid order set implemented  -Follow-up blood cultures remain negative growth to date.  Legionella negative.  Strep pneumo was never collected.    Active Problems:      Hypertension    Assessment: Stable    Plan: -Continue diltiazem                   Hyponatremia    Assessment: Acute on chronic, asymptomatic likely from dehydration.  Improved from 124-129.          Diet: Advance Diet as Tolerated: Regular Diet Adult    DVT Prophylaxis: Enoxaparin (Lovenox) SQ  Riggs Catheter: not present  Code Status: No CPR- Do NOT Intubate           Disposition Plan   Expected discharge: Tomorrow, recommended to prior living arrangement once safe disposition plan/ TCU bed available.  Entered: Corey Pelayo MD 11/04/2020, 5:38 PM       The patient's care was discussed with the Patient.    Corey Pelayo MD  Hospitalist Service  [facility]  Contact information available via [facility] Paging/Directory    ______________________________________________________________________    Interval History   Patient reports that she feels better today.  Feels that her strength is gradually returning but does admit that she is still a bit unsteady on her feet.  She feels that she would be safe if her daughter stayed with her but after discussing with her daughter she  is concerned about the potential for yenifer Covid.  She is comfortable giving her mother ride home but does not want to stay in the house while her mother is contagious.  Home care is set up.    Patient did not have any fevers overnight.  She is no longer coughing.    Data reviewed today: I reviewed all medications, new labs and imaging results over the last 24 hours. I personally reviewed no images or EKG's today.    Physical Exam   Vital Signs: Temp: 96.5  F (35.8  C) Temp src: Tympanic BP: (!) 157/84 Pulse: 67   Resp: 17 SpO2: 93 % O2 Device: None (Room air)    Weight: 127 lbs 10.34 oz  Constitutional: awake, alert, cooperative, no apparent distress, and appears stated age  Respiratory: No increased work of breathing, good air exchange, clear to auscultation bilaterally, no crackles or wheezing  Cardiovascular: Regular rate and rhythm  GI: Abdomen is soft, nontender  Musculoskeletal: Generalized weakness with no focal deficits  Neuropsychiatric: General: normal, calm and normal eye contact  Orientation: oriented to self, place, time and situation  Memory and insight: memory for past and recent events intact and thought process normal    Data   Recent Labs   Lab 11/04/20  0545 11/03/20  2125 11/03/20  1615 11/03/20  0402 11/02/20  0545 11/01/20  1454 11/01/20  1454 10/29/20  1108   WBC 3.7*  --   --  3.1* 2.8*  --  4.5 4.7   HGB 11.1*  --   --  10.4* 9.5*  --  10.6* 10.7*   MCV 95  --   --  97 98  --  97 98     --   --  262 244  --  274 213   INR 1.12  --   --  1.14 1.08   < >  --   --    *  --   --   --  129*  129*  --  124* 127*   POTASSIUM 3.5 3.5 3.3* 3.4* 3.3*  3.3*   < > 3.5 3.9   CHLORIDE 97*  --   --   --  97*  97*  --  92* 93*   CO2 27  --   --   --  23  24  --  26 25   BUN 9  --   --   --  10  10  --  9 15   CR 0.46*  --   --   --  0.45*  0.47*  --  0.50* 0.63   ANIONGAP 5  --   --   --  9  8  --  6 9   BARB 8.0*  --   --   --  7.8*  7.9*  --  7.7* 8.6   GLC 97  --   --   --  86   89  --  105 119*   ALBUMIN  --   --   --   --  3.2*  --   --  3.9   PROTTOTAL  --   --   --   --  5.4*  --   --  6.3*   BILITOTAL  --   --   --   --  0.3  --   --  0.3   ALKPHOS  --   --   --   --  57  --   --  62   ALT  --   --   --   --  16  --   --  17   AST  --   --   --   --  29  --   --  28    < > = values in this interval not displayed.     No results found for this or any previous visit (from the past 24 hour(s)).

## 2020-11-04 NOTE — PROGRESS NOTES
:    Per MD, patient may discharge later today or tomorrow. Called patient's daughter, Maria Antonia and provided update. Maria Antonia reports that she can pick patient up at discharge, but wants to be sure that patient is strong enough to return home today. Per discussion in discharge planning, PT/OT to see patient.     Called Zeina at [facility] and left voicemail with update.      will continue to follow.     SHANICE Guerrero on 11/4/2020 at 1:12 PM    Addendum:    Received report from MD that patient is anticipated to discharge tomorrow. Called patient's daughter, Maria Antonia and provided discharge update. Notified Maria Antonia that, per PT/OT, patient had some balance difficulties. Again mentioned the option of SNF placement in the [facility]. Maria Antonia reports that patient would never agree to that. Inquired if Maria Antonia would be willing to stay with patient if needed. Maria Antonia stated that she could likely stay with patient for a while. She also reports that [facility] services will resume once patient is out of her infectious period, which she anticipates will be next Tuesday/Wednesday.     Called Zeina at [facility] and provided discharge update. She reports that home care can begin services with patient on Friday.      will continue to follow.     SHANICE Guerrero on 11/4/2020 at 2:59 PM

## 2020-11-04 NOTE — PLAN OF CARE
Patient admitted for pneumonia due to 2019 novel coronavirus. VSS and WNL. Stable on room air. Lung sounds clear and equal bilaterally. Patient needing encouragement to get out of bed and improve mobility. Patient got up x1 with this RN to go to the bathroom using bedside commode. Patient c/o of soreness to joints, again, this RN encouraged ambulation and sitting in the chair. Patient was receptive to getting up but was unsteady at times. Pain managed with PRN Tylenol and Ibuprofen. Heat applied to joints PRN. Pure wick in place with adequate output, patient incontinent at times of urine. Will continue to monitor. Temp: 98.1  F (36.7  C) Temp src: Tympanic BP: (!) 156/78 Pulse: 66   Resp: 16 SpO2: 94 % O2 Device: None (Room air)      Jessica Howard RN on 11/4/2020 at 12:02 AM

## 2020-11-05 VITALS
HEART RATE: 70 BPM | OXYGEN SATURATION: 95 % | SYSTOLIC BLOOD PRESSURE: 151 MMHG | TEMPERATURE: 98.1 F | RESPIRATION RATE: 16 BRPM | DIASTOLIC BLOOD PRESSURE: 79 MMHG | WEIGHT: 127.43 LBS | HEIGHT: 62 IN | BODY MASS INDEX: 23.45 KG/M2

## 2020-11-05 LAB
BASOPHILS # BLD AUTO: 0 10E9/L (ref 0–0.2)
BASOPHILS NFR BLD AUTO: 0.6 %
CRP SERPL-MCNC: 73 MG/L
D DIMER PPP FEU-MCNC: 1.1 UG/ML FEU (ref 0–0.5)
DIFFERENTIAL METHOD BLD: ABNORMAL
EOSINOPHIL # BLD AUTO: 0 10E9/L (ref 0–0.7)
EOSINOPHIL NFR BLD AUTO: 0.7 %
ERYTHROCYTE [DISTWIDTH] IN BLOOD BY AUTOMATED COUNT: 14.3 % (ref 10–15)
FIBRINOGEN PPP-MCNC: 412 MG/DL (ref 200–420)
HCT VFR BLD AUTO: 30.8 % (ref 35–47)
HGB BLD-MCNC: 10.6 G/DL (ref 11.7–15.7)
IMM GRANULOCYTES # BLD: 0.1 10E9/L (ref 0–0.4)
IMM GRANULOCYTES NFR BLD: 2.6 %
INR PPP: 1.13 (ref 0.86–1.14)
LDH SERPL L TO P-CCNC: 253 U/L (ref 140–271)
LYMPHOCYTES # BLD AUTO: 1.2 10E9/L (ref 0.8–5.3)
LYMPHOCYTES NFR BLD AUTO: 23 %
MAGNESIUM SERPL-MCNC: 1.7 MG/DL (ref 1.9–2.7)
MCH RBC QN AUTO: 32.7 PG (ref 26.5–33)
MCHC RBC AUTO-ENTMCNC: 34.4 G/DL (ref 31.5–36.5)
MCV RBC AUTO: 95 FL (ref 78–100)
MONOCYTES # BLD AUTO: 0.4 10E9/L (ref 0–1.3)
MONOCYTES NFR BLD AUTO: 7.8 %
NEUTROPHILS # BLD AUTO: 3.5 10E9/L (ref 1.6–8.3)
NEUTROPHILS NFR BLD AUTO: 65.3 %
PLATELET # BLD AUTO: 363 10E9/L (ref 150–450)
POTASSIUM SERPL-SCNC: 3.4 MMOL/L (ref 3.5–5.1)
RBC # BLD AUTO: 3.24 10E12/L (ref 3.8–5.2)
RETICS # AUTO: 24.6 10E9/L (ref 25–95)
RETICS/RBC NFR AUTO: 0.8 % (ref 0.5–2)
S PNEUM AG SPEC QL: NORMAL
SPECIMEN SOURCE: NORMAL
WBC # BLD AUTO: 5.4 10E9/L (ref 4–11)

## 2020-11-05 PROCEDURE — 84132 ASSAY OF SERUM POTASSIUM: CPT | Performed by: INTERNAL MEDICINE

## 2020-11-05 PROCEDURE — 85610 PROTHROMBIN TIME: CPT | Performed by: INTERNAL MEDICINE

## 2020-11-05 PROCEDURE — 250N000013 HC RX MED GY IP 250 OP 250 PS 637: Performed by: FAMILY MEDICINE

## 2020-11-05 PROCEDURE — 85379 FIBRIN DEGRADATION QUANT: CPT | Performed by: INTERNAL MEDICINE

## 2020-11-05 PROCEDURE — 85025 COMPLETE CBC W/AUTO DIFF WBC: CPT | Performed by: INTERNAL MEDICINE

## 2020-11-05 PROCEDURE — 99239 HOSP IP/OBS DSCHRG MGMT >30: CPT | Performed by: FAMILY MEDICINE

## 2020-11-05 PROCEDURE — 87899 AGENT NOS ASSAY W/OPTIC: CPT | Performed by: INTERNAL MEDICINE

## 2020-11-05 PROCEDURE — 83615 LACTATE (LD) (LDH) ENZYME: CPT | Performed by: INTERNAL MEDICINE

## 2020-11-05 PROCEDURE — 85384 FIBRINOGEN ACTIVITY: CPT | Performed by: INTERNAL MEDICINE

## 2020-11-05 PROCEDURE — 85045 AUTOMATED RETICULOCYTE COUNT: CPT | Performed by: INTERNAL MEDICINE

## 2020-11-05 PROCEDURE — 250N000013 HC RX MED GY IP 250 OP 250 PS 637: Performed by: INTERNAL MEDICINE

## 2020-11-05 PROCEDURE — 83735 ASSAY OF MAGNESIUM: CPT | Performed by: INTERNAL MEDICINE

## 2020-11-05 PROCEDURE — 36415 COLL VENOUS BLD VENIPUNCTURE: CPT | Performed by: INTERNAL MEDICINE

## 2020-11-05 PROCEDURE — 86140 C-REACTIVE PROTEIN: CPT | Performed by: INTERNAL MEDICINE

## 2020-11-05 RX ORDER — POTASSIUM CHLORIDE 1500 MG/1
20 TABLET, EXTENDED RELEASE ORAL ONCE
Status: COMPLETED | OUTPATIENT
Start: 2020-11-05 | End: 2020-11-05

## 2020-11-05 RX ADMIN — ASPIRIN 81 MG CHEWABLE TABLET 81 MG: 81 TABLET CHEWABLE at 10:03

## 2020-11-05 RX ADMIN — ACETAMINOPHEN 650 MG: 325 TABLET, FILM COATED ORAL at 08:13

## 2020-11-05 RX ADMIN — POTASSIUM CHLORIDE 20 MEQ: 1500 TABLET, EXTENDED RELEASE ORAL at 08:13

## 2020-11-05 RX ADMIN — DILTIAZEM HYDROCHLORIDE 180 MG: 180 CAPSULE, COATED, EXTENDED RELEASE ORAL at 10:03

## 2020-11-05 RX ADMIN — POLYETHYLENE GLYCOL 3350 17 G: 17 POWDER, FOR SOLUTION ORAL at 10:03

## 2020-11-05 RX ADMIN — AZITHROMYCIN 500 MG: 250 TABLET, FILM COATED ORAL at 10:03

## 2020-11-05 RX ADMIN — LEVOTHYROXINE SODIUM 88 MCG: 88 TABLET ORAL at 08:13

## 2020-11-05 RX ADMIN — DOCUSATE SODIUM 50 MG AND SENNOSIDES 8.6 MG 1 TABLET: 8.6; 5 TABLET, FILM COATED ORAL at 10:02

## 2020-11-05 ASSESSMENT — ACTIVITIES OF DAILY LIVING (ADL)
ADLS_ACUITY_SCORE: 19

## 2020-11-05 ASSESSMENT — MIFFLIN-ST. JEOR: SCORE: 931.25

## 2020-11-05 NOTE — DISCHARGE SUMMARY
[facility]  Hospitalist Discharge Summary      Date of Admission:  11/1/2020  Date of Discharge:  11/5/2020  Discharging Provider: Corey Pelayo MD      Discharge Diagnoses   COVID-19 pneumonitis with respiratory failure -POA  Hyponatremia acute on chronic-POA and back to baseline  Hypertension    Follow-ups Needed After Discharge   Follow-up Appointments     Follow-up and recommended labs and tests       Follow up with primary care provider, Fransisca Rcoha, within 7   days for hospital follow- up.             Unresulted Labs Ordered in the Past 30 Days of this Admission     Date and Time Order Name Status Description    11/1/2020 1727 Streptococcus pneumoniae antigen In process     11/1/2020 1437 Blood culture Preliminary     11/1/2020 1437 Blood culture Preliminary       These results will be followed up by Yulissa Rocha at follow-up appointment    Discharge Disposition   Discharged to home  Condition at discharge: Stable      Hospital Course                   Geno Horn is a 89 year old female who presented with pneumonitis due to COVID-19.  Patient tested PCR positive on 1028 and presented to the ED with increasing weakness, dehydration, hypoxia and shortness of breath.  Patient was started on remdesivir and azithromycin.  She completed a 5-day course of each medication.  She had fairly rapid improvement in her respiratory status and slower improvement in her energy level and strength.    Due to those concerns we did keep her in the hospital for an extra day for recovery and ongoing PT.  She lives in an apartment but does have a daughter that can check on her and is willing to transport her.  They understand that patient may continue to be contagious for an additional 3 days and her quarantine does not end until November 9.  We did offer to set her up in a skilled nursing facility but patient and her daughter both declined.  They were accepting of home care which will see  the patient today versus tomorrow.    Viral versus bacterial pneumonia due to coronavirus.  Patient now had essentially resolution of respiratory symptoms but still has some generalized weakness and is a fall risk per PT/OT.    She also had worsening of her chronic hyponatremia.  This improved with IV fluid and eventually with oral intake.    Did recommend that she take 30 days of Xarelto due to potential elevated risk for blood clot with severe Covid infection requiring hospitalization.  Her mobility is steadily improving but is below her baseline presently which would also increase risk.  There is a risk for bleeding from GI tract and/or falls and this was discussed with patient.  I suggest she hold her aspirin and monitor for any symptoms.      Consultations This Hospital Stay   SOCIAL WORK IP CONSULT  PHYSICAL THERAPY ADULT IP CONSULT  OCCUPATIONAL THERAPY ADULT IP CONSULT    Code Status   No CPR- Do NOT Intubate    Time Spent on this Encounter   I, Corey Pelayo MD, personally saw the patient today and spent greater than 30 minutes discharging this patient.       Corey Pelayo MD  [facility]  [address]   [place] [address]  Phone: [phone]  Fax: [phone]  ______________________________________________________________________    Physical Exam   Vital Signs: Temp: 98.1  F (36.7  C) Temp src: Tympanic BP: (!) 151/79 Pulse: 70   Resp: 16 SpO2: 95 % O2 Device: None (Room air)    Weight: 127 lbs 6.81 oz  Constitutional: awake, alert, cooperative, no apparent distress, and appears stated age  Respiratory: No increased work of breathing, good air exchange, clear to auscultation bilaterally, no crackles or wheezing  Cardiovascular: Regular rate and rhythm.  No murmurs rubs or gallops heard.   GI: Abdomen Soft, non-tender.  No masses, rebound or guarding.   Musculoskeletal: Generalized weakness with no focal deficits.  No synovitis.  Neuropsychiatric: General:  normal, calm and normal eye contact  Orientation: oriented to self, place, time and situation  Memory and insight: memory for past and recent events intact and thought process normal       Primary Care Physician   Fransisca Rocha    Discharge Orders      Home Care PT Referral for Hospital Discharge      Home Care OT Referral for Hospital Discharge      Reason for your hospital stay    You had COVID-19 that was causing you to be short of breath and very weak.  Your respiratory symptoms resolved but you did have some residual weakness in part from being in the hospital for prolonged period.  We gave you 5 days of remdesivir.  You still may be contagious for the next few days and I would strongly recommend that you quarantine at home and wear a mask.  We were able to set up home care for you which should help you continue your recovery.  I would recommend that you stop your aspirin and take a new medication called Xarelto for 30 days to reduce your risk for developing blood clots.  This can increase your risk for bleeding and if you were to have bloody stools or symptoms of anemia you need to return to care.     Follow-up and recommended labs and tests     Follow up with primary care provider, Fransisca Rocha, within 7 days for hospital follow- up.     Activity    Your activity upon discharge: activity as tolerated     When to contact your care team    Call your primary doctor if you have any of the following: Recurrent fever, shortness of breath or any new concerning symptoms.     MD face to face encounter    Documentation of Face to Face and Certification for Home Health Services    I certify that patient: Geno Horn is under my care and that I, or a nurse practitioner or physician's assistant working with me, had a face-to-face encounter that meets the physician face-to-face encounter requirements with this patient on: 11/5/2020.    This encounter with the patient was in whole, or in part, for the  following medical condition, which is the primary reason for home health care: Weakness due to prolonged hospitalization for COVID-19.    I certify that, based on my findings, the following services are medically necessary home health services: Occupational Therapy and Physical Therapy.    My clinical findings support the need for the above services because: Occupational Therapy Services are needed to assess and treat cognitive ability and address ADL safety due to impairment in weakness and abilities to do ADLs. and Physical Therapy Services are needed to assess and treat the following functional impairments: Reduced exercise capacity and strength.    Further, I certify that my clinical findings support that this patient is homebound (i.e. absences from home require considerable and taxing effort and are for medical reasons or Mu-ism services or infrequently or of short duration when for other reasons) because: Leaving home is medically contraindicated for the following reason(s): Quarantine due to Covid..    Based on the above findings. I certify that this patient is confined to the home and needs intermittent skilled nursing care, physical therapy and/or speech therapy.  The patient is under my care, and I have initiated the establishment of the plan of care.  This patient will be followed by a physician who will periodically review the plan of care.  Physician/Provider to provide follow up care: Fransisca Rocha    Attending hospital physician (the Medicare certified [place] provider): Corey Pelayo MD  Physician Signature: See electronic signature associated with these discharge orders.  Date: 11/5/2020     No CPR- Do NOT Intubate     Diet    Follow this diet upon discharge: Orders Placed This Encounter      Advance Diet as Tolerated: Regular Diet Adult       Significant Results and Procedures   Most Recent 3 CBC's:  Recent Labs   Lab Test 11/05/20  0601 11/04/20  0545 11/03/20  0402   WBC 5.4 3.7*  3.1*   HGB 10.6* 11.1* 10.4*   MCV 95 95 97    316 262     Most Recent 3 BMP's:  Recent Labs   Lab Test 11/05/20  0601 11/04/20  0545 11/03/20  2125 11/02/20  0545 11/02/20  0545 11/01/20  1454 11/01/20  1454   NA  --  129*  --   --  129*  129*  --  124*   POTASSIUM 3.4* 3.5 3.5   < > 3.3*  3.3*   < > 3.5   CHLORIDE  --  97*  --   --  97*  97*  --  92*   CO2  --  27  --   --  23  24  --  26   BUN  --  9  --   --  10  10  --  9   CR  --  0.46*  --   --  0.45*  0.47*  --  0.50*   ANIONGAP  --  5  --   --  9  8  --  6   BARB  --  8.0*  --   --  7.8*  7.9*  --  7.7*   GLC  --  97  --   --  86  89  --  105    < > = values in this interval not displayed.   ,   Results for orders placed or performed during the hospital encounter of 11/01/20   XR Chest Port 1 View    Narrative    PROCEDURE:  XR CHEST PORT 1 VW    HISTORY:  +covid, weakness.     COMPARISON:  June 2019    FINDINGS:   The cardiac silhouette is normal in size. The pulmonary vasculature is  normal.  There is some confluent right upper lobe opacities consistent  with pneumonia. Millimeter diameter nodule in the left lung. There is  a 7 mm diameter nodule in the right lung. The right lung nodule is  unchanged as compared to 2019. The right upper lobe opacity in the  left lung nodule was not seen previously. No pleural effusion or  pneumothorax.      Impression    IMPRESSION:  Right upper lobe opacity consistent with pneumonia      FLORIAN PENDLETON MD       Discharge Medications   Current Discharge Medication List      START taking these medications    Details   rivaroxaban ANTICOAGULANT (XARELTO ANTICOAGULANT) 10 MG TABS tablet Take 1 tablet (10 mg) by mouth daily (with dinner)  Qty: 30 tablet, Refills: 0    Associated Diagnoses: COVID-19 virus infection         CONTINUE these medications which have NOT CHANGED    Details   acetaminophen (TYLENOL) 500 MG tablet Take 500 mg by mouth every 6 hours as needed for mild pain      Ascorbic Acid (VITAMIN C)  500 MG CAPS Take 1 capsule by mouth daily      diltiazem ER (DILT-XR) 180 MG 24 hr capsule Take 1 capsule (180 mg) by mouth daily  Qty: 90 capsule, Refills: 3    Comments: Increase dose back to previous  Associated Diagnoses: Essential hypertension      levothyroxine (SYNTHROID/LEVOTHROID) 88 MCG tablet TAKE 1 TABLET (88 MCG) BY MOUTH DAILY  Qty: 90 tablet, Refills: 3    Associated Diagnoses: Hypothyroidism, unspecified type      multivitamin, therapeutic (THERA-VIT) TABS tablet Take 1 tablet by mouth daily      sodium chloride (OCEAN) 0.65 % nasal spray Spray 2 sprays into both nostrils daily as needed for congestion      Incontinence Supply Disposable (POISE PAD) PADS 1 Units daily  Qty: 30 each, Refills: 11    Associated Diagnoses: Mixed stress and urge urinary incontinence      !! order for DME Equipment being ordered: Spikes for cane  Qty: 1 Units, Refills: 1    Associated Diagnoses: Unsteady gait      !! order for DME Equipment being ordered: plastic underpants  Qty: 1 Units, Refills: 1    Associated Diagnoses: Urinary incontinence, unspecified type       !! - Potential duplicate medications found. Please discuss with provider.      STOP taking these medications       aspirin 81 MG chewable tablet Comments:   Reason for Stopping:             Allergies   Allergies   Allergen Reactions     Celebrex [Celecoxib] Other (See Comments)     Light headed     Tolterodine      Other reaction(s): Intolerance-Can't Take  Was unable to sleep at night

## 2020-11-05 NOTE — PROGRESS NOTES
11/04/20 1300   Quick Adds   Type of Visit Initial PT Evaluation   Living Environment   People in home alone   Current Living Arrangements apartment   Home Accessibility stairs within home   Number of Stairs, Within Home, Primary 9   Stair Railings, Within Home, Primary railings on both sides of stairs   Transportation Anticipated family or friend will provide   Self-Care   Usual Activity Tolerance moderate   Current Activity Tolerance fair   Regular Exercise No   Equipment Currently Used at Home cane, quad;walker, rolling   Disability/Function   Hearing Difficulty or Deaf yes   Patient's preferred means of communication verbal   Describe hearing loss bilateral hearing loss   Use of hearing assistive devices bilateral hearing aids   Were auxiliary aids offered? no   Wear Glasses or Blind no   Concentrating, Remembering or Making Decisions Difficulty yes   Difficulty Communicating no   Difficulty Eating/Swallowing no   Walking or Climbing Stairs Difficulty yes   Walking or Climbing Stairs ambulation difficulty, requires equipment;ambulation difficulty, assistance 1 person;stair climbing difficulty, assistance 1 person;transferring difficulty, assistance 1 person   Toileting yes   Toileting Assistance toileting difficulty, assistance 1 person   Fall history within last six months yes   Number of times patient has fallen within last six months 2   General Information   Referring Physician Pierce   Patient/Family Therapy Goals Statement (PT) return home when able   Weight-Bearing Status - LLE full weight-bearing   Weight-Bearing Status - RLE full weight-bearing   Cognition   Orientation Status (Cognition) oriented x 4   Affect/Mental Status (Cognition) WFL   Follows Commands (Cognition) WFL   Memory Deficit (Cognition) minimal deficit   Pain Assessment   Patient Currently in Pain Yes, see Vital Sign flowsheet   Integumentary/Edema   Integumentary/Edema Comments bruising noted in bilateral lower legs without notable  edema   Posture    Posture Forward head position;Protracted shoulders   Range of Motion (ROM)   ROM Quick Adds ROM WFL   Strength   Manual Muscle Testing Quick Adds Strength WFL   Strength Comments however, patient fatigues with increased activity   Transfers   Transfers sit-stand transfer;bed-chair transfer   Impairments Contributing to Impaired Transfers decreased strength   Bed-Chair Transfer   Bed-Chair [place] (Transfers) contact guard   Assistive Device (Bed-Chair Transfers) walker, front-wheeled   Sit-Stand Transfer   Sit-Stand [place] (Transfers) contact guard   Assistive Device (Sit-Stand Transfers) walker, front-wheeled   Gait/Stairs (Locomotion)   [place] Level (Gait) contact guard   Assistive Device (Gait) walker, front-wheeled   Distance in Feet (Required for LE Total Joints) 40   Pattern (Gait) 3-point   Maintains Weight-bearing Status (Gait) able to maintain   Balance   Balance Comments only fair with Fww due to fatigue   Sensory Examination   Sensory Perception WFL   Coordination   Coordination no deficits were identified   Muscle Tone   Muscle Tone no deficits were identified   Clinical Impression   Criteria for Skilled Therapeutic Intervention yes, treatment indicated   PT Diagnosis (PT) impaired mobility   Influenced by the following impairments fatigue, weakness   Functional limitations due to impairments activity/gait tolerance and stability   Clinical Presentation Stable/Uncomplicated   Clinical Decision Making (Complexity) low complexity   Therapy Frequency (PT) Daily   Planned Therapy Interventions (PT) transfer training;gait training   Anticipated Equipment Needs at Discharge (PT) walker, rolling   Risk & Benefits of therapy have been explained risks/benefits reviewed   PT Discharge Planning    PT Discharge Recommendation (DC Rec) home with assist;home with home care physical therapy   PT Rationale for DC Rec to promote strength,incrased gait stability and safe mobility   PT  Brief overview of current status  patient fatiguing easily with activity; decreased dynamic stability noted due to fatigue   Total Evaluation Time   Total Evaluation Time (Minutes) 15

## 2020-11-05 NOTE — PROGRESS NOTES
11/04/20 1300   Signing Clinician's Name / Credentials   Signing clinician's name / credentials Bro Ramos MPT   Quick Adds   Rehab Discipline PT   Gait Training   Distance in Feet (Required for LE Total Joints) 40   [place] Level (Gait Training) contact guard   Physical Assistance Level (Gait Training) 1 person assist   PT Discharge Planning    PT Discharge Recommendation (DC Rec) home with assist;home with home care physical therapy   PT Rationale for DC Rec to promote strength,incrased gait stability and safe mobility   PT Brief overview of current status  patient fatiguing easily with activity; decreased dynamic stability noted due to fatigue   Additional Documentation   PT Plan continue PT   Total Session Time   Total Session Time (minutes) 30 minutes

## 2020-11-05 NOTE — PLAN OF CARE
Problem: Adult Inpatient Plan of Care  Goal: Plan of Care Review  Outcome: Adequate for Discharge  Flowsheets (Taken 11/5/2020 0749)  Plan of Care Reviewed With: patient  Goal: Patient-Specific Goal (Individualized)  Outcome: Adequate for Discharge  Goal: Absence of Hospital-Acquired Illness or Injury  Outcome: Adequate for Discharge  Intervention: Identify and Manage Fall Risk  Recent Flowsheet Documentation  Taken 11/5/2020 0749 by Desirae Asher RN  Safety Promotion/Fall Prevention: safety round/check completed  Intervention: Prevent Skin Injury  Recent Flowsheet Documentation  Taken 11/5/2020 0749 by Desirae Asher RN  Body Position: position changed independently  Intervention: Prevent and Manage VTE (Venous Thromboembolism) Risk  Recent Flowsheet Documentation  Taken 11/5/2020 0749 by Desirae Asher RN  VTE Prevention/Management:   ambulation promoted   fluids promoted   anticoagulant therapy maintained  Intervention: Prevent Infection  Recent Flowsheet Documentation  Taken 11/5/2020 0749 by Desirae Asher RN  Infection Prevention:   visitors restricted/screened   single patient room provided   rest/sleep promoted   personal protective equipment utilized   hand hygiene promoted  Goal: Optimal Comfort and Wellbeing  Outcome: Adequate for Discharge  Intervention: Provide Person-Centered Care  Recent Flowsheet Documentation  Taken 11/5/2020 0749 by Desirae Asher RN  Trust Relationship/Rapport:   care explained   choices provided   questions answered   questions encouraged   thoughts/feelings acknowledged  Goal: Readiness for Transition of Care  Outcome: Adequate for Discharge     Problem: Malnutrition  Goal: Improved Nutritional Intake  Outcome: Adequate for Discharge     Problem: Fluid Volume Deficit  Goal: Fluid Balance  Outcome: Adequate for Discharge  Intervention: Monitor and Manage Hypovolemia  Recent Flowsheet Documentation  Taken 11/5/2020 0749 by Desirae Asher RN  Fluid/Electrolyte Management: fluids  provided     Problem: Electrolyte Imbalance  Goal: Electrolyte Balance  Outcome: Adequate for Discharge  Intervention: Monitor and Manage Electrolyte Imbalance  Recent Flowsheet Documentation  Taken 11/5/2020 0749 by Desirae Asher RN  Fluid/Electrolyte Management: fluids provided     Problem: Fluid Imbalance (Pneumonia)  Goal: Fluid Balance  Outcome: Adequate for Discharge  Intervention: Monitor and Manage Fluid Balance  Recent Flowsheet Documentation  Taken 11/5/2020 0749 by Desirae Asher RN  Fluid/Electrolyte Management: fluids provided     Problem: Infection (Pneumonia)  Goal: Resolution of Infection Signs and Symptoms  Outcome: Adequate for Discharge  Intervention: Prevent Infection Progression  Recent Flowsheet Documentation  Taken 11/5/2020 0749 by Desirae Asher RN  Isolation Precautions: airborne precautions maintained     Problem: Respiratory Compromise (Pneumonia)  Goal: Effective Oxygenation and Ventilation  Outcome: Adequate for Discharge  Intervention: Promote Airway Secretion Clearance  Recent Flowsheet Documentation  Taken 11/5/2020 0749 by Desirae Asher RN  Cough And Deep Breathing: done with encouragement     Problem: Fall Injury Risk  Goal: Absence of Fall and Fall-Related Injury  Outcome: Adequate for Discharge  Intervention: Promote Injury-Free Environment  Recent Flowsheet Documentation  Taken 11/5/2020 0749 by Desirae Asher RN  Safety Promotion/Fall Prevention: safety round/check completed     Problem: OT General Care Plan  Goal: Transfer (OT)  Description: Transfer (OT) CGA  Outcome: Adequate for Discharge  Goal: Toilet Transfer/Toileting (OT)  Description: Toilet Transfer/Toileting (OT) CGA  Outcome: Adequate for Discharge   Patient alert and oriented. Able to walk to the bathroom with assist of 1 and walker. C/o back pain this morning, improved with Tylenol. She will be discharging today with her daughter and with home care.

## 2020-11-05 NOTE — PROGRESS NOTES
:     Patient discharged home with [facility].  Faxed orders to home care.  Spoke with Zeina and updated on discharge.  They plan to admit patient tomorrow.  Daughter provided transportation.    No further needs.    MADELYN Peoples on 11/5/2020 at 4:17 PM

## 2020-11-05 NOTE — PLAN OF CARE
"BP (!) 143/88 (BP Location: Left arm)   Pulse 66   Temp 97.9  F (36.6  C) (Tympanic)   Resp 18   Ht 1.575 m (5' 2\")   Wt 57.8 kg (127 lb 6.8 oz)   SpO2 95%   Breastfeeding No   BMI 23.31 kg/m        Patient A&O x4, SBA w/ walker. Patient c/o pain in the lowerback, heat packs and PRN medication used. LS clear. Patient experiences urinary  Incontinence/dribbling. NS infusing @ 10 mL/hr. Denies other needs at this time.  "

## 2020-11-05 NOTE — PROGRESS NOTES
NSG DISCHARGE NOTE    Patient discharged to home at 1:06 PM via wheel chair. Accompanied by daughter and staff. Discharge instructions reviewed with patient and daughter, opportunity offered to ask questions. Prescriptions sent to patients preferred pharmacy. All belongings sent with patient.    JARAD HOWARD RN

## 2020-11-05 NOTE — PROGRESS NOTES
SAFETY CHECKLIST  ID Bands and Risk clasps correct and in place (DNR, Fall risk, Allergy, Latex, Limb):  Yes  All Lines Reconciled and labeled correctly: Yes  Whiteboard updated:Yes  Environmental interventions (bed/chair alarm on, call light, side rails, restraints, sitter....): Yes  Verify Tele #:

## 2020-11-05 NOTE — PROGRESS NOTES
SAFETY CHECKLIST  ID Bands and Risk clasps correct and in place (DNR, Fall risk, Allergy, Latex, Limb):  Yes  All Lines Reconciled and labeled correctly: Yes  Whiteboard updated:Yes  Environmental interventions (bed/chair alarm on, call light, side rails, restraints, sitter....): Yes  Verify Tele # N/A:

## 2020-11-05 NOTE — PHARMACY - DISCHARGE MEDICATION RECONCILIATION AND EDUCATION
Pharmacy:  Discharge Counseling and Medication Reconciliation    Geno Horn  [address]   [facility] [id number]  [phone] (home)   89 year old female  PCP: rFansisca Rocha    Allergies: Celebrex [celecoxib] and Tolterodine    Discharge Counseling:    Pharmacist intern met with patient (and daughter) via phone today to review the medication portion of the After Visit Summary (with an emphasis on NEW medications) and to address patient's questions/concerns.    Summary of Education: Discussed Xarelto with the patient and daughter including indication, dosing, duration, and adverse effects. Discussed the discontinuation of aspirin while the patient is on Xarelto--neither the patient nor the daughter had questions at this time.     Materials Provided:  MedCounselor sheets printed from Clinical Pharmacology on: rivaroxaban.     Discharge Medication Reconciliation:    It has been determined that the patient has an adequate supply of medications available or which can be obtained from the patient's preferred pharmacy, which SHE has confirmed as: [id number] in [place].    Thank you for the consult.    Gregorio Pyle, Pharmacy Intern on 11/5/2020 at 11:10 AM

## 2020-11-05 NOTE — PROGRESS NOTES
Attempted to call [facility] to give report. Message left on Zeina's voicemail requesting call back.

## 2020-11-06 ENCOUNTER — PATIENT OUTREACH (OUTPATIENT)
Dept: CARE COORDINATION | Facility: CLINIC | Age: 85
End: 2020-11-06

## 2020-11-06 PROCEDURE — G0180 MD CERTIFICATION HHA PATIENT: HCPCS | Performed by: NURSE PRACTITIONER

## 2020-11-06 NOTE — PROGRESS NOTES
Clinic Care Coordination Contact  [facility]/Voicemail    Clinical Data: Care Coordinator Outreach  Outreach attempted x 1.  Left message on patient's voicemail with call back information and requested return call.  Plan: Care Coordinator will attempt again.   Karen Jett RN on 11/6/2020 at 9:07 AM     Unable to reach patient. [facility] called and they will be admitting patient today. No further follow-up required.  Karen Jett RN on 11/6/2020 at 9:09 AM

## 2020-11-07 LAB
BACTERIA SPEC CULT: NORMAL
BACTERIA SPEC CULT: NORMAL
SPECIMEN SOURCE: NORMAL
SPECIMEN SOURCE: NORMAL

## 2020-11-09 ENCOUNTER — TELEPHONE (OUTPATIENT)
Dept: INTERNAL MEDICINE | Facility: OTHER | Age: 85
End: 2020-11-09

## 2020-11-09 NOTE — TELEPHONE ENCOUNTER
Request for Home Care Skilled Nursing orders as follows:    SN eval and treat date: 11/6/20    Continuation frequency =  __1_____ X week X _2______ weeks      And PRN        Effective date=11/6/20        Interventions include:    Assessment    O2 sat monitoring (all disciplines as needed)  Patient/caregiver education      Please respond with .HOMECAREAGREE, if you are in agreement. Please sign with your comments and signature, if you are not in agreement.

## 2020-11-10 ENCOUNTER — TELEPHONE (OUTPATIENT)
Dept: INTERNAL MEDICINE | Facility: OTHER | Age: 85
End: 2020-11-10

## 2020-11-10 ENCOUNTER — OFFICE VISIT (OUTPATIENT)
Dept: INTERNAL MEDICINE | Facility: OTHER | Age: 85
End: 2020-11-10
Attending: NURSE PRACTITIONER
Payer: MEDICARE

## 2020-11-10 VITALS
BODY MASS INDEX: 22.5 KG/M2 | HEART RATE: 86 BPM | DIASTOLIC BLOOD PRESSURE: 82 MMHG | SYSTOLIC BLOOD PRESSURE: 120 MMHG | WEIGHT: 123 LBS | OXYGEN SATURATION: 97 % | RESPIRATION RATE: 16 BRPM | TEMPERATURE: 98.2 F

## 2020-11-10 DIAGNOSIS — R42 DIZZINESS: ICD-10-CM

## 2020-11-10 DIAGNOSIS — F34.1 DYSTHYMIA: ICD-10-CM

## 2020-11-10 DIAGNOSIS — M48.061 SPINAL STENOSIS OF LUMBAR REGION, UNSPECIFIED WHETHER NEUROGENIC CLAUDICATION PRESENT: ICD-10-CM

## 2020-11-10 DIAGNOSIS — U07.1 PNEUMONIA DUE TO 2019 NOVEL CORONAVIRUS: Primary | ICD-10-CM

## 2020-11-10 DIAGNOSIS — J12.82 PNEUMONIA DUE TO 2019 NOVEL CORONAVIRUS: Primary | ICD-10-CM

## 2020-11-10 DIAGNOSIS — R26.81 UNSTEADY GAIT: ICD-10-CM

## 2020-11-10 DIAGNOSIS — E87.1 HYPONATREMIA: ICD-10-CM

## 2020-11-10 PROCEDURE — 99495 TRANSJ CARE MGMT MOD F2F 14D: CPT | Performed by: NURSE PRACTITIONER

## 2020-11-10 PROCEDURE — 99495 TRANSJ CARE MGMT MOD F2F 14D: CPT

## 2020-11-10 PROCEDURE — G0463 HOSPITAL OUTPT CLINIC VISIT: HCPCS

## 2020-11-10 RX ORDER — DULOXETIN HYDROCHLORIDE 20 MG/1
20 CAPSULE, DELAYED RELEASE ORAL DAILY
Qty: 30 CAPSULE | Refills: 3 | Status: SHIPPED | OUTPATIENT
Start: 2020-11-10 | End: 2021-09-16

## 2020-11-10 ASSESSMENT — ENCOUNTER SYMPTOMS
HEMATOCHEZIA: 0
CONSTIPATION: 0
BACK PAIN: 1
ABDOMINAL PAIN: 0
NAUSEA: 0
DYSPHORIC MOOD: 1
DYSURIA: 0
COUGH: 0
DIZZINESS: 1
APPETITE CHANGE: 0
SORE THROAT: 0
SHORTNESS OF BREATH: 0
LIGHT-HEADEDNESS: 0
DIARRHEA: 0
ANAL BLEEDING: 0
WHEEZING: 0
CHEST TIGHTNESS: 0
WEAKNESS: 1
HEARTBURN: 0
FATIGUE: 0
VOMITING: 0
HEMATURIA: 0
FEVER: 0

## 2020-11-10 ASSESSMENT — PAIN SCALES - GENERAL: PAINLEVEL: MODERATE PAIN (5)

## 2020-11-10 NOTE — TELEPHONE ENCOUNTER
Request for Home Care Physical Therapy orders as follows:        Interventions include:  Add to current plan of care    Manual Therapy  Ultrasound        Therapist: Felipa Mckeon PT  Please respond with .HOMECAREAGREE, if you are in agreement. Please sign with your comments and signature, if you are not in agreement.

## 2020-11-10 NOTE — PATIENT INSTRUCTIONS
Hold fish oil while on the blood thinner.  Pump your calves before you stand.  Start duloxetine (Cymbalta) 20 mg once daily for the back pain.  May continue to use acetaminophen for pain but do not use ibuprofen nor other NSAIDs.  See me again in 4 weeks.  You will need a chest xray on that visit.

## 2020-11-10 NOTE — PROGRESS NOTES
Subjective:  Transitional care management  Date of hospital admission: 2020  Date of hospital discharge: 2020  Date of clinic contact: 2020    Patient was hospitalized with COVID-19 pneumonia.  She had weakness, dehydration, hypoxia and shortness of breath.  She was started on remdesivir and azithromycin.  She completed 5-day course of each medication.  She improved fairly rapidly with her respiratory status and her energy improved more slowly.  She did work with PT and OT throughout hospital stay who felt her gait was improving but still unsteady.  She did require IV fluids for worsening of chronic hyponatremia.  That is stabilized at the time of discharge.  Patient was discharged on a 30-day course of Xarelto.  She has been working with home care PT and OT and skilled nursing.  She is been doing well.  Continues to complain of chronic dizziness.  She was evaluated by [facility] balance center back in August who felt that the cause of the dizziness was related to blood flow pulling at her feet rather than at the heart and brain.  They felt if she became more active this would be effective.  Her chronic low back pain from her spinal stenosis contributes to her activity.  She has had physical therapy, chiropractor care, to separate corticosteroid injections, topical analgesics, acetaminophen without significant improvement.  She takes NSAIDs occasionally.  She did have an old prescription for hydrocodone from her previous hip surgery.  Her daughter cut those in half and she did find improvement with her pain.  She did not have any side effects.  It was a very old prescription so daughter is not sure how  it may have been.  She also feels more unhappy with her current life situation.  She does not feel that she is depressed but she just does not like her current state of dysfunction and pain.  Patient Active Problem List   Diagnosis     Allergic rhinitis     Coronary artery  disease involving native coronary artery of native heart without angina pectoris     H/O basal cell carcinoma excision     Hearing loss     Hypertension     Hypothyroidism     Osteoarthritis of both hips     Urinary urgency     Unsteady gait     Sepsis (H)     Spinal stenosis of lumbar region, unspecified whether neurogenic claudication present     Pneumonia due to 2019 novel coronavirus     Hyponatremia     COVID-19 virus infection     Past Medical History:   Diagnosis Date     Hypertension      Hypothyroid      Osteoarthritis      ST elevation (STEMI) myocardial infarction (H)     1981; angioplasty     Past Surgical History:   Procedure Laterality Date     ARTHROPLASTY HIP Right     Dr Rodriguez     ARTHROPLASTY HIP Left 2015    Dr. Rodriguez     COMBINED CYSTOSCOPY, URETEROSCOPY, LASER HOLMIUM LITHOTRIPSY URETER(S) Right 7/23/2019    Procedure: CYSTOSCOPY, RIGHT STENT REMOVAL, URETEROSCOPY, LASER HOLMIUM LITHOTRIPSY RIGHT URETER WITH STENT REPLACEMENT;  Surgeon: Shawn Cole MD;  Location:  OR     CYSTOSCOPY, RETROGRADES, INSERT STENT URETER(S), COMBINED Right 6/17/2019    Procedure: CYSTOSCOPY, WITH RETROGRADE PYELOGRAM AND URETERAL STENT INSERTION;  Surgeon: Shawn Cole MD;  Location:  OR     Social History     Socioeconomic History     Marital status:      Spouse name: Not on file     Number of children: Not on file     Years of education: Not on file     Highest education level: Not on file   Occupational History     Not on file   Social Needs     Financial resource strain: Not on file     Food insecurity     Worry: Not on file     Inability: Not on file     Transportation needs     Medical: Not on file     Non-medical: Not on file   Tobacco Use     Smoking status: Never Smoker     Smokeless tobacco: Never Used     Tobacco comment: no ecig   Substance and Sexual Activity     Alcohol use: Not Currently     Alcohol/week: 0.0 standard drinks     Drug use: No     Sexual activity: Not Currently      Partners: Male     Birth control/protection: Post-menopausal   Lifestyle     Physical activity     Days per week: Not on file     Minutes per session: Not on file     Stress: Not on file   Relationships     Social connections     Talks on phone: Not on file     Gets together: Not on file     Attends Denominational service: Not on file     Active member of club or organization: Not on file     Attends meetings of clubs or organizations: Not on file     Relationship status: Not on file     Intimate partner violence     Fear of current or ex partner: Not on file     Emotionally abused: Not on file     Physically abused: Not on file     Forced sexual activity: Not on file   Other Topics Concern     Parent/sibling w/ CABG, MI or angioplasty before 65F 55M? Not Asked   Social History Narrative    , lives in [place].  Previously from [place], [place].  Worked in drug store,  was a pharmacist.     Family History   Problem Relation Age of Onset     Heart Disease Mother 60         of mi     Heart Disease Father 60         of mi     Heart Disease Brother      Heart Disease Brother      Current Outpatient Medications   Medication Sig Dispense Refill     acetaminophen (TYLENOL) 500 MG tablet Take 500 mg by mouth every 6 hours as needed for mild pain       cetirizine (ZYRTEC) 10 MG tablet Take 10 mg by mouth daily as needed       diltiazem ER (DILT-XR) 180 MG 24 hr capsule Take 1 capsule (180 mg) by mouth daily 90 capsule 3     DULoxetine (CYMBALTA) 20 MG capsule Take 1 capsule (20 mg) by mouth daily 30 capsule 3     fish oil-omega-3 fatty acids 1000 MG capsule Take 1 capsule by mouth daily       Incontinence Supply Disposable (POISE PAD) PADS 1 Units daily 30 each 11     levothyroxine (SYNTHROID/LEVOTHROID) 88 MCG tablet TAKE 1 TABLET (88 MCG) BY MOUTH DAILY 90 tablet 3     loratadine (CLARITIN) 10 MG tablet Take 10 mg by mouth daily as needed       multivitamin, therapeutic (THERA-VIT) TABS tablet Take 1  tablet by mouth daily       order for DME Equipment being ordered: Spikes for cane 1 Units 1     order for DME Equipment being ordered: plastic underpants 1 Units 1     rivaroxaban ANTICOAGULANT (XARELTO ANTICOAGULANT) 10 MG TABS tablet Take 1 tablet (10 mg) by mouth daily (with dinner) 30 tablet 0     sodium chloride (OCEAN) 0.65 % nasal spray Spray 2 sprays into both nostrils daily as needed for congestion       Celebrex [celecoxib] and Tolterodine      Review of Systems:  Review of Systems   Constitutional: Negative for appetite change, fatigue and fever.   HENT: Negative for congestion and sore throat.    Respiratory: Negative for cough, chest tightness, shortness of breath and wheezing.    Cardiovascular: Negative for chest pain.   Gastrointestinal: Negative for abdominal pain, anal bleeding, constipation, diarrhea, heartburn, hematochezia, nausea and vomiting.   Genitourinary: Negative for dysuria and hematuria.   Musculoskeletal: Positive for back pain.   Neurological: Positive for dizziness and weakness. Negative for syncope and light-headedness.   Psychiatric/Behavioral: Positive for dysphoric mood and mood changes.       Objective:   /82 (BP Location: Right arm, Patient Position: Chair, Cuff Size: Adult Regular)   Pulse 86   Temp 98.2  F (36.8  C) (Tympanic)   Resp 16   Wt 55.8 kg (123 lb)   SpO2 97%   Breastfeeding No   BMI 22.50 kg/m    Physical Exam  Pleasant female accompanied by daughter.  No acute distress.  She has some forgetfulness but overall does pretty well and answers questions appropriately.  This is her baseline status.  Sclera nonicteric.  Conjunctiva noninflamed.  Patient wearing mask.  Neck supple without adenopathy.  Lung fields clear to auscultation with good air movement.  Cardiovascular regular rate and rhythm.  Abdomen is soft and without masses, tenderness and organomegaly.  Extremities without edema.  Patient is in a wheelchair today.  Assessment:    ICD-10-CM    1.  Pneumonia due to 2019 novel coronavirus  U07.1     J12.89    2. Hyponatremia  E87.1    3. Spinal stenosis of lumbar region, unspecified whether neurogenic claudication present  M48.061 DULoxetine (CYMBALTA) 20 MG capsule   4. Unsteady gait  R26.81    5. Dizziness  R42    6. Dysthymia  F34.1 DULoxetine (CYMBALTA) 20 MG capsule       Plan:   Patient has improved significantly from a COVID-19 pneumonia.  Her hyponatremia resolved by the time of hospital discharge.  Her hemoglobin was overall stable.  She is not having any difficulties with the Xarelto.  She completed azithromycin and remdesivir.  She is working with outpatient home care therapy and is finding improvement in her gait and weakness.  She is having more difficulty with her chronic back pain.  We discussed alternative treatments including narcotic pain management, Cymbalta, etc.  At this time they would be interested in trying a low-dose of Cymbalta and titrating up as needed.  We will start her on Cymbalta 20 mg daily.  Side effects of medication reviewed and discussed.  Could consider stronger pain medications including narcotics and tramadol, side effects reviewed and discussed but would like to try the duloxetine first.  We will see her back in 1 month and will need follow-up chest x-ray.  She will finish out the course of the Xarelto.  I have asked her to place her fish oil on hold while she is on Xarelto.    Nonface-to-face follow-up/services needed: Follow-up examination, pain management and will need chest x-ray in 1 month.  Post hospitalization medication reconciliation was completed.      DRU Salcedo   11/10/2020  10:15 AM

## 2020-11-10 NOTE — NURSING NOTE
"Chief Complaint   Patient presents with     Hospital F/U       Initial /82 (BP Location: Right arm, Patient Position: Chair, Cuff Size: Adult Regular)   Pulse 86   Temp 98.2  F (36.8  C) (Tympanic)   Resp 16   Wt 55.8 kg (123 lb)   SpO2 97%   Breastfeeding No   BMI 22.50 kg/m   Estimated body mass index is 22.5 kg/m  as calculated from the following:    Height as of 11/1/20: 1.575 m (5' 2\").    Weight as of this encounter: 55.8 kg (123 lb).  Medication Reconciliation: complete      Chantal Alfaro LPN on 11/10/2020 at 9:50 AM   "

## 2020-11-16 ENCOUNTER — HOSPITAL ENCOUNTER (EMERGENCY)
Facility: OTHER | Age: 85
Discharge: HOME | End: 2020-11-16
Attending: STUDENT IN AN ORGANIZED HEALTH CARE EDUCATION/TRAINING PROGRAM | Admitting: STUDENT IN AN ORGANIZED HEALTH CARE EDUCATION/TRAINING PROGRAM
Payer: MEDICARE

## 2020-11-16 ENCOUNTER — APPOINTMENT (OUTPATIENT)
Dept: CT IMAGING | Facility: OTHER | Age: 85
End: 2020-11-16
Attending: STUDENT IN AN ORGANIZED HEALTH CARE EDUCATION/TRAINING PROGRAM
Payer: MEDICARE

## 2020-11-16 ENCOUNTER — APPOINTMENT (OUTPATIENT)
Dept: PHYSICAL THERAPY | Facility: OTHER | Age: 85
End: 2020-11-16
Attending: STUDENT IN AN ORGANIZED HEALTH CARE EDUCATION/TRAINING PROGRAM
Payer: MEDICARE

## 2020-11-16 VITALS
HEART RATE: 78 BPM | TEMPERATURE: 97.1 F | RESPIRATION RATE: 22 BRPM | WEIGHT: 123 LBS | DIASTOLIC BLOOD PRESSURE: 115 MMHG | SYSTOLIC BLOOD PRESSURE: 183 MMHG | OXYGEN SATURATION: 96 % | BODY MASS INDEX: 22.5 KG/M2

## 2020-11-16 DIAGNOSIS — R62.7 FAILURE TO THRIVE IN ADULT: ICD-10-CM

## 2020-11-16 DIAGNOSIS — E87.1 CHRONIC HYPONATREMIA: ICD-10-CM

## 2020-11-16 DIAGNOSIS — S22.070A CLOSED WEDGE COMPRESSION FRACTURE OF T9 VERTEBRA, INITIAL ENCOUNTER (H): ICD-10-CM

## 2020-11-16 LAB
ANION GAP SERPL CALCULATED.3IONS-SCNC: 7 MMOL/L (ref 3–14)
BASOPHILS # BLD AUTO: 0 10E9/L (ref 0–0.2)
BASOPHILS NFR BLD AUTO: 0.5 %
BUN SERPL-MCNC: 10 MG/DL (ref 7–25)
CALCIUM SERPL-MCNC: 8.9 MG/DL (ref 8.6–10.3)
CHLORIDE SERPL-SCNC: 93 MMOL/L (ref 98–107)
CO2 SERPL-SCNC: 28 MMOL/L (ref 21–31)
CREAT SERPL-MCNC: 0.49 MG/DL (ref 0.6–1.2)
DIFFERENTIAL METHOD BLD: ABNORMAL
EOSINOPHIL # BLD AUTO: 0 10E9/L (ref 0–0.7)
EOSINOPHIL NFR BLD AUTO: 0.1 %
ERYTHROCYTE [DISTWIDTH] IN BLOOD BY AUTOMATED COUNT: 15.1 % (ref 10–15)
GFR SERPL CREATININE-BSD FRML MDRD: >90 ML/MIN/{1.73_M2}
GLUCOSE SERPL-MCNC: 105 MG/DL (ref 70–105)
HCT VFR BLD AUTO: 30 % (ref 35–47)
HGB BLD-MCNC: 10.1 G/DL (ref 11.7–15.7)
IMM GRANULOCYTES # BLD: 0 10E9/L (ref 0–0.4)
IMM GRANULOCYTES NFR BLD: 0.5 %
LYMPHOCYTES # BLD AUTO: 1.2 10E9/L (ref 0.8–5.3)
LYMPHOCYTES NFR BLD AUTO: 16.6 %
MCH RBC QN AUTO: 33 PG (ref 26.5–33)
MCHC RBC AUTO-ENTMCNC: 33.7 G/DL (ref 31.5–36.5)
MCV RBC AUTO: 98 FL (ref 78–100)
MONOCYTES # BLD AUTO: 0.7 10E9/L (ref 0–1.3)
MONOCYTES NFR BLD AUTO: 9.6 %
NEUTROPHILS # BLD AUTO: 5.3 10E9/L (ref 1.6–8.3)
NEUTROPHILS NFR BLD AUTO: 72.7 %
PLATELET # BLD AUTO: 353 10E9/L (ref 150–450)
POTASSIUM SERPL-SCNC: 3.7 MMOL/L (ref 3.5–5.1)
RBC # BLD AUTO: 3.06 10E12/L (ref 3.8–5.2)
SODIUM SERPL-SCNC: 128 MMOL/L (ref 134–144)
WBC # BLD AUTO: 7.3 10E9/L (ref 4–11)

## 2020-11-16 PROCEDURE — 80048 BASIC METABOLIC PNL TOTAL CA: CPT | Performed by: STUDENT IN AN ORGANIZED HEALTH CARE EDUCATION/TRAINING PROGRAM

## 2020-11-16 PROCEDURE — 72128 CT CHEST SPINE W/O DYE: CPT

## 2020-11-16 PROCEDURE — 93010 ELECTROCARDIOGRAM REPORT: CPT | Performed by: INTERNAL MEDICINE

## 2020-11-16 PROCEDURE — 93005 ELECTROCARDIOGRAM TRACING: CPT | Performed by: STUDENT IN AN ORGANIZED HEALTH CARE EDUCATION/TRAINING PROGRAM

## 2020-11-16 PROCEDURE — 97116 GAIT TRAINING THERAPY: CPT | Mod: GP

## 2020-11-16 PROCEDURE — 85025 COMPLETE CBC W/AUTO DIFF WBC: CPT | Performed by: STUDENT IN AN ORGANIZED HEALTH CARE EDUCATION/TRAINING PROGRAM

## 2020-11-16 PROCEDURE — 99285 EMERGENCY DEPT VISIT HI MDM: CPT | Mod: 25 | Performed by: STUDENT IN AN ORGANIZED HEALTH CARE EDUCATION/TRAINING PROGRAM

## 2020-11-16 PROCEDURE — 99284 EMERGENCY DEPT VISIT MOD MDM: CPT | Performed by: STUDENT IN AN ORGANIZED HEALTH CARE EDUCATION/TRAINING PROGRAM

## 2020-11-16 PROCEDURE — 70450 CT HEAD/BRAIN W/O DYE: CPT

## 2020-11-16 PROCEDURE — 97161 PT EVAL LOW COMPLEX 20 MIN: CPT | Mod: GP,KX

## 2020-11-16 PROCEDURE — 250N000013 HC RX MED GY IP 250 OP 250 PS 637: Performed by: STUDENT IN AN ORGANIZED HEALTH CARE EDUCATION/TRAINING PROGRAM

## 2020-11-16 PROCEDURE — 72131 CT LUMBAR SPINE W/O DYE: CPT

## 2020-11-16 RX ORDER — LIDOCAINE 50 MG/G
1 PATCH TOPICAL ONCE
Status: DISCONTINUED | OUTPATIENT
Start: 2020-11-16 | End: 2020-11-16 | Stop reason: HOSPADM

## 2020-11-16 RX ORDER — ACETAMINOPHEN 325 MG/1
650 TABLET ORAL ONCE
Status: COMPLETED | OUTPATIENT
Start: 2020-11-16 | End: 2020-11-16

## 2020-11-16 RX ADMIN — ACETAMINOPHEN 650 MG: 325 TABLET, FILM COATED ORAL at 16:17

## 2020-11-16 RX ADMIN — ACETAMINOPHEN 650 MG: 325 TABLET, FILM COATED ORAL at 09:15

## 2020-11-16 RX ADMIN — LIDOCAINE 5% 1 PATCH: 700 PATCH TOPICAL at 09:15

## 2020-11-16 NOTE — ED PROVIDER NOTES
This documentation serves as a history & physical for the purposes of admission to a nursing facility    History     Chief Complaint   Patient presents with     Back Pain     HPI  Geno Horn is a 89 year old female with history of recent admission for COVID-19 pneumonia, chronic hyponatremia, CAD, hypothyroidism, HTN, on xarelto for VTE prophylaxis in the setting of COVID-19 who presents after a fall 2 days with back pain and difficulty ambulating. Patient recently discharged to home on 11/5 after approximately 1 week hospitalization related to COVID-19 pneumonia. She lives alone, states she lost her balance while walking 2 days ago and fell to her side. She does not think she hit her head but unsure, denies LOC. She noted mid-lower back pain since that time, she has been able to ambulate at home but with significant pain. On waking up this morning her pain was so severe that she was unable to get out of bed so called 911 to come in and be evaluated. She denies any chest pain or shortness of breath, nausea/vomiting, headache or vision changes. She reports very mild abdominal discomfort. No leg swelling, cough. No fevers/chills. No loss of bowel or bladder function, no numbness/tingling or weakness in extremities.    Allergies:  Allergies   Allergen Reactions     Celebrex [Celecoxib] Other (See Comments)     Light headed     Tolterodine      Other reaction(s): Intolerance-Can't Take  Was unable to sleep at night       Problem List:    Patient Active Problem List    Diagnosis Date Noted     Pneumonia due to 2019 novel coronavirus 11/01/2020     Priority: Medium     Hyponatremia 11/01/2020     Priority: Medium     COVID-19 virus infection 11/01/2020     Priority: Medium     Spinal stenosis of lumbar region, unspecified whether neurogenic claudication present 02/19/2020     Priority: Medium     Sepsis (H) 06/17/2019     Priority: Medium     Unsteady gait 05/21/2018     Priority: Medium     Urinary urgency 07/24/2017      Priority: Medium     H/O basal cell carcinoma excision 2016     Priority: Medium     Coronary artery disease involving native coronary artery of native heart without angina pectoris 2016     Priority: Medium     Hearing loss 2015     Priority: Medium     Osteoarthritis of both hips 2015     Priority: Medium     Allergic rhinitis 2012     Priority: Medium     Hypertension 2012     Priority: Medium     Hypothyroidism 2012     Priority: Medium     Overview:   tsh and t4 normal on 2015           Past Medical History:    Past Medical History:   Diagnosis Date     Hypertension      Hypothyroid      Osteoarthritis      ST elevation (STEMI) myocardial infarction (H)        Past Surgical History:    Past Surgical History:   Procedure Laterality Date     ARTHROPLASTY HIP Right     Dr Rodriguez     ARTHROPLASTY HIP Left     Dr. Rodriguez     COMBINED CYSTOSCOPY, URETEROSCOPY, LASER HOLMIUM LITHOTRIPSY URETER(S) Right 2019    Procedure: CYSTOSCOPY, RIGHT STENT REMOVAL, URETEROSCOPY, LASER HOLMIUM LITHOTRIPSY RIGHT URETER WITH STENT REPLACEMENT;  Surgeon: Shawn Cole MD;  Location:  OR     CYSTOSCOPY, RETROGRADES, INSERT STENT URETER(S), COMBINED Right 2019    Procedure: CYSTOSCOPY, WITH RETROGRADE PYELOGRAM AND URETERAL STENT INSERTION;  Surgeon: Shawn Cole MD;  Location:  OR       Family History:    Family History   Problem Relation Age of Onset     Heart Disease Mother 60         of mi     Heart Disease Father 60         of mi     Heart Disease Brother      Heart Disease Brother        Social History:  Marital Status:   [5]  Social History     Tobacco Use     Smoking status: Never Smoker     Smokeless tobacco: Never Used     Tobacco comment: no ecig   Substance Use Topics     Alcohol use: Not Currently     Alcohol/week: 0.0 standard drinks     Drug use: No        Medications:         diltiazem ER (DILT-XR) 180 MG 24 hr capsule       DULoxetine  (CYMBALTA) 20 MG capsule       levothyroxine (SYNTHROID/LEVOTHROID) 88 MCG tablet       rivaroxaban ANTICOAGULANT (XARELTO ANTICOAGULANT) 10 MG TABS tablet       acetaminophen (TYLENOL) 500 MG tablet       cetirizine (ZYRTEC) 10 MG tablet       fish oil-omega-3 fatty acids 1000 MG capsule       Incontinence Supply Disposable (POISE PAD) PADS       loratadine (CLARITIN) 10 MG tablet       multivitamin, therapeutic (THERA-VIT) TABS tablet       order for DME       order for DME       sodium chloride (OCEAN) 0.65 % nasal spray          Review of Systems  10-point ROS complete and negative other than as noted in HPI above.    Physical Exam   BP: (!) 178/87  Pulse: 66  Temp: 97.1  F (36.2  C)  Resp: 16  Weight: 55.8 kg (123 lb)  SpO2: 96 %      Physical Exam  Gen: Lying in bed, no acute distress  HEENT: NC/AT, slightly dry mucous membranes  CV: RRR, appears warm and well-perfused, no murmurs appreciated  Pulm: CTAB, no wheezing or crackles, normal respiratory effort  Abd: Soft, very minimal tenderness diffusely, non-distended, no guarding or rebound  Skin: No rash or other lesions  MSK: No gross deformities or swelling. TTP over low-thoracic spinous process, no other posterior midline spinal tenderness.  Neuro: A&O x4, no focal deficits, CN II-XII grossly intact. Strength intact, 5/5 and symmetric in bilateral lower and upper extremities    ED Course     ED Course as of Nov 16 1623   Mon Nov 16, 2020   0810 EKG reviewed, no STEMI, no current of injury, normal sinus rhythm      0923 Patient's daughter called, states she does not think patient can care for herself at home, since the fall has been unable to care for herself, would like to talk with social work about nursing home placement. I asked nurse to place social work consult      0924 CT head final report: negative      0949 CT final report: Acute osteoporotic pathologic fracture of T9, associated with moderate  inferior endplate height loss      0953 Will have PT  evaluate patient      1208 I spoke with PT, patient able to get up and ambulate with walker with assistance only, social work is working on placement for her      1358 Social work still working on placement      1611 Informed by social work that patient has been accepted to [facility] tomorrow, will go home with daughter tonight. Will send ED note which should satisfy as H&P as well as med list and interagency report and work on discharging patient        Procedures               Critical Care time:  none               Results for orders placed or performed during the hospital encounter of 11/16/20 (from the past 24 hour(s))   CBC with platelets differential   Result Value Ref Range    WBC 7.3 4.0 - 11.0 10e9/L    RBC Count 3.06 (L) 3.8 - 5.2 10e12/L    Hemoglobin 10.1 (L) 11.7 - 15.7 g/dL    Hematocrit 30.0 (L) 35.0 - 47.0 %    MCV 98 78 - 100 fl    MCH 33.0 26.5 - 33.0 pg    MCHC 33.7 31.5 - 36.5 g/dL    RDW 15.1 (H) 10.0 - 15.0 %    Platelet Count 353 150 - 450 10e9/L    Diff Method Automated Method     % Neutrophils 72.7 %    % Lymphocytes 16.6 %    % Monocytes 9.6 %    % Eosinophils 0.1 %    % Basophils 0.5 %    % Immature Granulocytes 0.5 %    Absolute Neutrophil 5.3 1.6 - 8.3 10e9/L    Absolute Lymphocytes 1.2 0.8 - 5.3 10e9/L    Absolute Monocytes 0.7 0.0 - 1.3 10e9/L    Absolute Eosinophils 0.0 0.0 - 0.7 10e9/L    Absolute Basophils 0.0 0.0 - 0.2 10e9/L    Abs Immature Granulocytes 0.0 0 - 0.4 10e9/L   Basic metabolic panel   Result Value Ref Range    Sodium 128 (L) 134 - 144 mmol/L    Potassium 3.7 3.5 - 5.1 mmol/L    Chloride 93 (L) 98 - 107 mmol/L    Carbon Dioxide 28 21 - 31 mmol/L    Anion Gap 7 3 - 14 mmol/L    Glucose 105 70 - 105 mg/dL    Urea Nitrogen 10 7 - 25 mg/dL    Creatinine 0.49 (L) 0.60 - 1.20 mg/dL    GFR Estimate >90 >60 mL/min/[1.73_m2]    GFR Estimate If Black >90 >60 mL/min/[1.73_m2]    Calcium 8.9 8.6 - 10.3 mg/dL   CT Head w/o Contrast    Narrative    PROCEDURE: CT HEAD W/O  CONTRAST     HISTORY: fall on xarelto, unsure if hit head.    COMPARISON: October 29, 2020    TECHNIQUE:  Helical images of the head from the foramen magnum to the  vertex were obtained without contrast.    FINDINGS: The ventricles and sulci are enlarged consistent with  atrophy. No acute intracranial hemorrhage, mass effect, midline shift,  hydrocephalus or basilar cystern effacement are present.    The grey-white matter interface is preserved. There is extensive white  matter low density consistent with small vessel changes.    The calvarium is intact. The mastoid air cells are clear.  The  visualized paranasal sinuses are clear.      Impression    IMPRESSION: No acute brain abnormality      FLORIAN PENDLETON MD   CT Thoracic Spine w/o Contrast    Narrative    CT LUMBAR SPINE W/O CONTRAST, CT THORACIC SPINE W/O CONTRAST    HISTORY: back pain after fall .    COMPARISON: MR lumbar spine 2/18/2020. CT abdomen pelvis 10/29/2020    TECHNIQUE: Helical noncontrast CT images of the thoracic and lumbar  spines.    FINDINGS:    Moderate vertebral body height loss at T9 is new when compared to  10/29/2020, reflecting an acute to recent subacute pathologic  osteoporotic fracture of the inferior endplate. There is associated 4  mm of posterior inferior endplate spurring. Resultant spinal stenosis  is minimal.    No other acute fracture is identified. The thoracic kyphosis is  accentuated by the fracture and height loss. The lumbar lordosis is  unchanged when compared to the recent MR, with trace anterolisthesis  of L4 on L5 and moderate rightward curvature centered on L1-2.    Extensive degenerative changes of lumbar spine are not significantly  changed when compared to February 1 there are described in detail on  the prior MR, allowing for differences in technique.    Dependent atelectasis or fibrosis is noted in the lungs, along with  multiple calcified pulmonary nodules. Dense gallstones are seen.      Impression     IMPRESSION:     Acute osteoporotic pathologic fracture of T9, associated with moderate  inferior endplate height loss, new when compared to the prior CT dated  10/29/2020.      KATELIN CLEMENTS MD   CT Lumbar Spine w/o Contrast    Narrative    CT LUMBAR SPINE W/O CONTRAST, CT THORACIC SPINE W/O CONTRAST    HISTORY: back pain after fall .    COMPARISON: MR lumbar spine 2/18/2020. CT abdomen pelvis 10/29/2020    TECHNIQUE: Helical noncontrast CT images of the thoracic and lumbar  spines.    FINDINGS:    Moderate vertebral body height loss at T9 is new when compared to  10/29/2020, reflecting an acute to recent subacute pathologic  osteoporotic fracture of the inferior endplate. There is associated 4  mm of posterior inferior endplate spurring. Resultant spinal stenosis  is minimal.    No other acute fracture is identified. The thoracic kyphosis is  accentuated by the fracture and height loss. The lumbar lordosis is  unchanged when compared to the recent MR, with trace anterolisthesis  of L4 on L5 and moderate rightward curvature centered on L1-2.    Extensive degenerative changes of lumbar spine are not significantly  changed when compared to February 1 there are described in detail on  the prior MR, allowing for differences in technique.    Dependent atelectasis or fibrosis is noted in the lungs, along with  multiple calcified pulmonary nodules. Dense gallstones are seen.      Impression    IMPRESSION:     Acute osteoporotic pathologic fracture of T9, associated with moderate  inferior endplate height loss, new when compared to the prior CT dated  10/29/2020.      KATELIN CLEMENTS MD       Medications   lidocaine (LIDODERM) 5 % Patch 1 patch (1 patch Transdermal Patch/Med Applied 11/16/20 0915)   0.9% sodium chloride BOLUS (has no administration in time range)   acetaminophen (TYLENOL) tablet 650 mg (650 mg Oral Given 11/16/20 0915)   acetaminophen (TYLENOL) tablet 650 mg (650 mg Oral Given 11/16/20 1617)        Assessments & Plan (with Medical Decision Making)   89-year-old woman with history of CAD, hypothyroidism, HTN, chronic hyponatremia, on xarelto for VTE prophylaxis in setting of recent COVID-19 pneumonia who presents from home after a fall 2 days ago with mid-to-low back pain since the fall and inability to get out of bed this morning due to pain. Vitally stable here other than slightly hypertensive, afebrile, in no acute distress. She no longer has any COVID-19 symptoms of fevers/chills, cough, or shortness of breath. CT revealed acute osteoporotic compression fracture of T9. CT head also obtained due to fall on xarelto and this was negative. Patient has no neurologic deficits to suggest complication from this thus MRI or neurosurgical evaluation is not indicated; she will require conservative management only of this. In discussion with daughter she feels patient is too frail to function at home anymore; in coordination with social work and physical therapy, patient evaluated plan and accepted at [facility] assisted living [facility] tomorrow, 11/17 and will stay with her daughter until then. Lidocaine patch placed on patient's back and was removed prior to discharge. She received tylenol for pain here in the emergency department with good relief of her pain. She ambulated with assist and with a walker with PT. She is medically cleared to go to the assisted living facility. She will need close outpatient clinic follow-up in 2-3 days.    I have reviewed the nursing notes.    I have reviewed the findings, diagnosis, plan and need for follow up with the patient.       New Prescriptions    No medications on file       Final diagnoses:   Closed wedge compression fracture of T9 vertebra, initial encounter (H)   Failure to thrive in adult   Chronic hyponatremia       11/16/2020   [facility] Abdoulaye Gonsales MD  11/16/20 4990

## 2020-11-16 NOTE — PROGRESS NOTES
:    Telephone call to patient's daughter Maria Antonia.  Maria Antonia does not feel that it is safe for patient to be at home at this time due to recent falls and decline in functioning.  Per daughter patient has needed a lot of reminders to eat and drink.  Has needed assistance with medication set up and cooking.   She feels that patient will need assisted living placement or SNF.  She would like to consider [facility], [facility], or [facility].  Patient's son is in [place] so they would also consider [facility].  Patient is able to initially private pay.    Message left at [facility], awaiting return call.Phone call to [facility].  They may possibly have a private room opening up with a shared bathroom.Left a message for [facility] checking on bed availability.  Spoke with Tiffanie at [facility] in [place].  They have bed availability.      Attempted to reach primary RN to update and further discuss planning.       MADELYN Peoples on 11/16/2020 at 10:08 AM    Addendum:     Spoke with daughter to update on possible placement options.  She will call her brother to discuss, and call facilities for further information.    MADELYN Peoples on 11/16/2020 at 11:16 AM

## 2020-11-16 NOTE — ED TRIAGE NOTES
"Patient brought to ED from home for back pain.  Per EMS patient had fallen at home on Saturday, was able to get around home until this morning.  This morning patient unable to get out of bed.  Per patient she fell and hit her right hip and is having low back pain.  Patient stated she \"lost her balance\". Denies loss of consciousness.   Patient is on Xarelto. Unable to remember if she hit her head.   Patient alert, oriented to self and situation. .  20 g IV established in right hand.   25 mcg of Fentanyl given en route. Patient rates pain 10/10.  MD in room at time of arrival.   "

## 2020-11-16 NOTE — ED AVS SNAPSHOT
[facility]  [address]  [place] [address]  Phone: [phone]  Fax: [phone]                                    Geno Horn   MRN: [id number]    Department: [facility]   Date of Visit: 11/16/2020           After Visit Summary Signature Page    I have received my discharge instructions, and my questions have been answered. I have discussed any challenges I see with this plan with the nurse or doctor.    ..........................................................................................................................................  Patient/Patient Representative Signature      ..........................................................................................................................................  Patient Representative Print Name and Relationship to Patient    ..................................................               ................................................  Date                                   Time    ..........................................................................................................................................  Reviewed by Signature/Title    ...................................................              ..............................................  Date                                               Time          22EPIC Rev 08/18

## 2020-11-16 NOTE — ED NOTES
Patient daughter rich called, stated she is not sure that her mother can go back home, is not able to care for herself the way she used to.  Asked to talk with .  MD notified.

## 2020-11-16 NOTE — PROGRESS NOTES
11/16/20 1200   Signing Clinician's Name / Credentials   Signing clinician's name / credentials Bro Ramos MPT   Quick Adds   Rehab Discipline PT   Quick Adds Mobility;Therapeutic Activity   Functional Transfer Training   Symptoms Noted During/After Treatment increased pain   Treatment Detail CGA with Fww   Gait Training   Symptoms Noted During/After Treatment (Gait Training) increased pain   Distance in Feet (Required for LE Total Joints) 20   Treatment Detail short ambulation within patient's room   [place] Level (Gait Training) contact guard   Physical Assistance Level (Gait Training) 1 person assist   Weight Bearing (Gait Training) weight-bearing as tolerated   Assistive Device (Gait Training) rolling walker   Gait Analysis Deviations decreased [place]   Therapeutic Activity   Symptoms Noted During/After Treatment Increased pain   Treatment Detail bed mobilities with minimal assist    PT Discharge Planning    PT Discharge Recommendation (DC Rec) home with assist;home with home care physical therapy  (assisted living facility)   PT Rationale for DC Rec to promote strength,incrased gait stability and safe mobility   PT Brief overview of current status  fair stability with gait activities using a Fww; bed mobilities require minimal assist and transfers CGA with Fww   Additional Documentation   Rehab Comments patient will benefit from assisted living environment and home care PT   PT Plan PT assessment given to Provider   Total Session Time   Total Session Time (minutes) 25 minutes

## 2020-11-16 NOTE — DISCHARGE INSTRUCTIONS
Take tylenol as needed for pain. Talk to your primary doctor in clinic about getting additional treatment options for pain if this does not control your pain.    You had lidocaine patches on here in the emergency department; we removed these before you left.    Follow up with your doctor in clinic in 2-3 days for a re-check.

## 2020-11-16 NOTE — PROGRESS NOTES
11/16/20 1200   Quick Adds   Type of Visit Initial PT Evaluation   Living Environment   People in home alone   Current Living Arrangements apartment   Home Accessibility stairs within home   Number of Stairs, Within Home, Primary 9   Stair Railings, Within Home, Primary railings on both sides of stairs   Transportation Anticipated family or friend will provide   Self-Care   Usual Activity Tolerance moderate   Current Activity Tolerance poor   Equipment Currently Used at Home cane, quad;walker, rolling;shower chair;grab bar, toilet;grab bar, tub/shower   Disability/Function   Hearing Difficulty or Deaf yes   Patient's preferred means of communication verbal   Describe hearing loss bilateral hearing loss   Use of hearing assistive devices bilateral hearing aids   Wear Glasses or Blind no   Concentrating, Remembering or Making Decisions Difficulty yes   Difficulty Communicating no   Difficulty Eating/Swallowing no   Walking or Climbing Stairs Difficulty yes   Walking or Climbing Stairs ambulation difficulty, requires equipment   Dressing/Bathing Difficulty no   Toileting no   Fall history within last six months yes   Number of times patient has fallen within last six months 2   Change in Functional Status Since Onset of Current Illness/Injury yes   General Information   Referring Physician Dru   Patient/Family Therapy Goals Statement (PT) move to assistied living facility   Existing Precautions/Restrictions fall   Weight-Bearing Status - LLE full weight-bearing   Weight-Bearing Status - RLE full weight-bearing   Cognition   Orientation Status (Cognition) oriented x 4   Affect/Mental Status (Cognition) WFL   Follows Commands (Cognition) WFL   Memory Deficit (Cognition) minimal deficit   Pain Assessment   Patient Currently in Pain Yes, see Vital Sign flowsheet   Integumentary/Edema   Integumentary/Edema Comments bruising noted in bilateral lower legs without notable edema   Posture    Posture Forward head  position;Protracted shoulders   Range of Motion (ROM)   ROM Quick Adds ROM WFL   Strength   Manual Muscle Testing Quick Adds Strength WFL   Strength Comments however, patient fatigues with activity   Bed Mobility   Bed Mobility supine-sit;sit-supine   Supine-Sit [place] (Bed Mobility) minimum assist (75% patient effort)   Sit-Supine [place] (Bed Mobility) minimum assist (75% patient effort)   Impairments Contributing to Impaired Bed Mobility pain;decreased strength   Transfers   Transfers sit-stand transfer;bed-chair transfer   Impairments Contributing to Impaired Transfers decreased strength   Bed-Chair Transfer   Bed-Chair [place] (Transfers) contact guard   Assistive Device (Bed-Chair Transfers) walker, front-wheeled   Sit-Stand Transfer   Sit-Stand [place] (Transfers) contact guard   Assistive Device (Sit-Stand Transfers) walker, front-wheeled   Gait/Stairs (Locomotion)   [place] Level (Gait) contact guard   Assistive Device (Gait) walker, front-wheeled   Distance in Feet (Required for LE Total Joints) 20   Pattern (Gait) 3-point   Maintains Weight-bearing Status (Gait) able to maintain   Balance   Balance Comments fair with use of Fww   Sensory Examination   Sensory Perception WFL   Coordination   Coordination no deficits were identified   Muscle Tone   Muscle Tone no deficits were identified   Clinical Impression   Criteria for Skilled Therapeutic Intervention evaluation only   PT Diagnosis (PT) impaired mobility   Influenced by the following impairments fatigue, pain   Functional limitations due to impairments activity/gait tolerance   Clinical Presentation Stable/Uncomplicated   Clinical Decision Making (Complexity) low complexity   Therapy Frequency (PT) One time eval and treatment only   Predicted Duration of Therapy Intervention (days/wks) 1 day   Anticipated Equipment Needs at Discharge (PT) walker, rolling   Risk & Benefits of therapy have been explained risks/benefits reviewed    PT Discharge Planning    PT Discharge Recommendation (DC Rec) home with assist;home with home care physical therapy  (assisted living facility)   PT Rationale for DC Rec to promote strength,incrased gait stability and safe mobility   PT Brief overview of current status  fair stability with gait activities using a Fww; bed mobilities require minimal assist and transfers CGA with Fww   Total Evaluation Time   Total Evaluation Time (Minutes) 15

## 2020-11-16 NOTE — PROGRESS NOTES
:     Spoke with patient's daughter.  She talked with Mary Kate at [place] and Faiza from the [facility].  She is going to call her brother and call back.  It sounds like she could possibly stay with patient at her apartment until she can get into assisted living.    MADELYN Peoples on 11/16/2020 at 2:37 PM    Addendum:     Received a voicemail from patient's daughter.  They selected the [facility] [place].  She would like a referral sent.     Contacted [facility] and notified of referral.  Faxed referral information.  Awaiting return call.    Spoke with daughter.  She stated that the [facility] can accept patient tomorrow but will need an H & P, a signed med list and IAR.  Inquired if she could care for patient home until tomorrow and she was worried about patient being able to get up the stairs.     Spoke with Bro from PT and he felt she could do stairs with assistance.  Patient's daughter will call ED to see what time she should  patient.     MADELYN Peoples on 11/16/2020 at 3:36 PM

## 2020-11-17 ENCOUNTER — APPOINTMENT (OUTPATIENT)
Dept: GENERAL RADIOLOGY | Facility: OTHER | Age: 85
End: 2020-11-17
Attending: PHYSICIAN ASSISTANT
Payer: MEDICARE

## 2020-11-17 ENCOUNTER — HOSPITAL ENCOUNTER (OUTPATIENT)
Facility: OTHER | Age: 85
Setting detail: OBSERVATION
Discharge: NURSING, REHAB OR LONG-TERM CARE | End: 2020-11-18
Attending: PHYSICIAN ASSISTANT | Admitting: FAMILY MEDICINE
Payer: MEDICARE

## 2020-11-17 ENCOUNTER — TELEPHONE (OUTPATIENT)
Dept: INTERNAL MEDICINE | Facility: OTHER | Age: 85
End: 2020-11-17
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
## 2020-11-17 DIAGNOSIS — R29.6 REPEATED FALLS: ICD-10-CM

## 2020-11-17 DIAGNOSIS — Z79.899 ENCOUNTER FOR LONG-TERM (CURRENT) USE OF MEDICATIONS: ICD-10-CM

## 2020-11-17 DIAGNOSIS — Z86.16 HISTORY OF 2019 NOVEL CORONAVIRUS DISEASE (COVID-19): ICD-10-CM

## 2020-11-17 DIAGNOSIS — I10 ESSENTIAL (PRIMARY) HYPERTENSION: ICD-10-CM

## 2020-11-17 DIAGNOSIS — I25.2 OLD MYOCARDIAL INFARCTION: ICD-10-CM

## 2020-11-17 DIAGNOSIS — E03.9 HYPOTHYROIDISM, UNSPECIFIED TYPE: ICD-10-CM

## 2020-11-17 DIAGNOSIS — Z86.19 PERSONAL HISTORY OF OTHER INFECTIOUS AND PARASITIC DISEASES: ICD-10-CM

## 2020-11-17 DIAGNOSIS — R29.6 FALLS FREQUENTLY: ICD-10-CM

## 2020-11-17 DIAGNOSIS — S22.070D CLOSED WEDGE COMPRESSION FRACTURE OF T9 VERTEBRA WITH ROUTINE HEALING, SUBSEQUENT ENCOUNTER: ICD-10-CM

## 2020-11-17 DIAGNOSIS — R26.81 GENERAL UNSTEADINESS: ICD-10-CM

## 2020-11-17 DIAGNOSIS — S70.01XA CONTUSION OF RIGHT HIP, INITIAL ENCOUNTER: ICD-10-CM

## 2020-11-17 LAB — INTERPRETATION ECG - MUSE: NORMAL

## 2020-11-17 PROCEDURE — 74019 RADEX ABDOMEN 2 VIEWS: CPT

## 2020-11-17 PROCEDURE — 250N000013 HC RX MED GY IP 250 OP 250 PS 637: Mod: GY | Performed by: PHYSICIAN ASSISTANT

## 2020-11-17 PROCEDURE — 250N000013 HC RX MED GY IP 250 OP 250 PS 637: Mod: GY | Performed by: FAMILY MEDICINE

## 2020-11-17 PROCEDURE — 99285 EMERGENCY DEPT VISIT HI MDM: CPT | Performed by: PHYSICIAN ASSISTANT

## 2020-11-17 PROCEDURE — G0378 HOSPITAL OBSERVATION PER HR: HCPCS

## 2020-11-17 PROCEDURE — 99285 EMERGENCY DEPT VISIT HI MDM: CPT | Mod: 25 | Performed by: PHYSICIAN ASSISTANT

## 2020-11-17 PROCEDURE — U0003 INFECTIOUS AGENT DETECTION BY NUCLEIC ACID (DNA OR RNA); SEVERE ACUTE RESPIRATORY SYNDROME CORONAVIRUS 2 (SARS-COV-2) (CORONAVIRUS DISEASE [COVID-19]), AMPLIFIED PROBE TECHNIQUE, MAKING USE OF HIGH THROUGHPUT TECHNOLOGIES AS DESCRIBED BY CMS-2020-01-R: HCPCS | Performed by: PHYSICIAN ASSISTANT

## 2020-11-17 PROCEDURE — 73502 X-RAY EXAM HIP UNI 2-3 VIEWS: CPT

## 2020-11-17 PROCEDURE — C9803 HOPD COVID-19 SPEC COLLECT: HCPCS | Performed by: PHYSICIAN ASSISTANT

## 2020-11-17 RX ORDER — HYDROCODONE BITARTRATE AND ACETAMINOPHEN 5; 325 MG/1; MG/1
1-2 TABLET ORAL EVERY 4 HOURS PRN
Status: DISCONTINUED | OUTPATIENT
Start: 2020-11-17 | End: 2020-11-18 | Stop reason: HOSPADM

## 2020-11-17 RX ORDER — ACETAMINOPHEN 325 MG/1
650 TABLET ORAL EVERY 6 HOURS PRN
Status: DISCONTINUED | OUTPATIENT
Start: 2020-11-17 | End: 2020-11-18 | Stop reason: HOSPADM

## 2020-11-17 RX ORDER — ONDANSETRON 2 MG/ML
4 INJECTION INTRAMUSCULAR; INTRAVENOUS EVERY 6 HOURS PRN
Status: DISCONTINUED | OUTPATIENT
Start: 2020-11-17 | End: 2020-11-18 | Stop reason: HOSPADM

## 2020-11-17 RX ORDER — HYDROCODONE BITARTRATE AND ACETAMINOPHEN 5; 325 MG/1; MG/1
1 TABLET ORAL EVERY 6 HOURS PRN
Status: DISCONTINUED | OUTPATIENT
Start: 2020-11-17 | End: 2020-11-18

## 2020-11-17 RX ORDER — ONDANSETRON 4 MG/1
4 TABLET, ORALLY DISINTEGRATING ORAL EVERY 6 HOURS PRN
Status: DISCONTINUED | OUTPATIENT
Start: 2020-11-17 | End: 2020-11-18 | Stop reason: HOSPADM

## 2020-11-17 RX ORDER — NALOXONE HYDROCHLORIDE 0.4 MG/ML
.1-.4 INJECTION, SOLUTION INTRAMUSCULAR; INTRAVENOUS; SUBCUTANEOUS
Status: DISCONTINUED | OUTPATIENT
Start: 2020-11-17 | End: 2020-11-18 | Stop reason: HOSPADM

## 2020-11-17 RX ADMIN — ACETAMINOPHEN 650 MG: 325 TABLET, FILM COATED ORAL at 21:15

## 2020-11-17 RX ADMIN — HYDROCODONE BITARTRATE AND ACETAMINOPHEN 1 TABLET: 5; 325 TABLET ORAL at 18:27

## 2020-11-17 ASSESSMENT — ENCOUNTER SYMPTOMS
LIGHT-HEADEDNESS: 0
FATIGUE: 0
WHEEZING: 0
NAUSEA: 0
CHEST TIGHTNESS: 0
ACTIVITY CHANGE: 0
DIARRHEA: 0
FACIAL ASYMMETRY: 0
FEVER: 0
FREQUENCY: 0
SPEECH DIFFICULTY: 0
STRIDOR: 0
TREMORS: 0
ABDOMINAL PAIN: 0
COUGH: 0
APPETITE CHANGE: 0
NECK PAIN: 0
COLOR CHANGE: 0
CONSTIPATION: 0
SORE THROAT: 0
DYSURIA: 0
RHINORRHEA: 0
SHORTNESS OF BREATH: 0
DIZZINESS: 0
VOMITING: 0
SEIZURES: 0
EYE PAIN: 0
BACK PAIN: 0
TROUBLE SWALLOWING: 0
FACIAL SWELLING: 0
WEAKNESS: 1
NECK STIFFNESS: 0
HEADACHES: 0

## 2020-11-17 NOTE — ED TRIAGE NOTES
Patient presents to the ED via EMS post fall.  Patient has sustained multiple falls over the past month including yesterday in which she was seen in the ED and diagnosed with a compression fracture.  Patient was home with her daughter when her daughter left for 10mins and returned to find her mother sitting on the floor next to her bed.  Patients daughter is avidly looking into nursing home placement for patient due to increased number of falls. 50mcg fentanyl given in route.  Patient is A&O on arrival, did not hit her head.

## 2020-11-17 NOTE — TELEPHONE ENCOUNTER
Mom has ER Follow up but daughter is seeing if you can send in for pain medication for her-only on tylenol---please call her today

## 2020-11-17 NOTE — TELEPHONE ENCOUNTER
Patient is on the floor in the bathroom. Patient had tylenol but is not having relief. Patient fell and hurt left hip. Patient states she has had numerous falls  Recently.  Patient uses [facility].   Spoke with daughter. She had just left patients house to go home and states that she is only 1 block away. She will go to help her off the floor as we were speaking.  Please advise on the pain medication. Val Cardenas LPN .............11/17/2020  3:44 PM

## 2020-11-17 NOTE — TELEPHONE ENCOUNTER
Patient slipped off side of bed and sitting on floor has a cut that is bleeding but is fine.   Isrrael will go to independent living tomorrow.   Daughter will be calling the ambulance to have her go to there ER be evaluated. Val Cardenas LPN .............11/17/2020  3:58 PM

## 2020-11-17 NOTE — ED NOTES
pts daughter called, states Yulissa Lathamlouise was consulted earlier today about pts recent falls. Yulissa and daughter want pt placed in nursing home [facility]. Pt is scheduled to go to [facility] assisted living tomorrow but daughter feels that she needs more care than the assisted living. Provider notified

## 2020-11-17 NOTE — TELEPHONE ENCOUNTER
If having frequent falls then a narcotic pain medication is not recommended as it can cause lightheadedness, dizziness, confusion, etc.  It sounds as though patient had a fall and is waiting for daughter to get there to help her off the floor.  She may need to be evaluated in the emergency room if there is any injury from this fall.  Looks like she was in the emergency department yesterday and had a compression fracture.  I would recommend that she return back to the emergency department as she may need placement today in skilled nursing facility if work-up from fall is negative for new fractures

## 2020-11-18 VITALS
OXYGEN SATURATION: 95 % | DIASTOLIC BLOOD PRESSURE: 73 MMHG | SYSTOLIC BLOOD PRESSURE: 130 MMHG | TEMPERATURE: 98.4 F | HEART RATE: 72 BPM | RESPIRATION RATE: 18 BRPM | BODY MASS INDEX: 20.99 KG/M2 | WEIGHT: 114.77 LBS

## 2020-11-18 PROCEDURE — 250N000013 HC RX MED GY IP 250 OP 250 PS 637: Performed by: PHYSICIAN ASSISTANT

## 2020-11-18 PROCEDURE — 99234 HOSP IP/OBS SM DT SF/LOW 45: CPT | Performed by: FAMILY MEDICINE

## 2020-11-18 PROCEDURE — G0378 HOSPITAL OBSERVATION PER HR: HCPCS

## 2020-11-18 RX ADMIN — HYDROCODONE BITARTRATE AND ACETAMINOPHEN 1 TABLET: 5; 325 TABLET ORAL at 08:14

## 2020-11-18 NOTE — PHARMACY-ADMISSION MEDICATION HISTORY
Pharmacy -- Admission Medication Reconciliation IN PROGRESS    Prior to admission (PTA) medications were reviewed.    Sources Consulted: [employer], chart notes  Called patient x4  Called nurse x2    The reliability of this Medication Reconciliation is: Reliability: Borderline reliable    The following significant changes were made:  none    In addition, the patient's allergies were reviewed with the patient's records and left and updated as follows:   Allergies: Celebrex [celecoxib] and Tolterodine    The pharmacist unable to speak with patient    Reva Moreno [facility], 11/18/2020,  12:27 PM

## 2020-11-18 NOTE — PLAN OF CARE
Problem: Adult Inpatient Plan of Care  Goal: Readiness for Transition of Care  Outcome: Improving     Problem: Adult Inpatient Plan of Care  Goal: Optimal Comfort and Wellbeing  Outcome: Improving   Patient is sleeping/resting comfortably. No complaints of pain at this time. VSS. Will continue to monitor.

## 2020-11-18 NOTE — PROGRESS NOTES
NS ADMISSION NOTE    Patient admitted to room 359 at approximately 2010 via wheel chair from emergency room. Patient was accompanied by nurse.     Verbal SBAR report received from Camille prior to patient arrival.     Patient ambulated to bed with one assist. Patient alert and oriented X 3. Pain is controlled with current analgesics.  Medication(s) being used: acetaminophen. 0-10 Pain Scale: 8(abdomen, declines intervention). Admission vital signs: Blood pressure 132/78, pulse 63, temperature 97.5  F (36.4  C), temperature source Tympanic, resp. rate 16, SpO2 95 %, not currently breastfeeding. Patient was oriented to plan of care, call light, bed controls, tv, telephone, bathroom and visiting hours.     Risk Assessment    The following safety risks were identified during admission: fall. Yellow risk band applied: YES.     Skin Initial Assessment    This writer admitted this patient and completed a full skin assessment and Nicholas score in the Adult PCS flowsheet. Appropriate interventions initiated as needed.     Nicholas Risk Assessment  Sensory Perception: 4-->no impairment  Moisture: 3-->occasionally moist  Activity: 3-->walks occasionally  Mobility: 3-->slightly limited  Nutrition: 3-->adequate  Friction and Shear: 2-->potential problem  Nicholas Score: 18  Nicholas Intervention(s) Implemented: draw sheets, encouraged fluids    Education    Patient has a Michie to Observation order: Yes  Observation education completed and documented: Yes      Tyler Mckenna RN

## 2020-11-18 NOTE — PLAN OF CARE
Problem: Adult Inpatient Plan of Care  Goal: Optimal Comfort and Wellbeing  11/18/2020 0422 by Tyler Mckenna RN  Outcome: Improving     Problem: Adult Inpatient Plan of Care  Goal: Readiness for Transition of Care  11/18/2020 0422 by Tyler Mckenna RN  Outcome: Improving     Problem: Pain Acute  Goal: Acceptable Pain Control and Functional Ability  Outcome: Improving     Problem: Fall Injury Risk  Goal: Absence of Fall and Fall-Related Injury  Outcome: No Change   Patient continues to sleep/rest peacefully. No complaints at this time. Vitals remain stable. Will continue to monitor.

## 2020-11-18 NOTE — PROGRESS NOTES
Called nurse to nurse report to Aundrea HART at [facility]. She was given the opportunity to ask questions and verbalized her understanding even though She Aundrea stated she was not aware of her admit. She will call with further questions.

## 2020-11-18 NOTE — DISCHARGE SUMMARY
[facility]    Discharge Summary  Hospitalist    Date of Admission:  11/17/2020  Date of Discharge:  11/18/2020  Discharging Provider: Zhen Bhagat  Date of Service (when I saw the patient): 11/18/20    Discharge Diagnoses   Active Problems:    Falls frequently (11/17/2020)    Contusion of right hip, initial encounter (11/17/2020)    General unsteadiness (11/17/2020)    Closed wedge compression fracture of T9 vertebra with routine healing, subsequent encounter (11/17/2020)    History of 2019 novel coronavirus disease (COVID-19) (11/17/2020)      History of Present Illness   Geno Horn is an 89 year old female who presented with increasing falls at home.  She recently developed a T9 cmpression fracture  She was actually scheulded for admission to [facility] today but last night family felt that she was unable to stay at home until then.    Hospital Course   Geno Horn was admitted on 11/17/2020.  The following problems were addressed during her hospitalization:    Frequent falls   Recommend PT/OT at [facility]    Compression fracture   Pain control with tylenol has been adequate    Covid-19   Originally diagnosed on 10-   Through her infectious period    CAD, HTN, hypothyroidism   All stable, no changes to her regimen    Zhen Bhagat    Significant Results and Procedures   Results for orders placed or performed during the hospital encounter of 11/17/20   XR Abdomen 2 Views     Status: None    Narrative    PROCEDURE: XR ABDOMEN 2 VW 11/17/2020 5:44 PM    HISTORY: pain    COMPARISONS: None.    TECHNIQUE: Flat and upright    FINDINGS: The intestinal gas pattern is normal. There is no  extraluminal gas or pathologic intra-abdominal calcifications.  Thoracolumbar scoliosis is noted. There is a T9 compression fracture          Impression    IMPRESSION: Normal abdominal gas pattern    FLORIAN PENDLETON MD   XR Hip Right 2-3 Views     Status: None    Narrative    PROCEDURE:  XR HIP RIGHT 2-3 VIEWS  pelvis one view    HISTORY: fall    COMPARISON:  None.    TECHNIQUE:  2 views of the right hip were obtained. One view of the  pelvis was obtained.    FINDINGS:  Pelvis: The pelvis is intact. The sacrum is intact.  Degenerative changes are seen in the right sacroiliac joint. There is  a hip prosthesis in place on the left.    Right hip 2 views: The right hip prosthesis is in place. Prosthetic  elements appear well seated. No fracture or destructive lesion is  noted.       Impression    IMPRESSION: Hip prosthesis in place      FLORIAN PENDLETON MD        Pending Results     Unresulted Labs Ordered in the Past 30 Days of this Admission     Date and Time Order Name Status Description    11/17/2020 1922 Asymptomatic COVID-19 Virus (Coronavirus) by PCR In process           Code Status   Full Code       Primary Care Physician   Abundio Rendon    Physical Exam   Temp: 98.4  F (36.9  C) Temp src: Tympanic BP: 130/73 Pulse: 72   Resp: 18 SpO2: 95 % O2 Device: None (Room air)    Vitals:    11/18/20 0300   Weight: 52.1 kg (114 lb 12.3 oz)     Vital Signs with Ranges  Temp:  [97.5  F (36.4  C)-98.4  F (36.9  C)] 98.4  F (36.9  C)  Pulse:  [62-82] 72  Resp:  [16-18] 18  BP: (130-172)/(70-99) 130/73  SpO2:  [93 %-99 %] 95 %  No intake/output data recorded.    See todays exam    Discharge Disposition   Discharged to nursing home  Condition at discharge: Stable    Consultations This Hospital Stay   PHYSICAL THERAPY ADULT IP CONSULT  SOCIAL WORK IP CONSULT  PHYSICAL THERAPY ADULT IP CONSULT  OCCUPATIONAL THERAPY ADULT IP CONSULT    Time Spent on this Encounter   Zhen PEARCE, personally saw the patient today and spent less than or equal to 30 minutes discharging this patient.    Discharge Orders      General info for SNF    Length of Stay Estimate: Long Term Care  Condition at Discharge: Stable  Level of care:skilled   Rehabilitation Potential: Fair  Admission H&P remains valid and up-to-date: Yes  Recent Chemotherapy: N/A  Use  Nursing Home Standing Orders: Yes     Mantoux instructions    Give two-step Mantoux (PPD) Per Facility Policy Yes     Reason for your hospital stay    Frequent falls, T9 compression fracture (new)     Activity - Up with assistive device     Physical Therapy Adult Consult    Evaluate and treat as clinically indicated.    Reason:  Frequent falls     Occupational Therapy Adult Consult    Evaluate and treat as clinically indicated.    Reason:  Frequent falls     Fall precautions     Discharge Medications   Current Discharge Medication List      CONTINUE these medications which have NOT CHANGED    Details   acetaminophen (TYLENOL) 500 MG tablet Take 500 mg by mouth every 6 hours as needed for mild pain      cetirizine (ZYRTEC) 10 MG tablet Take 10 mg by mouth daily as needed      diltiazem ER (DILT-XR) 180 MG 24 hr capsule Take 1 capsule (180 mg) by mouth daily  Qty: 90 capsule, Refills: 3    Comments: Increase dose back to previous  Associated Diagnoses: Essential hypertension      DULoxetine (CYMBALTA) 20 MG capsule Take 1 capsule (20 mg) by mouth daily  Qty: 30 capsule, Refills: 3    Associated Diagnoses: Spinal stenosis of lumbar region, unspecified whether neurogenic claudication present; Dysthymia      fish oil-omega-3 fatty acids 1000 MG capsule Take 1 capsule by mouth daily      Incontinence Supply Disposable (POISE PAD) PADS 1 Units daily  Qty: 30 each, Refills: 11    Associated Diagnoses: Mixed stress and urge urinary incontinence      levothyroxine (SYNTHROID/LEVOTHROID) 88 MCG tablet TAKE 1 TABLET (88 MCG) BY MOUTH DAILY  Qty: 90 tablet, Refills: 3    Associated Diagnoses: Hypothyroidism, unspecified type      loratadine (CLARITIN) 10 MG tablet Take 10 mg by mouth daily as needed      multivitamin, therapeutic (THERA-VIT) TABS tablet Take 1 tablet by mouth daily      !! order for DME Equipment being ordered: Spikes for cane  Qty: 1 Units, Refills: 1    Associated Diagnoses: Unsteady gait      !! order for DME  Equipment being ordered: plastic underpants  Qty: 1 Units, Refills: 1    Associated Diagnoses: Urinary incontinence, unspecified type      rivaroxaban ANTICOAGULANT (XARELTO ANTICOAGULANT) 10 MG TABS tablet Take 1 tablet (10 mg) by mouth daily (with dinner)  Qty: 30 tablet, Refills: 0    Associated Diagnoses: COVID-19 virus infection      sodium chloride (OCEAN) 0.65 % nasal spray Spray 2 sprays into both nostrils daily as needed for congestion       !! - Potential duplicate medications found. Please discuss with provider.        Allergies   Allergies   Allergen Reactions     Celebrex [Celecoxib] Other (See Comments)     Light headed     Tolterodine      Other reaction(s): Intolerance-Can't Take  Was unable to sleep at night     Data   Most Recent 3 CBC's:  Recent Labs   Lab Test 11/16/20  0800 11/05/20  0601 11/04/20  0545   WBC 7.3 5.4 3.7*   HGB 10.1* 10.6* 11.1*   MCV 98 95 95    363 316      Most Recent 3 BMP's:  Recent Labs   Lab Test 11/16/20  0800 11/05/20  0601 11/04/20  0545 11/02/20  0545 11/02/20  0545   *  --  129*  --  129*  129*   POTASSIUM 3.7 3.4* 3.5   < > 3.3*  3.3*   CHLORIDE 93*  --  97*  --  97*  97*   CO2 28  --  27  --  23  24   BUN 10  --  9  --  10  10   CR 0.49*  --  0.46*  --  0.45*  0.47*   ANIONGAP 7  --  5  --  9  8   BARB 8.9  --  8.0*  --  7.8*  7.9*     --  97  --  86  89    < > = values in this interval not displayed.     Most Recent 2 LFT's:  Recent Labs   Lab Test 11/02/20  0545 10/29/20  1108   AST 29 28   ALT 16 17   ALKPHOS 57 62   BILITOTAL 0.3 0.3     Most Recent INR's and Anticoagulation Dosing History:  Anticoagulation Dose History     Recent Dosing and Labs Latest Ref Rng & Units 6/17/2019 11/1/2020 11/2/2020 11/3/2020 11/4/2020 11/5/2020    INR 0.86 - 1.14 1.10 0.96 1.08 1.14 1.12 1.13        Most Recent 3 Troponin's:  Recent Labs   Lab Test 06/17/19  0750   TROPI 0.039*     Most Recent Cholesterol Panel:No lab results found.  Most Recent 6  Bacteria Isolates From Any Culture (See EPIC Reports for Culture Details):  Recent Labs   Lab Test 11/01/20  1454 07/03/19  1555 06/17/19  1845 06/17/19  0750   CULT No growth after 6 days  No growth after 6 days 10,000 to 50,000 colonies/mL  Escherichia coli  * 10,000 to 50,000 colonies/mL  mixed urogenital kojo   Gram negative rods  SEE OTHER BLOOD CULTURE FOR IDENTIFICATION AND SENITIVITIES  *  4 BOTTLES OUT OF 4 BOTTLES ARE POSITIVE  Escherichia coli*  4 BOTTLES OUT OF 4 BOTTLES ARE POSITIVE     Most Recent TSH, T4 and A1c Labs:  Recent Labs   Lab Test 01/23/19  1304   T4 0.61     Results for orders placed or performed during the hospital encounter of 11/17/20   XR Abdomen 2 Views    Narrative    PROCEDURE: XR ABDOMEN 2 VW 11/17/2020 5:44 PM    HISTORY: pain    COMPARISONS: None.    TECHNIQUE: Flat and upright    FINDINGS: The intestinal gas pattern is normal. There is no  extraluminal gas or pathologic intra-abdominal calcifications.  Thoracolumbar scoliosis is noted. There is a T9 compression fracture          Impression    IMPRESSION: Normal abdominal gas pattern    FLORIAN PENDLETON MD   XR Hip Right 2-3 Views    Narrative    PROCEDURE:  XR HIP RIGHT 2-3 VIEWS pelvis one view    HISTORY: fall    COMPARISON:  None.    TECHNIQUE:  2 views of the right hip were obtained. One view of the  pelvis was obtained.    FINDINGS:  Pelvis: The pelvis is intact. The sacrum is intact.  Degenerative changes are seen in the right sacroiliac joint. There is  a hip prosthesis in place on the left.    Right hip 2 views: The right hip prosthesis is in place. Prosthetic  elements appear well seated. No fracture or destructive lesion is  noted.       Impression    IMPRESSION: Hip prosthesis in place      FLORIAN PENDLETON MD

## 2020-11-18 NOTE — PROGRESS NOTES
:    Called patient's daughter, Maria Antonia to discuss discharge plans. She reports that they were in the process of moving patient into [facility] [place] yesterday, but patient continued to fall. Maria Antonia would like patient to discharge to a SNF for short term rehab prior to moving into [facility] [place]. Provided options and Maria Antonia selected [facility].     Faxed referral to [facility]. Called Iona at [facility] and left voicemail notifying her of referral. Possible discharge today. Requested call back.     Pt/family was given the Medicare Compare list for SNF, with associated star ratings to assist with choice for referrals/discharge planning Yes    Education was given to pt/family that star ratings are updated/maintained by Medicare and can be reviewed by visiting www.medicare.gov Yes     will continue to follow.     SHANICE Guerrero on 11/18/2020 at 8:27 AM    Addendum:    Received call from Iona at [facility]. She reports that they can accept patient today, if patient is ready to discharge. She does not think they have transportation available today but will check on this.     Called patient's daughter, Maria Antonia and provided update. Maria Antonia reports that if [facility] does not have transportation and patient is not in too much pain, she would be willing to transport patient to [facility].     Provided update to RN.     Waiting to hear back from [facility] regarding potential transportation for today.     SHANICE Guerrero on 11/18/2020 at 11:13 AM    Addendum:    PAS completed and faxed to [facility]. The confirmation number is [id number].    SHANICE Guerrero on 11/18/2020 at 11:27 AM    Addendum:    Received call from Iona at [facility]. They do not have transportation today.     Called patient's daughterMaria Antonia and provided update. Maria Antonia reports that she can transport patient at 1400. She will call RN station when she is outside.      Called Iona at [facility] and provided update with discharge time.     Provided update to RN.     Patient had [facility]. Called Zeina at [facility] and left voicemail with discharge update.     Called Erica at [facility] and provided discharge update.     No further needs at this time.     SHANICE Guerrero on 11/18/2020 at 12:05 PM

## 2020-11-18 NOTE — PROGRESS NOTES
Patient is alert and orientated, but at times is forgetful and confused. Complained of back pain, prn tylenol given. Hard of hearing, bilat hearing aides. Lungs clear. Heart regular. Right shin scab with partial pink wound bed visible. High falls risk, ipad present and bed alarm on. C/o dizziness. VSS. Assist of one with walker. Will continue to monitor.     BP (!) 145/78   Pulse 63   Temp 97.8  F (36.6  C) (Tympanic)   Resp 16   Wt 52.1 kg (114 lb 12.3 oz)   SpO2 93%   BMI 20.99 kg/m

## 2020-11-18 NOTE — PLAN OF CARE
Pt complained of back pain was given 1 narco and has slept since then. She is afebrile, orientated. LCTAB, HRRR, Abdomen soft non tender, BS active. She is an assist of 2 and walker with gait belt for very short distances. Maintain comfort and safety. Expect pt going to [facility] this afternoon. Daughter transporting. /73   Pulse 72   Temp 98.4  F (36.9  C) (Tympanic)   Resp 18   Wt 52.1 kg (114 lb 12.3 oz)   SpO2 95%   BMI 20.99 kg/m

## 2020-11-18 NOTE — H&P
[facility]    History and Physical  Hospitalist       Date of Admission:  11/17/2020    Assessment & Plan   Geno Horn is a 89 year old female who presents with weakness    Active Problems:    Falls frequently    Assessment: chronic    Plan: PT,  to aid in placement.  Likely [facility] today?      Contusion of right hip, initial encounter    Assessment: acute, POA    Plan: symptom relief      General unsteadiness    Assessment: chronic    Plan: PT      Closed wedge compression fracture of T9 vertebra with routine healing, subsequent encounter    Assessment: recent    Plan: pain control, PT      History of 2019 novel coronavirus disease (COVID-19)    Assessment: reovered    Plan: none    DVT Prophylaxis: Low Risk/Ambulatory with no VTE prophylaxis indicated  Code Status: Full Code    Zhen Bhagat    Primary Care Physician   Fransisca Rocha    Chief Complaint   weakness    History is obtained from the patient and chart review.    History of Present Illness   Geno Horn is a 89 year old female who presents with weakness.  She was recently hospitalized with COVID and discharged.  She has had a history of numerous falls at home.  Was seen on 11- where she was found to have a T9 compression fracture that is new.  She was planning on being admitted to [facility] but she continued to fall at home and was brought to the ED.    Past Medical History    I have reviewed this patient's medical history and updated it with pertinent information if needed.   Past Medical History:   Diagnosis Date     Hypertension      Hypothyroid      Osteoarthritis      ST elevation (STEMI) myocardial infarction (H)     1981; angioplasty       Past Surgical History   I have reviewed this patient's surgical history and updated it with pertinent information if needed.  Past Surgical History:   Procedure Laterality Date     ARTHROPLASTY HIP Right     Dr Rodriguez     ARTHROPLASTY HIP Left 2015      Michael     COMBINED CYSTOSCOPY, URETEROSCOPY, LASER HOLMIUM LITHOTRIPSY URETER(S) Right 2019    Procedure: CYSTOSCOPY, RIGHT STENT REMOVAL, URETEROSCOPY, LASER HOLMIUM LITHOTRIPSY RIGHT URETER WITH STENT REPLACEMENT;  Surgeon: Shawn Cole MD;  Location:  OR     CYSTOSCOPY, RETROGRADES, INSERT STENT URETER(S), COMBINED Right 2019    Procedure: CYSTOSCOPY, WITH RETROGRADE PYELOGRAM AND URETERAL STENT INSERTION;  Surgeon: Shawn Cole MD;  Location:  OR       Prior to Admission Medications   Prior to Admission Medications   Prescriptions Last Dose Informant Patient Reported? Taking?   DULoxetine (CYMBALTA) 20 MG capsule 2020 at Unknown time  No Yes   Sig: Take 1 capsule (20 mg) by mouth daily   Incontinence Supply Disposable (POISE PAD) PADS 2020 at Unknown time Self No Yes   Si Units daily   acetaminophen (TYLENOL) 500 MG tablet 2020 at Unknown time Self Yes Yes   Sig: Take 500 mg by mouth every 6 hours as needed for mild pain   cetirizine (ZYRTEC) 10 MG tablet 2020 at Unknown time  Yes Yes   Sig: Take 10 mg by mouth daily as needed   diltiazem ER (DILT-XR) 180 MG 24 hr capsule 2020 at Unknown time Self No Yes   Sig: Take 1 capsule (180 mg) by mouth daily   fish oil-omega-3 fatty acids 1000 MG capsule 2020 at Unknown time  Yes Yes   Sig: Take 1 capsule by mouth daily   levothyroxine (SYNTHROID/LEVOTHROID) 88 MCG tablet 2020 at Unknown time Self No Yes   Sig: TAKE 1 TABLET (88 MCG) BY MOUTH DAILY   loratadine (CLARITIN) 10 MG tablet 2020 at Unknown time  Yes Yes   Sig: Take 10 mg by mouth daily as needed   multivitamin, therapeutic (THERA-VIT) TABS tablet 2020 at Unknown time Self Yes Yes   Sig: Take 1 tablet by mouth daily   order for DME 2020 at Unknown time Self No Yes   Sig: Equipment being ordered: Spikes for cane   order for DME 2020 at Unknown time Self No Yes   Sig: Equipment being ordered: plastic underpants   rivaroxaban  ANTICOAGULANT (XARELTO ANTICOAGULANT) 10 MG TABS tablet 2020 at Unknown time  No Yes   Sig: Take 1 tablet (10 mg) by mouth daily (with dinner)   sodium chloride (OCEAN) 0.65 % nasal spray 2020 at Unknown time Self Yes Yes   Sig: Spray 2 sprays into both nostrils daily as needed for congestion      Facility-Administered Medications: None     Allergies   Allergies   Allergen Reactions     Celebrex [Celecoxib] Other (See Comments)     Light headed     Tolterodine      Other reaction(s): Intolerance-Can't Take  Was unable to sleep at night       Social History   I have reviewed this patient's social history and updated it with pertinent information if needed. Geno Horn  reports that she has never smoked. She has never used smokeless tobacco. She reports previous alcohol use. She reports that she does not use drugs.    Family History   I have reviewed this patient's family history and updated it with pertinent information if needed.   Family History   Problem Relation Age of Onset     Heart Disease Mother 60         of mi     Heart Disease Father 60         of mi     Heart Disease Brother      Heart Disease Brother        Review of Systems     REVIEW OF SYSTEMS:    Constitutional: normal energy and appetite, no recent sick contacts  Eyes: no changes in vision  Ears, nose, mouth, throat, and face: no mouth sores, dysphagia, or odynophagia  Respiratory: no shortness of breath, cough, or wheezing. No aspiration symptoms.   Cardiovascular: no chest pain, palpitations, orthopnea, increased lower extremity edema, or syncope.   Gastrointestinal: no constipation, diarrhea, nausea, vomiting or abdominal pain.  Genitourinary: no dysuria, hematuria, urgency or frequency.   Hematologic/lymphatic: no unintentional weight loss or night sweats.  Musculoskeletal: no pain to extremities or falls.   Neurological: no new weakness, tingling, numbness.   Psychiatric: no hallucinations ordelusions.    Physical Exam    Temp: 97.8  F (36.6  C) Temp src: Tympanic BP: (!) 145/78 Pulse: 63   Resp: 16 SpO2: 93 % O2 Device: None (Room air)    Vital Signs with Ranges  Temp:  [97.5  F (36.4  C)-98.3  F (36.8  C)] 97.8  F (36.6  C)  Pulse:  [62-82] 63  Resp:  [16-18] 16  BP: (132-172)/(70-99) 145/78  SpO2:  [93 %-99 %] 93 %  114 lbs 12.34 oz    Constitutional: awakes easily, alert, does not appear to be in pain  HEENT: MMM  Respiratory: clear  Cardiovascular: regular  GI: soft, NT, ND  Skin: no rash  Musculoskeletal: no pedal edema  Psychiatric: no hallucinations, normal mentation    Data   Data reviewed today:   Recent Labs   Lab 11/16/20  0800   WBC 7.3   HGB 10.1*   MCV 98      *   POTASSIUM 3.7   CHLORIDE 93*   CO2 28   BUN 10   CR 0.49*   ANIONGAP 7   BARB 8.9          Recent Results (from the past 24 hour(s))   XR Hip Right 2-3 Views    Narrative    PROCEDURE:  XR HIP RIGHT 2-3 VIEWS pelvis one view    HISTORY: fall    COMPARISON:  None.    TECHNIQUE:  2 views of the right hip were obtained. One view of the  pelvis was obtained.    FINDINGS:  Pelvis: The pelvis is intact. The sacrum is intact.  Degenerative changes are seen in the right sacroiliac joint. There is  a hip prosthesis in place on the left.    Right hip 2 views: The right hip prosthesis is in place. Prosthetic  elements appear well seated. No fracture or destructive lesion is  noted.       Impression    IMPRESSION: Hip prosthesis in place      FLORIAN PENDLETON MD   XR Abdomen 2 Views    Narrative    PROCEDURE: XR ABDOMEN 2 VW 11/17/2020 5:44 PM    HISTORY: pain    COMPARISONS: None.    TECHNIQUE: Flat and upright    FINDINGS: The intestinal gas pattern is normal. There is no  extraluminal gas or pathologic intra-abdominal calcifications.  Thoracolumbar scoliosis is noted. There is a T9 compression fracture          Impression    IMPRESSION: Normal abdominal gas pattern    FLORIAN PENDLETON MD

## 2020-11-18 NOTE — PHARMACY - DISCHARGE MEDICATION RECONCILIATION
Pharmacy:  Discharge Counseling and Medication Reconciliation    Geno Horn  [address]   [facility] [address]  [phone] (home)   89 year old female  PCP: Abundio Rendon    Allergies: Celebrex [celecoxib] and Tolterodine    Discharge Counseling:    Pharmacist met with patient (and/or family) today to review the medication portion of the After Visit Summary (with an emphasis on NEW medications) and to address patient's questions/concerns.    Summary of Education: None. No medication changes and patient is discharging to [facility].     Materials Provided:  MedCounselor sheets printed from Clinical Pharmacology on: NA    Discharge Medication Reconciliation:    It has been determined that the patient has an adequate supply of medications available or which can be obtained from the patient's preferred pharmacy.    Thank you for the consult.    Reji Guzmán RPH........November 18, 2020 1:25 PM

## 2020-11-18 NOTE — ED NOTES
Observation discussed with daughter over the phone. Verbalized understanding, no questions at this time.

## 2020-11-19 ENCOUNTER — PATIENT OUTREACH (OUTPATIENT)
Dept: CARE COORDINATION | Facility: CLINIC | Age: 85
End: 2020-11-19

## 2020-11-19 LAB
LABORATORY COMMENT REPORT: NORMAL
SARS-COV-2 RNA SPEC QL NAA+PROBE: NEGATIVE
SARS-COV-2 RNA SPEC QL NAA+PROBE: NORMAL
SPECIMEN SOURCE: NORMAL
SPECIMEN SOURCE: NORMAL

## 2020-11-19 NOTE — PROGRESS NOTES
Transitional Care Management Phone Call    Summary of hospitalization:  [facility] discharge summary reviewed    DISCHARGE DIAGNOSIS: Frequent falls, T9 compression fracture    DATE OF DISCHARGE: 11-    Diagnostic Tests/Treatments reviewed.  Follow up needed: none    Post Discharge Medication Reconciliation: discharge medications reconciled, continue medications without change    Medications reviewed by: by myself    Problems taking medications regularly:  None    Problems adhering to non-medication therapy:  None    Other Healthcare Providers Involved in Patient s Care:         Physical Therapy / OT with [facility]    Update since discharge: improved. Does complain of moderate pain on R side, hip area, most likely related to falls prior to admission. No bruising seen. Nurse is offering ice and tylenol, patent has asked for more prior to every 6 hours as ordered. Will let us or rounding NP know if help with pain is needed, or it affects PT/OT.     Patient adjusting quite well per Aundrea justin. Pleasant, has used call light. Fall risk, but she hasn't tried to get up on own so far. 2 person transfer. Eating/drinking small amounts.     Plan of care communicated with other healthcare provider, Unit [address] nurseAundrea    Just a friendly reminder that you appointment is   Next 5 appointments (look out 90 days)    Dec 08, 2020  9:40 AM  SHORT with Fransisca Rocha NP  [facility] ([facility]) [address]  [facility] [id number] [phone]      .  We encourage you to keep this appointment.    Please remember to bring all of your pills in their bottles (including any vitamins or over the counter pills) with you to your appointment.   The patient indicates understanding of these issues and agrees with the plan of care.   Yes  Will follow up with Yulissa Rocha, most likely in facility per nurse.     Was the patient contacted  within the 2 business days or other approved timeframe?  Yes     Was the Medication reconciliation and management done since the patient was discharged? Yes    Karen Jett RN  11/19/2020 10:30 AM

## 2020-11-20 ENCOUNTER — VIRTUAL VISIT (OUTPATIENT)
Dept: INTERNAL MEDICINE | Facility: OTHER | Age: 85
End: 2020-11-20
Attending: NURSE PRACTITIONER
Payer: MEDICARE

## 2020-11-20 VITALS
WEIGHT: 116.3 LBS | DIASTOLIC BLOOD PRESSURE: 64 MMHG | TEMPERATURE: 98 F | BODY MASS INDEX: 21.27 KG/M2 | SYSTOLIC BLOOD PRESSURE: 125 MMHG | HEART RATE: 72 BPM | RESPIRATION RATE: 18 BRPM | OXYGEN SATURATION: 97 %

## 2020-11-20 DIAGNOSIS — S22.070G CLOSED WEDGE COMPRESSION FRACTURE OF T9 VERTEBRA WITH DELAYED HEALING, SUBSEQUENT ENCOUNTER: Primary | ICD-10-CM

## 2020-11-20 DIAGNOSIS — M25.559 HIP PAIN: ICD-10-CM

## 2020-11-20 PROCEDURE — 99441 PR PHYSICIAN TELEPHONE EVALUATION 5-10 MIN: CPT | Performed by: NURSE PRACTITIONER

## 2020-11-20 RX ORDER — HYDROCODONE BITARTRATE AND ACETAMINOPHEN 5; 325 MG/1; MG/1
1 TABLET ORAL EVERY 6 HOURS PRN
Qty: 10 TABLET | Refills: 0 | Status: SHIPPED | OUTPATIENT
Start: 2020-11-20 | End: 2020-11-23

## 2020-11-20 RX ORDER — ACETAMINOPHEN 500 MG
500-1000 TABLET ORAL EVERY 6 HOURS PRN
Qty: 1 BOTTLE | Refills: 0 | Status: SHIPPED | OUTPATIENT
Start: 2020-11-20 | End: 2021-01-22

## 2020-11-20 ASSESSMENT — PAIN SCALES - GENERAL: PAINLEVEL: SEVERE PAIN (6)

## 2020-11-20 NOTE — NURSING NOTE
After proper identification writer spoke with Santiago, patient nurse at the [facility]. They are looking for a medication stronger than a Tylenol because the 500mg she is taking doesn't seem to be helping her pain. With tylenol, patient is reporting being at a 6/10 mid back. No reported new falls.   Magali Vega LPN on 11/20/2020 at 1:34 PM

## 2020-11-20 NOTE — PROGRESS NOTES
"Geno Horn is a 89 year old female who is being evaluated via a billable telephone visit.      The patient has been notified of following:     \"This telephone visit will be conducted via a call between you and your physician/provider. We have found that certain health care needs can be provided without the need for a physical exam.  This service lets us provide the care you need with a short phone conversation.  If a prescription is necessary we can send it directly to your pharmacy.  If lab work is needed we can place an order for that and you can then stop by our lab to have the test done at a later time.    Telephone visits are billed at different rates depending on your insurance coverage. During this emergency period, for some insurers they may be billed the same as an in-person visit.  Please reach out to your insurance provider with any questions.    If during the course of the call the physician/provider feels a telephone visit is not appropriate, you will not be charged for this service.\"    Patient has given verbal consent for Telephone visit?  Yes    What phone number would you like to be contacted at? [phone] Santiago justin    How would you like to obtain your AVS? Mail a copy    Subjective     Geno Horn is a 89 year old female who presents via phone visit today for the following health issues:    HPI     Spoke with nurseSantiago from facility.  Reports patient is status post fall with T9 fracture.  Increased pain in back and right hip.  Initially while in the hospital the Tylenol had been helping but now patient is not getting much relief with the 500 mg of Tylenol that was ordered.  Patient is scheduled to see YARY Duenas on Monday and staff is just wondering if she can get something a little bit stronger over the weekend.    Review of Systems   CONSTITUTIONAL: NEGATIVE for fever, chills, change in weight  ENT/MOUTH: NEGATIVE for ear, mouth and throat problems  RESP: NEGATIVE for significant cough " or SOB  CV: NEGATIVE for chest pain, palpitations or peripheral edema       Objective      Vitals:  No vitals were obtained today due to virtual visit.  Remainder of exam unable to be completed due to telephone visits    Assessment/Plan:    Assessment & Plan     Closed wedge compression fracture of T9 vertebra with delayed healing, subsequent encounter  - HYDROcodone-acetaminophen (NORCO) 5-325 MG tablet; #10 given.  Take 1 tablet by mouth every 6 hours as needed for severe pain Not to exceed 4000 mg of acetaminophen in 24 hours from all sources.  - acetaminophen (TYLENOL) 500 MG tablet; Take 1-2 tablets (500-1,000 mg) by mouth every 6 hours as needed for mild pain Not to exceed 4000 mg of acetaminophen in 24 hours from all sources.    Hip pain  - HYDROcodone-acetaminophen (NORCO) 5-325 MG tablet; #10 given. Take 1 tablet by mouth every 6 hours as needed for severe pain Not to exceed 4000 mg of acetaminophen in 24 hours from all sources.  - acetaminophen (TYLENOL) 500 MG tablet; Take 1-2 tablets (500-1,000 mg) by mouth every 6 hours as needed for mild pain Not to exceed 4000 mg of acetaminophen in 24 hours from all sources.      They should continue to use ice to painful areas and frequent repositioning to assist in pain relief and to prevent skin breakdown.    Return for Keep Scheduled Appointment Already Made.  With Yulissa on Monday.    Iona Duarte NP  [facility]    Phone call duration:  5 minutes

## 2020-11-21 ENCOUNTER — APPOINTMENT (OUTPATIENT)
Dept: CT IMAGING | Facility: OTHER | Age: 85
End: 2020-11-21
Attending: EMERGENCY MEDICINE
Payer: MEDICARE

## 2020-11-21 ENCOUNTER — HOSPITAL ENCOUNTER (EMERGENCY)
Facility: OTHER | Age: 85
End: 2020-11-21
Payer: MEDICARE

## 2020-11-21 ENCOUNTER — HOSPITAL ENCOUNTER (EMERGENCY)
Facility: OTHER | Age: 85
Discharge: HOME | End: 2020-11-21
Attending: EMERGENCY MEDICINE | Admitting: EMERGENCY MEDICINE
Payer: MEDICARE

## 2020-11-21 VITALS
SYSTOLIC BLOOD PRESSURE: 162 MMHG | RESPIRATION RATE: 12 BRPM | HEIGHT: 62 IN | WEIGHT: 120 LBS | TEMPERATURE: 98 F | OXYGEN SATURATION: 97 % | DIASTOLIC BLOOD PRESSURE: 81 MMHG | BODY MASS INDEX: 22.08 KG/M2 | HEART RATE: 61 BPM

## 2020-11-21 DIAGNOSIS — S39.92XA INJURY OF BACK, INITIAL ENCOUNTER: ICD-10-CM

## 2020-11-21 LAB
ANION GAP SERPL CALCULATED.3IONS-SCNC: 10 MMOL/L (ref 3–14)
BASOPHILS # BLD AUTO: 0 10E9/L (ref 0–0.2)
BASOPHILS NFR BLD AUTO: 0.4 %
BUN SERPL-MCNC: 10 MG/DL (ref 7–25)
CALCIUM SERPL-MCNC: 9.4 MG/DL (ref 8.6–10.3)
CHLORIDE SERPL-SCNC: 93 MMOL/L (ref 98–107)
CO2 SERPL-SCNC: 25 MMOL/L (ref 21–31)
CREAT SERPL-MCNC: 0.49 MG/DL (ref 0.6–1.2)
DIFFERENTIAL METHOD BLD: ABNORMAL
EOSINOPHIL # BLD AUTO: 0 10E9/L (ref 0–0.7)
EOSINOPHIL NFR BLD AUTO: 0.3 %
ERYTHROCYTE [DISTWIDTH] IN BLOOD BY AUTOMATED COUNT: 15.9 % (ref 10–15)
GFR SERPL CREATININE-BSD FRML MDRD: >90 ML/MIN/{1.73_M2}
GLUCOSE SERPL-MCNC: 111 MG/DL (ref 70–105)
HCT VFR BLD AUTO: 34.4 % (ref 35–47)
HGB BLD-MCNC: 11.6 G/DL (ref 11.7–15.7)
IMM GRANULOCYTES # BLD: 0 10E9/L (ref 0–0.4)
IMM GRANULOCYTES NFR BLD: 0.5 %
LYMPHOCYTES # BLD AUTO: 1.3 10E9/L (ref 0.8–5.3)
LYMPHOCYTES NFR BLD AUTO: 16.8 %
MCH RBC QN AUTO: 33.1 PG (ref 26.5–33)
MCHC RBC AUTO-ENTMCNC: 33.7 G/DL (ref 31.5–36.5)
MCV RBC AUTO: 98 FL (ref 78–100)
MONOCYTES # BLD AUTO: 0.6 10E9/L (ref 0–1.3)
MONOCYTES NFR BLD AUTO: 7.9 %
NEUTROPHILS # BLD AUTO: 5.5 10E9/L (ref 1.6–8.3)
NEUTROPHILS NFR BLD AUTO: 74.1 %
PLATELET # BLD AUTO: 393 10E9/L (ref 150–450)
POTASSIUM SERPL-SCNC: 3.7 MMOL/L (ref 3.5–5.1)
RBC # BLD AUTO: 3.5 10E12/L (ref 3.8–5.2)
SODIUM SERPL-SCNC: 128 MMOL/L (ref 134–144)
WBC # BLD AUTO: 7.5 10E9/L (ref 4–11)

## 2020-11-21 PROCEDURE — 85025 COMPLETE CBC W/AUTO DIFF WBC: CPT | Performed by: EMERGENCY MEDICINE

## 2020-11-21 PROCEDURE — 36415 COLL VENOUS BLD VENIPUNCTURE: CPT | Performed by: EMERGENCY MEDICINE

## 2020-11-21 PROCEDURE — 255N000002 HC RX 255 OP 636: Performed by: EMERGENCY MEDICINE

## 2020-11-21 PROCEDURE — 72125 CT NECK SPINE W/O DYE: CPT

## 2020-11-21 PROCEDURE — 70450 CT HEAD/BRAIN W/O DYE: CPT

## 2020-11-21 PROCEDURE — 72128 CT CHEST SPINE W/O DYE: CPT | Mod: TC

## 2020-11-21 PROCEDURE — 80048 BASIC METABOLIC PNL TOTAL CA: CPT | Performed by: EMERGENCY MEDICINE

## 2020-11-21 PROCEDURE — 71260 CT THORAX DX C+: CPT

## 2020-11-21 PROCEDURE — 99285 EMERGENCY DEPT VISIT HI MDM: CPT | Mod: 25 | Performed by: EMERGENCY MEDICINE

## 2020-11-21 PROCEDURE — 72131 CT LUMBAR SPINE W/O DYE: CPT | Mod: TC

## 2020-11-21 PROCEDURE — 99283 EMERGENCY DEPT VISIT LOW MDM: CPT | Performed by: EMERGENCY MEDICINE

## 2020-11-21 RX ORDER — IODIXANOL 320 MG/ML
100 INJECTION, SOLUTION INTRAVASCULAR ONCE
Status: COMPLETED | OUTPATIENT
Start: 2020-11-21 | End: 2020-11-21

## 2020-11-21 RX ADMIN — IODIXANOL 100 ML: 320 INJECTION, SOLUTION INTRAVASCULAR at 07:37

## 2020-11-21 ASSESSMENT — MIFFLIN-ST. JEOR: SCORE: 897.57

## 2020-11-21 NOTE — ED PROVIDER NOTES
"Emergency Department Provider Note  : 1926 Age: 89 year old Sex: female MRN: [id number]    Chief Complaint   Patient presents with     Fall     Trauma       Medical Decision Making / Assessment / Plan   89 year old female presenting with fall. Mechanical in nature, no LOC, abnormal vitals, or concerning exam findings. CT pan scan negative for acute fracture; fracture of T9 is subacute. Neuro intact. discharge home.     The patient was informed of the plan and verbalized understanding and agreed with the plan. The patient was given strict return to Emergency Department precautions as well as appropriate follow up instructions. The patient was discharged in stable condition.    Final diagnoses:   Injury of back, initial encounter       Jayson Philippe MD  2020   [facility] Emergency Department    Subjective   Geno is a 89 year old female who presents at  6:56 AM with fall where she had a mechanical \"slide\" out of bed when she slipped. Unknown if hit head, but no LOC, dizziness, chest pain, syncope, or SOB. C/O abdominal pain and low back pain.     I have reviewed the Medications, Allergies, Past Medical and Surgical History, and Social History in the [facility] and with family.    Review of Systems:  Please see HPI for pertinent positives and negatives. All other systems reviewed and found to be negative.      Objective   BP: (!) 162/85  Pulse: 66  Temp: 98  F (36.7  C)  Resp: 19  Height: 157.5 cm (5' 2\")  Weight: 54.4 kg (120 lb)  SpO2: 98 %    Physical Exam:   General: Awake, alert, in no acute distress.  Head: Normocephalic, atraumatic.  Eyes: Conjugate gaze.  ENT: Moist membranes, external ear appears normal.   Chest/Respiratory: Equal chest rise.  Cardiovascular: Peripheral pulses present.  Abdominal: Soft, non-distended, non-tender.  Extremities: No obvious deformity.  Neurological: GCS 15, moving all extremities without gross deficit.  Skin: Warm, no rashes, lesions, or bruising. "   Psychiatric: Appropriate affect.     Procedures / Critical Care   Procedures    Critical Care Time: None.          Medical/Surgical History:  Past Medical History:   Diagnosis Date     Hypertension      Hypothyroid      Osteoarthritis      ST elevation (STEMI) myocardial infarction (H)     1981; angioplasty     Past Surgical History:   Procedure Laterality Date     ARTHROPLASTY HIP Right     Dr Rodriguez     ARTHROPLASTY HIP Left 2015    Dr. Rodriguez     COMBINED CYSTOSCOPY, URETEROSCOPY, LASER HOLMIUM LITHOTRIPSY URETER(S) Right 7/23/2019    Procedure: CYSTOSCOPY, RIGHT STENT REMOVAL, URETEROSCOPY, LASER HOLMIUM LITHOTRIPSY RIGHT URETER WITH STENT REPLACEMENT;  Surgeon: Shawn Cole MD;  Location:  OR     CYSTOSCOPY, RETROGRADES, INSERT STENT URETER(S), COMBINED Right 6/17/2019    Procedure: CYSTOSCOPY, WITH RETROGRADE PYELOGRAM AND URETERAL STENT INSERTION;  Surgeon: Shawn Cole MD;  Location:  OR       Medications:  No current facility-administered medications for this encounter.      Current Outpatient Medications   Medication     acetaminophen (TYLENOL) 500 MG tablet     cetirizine (ZYRTEC) 10 MG tablet     diltiazem ER (DILT-XR) 180 MG 24 hr capsule     DULoxetine (CYMBALTA) 20 MG capsule     fish oil-omega-3 fatty acids 1000 MG capsule     levothyroxine (SYNTHROID/LEVOTHROID) 88 MCG tablet     loratadine (CLARITIN) 10 MG tablet     multivitamin, therapeutic (THERA-VIT) TABS tablet     rivaroxaban ANTICOAGULANT (XARELTO ANTICOAGULANT) 10 MG TABS tablet     sodium chloride (OCEAN) 0.65 % nasal spray     HYDROcodone-acetaminophen (NORCO) 5-325 MG tablet     Incontinence Supply Disposable (POISE PAD) PADS     order for DME     order for DME       Allergies:  Celebrex [celecoxib] and Tolterodine    Relevant labs, images, EKGs, Epic and outside hospital (if applicable) charts were reviewed. The findings, diagnosis, plan, and need for follow up were discussed with the patient/family. Nursing notes were reviewed.       Jayson Philpipe MD  11/21/20 0919

## 2020-11-21 NOTE — ED NOTES
Daughter Adri is contact point and available for transportation back to the [facility] on discharge. Okay to give information.     [phone]

## 2020-11-21 NOTE — ED TRIAGE NOTES
Patient had unwitnessed fall out of bed at assisted living facility. Hx of spinal fracture and patient complains of pain here. A & O x4, some memory deficit noted. Normally ambulates with walker independently.

## 2020-11-21 NOTE — ED AVS SNAPSHOT
[facility]  [address]  [place] [address]  Phone: [phone]  Fax: [phone]                                    Geno Horn   MRN: [id number]    Department: [facility]   Date of Visit: 11/21/2020           After Visit Summary Signature Page    I have received my discharge instructions, and my questions have been answered. I have discussed any challenges I see with this plan with the nurse or doctor.    ..........................................................................................................................................  Patient/Patient Representative Signature      ..........................................................................................................................................  Patient Representative Print Name and Relationship to Patient    ..................................................               ................................................  Date                                   Time    ..........................................................................................................................................  Reviewed by Signature/Title    ...................................................              ..............................................  Date                                               Time          22EPIC Rev 08/18

## 2020-11-23 ENCOUNTER — NURSING HOME VISIT (OUTPATIENT)
Dept: GERIATRICS | Facility: OTHER | Age: 85
End: 2020-11-23
Attending: NURSE PRACTITIONER
Payer: MEDICARE

## 2020-11-23 ENCOUNTER — TELEPHONE (OUTPATIENT)
Dept: INTERNAL MEDICINE | Facility: OTHER | Age: 85
End: 2020-11-23
Payer: MEDICARE

## 2020-11-23 DIAGNOSIS — U07.1 COVID-19 VIRUS INFECTION: Primary | ICD-10-CM

## 2020-11-23 DIAGNOSIS — I10 ESSENTIAL HYPERTENSION: Primary | ICD-10-CM

## 2020-11-23 PROCEDURE — 99309 SBSQ NF CARE MODERATE MDM 30: CPT | Performed by: NURSE PRACTITIONER

## 2020-11-23 NOTE — TELEPHONE ENCOUNTER
Dr Rocha reviewed and completed the following home care or hospice form(s) for Geno Kory [id number]   This covers the certification period effective 11-6-20 to 1-4-21.  Val Cardenas LPN on 11/23/2020 at 10:18 AM

## 2020-11-25 DIAGNOSIS — M25.559 HIP PAIN: ICD-10-CM

## 2020-11-25 DIAGNOSIS — S22.070G CLOSED WEDGE COMPRESSION FRACTURE OF T9 VERTEBRA WITH DELAYED HEALING, SUBSEQUENT ENCOUNTER: ICD-10-CM

## 2020-11-25 NOTE — PROGRESS NOTES
Visit Date:   11/23/2020      PROVIDER:  Fransisca Rocha NP      CHIEF COMPLAINT:  Emergency Department followup.      HISTORY OF PRESENT ILLNESS:  The patient is at skilled nursing facility due to recurrent falls.  On 11/16 she had a fall and sustained a T9 compression fracture.  She then went home with plans to be admitted to assisted living the following day; however, on 11/17 she had another fall at home.  She was brought to the emergency room and kept for observation.  The following day she was admitted to [facility].  She then fell out of bed on 11/21.  It was unwitnessed and she is on anticoagulants, so she was sent to the Emergency Department.  She had a CT of chest, cervical spine and head, which all were negative with the exception of the known T9 compression fracture.  She tells me that she is not having any pain at this time.  On 11/20 there was a telephone visit with Iona Duarte NP, and nursing staff requested that patient be treated with stronger pain medication.  While hospitalized she tolerated acetaminophen without any problems.  She was then started on Norco 1 tablet every 6 hours on 11/20.  She has received 1 pill over the past couple of days.  The timing of the Norco was not necessarily associated with the fall, but difficult to determine.  Nursing staff report to me that for interventions they have put a concave mattress on the patient's bed as she had told them that she rolled out of bed.  She does not have any type of tabs alarm.      It is also to note that patient was hospitalized with COVID-19 from 11/01 through 11/05 and otherwise has done well.      PAST MEDICAL HISTORY, ALLERGIES, MEDICATIONS:  Reviewed.      REVIEW OF SYSTEMS:  A 12-point complete review of systems discussed with nursing staff and resident.  See HPI for positive findings, otherwise unremarkable.      PHYSICAL EXAMINATION:   VITAL SIGNS:  Blood pressure 114/60, pulse 68, respiratory  rate 16, temperature 97.7, weight 115 pounds.   GENERAL:  Pleasant female lying in bed, in no acute distress.  She is alert and pleasant.  She has confusion at baseline.  She is forgetful.  She does not remember the fall since being at skilled nursing facility.   SKIN:  Color pink.   HEENT:  Mucous membranes moist.   NECK:  Supple and without adenopathy.  No thyromegaly.   PULMONARY:  Lung fields clear to auscultation.   CARDIOVASCULAR:  Regular rate and rhythm.   ABDOMEN:  Soft and without masses, tenderness and organomegaly.   EXTREMITIES:  Without edema.     BACK:  There is tenderness with palpation over the T9 compression fracture; however, she has no pain at rest.      ASSESSMENT:   1.  T9 compression fracture.   2.  Lumbar spinal stenosis, chronic.   3.  Hypertension.   4.  Dementia.      PLAN:  We will request a BIMS be obtained.  Also, recommend that staff monitor for worsening of confusion and falls associated with narcotic pain medication.  I have recommended that she have a floor mat alarm so that staff are notified when she tries to get out of bed on her own.         ELAN PRESTON NP             D: 2020   T: 2020   MT: FILIPE      Name:     SUSHIL GONZALEZ   MRN:      [id number]        Account:      [id number]   :      1926           Visit Date:   2020      Document: [id number]       cc: Abundio Rendon MD       [facility]

## 2020-11-27 DIAGNOSIS — S22.070G CLOSED WEDGE COMPRESSION FRACTURE OF T9 VERTEBRA WITH DELAYED HEALING, SUBSEQUENT ENCOUNTER: ICD-10-CM

## 2020-11-27 DIAGNOSIS — M25.559 HIP PAIN: ICD-10-CM

## 2020-11-27 NOTE — TELEPHONE ENCOUNTER
[id number] sent Rx request for the following:      Requested Prescriptions   Pending Prescriptions Disp Refills   HYDROcodone-acetaminophen (NORCO) 5-325 MG tablet 10 tablet 0    Sig: Take 1 tablet by mouth every 6 hours as needed for severe pain Not to exceed 4000 mg of acetaminophen in 24 hours from all sources.   Last Prescription Date:   11/20/20  Last Fill Qty/Refills:         10, R-0 (End: 11/23/20)    Last Office Visit:              11/23/20  Future Office visit:           None  Routing refill request to provider for review/approval because:  Drug not on the [facility], P or [facility] refill protocol or controlled substance     Unable to complete prescription refill per RN Medication Refill Policy. Karen Simon RN .............. 11/27/2020  4:21 PM

## 2020-11-30 RX ORDER — HYDROCODONE BITARTRATE AND ACETAMINOPHEN 5; 325 MG/1; MG/1
TABLET ORAL
Qty: 10 TABLET | OUTPATIENT
Start: 2020-11-30

## 2020-11-30 RX ORDER — HYDROCODONE BITARTRATE AND ACETAMINOPHEN 5; 325 MG/1; MG/1
1 TABLET ORAL EVERY 6 HOURS PRN
Qty: 10 TABLET | Refills: 0 | OUTPATIENT
Start: 2020-11-30

## 2020-11-30 NOTE — TELEPHONE ENCOUNTER
Duplicate request. Please see refill encounter, dated 11/25/20. Unable to complete prescription refill per RN Medication Refill Policy. Karen Simon RN .............. 11/30/2020  1:57 PM

## 2020-12-07 NOTE — TELEPHONE ENCOUNTER
Called number given and was told patient is living at [facility]. Will call there. Laura Contreras LPN ....................12/7/2020  9:53 AM

## 2020-12-10 ENCOUNTER — NURSING HOME VISIT (OUTPATIENT)
Dept: GERIATRICS | Facility: OTHER | Age: 85
End: 2020-12-10
Attending: NURSE PRACTITIONER
Payer: MEDICARE

## 2020-12-10 DIAGNOSIS — E03.8 OTHER SPECIFIED HYPOTHYROIDISM: Primary | ICD-10-CM

## 2020-12-10 PROCEDURE — 99309 SBSQ NF CARE MODERATE MDM 30: CPT | Performed by: NURSE PRACTITIONER

## 2020-12-14 NOTE — PROGRESS NOTES
Visit Date:   12/10/2020      CHIEF COMPLAINT:  60-day recertification.      HISTORY OF PRESENT ILLNESS:  The patient was admitted after hospitalization of COVID-19, but also because she had falls at home.  She developed a T9 compression fracture and a flare of her lumbar spinal stenosis.  Patient does report that she is not having any pain at the T9 compression fracture region, but has chronic pain at the lower back.  She was prescribed Lortab 5/325 mg and she has taken this 3 times so far this month.  She has also received acetaminophen 1000 mg 6 times this month.  She has not had any falls or injuries since last evaluation.  She has been having more problems with constipation.  She is taking p.r.n. senna and Milk of Magnesia.  Her hypertension has been well controlled.  She is on replacement therapy for hypothyroidism.  Last TSH was normal in June.  Symptoms from COVID did completely resolve.  She did have a dementia evaluation and BIMS score was 12, indicating moderate dementia.  She does take Cymbalta to help with her chronic arthritis pain and reports that has been stable.      PAST MEDICAL HISTORY, PAST SURGICAL HISTORY, ALLERGIES, MEDICATIONS, RISK FACTORS, SOCIAL HISTORY:  All reviewed.      REVIEW OF SYSTEMS:  A 12-point complete review of systems discussed was nursing staff and resident.  See HPI for positive findings, otherwise unremarkable.        PHYSICAL EXAMINATION:   VITAL SIGNS:  Blood pressure 133/61, pulse 84, respiratory rate 16, temperature 97.2, SpO2 97% on room air, weight 115 pounds.   GENERAL:  A pleasant female sitting in a wheelchair, no acute distress.   SKIN:  Color pink.   HEENT:  Mucous membranes moist.   NECK:  Supple without adenopathy.   LUNGS:  Walters clear to auscultation throughout.   CARDIOVASCULAR:  Regular rate and rhythm with no murmur or S3 auscultated.   ABDOMEN:  Soft and without masses, tenderness or organomegaly.  No suprapubic tenderness.   EXTREMITIES:  Without edema.   No pain with palpation to the thoracic spine.  She does rub across the lower lumbar spine as the area of discomfort.      ASSESSMENT:   1.  Moderate dementia.   2.  COVID-19.   3.  Lumbar spinal stenosis, chronic.   4.  T9 compression fracture.   5.  Hypertension.   6.  Hypothyroidism.   7.  Narcotic-induced constipation.      PLAN:  We will start her on Senna-S 1 tablet twice daily.  Can always titrate this up as needed for management of constipation.  I would like staff to try to back off on using the acetaminophen as she is not really having much for pain.  Instead, we will start her on acetaminophen 500 mg 2 tablets twice daily.  We will follow-up again in 30 days, sooner with concerns.         ELAN PRESTON NP             D: 12/10/2020   T: 12/10/2020   MT: ALBIN      Name:     SUSHIL GONZALEZ   MRN:      [id number]        Account:      [id number]   :      1926           Visit Date:   12/10/2020      Document: [id number]       cc: Abundio Rendon MD       [facility]

## 2021-01-13 ENCOUNTER — NURSING HOME VISIT (OUTPATIENT)
Dept: GERIATRICS | Facility: OTHER | Age: 86
End: 2021-01-13
Attending: NURSE PRACTITIONER
Payer: MEDICARE

## 2021-01-13 DIAGNOSIS — E03.8 OTHER SPECIFIED HYPOTHYROIDISM: Primary | ICD-10-CM

## 2021-01-13 PROCEDURE — 99315 NF DSCHRG MGMT 30 MIN/LESS: CPT | Performed by: NURSE PRACTITIONER

## 2021-01-14 ENCOUNTER — TELEPHONE (OUTPATIENT)
Dept: INTERNAL MEDICINE | Facility: OTHER | Age: 86
End: 2021-01-14

## 2021-01-14 ENCOUNTER — MEDICAL CORRESPONDENCE (OUTPATIENT)
Dept: HEALTH INFORMATION MANAGEMENT | Facility: OTHER | Age: 86
End: 2021-01-14

## 2021-01-14 ENCOUNTER — RESULTS ONLY (OUTPATIENT)
Dept: LAB | Age: 86
End: 2021-01-14

## 2021-01-14 ENCOUNTER — APPOINTMENT (OUTPATIENT)
Dept: LAB | Facility: OTHER | Age: 86
End: 2021-01-14
Attending: NURSE PRACTITIONER
Payer: MEDICARE

## 2021-01-14 DIAGNOSIS — S22.070G CLOSED WEDGE COMPRESSION FRACTURE OF T9 VERTEBRA WITH DELAYED HEALING, SUBSEQUENT ENCOUNTER: ICD-10-CM

## 2021-01-14 DIAGNOSIS — R26.81 UNSTEADY GAIT: ICD-10-CM

## 2021-01-14 DIAGNOSIS — M48.061 SPINAL STENOSIS OF LUMBAR REGION, UNSPECIFIED WHETHER NEUROGENIC CLAUDICATION PRESENT: Primary | ICD-10-CM

## 2021-01-15 DIAGNOSIS — E03.9 HYPOTHYROIDISM, UNSPECIFIED TYPE: Primary | ICD-10-CM

## 2021-01-15 LAB
T4 FREE SERPL-MCNC: 0.99 NG/DL (ref 0.6–1.6)
TSH SERPL DL<=0.05 MIU/L-ACNC: 0.32 IU/ML (ref 0.34–5.6)

## 2021-01-15 RX ORDER — LEVOTHYROXINE SODIUM 75 UG/1
75 TABLET ORAL DAILY
Start: 2021-01-15 | End: 2021-01-29

## 2021-01-15 NOTE — PROGRESS NOTES
Visit Date:   01/13/2021      SKILLED NURSING FACILITY DISCHARGE SUMMARY      SKILLED NURSING FACILITY DATE OF ADMISSION:  11/21/2020 from [facility] Emergency Department.      PAST MEDICAL HISTORY:  T9 compression fracture, lumbar spinal stenosis, hypothyroidism and hypertension.      CURRENT MEDICATIONS:   1.  Acetaminophen 500 mg 2 tablets twice daily and every 6 hours as needed.   2.  Cetirizine 10 mg daily.   3.  Diltiazem  mg 1 tablet daily.   4.  Duloxetine 20 mg 1 capsule daily.   5.  Levothyroxine 88 mcg 1 tablet daily.   6.  Multivitamin 1 tablet daily.   7.  Omega-3 1000 mg 1 capsule daily.   8.  Sennoside docusate 8.6/50 1 tablet daily.      ADVANCE DIRECTIVE:  CPR      SUMMARY OF CARE:  The patient was admitted from the Emergency Department after recurrent falls.  She had progressive weakness after a T9 compression fracture.  She did receive PT and OT at [facility] nursing [facility] for strengthening.  Her compression fracture back pain has resolved.  She still has some minor difficulties with spinal stenosis causing chronic low back pain, but otherwise it is well controlled with twice-daily acetaminophen.  She has not had any recent falls at [facility] nursing facility.  She does have hypothyroidism and is due for thyroid labs.  She has moderate dementia that has been stable without any behaviors.  Her hypertension has been well controlled on diltiazem.  In addition to acetaminophen, she does take Cymbalta for treatment of arthritis pain.      REVIEW OF SYSTEMS:  A 12-point complete review of systems discussed with nursing staff and resident.  See HPI for positive findings, otherwise unremarkable.      VITAL SIGNS:  Blood pressure 148/79, pulse 81, respiratory rate 16, temperature 97.6, weight 122 pounds.      PHYSICAL EXAMINATION:   GENERAL:  A pleasantly demented female sitting in her wheelchair watching television.  She is alert and pleasant.   SKIN:  Color pink.   HEENT:   Mucous membranes moist.   NECK:  Supple without adenopathy.  No thyromegaly.   LUNGS:  Fields clear to auscultation throughout.   CARDIOVASCULAR:  Regular rate and rhythm with no S3 auscultated.   ABDOMEN:  Soft and without masses, tenderness and organomegaly.   EXTREMITIES:  Without edema.  There is no pain with palpation to the T9 region, nor to the lower lumbar spine with palpation.      ASSESSMENT:   1.  T9 compression fracture.   2.  Lumbar spinal stenosis.   3.  Moderate dementia.   4.  Hypertension.   5.  Hypothyroidism.      PLANNED DATE OF DISCHARGE:  2021.        DME PRESCRIPTION:  The patient is in need of wheelchair prescription.  She needs a wheelchair to assist with ambulation.      FOLLOWUP APPOINTMENTS:  As needed.      TIME SPENT ON DISCHARGE:  21 minutes.      PLAN:  The patient will discharge to [facility] to the memory care unit.  Before she leaves tomorrow, she will have labs completed for TSH and free T4.  A prescription has been completed for her to receive a wheelchair.  This prescription has been given to nursing staff.  Followup with assisted living nurse practitioner or PCP as needed.         ELAN PRESTON NP             D: 2021   T: 2021   MT: DARRICK      Name:     SUSHIL GONZALEZ   MRN:      [id number]        Account:      [id number]   :      1926           Visit Date:   2021      Document: [id number]       cc: Abundio Rendon MD       [facility]

## 2021-01-22 ENCOUNTER — MEDICAL CORRESPONDENCE (OUTPATIENT)
Dept: HEALTH INFORMATION MANAGEMENT | Facility: OTHER | Age: 86
End: 2021-01-22

## 2021-01-22 ENCOUNTER — NURSING HOME VISIT (OUTPATIENT)
Dept: GERIATRICS | Facility: OTHER | Age: 86
End: 2021-01-22
Attending: NURSE PRACTITIONER
Payer: MEDICARE

## 2021-01-22 DIAGNOSIS — S41.101A OPEN WOUND OF RIGHT UPPER ARM, INITIAL ENCOUNTER: Primary | ICD-10-CM

## 2021-01-22 DIAGNOSIS — S22.070G CLOSED WEDGE COMPRESSION FRACTURE OF T9 VERTEBRA WITH DELAYED HEALING, SUBSEQUENT ENCOUNTER: ICD-10-CM

## 2021-01-22 DIAGNOSIS — M25.559 HIP PAIN: ICD-10-CM

## 2021-01-22 DIAGNOSIS — M48.00 SPINAL STENOSIS, UNSPECIFIED SPINAL REGION: ICD-10-CM

## 2021-01-22 DIAGNOSIS — R23.8 DENUDED SKIN: ICD-10-CM

## 2021-01-22 DIAGNOSIS — S22.070D CLOSED WEDGE COMPRESSION FRACTURE OF T9 VERTEBRA WITH ROUTINE HEALING, SUBSEQUENT ENCOUNTER: ICD-10-CM

## 2021-01-22 DIAGNOSIS — T14.8XXA MULTIPLE SKIN TEARS: ICD-10-CM

## 2021-01-22 PROCEDURE — 97597 DBRDMT OPN WND 1ST 20 CM/<: CPT | Performed by: NURSE PRACTITIONER

## 2021-01-22 RX ORDER — MUPIROCIN 20 MG/G
OINTMENT TOPICAL
Qty: 22 G | Refills: 0 | Status: SHIPPED | OUTPATIENT
Start: 2021-01-22 | End: 2021-05-27

## 2021-01-22 RX ORDER — ACETAMINOPHEN 500 MG
500-1000 TABLET ORAL EVERY 6 HOURS PRN
COMMUNITY
Start: 2021-01-22 | End: 2021-01-25

## 2021-01-22 NOTE — PROGRESS NOTES
Geno Horn is a 89 year old female being seen today for comprehensive and follow up visit visit at [facility].    Code Status: DNR / DNI.   Health Care Power of : Extended Emergency Contact Information  Primary Emergency Contact: INDIABOBBI   [facility]  Home Phone: [phone]  Mobile Phone: [phone]  Relation: Daughter     Allergies: Celebrex [celecoxib] and Tolterodine     Chief Complaint / HPI: Patient recently discharged from skilled nursing facility for T9 wedge compression fracture pain and rehab.  Pain reasonably under control with Tylenol at this time  Staff report a fall injury in the bathroom causing 2 large skin tears right upper extremity--are concerned with wounds and would like evaluated  Injury was 3 days ago--staff have cleansed and applied Telfa pad covered with Kerlix.    Resident will need home care therapy services continued at her place of residence at assisted living facility  To work on mobility, transfers, fall prevention--- using mostly wheelchair walker with standby assistance only  Goal to return to previous baseline level of independence            Documentation of Face-to-Face and Certification for Home Health Services     Patient: Geno Horn   YOB: 1926  MR Number: [id number]  Today's Date: 1/22/2021    I certify that patient: Geno Horn is under my care and that I, or a nurse practitioner or physician's assistant working with me, had a face-to-face encounter that meets the physician face-to-face encounter requirements with this patient on: 1/22/2021.    This encounter with the patient was in whole, or in part, for the following medical condition, which is the primary reason for home health care: (S41.101A) Open wound of right upper arm, initial encounter(S22.070G) Closed wedge compression fracture of T9 vertebra with delayed healing, subsequent encounter      I certify that, based on my findings, the following services are medically  necessary home health services: Nursing, Occupational Therapy and Physical Therapy.    My clinical findings support the need for the above services because: Nurse is needed: To provide assessment and oversight required in the home to assure adherence to the medical plan due to: memory loss. and skilled nursing wound care treatment plan and staff education and oversight., Occupational Therapy Services are needed to assess and treat cognitive ability and address ADL safety due to impairment in transfers, positioning. and Physical Therapy Services are needed to assess and treat the following functional impairments: mobility, transfers, fall prevention, ADL.    Further, I certify that my clinical findings support that this patient is homebound (i.e. absences from home require considerable and taxing effort and are for medical reasons or [facility] services or infrequently or of short duration when for other reasons) because: Requires assistance of another person or specialized equipment to access medical services because patient: Is unable to operate assistive equipment on their own., Range of motion limitations prevents ability to exit home safely. and Requires supervision of another for safe transfer...    Based on the above findings. I certify that this patient is confined to the home and needs intermittent skilled nursing care, physical therapy and/or speech therapy.  The patient is under my care, and I have initiated the establishment of the plan of care.  This patient will be followed by a physician who will periodically review the plan of care.  Physician/Provider to provide follow up care: Abundio Rendon    Responsible Medicare certified [place] Physician: Dr Rendon  Physician Signature: See electronic signature associated with these discharge orders.  Date: 1/22/2021      Past Medical, Surgical, Family and Social History reviewed: YES.     Medications: reviewed  Medications - recent changes: none    Review of  Systems:  General: positive for weakness  Skin: positive for denuded, abraded extensive skin tear wounds right upper extremity  Eyes: negative  Resp: negative  CV: positive for lower extremity edema  : negative  Musculoskeletal: positive for fracture, back pain, arthritis, joint pain, joint stiffness and muscular weakness  Psychiatric: positive for cognitive decline  Toileting:    Continent of Bowel: Yes   Continent of Bladder: Yes  Mobility: walker and wheelchair    Recent Labs: reviewed      Current Therapies: none    Exam:        Vital signs reviewed.   GENERAL APPEARANCE:  alert and no distress  EYES: Eyes grossly normal to inspection, PERRL and conjunctivae and sclerae normal  NECK: no adenopathy, no asymmetry, masses, or scars and thyroid normal to palpation  RESP: lungs clear to auscultation - no rales, rhonchi or wheezes  MS: no musculoskeletal defects are noted  SKIN: two large denuded, abraded skin tear injuries right upper extremity--removed dressing--yellow purulent wound bed upper wound near lateral elbow  Telfa adherent to brown drainage wound near lateral wrist  12 mm length x 5 mm with dead skin flaps adherent to lateral wound bed and periwound wound near elbow---  And 10 mm length x 4 mm dead skin flaps adherent to lateral wound bed and periwound near wrist--- see above picture after debridement  Both wound beds cleansed with antiseptic wound cleanser--- dead tissue skin flaps removed easily with scissor excision--- patient tolerated well  Adaptic Vaseline gauze dressings placed to cover both wound beds, covered with Kerlix and tape to Kerlix  Ace wrap from wrist to above antecubital fossa to keep dressings in place without skin stripping  PSYCH: Alert, pleasant    Assessment and Plan:    Skin tear injury wounds will need close monitoring and follow-up at risk for infection--  Referral to home care skilled nursing wound care to evaluate, treat, develop wound care plan, staff education and  oversight until completely healed    Resident will benefit from continued physical therapy and occupational home therapy services for mobility, transfers  ADLs and fall prevention    Referral placed to recover health  Spoke with Isabel RN skilled nursing wound care specialist will be following up resident within the next 2 days  Will notify me if any concerns  We will follow-up next week during rounds    Ordered mupirocin ointment for wound bed and periwound to prevent secondary infection as part of dressing care    In reviewing past notes back 6 months--- appears levothyroxine was decreased in January from 88 mcg to 75 mcg  Will recheck TSH February 23 facility lab day along with BMP--chronic hyponatremia    (R23.8) Denuded skin  (primary encounter diagnosis)  Plan: mupirocin (BACTROBAN) 2 % external ointment            (S22.070G) Closed wedge compression fracture of T9 vertebra with delayed healing, subsequent encounter    Plan: acetaminophen (TYLENOL) 500 MG tablet            (M25.559) Hip pain    Plan: acetaminophen (TYLENOL) 500 MG tablet            (T14.8XXA) Multiple skin tears    Plan: mupirocin (BACTROBAN) 2 % external ointment           (S41.101A) Open wound of right upper arm, initial encounter      (S22.070D) Closed wedge compression fracture of T9 vertebra with routine healing, subsequent encounter      (M48.00) Spinal stenosis, unspecified spinal region    Chronic anticoagulation, DOAC

## 2021-01-24 PROCEDURE — G0180 MD CERTIFICATION HHA PATIENT: HCPCS | Performed by: FAMILY MEDICINE

## 2021-01-25 ENCOUNTER — TELEPHONE (OUTPATIENT)
Dept: FAMILY MEDICINE | Facility: OTHER | Age: 86
End: 2021-01-25

## 2021-01-25 DIAGNOSIS — S22.070G CLOSED WEDGE COMPRESSION FRACTURE OF T9 VERTEBRA WITH DELAYED HEALING, SUBSEQUENT ENCOUNTER: ICD-10-CM

## 2021-01-25 DIAGNOSIS — M25.559 HIP PAIN: ICD-10-CM

## 2021-01-25 RX ORDER — HYDROCODONE BITARTRATE AND ACETAMINOPHEN 5; 325 MG/1; MG/1
1 TABLET ORAL EVERY 6 HOURS PRN
Qty: 18 TABLET | Refills: 0 | Status: ON HOLD | COMMUNITY
Start: 2021-01-25 | End: 2021-10-22

## 2021-01-25 RX ORDER — ACETAMINOPHEN 500 MG
TABLET ORAL
Qty: 180 TABLET | Refills: 3 | COMMUNITY
Start: 2021-01-25 | End: 2021-04-30

## 2021-01-25 NOTE — TELEPHONE ENCOUNTER
Call from Isabel HART wound care [facility]--was able to see patient on Sunday and open to home services--adaptic dressings 2 days old were severely stuck to both wounds    Discussed wound care plan--cleanse, apply antibacterial mupirocin 2% to wound bed and amelie wound bed, apply mepilex  foam dressings to both areas, cover with ace wrap to hold in place without skin stripping adhesives  Wound care will change every other day and will call end of week with progress and update on frequency of dressing changes needed      Faiza HART Nurse manager requesting PRN pain meds per patient daughter--was on tylenol previously at [facility] which was changed to PRN and PRN hydrocodone for compression fracture back pain  Daughter would like to have hydrocodone PRN available    My suggestion would be scheduled tylenol BID and PRN and have hydrocodone Q6H  PRN available for breakthrough pain  Daughter understands possible adverse effects drowsiness, increase fall risk--currently wheelchair dependent but starting therapy services this week  Daughter would like to have narcotic available for breakthrough only    Sierra Ray, APRN CNP   January 25, 2021

## 2021-01-26 ENCOUNTER — TELEPHONE (OUTPATIENT)
Dept: INTERNAL MEDICINE | Facility: OTHER | Age: 86
End: 2021-01-26

## 2021-01-26 DIAGNOSIS — R26.81 UNSTEADY GAIT: Primary | ICD-10-CM

## 2021-01-26 DIAGNOSIS — S22.070G CLOSED WEDGE COMPRESSION FRACTURE OF T9 VERTEBRA WITH DELAYED HEALING, SUBSEQUENT ENCOUNTER: ICD-10-CM

## 2021-01-26 DIAGNOSIS — M48.061 SPINAL STENOSIS OF LUMBAR REGION, UNSPECIFIED WHETHER NEUROGENIC CLAUDICATION PRESENT: ICD-10-CM

## 2021-01-26 NOTE — TELEPHONE ENCOUNTER
Patient Occupational Therapist is calling regarding order for manual wheelchair. Patients family would like patient to get power chair. Cherelle states that patient is a good candidate for a power chair. However there was an order for a manual chair requested and she is stating that needs to be discharged before approved or Medicare will not pay for a power chair for five years. Please all Cherelle for clarification, [phone].    Ha Iglesias on 1/26/2021 at 5:02 PM

## 2021-01-27 ENCOUNTER — TELEPHONE (OUTPATIENT)
Dept: FAMILY MEDICINE | Facility: OTHER | Age: 86
End: 2021-01-27

## 2021-01-27 NOTE — TELEPHONE ENCOUNTER
I would like patient to go through a complete evaluation with therapist to make sure she qualifies for the power wheelchair and that it is recommended.  If that is the case then I do not mind discontinuing the order for the manual wheelchair.

## 2021-01-27 NOTE — TELEPHONE ENCOUNTER
After verification,  Cherelle from OT said to send order for power wheelchair evaluation along with demographics to [facility] and see if they will do it without a face to face evaluation first.  Isabel Wolf LPN ....................1/27/2021   10:05 AM

## 2021-01-27 NOTE — TELEPHONE ENCOUNTER
Demographics and order faxed to [facility] at [phone].  Isabel Wolf LPN ....................1/27/2021   1:28 PM

## 2021-01-27 NOTE — TELEPHONE ENCOUNTER
Calling to get verbal orders for physical therapy once a week for one week, twice a week for three weeks and then once a week for three weeks.

## 2021-01-27 NOTE — TELEPHONE ENCOUNTER
DME prescription is in my outbox.  See previous note for all that needs to accompany this order and send to [facility]

## 2021-01-28 ENCOUNTER — NURSING HOME VISIT (OUTPATIENT)
Dept: GERIATRICS | Facility: OTHER | Age: 86
End: 2021-01-28
Attending: NURSE PRACTITIONER
Payer: MEDICARE

## 2021-01-28 DIAGNOSIS — H61.22 IMPACTED CERUMEN OF LEFT EAR: Primary | ICD-10-CM

## 2021-01-28 DIAGNOSIS — S41.101D OPEN ARM WOUND, RIGHT, SUBSEQUENT ENCOUNTER: ICD-10-CM

## 2021-01-28 DIAGNOSIS — S22.070D CLOSED WEDGE COMPRESSION FRACTURE OF T9 VERTEBRA WITH ROUTINE HEALING, SUBSEQUENT ENCOUNTER: ICD-10-CM

## 2021-01-28 DIAGNOSIS — E03.9 HYPOTHYROIDISM, UNSPECIFIED TYPE: ICD-10-CM

## 2021-01-28 NOTE — PROGRESS NOTES
Geno Horn is a 89 year old female being seen today for follow up visit visit at [facility]    Code Status: DNR / DNI.   Health Care Power of : Extended Emergency Contact Information  Primary Emergency Contact: BOBBI OSBORNE   [facility]  Home Phone: [phone]  Mobile Phone: [phone]  Relation: Daughter     Allergies: Celebrex [celecoxib] and Tolterodine     Chief Complaint / HPI: Follow-up on traumatic skin tear wounds right upper extremity  From a fall injury 1 week ago  Wound debridement of dead tissue adherent to lateral edges of wound bed. Wound care started with using mupirocin to wound bed after cleansing, Adaptic covered with Kerlix dressings kept in place with Ace wrap--- skilled wound care nursing following every other day during week  Spoke with wound care nurse today had found Telfa dressing over wounds stuck to wounds--even with saturating dressing to remove painful and tender.  Resident is wheelchair dependent at this time due to spinal stenosis and compression fracture can transfer wheelchair to chair    Also has hearing aids and recurrent issues with cerumen impaction--- removed cerumen from right external canal last week, would like left external canal seen and cerumen removal if possible    Past Medical, Surgical, Family and Social History reviewed: YES.     Medications: recent   Medications - recent changes: none    Review of Systems:  General: negative  Skin: positive for open wound  Eyes: negative  Resp: negative  Musculoskeletal: positive for back pain and muscular weakness  Psychiatric: negative  Toileting:    Continent of Bowel: Yes   Continent of Bladder: Yes  Mobility: wheelchair    Recent Labs: reviewed      Current Therapies: physical therapy, occupational therapy and skilled nursing wound care    Exam:      Vital signs reviewed.     GENERAL APPEARANCE:  alert and no distress  HENT: Left external canal half centimeter sized soft brown cerumen removed easily with  curette  Half centimeter cerumen plug adherent to medial external canal near TM  TM visible and intact, right external canal free of debris, TM intact  NECK: no adenopathy, no asymmetry  RESP: lungs clear to auscultation - no rales, rhonchi or wheezes  MS: no musculoskeletal defects are noted and gait is age appropriate without ataxia  SKIN: Skin tear abraded wounds appear clean, minimal drainage  No evidence of purulence, focal edema or erythema  Wound towards elbow healing margins improved from last visit  Wound closest to lateral wrist does not appear any changes in the margin diameter  However wound bed is beefy, clean with no drainage tender with palpation  Wounds were cleansed with wound cleansing agent, thin layer of mupirocin ointment applied to both wound beds and periwound--- cover with Adaptic dressing, wrapped with Kerlix  Ace wrap applied from wrist to above elbow--- resident reports Ace wrap compression feels comfortable  PSYCH: mentation appears normal and affect normal/bright    Assessment and Plan:    Wound bed near elbow improved from last week, wound bed near wrist appears clean  With no evidence of inflammation or purulence split thickness depth wrist wound  Elbow depth superficial  Wound care completed--removed dressing easily. Cleansed, mupirocin to wound bed and amelie wound  Covered with adaptic, Kerlix and held in place with ace wrap--tolerated well  Reports wound dressings soothing    Skilled nursing wound care will contact me tomorrow after dressing change visit  Based on condition of dressings if dry and mildly adherent will adjust wound care to daily changes  Perhaps add thin layer of Vaseline to wound bed and periwound, mupirocin ointment to wound bed  Covered with Adaptic, Kerlix nontape wrap and held in place with Ace wrap  Would expect healed by 50% 2 weeks from injury--Telfa dressings taken out of room    Nurse manager will utilize Debrox drops to left external canal  Will recheck in  1 week and remove cerumen with curette if still present  Most of cerumen removed today    Will followup weekly on rounds and PRN based on progress    (H61.22) Impacted cerumen of left ear  (primary encounter diagnosis)      (S22.070D) Closed wedge compression fracture of T9 vertebra with routine healing, subsequent encounter      (S41.101D) Open arm wound, right, subsequent encounter

## 2021-01-29 RX ORDER — LEVOTHYROXINE SODIUM 75 UG/1
75 TABLET ORAL DAILY
Qty: 45 TABLET | Refills: 0 | Status: SHIPPED | OUTPATIENT
Start: 2021-01-29 | End: 2021-02-22

## 2021-01-31 ENCOUNTER — TRANSFERRED RECORDS (OUTPATIENT)
Dept: HEALTH INFORMATION MANAGEMENT | Facility: OTHER | Age: 86
End: 2021-01-31

## 2021-02-03 ENCOUNTER — TELEPHONE (OUTPATIENT)
Dept: FAMILY MEDICINE | Facility: OTHER | Age: 86
End: 2021-02-03
Payer: MEDICARE

## 2021-02-03 DIAGNOSIS — R29.6 FALLS FREQUENTLY: ICD-10-CM

## 2021-02-03 DIAGNOSIS — R26.81 GENERAL UNSTEADINESS: Primary | ICD-10-CM

## 2021-02-03 NOTE — TELEPHONE ENCOUNTER
Abundio Rendon MD reviewed and completed the following home care or hospice form(s) for Home Care on 1-24-21.   This covers the certification period effective 1-24-21 to 3-24-21.  Alexandra Sanabria LPN on 2/3/2021 at 2:08 PM

## 2021-02-04 ENCOUNTER — NURSING HOME VISIT (OUTPATIENT)
Dept: GERIATRICS | Facility: OTHER | Age: 86
End: 2021-02-04
Attending: NURSE PRACTITIONER
Payer: MEDICARE

## 2021-02-04 DIAGNOSIS — S41.111D SKIN TEAR OF RIGHT UPPER ARM WITHOUT COMPLICATION, SUBSEQUENT ENCOUNTER: Primary | ICD-10-CM

## 2021-02-08 NOTE — PROGRESS NOTES
Geno Horn is a 89 year old female being seen today for follow up visit visit at [facility].    Code Status: DNR / DNI.   Health Care Power of : Extended Emergency Contact Information  Primary Emergency Contact: BOBBI OSBORNE   [facility]  Home Phone: [phone]  Mobile Phone: [phone]  Relation: Daughter     Allergies: Celebrex [celecoxib] and Tolterodine     Chief Complaint / HPI: Follow-up on right arm skin tear wounds from fall injury a couple of weeks ago, initially debrided edges for nonviable tissue  Daily dressing changes with cleanser, mupirocin ointment, Vaseline gauze covered with Kerlix    Had started skilled nursing wound care services to implement, follow and provide staff education and oversight on treatment plan    Resident is working with therapy services--currently wheelchair dependent from compression fracture  Requires assistance of 1 staff for toileting and daily cares    Patient reports skin tear wounds nontender--initially were quite painful  Raises no other concerns    Past Medical, Surgical, Family and Social History reviewed: YES.     Medications: reviewed  Medications - recent changes:     Review of Systems:  General: negative  Skin: positive for skin tear wound  Resp: negative  Musculoskeletal: positive for wheelchair dependent  Psychiatric: negative  Toileting:    Continent of Bowel: Yes   Continent of Bladder: Yes  Mobility: wheelchair    Recent Labs: reviewed      Current Therapies: physical therapy, occupational therapy and skilled nursing wound care    Exam:  Vital signs reviewed.   GENERAL APPEARANCE: alert and no distress  RESP: lungs clear   CV:  no peripheral edema and peripheral pulses strong  MS: no musculoskeletal defects are noted   SKIN: Skin tear wounds on right forearm near lateral wrist and elbow both healing well, decrease in diameter of wound bed by 50%  Wound bed pink and granulating well, no drainage, surrounding erythema or focal  edema  Nontender with palpation  Areas cleansed, reapplied mupirocin ointment and redressed with foam dressings  PSYCH: mentation appears normal and affect normal/bright    Assessment and Plan:    Very pleasant gracious lady, was actually visiting with her daughter when I stopped to check in on her skin tear wound      Wound healing significant and steady progress would expect complete healing and closure in 2 weeks if continues on current tracjectory.  Spoke with skilled nursing wound care staff will start dressing changes every other day with close monitoring  Wound care nurses will provide staff education and oversight    Will follow up in 2 weeks or sooner PRN       (S41.062D) Skin tear of right upper arm without complication, subsequent encounter  (primary encounter diagnosis)

## 2021-02-18 ENCOUNTER — NURSING HOME VISIT (OUTPATIENT)
Dept: GERIATRICS | Facility: OTHER | Age: 86
End: 2021-02-18
Attending: NURSE PRACTITIONER
Payer: MEDICARE

## 2021-02-18 DIAGNOSIS — S41.112A SKIN TEAR OF LEFT UPPER EXTREMITY: Primary | ICD-10-CM

## 2021-02-18 DIAGNOSIS — Z74.09 LIMITED MOBILITY: ICD-10-CM

## 2021-02-18 DIAGNOSIS — S22.070D CLOSED WEDGE COMPRESSION FRACTURE OF T9 VERTEBRA WITH ROUTINE HEALING, SUBSEQUENT ENCOUNTER: ICD-10-CM

## 2021-02-18 PROCEDURE — 97597 DBRDMT OPN WND 1ST 20 CM/<: CPT | Performed by: NURSE PRACTITIONER

## 2021-02-19 DIAGNOSIS — E03.9 HYPOTHYROIDISM, UNSPECIFIED TYPE: Primary | ICD-10-CM

## 2021-02-20 PROBLEM — S41.112A SKIN TEAR OF LEFT UPPER EXTREMITY: Status: ACTIVE | Noted: 2021-02-20

## 2021-02-20 NOTE — PROGRESS NOTES
Geno Horn is a 89 year old female being seen today for acute and follow up visit visit at [facility].    Code Status: DNR / DNI.   Health Care Power of : Extended Emergency Contact Information  Primary Emergency Contact: BOBBI OSBORNE   [facility]  Home Phone: [phone]  Mobile Phone: [phone]  Relation: Daughter     Allergies: Celebrex [celecoxib] and Tolterodine     Chief Complaint / HPI: Follow-up on 2 skin tear injuries on right arm upper arm near elbow wound healed, lower forearm near healed still requires dressing cover--- Home care RN wound care nurse called yesterday to report resident has a new skin tear injury while in the bathroom caught her arm on the edge of the door--this involves her left arm.  Wound care nurse will dress similar to right skin tear wounds--- questionable skin flap with nonviable tissue.  Here today to follow-up on 2-week old skin tear wounds and new skin tear injury.  Resident has a thoracic compression fracture, was previously completely dependent on staff during skilled nursing facility rehab, has progressed to wheelchair and working on standing with walker and taking steps.  She is currently working with physical therapy and OT home care services  And making regular progress--resident hopes to return to previous baseline mobility and independence.        Past Medical, Surgical, Family and Social History reviewed: YES.     Medications: reviewed  Medications - recent changes: none    Review of Systems:  General: positive for weakness  Skin: positive for skin tear wound  Eyes: negative  Resp: negative  Musculoskeletal: positive for fracture, back pain and muscular weakness  Psychiatric: negative  Toileting:    Continent of Bowel: Yes   Continent of Bladder: Yes  Mobility: wheelchair    Recent Labs: None      Current Therapies: physical therapy, occupational therapy and skilled Nursing wound care    Exam:  Vital signs reviewed.   GENERAL APPEARANCE: alert and  no distress  EYES: Eyes grossly normal to inspection  RESP: lungs clear   CV: regular rate and rhythm, no peripheral edema and peripheral pulses strong  MS: no musculoskeletal defects are noted   SKIN: Skin tear injury left lower arm dorsal surface about 4 cm split thickness, jagged skin flap distal portion of wound about 2 cm  Rest of wound bed open beefy red in appearance--- edges of skin flap necrotic appearing  Wound bed cleansed with Hibiclens, sterile procedure to remove necrotic edges of remaining skin flap scissor excision--about 1 cm of lower border of flap easily removed--- patient not aware of excision of nonviable tissue--no complications, no bleeding  Area flushed with Hibiclens and hypochlorite wound wash pat dry with clean gauze sponge  Mupirocin ointment applied to wound bed and periwound borders, Vaseline gauze dressing applied to wound bed covered with foam dressing    Right lower arm 2-week old skin tear injury area almost completely healed about 2 cm of pink irritated looking tissue  Area is clean and dry no evidence of inflammation or infection--- covered with foam dressing.  Right upper skin tear wound near elbow completely healed clean and dry open to air  PSYCH: mentation appears normal and affect normal/bright    Assessment and Plan:    New left arm skin tear injury--- wound bed appears healthy and clean at this time--nonviable tissue was excised to facilitate and prevent stalls in wound healing--- skilled nursing wound care will visit 3 times a week and provide wound care as described above along with assessment and follow-up as indicated  I will look at wound weekly during Thursday rounding day and will adjust wound care and dressings as indicated on progress    Right lower arm skin tear wound almost completely healed--3 cm area of pink irritated appearing tissue remains no evidence of purulence or infection appears healthy however will require dressing cover for another week until  completely dry --will assess on Thursday rounding day    (S41.112A) Skin tear of left upper extremity  (primary encounter diagnosis)    Plan: DEBRIDEMENT WOUND UP TO 20 SQ CM            (S22.070D) Closed wedge compression fracture of T9 vertebra with routine healing, subsequent encounter      (Z74.09) Limited mobility

## 2021-02-22 RX ORDER — LEVOTHYROXINE SODIUM 75 UG/1
75 TABLET ORAL DAILY
Qty: 90 TABLET | Refills: 3 | Status: SHIPPED | OUTPATIENT
Start: 2021-02-22 | End: 2021-04-27 | Stop reason: DRUGHIGH

## 2021-02-22 NOTE — TELEPHONE ENCOUNTER
[id number] sent Rx request for the following:      Requested Prescriptions   Pending Prescriptions Disp Refills   levothyroxine (SYNTHROID/LEVOTHROID) 75 MCG tablet [Pharmacy Med Name: LEVOTHYROXINE 75MCG TABLET] 45 tablet 0    Sig: TAKE 1 TABLET (75 MCG) BY MOUTH DAILY     Last prescription:  01/29/21 1044 Sierra Vaz, LARISA CNP Reorder from Order:[id number]     levothyroxine (SYNTHROID/LEVOTHROID) 75 MCG tablet 45 tablet 0 1/29/2021  No   Sig - Route: Take 1 tablet (75 mcg) by mouth daily - Oral   Sent to pharmacy as: Levothyroxine Sodium 75 MCG Oral Tablet (SYNTHROID/LEVOTHROID)   Class: E-Prescribe   Notes to Pharmacy: Will need lab before refill--HARMAN     LOV: 2/18/21 NH visit, Sierra Ray  NOV: None    Unable to complete prescription refill per RN Medication Refill Policy. Karen Simon RN .............. 2/22/2021  3:03 PM

## 2021-02-23 ENCOUNTER — MEDICAL CORRESPONDENCE (OUTPATIENT)
Dept: HEALTH INFORMATION MANAGEMENT | Facility: OTHER | Age: 86
End: 2021-02-23

## 2021-02-23 ENCOUNTER — RESULTS ONLY (OUTPATIENT)
Dept: LAB | Age: 86
End: 2021-02-23

## 2021-02-23 ENCOUNTER — APPOINTMENT (OUTPATIENT)
Dept: LAB | Facility: OTHER | Age: 86
End: 2021-02-23
Attending: NURSE PRACTITIONER
Payer: MEDICARE

## 2021-02-23 LAB
ANION GAP SERPL CALCULATED.3IONS-SCNC: 5 MMOL/L (ref 3–14)
BUN SERPL-MCNC: 16 MG/DL (ref 7–25)
CALCIUM SERPL-MCNC: 9.4 MG/DL (ref 8.6–10.3)
CHLORIDE SERPL-SCNC: 99 MMOL/L (ref 98–107)
CO2 SERPL-SCNC: 29 MMOL/L (ref 21–31)
CREAT SERPL-MCNC: 0.62 MG/DL (ref 0.6–1.2)
GFR SERPL CREATININE-BSD FRML MDRD: 90 ML/MIN/{1.73_M2}
GLUCOSE SERPL-MCNC: 103 MG/DL (ref 70–105)
POTASSIUM SERPL-SCNC: 4.4 MMOL/L (ref 3.5–5.1)
SODIUM SERPL-SCNC: 133 MMOL/L (ref 134–144)
T4 FREE SERPL-MCNC: 0.89 NG/DL (ref 0.6–1.6)
TSH SERPL DL<=0.05 MIU/L-ACNC: 1.64 IU/ML (ref 0.34–5.6)

## 2021-02-25 ENCOUNTER — NURSING HOME VISIT (OUTPATIENT)
Dept: GERIATRICS | Facility: OTHER | Age: 86
End: 2021-02-25
Attending: NURSE PRACTITIONER
Payer: MEDICARE

## 2021-02-25 DIAGNOSIS — Z87.898 H/O DIZZINESS: Primary | ICD-10-CM

## 2021-02-25 DIAGNOSIS — Z78.9 LIVES IN ASSISTED LIVING FACILITY: ICD-10-CM

## 2021-02-25 DIAGNOSIS — Z79.899 ENCOUNTER FOR MEDICATION REVIEW: ICD-10-CM

## 2021-02-25 DIAGNOSIS — S22.070D CLOSED WEDGE COMPRESSION FRACTURE OF T9 VERTEBRA WITH ROUTINE HEALING, SUBSEQUENT ENCOUNTER: ICD-10-CM

## 2021-02-25 DIAGNOSIS — R41.3 MEMORY LOSS: ICD-10-CM

## 2021-02-25 DIAGNOSIS — T14.8XXA MULTIPLE SKIN TEARS: ICD-10-CM

## 2021-02-26 NOTE — PROGRESS NOTES
Geno Horn is a 89 year old female being seen today for comprehensive and follow up visit visit at [facility].    Code Status: DNR / DNI.   Health Care Power of : Extended Emergency Contact Information  Primary Emergency Contact: BOBBI OSBORNE   [facility]  Home Phone: [phone]  Mobile Phone: [phone]  Relation: Daughter     Allergies: Celebrex [celecoxib] and Tolterodine     Chief Complaint / HPI: Follow-up with patient today on a couple of issues--primarily uses wheelchair for mobility able to use walker with standby assistance only significant recent history of T9 compression fracture and severe lumbar spinal stenosis.  Recently readmitted to assisted living from skilled nursing facility after hospitalization for Covid pneumonia.    Patient has had a couple of skin tear injuries, first injury a few weeks ago bumping into the bathroom door, sustaining 2 large skin tear injuries 1 right upper arm near elbow about 3 to 4 cm superficial, second wound split thickness required some debridement of nonviable tissue about 4 to 5 cm which has almost completely healed however last week she is uncertain of what happened but bumped her left arm and now has a skin tear wound on her left lower arm that is progressing well--- open to home care services currently, has skilled nursing wound care following up 3 days a week, physical therapy 2 to 3 days a week and Occupational Therapy.   Patient is frustrated and states she would like to return to the skilled nursing facility rehab as she would like physical therapy 5 days a week if possible as she was used to being active previously and it is frustrating for her and feels she is not making any progress 2 days a week.    The other issue she would like to talk about is her intermittent dizziness and generalized eye pain that she describes behind her eyes--- in reviewing her chart back 2 years she has had extensive work-up and imaging.  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 She is very pleasant, oriented but does have short-term memory loss issues and is difficult for her to remember details.   She does not remember seeing the ophthalmologist however and discussing with her PCP she has been seen and examined by ophthalmology regularly and recently and negative findings to explain the eye pain and dizziness.  She has seen balance and vertigo specialist at [facility] with negative findings.    Her daughter scheduled an appointment tomorrow in the clinic to follow-up on her chronic issues with intermittent eye pain and dizziness      Past Medical, Surgical, Family and Social History reviewed: YES.     Medications: reviewed  Medications - recent changes: none    Review of Systems:  General: negative  Skin: positive for skin tear wounds  Eyes: positive for eye pain  Resp: negative  Musculoskeletal: positive for back pain and muscular weakness  Neurologic: negative  Psychiatric: negative  Toileting:    Continent of Bowel: Yes   Continent of Bladder: Yes  Mobility: walker and wheelchair    Recent Labs: reveiwed    Current Therapies: physical therapy, occupational therapy and skilled nursing    Exam:  Vital signs reviewed.   GENERAL APPEARANCE: alert and no distress  EYES: Eyes grossly normal to inspection, PERRL and conjunctivae and sclerae normal  NECK: no adenopathy, no asymmetry  RESP: lungs clear   CV: regular rate and rhythm, normal S1 S2, no S3 or S4, no murmur, click or rub, no peripheral edema and peripheral pulses strong  ABDOMEN: soft, nontender  MS: no musculoskeletal defects are noted   SKIN: no suspicious lesions or rashes  NEURO: Normal strength and tone, sensory exam grossly normal, speech normal  PSYCH: Alert, oriented x 3, social    Assessment and Plan:    History of intermittent dizziness and eye pain not correlated to any specific position  has been worked up extensively over the last 2 years  Uncertain of etiology, possibly related to ischemic changes as seen on MRI ?    There is no evidence of orthostatic hypotension--blood pressure actually improved standing    Patient seems more concerned about becoming more active and working more with physical therapy--- I have spoken with physical therapist  Who is able to do some vertigo vestibular assessment and manipulations if indicated--- also physical therapist suggests a exercise program with assistance in the fitness center.    I did contact her daughter and was unable to reach however left detailed message as her daughter had question keeping tomorrow's appointment  Regarding the dizziness and eye pain concerns.  She has had recent ophthalmology exam that has been negative.     Would like to trial above suggested therapeutics with physical therapist and revisit this in a few weeks.  Her skin tear wounds are healing nicely I have no concerns on the progress and would expect complete healing in 2 weeks  Will continue skilled nursing wound care for wound care with close followup and provide patient and staff education until resolved.    (Z87.898) H/O dizziness  (primary encounter diagnosis)    (T14.8XXA) Multiple skin tears      (R41.3) Memory loss      (Z79.899) Encounter for medication review    (S22.070D) Closed wedge compression fracture of T9 vertebra with routine healing, subsequent encounter      (Z59.3) Lives in assisted living facility

## 2021-03-03 VITALS
HEART RATE: 78 BPM | SYSTOLIC BLOOD PRESSURE: 128 MMHG | RESPIRATION RATE: 16 BRPM | OXYGEN SATURATION: 97 % | DIASTOLIC BLOOD PRESSURE: 78 MMHG | TEMPERATURE: 97 F

## 2021-03-04 ENCOUNTER — NURSING HOME VISIT (OUTPATIENT)
Dept: GERIATRICS | Facility: OTHER | Age: 86
End: 2021-03-04
Attending: NURSE PRACTITIONER
Payer: MEDICARE

## 2021-03-04 DIAGNOSIS — T14.8XXA MULTIPLE SKIN TEARS: Primary | ICD-10-CM

## 2021-03-07 ENCOUNTER — APPOINTMENT (OUTPATIENT)
Dept: GENERAL RADIOLOGY | Facility: OTHER | Age: 86
End: 2021-03-07
Attending: FAMILY MEDICINE
Payer: MEDICARE

## 2021-03-07 ENCOUNTER — HOSPITAL ENCOUNTER (EMERGENCY)
Facility: OTHER | Age: 86
Discharge: HOME | End: 2021-03-07
Attending: FAMILY MEDICINE | Admitting: FAMILY MEDICINE
Payer: MEDICARE

## 2021-03-07 VITALS
RESPIRATION RATE: 14 BRPM | WEIGHT: 125.88 LBS | HEART RATE: 65 BPM | SYSTOLIC BLOOD PRESSURE: 141 MMHG | DIASTOLIC BLOOD PRESSURE: 67 MMHG | BODY MASS INDEX: 23.17 KG/M2 | HEIGHT: 62 IN | OXYGEN SATURATION: 96 % | TEMPERATURE: 97.4 F

## 2021-03-07 DIAGNOSIS — K59.00 CONSTIPATION, UNSPECIFIED CONSTIPATION TYPE: ICD-10-CM

## 2021-03-07 DIAGNOSIS — S22.42XA CLOSED FRACTURE OF MULTIPLE RIBS OF LEFT SIDE, INITIAL ENCOUNTER: ICD-10-CM

## 2021-03-07 PROCEDURE — 250N000013 HC RX MED GY IP 250 OP 250 PS 637: Performed by: FAMILY MEDICINE

## 2021-03-07 PROCEDURE — 99283 EMERGENCY DEPT VISIT LOW MDM: CPT | Mod: 25 | Performed by: FAMILY MEDICINE

## 2021-03-07 PROCEDURE — 76705 ECHO EXAM OF ABDOMEN: CPT | Mod: TC | Performed by: FAMILY MEDICINE

## 2021-03-07 PROCEDURE — 76705 ECHO EXAM OF ABDOMEN: CPT | Mod: 26 | Performed by: FAMILY MEDICINE

## 2021-03-07 PROCEDURE — 71101 X-RAY EXAM UNILAT RIBS/CHEST: CPT | Mod: LT

## 2021-03-07 RX ORDER — BISACODYL 10 MG
10 SUPPOSITORY, RECTAL RECTAL ONCE
Status: COMPLETED | OUTPATIENT
Start: 2021-03-07 | End: 2021-03-07

## 2021-03-07 RX ORDER — BISACODYL 10 MG
10 SUPPOSITORY, RECTAL RECTAL
Qty: 10 SUPPOSITORY | Refills: 1 | Status: SHIPPED | OUTPATIENT
Start: 2021-03-07 | End: 2021-03-11 | Stop reason: ALTCHOICE

## 2021-03-07 RX ADMIN — BISACODYL 10 MG: 10 SUPPOSITORY RECTAL at 10:58

## 2021-03-07 ASSESSMENT — ENCOUNTER SYMPTOMS
WEAKNESS: 0
NECK STIFFNESS: 0
BACK PAIN: 0
DIAPHORESIS: 0
CONSTITUTIONAL NEGATIVE: 1
LIGHT-HEADEDNESS: 0
CHILLS: 0
EYES NEGATIVE: 1
SPEECH DIFFICULTY: 0
COLOR CHANGE: 0
ABDOMINAL PAIN: 0
SHORTNESS OF BREATH: 0
COUGH: 0
NECK PAIN: 0
FEVER: 0
NUMBNESS: 0
CARDIOVASCULAR NEGATIVE: 1
ACTIVITY CHANGE: 0
SORE THROAT: 0

## 2021-03-07 ASSESSMENT — MIFFLIN-ST. JEOR: SCORE: 924.25

## 2021-03-07 NOTE — PROGRESS NOTES
"Geno Horn is a 89 year old female being seen today for follow up visit visit at [facility].    Code Status: DNR / DNI.   Health Care Power of : Extended Emergency Contact Information  Primary Emergency Contact: JAMI OSBORNEZABETH   [facility]  Home Phone: [phone]  Mobile Phone: [phone]  Relation: Daughter     Allergies: Celebrex [celecoxib] and Tolterodine     Chief Complaint / HPI: Followup on progress of new skin tear and bruise injury to left knee --bumped her knee on bathroom door  While self transferring for toileting.  This is her 3rd skin tear injury from self transferring--first had two large skin injuries right arm--wound near elbow has healed completely  Wound near wrist almost healed--skin mildly irritated and fragile--still healing and reepithelializing. Staff member thought this was a burn injury ?  Skin tear injury left forearm--progressing well almost ready to leave open to air.  Has a new skin tear and bruise left knee from contusion on bathroom door.    Geno reports less problems with \"eye pain and dizziness\" and working with PT and using fitness center with stand by assistance 2 days week    A friend of hers moved into facility recently and this has been a positive source of support and company for her.      Past Medical, Surgical, Family and Social History reviewed: YES.     Medications: reviewed  Medications - recent changes: none    Review of Systems:  General: negative  Skin: positive for skin tear injuries--multiple  Resp: negative  Musculoskeletal: positive for fracture, back pain, arthritis and muscular weakness  Neurologic: negative  Psychiatric: negative  Toileting:    Continent of Bowel: Yes   Continent of Bladder: Yes  Mobility: wheelchair    Recent Labs: reviewed      Current Therapies: physical therapy and skilled nursing    Exam:  Vital signs reviewed.   GENERAL APPEARANCE:  alert and no distress  EYES: Eyes grossly normal to inspection  RESP: lungs clear "   CV: regular rate, no peripheral edema and peripheral pulses strong  MS: no musculoskeletal defects are noted, wheelchair dependent primarily for mobility --Wanda x4 with no lateralization  SKIN: right forearm skin tear wound covered with bandaid linear elliptical about 4cm x 1 cm clean and dry  Left forearm skin tear wound covered with foam dressing no drainage or surrounding erythema  New skin tear wound and bruise left knee about 2 cm, clean and dry--superficial without any loose skin flap.  Wound bed beefy, no purulence  Wound right forearm and left knee cleansed, applied thin later mupirocin and covered with soft medipore cotton dressing with light adhesive border.  Left forearm wound visually inspected--left dressing in place that was placed yesterday  And Skilled nursing homecare will be visiting tomorrow and will redress all wounds  NEURO: Wanda x 4 equal strength, speech normal  PSYCH: mentation appears normal and affect normal/bright    Assessment and Plan:        (T14.8XXA) Multiple skin tears  (primary encounter diagnosis)

## 2021-03-07 NOTE — ED PROVIDER NOTES
History     Chief Complaint   Patient presents with     Rib Pain     HPI  Geno Horn is a 89 year old female [facility] Resident who slipped onto the arm of her wheelchair from standing and struck her left lower lateral ribs/chest wall on the arm rest with pain that continues today.  Her pain is worse with deep breathing and movement.  No clicking/movement of bones noted.  She arrives via EMS for evaluation.  No LOC or head injury or any other injury.    Allergies:  Allergies   Allergen Reactions     Celebrex [Celecoxib] Other (See Comments)     Light headed     Tolterodine      Other reaction(s): Intolerance-Can't Take  Was unable to sleep at night       Problem List:    Patient Active Problem List    Diagnosis Date Noted     Multiple skin tears 02/25/2021     Priority: Medium     H/O dizziness 02/25/2021     Priority: Medium     Skin tear of left upper extremity 02/20/2021     Priority: Medium     Falls frequently 11/17/2020     Priority: Medium     Contusion of right hip, initial encounter 11/17/2020     Priority: Medium     General unsteadiness 11/17/2020     Priority: Medium     Closed wedge compression fracture of T9 vertebra with routine healing, subsequent encounter 11/17/2020     Priority: Medium     History of 2019 novel coronavirus disease (COVID-19) 11/17/2020     Priority: Medium     Pneumonia due to 2019 novel coronavirus 11/01/2020     Priority: Medium     Hyponatremia 11/01/2020     Priority: Medium     COVID-19 virus infection 11/01/2020     Priority: Medium     Spinal stenosis of lumbar region, unspecified whether neurogenic claudication present 02/19/2020     Priority: Medium     Sepsis (H) 06/17/2019     Priority: Medium     Unsteady gait 05/21/2018     Priority: Medium     Urinary urgency 07/24/2017     Priority: Medium     H/O basal cell carcinoma excision 07/20/2016     Priority: Medium     Coronary artery disease involving native coronary artery of native heart without  angina pectoris 2016     Priority: Medium     Hearing loss 2015     Priority: Medium     Osteoarthritis of both hips 2015     Priority: Medium     Allergic rhinitis 2012     Priority: Medium     Hypertension 2012     Priority: Medium     Hypothyroidism 2012     Priority: Medium     Overview:   tsh and t4 normal on 2015           Past Medical History:    Past Medical History:   Diagnosis Date     Hypertension      Hypothyroid      Osteoarthritis      ST elevation (STEMI) myocardial infarction (H)        Past Surgical History:    Past Surgical History:   Procedure Laterality Date     ARTHROPLASTY HIP Right     Dr Rodriguez     ARTHROPLASTY HIP Left     Dr. Rodriguez     COMBINED CYSTOSCOPY, URETEROSCOPY, LASER HOLMIUM LITHOTRIPSY URETER(S) Right 2019    Procedure: CYSTOSCOPY, RIGHT STENT REMOVAL, URETEROSCOPY, LASER HOLMIUM LITHOTRIPSY RIGHT URETER WITH STENT REPLACEMENT;  Surgeon: Shawn Cole MD;  Location:  OR     CYSTOSCOPY, RETROGRADES, INSERT STENT URETER(S), COMBINED Right 2019    Procedure: CYSTOSCOPY, WITH RETROGRADE PYELOGRAM AND URETERAL STENT INSERTION;  Surgeon: Shawn Cole MD;  Location:  OR       Family History:    Family History   Problem Relation Age of Onset     Heart Disease Mother 60         of mi     Heart Disease Father 60         of mi     Heart Disease Brother      Heart Disease Brother        Social History:  Marital Status:   [5]  Social History     Tobacco Use     Smoking status: Never Smoker     Smokeless tobacco: Never Used     Tobacco comment: no ecig   Substance Use Topics     Alcohol use: Not Currently     Alcohol/week: 0.0 standard drinks     Drug use: No        Medications:    acetaminophen (TYLENOL) 500 MG tablet  bisacodyl (DULCOLAX) 10 MG suppository  cetirizine (ZYRTEC) 10 MG tablet  diltiazem ER (DILT-XR) 180 MG 24 hr capsule  DULoxetine (CYMBALTA) 20 MG capsule  fish oil-omega-3 fatty acids 1000 MG  "capsule  HYDROcodone-acetaminophen (NORCO) 5-325 MG tablet  levothyroxine (SYNTHROID/LEVOTHROID) 75 MCG tablet  multivitamin, therapeutic (THERA-VIT) TABS tablet  sodium chloride (OCEAN) 0.65 % nasal spray  Incontinence Supply Disposable (POISE PAD) PADS  loratadine (CLARITIN) 10 MG tablet  mupirocin (BACTROBAN) 2 % external ointment  order for DME  order for DME  rivaroxaban ANTICOAGULANT (XARELTO ANTICOAGULANT) 10 MG TABS tablet          Review of Systems   Constitutional: Negative.  Negative for activity change, chills, diaphoresis and fever.   HENT: Negative.  Negative for congestion and sore throat.    Eyes: Negative.  Negative for visual disturbance.   Respiratory: Negative for cough and shortness of breath.         Left lower lateral rib tenderness.   Cardiovascular: Negative.    Gastrointestinal: Negative for abdominal pain.   Musculoskeletal: Negative for back pain, neck pain and neck stiffness.   Skin: Negative.  Negative for color change and rash.   Neurological: Negative for speech difficulty, weakness, light-headedness and numbness.       Physical Exam   BP: (!) 145/104  Pulse: 71  Temp: 97.4  F (36.3  C)  Resp: 18  Height: 157.5 cm (5' 2\")  Weight: 57.1 kg (125 lb 14.1 oz)  SpO2: 97 %      Physical Exam  Vitals signs and nursing note reviewed.   Constitutional:       General: She is not in acute distress.     Appearance: She is normal weight.      Comments: Pleasant alert appropriate 89 year old female looking 20 years younger than her stated age.   HENT:      Head: Normocephalic and atraumatic.      Right Ear: External ear normal.      Left Ear: External ear normal.      Nose: Nose normal.      Mouth/Throat:      Mouth: Mucous membranes are moist.   Eyes:      Extraocular Movements: Extraocular movements intact.      Conjunctiva/sclera: Conjunctivae normal.      Pupils: Pupils are equal, round, and reactive to light.   Neck:      Musculoskeletal: Normal range of motion and neck supple. No muscular " tenderness.   Cardiovascular:      Rate and Rhythm: Normal rate and regular rhythm.      Heart sounds: Murmur (2/6 MARIELY.) present.   Pulmonary:      Effort: Pulmonary effort is normal.      Breath sounds: Normal breath sounds. No stridor. No wheezing, rhonchi or rales.   Chest:      Chest wall: Tenderness present. No lacerations, swelling or crepitus.       Abdominal:      General: Bowel sounds are normal.      Palpations: Abdomen is soft.      Tenderness: There is abdominal tenderness. There is no right CVA tenderness.   Musculoskeletal:         General: Tenderness and signs of injury present. No swelling or deformity.   Skin:     General: Skin is warm and dry.      Findings: No bruising.   Neurological:      General: No focal deficit present.      Mental Status: She is alert.   Psychiatric:         Mood and Affect: Mood normal.         ED Course        Procedures    Results for orders placed during the hospital encounter of 03/07/21   POC US ABDOMEN LIMITED    Impression eFAST with no PTX and no abnormal intraabdominal or pericardial fluid collections.               Critical Care time:  none               Results for orders placed or performed during the hospital encounter of 03/07/21 (from the past 24 hour(s))   POC US ABDOMEN LIMITED    Impression    eFAST with no PTX and no abnormal intraabdominal or pericardial fluid collections.     XR Ribs & Chest Lt 3v    Narrative    XR RIBS & CHEST LT 3VW, 3/7/2021 10:37 AM    History: Female, age 89 years; fall yesterday and left inferior  anterolateral rib tenderness without crepitus.    Comparison: Chest x-ray 11/1/2020    Technique: Single view the chest and three views of the left ribs were  obtained.    FINDINGS: The cardiac silhouette is enlarged. The pulmonary  vasculature is normal. Evaluation of the ribs demonstrates mildly  attenuated fractures involving the lateral margin involving 2 of the  lower left ribs, possibly the left 10th and 11th ribs.  No  pneumothorax. Interstitial thickening and nodularity opacities  throughout the lungs are again seen. Pneumonia seen previously within  the right upper lobe has resolved.      Impression    IMPRESSION:   Mildly angulated, overriding fracture seen in the lateral aspect to  the lower left ribs, possibly the left 10th and 11th ribs. No  pneumothorax.    Chronic changes are seen within the lungs and are likely unchanged  dating back to 11/1/2020 and include diffuse interstitial thickening  with a few small nodules.    LUKE FORBES MD       Medications   bisacodyl (DULCOLAX) Suppository 10 mg (10 mg Rectal Given 3/7/21 1058)     11:02 AM Patient appears to be constipated on her CXR and rib detail and with two broken ribs.  Uncertain last BM and agreeable with dulcolax suppository.  ASUNCION NT with small amount hard stool in rectum and no mass or gross blood.  Patient ready for discharge home.      Assessments & Plan (with Medical Decision Making)   89 year old female [facility] resident with fall striking left lower chest against her wheel chair arm rest with left lateral #10 & #11 rib fractures and sx cares reviewed.  Also incidental finding of constipation and started on dulcolax suppositories PRN.    Per AVS:  Dear Ms. Kory,  It was nice to see you.  As we talked, you do have 2 left lower ribs fractures: #10 and #11.   However, you don't have much swelling or bruising and you are able to breath well.    We will have you continue with tylenol for pain.      Use ice to your left lower chest wall/point of tenderness for 10 minutes 4 times a day for the next 2 weeks.    Use a pillow against your left lower chest to splint for breathing/coughing/sneezing.    Take 10 deep breaths per hour while awake for the next 2 weeks to keep your lungs clear.    Sometimes it is hard to keep up with bowel movements when you have a broken rib.  Use a dulcolax suppository if you don't have a BM at least every other  day.    Follow up for any additional concerns.    Dr. Homar Kc        I have reviewed the nursing notes.    I have reviewed the findings, diagnosis, plan and need for follow up with the patient.       Discharge Medication List as of 3/7/2021 11:50 AM      START taking these medications    Details   bisacodyl (DULCOLAX) 10 MG suppository Place 1 suppository (10 mg) rectally every 48 hours as needed for constipation, Disp-10 suppository, R-1, E-Prescribe             Final diagnoses:   Closed fracture of multiple ribs of left side, initial encounter   Constipation, unspecified constipation type       3/7/2021   [facility] AND hospitals     Porfirio Kc MD  03/07/21 7340

## 2021-03-07 NOTE — ED TRIAGE NOTES
EMS Arrival Note  ________________________________  Geno Horn is a 89 year old Female that arrives via [facility] ambulance service from [facility].  Pre hospital clinical presentation per patient  and EMS personnel includes a fall yesterday from a standing position down onto the arm of her w/c injuring her left anterior lower rib cage. Pain with deep breathing and movement. Denies hitting her head or any other injuries.  Med list from facility indicates no blood thinners. Family wanted patient to be seen.   Pre hospital personnel report vital signs of:  B/P 129/78; HR 60's, RR 16;SpO2 95 on RA.    Patient arrives with:  GCS Total = 15  Airway intact  Breathing Assessment Normal.    Circulation Assessment Normal.  Patient arrives with a no IV      Placed in room 904, gowned, warm blanket provided, side rails up,  ID verified and band placed, and call light within reach.       Previous living situation apartment living at [facility]

## 2021-03-07 NOTE — DISCHARGE INSTRUCTIONS
Dear Ms. Kory,  It was nice to see you.  As we talked, you do have 2 left lower ribs fractures: #10 and #11.   However, you don't have much swelling or bruising and you are able to breath well.    We will have you continue with tylenol for pain.      Use ice to your left lower chest wall/point of tenderness for 10 minutes 4 times a day for the next 2 weeks.    Use a pillow against your left lower chest to splint for breathing/coughing/sneezing.    Take 10 deep breaths per hour while awake for the next 2 weeks to keep your lungs clear.    Sometimes it is hard to keep up with bowel movements when you have a broken rib.  Use a dulcolax suppository if you don't have a BM at least every other day.    Follow up for any additional concerns.    Dr. Homar Kc

## 2021-03-11 ENCOUNTER — NURSING HOME VISIT (OUTPATIENT)
Dept: GERIATRICS | Facility: OTHER | Age: 86
End: 2021-03-11
Attending: NURSE PRACTITIONER
Payer: MEDICARE

## 2021-03-11 DIAGNOSIS — K59.00 CONSTIPATION, UNSPECIFIED CONSTIPATION TYPE: Primary | ICD-10-CM

## 2021-03-11 DIAGNOSIS — S41.119A SKIN TEAR OF UPPER EXTREMITY: ICD-10-CM

## 2021-03-11 RX ORDER — DOCUSATE SODIUM 100 MG/1
100 CAPSULE, LIQUID FILLED ORAL 2 TIMES DAILY
Qty: 120 CAPSULE | Refills: 4 | Status: SHIPPED | OUTPATIENT
Start: 2021-03-11 | End: 2022-01-11

## 2021-03-15 NOTE — PROGRESS NOTES
Geno Horn is a 89 year old female being seen today for follow up visit visit at [facility].    Code Status: DNR / DNI.   Health Care Power of : Extended Emergency Contact Information  Primary Emergency Contact: BOBBI OSBORNE   [facility]  Home Phone: [phone]  Mobile Phone: [phone]  Relation: Daughter     Allergies: Celebrex [celecoxib] and Tolterodine     Chief Complaint / HPI: Follow-up from recent fall was seen in ED, left lower lateral rib fracture from fall impact on armrest of wheelchair  Also was noted on imaging incidental constipation--- was given a suppository in the ED  Resident is requesting scheduled stool softener--she is on pain medication as needed for her thoracic compression fracture.    Resident raises no other concerns--continues to self transfer  Currently working with home care physical therapy    Follow-up on skin tear injury most recent on left lower forearm--- home care skilled nursing is following up 3 days a week  Current dressing changes with skin cleanser, Vaseline gauze, cover with foam dressing      Past Medical, Surgical, Family and Social History reviewed: YES.     Medications: reviewed  Medications - recent changes:     Review of Systems:  General: negative  Skin: positive for skin tear  Eyes: negative  Resp: negative  GI: positive for constipation  Musculoskeletal: positive for fracture, back pain and arthritis  Psychiatric: negative  Toileting:    Continent of Bowel: Yes   Continent of Bladder: Yes  Mobility: wheelchair    Recent Labs: reviewed      Current Therapies: physical therapy    Exam:  Vital signs reviewed.   GENERAL APPEARANCE:  alert and no distress  EYES: Eyes grossly normal to inspection,   RESP: lungs clear   ABDOMEN: soft, nontender  MS: no musculoskeletal defects are noted   SKIN: healing skin tear wound left upper forearm, clean, no redness or active drainage--vaseline gauze in place  With foam dressing cover, non tender--about 2 cm  irregular wound bed  Right forearm skin tear wound healed, clean and dry--linear faded pink line from tear injury site  NEURO: Normal strength and tone x 4,  mentation intact and speech normal  PSYCH: mentation appears normal and affect normal/bright    Assessment and Plan:    We will start scheduled Colace, resident will continue to push fluids and work with physical therapy and fitness center on bicycle    Most recent skin tear injury left forearm is healing nicely would expect completely healed in the next 2 weeks  Skilled nursing wound care will continue regular follow-ups    Given incentive spirometry bottle to use to prevent atelectasis, pneumonia from rib fractures and limited mobility    followup PRN    (K59.00) Constipation, unspecified constipation type  (primary encounter diagnosis)    Plan: docusate sodium (COLACE) 100 MG capsule            (S41.119A) Skin tear of upper extremity

## 2021-03-18 ENCOUNTER — NURSING HOME VISIT (OUTPATIENT)
Dept: GERIATRICS | Facility: OTHER | Age: 86
End: 2021-03-18
Attending: NURSE PRACTITIONER
Payer: MEDICARE

## 2021-03-18 DIAGNOSIS — S41.112A SKIN TEAR OF LEFT UPPER EXTREMITY: Primary | ICD-10-CM

## 2021-03-18 DIAGNOSIS — K59.00 CONSTIPATION, UNSPECIFIED CONSTIPATION TYPE: ICD-10-CM

## 2021-03-18 DIAGNOSIS — S22.070D CLOSED WEDGE COMPRESSION FRACTURE OF T9 VERTEBRA WITH ROUTINE HEALING, SUBSEQUENT ENCOUNTER: ICD-10-CM

## 2021-03-18 NOTE — NURSING NOTE
Chief Complaint   Patient presents with     Hospital F/U     pylonephritis       Medication Reconciliation: complete    Sarah Valdovinos LPN on 7/3/2019 at 12:48 PM     [Normal Conjunctiva] : the conjunctiva exhibited no abnormalities [Auscultation Breath Sounds / Voice Sounds] : lungs were clear to auscultation bilaterally [Heart Rate And Rhythm] : heart rate and rhythm were normal [Heart Sounds] : normal S1 and S2 [Murmurs] : no murmurs present [Edema] : no peripheral edema present [Bowel Sounds] : normal bowel sounds [Abdomen Soft] : soft [Abdomen Tenderness] : non-tender [Abnormal Walk] : normal gait [Cyanosis, Localized] : no localized cyanosis [] : no rash [Affect] : the affect was normal [FreeTextEntry1] : pleasant

## 2021-03-20 NOTE — PROGRESS NOTES
Geno Horn is a 89 year old female being seen today for follow up visit at [facility]    Code Status: DNR / DNI.   Health Care Power of : Extended Emergency Contact Information  Primary Emergency Contact: BOBBI OSBORNE   [facility]  Home Phone: [phone]  Mobile Phone: [phone]  Relation: Daughter     Allergies: Celebrex [celecoxib] and Tolterodine     Chief Complaint / HPI: followup on constipation issues and wound healing. Was working with PT when I visited.  Resident is highly motivated to walk with walker independent as her main goal. She is not reporting back pain from her thoracic compression fracture.  Recently had a fall and fractured left lower anterior ribs week ago. Using tylenol. Rib fractures have not limited her therapy and mobility.  Resident appears much younger than her actual age.    Home care therapy and skilled nursing services making great progress.        Past Medical, Surgical, Family and Social History reviewed: YES.     Medications: reviewed  Medications - recent changes: colace BID    Review of Systems:  General: negative  Skin: positive for wound  Eyes: negative  Resp: negative  GI: positive for constipation  Musculoskeletal: positive for fracture, back pain, arthritis and joint pain  Psychiatric: negative  Toileting:    Continent of Bowel: Yes   Continent of Bladder: Yes  Mobility: walker and wheelchair    Recent Labs: reviewed      Current Therapies: physical therapy and occupational therapy    Exam:  Vital signs reviewed.   GENERAL APPEARANCE:  alert and no distress  NECK: no adenopathy, no asymmetry  RESP: lungs clear   CV: regular rate,no peripheral edema  ABDOMEN: soft, nontender  MS: no musculoskeletal defects are noted   SKIN: left forearm skin tear wound bed epithelialized with new granulation tissue filling wound bed from edges above 75% --central area fragile pink  Moist and clean--vaseline gauze cover with foam dressing.  NEURO: Normal strength BUSTILLO's x  4, mentation intact and speech normal  PSYCH: mentation appears normal and affect normal/bright    Assessment and Plan:      History thoracic compression fracture--no neuro red flags  Left lateral rib fractures--using tylenol and not limiting activity    Most recent skin tear wound healing progressing slowly--would expect completely healed and able to remove dressing cover in 1-2 weeks  Home care skilled nursing will continue to follow     PT home care services making great steady progress--walked from room down hallway with walker gait belt and standby assistance.  Resident is highly motivated to walk independently with walker.    Colace BID working well and does not want any changes--      (S41.112A) Skin tear of left upper extremity  (primary encounter diagnosis)      (S22.710D) Closed wedge compression fracture of T9 vertebra with routine healing, subsequent encounter      (K59.00) Constipation, unspecified constipation type

## 2021-03-22 ENCOUNTER — APPOINTMENT (OUTPATIENT)
Dept: LAB | Facility: OTHER | Age: 86
End: 2021-03-22
Attending: NURSE PRACTITIONER
Payer: MEDICARE

## 2021-03-22 ENCOUNTER — RESULTS ONLY (OUTPATIENT)
Dept: LAB | Age: 86
End: 2021-03-22

## 2021-03-22 ENCOUNTER — MEDICAL CORRESPONDENCE (OUTPATIENT)
Dept: HEALTH INFORMATION MANAGEMENT | Facility: OTHER | Age: 86
End: 2021-03-22

## 2021-03-22 LAB
ALBUMIN UR-MCNC: NEGATIVE MG/DL
APPEARANCE UR: CLEAR
BILIRUB UR QL STRIP: NEGATIVE
COLOR UR AUTO: NORMAL
GLUCOSE UR STRIP-MCNC: NEGATIVE MG/DL
HGB UR QL STRIP: NEGATIVE
KETONES UR STRIP-MCNC: NEGATIVE MG/DL
LEUKOCYTE ESTERASE UR QL STRIP: NEGATIVE
NITRATE UR QL: NEGATIVE
PH UR STRIP: 7 PH (ref 5–7)
SOURCE: NORMAL
SP GR UR STRIP: 1.02 (ref 1–1.03)
UROBILINOGEN UR STRIP-MCNC: NORMAL MG/DL (ref 0–2)

## 2021-04-27 ENCOUNTER — RESULTS ONLY (OUTPATIENT)
Dept: LAB | Age: 86
End: 2021-04-27

## 2021-04-27 ENCOUNTER — MEDICAL CORRESPONDENCE (OUTPATIENT)
Dept: HEALTH INFORMATION MANAGEMENT | Facility: OTHER | Age: 86
End: 2021-04-27

## 2021-04-27 ENCOUNTER — TELEPHONE (OUTPATIENT)
Dept: FAMILY MEDICINE | Facility: OTHER | Age: 86
End: 2021-04-27

## 2021-04-27 DIAGNOSIS — E03.8 OTHER SPECIFIED HYPOTHYROIDISM: Primary | ICD-10-CM

## 2021-04-27 LAB — TSH SERPL DL<=0.05 MIU/L-ACNC: 10.41 IU/ML (ref 0.34–5.6)

## 2021-04-27 RX ORDER — LEVOTHYROXINE SODIUM 100 UG/1
100 TABLET ORAL DAILY
Qty: 60 TABLET | Refills: 0 | Status: SHIPPED | OUTPATIENT
Start: 2021-04-27 | End: 2021-05-26

## 2021-04-27 NOTE — TELEPHONE ENCOUNTER
Called [facility] to increase dose from 75 mcg levothyroxine to 100 mcg--recheck lab 2 months    Sierra Ray, LARISA CNP   April 27, 2021

## 2021-04-30 DIAGNOSIS — S22.070G CLOSED WEDGE COMPRESSION FRACTURE OF T9 VERTEBRA WITH DELAYED HEALING, SUBSEQUENT ENCOUNTER: Primary | ICD-10-CM

## 2021-04-30 DIAGNOSIS — M25.559 HIP PAIN: ICD-10-CM

## 2021-04-30 NOTE — TELEPHONE ENCOUNTER
[id number] of [place] sent Rx request for the following:      Requested Prescriptions   Pending Prescriptions Disp Refills   acetaminophen (TYLENOL) 500 MG tablet 180 tablet 3    Sig: Take 1 tabs BID and 1 tabs PRN daily Not to exceed 4000 mg of acetaminophen in 24 hours from all sources.   Last Office Visit:              3/18/21 ([facility] visit, Sierra Ray)  Future Office visit:           None  Routing refill request to provider for review/approval because:  Not on active medication list as requested. Tylenol 500 mg tablet was last historically ordered as taking 2 tabs BID and 2 tabs PRN daily.    Unable to complete prescription refill per RN Medication Refill Policy. Karen Simon RN .............. 4/30/2021  3:15 PM

## 2021-04-30 NOTE — TELEPHONE ENCOUNTER
Note from pharmacy: Facility said this has changed to 1 tab.  Macy Roy RN ,....................  4/30/2021   3:07 PM

## 2021-05-03 RX ORDER — ACETAMINOPHEN 500 MG
TABLET ORAL
Qty: 180 TABLET | Refills: 2 | Status: SHIPPED | OUTPATIENT
Start: 2021-05-03 | End: 2021-05-27

## 2021-05-13 ENCOUNTER — NURSING HOME VISIT (OUTPATIENT)
Dept: GERIATRICS | Facility: OTHER | Age: 86
End: 2021-05-13
Attending: NURSE PRACTITIONER
Payer: MEDICARE

## 2021-05-13 DIAGNOSIS — R53.81 PHYSICAL DECONDITIONING: ICD-10-CM

## 2021-05-13 DIAGNOSIS — E03.9 HYPOTHYROIDISM, UNSPECIFIED TYPE: ICD-10-CM

## 2021-05-13 DIAGNOSIS — R53.83 FATIGUE, UNSPECIFIED TYPE: Primary | ICD-10-CM

## 2021-05-17 NOTE — PROGRESS NOTES
Geno Horn is a 89 year old female being seen today for follow up visit visit at [facility].    Code Status: DNR / DNI.   Health Care Power of : Extended Emergency Contact Information  Primary Emergency Contact: BOBBI OSBORNE   [facility]  Home Phone: [phone]  Mobile Phone: [phone]  Relation: Daughter     Allergies: Celebrex [celecoxib] and Tolterodine     Chief Complaint / HPI: patient requests followup on fatigue. TSH recently low in January while staying in SNF  Dose was adjusted down to 75 mcg. Rechecked TSH at 10.0, dose was adjusted up to 100 mcg 4/27/21.  Reports has been feeling generally fatigued. She is now wheelchair dependent, unable to use walker safely without standby assistance.  She reports no other concerns. Overall she has acclimated well to [facility] well with participation in social activities  And friendships.     Past Medical, Surgical, Family and Social History reviewed: YES.     Medications: reviewed  Medications - recent changes: levothyroxine dose adjustment down    Review of Systems:  General: positive for fatigue  Musculoskeletal: positive for back pain and muscular weakness  Endocrine: positive for thyroid disorder  All other systems negative  Toileting:    Continent of Bowel: Yes   Continent of Bladder: Yes  Mobility: wheelchair    Recent Labs: reviewed      Current Therapies: physical therapy    Exam:  Vital signs reviewed.   GENERAL APPEARANCE: alert and no distress  EYES: Eyes grossly normal to inspection  NECK: no adenopathy, no asymmetry  RESP: lungs clear   CV: regular rate, no peripheral edema   MS: no musculoskeletal defects are noted--wheelchair dependent  SKIN: warm and dry  PSYCH: mentation appears normal and affect normal/bright    Assessment and Plan:    Will need to recheck lab to assess thyroid function  Other possible etiologies may include deconditioning from lost mobility, anemia.    Will schedule lab next facility lab day--or give option for  daughter to transport into clinic for labs sooner.  Will followup with results and discuss plan of action as indicated.    (R53.83) Fatigue, unspecified type  (primary encounter diagnosis)      (E03.9) Hypothyroidism, unspecified type      (R53.81) Physical deconditioning

## 2021-05-25 ENCOUNTER — RESULTS ONLY (OUTPATIENT)
Dept: LAB | Age: 86
End: 2021-05-25

## 2021-05-25 ENCOUNTER — MEDICAL CORRESPONDENCE (OUTPATIENT)
Dept: HEALTH INFORMATION MANAGEMENT | Facility: OTHER | Age: 86
End: 2021-05-25

## 2021-05-25 LAB
BASOPHILS # BLD AUTO: 0 10E9/L (ref 0–0.2)
BASOPHILS NFR BLD AUTO: 0.4 %
DIFFERENTIAL METHOD BLD: ABNORMAL
EOSINOPHIL # BLD AUTO: 0.2 10E9/L (ref 0–0.7)
EOSINOPHIL NFR BLD AUTO: 1.5 %
ERYTHROCYTE [DISTWIDTH] IN BLOOD BY AUTOMATED COUNT: 14.6 % (ref 10–15)
HCT VFR BLD AUTO: 31 % (ref 35–47)
HGB BLD-MCNC: 10.6 G/DL (ref 11.7–15.7)
IMM GRANULOCYTES # BLD: 0.1 10E9/L (ref 0–0.4)
IMM GRANULOCYTES NFR BLD: 0.5 %
LYMPHOCYTES # BLD AUTO: 1.2 10E9/L (ref 0.8–5.3)
LYMPHOCYTES NFR BLD AUTO: 11.4 %
MCH RBC QN AUTO: 34.5 PG (ref 26.5–33)
MCHC RBC AUTO-ENTMCNC: 34.2 G/DL (ref 31.5–36.5)
MCV RBC AUTO: 101 FL (ref 78–100)
MONOCYTES # BLD AUTO: 0.8 10E9/L (ref 0–1.3)
MONOCYTES NFR BLD AUTO: 7.6 %
NEUTROPHILS # BLD AUTO: 8.3 10E9/L (ref 1.6–8.3)
NEUTROPHILS NFR BLD AUTO: 78.6 %
PLATELET # BLD AUTO: 275 10E9/L (ref 150–450)
RBC # BLD AUTO: 3.07 10E12/L (ref 3.8–5.2)
T4 FREE SERPL-MCNC: 0.82 NG/DL (ref 0.6–1.6)
TSH SERPL DL<=0.05 MIU/L-ACNC: 2.75 IU/ML (ref 0.34–5.6)
WBC # BLD AUTO: 10.6 10E9/L (ref 4–11)

## 2021-05-26 DIAGNOSIS — E03.8 OTHER SPECIFIED HYPOTHYROIDISM: ICD-10-CM

## 2021-05-26 RX ORDER — LEVOTHYROXINE SODIUM 100 UG/1
100 TABLET ORAL DAILY
Qty: 90 TABLET | Refills: 3 | Status: SHIPPED | OUTPATIENT
Start: 2021-05-26 | End: 2022-01-19

## 2021-05-26 NOTE — TELEPHONE ENCOUNTER
"Requested Prescriptions   Pending Prescriptions Disp Refills     levothyroxine (SYNTHROID/LEVOTHROID) 100 MCG tablet [Pharmacy Med Name: LEVOTHYROXINE 100MCG TABLET] 60 tablet 0     Sig: TAKE 1 TABLET (100 MCG) BY MOUTH DAILY       Thyroid Protocol Passed - 5/26/2021  9:38 AM        Passed - Patient is 12 years or older        Passed - Recent (12 mo) or future (30 days) visit within the authorizing provider's specialty     Patient has had an office visit with the authorizing provider or a provider within the authorizing providers department within the previous 12 mos or has a future within next 30 days. See \"Patient Info\" tab in inbasket, or \"Choose Columns\" in Meds & Orders section of the refill encounter.            Passed - Medication is active on med list        Passed - Normal TSH on file in past 12 months     No lab results found.       Thyrotropin 0.34 - 5.60 IU/mL 2.75              Passed - No active pregnancy on record     If patient is pregnant or has had a positive pregnancy test, please check TSH.          Passed - No positive pregnancy test in past 12 months     If patient is pregnant or has had a positive pregnancy test, please check TSH.           LOV-5/13/21 and medication remains current on medication list.  Prescription refilled per RN Medication RefillPolicy.................... Tatiana Sanchez RN ....................  5/26/2021   11:15 AM      "

## 2021-05-27 ENCOUNTER — TELEPHONE (OUTPATIENT)
Dept: FAMILY MEDICINE | Facility: OTHER | Age: 86
End: 2021-05-27

## 2021-05-27 ENCOUNTER — NURSING HOME VISIT (OUTPATIENT)
Dept: GERIATRICS | Facility: OTHER | Age: 86
End: 2021-05-27
Attending: NURSE PRACTITIONER
Payer: MEDICARE

## 2021-05-27 DIAGNOSIS — S22.070G CLOSED WEDGE COMPRESSION FRACTURE OF T9 VERTEBRA WITH DELAYED HEALING, SUBSEQUENT ENCOUNTER: ICD-10-CM

## 2021-05-27 DIAGNOSIS — M25.562 CHRONIC PAIN OF LEFT KNEE: ICD-10-CM

## 2021-05-27 DIAGNOSIS — D64.89 ANEMIA DUE TO OTHER CAUSE, NOT CLASSIFIED: ICD-10-CM

## 2021-05-27 DIAGNOSIS — M25.559 HIP PAIN: ICD-10-CM

## 2021-05-27 DIAGNOSIS — M17.12 ARTHRITIS OF LEFT KNEE: ICD-10-CM

## 2021-05-27 DIAGNOSIS — M15.0 PRIMARY OSTEOARTHRITIS INVOLVING MULTIPLE JOINTS: Primary | ICD-10-CM

## 2021-05-27 DIAGNOSIS — G89.29 CHRONIC PAIN OF LEFT KNEE: ICD-10-CM

## 2021-05-27 RX ORDER — BACILLUS COAGULANS 1B CELL
1 CAPSULE ORAL DAILY
Qty: 45 TABLET | Refills: 0 | Status: SHIPPED | OUTPATIENT
Start: 2021-05-27 | End: 2021-06-22

## 2021-05-27 RX ORDER — ACETAMINOPHEN 500 MG
TABLET ORAL
Qty: 180 TABLET | Refills: 2 | COMMUNITY
Start: 2021-05-27 | End: 2021-09-19 | Stop reason: ALTCHOICE

## 2021-05-27 NOTE — TELEPHONE ENCOUNTER
[facility] is looking for verbal orders to evaluate and treat for skilled nursing and PT    Please call and advise    Thank You  Edwina Srivastava on 5/27/2021 at 2:24 PM

## 2021-05-27 NOTE — TELEPHONE ENCOUNTER
Patient was seen today at [facility] and face to face was completed    LARISA Mathis CNP   May 27, 2021

## 2021-05-27 NOTE — PROGRESS NOTES
Geno Horn is a 89 year old female being seen today for comprehensive visit at [facility], [place].    Code Status: DNR / DNI.   Health Care Power of : Extended Emergency Contact Information  Primary Emergency Contact: BOBBI OSBORNE   [facility]  Home Phone: [phone]  Mobile Phone: [phone]  Relation: Daughter     Allergies: Celebrex [celecoxib] and Tolterodine     Chief Complaint / HPI: Follow-up on several issues and recent lab results--- discussed with patient and daughter who is available by speaker phone  Resident has been complaining of weakness and fatigue--- recent labs completed CBC unchanged from previous however chronic anemia  Thyroid studies normal level on current dose of medication  Resident reports severe left knee pain stiffness from osteoarthritis--grinding crepitus sounds with extension and flexion  Currently walker wheelchair dependent--- recent history of thoracic compression fracture    Nursing staff had wanted me to look at her right lower ankle and told for report of possible redness and edema  However ankle edema minimal and no evidence of metatarsal edema or erythema--no pain or limitations range of motion          Documentation of Face-to-Face and Certification for Home Health Services     Patient: Geno Horn   YOB: 1926  MR Number: [id number]  Today's Date: 5/27/2021    I certify that patient: eGno Horn is under my care and that I, or a nurse practitioner or physician's assistant working with me, had a face-to-face encounter that meets the physician face-to-face encounter requirements with this patient on: 5/27/2021.    This encounter with the patient was in whole, or in part, for the following medical condition, which is the primary reason for home health care: M89.49) Primary osteoarthritis involving multiple joints  (primary encounter diagnosis)(S22.070G) Closed wedge compression fracture of T9 vertebra with delayed healing, subsequent  encounter      I certify that, based on my findings, the following services are medically necessary home health services: Physical Therapy.    My clinical findings support the need for the above services because: Nurse is needed: provide education about condition and ensure adherence to reatment plan. and Physical Therapy Services are needed to assess and treat the following functional impairments: mobility, transfers, ADLs.    Further, I certify that my clinical findings support that this patient is homebound (i.e. absences from home require considerable and taxing effort and are for medical reasons or [employer] services or infrequently or of short duration when for other reasons) because: Requires assistance of another person or specialized equipment to access medical services because patient: Range of motion limitations prevents ability to exit home safely. and Requires supervision of another for safe transfer...    Based on the above findings. I certify that this patient is confined to the home and needs intermittent skilled nursing care, physical therapy and/or speech therapy.  The patient is under my care, and I have initiated the establishment of the plan of care.  This patient will be followed by a physician who will periodically review the plan of care.  Physician/Provider to provide follow up care: Abundio Rendon    Responsible Medicare certified [place] Physician: Dr Rendon  Physician Signature: See electronic signature associated with these discharge orders.  Date: 5/27/2021      Past Medical, Surgical, Family and Social History reviewed: YES.     Medications: reviewed and reconciled  Medications - recent changes:     Review of Systems:  Skin: positive for pigmentation, ecchymosis  Musculoskeletal: positive for arthritis, joint pain, joint stiffness and muscular weakness  Neurologic: positive for local weakness  Hematologic: positive for anemia  Endocrine: positive for thyroid disorder  All other systems  negative  Toileting:    Continent of Bowel: Yes   Continent of Bladder: Yes  Mobility: walker and wheelchair    Recent Labs: reviewed and discussed treatment plan      Current Therapies: none    Exam:  Vital signs reviewed.   GENERAL APPEARANCE:  alert and no distress  EYES: Eyes grossly normal to inspection, conjunctivae and sclerae normal  NECK: no adenopathy, no asymmetry  RESP: lungs clear   CV: regular rate and rhythm, normal S1 S2, no S3 or S4, no murmur, click or rub, mild peripheral edema and peripheral pulses strong  MS: Left patella joint edema with crepitus on flexion and extension, no focal erythema or warmth consistent with osteoarthritis    SKIN: Macular patches of ecchymosis distributed bilateral lower legs forearms and elbows--unchanged from previous  No evidence of cellulitis or focal edema  PSYCH: mentation appears normal and affect normal/bright    Assessment and Plan:    Had a lengthy discussion with daughter and patient--- would benefit from physical therapy for osteoarthritis particularly bilateral lower extremities    Nonsurgical orthopedic evaluation for chronic left knee pain with imaging and options for palliative pain relief such as injections or other nonsurgical options  Daughter will consider and will schedule appointment if decides to proceed    We will trial 6 weeks of Vitron-C--repeat CBC with differential and recheck thyroid studies at that time    Referral sent to [facility] for physical therapy services--patient and daughter both agreeable and in favor of trying all nonsurgical options to improve mobility, transfers, independence with ADLs        (M89.49) Primary osteoarthritis involving multiple joints  (primary encounter diagnosis)    (M17.12) Arthritis of left knee      (M25.562,  G89.29) Chronic pain of left knee      (D64.89) Anemia due to other cause, not classified    Plan: ferrous fumarate 65 mg, [place]. FE,-Vitamin C 125        mg (VITRON-C)  MG TABS tablet             (S22.070G) Closed wedge compression fracture of T9 vertebra with delayed healing, subsequent encounter    Plan: acetaminophen (TYLENOL) 500 MG tablet            (M25.559) Hip pain    Plan: acetaminophen (TYLENOL) 500 MG tablet

## 2021-05-27 NOTE — Clinical Note
Face to face completed--referral sent to [facility]  Please sign homecare PT orders

## 2021-05-27 NOTE — TELEPHONE ENCOUNTER
Notified [facility] of providers note.  Betsy Tadeo LPN ....................  5/27/2021   3:07 PM

## 2021-05-31 PROCEDURE — G0180 MD CERTIFICATION HHA PATIENT: HCPCS | Performed by: NURSE PRACTITIONER

## 2021-06-01 DIAGNOSIS — M16.0 PRIMARY OSTEOARTHRITIS OF BOTH HIPS: Primary | ICD-10-CM

## 2021-06-02 ENCOUNTER — TELEPHONE (OUTPATIENT)
Dept: INTERNAL MEDICINE | Facility: OTHER | Age: 86
End: 2021-06-02

## 2021-06-08 ENCOUNTER — HOSPITAL ENCOUNTER (OUTPATIENT)
Dept: GENERAL RADIOLOGY | Facility: OTHER | Age: 86
End: 2021-06-08
Attending: FAMILY MEDICINE
Payer: MEDICARE

## 2021-06-08 ENCOUNTER — OFFICE VISIT (OUTPATIENT)
Dept: FAMILY MEDICINE | Facility: OTHER | Age: 86
End: 2021-06-08
Attending: FAMILY MEDICINE
Payer: MEDICARE

## 2021-06-08 VITALS
DIASTOLIC BLOOD PRESSURE: 64 MMHG | SYSTOLIC BLOOD PRESSURE: 128 MMHG | BODY MASS INDEX: 22.31 KG/M2 | HEART RATE: 73 BPM | OXYGEN SATURATION: 98 % | WEIGHT: 122 LBS | RESPIRATION RATE: 14 BRPM | TEMPERATURE: 97.4 F

## 2021-06-08 DIAGNOSIS — M11.261 CHONDROCALCINOSIS OF RIGHT KNEE: ICD-10-CM

## 2021-06-08 DIAGNOSIS — M17.11 PRIMARY OSTEOARTHRITIS OF RIGHT KNEE: ICD-10-CM

## 2021-06-08 DIAGNOSIS — M17.12 PRIMARY OSTEOARTHRITIS OF LEFT KNEE: Primary | ICD-10-CM

## 2021-06-08 DIAGNOSIS — M11.262 CHONDROCALCINOSIS OF LEFT KNEE: ICD-10-CM

## 2021-06-08 PROCEDURE — 99215 OFFICE O/P EST HI 40 MIN: CPT | Mod: 25 | Performed by: FAMILY MEDICINE

## 2021-06-08 PROCEDURE — 250N000009 HC RX 250: Performed by: FAMILY MEDICINE

## 2021-06-08 PROCEDURE — 73564 X-RAY EXAM KNEE 4 OR MORE: CPT | Mod: 50

## 2021-06-08 PROCEDURE — G0463 HOSPITAL OUTPT CLINIC VISIT: HCPCS

## 2021-06-08 PROCEDURE — G0463 HOSPITAL OUTPT CLINIC VISIT: HCPCS | Mod: 25

## 2021-06-08 PROCEDURE — 250N000011 HC RX IP 250 OP 636: Performed by: FAMILY MEDICINE

## 2021-06-08 PROCEDURE — 20610 DRAIN/INJ JOINT/BURSA W/O US: CPT | Performed by: FAMILY MEDICINE

## 2021-06-08 RX ORDER — LIDOCAINE HYDROCHLORIDE 10 MG/ML
2 INJECTION, SOLUTION EPIDURAL; INFILTRATION; INTRACAUDAL; PERINEURAL ONCE
Status: COMPLETED | OUTPATIENT
Start: 2021-06-08 | End: 2021-06-08

## 2021-06-08 RX ORDER — TRIAMCINOLONE ACETONIDE 40 MG/ML
20 INJECTION, SUSPENSION INTRA-ARTICULAR; INTRAMUSCULAR ONCE
Status: COMPLETED | OUTPATIENT
Start: 2021-06-08 | End: 2021-06-08

## 2021-06-08 RX ORDER — BUPIVACAINE HYDROCHLORIDE 5 MG/ML
2 INJECTION, SOLUTION EPIDURAL; INTRACAUDAL ONCE
Status: COMPLETED | OUTPATIENT
Start: 2021-06-08 | End: 2021-06-08

## 2021-06-08 RX ADMIN — LIDOCAINE HYDROCHLORIDE 2 ML: 10 INJECTION, SOLUTION INFILTRATION; PERINEURAL at 15:04

## 2021-06-08 RX ADMIN — TRIAMCINOLONE ACETONIDE 20 MG: 40 INJECTION, SUSPENSION INTRA-ARTICULAR; INTRAMUSCULAR at 15:08

## 2021-06-08 RX ADMIN — BUPIVACAINE HYDROCHLORIDE 10 MG: 5 INJECTION, SOLUTION EPIDURAL; INTRACAUDAL at 15:08

## 2021-06-08 RX ADMIN — BUPIVACAINE HYDROCHLORIDE 10 MG: 5 INJECTION, SOLUTION EPIDURAL; INTRACAUDAL at 15:07

## 2021-06-08 ASSESSMENT — PAIN SCALES - GENERAL: PAINLEVEL: WORST PAIN (10)

## 2021-06-08 NOTE — NURSING NOTE
"Chief Complaint   Patient presents with     Knee Pain     bilateral knee pain, left worse than right, pain 10/10, no known injury     Patient presents to clinic today for bilateral knee pain, left worse than right. Pain 10/10. No known injury. She has noticed this pain for about 1 year. She denies any past surgeries or injections on either knee.     Initial /64 (BP Location: Right arm, Patient Position: Sitting, Cuff Size: Adult Regular)   Pulse 73   Temp 97.4  F (36.3  C) (Tympanic)   Resp 14   Wt 55.3 kg (122 lb)   SpO2 98%   BMI 22.31 kg/m   Estimated body mass index is 22.31 kg/m  as calculated from the following:    Height as of 3/7/21: 1.575 m (5' 2\").    Weight as of this encounter: 55.3 kg (122 lb).         Medication Reconciliation: Complete      Magy Barbour LPN   "

## 2021-06-08 NOTE — PROGRESS NOTES
HPI:  89-year-old female coming in for evaluation of bilateral knee pain, left worse than right.  Pain has been present for almost a year.  Patient endorses a gradual onset without inciting event or trauma.  She rates her pain as 10/10.  She characterizes the pain as aching.  She localizes the pain to the anterior aspect of the knees.  For the last 7-8 months she has had increased difficulty with walking and has mostly been wheelchair-bound for the last half year.  Her pain is worse with bending and standing.  She has tried ice, Tylenol, and physical therapy.  She has associated swelling and grinding of the left knee.      EXAM:  /64 (BP Location: Right arm, Patient Position: Sitting, Cuff Size: Adult Regular)   Pulse 73   Temp 97.4  F (36.3  C) (Tympanic)   Resp 14   Wt 55.3 kg (122 lb)   SpO2 98%   BMI 22.31 kg/m    MUSCULOSKELETAL EXAM:  LEFT KNEE  Inspection:  -No gross deformity  -Multiple areas of bruising throughout the lower extremity  -Scars:  None    Tenderness to palpation of the:  -Quadriceps musculature:  Negative  -Quadriceps tendon:  Negative  -Patella:  Negative  -Medial patellar facet: Positive  -Lateral patellar facet: Positive  -Inferior pole of the patella:  Negative  -Patellar tendon:  Negative  -Tibial tuberosity:  Negative  -Medial joint line: Mild pain  -Medial collateral ligament:  Negative  -Medial hamstring tendons:  Negative  -Medial femoral condyle:  Negative  -Medial tibial plateau:  Negative  -Pes anserine bursa:  Negative  -Lateral joint line: No pain  -Distal IT band:  Negative  -Gerdy's tubercle:  Negative  -Lateral collateral ligament:  Negative  -Lateral hamstring tendons:  Negative  -Lateral femoral condyle:  Negative  -Lateral tibial plateau:  Negative  -Posterior lateral corner:  Negative  -Popliteal fossa:  Negative    Special Tests:  -Effusion:  Absent  -Medial patellar glide: Positive  -Lateral patellar glide: Positive  -Patellar apprehension:  Negative  -Valgus  stress:  Negative  -Varus stress:  Negative    Other:  -Intact sensation to light touch distally.  -No signs of cyanosis. Normal skin temperature of the lower extremity.  -Foot/ankle:  No gross deformity. Full range of motion.    MUSCULOSKELETAL EXAM:  RIGHT KNEE  Inspection:  -No gross deformity  -Multiple areas of bruising throughout the lower extremity  -Scars:  None    Tenderness to palpation of the:  -Quadriceps musculature:  Negative  -Quadriceps tendon:  Negative  -Patella:  Negative  -Medial patellar facet: Positive  -Lateral patellar facet: Positive  -Inferior pole of the patella:  Negative  -Patellar tendon:  Negative  -Tibial tuberosity:  Negative  -Medial joint line: Mild pain  -Medial collateral ligament:  Negative  -Medial hamstring tendons:  Negative  -Medial femoral condyle:  Negative  -Medial tibial plateau:  Negative  -Pes anserine bursa:  Negative  -Lateral joint line: No pain  -Distal IT band:  Negative  -Gerdy's tubercle:  Negative  -Lateral collateral ligament:  Negative  -Lateral hamstring tendons:  Negative  -Lateral femoral condyle:  Negative  -Lateral tibial plateau:  Negative  -Posterior lateral corner:  Negative  -Popliteal fossa:  Negative    Special Tests:  -Effusion:  Absent  -Medial patellar glide: Positive  -Lateral patellar glide: Positive  -Patellar apprehension:  Negative  -Valgus stress:  Negative  -Varus stress:  Negative    Other:  -Intact sensation to light touch distally.  -No signs of cyanosis. Normal skin temperature of the lower extremity.  -Foot/ankle:  No gross deformity. Full range of motion.      IMAGIN2021: 6 view bilateral knee x-ray  -Moderate degenerative changes in the bilateral medial and lateral tibiofemoral compartments with noted chondrocalcinosis.  Bone-on-bone osteoarthritis in the bilateral patellofemoral compartments.  No acute fracture.      ASSESSMENT/PLAN:  Diagnoses and all orders for this visit:  Primary osteoarthritis of left knee  -     XR  Knee Standing 2v Bilateral & 2v Bilateral  -     DRAIN/INJECT LARGE JOINT/BURSA  -     lidocaine (PF) (XYLOCAINE) 1 % injection 2 mL  -     Bupivacaine 0.5% 2mL Intra-articular  -     triamcinolone (KENALOG-40) injection 20 mg  Primary osteoarthritis of right knee  -     XR Knee Standing 2v Bilateral & 2v Bilateral  -     Bupivacaine 0.5% 2mL Intra-articular  -     lidocaine (PF) (XYLOCAINE) 1 % injection 2 mL  -     triamcinolone (KENALOG-40) injection 20 mg  -     DRAIN/INJECT LARGE JOINT/BURSA  Chondrocalcinosis of left knee  -     DRAIN/INJECT LARGE JOINT/BURSA  -     lidocaine (PF) (XYLOCAINE) 1 % injection 2 mL  -     Bupivacaine 0.5% 2mL Intra-articular  -     triamcinolone (KENALOG-40) injection 20 mg  Chondrocalcinosis of right knee  -     Bupivacaine 0.5% 2mL Intra-articular  -     lidocaine (PF) (XYLOCAINE) 1 % injection 2 mL  -     triamcinolone (KENALOG-40) injection 20 mg  -     DRAIN/INJECT LARGE JOINT/BURSA    89-year-old female with bilateral patellofemoral compartment osteoarthritis that is bone-on-bone.  X-rays were performed in the office today and personally reviewed by me with the findings as demonstrated above by my interpretation.  We discussed treatment options for this condition to include physical therapy (this will be the most beneficial treatment modality to get the patient walking again as she does note significant problems with balance that are not associated with knee pain), APAP, Voltaren gel, or CSI.  Patient is not likely a surgical candidate.  After reviewing the risks/benefits patient would like to proceed with bilateral corticosteroid injections into the intra-articular knees.  See procedure notes below:    PROCEDURE:  Intraarticular Injection of the Bilateral Knees     PROCEDURAL PAUSE:    A procedural pause was conducted to verify:  correct patient identity, procedure to be performed, and as applicable, correct side, site, and correct patient position.      INFORMED CONSENT:    I discussed the risks, possible benefits, and alternatives to injection.  Following denial of allergy and review of potential side effects and complications (including but not necessarily limited to infection, allergic reaction, fat necrosis, skin depigmentation, local tissue breakdown, systemic effects of corticosteroids, elevation of blood glucose, injury to soft tissue and/or nerves, and seizure), all questions were answered, and consent was given to proceed.  Patient verbalized understanding.      PROCEDURE DETAILS:    Side and site were marked, and a time out was performed to verify appropriate patient identifiers.  Following this the right knee, just superior to the tibial plateau and medial to the patellar tendon, was prepped with chlorhexidine.  Utilizing a 22-gauge needle the right intraarticular knee was injected using a anteromedial to posterolateral approach with 2mL of 1% Lidocaine, 2mL of 0.5% bupivacaine, and 0.5mL triamcinolone (40mg/mL).  0mL was wasted.    Side and site were marked, and a time out was performed to verify appropriate patient identifiers.  Following this the left knee, just superior to the tibial plateau and medial to the patellar tendon, was prepped with chlorhexidine.  Utilizing a 22-gauge needle the left intraarticular knee was injected using a anteromedial to posterolateral approach with 2mL of 1% Lidocaine, 2mL of 0.5% bupivacaine, and 0.5mL triamcinolone (40mg/mL).  0mL was wasted.      The patient tolerated the procedures without complication and was discharged in good condition after a short observation period.  The patient was instructed to contact me regarding any questions pertaining to the procedure.      DIAGNOSIS:    -Successful injection of the bilateral intraarticular knees without immediate complication      PLAN:   -Post-procedure care reviewed, including avoiding submersion of the injection site for 48 hours   -Return precautions reviewed for signs/symptoms that  would be concerning for infection   -The patient was instructed to ice and take APAP this evening if needed  -Discussed the importance of continued physical therapy  -Okay to use as needed APAP  -Patient can try Voltaren 1% gel 3-4 times per day as needed  -Return to clinic PRN      Miguel Harrison MD  6/8/2021  1:17 PM    Total time spent with this patient was 63 minutes which included chart review, visualization and interpretation of images, time spent with the patient, and documentation.

## 2021-06-08 NOTE — Clinical Note
Forrest Garcia,    I saw this patient in my office today for her bilateral knee pain.  We performed injections which she tolerated well.  Hopefully these work for her.  I can see her back on an as-needed basis.  Please let me know if you have questions.  Thanks.    Miguel

## 2021-06-09 ENCOUNTER — TELEPHONE (OUTPATIENT)
Dept: INTERNAL MEDICINE | Facility: OTHER | Age: 86
End: 2021-06-09

## 2021-06-09 NOTE — TELEPHONE ENCOUNTER
Calling to request verbal orders for PT once a week for one week, twice a week for one week and once a week for six weeks.

## 2021-06-11 NOTE — TELEPHONE ENCOUNTER
Please call [facility] th back.      Nicolasa Hull on 6/11/2021 at 8:40 AM

## 2021-06-16 ENCOUNTER — TELEPHONE (OUTPATIENT)
Dept: INTERNAL MEDICINE | Facility: OTHER | Age: 86
End: 2021-06-16
Payer: MEDICARE

## 2021-06-16 DIAGNOSIS — R26.89 BALANCE PROBLEMS: ICD-10-CM

## 2021-06-16 DIAGNOSIS — M48.061 SPINAL STENOSIS OF LUMBAR REGION, UNSPECIFIED WHETHER NEUROGENIC CLAUDICATION PRESENT: Primary | ICD-10-CM

## 2021-06-16 NOTE — TELEPHONE ENCOUNTER
Fransisca HINES reviewed and completed the following home care or hospice form(s) for [facility] on 6/16/21.   This covers the certification period effective 5/31/21 to 7/29/21.  Lucero Bustamante, CMA on 6/16/2021 at 9:47 AM

## 2021-06-17 ENCOUNTER — NURSING HOME VISIT (OUTPATIENT)
Dept: GERIATRICS | Facility: OTHER | Age: 86
End: 2021-06-17
Attending: NURSE PRACTITIONER
Payer: MEDICARE

## 2021-06-17 DIAGNOSIS — R26.81 GENERAL UNSTEADINESS: ICD-10-CM

## 2021-06-17 DIAGNOSIS — T14.8XXA MULTIPLE SKIN TEARS: Primary | ICD-10-CM

## 2021-06-17 DIAGNOSIS — S22.070D CLOSED WEDGE COMPRESSION FRACTURE OF T9 VERTEBRA WITH ROUTINE HEALING, SUBSEQUENT ENCOUNTER: ICD-10-CM

## 2021-06-17 DIAGNOSIS — Z79.899 ENCOUNTER FOR MEDICATION REVIEW: ICD-10-CM
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
## 2021-06-21 ENCOUNTER — TELEPHONE (OUTPATIENT)
Dept: INTERNAL MEDICINE | Facility: OTHER | Age: 86
End: 2021-06-21
Payer: MEDICARE

## 2021-06-21 DIAGNOSIS — M15.0 PRIMARY OSTEOARTHRITIS INVOLVING MULTIPLE JOINTS: Primary | ICD-10-CM

## 2021-06-21 PROCEDURE — G0180 MD CERTIFICATION HHA PATIENT: HCPCS | Performed by: NURSE PRACTITIONER

## 2021-06-21 NOTE — TELEPHONE ENCOUNTER
Fransisca Rocha reviewed and completed the following home care form for Geno Horn on 6/21/21.   This covers the certification period effective 05/31/21 to 07/29/21.  Noreen Fournier LPN on 6/21/2021 at 9:42 AM

## 2021-06-22 ENCOUNTER — MEDICAL CORRESPONDENCE (OUTPATIENT)
Dept: HEALTH INFORMATION MANAGEMENT | Facility: OTHER | Age: 86
End: 2021-06-22

## 2021-06-22 ENCOUNTER — TELEPHONE (OUTPATIENT)
Dept: FAMILY MEDICINE | Facility: OTHER | Age: 86
End: 2021-06-22

## 2021-06-22 ENCOUNTER — RESULTS ONLY (OUTPATIENT)
Dept: LAB | Age: 86
End: 2021-06-22

## 2021-06-22 ENCOUNTER — APPOINTMENT (OUTPATIENT)
Dept: LAB | Facility: OTHER | Age: 86
End: 2021-06-22
Attending: NURSE PRACTITIONER
Payer: MEDICARE

## 2021-06-22 DIAGNOSIS — D50.8 OTHER IRON DEFICIENCY ANEMIA: Primary | ICD-10-CM

## 2021-06-22 DIAGNOSIS — D64.89 ANEMIA DUE TO OTHER CAUSE, NOT CLASSIFIED: ICD-10-CM

## 2021-06-22 LAB
BASOPHILS # BLD AUTO: 0.1 10E9/L (ref 0–0.2)
BASOPHILS NFR BLD AUTO: 0.7 %
DIFFERENTIAL METHOD BLD: ABNORMAL
EOSINOPHIL # BLD AUTO: 0.2 10E9/L (ref 0–0.7)
EOSINOPHIL NFR BLD AUTO: 2.3 %
ERYTHROCYTE [DISTWIDTH] IN BLOOD BY AUTOMATED COUNT: 14.8 % (ref 10–15)
FERRITIN SERPL-MCNC: 156 NG/ML (ref 23.9–336.2)
HCT VFR BLD AUTO: 30.3 % (ref 35–47)
HGB BLD-MCNC: 9.8 G/DL (ref 11.7–15.7)
IMM GRANULOCYTES # BLD: 0.1 10E9/L (ref 0–0.4)
IMM GRANULOCYTES NFR BLD: 1.2 %
IRON SATN MFR SERPL: 15 % (ref 20–55)
IRON SERPL-MCNC: 38 UG/DL (ref 50–212)
LYMPHOCYTES # BLD AUTO: 1.5 10E9/L (ref 0.8–5.3)
LYMPHOCYTES NFR BLD AUTO: 13.7 %
MCH RBC QN AUTO: 32.8 PG (ref 26.5–33)
MCHC RBC AUTO-ENTMCNC: 32.3 G/DL (ref 31.5–36.5)
MCV RBC AUTO: 101 FL (ref 78–100)
MONOCYTES # BLD AUTO: 0.7 10E9/L (ref 0–1.3)
MONOCYTES NFR BLD AUTO: 6.4 %
NEUTROPHILS # BLD AUTO: 8 10E9/L (ref 1.6–8.3)
NEUTROPHILS NFR BLD AUTO: 75.7 %
PLATELET # BLD AUTO: 326 10E9/L (ref 150–450)
RBC # BLD AUTO: 2.99 10E12/L (ref 3.8–5.2)
TIBC SERPL-MCNC: 252 UG/DL (ref 245–400)
TSH SERPL DL<=0.05 MIU/L-ACNC: 2.59 IU/ML (ref 0.34–5.6)
UIBC (UNSATURATED): 214 MG/DL
WBC # BLD AUTO: 10.6 10E9/L (ref 4–11)

## 2021-06-22 RX ORDER — BACILLUS COAGULANS 1B CELL
1 CAPSULE ORAL 2 TIMES DAILY
Qty: 60 TABLET | Refills: 0 | Status: SHIPPED | OUTPATIENT
Start: 2021-06-22 | End: 2021-07-15

## 2021-06-23 NOTE — TELEPHONE ENCOUNTER
Left message to call back....................  6/23/2021   8:42 AM  Jeanine Fox LPN, LPN  6/23/2021  8:42 AM

## 2021-06-23 NOTE — TELEPHONE ENCOUNTER
Please call [facility]--Felicita HART DON  [phone]    New orders to change Vitron C dose  From 1 tab daily to 1 tab BID x 8 weeks  Not to be taken with calcium or mineral supplements    Will need to be put on lab list in 8 weeks facility lab day  Dose adjustment sent to [facility]    Please call daughter to report dose adjustment  And lab recheck    Thanks    LARISA Mathis CNP   June 22, 2021

## 2021-06-23 NOTE — TELEPHONE ENCOUNTER
Felicita from [facility] notified and also daughter has been notified.  Jeanine Fox, LPN, LPN  6/23/2021  1:49 PM

## 2021-06-25 ENCOUNTER — HOSPITAL ENCOUNTER (OUTPATIENT)
Dept: GENERAL RADIOLOGY | Facility: OTHER | Age: 86
End: 2021-06-25
Attending: NURSE PRACTITIONER
Payer: MEDICARE

## 2021-06-25 ENCOUNTER — OFFICE VISIT (OUTPATIENT)
Dept: FAMILY MEDICINE | Facility: OTHER | Age: 86
End: 2021-06-25
Payer: MEDICARE

## 2021-06-25 VITALS
RESPIRATION RATE: 16 BRPM | OXYGEN SATURATION: 97 % | TEMPERATURE: 98 F | WEIGHT: 136.4 LBS | DIASTOLIC BLOOD PRESSURE: 76 MMHG | SYSTOLIC BLOOD PRESSURE: 124 MMHG | BODY MASS INDEX: 25.1 KG/M2 | HEART RATE: 78 BPM | HEIGHT: 62 IN

## 2021-06-25 DIAGNOSIS — S81.811A TEAR OF SKIN OF MULTIPLE SITES OF LOWER EXTREMITY, RIGHT, INITIAL ENCOUNTER: ICD-10-CM

## 2021-06-25 DIAGNOSIS — R29.6 MULTIPLE FALLS: ICD-10-CM

## 2021-06-25 DIAGNOSIS — M25.471 RIGHT ANKLE SWELLING: Primary | ICD-10-CM

## 2021-06-25 PROCEDURE — G0463 HOSPITAL OUTPT CLINIC VISIT: HCPCS

## 2021-06-25 PROCEDURE — 99213 OFFICE O/P EST LOW 20 MIN: CPT | Performed by: NURSE PRACTITIONER

## 2021-06-25 PROCEDURE — 73610 X-RAY EXAM OF ANKLE: CPT | Mod: RT

## 2021-06-25 ASSESSMENT — MIFFLIN-ST. JEOR: SCORE: 971.96

## 2021-06-25 ASSESSMENT — PAIN SCALES - GENERAL: PAINLEVEL: NO PAIN (0)

## 2021-06-25 NOTE — NURSING NOTE
"Chief Complaint   Patient presents with     Swelling     Right leg swelling       Initial /76 (BP Location: Right arm, Patient Position: Sitting, Cuff Size: Adult Regular)   Pulse 78   Temp 98  F (36.7  C) (Temporal)   Resp 16   Ht 1.575 m (5' 2\")   Wt 61.9 kg (136 lb 6.4 oz)   SpO2 97%   BMI 24.95 kg/m   Estimated body mass index is 24.95 kg/m  as calculated from the following:    Height as of this encounter: 1.575 m (5' 2\").    Weight as of this encounter: 61.9 kg (136 lb 6.4 oz).  Medication Reconciliation: complete    Nicolasa Horn  "

## 2021-06-25 NOTE — PROGRESS NOTES
HPI:    Geno Horn is a 89 year old female  who presents to [facility] today for lower leg swelling.    Patient is brought into clinic today by her adult daughter.  Information is obtained by patient and daughter.  States she lost her balance about 5 or 6 days ago in her apartment in assisted living at [facility] and bumped her right lower leg against the fridge twice.  She has multiple skin tears on her right lower extremity that are covered in bandages by the nurses at her assisted living.  She has been having increased swelling in her right lower extremity the past 2 days so her daughter brought her in for evaluation today.  She denies any pain to touch.  Minimal pain with weight bearing.  No calf pain or tenderness.  She states she is non ambulatory and uses a motorized wheelchair.  She is not on anticoagulants.  She does not wear compression socks.        Past Medical History:   Diagnosis Date     Hypertension      Hypothyroid      Osteoarthritis      ST elevation (STEMI) myocardial infarction (H)     1981; angioplasty     Past Surgical History:   Procedure Laterality Date     ARTHROPLASTY HIP Right     Dr Rodriguez     ARTHROPLASTY HIP Left 2015    Dr. Rodriguez     COMBINED CYSTOSCOPY, URETEROSCOPY, LASER HOLMIUM LITHOTRIPSY URETER(S) Right 7/23/2019    Procedure: CYSTOSCOPY, RIGHT STENT REMOVAL, URETEROSCOPY, LASER HOLMIUM LITHOTRIPSY RIGHT URETER WITH STENT REPLACEMENT;  Surgeon: Shawn Cole MD;  Location:  OR     CYSTOSCOPY, RETROGRADES, INSERT STENT URETER(S), COMBINED Right 6/17/2019    Procedure: CYSTOSCOPY, WITH RETROGRADE PYELOGRAM AND URETERAL STENT INSERTION;  Surgeon: Shawn Cole MD;  Location:  OR     Social History     Tobacco Use     Smoking status: Never Smoker     Smokeless tobacco: Never Used     Tobacco comment: no ecig   Substance Use Topics     Alcohol use: Not Currently     Alcohol/week: 0.0 standard drinks     Current Outpatient Medications   Medication Sig Dispense Refill      acetaminophen (TYLENOL) 500 MG tablet Take 2 tabs HS and 1 tabs PRN daily Not to exceed 4000 mg of acetaminophen in 24 hours from all sources. 180 tablet 2     cetirizine (ZYRTEC) 10 MG tablet Take 10 mg by mouth daily as needed       diltiazem ER (DILT-XR) 180 MG 24 hr capsule Take 1 capsule (180 mg) by mouth daily 90 capsule 3     DULoxetine (CYMBALTA) 20 MG capsule Take 1 capsule (20 mg) by mouth daily 30 capsule 3     ferrous fumarate 65 mg, [place]. FE,-Vitamin C 125 mg (VITRON-C)  MG TABS tablet Take 1 tablet by mouth 2 times daily Not be given with mineral supplements 60 tablet 0     fish oil-omega-3 fatty acids 1000 MG capsule Take 1 capsule by mouth daily       Incontinence Supply Disposable (POISE PAD) PADS 1 Units daily 30 each 11     levothyroxine (SYNTHROID/LEVOTHROID) 100 MCG tablet TAKE 1 TABLET (100 MCG) BY MOUTH DAILY 90 tablet 3     loratadine (CLARITIN) 10 MG tablet Take 10 mg by mouth daily as needed       multivitamin, therapeutic (THERA-VIT) TABS tablet Take 1 tablet by mouth daily       Nutritional Supplements (BOOST HIGH PROTEIN) LIQD 1 can daily       order for DME Equipment being ordered: Spikes for cane 1 Units 1     order for DME Equipment being ordered: plastic underpants 1 Units 1     docusate sodium (COLACE) 100 MG capsule Take 1 capsule (100 mg) by mouth 2 times daily Hold for loose stools (Patient not taking: Reported on 6/25/2021) 120 capsule 4     HYDROcodone-acetaminophen (NORCO) 5-325 MG tablet Take 1 tablet by mouth every 6 hours as needed for breakthrough pain or moderate to severe pain 18 tablet 0     sodium chloride (OCEAN) 0.65 % nasal spray Spray 2 sprays into both nostrils daily as needed for congestion       Allergies   Allergen Reactions     Alendronic Acid      Stomach upset     Oxybutynin      Felt High     Tramadol Other (See Comments)     Adverse  effects     Trospium Other (See Comments)     drowsy     Celebrex [Celecoxib] Other (See Comments)     Light headed  "    Tolterodine      Other reaction(s): Intolerance-Can't Take  Was unable to sleep at night         Past medical history, past surgical history, current medications and allergies reviewed and accurate to the best of my knowledge.        ROS:  Refer to HPI    /76 (BP Location: Right arm, Patient Position: Sitting, Cuff Size: Adult Regular)   Pulse 78   Temp 98  F (36.7  C) (Temporal)   Resp 16   Ht 1.575 m (5' 2\")   Wt 61.9 kg (136 lb 6.4 oz)   SpO2 97%   BMI 24.95 kg/m      EXAM:  General Appearance: frail appearing elderly female in wheelchair, appropriate appearance for age. No acute distress  Ears: Left TM intact, translucent with bony landmarks appreciated, no erythema, no effusion, no bulging, no purulence.  Right TM intact, translucent with bony landmarks appreciated, no erythema, no effusion, no bulging, no purulence.  Left auditory canal with mild wax, curette used to remove wax.  Right auditory canal clear.  Normal external ears, non tender.  Bilateral hearing aids.    Respiratory: normal chest wall and respirations.  Normal effort.  Clear to auscultation bilaterally, no wheezing, crackles or rhonchi.  No increased work of breathing.  No cough appreciated.  Cardiac: RRR with murmur.  CMS intact to right lower extremity with strong pedal pulse and brisk capillary refill less than 1 second.  Right lower extremity with 2+ non pitting edema in right ankle and 1+ non pitting edema in calf.  Right calf soft, supple and non tender.  Left lower extremity without edema.    Musculoskeletal:  Equal movement of bilateral upper extremities.  Equal movement of bilateral lower extremities.  Non ambulatory.    Dermatological: right lower extremity with multiple bruises and skin tears - bandages intact and not removed.    Psychological: normal affect, alert, oriented, and pleasant.       Xray:  Results for orders placed or performed in visit on 06/25/21   XR Ankle Right G/E 3 Views     Status: None    " Narrative    XR ANKLE RIGHT G/E 3 VIEWS    HISTORY: 89 years Female fall, right ankle swelling; Right ankle  swelling    COMPARISON: None    TECHNIQUE: Right ankle 3 views    FINDINGS: There is diffuse soft tissue edema. Ankle mortise is  congruent. There is no evidence of fracture or dislocation.      Impression    IMPRESSION: Diffuse soft tissue edema most prominent laterally. No  evidence of fracture or dislocation.    MARIAELENA LIMA MD           ASSESSMENT/PLAN:    I have reviewed the nursing notes.  I have reviewed the findings, diagnosis, plan and need for follow up with the patient.    1. Right ankle swelling    - XR Ankle Right G/E 3 Views    Xray of right ankle completed and personally reviewed, no fracture appreciated, radiologist over read:  Diffuse soft tissue edema most prominent laterally. No evidence of fracture or dislocation.    Recommend RICE treatment.  ACE wrap applied in clinic from foot to knee and patient stated immediate improvement and comfort.  Limited use of ice due to risk of skin injury.    No exam findings consistent with DVT therefore US not completed.      Discussed warning signs/symptoms indicative of need to f/u  Follow up if symptoms persist or worsen or concerns    2. Tear of skin of multiple sites of lower extremity, right, initial encounter    Wound care managed by nurses at assisted living     3. Multiple falls    Lives in assisted living  Mostly non ambulatory  Uses motorized wheelchair                     I explained my diagnostic considerations and recommendations to the patient, who voiced understanding and agreement with the treatment plan. All questions were answered. We discussed potential side effects of any prescribed or recommended therapies, as well as expectations for response to treatments.

## 2021-06-25 NOTE — PATIENT INSTRUCTIONS
Xray of right ankle - no fracture or dislocation  Continue to elevate, wrap to reduce swelling  Limit use of ice due to risk of skin injury

## 2021-07-08 ENCOUNTER — NURSING HOME VISIT (OUTPATIENT)
Dept: GERIATRICS | Facility: OTHER | Age: 86
End: 2021-07-08
Attending: NURSE PRACTITIONER
Payer: COMMERCIAL

## 2021-07-08 DIAGNOSIS — N31.9 NEUROGENIC BLADDER: ICD-10-CM

## 2021-07-08 DIAGNOSIS — R32 URINARY INCONTINENCE, UNSPECIFIED TYPE: ICD-10-CM

## 2021-07-08 DIAGNOSIS — T14.8XXA SKIN AVULSION: Primary | ICD-10-CM

## 2021-07-08 DIAGNOSIS — H61.20 IMPACTED CERUMEN, UNSPECIFIED LATERALITY: ICD-10-CM

## 2021-07-08 DIAGNOSIS — T14.8XXA MULTIPLE SKIN TEARS: ICD-10-CM

## 2021-07-08 DIAGNOSIS — R29.6 FALLS FREQUENTLY: ICD-10-CM

## 2021-07-08 RX ORDER — INCONTINENCE PAD,LINER,DISP
EACH MISCELLANEOUS
Qty: 100 EACH | Refills: 11 | Status: SHIPPED | OUTPATIENT
Start: 2021-07-08

## 2021-07-08 NOTE — PROGRESS NOTES
Geno Horn is a 89 year old female being seen today for comprehensive and follow up visit visit at [facility].    Code Status: Advance Directives: YES.   Health Care Power of : Extended Emergency Contact Information  Primary Emergency Contact: INDIABOBBI   [facility]  Home Phone: [phone]  Mobile Phone: [phone]  Relation: Daughter     Allergies: Alendronic acid, Oxybutynin, Tramadol, Trospium, Celebrex [celecoxib], and Tolterodine     Chief Complaint / HPI: Received a phone call from home care nurse yesterday 7/7/21 about 5 PM for concerns with new skin avulsion injuries anterior right lower leg for transparent tape applied to both skin tear wounds by a PCA. New skin injuries right lower leg from contusion reaching above shelves in kitchen from wheelchair. Did not sustain other injuries, no head injury by report from resident.  Currently being followed by skilled nursing homecare wound care for multiple skin avulsions upper right leg and posterior right calf--- has had frequent skin tear avulsion wounds from falls due to wheelchair dependence--falls with self transfers.Concern from home care nurse regarding new skin tear avulsion wounds being dressed with transparent tape and Tegaderm type covering stuck to skin--was unable to removed without removing skin.  Has wound care supplies in her room from home care such as Vaseline gauze, Xeroform, Telfa, Kerlix, Ace wrap and gauze from history over past year including current for multiple skin avulsion wounds.      Currently working with physical therapy feels she is getting stronger with upper body strength.    Spoke with facility RN who had delegated wound care dressing and new skin tear avulsions to PCA.  Director of nursing RN had requested visit for patient today only for cerumen removal--no mention of new skin avulsion wounds assessment and wound care dressings.      Past Medical, Surgical, Family and Social History reviewed: YES.      Medications: reviewed  Medications - recent changes: ferrous sulfate, B12    Review of Systems:  Skin: positive for multiple skin avulsion wounds  Musculoskeletal: positive for back pain, arthritis and muscular weakness  Neurologic: positive for local weakness  Endocrine: positive for thyroid disorder  All other systems negative  Toileting:    Continent of Bowel: Yes   Continent of Bladder: Yes  Mobility: walker and wheelchair    Recent Labs: reviewed      Current Therapies: physical therapy, occupational therapy and skilled nursing    Exam:            Old skin avulsion wound covered with old Tegaderm dressing  Transparent tape and Tegaderm dressing covered new skin avulsion anterior right lower leg wounds          Vital signs reviewed.   GENERAL APPEARANCE: healthy, alert and no distress  EYES: Eyes grossly normal to inspection,conjunctivae and sclerae normal  EARS: Left external canal dark brown dry cerumen adherent to lateral wall, not obstructive, TM intact  Right external canal free of cerumen and debris--attempted to remove cerumen from left external canal--patient intolerant  RESP: lungs clear   CV: regular rate and rhythm, normal S1 S2, no S3 or S4, no murmur, click or rub, no peripheral edema and peripheral pulses strong  MS: no musculoskeletal defects are noted and gait is age appropriate without ataxia  SKIN: two large new skin avulsion wounds--- cleansed with wound cleanser, adhesive remover wipes and gauze to remove transparent tape dressing  Without stripping skin--- entire area cleansed with saline, Xeroform dressings applied to all skin avulsion wounds  Including posterior calf wounds--covered with Telfa.  Lower leg wrapped with Kerlix kept in place with Ace wrap.  Spoke with wound care nurse--who will come tomorrow to look at dressing and change after resident takes a shower  See above pictures of transparent dressings found on new skin avulsions and current avulsion injury upper leg that she  has been receiving  Regular wound care followup from skilled nursing.  PSYCH: mentation appears normal and affect normal/bright    Assessment and Plan:    Wound care orders written for cleansing wound beds with saline wound wash, apply Xeroform, nonadherent dressing, covered with Kerlix and--can use Ace wrap if patient prefers  Change every other day and as needed with dislocation  Discussed above with facility RN  that skilled nursing required to change and assess wounds --- discourage use of adherent tape and transparent tape dressings on skin tears  I did remove the box of Tegaderm and transparent tape out of the room--- only supplies left in room are appropriate for current treatment plan such as Vaseline gauze, Kerlix, Telfa, wound cleanser, ace wrap.    Recommend using Debrox drops at at bedtime after hearing aids removed at night for a few weeks- will follow-up and remove if any remaining cerumen    (T14.8XXA) Skin avulsion  (primary encounter diagnosis)      (H61.20) Impacted cerumen, unspecified laterality    Plan: carbamide peroxide (DEBROX) 6.5 % otic solution            (N31.9) Neurogenic bladder    Plan: Incontinence Supply Disposable (DEPEND EASY FIT        UNDERGARMENTS) MISC            (R32) Urinary incontinence, unspecified type    Plan: Incontinence Supply Disposable (DEPEND EASY FIT        UNDERGARMENTS) MISC            (R29.6) Falls frequently      (T14.8XXA) Multiple skin tears

## 2021-07-13 ENCOUNTER — MEDICAL CORRESPONDENCE (OUTPATIENT)
Dept: HEALTH INFORMATION MANAGEMENT | Facility: OTHER | Age: 86
End: 2021-07-13

## 2021-07-13 ENCOUNTER — APPOINTMENT (OUTPATIENT)
Dept: LAB | Facility: OTHER | Age: 86
End: 2021-07-13
Attending: NURSE PRACTITIONER
Payer: COMMERCIAL

## 2021-07-13 ENCOUNTER — LAB REQUISITION (OUTPATIENT)
Dept: LAB | Facility: OTHER | Age: 86
End: 2021-07-13
Payer: COMMERCIAL

## 2021-07-13 DIAGNOSIS — E03.9 HYPOTHYROIDISM, UNSPECIFIED: ICD-10-CM

## 2021-07-13 DIAGNOSIS — D64.9 ANEMIA, UNSPECIFIED: ICD-10-CM

## 2021-07-13 LAB
BASOPHILS # BLD AUTO: 0 10E3/UL (ref 0–0.2)
BASOPHILS NFR BLD AUTO: 0 %
EOSINOPHIL # BLD AUTO: 0.1 10E3/UL (ref 0–0.7)
EOSINOPHIL NFR BLD AUTO: 1 %
ERYTHROCYTE [DISTWIDTH] IN BLOOD BY AUTOMATED COUNT: 16.2 % (ref 10–15)
FERRITIN SERPL-MCNC: 141 NG/ML (ref 24–336)
HCT VFR BLD AUTO: 34.1 % (ref 35–47)
HGB BLD-MCNC: 10.9 G/DL (ref 11.7–15.7)
IMM GRANULOCYTES # BLD: 0 10E3/UL
IMM GRANULOCYTES NFR BLD: 0 %
IRON SATN MFR SERPL: 50 % (ref 20–55)
IRON SERPL-MCNC: 161 UG/DL (ref 50–212)
LYMPHOCYTES # BLD AUTO: 1.9 10E3/UL (ref 0.8–5.3)
LYMPHOCYTES NFR BLD AUTO: 23 %
MCH RBC QN AUTO: 32.8 PG (ref 26.5–33)
MCHC RBC AUTO-ENTMCNC: 32 G/DL (ref 31.5–36.5)
MCV RBC AUTO: 103 FL (ref 78–100)
MONOCYTES # BLD AUTO: 0.5 10E3/UL (ref 0–1.3)
MONOCYTES NFR BLD AUTO: 6 %
NEUTROPHILS # BLD AUTO: 5.8 10E3/UL (ref 1.6–8.3)
NEUTROPHILS NFR BLD AUTO: 70 %
NRBC # BLD AUTO: 0 10E3/UL
NRBC BLD AUTO-RTO: 0 /100
PLATELET # BLD AUTO: 352 10E3/UL (ref 150–450)
RBC # BLD AUTO: 3.32 10E6/UL (ref 3.8–5.2)
TIBC SERPL-MCNC: 324.8 UG/DL (ref 245–400)
TRANSFERRIN SERPL-MCNC: 232 MG/DL (ref 203–362)
TSH SERPL DL<=0.005 MIU/L-ACNC: 0.84 MU/L (ref 0.4–4)
UIBC (UNSATURATED): 163.8 MG/DL
WBC # BLD AUTO: 8.3 10E3/UL (ref 4–11)

## 2021-07-13 PROCEDURE — 84443 ASSAY THYROID STIM HORMONE: CPT | Performed by: NURSE PRACTITIONER

## 2021-07-13 PROCEDURE — 36415 COLL VENOUS BLD VENIPUNCTURE: CPT | Performed by: NURSE PRACTITIONER

## 2021-07-13 PROCEDURE — 85025 COMPLETE CBC W/AUTO DIFF WBC: CPT | Performed by: NURSE PRACTITIONER

## 2021-07-13 PROCEDURE — 82728 ASSAY OF FERRITIN: CPT | Performed by: NURSE PRACTITIONER

## 2021-07-13 PROCEDURE — 83540 ASSAY OF IRON: CPT | Performed by: NURSE PRACTITIONER

## 2021-07-13 PROCEDURE — 83550 IRON BINDING TEST: CPT | Performed by: NURSE PRACTITIONER

## 2021-07-15 ENCOUNTER — NURSING HOME VISIT (OUTPATIENT)
Dept: GERIATRICS | Facility: OTHER | Age: 86
End: 2021-07-15
Attending: NURSE PRACTITIONER
Payer: COMMERCIAL

## 2021-07-15 DIAGNOSIS — T14.8XXA MULTIPLE SKIN TEARS: Primary | ICD-10-CM

## 2021-07-15 DIAGNOSIS — D64.89 ANEMIA DUE TO OTHER CAUSE, NOT CLASSIFIED: ICD-10-CM

## 2021-07-15 DIAGNOSIS — D50.8 OTHER IRON DEFICIENCY ANEMIA: ICD-10-CM

## 2021-07-15 RX ORDER — BACILLUS COAGULANS 1B CELL
1 CAPSULE ORAL 2 TIMES DAILY
Qty: 60 TABLET | Refills: 2 | Status: SHIPPED | OUTPATIENT
Start: 2021-07-15 | End: 2021-09-16

## 2021-07-15 NOTE — PROGRESS NOTES
Geno Horn is a 89 year old female being seen today for follow up visit visit at [facility].    Code Status: Advance Directives: YES.   Health Care Power of : Extended Emergency Contact Information  Primary Emergency Contact: BOBBI OSBORNE   [facility]  Home Phone: [phone]  Mobile Phone: [phone]  Relation: Daughter     Allergies: Alendronic acid, Oxybutynin, Tramadol, Trospium, Celebrex [celecoxib], and Tolterodine     Chief Complaint / HPI: Follow-up on recent labs for anemia and lower right leg multiple traumatic skin tear wounds.  Patient reports minimal tenderness with new traumatic skin tear wounds on anterior lower right leg.  Was resting on sulfa with legs elevated above level of heart, ankle edema minimal.    Resident reports no further falls or injuries--continues to work with physical therapy to improve strength and regain optimal mobility and independence.  Home care skilled nursing continue to provide wound care 3 times a week, education, monitoring and surveillance on healing  Raises no other concerns    Past Medical, Surgical, Family and Social History reviewed: YES.     Medications: reviewed  Medications - recent changes:     Review of Systems:  General: positive for weakness  Skin: positive for skin tears wounds  Musculoskeletal: positive for muscular weakness  Neurologic: positive for local weakness  All other systems negative  Toileting:    Continent of Bowel: Yes   Continent of Bladder: No  Mobility: walker and wheelchair    Recent Labs: reviewed labs done 7/13      Current Therapies: PT/OT, skilled nursing    Exam:        Vital signs reviewed.   GENERAL APPEARANCE: alert and no distress  EYES: Eyes grossly normal to inspection,conjunctivae and sclerae normal  RESP: lungs clear   CV: regular rate and rhythm, normal S1 S2, no S3 or S4, no murmur, click or rub, 1+ peripheral edema and peripheral pulses strong  MS: no musculoskeletal defects are noted and wheelchair  dependent, CMS intact lower extremities  SKIN: Left lower leg skin tear wounds dressed--- area inspected, dressed with Xeroform, Telfa cover and Kerlix, wound beds appear beefy without any evidence of drainage, purulence, focal edema, undermining or tunneling.  NEURO: mentation intact and speech normal  PSYCH: mentation appears normal and affect normal/bright    Assessment and Plan:    We will continue iron supplement for another 6 weeks--in reviewing labs has made some improvement in raising hemoglobin, iron stores are still depleted  Will recheck labs in 6 weeks or PRN    Continue skin tear wounds dressings with Xeroform, nonstick adherent dressing covered with Kerlix--Tubigrip for mild compression from base of toes to knee  Along with elevation to control edema    Home care skilled nursing continue to follow--- will follow up in 2 weeks on rounding unless indicated sooner    (T14.8XXA) Multiple skin tears  (primary encounter diagnosis)      (D64.89) Anemia due to other cause, not classified  Plan: ferrous fumarate 65 mg, [place]. FE,-Vitamin C 125        mg (VITRON-C)  MG TABS tablet            (D50.8) Other iron deficiency anemia  Plan: ferrous fumarate 65 mg, [place]. FE,-Vitamin C 125        mg (VITRON-C)  MG TABS tablet

## 2021-07-27 ENCOUNTER — TELEPHONE (OUTPATIENT)
Dept: INTERNAL MEDICINE | Facility: OTHER | Age: 86
End: 2021-07-27
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
## 2021-07-29 ENCOUNTER — NURSING HOME VISIT (OUTPATIENT)
Dept: GERIATRICS | Facility: OTHER | Age: 86
End: 2021-07-29
Attending: NURSE PRACTITIONER
Payer: COMMERCIAL

## 2021-07-29 DIAGNOSIS — R26.81 GENERAL UNSTEADINESS: ICD-10-CM

## 2021-07-29 DIAGNOSIS — T14.8XXA NONTRAUMATIC TEAR OF SKIN: ICD-10-CM

## 2021-07-29 DIAGNOSIS — T14.8XXA MULTIPLE SKIN TEARS: Primary | ICD-10-CM

## 2021-07-29 DIAGNOSIS — S22.070D CLOSED WEDGE COMPRESSION FRACTURE OF T9 VERTEBRA WITH ROUTINE HEALING, SUBSEQUENT ENCOUNTER: ICD-10-CM
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
## 2021-07-30 PROCEDURE — G0179 MD RECERTIFICATION HHA PT: HCPCS | Performed by: FAMILY MEDICINE

## 2021-07-30 RX ORDER — MUPIROCIN 20 MG/G
OINTMENT TOPICAL
Qty: 30 G | Refills: 1 | Status: SHIPPED | OUTPATIENT
Start: 2021-07-30 | End: 2021-09-12

## 2021-08-04 ENCOUNTER — MYC MEDICAL ADVICE (OUTPATIENT)
Dept: FAMILY MEDICINE | Facility: OTHER | Age: 86
End: 2021-08-04

## 2021-08-11 NOTE — TELEPHONE ENCOUNTER
"After verifying patient's name and date of birth,  spoke with patient's daughter, Adri. Reiterated to her the routing comment from Dr. Harrison...   \"Please call patient/daughter and let them know that viscosupplementation (Synvisc) would be a reasonable option.  We generally like to wait 3-4 months before repeating steroid injections (had on 6/8).  I would like to evaluate the patient's knees in the office before moving forward with injections (typically takes time to get authorization from insurance before we can proceed anyways) because of the aforementioned wounds.  If the wounds are in the area where we would be doing the injection, we would typically wait until these have healed.\"    She verbalized agreement and said she would speak to patient about scheduling an evaluation.    Magy Barbour LPN .......  8/11/2021  4:25 PM      "

## 2021-08-12 ENCOUNTER — TELEPHONE (OUTPATIENT)
Dept: FAMILY MEDICINE | Facility: OTHER | Age: 86
End: 2021-08-12

## 2021-08-12 NOTE — TELEPHONE ENCOUNTER
Occupational Therapist thinks it would be beneficial for patient to receive PT. Looking for verbal orders for PT.

## 2021-08-14 ENCOUNTER — HEALTH MAINTENANCE LETTER (OUTPATIENT)
Age: 86
End: 2021-08-14

## 2021-08-30 ENCOUNTER — TELEPHONE (OUTPATIENT)
Dept: FAMILY MEDICINE | Facility: OTHER | Age: 86
End: 2021-08-30
Payer: COMMERCIAL

## 2021-08-30 ENCOUNTER — TELEPHONE (OUTPATIENT)
Dept: FAMILY MEDICINE | Facility: OTHER | Age: 86
End: 2021-08-30

## 2021-08-30 DIAGNOSIS — S81.811A LACERATION WITHOUT FOREIGN BODY, RIGHT LOWER LEG, INITIAL ENCOUNTER: Primary | ICD-10-CM

## 2021-08-30 NOTE — TELEPHONE ENCOUNTER
Dr Pereira reviewed and completed the following home care form for Geno Mcfarlaneon on 07/30/2021   This covers the certification period effective 07/30/2021 to 09/27/2021.  Marco Crowe LPN on 8/30/2021 at 2:38 PM

## 2021-08-30 NOTE — TELEPHONE ENCOUNTER
After verifying patient's name and date of birth, spoke with patient's daughter. Told her that the prior authorization has been approved and that patient is already scheduled for 9/7/21. Patient's daughter verbalized understanding.    Magy Barbour LPN....8/30/2021 1:43 PM

## 2021-09-02 ENCOUNTER — LAB REQUISITION (OUTPATIENT)
Dept: LAB | Facility: OTHER | Age: 86
End: 2021-09-02
Payer: COMMERCIAL

## 2021-09-02 DIAGNOSIS — R63.4 WEIGHT LOSS: ICD-10-CM

## 2021-09-02 DIAGNOSIS — R30.0 DYSURIA: ICD-10-CM

## 2021-09-02 DIAGNOSIS — N39.46 MIXED STRESS AND URGE URINARY INCONTINENCE: Primary | ICD-10-CM

## 2021-09-02 LAB
ALBUMIN UR-MCNC: NEGATIVE MG/DL
APPEARANCE UR: CLEAR
BILIRUB UR QL STRIP: NEGATIVE
COLOR UR AUTO: NORMAL
GLUCOSE UR STRIP-MCNC: NEGATIVE MG/DL
HGB UR QL STRIP: NEGATIVE
KETONES UR STRIP-MCNC: NEGATIVE MG/DL
LEUKOCYTE ESTERASE UR QL STRIP: NEGATIVE
NITRATE UR QL: NEGATIVE
PH UR STRIP: 7 [PH] (ref 5–9)
SP GR UR STRIP: 1.01 (ref 1–1.03)
UROBILINOGEN UR STRIP-MCNC: NORMAL MG/DL

## 2021-09-02 PROCEDURE — 81003 URINALYSIS AUTO W/O SCOPE: CPT | Performed by: NURSE PRACTITIONER

## 2021-09-02 PROCEDURE — 87086 URINE CULTURE/COLONY COUNT: CPT | Performed by: NURSE PRACTITIONER

## 2021-09-02 RX ORDER — DIAPER,BRIEF,ADULT, DISPOSABLE
EACH MISCELLANEOUS
Qty: 90 EACH | Refills: 11 | Status: SHIPPED | OUTPATIENT
Start: 2021-09-02 | End: 2021-10-07

## 2021-09-04 LAB — BACTERIA UR CULT: NORMAL

## 2021-09-07 ENCOUNTER — OFFICE VISIT (OUTPATIENT)
Dept: FAMILY MEDICINE | Facility: OTHER | Age: 86
End: 2021-09-07
Attending: FAMILY MEDICINE
Payer: COMMERCIAL

## 2021-09-07 VITALS
DIASTOLIC BLOOD PRESSURE: 62 MMHG | OXYGEN SATURATION: 100 % | SYSTOLIC BLOOD PRESSURE: 120 MMHG | BODY MASS INDEX: 24.87 KG/M2 | RESPIRATION RATE: 16 BRPM | WEIGHT: 136 LBS | TEMPERATURE: 96.8 F | HEART RATE: 73 BPM

## 2021-09-07 DIAGNOSIS — M11.262 CHONDROCALCINOSIS OF LEFT KNEE: ICD-10-CM

## 2021-09-07 DIAGNOSIS — M11.261 CHONDROCALCINOSIS OF RIGHT KNEE: ICD-10-CM

## 2021-09-07 DIAGNOSIS — M17.11 PRIMARY OSTEOARTHRITIS OF RIGHT KNEE: ICD-10-CM

## 2021-09-07 DIAGNOSIS — M17.12 PRIMARY OSTEOARTHRITIS OF LEFT KNEE: Primary | ICD-10-CM

## 2021-09-07 PROCEDURE — G0463 HOSPITAL OUTPT CLINIC VISIT: HCPCS | Mod: 25

## 2021-09-07 PROCEDURE — 99214 OFFICE O/P EST MOD 30 MIN: CPT | Mod: 25 | Performed by: FAMILY MEDICINE

## 2021-09-07 PROCEDURE — 250N000009 HC RX 250: Performed by: FAMILY MEDICINE

## 2021-09-07 PROCEDURE — G0463 HOSPITAL OUTPT CLINIC VISIT: HCPCS

## 2021-09-07 PROCEDURE — 20611 DRAIN/INJ JOINT/BURSA W/US: CPT | Performed by: FAMILY MEDICINE

## 2021-09-07 PROCEDURE — 250N000011 HC RX IP 250 OP 636: Performed by: FAMILY MEDICINE

## 2021-09-07 RX ORDER — SENNOSIDES AND DOCUSATE SODIUM 8.6; 5 MG/1; MG/1
TABLET ORAL
COMMUNITY
Start: 2021-08-26 | End: 2022-01-11

## 2021-09-07 RX ORDER — LIDOCAINE HYDROCHLORIDE 10 MG/ML
2 INJECTION, SOLUTION EPIDURAL; INFILTRATION; INTRACAUDAL; PERINEURAL ONCE
Status: COMPLETED | OUTPATIENT
Start: 2021-09-07 | End: 2021-09-07

## 2021-09-07 RX ORDER — DILTIAZEM HYDROCHLORIDE 180 MG/1
CAPSULE, COATED, EXTENDED RELEASE ORAL
COMMUNITY
Start: 2021-08-23 | End: 2022-03-10

## 2021-09-07 RX ADMIN — LIDOCAINE HYDROCHLORIDE 2 ML: 10 INJECTION, SOLUTION EPIDURAL; INFILTRATION; INTRACAUDAL; PERINEURAL at 11:20

## 2021-09-07 RX ADMIN — Medication 48 MG: at 11:20

## 2021-09-07 ASSESSMENT — PAIN SCALES - GENERAL: PAINLEVEL: EXTREME PAIN (9)

## 2021-09-07 NOTE — PROGRESS NOTES
HPI:  89-year-old female coming in for follow-up evaluation of bilateral knee pain, left worse than right.  She was seen last on  and bilateral corticosteroid injections were performed.  She reports approximately 5 days of relief with this intervention.  She continues to take Tylenol and has gone to 8-10 sessions of physical therapy.  Her current pain is 9/10.  She characterizes the pain as sharp.  Her pain is back to baseline from her previous injections.      EXAM:  /62 (BP Location: Right arm, Patient Position: Sitting, Cuff Size: Adult Regular)   Pulse 73   Temp 96.8  F (36  C) (Tympanic)   Resp 16   Wt 61.7 kg (136 lb)   SpO2 100%   BMI 24.87 kg/m    Musculoskeletal exam: Left knee  -Mild bruising throughout the lower extremities diffusely without signs of open wound/sore  -Diffusely tender to palpation throughout the joint    Musculoskeletal exam: Right knee  -Mild bruising throughout the lower extremities diffusely without signs of open wound/sore  -Diffusely tender to palpation throughout the joint      IMAGIN2021: 6 view bilateral knee x-ray  -Moderate degenerative changes in the bilateral medial and lateral tibiofemoral compartments with noted chondrocalcinosis.  Bone-on-bone osteoarthritis in the bilateral patellofemoral compartments.  No acute fracture.      ASSESSMENT/PLAN:  Diagnoses and all orders for this visit:  Primary osteoarthritis of left knee  -     lidocaine (PF) (XYLOCAINE) 1 % injection 2 mL  -     IL ARTHROCENTESIS ASPIR&/INJ MAJOR JT/BURSA W/US  -     POC US SOFT TISSUE  -     hylan G-F 20 (SYNVISC) injection 48 mg  Chondrocalcinosis of left knee  -     lidocaine (PF) (XYLOCAINE) 1 % injection 2 mL  -     IL ARTHROCENTESIS ASPIR&/INJ MAJOR JT/BURSA W/US  -     POC US SOFT TISSUE  -     hylan G-F 20 (SYNVISC) injection 48 mg  Primary osteoarthritis of right knee  -     POC US SOFT TISSUE  -     IL ARTHROCENTESIS ASPIR&/INJ MAJOR JT/BURSA W/US  -     lidocaine (PF)  (XYLOCAINE) 1 % injection 2 mL  -     hylan G-F 20 (SYNVISC) injection 48 mg  Chondrocalcinosis of right knee  -     POC US SOFT TISSUE  -     OR ARTHROCENTESIS ASPIR&/INJ MAJOR JT/BURSA W/US  -     lidocaine (PF) (XYLOCAINE) 1 % injection 2 mL  -     hylan G-F 20 (SYNVISC) injection 48 mg    89-year-old female with bilateral knee osteoarthritis, bone-on-bone in the bilateral patellofemoral compartments.  She did not get good relief with corticosteroid injections.  We discussed advanced treatment options to include CSI's, viscosupplementation injections, or surgical referral.  X-rays from 6/8 were personally reviewed in the office with the findings as demonstrated above by my interpretation.  After reviewing the risks/benefits, patient would like to proceed with intra-articular Synvisc injections into the bilateral knees.  See procedure notes below:    PROCEDURE:  Ultrasound Guided Injection of the Bilateral Intraarticular Knees with Synvisc One      EQUIPMENT:  Layton Affiniti 70 with Linear [id number] (2-22MHz) Transducer.      PROCEDURAL PAUSE:    A procedural pause was conducted to verify:  correct patient identity, procedure to be performed, and as applicable, correct side, site, and correct patient position.      INFORMED CONSENT:   I discussed the risks, possible benefits, and alternatives to injection.  Following denial of allergy and review of potential side effects and complications (including but not necessarily limited to infection, allergic reaction, fat necrosis, skin depigmentation, local tissue breakdown, and injury to soft tissue and/or nerves), all questions were answered, and consent was given to proceed.  Patient verbalized understanding.      PROCEDURE DETAILS:    The use of direct ultrasound visualization of the needle (rather than a non-guided ultrasound procedure) was required to increase patient safety by excluding inadvertent intramuscular or intratendinous placement and minimizing bleeding  and injury by avoiding osteochondral and nearby neurovascular structures.  Guidance also maximizes accurate injection placement and likely clinical benefit beyond that obtained with a non-guided injection.  This allows increased diagnostic specificity when evaluating effectiveness of the injection.      Prior to the procedure, the left knee was examined with a 12MHz linear transducer to determine the optimal needle path and visualize the quadriceps tendon superficial to the suprapatellar recess in longitudinal and transverse views.  Following this, the skin was marked, and the left knee was prepared with chlorhexidine.  Local anesthesia was obtained with 2mL of 1% lidocaine.  Then this area was re-examined using the same transducer, a sterile ultrasound transducer cover, sterile gloves, and sterile gel.  Under ultrasound guidance, a 1.5-inch 22-gauge needle was advanced from lateral to medial using an in-plane approach into the suprapatellar recess of the knee joint space.  One needle pass was performed.  After ultrasound visualization of the needle tip in the target area and negative aspiration for blood, 6mL of Synvisc One (8mg/mL) was injected into the left suprapatellar space.  0mL was wasted.  Images have been saved on the musculoskeletal ultrasound in clinic.      Prior to the procedure, the right knee was examined with a 12MHz linear transducer to determine the optimal needle path and visualize the quadriceps tendon superficial to the suprapatellar recess in longitudinal and transverse views.  Following this, the skin was marked, and the right knee was prepared with chlorhexidine.  Local anesthesia was obtained with 2mL of 1% lidocaine.  Then this area was re-examined using the same transducer, a sterile ultrasound transducer cover, sterile gloves, and sterile gel.  Under ultrasound guidance, a 1.5-inch 22-gauge needle was advanced from lateral to medial using an in-plane approach into the suprapatellar  recess of the knee joint space.  One needle pass was performed.  After ultrasound visualization of the needle tip in the target area and negative aspiration for blood, 6mL of Synvisc One (8mg/mL) was injected into the right suprapatellar space.  0mL was wasted.  Images have been saved on the musculoskeletal ultrasound in clinic.      The patient tolerated the procedures without complication and was discharged in good condition after a short observation period.  The patient was instructed to contact me regarding any questions pertaining to the procedure.      DIAGNOSIS:    -Successful ultrasound guided Synvisc One injections of the bilateral intraarticular knees without immediate complication      PLAN:   -Post-procedure care reviewed, including avoiding submersion of the injection sites for 48 hours   -Return precautions reviewed for signs/symptoms that would be concerning for infection   -The patient was instructed to ice and take APAP this evening if needed  -Continue OTC APAP  -Encouraged home therapy   -Return to clinic as needed      Miguel Harrison MD  9/7/2021  10:42 AM    Total time spent with this patient was 27 minutes which included chart review, visualization and interpretation of images, time spent with the patient, and documentation.

## 2021-09-07 NOTE — NURSING NOTE
"Chief Complaint   Patient presents with     Injections     possible bilateral knee viscosupplementation injections, pain 9/10, 0% improvement     Patient presents to clinic today for possible bilateral knee visco supplementation injections. Pain 9/10. She reports 0% improvement since last visit. She has injections at last visit that she states lasted maybe 1 week.    Initial /62 (BP Location: Right arm, Patient Position: Sitting, Cuff Size: Adult Regular)   Pulse 73   Temp 96.8  F (36  C) (Tympanic)   Resp 16   Wt 61.7 kg (136 lb)   SpO2 100%   BMI 24.87 kg/m   Estimated body mass index is 24.87 kg/m  as calculated from the following:    Height as of 6/25/21: 1.575 m (5' 2\").    Weight as of this encounter: 61.7 kg (136 lb).     FOOD SECURITY SCREENING QUESTIONS  Hunger Vital Signs:  Within the past 12 months we worried whether our food would run out before we got money to buy more. Never  Within the past 12 months the food we bought just didn't last and we didn't have money to get more. Never      Medication Reconciliation: Complete      Magy Barbour, MARGARETTE   "

## 2021-09-09 ENCOUNTER — NURSING HOME VISIT (OUTPATIENT)
Dept: GERIATRICS | Facility: OTHER | Age: 86
End: 2021-09-09
Attending: NURSE PRACTITIONER
Payer: COMMERCIAL

## 2021-09-09 DIAGNOSIS — M17.0 PRIMARY OSTEOARTHRITIS OF BOTH KNEES: ICD-10-CM

## 2021-09-09 DIAGNOSIS — D50.8 OTHER IRON DEFICIENCY ANEMIA: ICD-10-CM

## 2021-09-09 DIAGNOSIS — S22.070D CLOSED WEDGE COMPRESSION FRACTURE OF T9 VERTEBRA WITH ROUTINE HEALING, SUBSEQUENT ENCOUNTER: ICD-10-CM

## 2021-09-09 DIAGNOSIS — T14.8XXA MULTIPLE SKIN TEARS: ICD-10-CM

## 2021-09-09 DIAGNOSIS — E03.8 OTHER SPECIFIED HYPOTHYROIDISM: ICD-10-CM
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
## 2021-09-09 DIAGNOSIS — Z79.899 ENCOUNTER FOR MEDICATION REVIEW: Primary | ICD-10-CM

## 2021-09-14 ENCOUNTER — LAB REQUISITION (OUTPATIENT)
Dept: LAB | Facility: OTHER | Age: 86
End: 2021-09-14
Payer: COMMERCIAL

## 2021-09-14 DIAGNOSIS — E03.9 HYPOTHYROIDISM, UNSPECIFIED: ICD-10-CM

## 2021-09-14 DIAGNOSIS — E87.1 HYPO-OSMOLALITY AND HYPONATREMIA: ICD-10-CM

## 2021-09-14 DIAGNOSIS — D64.9 ANEMIA, UNSPECIFIED: ICD-10-CM

## 2021-09-14 LAB
ANION GAP SERPL CALCULATED.3IONS-SCNC: 7 MMOL/L (ref 3–14)
BASOPHILS # BLD AUTO: 0 10E3/UL (ref 0–0.2)
BASOPHILS NFR BLD AUTO: 1 %
BUN SERPL-MCNC: 19 MG/DL (ref 7–25)
CALCIUM SERPL-MCNC: 9.6 MG/DL (ref 8.6–10.3)
CHLORIDE BLD-SCNC: 100 MMOL/L (ref 98–107)
CO2 SERPL-SCNC: 29 MMOL/L (ref 21–31)
CREAT SERPL-MCNC: 0.76 MG/DL (ref 0.6–1.2)
EOSINOPHIL # BLD AUTO: 0.1 10E3/UL (ref 0–0.7)
EOSINOPHIL NFR BLD AUTO: 2 %
ERYTHROCYTE [DISTWIDTH] IN BLOOD BY AUTOMATED COUNT: 14.6 % (ref 10–15)
GFR SERPL CREATININE-BSD FRML MDRD: 67 ML/MIN/1.73M2
GLUCOSE BLD-MCNC: 98 MG/DL (ref 70–105)
HCT VFR BLD AUTO: 36.3 % (ref 35–47)
HGB BLD-MCNC: 12.1 G/DL (ref 11.7–15.7)
IMM GRANULOCYTES # BLD: 0 10E3/UL
IMM GRANULOCYTES NFR BLD: 0 %
LYMPHOCYTES # BLD AUTO: 2 10E3/UL (ref 0.8–5.3)
LYMPHOCYTES NFR BLD AUTO: 23 %
MCH RBC QN AUTO: 34.2 PG (ref 26.5–33)
MCHC RBC AUTO-ENTMCNC: 33.3 G/DL (ref 31.5–36.5)
MCV RBC AUTO: 103 FL (ref 78–100)
MONOCYTES # BLD AUTO: 0.6 10E3/UL (ref 0–1.3)
MONOCYTES NFR BLD AUTO: 7 %
NEUTROPHILS # BLD AUTO: 5.8 10E3/UL (ref 1.6–8.3)
NEUTROPHILS NFR BLD AUTO: 67 %
NRBC # BLD AUTO: 0 10E3/UL
NRBC BLD AUTO-RTO: 0 /100
PLATELET # BLD AUTO: 343 10E3/UL (ref 150–450)
POTASSIUM BLD-SCNC: 4.4 MMOL/L (ref 3.5–5.1)
RBC # BLD AUTO: 3.54 10E6/UL (ref 3.8–5.2)
SODIUM SERPL-SCNC: 136 MMOL/L (ref 134–144)
TSH SERPL DL<=0.005 MIU/L-ACNC: 2.54 MU/L (ref 0.4–4)
WBC # BLD AUTO: 8.5 10E3/UL (ref 4–11)

## 2021-09-14 PROCEDURE — 36415 COLL VENOUS BLD VENIPUNCTURE: CPT | Performed by: NURSE PRACTITIONER

## 2021-09-14 PROCEDURE — 84443 ASSAY THYROID STIM HORMONE: CPT | Performed by: NURSE PRACTITIONER

## 2021-09-14 PROCEDURE — 80048 BASIC METABOLIC PNL TOTAL CA: CPT | Performed by: NURSE PRACTITIONER

## 2021-09-14 PROCEDURE — 85025 COMPLETE CBC W/AUTO DIFF WBC: CPT | Performed by: NURSE PRACTITIONER

## 2021-09-15 ENCOUNTER — OFFICE VISIT (OUTPATIENT)
Dept: FAMILY MEDICINE | Facility: OTHER | Age: 86
End: 2021-09-15
Attending: PHYSICIAN ASSISTANT
Payer: COMMERCIAL

## 2021-09-15 ENCOUNTER — HOSPITAL ENCOUNTER (OUTPATIENT)
Dept: GENERAL RADIOLOGY | Facility: OTHER | Age: 86
End: 2021-09-15
Attending: PHYSICIAN ASSISTANT
Payer: COMMERCIAL

## 2021-09-15 VITALS
OXYGEN SATURATION: 93 % | TEMPERATURE: 97.8 F | DIASTOLIC BLOOD PRESSURE: 60 MMHG | HEART RATE: 82 BPM | SYSTOLIC BLOOD PRESSURE: 130 MMHG | RESPIRATION RATE: 18 BRPM

## 2021-09-15 DIAGNOSIS — S20.219A CONTUSION OF CHEST WALL, UNSPECIFIED LATERALITY, INITIAL ENCOUNTER: ICD-10-CM

## 2021-09-15 DIAGNOSIS — R07.89 CHEST DISCOMFORT: Primary | ICD-10-CM

## 2021-09-15 PROCEDURE — 71111 X-RAY EXAM RIBS/CHEST4/> VWS: CPT

## 2021-09-15 PROCEDURE — 99213 OFFICE O/P EST LOW 20 MIN: CPT | Performed by: PHYSICIAN ASSISTANT

## 2021-09-15 PROCEDURE — G0463 HOSPITAL OUTPT CLINIC VISIT: HCPCS

## 2021-09-15 ASSESSMENT — PAIN SCALES - GENERAL: PAINLEVEL: WORST PAIN (10)

## 2021-09-15 NOTE — NURSING NOTE
"Chief Complaint   Patient presents with     Fall     Chest Pain     Patient fell this morning while she was reaching forward hitting her chest on her wheelchair arm. She reported having pain across her whole chest.    Initial /60 (BP Location: Right arm, Patient Position: Sitting, Cuff Size: Adult Regular)   Pulse 82   Temp 97.8  F (36.6  C) (Tympanic)   Resp 18   SpO2 93%   Breastfeeding No  Estimated body mass index is 24.87 kg/m  as calculated from the following:    Height as of 6/25/21: 1.575 m (5' 2\").    Weight as of 9/7/21: 61.7 kg (136 lb).     FOOD SECURITY SCREENING QUESTIONS  Hunger Vital Signs:  Within the past 12 months we worried whether our food would run out before we got money to buy more. Never  Within the past 12 months the food we bought just didn't last and we didn't have money to get more. Never      Medication Reconciliation: Complete      Erin Miranda LPN   "

## 2021-09-15 NOTE — PROGRESS NOTES
ASSESSMENT/PLAN:    I have reviewed the nursing notes.  I have reviewed the findings, diagnosis, plan and need for follow up with the patient.    1. Chest discomfort  - XR Ribs & Chest Bilateral G/E 4 Views  - XR normal, without evidence of rib fracture or pneumothorax    2. Contusion of chest wall, unspecified laterality, initial encounter  - Vital signs stable.  Physical exam consistent with contusion of costochondral musculature.  XR: negative for acute abnormalities. Discussed that sprains are typically self-limiting and will heal in 4 to 8 weeks duration of time.  Recommend alternating Tylenol and ibuprofen every 4-6 hours if able, do not exceed daily limits as reviewed on AVS (4000 mg of Tylenol daily, 1200 mg of ibuprofen daily), alternate heat and ice, gentle range of motion as tolerated.  If an orthopedic referral was placed patient understands that they will contact the patient directly to schedule this visit.  Patient runs into any difficulties/setbacks during recovery they should follow-up with her PCP or orthopedics for reevaluation. Patient is in agreement and understanding of the above treatment plan. All questions and concerns were addressed and answered to patient's satisfaction. AVS reviewed with patient.     Discussed warning signs/symptoms indicative of need to f/u    Follow up if symptoms persist or worsen or concerns    I explained my diagnostic considerations and recommendations to the patient, who voiced understanding and agreement with the treatment plan. All questions were answered. We discussed potential side effects of any prescribed or recommended therapies, as well as expectations for response to treatments.    Mary Ellen Jorge PA-C  9/15/2021  2:57 PM    HPI:    Geno Horn is a 89 year old female  who presents to [facility] today for concerns of fall out of her wheelchair this AM. She was reaching forward and hit her chest on wheelchair arm. She is having pain across entirety of  her chest. She does have a history of similar symptoms and had rib fractures from this injury. She denies shortness of breath or difficulty breathing.     Pain: 10/10  Quality of pain: sharp  Location: chest, entirety  Palliative: rest  Provocative: deep breathing  Numbness, tingling, burning: none  Mechanical symptoms (locking, popping, catching): none  Bruising/edema/erythema: none  Treatments tried: Tylenol  Prior falls, injuries or trauma: none  Additional symptoms to report: none    Allergies: Alendronic acid, Oxybutynin, Tramadol, Trospium, Celebrex, Tolterodine    PCP: YARY Rocha    Past Medical History:   Diagnosis Date     Hypertension      Hypothyroid      Osteoarthritis      ST elevation (STEMI) myocardial infarction (H)     1981; angioplasty     Past Surgical History:   Procedure Laterality Date     ARTHROPLASTY HIP Right     Dr Rodriguez     ARTHROPLASTY HIP Left 2015    Dr. Rodriguez     COMBINED CYSTOSCOPY, URETEROSCOPY, LASER HOLMIUM LITHOTRIPSY URETER(S) Right 7/23/2019    Procedure: CYSTOSCOPY, RIGHT STENT REMOVAL, URETEROSCOPY, LASER HOLMIUM LITHOTRIPSY RIGHT URETER WITH STENT REPLACEMENT;  Surgeon: Shawn Cole MD;  Location:  OR     CYSTOSCOPY, RETROGRADES, INSERT STENT URETER(S), COMBINED Right 6/17/2019    Procedure: CYSTOSCOPY, WITH RETROGRADE PYELOGRAM AND URETERAL STENT INSERTION;  Surgeon: Shawn Cole MD;  Location:  OR     Social History     Tobacco Use     Smoking status: Never Smoker     Smokeless tobacco: Never Used     Tobacco comment: no ecig   Substance Use Topics     Alcohol use: Not Currently     Alcohol/week: 0.0 standard drinks     Current Outpatient Medications   Medication Sig Dispense Refill     acetaminophen (TYLENOL) 500 MG tablet Take 2 tabs HS and 1 tabs PRN daily Not to exceed 4000 mg of acetaminophen in 24 hours from all sources. 180 tablet 2     carbamide peroxide (DEBROX) 6.5 % otic solution Place 5 drops into both ears At Bedtime 15 mL 0     cetirizine (ZYRTEC)  10 MG tablet Take 10 mg by mouth daily as needed       diltiazem ER COATED BEADS (CARDIZEM CD/CARTIA XT) 180 MG 24 hr capsule TAKE 1 CAPSULE BY MOUTH ONCE DAILY       docusate sodium (COLACE) 100 MG capsule Take 1 capsule (100 mg) by mouth 2 times daily Hold for loose stools 120 capsule 4     DULoxetine (CYMBALTA) 20 MG capsule Take 1 capsule (20 mg) by mouth daily 30 capsule 3     ferrous fumarate 65 mg, [place]. FE,-Vitamin C 125 mg (VITRON-C)  MG TABS tablet Take 1 tablet by mouth 2 times daily Not be given with mineral supplements 60 tablet 2     fish oil-omega-3 fatty acids 1000 MG capsule Take 1 capsule by mouth daily       HYDROcodone-acetaminophen (NORCO) 5-325 MG tablet Take 1 tablet by mouth every 6 hours as needed for breakthrough pain or moderate to severe pain 18 tablet 0     Incontinence Supply Disposable (DEPEND EASY FIT UNDERGARMENTS) MISC Medium size disposable under pants covered by insurance 100 each 11     Incontinence Supply Disposable (POISE PAD) PADS Change pad 3 x daily 90 each 11     levothyroxine (SYNTHROID/LEVOTHROID) 100 MCG tablet TAKE 1 TABLET (100 MCG) BY MOUTH DAILY 90 tablet 3     loratadine (CLARITIN) 10 MG tablet Take 10 mg by mouth daily as needed       multivitamin, therapeutic (THERA-VIT) TABS tablet Take 1 tablet by mouth daily       Nutritional Supplements (BOOST HIGH PROTEIN) LIQD 1 can daily       order for DME Equipment being ordered: Spikes for cane 1 Units 1     order for DME Equipment being ordered: plastic underpants 1 Units 1     SM SENNA-S 8.6-50 MG tablet TAKE 1 TAB BY MOUTH ONCE DAILY       sodium chloride (OCEAN) 0.65 % nasal spray Spray 2 sprays into both nostrils daily as needed for congestion       Allergies   Allergen Reactions     Alendronic Acid      Stomach upset     Oxybutynin      Felt High     Tramadol Other (See Comments)     Adverse  effects     Trospium Other (See Comments)     drowsy     Celebrex [Celecoxib] Other (See Comments)     Light headed      Tolterodine      Other reaction(s): Intolerance-Can't Take  Was unable to sleep at night     Past medical history, past surgical history, current medications and allergies reviewed and accurate to the best of my knowledge.      ROS:  Refer to HPI    /60 (BP Location: Right arm, Patient Position: Sitting, Cuff Size: Adult Regular)   Pulse 82   Temp 97.8  F (36.6  C) (Tympanic)   Resp 18   SpO2 93%   Breastfeeding No     EXAM:  General Appearance: Well appearing 89-year old female, appropriate appearance for age. No acute distress  Ears: Left TM intact, translucent with bony landmarks appreciated, no erythema, no effusion, no bulging, no purulence.  Right TM intact, translucent with bony landmarks appreciated, no erythema, no effusion, no bulging, no purulence.  Left auditory canal clear.  Right auditory canal clear.  Normal external ears, non tender.  Eyes: conjunctivae normal without erythema or irritation, corneas clear, no drainage or crusting, no eyelid swelling, pupils equal   Orophayrnx: moist mucous membranes, posterior pharynx without erythema, tonsils without hypertrophy, no erythema, no exudates or petechiae, no post nasal drip seen, no trismus, voice clear.    Sinuses:  No sinus tenderness upon palpation of the frontal or maxillary sinuses  Nose:  Bilateral nares: no erythema, no edema, no drainage or congestion   Neck: supple without adenopathy  Respiratory: normal chest wall and respirations.  Normal effort.  Clear to auscultation bilaterally, no wheezing, crackles or rhonchi.  No increased work of breathing.  No cough appreciated.  Cardiac: RRR with no murmurs   MSK: TTP sternum and ribs 6-8 on left anterolateral.   Dermatological: no rashes noted of exposed skin  Psychological: normal affect, alert, oriented, and pleasant.     Labs:  None     Xray:  CXR negative for pneumothorax and rib fractures

## 2021-09-16 ENCOUNTER — NURSING HOME VISIT (OUTPATIENT)
Dept: GERIATRICS | Facility: OTHER | Age: 86
End: 2021-09-16
Attending: NURSE PRACTITIONER
Payer: COMMERCIAL

## 2021-09-16 VITALS — OXYGEN SATURATION: 98 % | TEMPERATURE: 97 F | HEART RATE: 71 BPM

## 2021-09-16 DIAGNOSIS — G62.9 NEUROPATHY: ICD-10-CM

## 2021-09-16 DIAGNOSIS — M48.061 SPINAL STENOSIS OF LUMBAR REGION, UNSPECIFIED WHETHER NEUROGENIC CLAUDICATION PRESENT: ICD-10-CM

## 2021-09-16 DIAGNOSIS — F34.1 DYSTHYMIA: ICD-10-CM

## 2021-09-16 DIAGNOSIS — S22.070D CLOSED WEDGE COMPRESSION FRACTURE OF T9 VERTEBRA WITH ROUTINE HEALING, SUBSEQUENT ENCOUNTER: ICD-10-CM

## 2021-09-16 DIAGNOSIS — R07.89 CHEST PAIN, MUSCULOSKELETAL: ICD-10-CM

## 2021-09-16 DIAGNOSIS — R29.6 MULTIPLE FALLS: Primary | ICD-10-CM

## 2021-09-16 DIAGNOSIS — D50.8 OTHER IRON DEFICIENCY ANEMIA: ICD-10-CM

## 2021-09-16 DIAGNOSIS — Z79.899 ENCOUNTER FOR MEDICATION REVIEW: ICD-10-CM

## 2021-09-16 RX ORDER — DULOXETIN HYDROCHLORIDE 20 MG/1
20 CAPSULE, DELAYED RELEASE ORAL 2 TIMES DAILY
Qty: 120 CAPSULE | Refills: 3 | Status: SHIPPED | OUTPATIENT
Start: 2021-09-16 | End: 2022-01-19

## 2021-09-17 ENCOUNTER — MYC MEDICAL ADVICE (OUTPATIENT)
Dept: GERIATRICS | Facility: OTHER | Age: 86
End: 2021-09-17

## 2021-09-17 DIAGNOSIS — M79.18 MUSCULOSKELETAL PAIN: Primary | ICD-10-CM
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
## 2021-09-19 RX ORDER — SENNOSIDES 8.6 MG
650 CAPSULE ORAL
COMMUNITY
Start: 2021-09-19 | End: 2022-02-18

## 2021-09-19 RX ORDER — NAPROXEN SODIUM 220 MG
220 TABLET ORAL 2 TIMES DAILY WITH MEALS
Qty: 14 TABLET | Refills: 0 | COMMUNITY
Start: 2021-09-19 | End: 2021-09-23

## 2021-09-23 ENCOUNTER — NURSING HOME VISIT (OUTPATIENT)
Dept: GERIATRICS | Facility: OTHER | Age: 86
End: 2021-09-23
Attending: NURSE PRACTITIONER
Payer: COMMERCIAL

## 2021-09-23 DIAGNOSIS — S22.070D CLOSED WEDGE COMPRESSION FRACTURE OF T9 VERTEBRA WITH ROUTINE HEALING, SUBSEQUENT ENCOUNTER: ICD-10-CM

## 2021-09-23 DIAGNOSIS — M79.18 MUSCULOSKELETAL PAIN: ICD-10-CM

## 2021-09-23 DIAGNOSIS — T14.8XXA TRAUMATIC INJURY OF SKIN: ICD-10-CM

## 2021-09-23 DIAGNOSIS — R29.6 MULTIPLE FALLS: ICD-10-CM

## 2021-09-23 DIAGNOSIS — Z99.3 WHEELCHAIR DEPENDENCE: ICD-10-CM

## 2021-09-23 DIAGNOSIS — R29.6 FALLS FREQUENTLY: ICD-10-CM

## 2021-09-23 DIAGNOSIS — M15.0 PRIMARY OSTEOARTHRITIS INVOLVING MULTIPLE JOINTS: Primary | ICD-10-CM
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
## 2021-09-23 RX ORDER — NAPROXEN SODIUM 220 MG
220 TABLET ORAL 2 TIMES DAILY WITH MEALS
Qty: 28 TABLET | Refills: 0 | Status: SHIPPED | OUTPATIENT
Start: 2021-09-23 | End: 2021-10-07

## 2021-09-24 ENCOUNTER — MEDICAL CORRESPONDENCE (OUTPATIENT)
Dept: HEALTH INFORMATION MANAGEMENT | Facility: OTHER | Age: 86
End: 2021-09-24

## 2021-09-24 ENCOUNTER — TELEPHONE (OUTPATIENT)
Dept: FAMILY MEDICINE | Facility: OTHER | Age: 86
End: 2021-09-24
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
## 2021-09-27 ENCOUNTER — TELEPHONE (OUTPATIENT)
Dept: FAMILY MEDICINE | Facility: OTHER | Age: 86
End: 2021-09-27

## 2021-09-27 PROCEDURE — G0180 MD CERTIFICATION HHA PATIENT: HCPCS | Performed by: NURSE PRACTITIONER

## 2021-09-28 ENCOUNTER — MEDICAL CORRESPONDENCE (OUTPATIENT)
Dept: HEALTH INFORMATION MANAGEMENT | Facility: OTHER | Age: 86
End: 2021-09-28

## 2021-09-28 NOTE — TELEPHONE ENCOUNTER
Will route to HARMAN and PCP WILBERT, I am not sure if LETI Ray does orders or if they are sent to primary.   Adeline Ennis LPN .............9/28/2021     1:47 PM     General

## 2021-09-30 ENCOUNTER — HOSPITAL ENCOUNTER (EMERGENCY)
Facility: OTHER | Age: 86
Discharge: NURSING, REHAB OR LONG-TERM CARE | End: 2021-09-30
Attending: EMERGENCY MEDICINE | Admitting: EMERGENCY MEDICINE
Payer: COMMERCIAL

## 2021-09-30 ENCOUNTER — APPOINTMENT (OUTPATIENT)
Dept: CT IMAGING | Facility: OTHER | Age: 86
End: 2021-09-30
Attending: EMERGENCY MEDICINE
Payer: COMMERCIAL

## 2021-09-30 VITALS
RESPIRATION RATE: 6 BRPM | HEART RATE: 64 BPM | TEMPERATURE: 97.2 F | OXYGEN SATURATION: 95 % | BODY MASS INDEX: 21.86 KG/M2 | WEIGHT: 136 LBS | SYSTOLIC BLOOD PRESSURE: 153 MMHG | HEIGHT: 66 IN | DIASTOLIC BLOOD PRESSURE: 77 MMHG

## 2021-09-30 DIAGNOSIS — R31.9 URINARY TRACT INFECTION WITH HEMATURIA, SITE UNSPECIFIED: ICD-10-CM

## 2021-09-30 DIAGNOSIS — S22.21XA CLOSED FRACTURE OF MANUBRIUM, INITIAL ENCOUNTER: ICD-10-CM

## 2021-09-30 DIAGNOSIS — S22.41XA CLOSED FRACTURE OF MULTIPLE RIBS OF RIGHT SIDE, INITIAL ENCOUNTER: ICD-10-CM

## 2021-09-30 DIAGNOSIS — N39.0 URINARY TRACT INFECTION WITH HEMATURIA, SITE UNSPECIFIED: ICD-10-CM

## 2021-09-30 LAB
ALBUMIN SERPL-MCNC: 4 G/DL (ref 3.5–5.7)
ALBUMIN UR-MCNC: 30 MG/DL
ALP SERPL-CCNC: 82 U/L (ref 34–104)
ALT SERPL W P-5'-P-CCNC: 15 U/L (ref 7–52)
ANION GAP SERPL CALCULATED.3IONS-SCNC: 7 MMOL/L (ref 3–14)
APPEARANCE UR: ABNORMAL
AST SERPL W P-5'-P-CCNC: 15 U/L (ref 13–39)
ATRIAL RATE - MUSE: 74 BPM
BACTERIA #/AREA URNS HPF: ABNORMAL /HPF
BASOPHILS # BLD AUTO: 0.1 10E3/UL (ref 0–0.2)
BASOPHILS NFR BLD AUTO: 1 %
BILIRUB SERPL-MCNC: 0.5 MG/DL (ref 0.3–1)
BILIRUB UR QL STRIP: NEGATIVE
BUN SERPL-MCNC: 19 MG/DL (ref 7–25)
CALCIUM SERPL-MCNC: 9.4 MG/DL (ref 8.6–10.3)
CHLORIDE BLD-SCNC: 99 MMOL/L (ref 98–107)
CO2 SERPL-SCNC: 27 MMOL/L (ref 21–31)
COLOR UR AUTO: YELLOW
CREAT SERPL-MCNC: 0.64 MG/DL (ref 0.6–1.2)
DIASTOLIC BLOOD PRESSURE - MUSE: NORMAL MMHG
EOSINOPHIL # BLD AUTO: 0.2 10E3/UL (ref 0–0.7)
EOSINOPHIL NFR BLD AUTO: 2 %
ERYTHROCYTE [DISTWIDTH] IN BLOOD BY AUTOMATED COUNT: 14.3 % (ref 10–15)
GFR SERPL CREATININE-BSD FRML MDRD: 76 ML/MIN/1.73M2
GLUCOSE BLD-MCNC: 100 MG/DL (ref 70–105)
GLUCOSE UR STRIP-MCNC: NEGATIVE MG/DL
HCT VFR BLD AUTO: 36.3 % (ref 35–47)
HGB BLD-MCNC: 12.1 G/DL (ref 11.7–15.7)
HGB UR QL STRIP: NEGATIVE
IMM GRANULOCYTES # BLD: 0.1 10E3/UL
IMM GRANULOCYTES NFR BLD: 1 %
INTERPRETATION ECG - MUSE: NORMAL
KETONES UR STRIP-MCNC: NEGATIVE MG/DL
LEUKOCYTE ESTERASE UR QL STRIP: ABNORMAL
LYMPHOCYTES # BLD AUTO: 1.8 10E3/UL (ref 0.8–5.3)
LYMPHOCYTES NFR BLD AUTO: 20 %
MCH RBC QN AUTO: 33.4 PG (ref 26.5–33)
MCHC RBC AUTO-ENTMCNC: 33.3 G/DL (ref 31.5–36.5)
MCV RBC AUTO: 100 FL (ref 78–100)
MONOCYTES # BLD AUTO: 0.7 10E3/UL (ref 0–1.3)
MONOCYTES NFR BLD AUTO: 7 %
MUCOUS THREADS #/AREA URNS LPF: PRESENT /LPF
NEUTROPHILS # BLD AUTO: 6.3 10E3/UL (ref 1.6–8.3)
NEUTROPHILS NFR BLD AUTO: 69 %
NITRATE UR QL: NEGATIVE
NRBC # BLD AUTO: 0 10E3/UL
NRBC BLD AUTO-RTO: 0 /100
P AXIS - MUSE: 71 DEGREES
PH UR STRIP: 7 [PH] (ref 5–9)
PLATELET # BLD AUTO: 379 10E3/UL (ref 150–450)
POTASSIUM BLD-SCNC: 4.2 MMOL/L (ref 3.5–5.1)
PR INTERVAL - MUSE: 176 MS
PROT SERPL-MCNC: 6.2 G/DL (ref 6.4–8.9)
QRS DURATION - MUSE: 90 MS
QT - MUSE: 412 MS
QTC - MUSE: 457 MS
R AXIS - MUSE: -35 DEGREES
RBC # BLD AUTO: 3.62 10E6/UL (ref 3.8–5.2)
RBC URINE: 3 /HPF
SODIUM SERPL-SCNC: 133 MMOL/L (ref 134–144)
SP GR UR STRIP: 1.03 (ref 1–1.03)
SQUAMOUS EPITHELIAL: 26 /HPF
SYSTOLIC BLOOD PRESSURE - MUSE: NORMAL MMHG
T AXIS - MUSE: 40 DEGREES
TROPONIN I SERPL-MCNC: 3.5 PG/ML (ref 0–34)
UROBILINOGEN UR STRIP-MCNC: 3 MG/DL
VENTRICULAR RATE- MUSE: 74 BPM
WBC # BLD AUTO: 9 10E3/UL (ref 4–11)
WBC URINE: 82 /HPF

## 2021-09-30 PROCEDURE — 81003 URINALYSIS AUTO W/O SCOPE: CPT | Performed by: EMERGENCY MEDICINE

## 2021-09-30 PROCEDURE — 84484 ASSAY OF TROPONIN QUANT: CPT | Performed by: EMERGENCY MEDICINE

## 2021-09-30 PROCEDURE — 93005 ELECTROCARDIOGRAM TRACING: CPT | Performed by: EMERGENCY MEDICINE

## 2021-09-30 PROCEDURE — 71250 CT THORAX DX C-: CPT

## 2021-09-30 PROCEDURE — 87086 URINE CULTURE/COLONY COUNT: CPT | Performed by: EMERGENCY MEDICINE

## 2021-09-30 PROCEDURE — 36415 COLL VENOUS BLD VENIPUNCTURE: CPT | Performed by: EMERGENCY MEDICINE

## 2021-09-30 PROCEDURE — 85025 COMPLETE CBC W/AUTO DIFF WBC: CPT | Performed by: EMERGENCY MEDICINE

## 2021-09-30 PROCEDURE — 250N000013 HC RX MED GY IP 250 OP 250 PS 637: Mod: GY | Performed by: EMERGENCY MEDICINE

## 2021-09-30 PROCEDURE — 80053 COMPREHEN METABOLIC PANEL: CPT | Performed by: EMERGENCY MEDICINE

## 2021-09-30 PROCEDURE — 99283 EMERGENCY DEPT VISIT LOW MDM: CPT | Performed by: EMERGENCY MEDICINE

## 2021-09-30 PROCEDURE — 93010 ELECTROCARDIOGRAM REPORT: CPT | Performed by: INTERNAL MEDICINE

## 2021-09-30 PROCEDURE — 99285 EMERGENCY DEPT VISIT HI MDM: CPT | Mod: 25 | Performed by: EMERGENCY MEDICINE

## 2021-09-30 RX ORDER — CEFUROXIME AXETIL 250 MG/1
250 TABLET ORAL 2 TIMES DAILY
Qty: 14 TABLET | Refills: 0 | Status: SHIPPED | OUTPATIENT
Start: 2021-09-30 | End: 2021-10-07

## 2021-09-30 RX ORDER — CEFUROXIME AXETIL 250 MG/1
250 TABLET ORAL EVERY 12 HOURS SCHEDULED
Status: DISCONTINUED | OUTPATIENT
Start: 2021-09-30 | End: 2021-09-30 | Stop reason: HOSPADM

## 2021-09-30 RX ADMIN — ACETAMINOPHEN AND CODEINE 1 TABLET: 300; 30 TABLET ORAL at 11:49

## 2021-09-30 RX ADMIN — CEFUROXIME AXETIL 250 MG: 250 TABLET, FILM COATED ORAL at 11:49

## 2021-09-30 ASSESSMENT — ENCOUNTER SYMPTOMS
COUGH: 0
VOMITING: 0
CHILLS: 0
FEVER: 0
ARTHRALGIAS: 0
SHORTNESS OF BREATH: 0
NAUSEA: 0
DYSURIA: 0
LIGHT-HEADEDNESS: 0
CHEST TIGHTNESS: 0
AGITATION: 0

## 2021-09-30 ASSESSMENT — MIFFLIN-ST. JEOR: SCORE: 1028.64

## 2021-09-30 NOTE — ED PROVIDER NOTES
History     Chief Complaint   Patient presents with     Chest Pain     HPI  Geno Horn is a 89 year old female who comes in from local nursing home via ambulance for chest pain.  She actually gives a pretty good history, but I think that her timeline might be a little bit off.  She states that she has bad knees and has bad balance because of this.  She states she falls frequently.  She says the last time she fell she fell straight forward onto her chest.  She says she believes that is what the pain is from.  When asked when that was however she says a month ago.  Paramedics report that she had been complaining of this anterior chest pain for just a few days.  She says it is worse to touch, it is worse with deep breaths.  Better with rest but the pain is always there.  Denies any bruising.  Not feeling short of breath except that it hurts to breathe.  No coughing or URI type symptoms.  No fevers or chills.  No change in bowel or bladder    Allergies:  Allergies   Allergen Reactions     Alendronic Acid      Stomach upset     Oxybutynin      Felt High     Tramadol Other (See Comments)     Adverse  effects     Trospium Other (See Comments)     drowsy     Celebrex [Celecoxib] Other (See Comments)     Light headed     Tolterodine      Other reaction(s): Intolerance-Can't Take  Was unable to sleep at night       Problem List:    Patient Active Problem List    Diagnosis Date Noted     Multiple skin tears 02/25/2021     Priority: Medium     H/O dizziness 02/25/2021     Priority: Medium     Skin tear of left upper extremity 02/20/2021     Priority: Medium     Falls frequently 11/17/2020     Priority: Medium     Contusion of right hip, initial encounter 11/17/2020     Priority: Medium     General unsteadiness 11/17/2020     Priority: Medium     Closed wedge compression fracture of T9 vertebra with routine healing, subsequent encounter 11/17/2020     Priority: Medium     History of 2019 novel coronavirus disease (COVID-19)  11/17/2020     Priority: Medium     Pneumonia due to 2019 novel coronavirus 11/01/2020     Priority: Medium     Hyponatremia 11/01/2020     Priority: Medium     COVID-19 virus infection 11/01/2020     Priority: Medium     Spinal stenosis of lumbar region, unspecified whether neurogenic claudication present 02/19/2020     Priority: Medium     Sepsis (H) 06/17/2019     Priority: Medium     Unsteady gait 05/21/2018     Priority: Medium     Urinary urgency 07/24/2017     Priority: Medium     H/O basal cell carcinoma excision 07/20/2016     Priority: Medium     Osteoarthritis of both hips 01/21/2015     Priority: Medium     Allergic rhinitis 03/16/2012     Priority: Medium     Urinary incontinence 11/01/2010     Priority: Medium     Formatting of this note might be different from the original.  IMO Update 10/11       Osteoarthritis of foot joint 05/26/2009     Priority: Medium     Formatting of this note might be different from the original.  Right midfoot       Hearing loss 04/10/2008     Priority: Medium     Formatting of this note might be different from the original.  IMO Update       Coronary atherosclerosis of native coronary artery 05/09/2007     Priority: Medium     Formatting of this note might be different from the original.  IMO Update 10/11       Essential hypertension 05/09/2007     Priority: Medium     Formatting of this note might be different from the original.  IMO Update       Hypothyroidism 05/09/2007     Priority: Medium     Overview:   tsh and t4 normal on 1/2015     Formatting of this note might be different from the original.  IMO Update 10/11       Hyperlipidemia 06/26/2003     Priority: Medium     Formatting of this note might be different from the original.  IMO Update 10/11       Osteoporosis 06/26/2003     Priority: Medium     Formatting of this note might be different from the original.  Has had DEXA x 2.  IMO Update       Sciatica 07/01/2002     Priority: Medium     Formatting of this note  might be different from the original.  Apparently has hx. of coronary disease.  Is S/P angioplasty in .  Had normal EKG done 04 in [place].  IMO Update 10/11    Formatting of this note might be different from the original.  IMO Update 10/11    Formatting of this note might be different from the original.  [facility]          Past Medical History:    Past Medical History:   Diagnosis Date     Hypertension      Hypothyroid      Osteoarthritis      ST elevation (STEMI) myocardial infarction (H)        Past Surgical History:    Past Surgical History:   Procedure Laterality Date     ARTHROPLASTY HIP Right     Dr Rodriguez     ARTHROPLASTY HIP Left     Dr. Rodriguez     COMBINED CYSTOSCOPY, URETEROSCOPY, LASER HOLMIUM LITHOTRIPSY URETER(S) Right 2019    Procedure: CYSTOSCOPY, RIGHT STENT REMOVAL, URETEROSCOPY, LASER HOLMIUM LITHOTRIPSY RIGHT URETER WITH STENT REPLACEMENT;  Surgeon: Shawn Cole MD;  Location:  OR     CYSTOSCOPY, RETROGRADES, INSERT STENT URETER(S), COMBINED Right 2019    Procedure: CYSTOSCOPY, WITH RETROGRADE PYELOGRAM AND URETERAL STENT INSERTION;  Surgeon: Shawn Cole MD;  Location:  OR       Family History:    Family History   Problem Relation Age of Onset     Heart Disease Mother 60         of mi     Heart Disease Father 60         of mi     Heart Disease Brother      Heart Disease Brother        Social History:  Marital Status:   [5]  Social History     Tobacco Use     Smoking status: Never Smoker     Smokeless tobacco: Never Used     Tobacco comment: no ecig   Vaping Use     Vaping Use: Never used   Substance Use Topics     Alcohol use: Not Currently     Alcohol/week: 0.0 standard drinks     Drug use: No        Medications:    acetaminophen (TYLENOL) 650 MG CR tablet  acetaminophen-codeine (TYLENOL #3) 300-30 MG tablet  carbamide peroxide (DEBROX) 6.5 % otic solution  cefuroxime (CEFTIN) 250 MG tablet  cetirizine (ZYRTEC) 10 MG tablet  diltiazem ER COATED BEADS  "(CARDIZEM CD/CARTIA XT) 180 MG 24 hr capsule  docusate sodium (COLACE) 100 MG capsule  DULoxetine (CYMBALTA) 20 MG capsule  fish oil-omega-3 fatty acids 1000 MG capsule  HYDROcodone-acetaminophen (NORCO) 5-325 MG tablet  Incontinence Supply Disposable (DEPEND EASY FIT UNDERGARMENTS) MISC  Incontinence Supply Disposable (POISE PAD) PADS  levothyroxine (SYNTHROID/LEVOTHROID) 100 MCG tablet  loratadine (CLARITIN) 10 MG tablet  multivitamin, therapeutic (THERA-VIT) TABS tablet  naproxen sodium (ANAPROX) 220 MG tablet  Nutritional Supplements (BOOST HIGH PROTEIN) LIQD  order for DME  order for DME  SM SENNA-S 8.6-50 MG tablet  sodium chloride (OCEAN) 0.65 % nasal spray          Review of Systems   Constitutional: Negative for chills and fever.   HENT: Negative for congestion.    Eyes: Negative for visual disturbance.   Respiratory: Negative for cough, chest tightness and shortness of breath.    Cardiovascular: Positive for chest pain.   Gastrointestinal: Negative for nausea and vomiting.   Genitourinary: Negative for dysuria.   Musculoskeletal: Negative for arthralgias.   Skin: Negative for rash.   Neurological: Negative for light-headedness.   Psychiatric/Behavioral: Negative for agitation.       Physical Exam   BP: (!) 149/81  Pulse: 61  Temp: 97.2  F (36.2  C)  Resp: 20  Height: 167.6 cm (5' 6\")  Weight: 61.7 kg (136 lb)  SpO2: 98 %      Physical Exam  Vitals and nursing note reviewed.   Constitutional:       Appearance: She is well-developed.   HENT:      Head: Normocephalic and atraumatic.      Mouth/Throat:      Mouth: Mucous membranes are moist.   Eyes:      Conjunctiva/sclera: Conjunctivae normal.   Cardiovascular:      Rate and Rhythm: Normal rate and regular rhythm.      Heart sounds: Normal heart sounds.   Pulmonary:      Effort: Pulmonary effort is normal.      Breath sounds: Normal breath sounds.      Comments: She winces with pain when I palpate across her mid chest.  Does not seem worse 1 side more than " the other.  No bruising or abrasions or other obvious visible abnormality.  Abdominal:      General: Abdomen is flat. Bowel sounds are normal. There is no distension.      Tenderness: There is no abdominal tenderness.   Skin:     General: Skin is warm and dry.   Neurological:      Mental Status: She is alert and oriented to person, place, and time.   Psychiatric:         Behavior: Behavior normal.         ED Course        Procedures                  Results for orders placed or performed during the hospital encounter of 09/30/21 (from the past 24 hour(s))   EKG 12-lead, tracing only   Result Value Ref Range    Systolic Blood Pressure  mmHg    Diastolic Blood Pressure  mmHg    Ventricular Rate 74 BPM    Atrial Rate 74 BPM    DC Interval 176 ms    QRS Duration 90 ms     ms    QTc 457 ms    P Axis 71 degrees    R AXIS -35 degrees    T Axis 40 degrees    Interpretation ECG       Sinus rhythm with occasional Premature ventricular complexes  Left axis deviation  Abnormal ECG  When compared with ECG of 16-NOV-2020 08:01,  Premature ventricular complexes are now Present  Confirmed by MD CHACE, CHUCK ([id number]) on 9/30/2021 9:05:21 AM     CBC with platelets differential    Narrative    The following orders were created for panel order CBC with platelets differential.  Procedure                               Abnormality         Status                     ---------                               -----------         ------                     CBC with platelets and d...[[id number]]  Abnormal            Final result                 Please view results for these tests on the individual orders.   Troponin I   Result Value Ref Range    Troponin I 3.5 0.0 - 34.0 pg/mL   Comprehensive metabolic panel   Result Value Ref Range    Sodium 133 (L) 134 - 144 mmol/L    Potassium 4.2 3.5 - 5.1 mmol/L    Chloride 99 98 - 107 mmol/L    Carbon Dioxide (CO2) 27 21 - 31 mmol/L    Anion Gap 7 3 - 14 mmol/L    Urea Nitrogen 19 7 - 25 mg/dL     Creatinine 0.64 0.60 - 1.20 mg/dL    Calcium 9.4 8.6 - 10.3 mg/dL    Glucose 100 70 - 105 mg/dL    Alkaline Phosphatase 82 34 - 104 U/L    AST 15 13 - 39 U/L    ALT 15 7 - 52 U/L    Protein Total 6.2 (L) 6.4 - 8.9 g/dL    Albumin 4.0 3.5 - 5.7 g/dL    Bilirubin Total 0.5 0.3 - 1.0 mg/dL    GFR Estimate 76 >60 mL/min/1.73m2   CBC with platelets and differential   Result Value Ref Range    WBC Count 9.0 4.0 - 11.0 10e3/uL    RBC Count 3.62 (L) 3.80 - 5.20 10e6/uL    Hemoglobin 12.1 11.7 - 15.7 g/dL    Hematocrit 36.3 35.0 - 47.0 %     78 - 100 fL    MCH 33.4 (H) 26.5 - 33.0 pg    MCHC 33.3 31.5 - 36.5 g/dL    RDW 14.3 10.0 - 15.0 %    Platelet Count 379 150 - 450 10e3/uL    % Neutrophils 69 %    % Lymphocytes 20 %    % Monocytes 7 %    % Eosinophils 2 %    % Basophils 1 %    % Immature Granulocytes 1 %    NRBCs per 100 WBC 0 <1 /100    Absolute Neutrophils 6.3 1.6 - 8.3 10e3/uL    Absolute Lymphocytes 1.8 0.8 - 5.3 10e3/uL    Absolute Monocytes 0.7 0.0 - 1.3 10e3/uL    Absolute Eosinophils 0.2 0.0 - 0.7 10e3/uL    Absolute Basophils 0.1 0.0 - 0.2 10e3/uL    Absolute Immature Granulocytes 0.1 (H) <=0.0 10e3/uL    Absolute NRBCs 0.0 10e3/uL   CT Chest w/o Contrast    Narrative    CT CHEST W/O CONTRAST    HISTORY:  Fall, chest pain.      TECHNIQUE:  Non-contrast helical thoracic CT was performed.    COMPARISON:  11/21/20    FINDINGS:           Cardiac size is stable. There are atherosclerotic calcifications of  the coronary arteries.  There is no pericardial or pleural effusion.  No abnormal thoracic adenopathy is identified. There is a moderate  hiatal hernia. Calcified gallstones are seen. Right adrenal thickening  is chronic.    The central airways are patent. Calcified pulmonary nodules are  redemonstrated. There is patchy nodular calcification along the left  anterior lung. Dependent atelectasis and scarring is present. A 4 mm  solid noncalcified nodule in the right upper lobe is stable. No new or  enlarging  pulmonary mass is identified.    Osteoporosis. There are acute fractures of the anterior right third,  fourth and likely fifth ribs. There is a mildly comminuted fracture of  the manubrium. There is a chronic fracture of T9. There is an interval  fracture of T7.      Impression    IMPRESSION:    Osteoporosis. Mildly comminuted acute fracture of the manubrium. Acute  anterior right third, fourth and likely fifth rib fractures. No  pneumothorax. Interval pathologic osteoporotic fracture of T7.    KATELIN CLEMENTS MD         SYSTEM ID:  [id number]   UA with Microscopic reflex to Culture    Specimen: Urine, Clean Catch   Result Value Ref Range    Color Urine Yellow Colorless, Straw, Light Yellow, Yellow    Appearance Urine Cloudy (A) Clear    Glucose Urine Negative Negative mg/dL    Bilirubin Urine Negative Negative    Ketones Urine Negative Negative mg/dL    Specific Gravity Urine 1.027 1.000 - 1.030    Blood Urine Negative Negative    pH Urine 7.0 5.0 - 9.0    Protein Albumin Urine 30  (A) Negative mg/dL    Urobilinogen Urine 3.0 (A) Normal, 2.0 mg/dL    Nitrite Urine Negative Negative    Leukocyte Esterase Urine Moderate (A) Negative    Bacteria Urine Many (A) None Seen /HPF    Mucus Urine Present (A) None Seen /LPF    RBC Urine 3 (H) <=2 /HPF    WBC Urine 82 (H) <=5 /HPF    Squamous Epithelials Urine 26 (H) <=1 /HPF    Narrative    Urine Culture ordered based on laboratory criteria       Medications   acetaminophen-codeine (TYLENOL #3) 300-30 MG per tablet 1 tablet (has no administration in time range)   acetaminophen-codeine (TYLENOL #3) 300-30 MG per tablet 1 tablet (has no administration in time range)   cefuroxime (CEFTIN) tablet 250 mg (has no administration in time range)       Assessments & Plan (with Medical Decision Making)     I have reviewed the nursing notes.    I have reviewed the findings, diagnosis, plan and need for follow up with the patient.  Patient with fracture of manubrium and 2-3 right-sided  rib fractures as above in CT.  Also appears to have a urinary tract infection.  We will culture the urine.  We will start her on Ceftin.  For pain we will give some Tylenol with codeine.  Discharge back to nursing home at this time.  Return if worse.    New Prescriptions    ACETAMINOPHEN-CODEINE (TYLENOL #3) 300-30 MG TABLET    Take 1 tablet by mouth every 6 hours as needed for severe pain    CEFUROXIME (CEFTIN) 250 MG TABLET    Take 1 tablet (250 mg) by mouth 2 times daily for 7 days       Final diagnoses:   Closed fracture of manubrium, initial encounter   Closed fracture of multiple ribs of right side, initial encounter   Urinary tract infection with hematuria, site unspecified       9/30/2021   [facility]     Tahir Hardy MD  09/30/21 [id number]

## 2021-09-30 NOTE — ED TRIAGE NOTES
EMS Arrival Note  ________________________________  Geno Horn is a 89 year old Female that [place] [facility] [place] [facility]  Pre hospital clinical presentation per patient  and EMS personnel includes Pt has chest pain that is generalized. Pt says this has been going on for a couple of days. Pt recently had a fall and pt says she fell on her chest.    Pre Hospital Cardiac rhythm reported as Normal Sinus  Patient arrives with:  GCS Total = 15  Airway intact  Breathing Assessment Normal  Circulation Assessment Normal  Patient arrives with a 20g IV at her left anticubital    Placed in room bay 5, gowned, warm blanket provided, side rails up,  ID verified and band placed, and call light within reach.       Previous living situation Other:  [facility]

## 2021-10-01 DIAGNOSIS — L98.499 SKIN ULCER, UNSPECIFIED ULCER STAGE (H): Primary | ICD-10-CM

## 2021-10-01 LAB — BACTERIA UR CULT: NORMAL

## 2021-10-01 RX ORDER — MUPIROCIN 20 MG/G
OINTMENT TOPICAL
Qty: 30 G | Refills: 1 | Status: SHIPPED | OUTPATIENT
Start: 2021-10-01 | End: 2022-01-11

## 2021-10-01 NOTE — RESULT ENCOUNTER NOTE
Final urine culture report is negative.  Adult Negative Urine culture parameters per protocol: Any # Urogenital single or mixed organism, <10,000 col/ml single organism (cath specimen), and <50,000 col/ml single organism (midstream).  [facility] Emergency Dept discharge antibiotic prescribed (If applicable): Cefuroxime (Ceftin) 250 mg PO tablet, 1 tablet by mouth 2 times daily for 7 days  Treatment recommendations per [facility] ED Lab Result Urine Culture protocol.

## 2021-10-06 ENCOUNTER — DOCUMENTATION ONLY (OUTPATIENT)
Dept: OTHER | Facility: CLINIC | Age: 86
End: 2021-10-06

## 2021-10-07 ENCOUNTER — NURSING HOME VISIT (OUTPATIENT)
Dept: GERIATRICS | Facility: OTHER | Age: 86
End: 2021-10-07
Attending: NURSE PRACTITIONER
Payer: COMMERCIAL

## 2021-10-07 DIAGNOSIS — S70.01XA CONTUSION OF RIGHT HIP, INITIAL ENCOUNTER: ICD-10-CM

## 2021-10-07 DIAGNOSIS — T14.8XXA TRAUMATIC INJURY OF SKIN: ICD-10-CM

## 2021-10-07 DIAGNOSIS — S22.21XD CLOSED FRACTURE OF MANUBRIUM WITH ROUTINE HEALING, SUBSEQUENT ENCOUNTER: ICD-10-CM

## 2021-10-07 DIAGNOSIS — R29.6 MULTIPLE FALLS: ICD-10-CM

## 2021-10-07 DIAGNOSIS — M15.0 PRIMARY OSTEOARTHRITIS INVOLVING MULTIPLE JOINTS: ICD-10-CM

## 2021-10-07 DIAGNOSIS — N39.46 MIXED STRESS AND URGE URINARY INCONTINENCE: Primary | ICD-10-CM

## 2021-10-07 RX ORDER — DIAPER,BRIEF,ADULT, DISPOSABLE
EACH MISCELLANEOUS
Qty: 90 EACH | Refills: 11 | Status: SHIPPED | OUTPATIENT
Start: 2021-10-07

## 2021-10-09 ENCOUNTER — HEALTH MAINTENANCE LETTER (OUTPATIENT)
Age: 86
End: 2021-10-09

## 2021-10-09 NOTE — PROGRESS NOTES
Geno Horn is a 89 year old female being seen today for follow up visit visit at [facility].    Code Status: Advance Directives: YES-.   Health Care Power of : Extended Emergency Contact Information  Primary Emergency Contact: Daniel Horn  Address: [address]           [place] [address] [facility]  Mobile Phone: [phone]  Relation: Son  Secondary Emergency Contact: Adri Chaudhary  Address: [address] [place] [address] [facility]  Home Phone: [phone]  Mobile Phone: [phone]  Relation: Daughter     Allergies: Alendronic acid, Oxybutynin, Tramadol, Trospium, Celebrex [celecoxib], and Tolterodine     Chief Complaint / HPI: Follow-up on ED visit last week for severe musculoskeletal chest pain, was seen a couple weeks previously for a fall injury while transferring, had multiple x-rays that were negative.  ED visit CT imaging showed nondisplaced manubrium fracture and 3 right rib fractures.  Increased Cymbalta from 20 mg daily to twice daily along with scheduled Tylenol  Limited Aleve with food daily x2 weeks, ice packs intermittently which resident feels has provided comfort and pain management  She is currently working with home care therapy and skilled nursing services--physical therapy and Occupational Therapy--reports was up walking with her walker with assistance today--- will be having OT re-evaluation for transfers mobility and safety with motorized wheelchair.  No new skin tear injuries--skilled nursing wound care follow-up reports healing as expected course--dressings are clean and dry.  Resident raises no new concerns--feels pain a little better controlled      Past Medical, Surgical, Family and Social History reviewed: YES.     Medications: reviewed  Medications - recent changes: Increase Cymbalta from 20 mg every day to BID    Review of Systems:  General: positive for weakness  Skin: positive for skin tear wounds  Musculoskeletal: positive for  fracture, back pain, arthritis, joint pain and muscular weakness  All other systems negative  Toileting:    Continent of Bowel: Yes   Continent of Bladder: Yes  Mobility: wheelchair    Recent Labs: reviewed      Current Therapies: physical therapy, occupational therapy and skilled nursing wound care home care services    Exam:  Vital signs reviewed.   GENERAL APPEARANCE: alert and no distress  EYES: Eyes grossly normal to inspection, conjunctivae and sclerae normal  NECK: no adenopathy, no asymmetry  RESP: lungs clear   CV: regular rate, no peripheral edema and peripheral pulses strong  MS: no musculoskeletal defects are noted  SKIN: Has 3 small skin tear injuries on right lower arm, left wrist and right lower leg with clean dry dressing intact  NEURO: Wanda x 4, CMS intact, mentation and speech normal, no lateralization weakness  PSYCH: mentation appears normal and affect normal/bright    Assessment and Plan:    Resident feels pain is under a little better control realizes it will take weeks probably months before feeling back to baseline  Using incentive spirometry, no respiratory concerns.    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

## 2021-10-20 ENCOUNTER — APPOINTMENT (OUTPATIENT)
Dept: CT IMAGING | Facility: OTHER | Age: 86
End: 2021-10-20
Attending: FAMILY MEDICINE
Payer: COMMERCIAL

## 2021-10-20 ENCOUNTER — HOSPITAL ENCOUNTER (OUTPATIENT)
Facility: OTHER | Age: 86
Setting detail: OBSERVATION
Discharge: NURSING, REHAB OR LONG-TERM CARE | End: 2021-10-22
Attending: FAMILY MEDICINE | Admitting: FAMILY MEDICINE
Payer: COMMERCIAL

## 2021-10-20 ENCOUNTER — MEDICAL CORRESPONDENCE (OUTPATIENT)
Dept: HEALTH INFORMATION MANAGEMENT | Facility: OTHER | Age: 86
End: 2021-10-20

## 2021-10-20 ENCOUNTER — APPOINTMENT (OUTPATIENT)
Dept: GENERAL RADIOLOGY | Facility: OTHER | Age: 86
End: 2021-10-20
Attending: FAMILY MEDICINE
Payer: COMMERCIAL

## 2021-10-20 DIAGNOSIS — M97.8XXA PERIPROSTHETIC FRACTURE AROUND INTERNAL PROSTHETIC HIP JOINT, INITIAL ENCOUNTER: Primary | ICD-10-CM

## 2021-10-20 DIAGNOSIS — H61.20 IMPACTED CERUMEN, UNSPECIFIED LATERALITY: ICD-10-CM

## 2021-10-20 DIAGNOSIS — S72.114A CLOSED NONDISPLACED FRACTURE OF GREATER TROCHANTER OF RIGHT FEMUR, INITIAL ENCOUNTER (H): ICD-10-CM

## 2021-10-20 DIAGNOSIS — Z96.649 PERIPROSTHETIC FRACTURE AROUND INTERNAL PROSTHETIC HIP JOINT, INITIAL ENCOUNTER: Primary | ICD-10-CM

## 2021-10-20 LAB
ALBUMIN UR-MCNC: 10 MG/DL
ANION GAP SERPL CALCULATED.3IONS-SCNC: 10 MMOL/L (ref 3–14)
APPEARANCE UR: ABNORMAL
BASOPHILS # BLD AUTO: 0.1 10E3/UL (ref 0–0.2)
BASOPHILS NFR BLD AUTO: 1 %
BILIRUB UR QL STRIP: NEGATIVE
BUN SERPL-MCNC: 14 MG/DL (ref 7–25)
CALCIUM SERPL-MCNC: 9.1 MG/DL (ref 8.6–10.3)
CHLORIDE BLD-SCNC: 100 MMOL/L (ref 98–107)
CO2 SERPL-SCNC: 24 MMOL/L (ref 21–31)
COLOR UR AUTO: YELLOW
CREAT SERPL-MCNC: 0.53 MG/DL (ref 0.6–1.2)
EOSINOPHIL # BLD AUTO: 0.5 10E3/UL (ref 0–0.7)
EOSINOPHIL NFR BLD AUTO: 4 %
ERYTHROCYTE [DISTWIDTH] IN BLOOD BY AUTOMATED COUNT: 14.5 % (ref 10–15)
GFR SERPL CREATININE-BSD FRML MDRD: 81 ML/MIN/1.73M2
GLUCOSE BLD-MCNC: 127 MG/DL (ref 70–105)
GLUCOSE UR STRIP-MCNC: NEGATIVE MG/DL
HCT VFR BLD AUTO: 33.2 % (ref 35–47)
HGB BLD-MCNC: 11.2 G/DL (ref 11.7–15.7)
HGB UR QL STRIP: NEGATIVE
IMM GRANULOCYTES # BLD: 0.5 10E3/UL
IMM GRANULOCYTES NFR BLD: 4 %
KETONES UR STRIP-MCNC: NEGATIVE MG/DL
LEUKOCYTE ESTERASE UR QL STRIP: NEGATIVE
LYMPHOCYTES # BLD AUTO: 3.1 10E3/UL (ref 0.8–5.3)
LYMPHOCYTES NFR BLD AUTO: 30 %
MCH RBC QN AUTO: 33.7 PG (ref 26.5–33)
MCHC RBC AUTO-ENTMCNC: 33.7 G/DL (ref 31.5–36.5)
MCV RBC AUTO: 100 FL (ref 78–100)
MONOCYTES # BLD AUTO: 0.8 10E3/UL (ref 0–1.3)
MONOCYTES NFR BLD AUTO: 8 %
MUCOUS THREADS #/AREA URNS LPF: PRESENT /LPF
NEUTROPHILS # BLD AUTO: 5.6 10E3/UL (ref 1.6–8.3)
NEUTROPHILS NFR BLD AUTO: 53 %
NITRATE UR QL: NEGATIVE
NRBC # BLD AUTO: 0.1 10E3/UL
NRBC BLD AUTO-RTO: 1 /100
PH UR STRIP: 6 [PH] (ref 5–9)
PLATELET # BLD AUTO: 389 10E3/UL (ref 150–450)
POTASSIUM BLD-SCNC: 4.1 MMOL/L (ref 3.5–5.1)
RBC # BLD AUTO: 3.32 10E6/UL (ref 3.8–5.2)
RBC URINE: <1 /HPF
SARS-COV-2 RNA RESP QL NAA+PROBE: NEGATIVE
SODIUM SERPL-SCNC: 134 MMOL/L (ref 134–144)
SP GR UR STRIP: 1.02 (ref 1–1.03)
TROPONIN I SERPL-MCNC: 3.2 PG/ML (ref 0–34)
UROBILINOGEN UR STRIP-MCNC: NORMAL MG/DL
WBC # BLD AUTO: 10.5 10E3/UL (ref 4–11)
WBC URINE: 8 /HPF

## 2021-10-20 PROCEDURE — 99285 EMERGENCY DEPT VISIT HI MDM: CPT | Mod: 25 | Performed by: FAMILY MEDICINE

## 2021-10-20 PROCEDURE — 85025 COMPLETE CBC W/AUTO DIFF WBC: CPT | Performed by: FAMILY MEDICINE

## 2021-10-20 PROCEDURE — 70450 CT HEAD/BRAIN W/O DYE: CPT

## 2021-10-20 PROCEDURE — 96374 THER/PROPH/DIAG INJ IV PUSH: CPT | Performed by: FAMILY MEDICINE

## 2021-10-20 PROCEDURE — 250N000011 HC RX IP 250 OP 636: Performed by: FAMILY MEDICINE

## 2021-10-20 PROCEDURE — C9803 HOPD COVID-19 SPEC COLLECT: HCPCS | Performed by: FAMILY MEDICINE

## 2021-10-20 PROCEDURE — 80048 BASIC METABOLIC PNL TOTAL CA: CPT | Performed by: FAMILY MEDICINE

## 2021-10-20 PROCEDURE — U0005 INFEC AGEN DETEC AMPLI PROBE: HCPCS | Performed by: FAMILY MEDICINE

## 2021-10-20 PROCEDURE — 93005 ELECTROCARDIOGRAM TRACING: CPT | Performed by: FAMILY MEDICINE

## 2021-10-20 PROCEDURE — 36415 COLL VENOUS BLD VENIPUNCTURE: CPT | Performed by: FAMILY MEDICINE

## 2021-10-20 PROCEDURE — 683N000002 HC PARTIAL TRAUMA W/O CC LEVEL III: Performed by: FAMILY MEDICINE

## 2021-10-20 PROCEDURE — 93010 ELECTROCARDIOGRAM REPORT: CPT | Performed by: INTERNAL MEDICINE

## 2021-10-20 PROCEDURE — 99285 EMERGENCY DEPT VISIT HI MDM: CPT | Performed by: FAMILY MEDICINE

## 2021-10-20 PROCEDURE — 84484 ASSAY OF TROPONIN QUANT: CPT | Performed by: FAMILY MEDICINE

## 2021-10-20 PROCEDURE — 81001 URINALYSIS AUTO W/SCOPE: CPT | Performed by: FAMILY MEDICINE

## 2021-10-20 PROCEDURE — 73502 X-RAY EXAM HIP UNI 2-3 VIEWS: CPT

## 2021-10-20 PROCEDURE — 96375 TX/PRO/DX INJ NEW DRUG ADDON: CPT | Performed by: FAMILY MEDICINE

## 2021-10-20 RX ORDER — PROCHLORPERAZINE 25 MG
12.5 SUPPOSITORY, RECTAL RECTAL EVERY 12 HOURS PRN
Status: DISCONTINUED | OUTPATIENT
Start: 2021-10-20 | End: 2021-10-22 | Stop reason: HOSPADM

## 2021-10-20 RX ORDER — ACETAMINOPHEN 500 MG
500 TABLET ORAL EVERY 6 HOURS PRN
Status: DISCONTINUED | OUTPATIENT
Start: 2021-10-20 | End: 2021-10-22 | Stop reason: HOSPADM

## 2021-10-20 RX ORDER — ONDANSETRON 4 MG/1
4 TABLET, ORALLY DISINTEGRATING ORAL EVERY 6 HOURS PRN
Status: DISCONTINUED | OUTPATIENT
Start: 2021-10-20 | End: 2021-10-22 | Stop reason: HOSPADM

## 2021-10-20 RX ORDER — FENTANYL CITRATE 50 UG/ML
50 INJECTION, SOLUTION INTRAMUSCULAR; INTRAVENOUS
Status: DISCONTINUED | OUTPATIENT
Start: 2021-10-20 | End: 2021-10-21

## 2021-10-20 RX ORDER — FENTANYL CITRATE 50 UG/ML
50 INJECTION, SOLUTION INTRAMUSCULAR; INTRAVENOUS ONCE
Status: COMPLETED | OUTPATIENT
Start: 2021-10-20 | End: 2021-10-20

## 2021-10-20 RX ORDER — PROCHLORPERAZINE MALEATE 5 MG
5 TABLET ORAL EVERY 6 HOURS PRN
Status: DISCONTINUED | OUTPATIENT
Start: 2021-10-20 | End: 2021-10-22 | Stop reason: HOSPADM

## 2021-10-20 RX ORDER — NALOXONE HYDROCHLORIDE 0.4 MG/ML
0.4 INJECTION, SOLUTION INTRAMUSCULAR; INTRAVENOUS; SUBCUTANEOUS
Status: DISCONTINUED | OUTPATIENT
Start: 2021-10-20 | End: 2021-10-22 | Stop reason: HOSPADM

## 2021-10-20 RX ORDER — ONDANSETRON 2 MG/ML
4 INJECTION INTRAMUSCULAR; INTRAVENOUS EVERY 6 HOURS PRN
Status: DISCONTINUED | OUTPATIENT
Start: 2021-10-20 | End: 2021-10-22 | Stop reason: HOSPADM

## 2021-10-20 RX ORDER — NALOXONE HYDROCHLORIDE 0.4 MG/ML
0.2 INJECTION, SOLUTION INTRAMUSCULAR; INTRAVENOUS; SUBCUTANEOUS
Status: DISCONTINUED | OUTPATIENT
Start: 2021-10-20 | End: 2021-10-22 | Stop reason: HOSPADM

## 2021-10-20 RX ORDER — LIDOCAINE 40 MG/G
CREAM TOPICAL
Status: DISCONTINUED | OUTPATIENT
Start: 2021-10-20 | End: 2021-10-22 | Stop reason: HOSPADM

## 2021-10-20 RX ADMIN — FENTANYL CITRATE 50 MCG: 50 INJECTION, SOLUTION INTRAMUSCULAR; INTRAVENOUS at 20:42

## 2021-10-20 RX ADMIN — FENTANYL CITRATE 50 MCG: 50 INJECTION, SOLUTION INTRAMUSCULAR; INTRAVENOUS at 22:12

## 2021-10-20 ASSESSMENT — ENCOUNTER SYMPTOMS
ABDOMINAL PAIN: 0
CHEST TIGHTNESS: 0
HEADACHES: 0

## 2021-10-21 ENCOUNTER — APPOINTMENT (OUTPATIENT)
Dept: PHYSICAL THERAPY | Facility: OTHER | Age: 86
End: 2021-10-21
Attending: FAMILY MEDICINE
Payer: COMMERCIAL

## 2021-10-21 ENCOUNTER — APPOINTMENT (OUTPATIENT)
Dept: OCCUPATIONAL THERAPY | Facility: OTHER | Age: 86
End: 2021-10-21
Attending: FAMILY MEDICINE
Payer: COMMERCIAL

## 2021-10-21 PROBLEM — S72.114A CLOSED NONDISPLACED FRACTURE OF GREATER TROCHANTER OF RIGHT FEMUR, INITIAL ENCOUNTER (H): Status: RESOLVED | Noted: 2021-10-20 | Resolved: 2021-10-21

## 2021-10-21 PROBLEM — M97.8XXA PERIPROSTHETIC FRACTURE AROUND INTERNAL PROSTHETIC HIP JOINT, INITIAL ENCOUNTER: Status: ACTIVE | Noted: 2021-10-21

## 2021-10-21 PROBLEM — Z96.649 PERIPROSTHETIC FRACTURE AROUND INTERNAL PROSTHETIC HIP JOINT, INITIAL ENCOUNTER: Status: ACTIVE | Noted: 2021-10-21

## 2021-10-21 LAB
ATRIAL RATE - MUSE: 72 BPM
DIASTOLIC BLOOD PRESSURE - MUSE: NORMAL MMHG
INTERPRETATION ECG - MUSE: NORMAL
P AXIS - MUSE: 61 DEGREES
PR INTERVAL - MUSE: 188 MS
QRS DURATION - MUSE: 94 MS
QT - MUSE: 408 MS
QTC - MUSE: 446 MS
R AXIS - MUSE: -38 DEGREES
SYSTOLIC BLOOD PRESSURE - MUSE: NORMAL MMHG
T AXIS - MUSE: 51 DEGREES
VENTRICULAR RATE- MUSE: 72 BPM

## 2021-10-21 PROCEDURE — 99219 PR INITIAL OBSERVATION CARE,LEVEL II: CPT | Performed by: FAMILY MEDICINE

## 2021-10-21 PROCEDURE — 97161 PT EVAL LOW COMPLEX 20 MIN: CPT | Mod: GP

## 2021-10-21 PROCEDURE — 250N000013 HC RX MED GY IP 250 OP 250 PS 637: Performed by: FAMILY MEDICINE

## 2021-10-21 PROCEDURE — 97530 THERAPEUTIC ACTIVITIES: CPT | Mod: GO | Performed by: OCCUPATIONAL THERAPIST

## 2021-10-21 PROCEDURE — 93010 ELECTROCARDIOGRAM REPORT: CPT | Performed by: INTERNAL MEDICINE

## 2021-10-21 PROCEDURE — 999N000157 HC STATISTIC RCP TIME EA 10 MIN

## 2021-10-21 PROCEDURE — G0378 HOSPITAL OBSERVATION PER HR: HCPCS

## 2021-10-21 PROCEDURE — 97116 GAIT TRAINING THERAPY: CPT | Mod: GP

## 2021-10-21 PROCEDURE — 97530 THERAPEUTIC ACTIVITIES: CPT | Mod: GP

## 2021-10-21 PROCEDURE — 97165 OT EVAL LOW COMPLEX 30 MIN: CPT | Mod: GO | Performed by: OCCUPATIONAL THERAPIST

## 2021-10-21 RX ORDER — HYDROCODONE BITARTRATE AND ACETAMINOPHEN 5; 325 MG/1; MG/1
1-2 TABLET ORAL EVERY 4 HOURS PRN
Status: DISCONTINUED | OUTPATIENT
Start: 2021-10-21 | End: 2021-10-22 | Stop reason: HOSPADM

## 2021-10-21 RX ORDER — LEVOTHYROXINE SODIUM 100 UG/1
100 TABLET ORAL DAILY
Status: DISCONTINUED | OUTPATIENT
Start: 2021-10-21 | End: 2021-10-22 | Stop reason: HOSPADM

## 2021-10-21 RX ORDER — DULOXETIN HYDROCHLORIDE 20 MG/1
20 CAPSULE, DELAYED RELEASE ORAL 2 TIMES DAILY
Status: DISCONTINUED | OUTPATIENT
Start: 2021-10-21 | End: 2021-10-22 | Stop reason: HOSPADM

## 2021-10-21 RX ORDER — SENNOSIDES 8.6 MG
650 CAPSULE ORAL DAILY PRN
COMMUNITY
End: 2022-12-29

## 2021-10-21 RX ORDER — DOCUSATE SODIUM 100 MG/1
100 CAPSULE, LIQUID FILLED ORAL 2 TIMES DAILY
Status: DISCONTINUED | OUTPATIENT
Start: 2021-10-21 | End: 2021-10-22 | Stop reason: HOSPADM

## 2021-10-21 RX ORDER — DILTIAZEM HYDROCHLORIDE 180 MG/1
180 CAPSULE, COATED, EXTENDED RELEASE ORAL DAILY
Status: DISCONTINUED | OUTPATIENT
Start: 2021-10-21 | End: 2021-10-22 | Stop reason: HOSPADM

## 2021-10-21 RX ADMIN — DILTIAZEM HYDROCHLORIDE 180 MG: 180 CAPSULE, COATED, EXTENDED RELEASE ORAL at 11:17

## 2021-10-21 RX ADMIN — DULOXETINE HYDROCHLORIDE 20 MG: 20 CAPSULE, DELAYED RELEASE ORAL at 21:03

## 2021-10-21 RX ADMIN — ACETAMINOPHEN 500 MG: 500 TABLET, FILM COATED ORAL at 01:12

## 2021-10-21 RX ADMIN — HYDROCODONE BITARTRATE AND ACETAMINOPHEN 1 TABLET: 5; 325 TABLET ORAL at 17:31

## 2021-10-21 RX ADMIN — DOCUSATE SODIUM 100 MG: 100 CAPSULE, LIQUID FILLED ORAL at 21:03

## 2021-10-21 RX ADMIN — DOCUSATE SODIUM 100 MG: 100 CAPSULE, LIQUID FILLED ORAL at 11:17

## 2021-10-21 RX ADMIN — DULOXETINE HYDROCHLORIDE 20 MG: 20 CAPSULE, DELAYED RELEASE ORAL at 11:17

## 2021-10-21 RX ADMIN — LEVOTHYROXINE SODIUM 100 MCG: 0.1 TABLET ORAL at 11:17

## 2021-10-21 RX ADMIN — ACETAMINOPHEN 500 MG: 500 TABLET, FILM COATED ORAL at 13:28

## 2021-10-21 NOTE — PROGRESS NOTES
Patient is alert and orientated. Complaints of pain in right hip when moved. Tylenol administered and cold therapy applied. Patient controlled at this time. Previous right hip fracture. Patient reports multiple falls in the last 6 months. Scattered bruising throughout. Incontinent of bladder. Patient has not been out of bed this shift. Denies nausea. LS clear. VSS.    /69   Pulse 96   Temp 97.7  F (36.5  C) (Tympanic)   Resp 16   Wt 64.2 kg (141 lb 8.6 oz)   SpO2 94%   BMI 22.84 kg/m

## 2021-10-21 NOTE — PROGRESS NOTES
10/21/21 1543   Quick Adds   Type of Visit Initial Occupational Therapy Evaluation   Living Environment   People in home alone   Current Living Arrangements assisted living;apartment   Home Accessibility no concerns   Transportation Anticipated family or friend will provide   Self-Care   Usual Activity Tolerance fair   Current Activity Tolerance poor   Cognitive Status Examination   Orientation Status orientation to person, place and time   Affect/Mental Status (Cognitive) WFL   Follows Commands WFL   Pain Assessment   Patient Currently in Pain Yes, see Vital Sign flowsheet   Range of Motion Comprehensive   General Range of Motion no range of motion deficits identified   Coordination   Upper Extremity Coordination No deficits were identified   Bed Mobility   Bed Mobility supine-sit   Supine-Sit [place] (Bed Mobility) maximum assist (25% patient effort);2 person assist   Transfers   Transfers bed-chair transfer;sit-stand transfer   Transfer Skill: Bed to Chair/Chair to Bed   Bed-Chair [place] (Transfers) minimum assist (75% patient effort);2 person assist   Sit-Stand Transfer   Sit-Stand [place] (Transfers) minimum assist (75% patient effort);2 person assist   Clinical Impression   Criteria for Skilled Therapeutic Interventions Met (OT) yes   OT Diagnosis right hip fx   OT Problem List-Impairments impacting ADL activity tolerance impaired;pain   Assessment of Occupational Performance 1-3 Performance Deficits   Identified Performance Deficits mobility and self care    Planned Therapy Interventions (OT) ADL retraining;progressive activity/exercise   Clinical Decision Making Complexity (OT) low complexity   Therapy Frequency (OT) Daily   Predicted Duration of Therapy 1-2 days    Risk & Benefits of therapy have been explained risks/benefits reviewed   OT Discharge Planning    OT Discharge Recommendation (DC Rec) Home with assist;home with home care occupational therapy;Transitional Care Facility   OT  Rationale for DC Rec Pt would benefit from short term rehab to maximize level of independence needed to return to [facility] as previous    OT Brief overview of current status  Pt very pleasant, agreeable to get up to chair, max assist with bed mobility and min to mod assist to take a few steps with hand held assist to recliner. Had increased pain with movement but tolerated very well    Total Evaluation Time (Minutes)   Total Evaluation Time (Minutes) 15

## 2021-10-21 NOTE — UTILIZATION REVIEW
Concurrent stay review; Secondary Review Determination    Under the authority of the Utilization Management Committee, the utilization review process indicated a secondary review on the above patient. The review outcome is based on review of the medical records, discussions with staff, and applying clinical experience noted on the date of the review.    (x) Observation Status Appropriate - Concurrent stay review        RATIONALE FOR DETERMINATION: 89-year-old female who resides at a care facility lost her balance when she turned at her kitchen falling on her right hip resulted in pain.  Patient has an acute periprosthetic fracture of the proximal right femur involving the greater trochanter.  There also may be a left anterior pubic ring fracture.  Patient will need to increase supportive services for nonoperative fracture.  Observation care appropriate for  evaluating pain control, mobility and disposition.    Patient is clinically improving and there is no clear indication to change patient's status to inpatient. The severity of illness, intensity of service provided, expected LOS and risk for adverse outcome make the care appropriate for observation.    This document was produced using voice recognition software    The information on this document is developed by the utilization review team in order for the business office to ensure compliance. This only denotes the appropriateness of proper admission status and does not reflect the quality of care rendered.    The definitions of Inpatient Status and Observation Status used in making the determination above are those provided in the CMS Coverage Manual, Chapter 1 and Chapter 6, section 70.4.    Sincerely,    Taiwo Weeks MD  Utilization Review  Physician Advisor  [facility].

## 2021-10-21 NOTE — ED PROVIDER NOTES
History     Chief Complaint   Patient presents with     Trauma     Fall     HPI  Geno Horn is a 89 year old female with history of osteoporosis, balance issues with multiple falls who presents to the emergency room via ambulance after she had a fall in her room.  She states she was trying to plug in her phone at her kitchen counter and when she turned she lost her balance and landed on her right hip.  She thinks it may be her knees are little bit weak.  She was just seen here on 9/30/2021 and had a fracture of her manubrium with 2-3 right-sided rib fractures noted in CT.  She knows that she is in the emergency room.  She is living at [facility].  It was also noted that she a fracture at T7 at that time. EMS noted slight shortening of right leg and gave her pain meds enroute and she is now painfree.     Allergies:  Allergies   Allergen Reactions     Alendronic Acid      Stomach upset     Oxybutynin      Felt High     Tramadol Other (See Comments)     Adverse  effects     Trospium Other (See Comments)     drowsy     Celebrex [Celecoxib] Other (See Comments)     Light headed     Tolterodine      Other reaction(s): Intolerance-Can't Take  Was unable to sleep at night       Problem List:    Patient Active Problem List    Diagnosis Date Noted     Closed nondisplaced fracture of greater trochanter of right femur, initial encounter (H) 10/20/2021     Priority: Medium     Multiple skin tears 02/25/2021     Priority: Medium     H/O dizziness 02/25/2021     Priority: Medium     Skin tear of left upper extremity 02/20/2021     Priority: Medium     Falls frequently 11/17/2020     Priority: Medium     Contusion of right hip, initial encounter 11/17/2020     Priority: Medium     General unsteadiness 11/17/2020     Priority: Medium     Closed wedge compression fracture of T9 vertebra with routine healing, subsequent encounter 11/17/2020     Priority: Medium     History of 2019 novel coronavirus disease (COVID-19) 11/17/2020      Priority: Medium     Pneumonia due to 2019 novel coronavirus 11/01/2020     Priority: Medium     Hyponatremia 11/01/2020     Priority: Medium     COVID-19 virus infection 11/01/2020     Priority: Medium     Spinal stenosis of lumbar region, unspecified whether neurogenic claudication present 02/19/2020     Priority: Medium     Sepsis (H) 06/17/2019     Priority: Medium     Unsteady gait 05/21/2018     Priority: Medium     Urinary urgency 07/24/2017     Priority: Medium     H/O basal cell carcinoma excision 07/20/2016     Priority: Medium     Osteoarthritis of both hips 01/21/2015     Priority: Medium     Allergic rhinitis 03/16/2012     Priority: Medium     Urinary incontinence 11/01/2010     Priority: Medium     Formatting of this note might be different from the original.  IMO Update 10/11       Osteoarthritis of foot joint 05/26/2009     Priority: Medium     Formatting of this note might be different from the original.  Right midfoot       Hearing loss 04/10/2008     Priority: Medium     Formatting of this note might be different from the original.  IMO Update       Coronary atherosclerosis of native coronary artery 05/09/2007     Priority: Medium     Formatting of this note might be different from the original.  IMO Update 10/11       Essential hypertension 05/09/2007     Priority: Medium     Formatting of this note might be different from the original.  IMO Update       Hypothyroidism 05/09/2007     Priority: Medium     Overview:   tsh and t4 normal on 1/2015     Formatting of this note might be different from the original.  IMO Update 10/11       Hyperlipidemia 06/26/2003     Priority: Medium     Formatting of this note might be different from the original.  IMO Update 10/11       Osteoporosis 06/26/2003     Priority: Medium     Formatting of this note might be different from the original.  Has had DEXA x 2.  IMO Update       Sciatica 07/01/2002     Priority: Medium     Formatting of this note might be  different from the original.  Apparently has hx. of coronary disease.  Is S/P angioplasty in .  Had normal EKG done 04 in [place].  IMO Update 10/11    Formatting of this note might be different from the original.  IMO Update 10/11    Formatting of this note might be different from the original.  [facility]          Past Medical History:    Past Medical History:   Diagnosis Date     Hypertension      Hypothyroid      Osteoarthritis      ST elevation (STEMI) myocardial infarction (H)        Past Surgical History:    Past Surgical History:   Procedure Laterality Date     ARTHROPLASTY HIP Right     Dr Rodriguez     ARTHROPLASTY HIP Left     Dr. Rodriguez     COMBINED CYSTOSCOPY, URETEROSCOPY, LASER HOLMIUM LITHOTRIPSY URETER(S) Right 2019    Procedure: CYSTOSCOPY, RIGHT STENT REMOVAL, URETEROSCOPY, LASER HOLMIUM LITHOTRIPSY RIGHT URETER WITH STENT REPLACEMENT;  Surgeon: Shawn Cole MD;  Location:  OR     CYSTOSCOPY, RETROGRADES, INSERT STENT URETER(S), COMBINED Right 2019    Procedure: CYSTOSCOPY, WITH RETROGRADE PYELOGRAM AND URETERAL STENT INSERTION;  Surgeon: Shawn Cole MD;  Location:  OR       Family History:    Family History   Problem Relation Age of Onset     Heart Disease Mother 60         of mi     Heart Disease Father 60         of mi     Heart Disease Brother      Heart Disease Brother        Social History:  Marital Status:   [5]  Social History     Tobacco Use     Smoking status: Never Smoker     Smokeless tobacco: Never Used     Tobacco comment: no ecig   Vaping Use     Vaping Use: Never used   Substance Use Topics     Alcohol use: Not Currently     Alcohol/week: 0.0 standard drinks     Drug use: No        Medications:    acetaminophen (TYLENOL) 650 MG CR tablet  carbamide peroxide (DEBROX) 6.5 % otic solution  cetirizine (ZYRTEC) 10 MG tablet  diltiazem ER COATED BEADS (CARDIZEM CD/CARTIA XT) 180 MG 24 hr capsule  docusate sodium (COLACE) 100 MG capsule  DULoxetine  (CYMBALTA) 20 MG capsule  fish oil-omega-3 fatty acids 1000 MG capsule  HYDROcodone-acetaminophen (NORCO) 5-325 MG tablet  Incontinence Supply Disposable (DEPEND EASY FIT UNDERGARMENTS) MISC  Incontinence Supply Disposable (POISE PAD) PADS  levothyroxine (SYNTHROID/LEVOTHROID) 100 MCG tablet  loratadine (CLARITIN) 10 MG tablet  multivitamin, therapeutic (THERA-VIT) TABS tablet  mupirocin (BACTROBAN) 2 % external ointment  Nutritional Supplements (BOOST HIGH PROTEIN) LIQD  order for DME  order for DME  SM SENNA-S 8.6-50 MG tablet  sodium chloride (OCEAN) 0.65 % nasal spray          Review of Systems   Respiratory: Negative for chest tightness.    Cardiovascular: Negative for chest pain.   Gastrointestinal: Negative for abdominal pain.   Musculoskeletal: Positive for gait problem.   Skin: Negative.    Neurological: Negative for headaches.       Physical Exam   BP: (!) 153/81  Pulse: 73  Weight: 64.2 kg (141 lb 8.6 oz)  SpO2: (!) 89 %      Physical Exam  Vitals and nursing note reviewed.   Constitutional:       Appearance: Normal appearance. She is normal weight.   HENT:      Head: Normocephalic.      Comments: Abrasion just below right jaw line- patient states it has been there for awhile.      Nose: Nose normal.      Mouth/Throat:      Mouth: Mucous membranes are moist.   Eyes:      Extraocular Movements: Extraocular movements intact.      Pupils: Pupils are equal, round, and reactive to light.   Cardiovascular:      Rate and Rhythm: Normal rate and regular rhythm.      Heart sounds: Normal heart sounds.   Pulmonary:      Effort: Pulmonary effort is normal. No respiratory distress.      Breath sounds: Normal breath sounds. No wheezing or rhonchi.   Abdominal:      General: Abdomen is flat. Bowel sounds are normal.      Palpations: Abdomen is soft.   Musculoskeletal:         General: Normal range of motion.      Cervical back: Normal range of motion and neck supple. No rigidity or tenderness.      Comments: No  tenderness to palpation over the right hip.    Skin:     General: Skin is warm and dry.      Capillary Refill: Capillary refill takes less than 2 seconds.   Neurological:      General: No focal deficit present.      Mental Status: She is alert.       ED Course   Patient seen and examined. Sent for xraY right hip and Ct head. Labs ordered. Patient denies pain at this time.   ED Course as of Oct 20 2221   Wed Oct 20, 2021   2110  Call to [facility] - [facility]- Dr Shane- will ask them to look  at xray for disposition.       2206 Recommended protected weight bearing, admssion for mobility concerns, physical therapy evaluation.       2210 Patient accepted for admission by Dr Stockton.   Admission/transition orders done.         Procedures              EKG Interpretation:      Interpreted by Edilia Downs MD  Time reviewed: 20:50  Symptoms at time of EKG: fall  Rhythm: normal sinus   Rate: normal  Axis: left axis deviation  Ectopy: none  Conduction: no abnormality  ST Segments/ T Waves: No ST-T wave changes  Q Waves: none  Comparison to prior: Unchanged    Clinical Impression: normal EKG    Trauma- partial done secondary to age, fall and on Xarelto.     Results for orders placed or performed during the hospital encounter of 10/20/21 (from the past 24 hour(s))   CBC with platelets differential    Narrative    The following orders were created for panel order CBC with platelets differential.  Procedure                               Abnormality         Status                     ---------                               -----------         ------                     CBC with platelets and d...[[id number]]  Abnormal            Final result                 Please view results for these tests on the individual orders.   Basic metabolic panel   Result Value Ref Range    Sodium 134 134 - 144 mmol/L    Potassium 4.1 3.5 - 5.1 mmol/L    Chloride 100 98 - 107 mmol/L    Carbon Dioxide (CO2) 24 21 - 31 mmol/L    Anion Gap 10 3 - 14 mmol/L     Urea Nitrogen 14 7 - 25 mg/dL    Creatinine 0.53 (L) 0.60 - 1.20 mg/dL    Calcium 9.1 8.6 - 10.3 mg/dL    Glucose 127 (H) 70 - 105 mg/dL    GFR Estimate 81 >60 mL/min/1.73m2   CBC with platelets and differential   Result Value Ref Range    WBC Count 10.5 4.0 - 11.0 10e3/uL    RBC Count 3.32 (L) 3.80 - 5.20 10e6/uL    Hemoglobin 11.2 (L) 11.7 - 15.7 g/dL    Hematocrit 33.2 (L) 35.0 - 47.0 %     78 - 100 fL    MCH 33.7 (H) 26.5 - 33.0 pg    MCHC 33.7 31.5 - 36.5 g/dL    RDW 14.5 10.0 - 15.0 %    Platelet Count 389 150 - 450 10e3/uL    % Neutrophils 53 %    % Lymphocytes 30 %    % Monocytes 8 %    % Eosinophils 4 %    % Basophils 1 %    % Immature Granulocytes 4 %    NRBCs per 100 WBC 1 (H) <1 /100    Absolute Neutrophils 5.6 1.6 - 8.3 10e3/uL    Absolute Lymphocytes 3.1 0.8 - 5.3 10e3/uL    Absolute Monocytes 0.8 0.0 - 1.3 10e3/uL    Absolute Eosinophils 0.5 0.0 - 0.7 10e3/uL    Absolute Basophils 0.1 0.0 - 0.2 10e3/uL    Absolute Immature Granulocytes 0.5 (H) <=0.0 10e3/uL    Absolute NRBCs 0.1 10e3/uL   CT Head w/o Contrast    Narrative    PROCEDURE: CT HEAD W/O CONTRAST     HISTORY: Head trauma, minor (Age >= 65y).    COMPARISON: 11/21/2020    TECHNIQUE:  Helical images of the head from the foramen magnum to the  vertex were obtained without contrast.    FINDINGS: The ventricles and sulci are prominent, compatible with  moderate to advanced, generalized volume loss. No acute intracranial  hemorrhage, mass effect, midline shift, hydrocephalus or basilar  cystern effacement are present.    No acute transcortical ischemia is identified. Confluent  hypoattenuation within the supratentorial subcortical and  periventricular white matter is most compatible with advanced chronic  microvascular ischemic change.     The calvarium is intact.       Impression    IMPRESSION: No acute intracranial hemorrhage.      KATELIN CLEMENTS MD         SYSTEM ID:  [id number]   XR Pelvis and Hip Right 1 View    Narrative    XR  PELVIS AND HIP RIGHT 1 VIEW    HISTORY: fall right hip pain .    COMPARISON: 11/17/2020.    TECHNIQUE: AP pelvis and 2 views right hip.    FINDINGS:    Osteopenia/osteoporosis.  There is an acute periprosthetic fracture of  the proximal right femur, new when compared to 11/17/2020, involving  the greater trochanter.     Mild irregularity of the left anterior pubic ring also appears new  when compared to prior, without a definite fracture line.    Bowel gas limits assessment for sacral insufficiency fracture.    Recommend follow-up.    KATELIN LCEMENTS MD         SYSTEM ID:  [id number]   Troponin I (now)   Result Value Ref Range    Troponin I 3.2 0.0 - 34.0 pg/mL   Asymptomatic COVID-19 Virus (Coronavirus) by PCR Nasopharyngeal    Specimen: Nasopharyngeal; Swab   Result Value Ref Range    SARS CoV2 PCR Negative Negative    Narrative    Testing was performed using the Xpert Xpress SARS-CoV-2 Assay on the   Cepheid Gene-Xpert Instrument Systems. Additional information about   this Emergency Use Authorization (EUA) assay can be found via the Lab   Guide. This test should be ordered for the detection of SARS-CoV-2 in   individuals who meet SARS-CoV-2 clinical and/or epidemiological   criteria. Test performance is unknown in asymptomatic patients. This   test is for in vitro diagnostic use under the FDA EUA for   laboratories certified under CLIA to perform high complexity testing.   This test has not been FDA cleared or approved. A negative result   does not rule out the presence of PCR inhibitors in the specimen or   target RNA in concentration below the limit of detection for the   assay. The possibility of a false negative should be considered if   the patient's recent exposure or clinical presentation suggests   COVID-19. This test was validated by [facility]. This laboratory is certified under the [facility]  al Laboratory Improvement Amendments (CLIA) as qualified  to perform high complex  ity clinical laboratory testing.       Medications   lidocaine 1 % 0.1-1 mL (has no administration in time range)   lidocaine (LMX4) cream (has no administration in time range)   sodium chloride (PF) 0.9% PF flush 3 mL (has no administration in time range)   sodium chloride (PF) 0.9% PF flush 3 mL (has no administration in time range)   naloxone (NARCAN) injection 0.2 mg (has no administration in time range)     Or   naloxone (NARCAN) injection 0.4 mg (has no administration in time range)     Or   naloxone (NARCAN) injection 0.2 mg (has no administration in time range)     Or   naloxone (NARCAN) injection 0.4 mg (has no administration in time range)   ondansetron (ZOFRAN-ODT) ODT tab 4 mg (has no administration in time range)     Or   ondansetron (ZOFRAN) injection 4 mg (has no administration in time range)   prochlorperazine (COMPAZINE) injection 5 mg (has no administration in time range)     Or   prochlorperazine (COMPAZINE) tablet 5 mg (has no administration in time range)     Or   prochlorperazine (COMPAZINE) suppository 12.5 mg (has no administration in time range)   fentaNYL (PF) (SUBLIMAZE) injection 50 mcg (has no administration in time range)   acetaminophen (TYLENOL) tablet 500 mg (has no administration in time range)   fentaNYL (PF) (SUBLIMAZE) injection 50 mcg (50 mcg Intravenous Given 10/20/21 2042)   fentaNYL (PF) (SUBLIMAZE) injection 50 mcg (50 mcg Intravenous Given 10/20/21 2212)       Assessments & Plan (with Medical Decision Making)     I have reviewed the nursing notes.    I have reviewed the findings, diagnosis, plan and need for follow up with the patient.    New Prescriptions    No medications on file       Final diagnoses:   Closed nondisplaced fracture of greater trochanter of right femur, initial encounter (H)       10/20/2021   [facility] MD  10/20/21 2221

## 2021-10-21 NOTE — H&P
[facility]    History and Physical - Hospitalist Service       Date of Admission:  10/20/2021    Assessment & Plan      Geno Horn is a 89 year old female admitted on 10/20/2021. She fell at the nursing home last evening with persistent right hip pain.  History significant for bilateral hip replacements with x-ray in the ER showing a periprosthetic fracture involving the right hip.  Fall was the result of being off balance with no evidence for other causes.  There were no other injuries.  ER physician spoke to orthopedics last evening who recommended partial protected weightbearing and no need for surgery.  At this time we will have orthopedics review the x-rays again with recommendations whether the surgery would be indicated and how much weightbearing can be tolerated.  We will try to treat her pain with oral pain management.  If surgery is necessary, she is medically cleared for surgery and anesthesia.    Periprosthetic fracture around internal prosthetic hip joint, initial encounter  Fall at nursing home last night with right hip pain  X-ray showing periprosthetic fracture with minimal displacement  Has been brought in for pain management  Over the phone consultation with orthopedics suggested medical management with partial weightbearing  Discussed with orthopedic nurse who will speak with orthopedic physicians with x-rays with ongoing suggestions for need for surgery and weightbearing status  Once this is determined, likely PT/OT consult  Pain management ordered with oral Vicodin.    Essential hypertension  Controlled taking daily Cardizem  Continue home dosing    Hypothyroidism  Taking daily Synthroid  Continue home dosing         Diet: Regular Diet Adult    DVT Prophylaxis: Observation status  Riggs Catheter: Not present  Central Lines: None  Code Status: Full Code      Clinically Significant Risk Factors Present on Admission                   Disposition Plan   Expected discharge:   1 to 2 days recommended to prior living arrangement once Once orthopedic recommendations are back, adequate pain management.     The patient's care was discussed with the Patient.    Suleiman Moore MD  [facility]  Securely message with the Vocera Web Console (learn more here)  Text page via [facility] Paging/Directory      ______________________________________________________________________    Chief Complaint   89-year-old female who fell at nursing home with right hip injury    History is obtained from the patient, electronic health record and emergency department physician    History of Present Illness   Geno Horn is a 89 year old female who fell at the nursing home last evening.  She is a resident of the [facility] and lost her balance falling on her right hip.  Had pain resulting from the fall and has been unable to bear weight.  She has had bilateral hip replacement surgeries.  She denies feeling ill prior to the fall specifically any fevers, cough, shortness of breath or chest pain.  She relates that she has had a number of falls recently.  She has been Covid vaccinated with Covid testing in the ER being negative.    Review of Systems    All other systems reviewed and negative other than noted in the HPI or here.       Past Medical History    I have reviewed this patient's medical history and updated it with pertinent information if needed.   Past Medical History:   Diagnosis Date     Hypertension      Hypothyroid      Osteoarthritis      ST elevation (STEMI) myocardial infarction (H)     1981; angioplasty       Past Surgical History   I have reviewed this patient's surgical history and updated it with pertinent information if needed.  Past Surgical History:   Procedure Laterality Date     ARTHROPLASTY HIP Right     Dr Rodriguez     ARTHROPLASTY HIP Left 2015    Dr. Rodriguez     COMBINED CYSTOSCOPY, URETEROSCOPY, LASER HOLMIUM LITHOTRIPSY URETER(S) Right 7/23/2019     Procedure: CYSTOSCOPY, RIGHT STENT REMOVAL, URETEROSCOPY, LASER HOLMIUM LITHOTRIPSY RIGHT URETER WITH STENT REPLACEMENT;  Surgeon: Shawn Cole MD;  Location:  OR     CYSTOSCOPY, RETROGRADES, INSERT STENT URETER(S), COMBINED Right 2019    Procedure: CYSTOSCOPY, WITH RETROGRADE PYELOGRAM AND URETERAL STENT INSERTION;  Surgeon: Shawn Cole MD;  Location:  OR       Social History   I have reviewed this patient's social history and updated it with pertinent information if needed.  Social History     Tobacco Use     Smoking status: Never Smoker     Smokeless tobacco: Never Used     Tobacco comment: no ecig   Vaping Use     Vaping Use: Never used   Substance Use Topics     Alcohol use: Not Currently     Alcohol/week: 0.0 standard drinks     Drug use: No       Family History   I have reviewed this patient's family history and updated it with pertinent information if needed.  Family History   Problem Relation Age of Onset     Heart Disease Mother 60         of mi     Heart Disease Father 60         of mi     Heart Disease Brother      Heart Disease Brother        Prior to Admission Medications   Prior to Admission Medications   Prescriptions Last Dose Informant Patient Reported? Taking?   DULoxetine (CYMBALTA) 20 MG capsule   No No   Sig: Take 1 capsule (20 mg) by mouth 2 times daily   HYDROcodone-acetaminophen (NORCO) 5-325 MG tablet   Yes No   Sig: Take 1 tablet by mouth every 6 hours as needed for breakthrough pain or moderate to severe pain   Incontinence Supply Disposable (DEPEND EASY FIT UNDERGARMENTS) MISC   No No   Sig: Medium size disposable under pants covered by insurance   Incontinence Supply Disposable (POISE PAD) PADS   No No   Sig: Change pad 3 x daily, #6 long Maxi pads   Nutritional Supplements (BOOST HIGH PROTEIN) LIQD   Yes No   Si can daily   SM SENNA-S 8.6-50 MG tablet   Yes No   Sig: TAKE 1 TAB BY MOUTH ONCE DAILY   acetaminophen (TYLENOL) 650 MG CR tablet   Yes No   Si  tab TID and 1 tab Daily PRN   carbamide peroxide (DEBROX) 6.5 % otic solution   No No   Sig: Place 5 drops into both ears At Bedtime   cetirizine (ZYRTEC) 10 MG tablet   Yes No   Sig: Take 10 mg by mouth daily as needed   diltiazem ER COATED BEADS (CARDIZEM CD/CARTIA XT) 180 MG 24 hr capsule   Yes No   Sig: TAKE 1 CAPSULE BY MOUTH ONCE DAILY   docusate sodium (COLACE) 100 MG capsule   No No   Sig: Take 1 capsule (100 mg) by mouth 2 times daily Hold for loose stools   fish oil-omega-3 fatty acids 1000 MG capsule   Yes No   Sig: Take 1 capsule by mouth daily   levothyroxine (SYNTHROID/LEVOTHROID) 100 MCG tablet   No No   Sig: TAKE 1 TABLET (100 MCG) BY MOUTH DAILY   loratadine (CLARITIN) 10 MG tablet   Yes No   Sig: Take 10 mg by mouth daily as needed   multivitamin, therapeutic (THERA-VIT) TABS tablet  Self Yes No   Sig: Take 1 tablet by mouth daily   mupirocin (BACTROBAN) 2 % external ointment   No No   Sig: Use with dressing changes   order for DME  Self No No   Sig: Equipment being ordered: Spikes for cane   order for DME  Self No No   Sig: Equipment being ordered: plastic underpants   sodium chloride (OCEAN) 0.65 % nasal spray  Self Yes No   Sig: Spray 2 sprays into both nostrils daily as needed for congestion      Facility-Administered Medications: None     Allergies   Allergies   Allergen Reactions     Alendronic Acid      Stomach upset     Oxybutynin      Felt High     Tramadol Other (See Comments)     Adverse  effects     Trospium Other (See Comments)     drowsy     Celebrex [Celecoxib] Other (See Comments)     Light headed     Tolterodine      Other reaction(s): Intolerance-Can't Take  Was unable to sleep at night       Physical Exam   Vital Signs: Temp: 97.7  F (36.5  C) Temp src: Tympanic BP: 134/69 Pulse: 96   Resp: 16 SpO2: 94 % O2 Device: None (Room air)    Weight: 141 lbs 8.57 oz    Constitutional: awake, alert, cooperative and mild distress  Eyes: Lids and lashes normal, pupils equal, round and  reactive to light, extra ocular muscles intact, sclera clear, conjunctiva normal  ENT: normocepalic, without obvious abnormality, atramatic  Respiratory: No increased work of breathing, good air exchange, clear to auscultation bilaterally, no crackles or wheezing  Cardiovascular: regular rate and rhythm  GI: normal bowel sounds, soft and non-distended  Skin: no bruising or bleeding, normal skin color, texture, turgor and no redness, warmth, or swelling  Musculoskeletal: Tender over the right hip area  Neurologic: no focal deficits, CN normal    Data   Data reviewed today: I reviewed all medications, new labs and imaging results over the last 24 hours. I personally reviewed the x-ray images of pelvis showing a minimally displaced fracture involving the greater trochateric area of right hip with right hip prostheisis image(s) showing hip fracture.    Results for orders placed or performed during the hospital encounter of 10/20/21   XR Pelvis and Hip Right 1 View     Status: None    Narrative    XR PELVIS AND HIP RIGHT 1 VIEW    HISTORY: fall right hip pain .    COMPARISON: 11/17/2020.    TECHNIQUE: AP pelvis and 2 views right hip.    FINDINGS:    Osteopenia/osteoporosis.  There is an acute periprosthetic fracture of  the proximal right femur, new when compared to 11/17/2020, involving  the greater trochanter.     Mild irregularity of the left anterior pubic ring also appears new  when compared to prior, without a definite fracture line.    Bowel gas limits assessment for sacral insufficiency fracture.    Recommend follow-up.    KATELIN CLEMENTS MD         SYSTEM ID:  [id number]   CT Head w/o Contrast     Status: None    Narrative    PROCEDURE: CT HEAD W/O CONTRAST     HISTORY: Head trauma, minor (Age >= 65y).    COMPARISON: 11/21/2020    TECHNIQUE:  Helical images of the head from the foramen magnum to the  vertex were obtained without contrast.    FINDINGS: The ventricles and sulci are prominent, compatible  with  moderate to advanced, generalized volume loss. No acute intracranial  hemorrhage, mass effect, midline shift, hydrocephalus or basilar  cystern effacement are present.    No acute transcortical ischemia is identified. Confluent  hypoattenuation within the supratentorial subcortical and  periventricular white matter is most compatible with advanced chronic  microvascular ischemic change.     The calvarium is intact.       Impression    IMPRESSION: No acute intracranial hemorrhage.      KATELIN CLEMENTS MD         SYSTEM ID:  [id number]   Basic metabolic panel     Status: Abnormal   Result Value Ref Range    Sodium 134 134 - 144 mmol/L    Potassium 4.1 3.5 - 5.1 mmol/L    Chloride 100 98 - 107 mmol/L    Carbon Dioxide (CO2) 24 21 - 31 mmol/L    Anion Gap 10 3 - 14 mmol/L    Urea Nitrogen 14 7 - 25 mg/dL    Creatinine 0.53 (L) 0.60 - 1.20 mg/dL    Calcium 9.1 8.6 - 10.3 mg/dL    Glucose 127 (H) 70 - 105 mg/dL    GFR Estimate 81 >60 mL/min/1.73m2   CBC with platelets and differential     Status: Abnormal   Result Value Ref Range    WBC Count 10.5 4.0 - 11.0 10e3/uL    RBC Count 3.32 (L) 3.80 - 5.20 10e6/uL    Hemoglobin 11.2 (L) 11.7 - 15.7 g/dL    Hematocrit 33.2 (L) 35.0 - 47.0 %     78 - 100 fL    MCH 33.7 (H) 26.5 - 33.0 pg    MCHC 33.7 31.5 - 36.5 g/dL    RDW 14.5 10.0 - 15.0 %    Platelet Count 389 150 - 450 10e3/uL    % Neutrophils 53 %    % Lymphocytes 30 %    % Monocytes 8 %    % Eosinophils 4 %    % Basophils 1 %    % Immature Granulocytes 4 %    NRBCs per 100 WBC 1 (H) <1 /100    Absolute Neutrophils 5.6 1.6 - 8.3 10e3/uL    Absolute Lymphocytes 3.1 0.8 - 5.3 10e3/uL    Absolute Monocytes 0.8 0.0 - 1.3 10e3/uL    Absolute Eosinophils 0.5 0.0 - 0.7 10e3/uL    Absolute Basophils 0.1 0.0 - 0.2 10e3/uL    Absolute Immature Granulocytes 0.5 (H) <=0.0 10e3/uL    Absolute NRBCs 0.1 10e3/uL   UA reflex to Microscopic     Status: Abnormal   Result Value Ref Range    Color Urine Yellow Colorless, Straw,  Light Yellow, Yellow    Appearance Urine Slightly Cloudy (A) Clear    Glucose Urine Negative Negative mg/dL    Bilirubin Urine Negative Negative    Ketones Urine Negative Negative mg/dL    Specific Gravity Urine 1.025 1.000 - 1.030    Blood Urine Negative Negative    pH Urine 6.0 5.0 - 9.0    Protein Albumin Urine 10  (A) Negative mg/dL    Urobilinogen Urine Normal Normal, 2.0 mg/dL    Nitrite Urine Negative Negative    Leukocyte Esterase Urine Negative Negative    RBC Urine <1 <=2 /HPF    WBC Urine 8 (H) <=5 /HPF    Mucus Urine Present (A) None Seen /LPF   Troponin I (now)     Status: Normal   Result Value Ref Range    Troponin I 3.2 0.0 - 34.0 pg/mL   Asymptomatic COVID-19 Virus (Coronavirus) by PCR Nasopharyngeal     Status: Normal    Specimen: Nasopharyngeal; Swab   Result Value Ref Range    SARS CoV2 PCR Negative Negative    Narrative    Testing was performed using the Xpert Xpress SARS-CoV-2 Assay on the   Cepheid Gene-Xpert Instrument Systems. Additional information about   this Emergency Use Authorization (EUA) assay can be found via the Lab   Guide. This test should be ordered for the detection of SARS-CoV-2 in   individuals who meet SARS-CoV-2 clinical and/or epidemiological   criteria. Test performance is unknown in asymptomatic patients. This   test is for in vitro diagnostic use under the FDA EUA for   laboratories certified under CLIA to perform high complexity testing.   This test has not been FDA cleared or approved. A negative result   does not rule out the presence of PCR inhibitors in the specimen or   target RNA in concentration below the limit of detection for the   assay. The possibility of a false negative should be considered if   the patient's recent exposure or clinical presentation suggests   COVID-19. This test was validated by [facility] and [facility]. This laboratory is certified under the Clinic  al Laboratory Improvement Amendments (CLIA) as  qualified to perform high complex  ity clinical laboratory testing.   CBC with platelets differential     Status: Abnormal    Narrative    The following orders were created for panel order CBC with platelets differential.  Procedure                               Abnormality         Status                     ---------                               -----------         ------                     CBC with platelets and d...[[id number]]  Abnormal            Final result                 Please view results for these tests on the individual orders.

## 2021-10-21 NOTE — PROGRESS NOTES
[facility] ADMISSION NOTE    Patient admitted to room 332 at approximately 2340 via cart from emergency room. Patient was accompanied by transport tech.     Verbal SBAR report received from HAILEY Staton prior to patient arrival.     Patient trasferred to bed via slipsheet Patient alert and oriented X 3. Pain is controlled without any medications.  . Admission vital signs: Blood pressure (!) 143/72, pulse 91, temperature 96.9  F (36.1  C), temperature source Tympanic, resp. rate 18, weight 64.2 kg (141 lb 8.6 oz), SpO2 92 %, not currently breastfeeding. Patient was oriented to plan of care, call light, bed controls, tv, telephone, bathroom and visiting hours.     Risk Assessment    The following safety risks were identified during admission: fall. Yellow risk band applied: YES.     Skin Initial Assessment    This writer admitted this patient and completed a full skin assessment and Nicholas score in the Adult PCS flowsheet. Appropriate interventions initiated as needed.       Nicholas Risk Assessment  Sensory Perception: 3-->slightly limited  Moisture: 3-->occasionally moist  Activity: 2-->chairfast  Mobility: 2-->very limited  Nutrition: 3-->adequate  Friction and Shear: 2-->potential problem  Nicholas Score: 15  Sensory/Activity/Mobility Interventions: Turn: left/right positioning  Moisture Interventions: Incontinence pad  Friction/Shear Interventions: HOB 30 degrees or less  Mattress: Standard Hospital Mattress (Foam)  Bed Frame: Standard width and length    Education    Patient has a [place] to Observation order: Yes  Observation education completed and documented: Yes      Albina Wahl RN

## 2021-10-21 NOTE — PROGRESS NOTES
Patient alert and oriented. Reported pain in the right hip, cold therapy applied with relief. LS clear. BS audible. VSS. IV saliine lock. Some scattered bruising. Patient not out of bed this shift. Will continue ot monitor.    /69   Pulse 84   Temp 98.4  F (36.9  C) (Tympanic)   Resp 16   Wt 64.2 kg (141 lb 8.6 oz)   SpO2 98%   BMI 22.84 kg/m     Steph Lucas RN on 10/21/2021 at 1:17 PM    Patient up to commode with x2 assist.   Steph Lucas RN on 10/21/2021 at 5:38 PM

## 2021-10-21 NOTE — PROGRESS NOTES
SAFETY CHECKLIST  ID Bands and Risk clasps correct and in place (DNR, Fall risk, Allergy, Latex, Limb):  Yes  All Lines Reconciled and labeled correctly: Yes  Whiteboard updated:Yes  Environmental interventions (bed/chair alarm on, call light, side rails, restraints, sitter....): Yes  Verify Tele #: NA  Steph Lucas RN on 10/21/2021 at 7:35 AM

## 2021-10-21 NOTE — ED TRIAGE NOTES
"ED Nursing Triage Note (General)   ________________________________    Geno TK Horn is a 89 year old Female that presents to triage ambulance from [facility]. Patient was trying to charge her phone while standing, lost her balance and fell landing on right hip. Patient is on Xarelto. She states, \"I normally have balance issues.\" Denies hitting head. No LOC. VSS. Fentanyl 50 mcg given en route. Patient currently denies pain at this time. MD at bedside.   Wt 64.2 kg (141 lb 8.6 oz)   BMI 22.84 kg/m  t  Patient appears alert , in no acute distress., and cooperative and calm behavior.    GCS Total = 15  Airway: intact  Breathing noted as Normal.  Circulation Normal  Skin normal  Action taken:  Triage to critical care immediately      [facility] Residential Facility    "

## 2021-10-21 NOTE — ASSESSMENT & PLAN NOTE
Fall at nursing home last night with right hip pain  X-ray showing periprosthetic fracture with minimal displacement  Has been brought in for pain management  Over the phone consultation with orthopedics recommends medical management with weightbearing as tolerated  PT/OT worked with patient, will discharge to SNF for ongoing cares  [facility] has accepted patient

## 2021-10-21 NOTE — PROGRESS NOTES
10/21/21 1500   Quick Adds   Type of Visit Initial PT Evaluation   Living Environment   People in home alone   Current Living Arrangements assisted living;apartment   Home Accessibility no concerns   Transportation Anticipated family or friend will provide   Self-Care   Usual Activity Tolerance fair   Current Activity Tolerance poor   Equipment Currently Used at Home wheelchair, power;walker, rolling   Disability/Function   Hearing Difficulty or Deaf yes   Patient's preferred means of communication English speaker with hearing loss, no speech problems.   Describe hearing loss bilateral hearing loss   Use of hearing assistive devices bilateral hearing aids   Wear Glasses or Blind yes   Vision Management glasses   Concentrating, Remembering or Making Decisions Difficulty no   Difficulty Communicating no   Difficulty Eating/Swallowing no   Walking or Climbing Stairs Difficulty yes   Walking or Climbing Stairs ambulation difficulty, requires equipment   Dressing/Bathing Difficulty yes   Dressing/Bathing bathing difficulty, requires equipment;dressing difficulty, requires equipment   Toileting issues no   Doing Errands Independently Difficulty (such as shopping) yes   Fall history within last six months yes   Number of times patient has fallen within last six months   (multiple)   General Information   Referring Physician Oscar   Patient/Family Therapy Goals Statement (PT) return home?   Weight-Bearing Status - LLE full weight-bearing   Weight-Bearing Status - RLE weight-bearing as tolerated   Cognition   Orientation Status (Cognition) oriented x 4   Affect/Mental Status (Cognition) WFL   Follows Commands (Cognition) WFL   Pain Assessment   Patient Currently in Pain Yes, see Vital Sign flowsheet   Integumentary/Edema   Integumentary/Edema no deficits were identifed   Posture    Posture Forward head position;Protracted shoulders   Range of Motion (ROM)   ROM Quick Adds ROM WFL   Strength   Manual Muscle Testing Quick  Adds Strength WFL   Bed Mobility   Comment (Bed Mobility) maximal assist   Transfers   Transfer Safety Comments moderate assist of 2   Gait/Stairs (Locomotion)   Comment (Gait/Stairs) patient not presently functionally ambulating   Balance   Balance Comments poor due to c/o right hip pain   Sensory Examination   Sensory Perception WFL   Coordination   Coordination no deficits were identified   Muscle Tone   Muscle Tone no deficits were identified   Clinical Impression   Criteria for Skilled Therapeutic Intervention yes, treatment indicated   PT Diagnosis (PT) impaired mobility   Influenced by the following impairments pain and fatigue   Functional limitations due to impairments activity/gait tolerance and stabiltiy   Clinical Presentation Stable/Uncomplicated   Clinical Decision Making (Complexity) low complexity   Therapy Frequency (PT) Daily   Predicted Duration of Therapy Intervention (days/wks) 1-2 days   Planned Therapy Interventions (PT) bed mobility training;gait training;transfer training   Anticipated Equipment Needs at Discharge (PT) wheelchair;walker, rolling   Risk & Benefits of therapy have been explained risks/benefits reviewed;patient;son   PT Discharge Planning    PT Discharge Recommendation (DC Rec) Transitional Care Facility   PT Rationale for DC Rec to promote strength, stabiltiy and safe mobility   PT Brief overview of current status  maximal assist for bed mobilities; moderate assist of 2 for stand pivot; presently not functionally ambulating   Total Evaluation Time   Total Evaluation Time (Minutes) 15

## 2021-10-21 NOTE — PHARMACY-ADMISSION MEDICATION HISTORY
Pharmacy -- Admission Medication Reconciliation    Prior to admission (PTA) medications were reviewed and the patient's PTA medication list was updated.    Sources Consulted: [employer] Scripts, [facility], Care Everywhere, , [facility]    The reliability of this Medication Reconciliation is: Reliability: Reliable    The following significant changes were made:  Updated last doses  Updated apap 650 mg cr to tid scheduled  Added apap prn (per [facility] 650 mg cr, but [facility] filled 500 mg prn)  Removed loratadine  Added mom  Added menthol patch  Added tylenol #3    In addition, the patient's allergies were reviewed with the patient's records and updated as follows:   Allergies: Alendronic acid, Celebrex [celecoxib], Oxybutynin, Tolterodine, Tramadol, and Trospium     Medication barriers identified: [facility] administers, MAR had missing amount and prn, filled in for next time, TW filled apap 500 mg, 650 mg CR on MAR   Medication adherence concerns: see above   Understanding of emergency medications: no naloxone    Reva Moreno [facility], 10/21/2021,  11:03 AM

## 2021-10-21 NOTE — PROGRESS NOTES
:    Phone call to [facility] and spoke with Erica.  Provided update on patient.  Inquired on ability of AL to meet patient's needs upon return.  She will have nursing call back to discuss if they can meet patient's needs.    Received a return call from Felicita at [facility].  They would like patient to go to SNF for rehab as they cannot accommodate an Ax1.     MADELYN Peoples on 10/21/2021 at 2:08 PM    Addendum:    Met with patient along with her son Daniel.  Discussed discharge planning and offered short term rehab.     Pt/family was given the Medicare Compare list for SNF, with associated star ratings to assist with choice for referrals/discharge planning Yes    Education was given to pt/family that star ratings are updated/maintained by Medicare and can be reviewed by visiting www.medicare.gov Yes    Patient selected [facility].  Referral faxed to [facility] and Mayra was notified of new referral.     MADELYN Peoples on 10/21/2021 at 2:51 PM

## 2021-10-21 NOTE — PROGRESS NOTES
Orthopedics reviewed imaging  They are recommending medical management, weightbearing as tolerated with walker  We will alert PT/OT  When able to ambulate, pain management controlled can likely discharge back to [facility]  Electronically signed by CLARISA Moore on October 21, 2021

## 2021-10-22 ENCOUNTER — APPOINTMENT (OUTPATIENT)
Dept: OCCUPATIONAL THERAPY | Facility: OTHER | Age: 86
End: 2021-10-22
Payer: COMMERCIAL

## 2021-10-22 ENCOUNTER — APPOINTMENT (OUTPATIENT)
Dept: PHYSICAL THERAPY | Facility: OTHER | Age: 86
End: 2021-10-22
Payer: COMMERCIAL

## 2021-10-22 VITALS
WEIGHT: 134.04 LBS | SYSTOLIC BLOOD PRESSURE: 121 MMHG | TEMPERATURE: 99.2 F | BODY MASS INDEX: 21.63 KG/M2 | RESPIRATION RATE: 16 BRPM | OXYGEN SATURATION: 91 % | HEART RATE: 79 BPM | DIASTOLIC BLOOD PRESSURE: 69 MMHG

## 2021-10-22 PROCEDURE — 99217 PR OBSERVATION CARE DISCHARGE: CPT | Performed by: FAMILY MEDICINE

## 2021-10-22 PROCEDURE — 97530 THERAPEUTIC ACTIVITIES: CPT | Mod: GO | Performed by: OCCUPATIONAL THERAPIST

## 2021-10-22 PROCEDURE — 97530 THERAPEUTIC ACTIVITIES: CPT | Mod: GP

## 2021-10-22 PROCEDURE — G0378 HOSPITAL OBSERVATION PER HR: HCPCS

## 2021-10-22 PROCEDURE — 250N000013 HC RX MED GY IP 250 OP 250 PS 637: Performed by: FAMILY MEDICINE

## 2021-10-22 RX ORDER — HYDROCODONE BITARTRATE AND ACETAMINOPHEN 5; 325 MG/1; MG/1
1 TABLET ORAL EVERY 4 HOURS PRN
Qty: 25 TABLET | Refills: 0 | Status: SHIPPED | OUTPATIENT
Start: 2021-10-22 | End: 2022-01-11

## 2021-10-22 RX ADMIN — HYDROCODONE BITARTRATE AND ACETAMINOPHEN 2 TABLET: 5; 325 TABLET ORAL at 00:23

## 2021-10-22 RX ADMIN — DOCUSATE SODIUM 100 MG: 100 CAPSULE, LIQUID FILLED ORAL at 09:51

## 2021-10-22 RX ADMIN — DULOXETINE HYDROCHLORIDE 20 MG: 20 CAPSULE, DELAYED RELEASE ORAL at 09:51

## 2021-10-22 RX ADMIN — DILTIAZEM HYDROCHLORIDE 180 MG: 180 CAPSULE, COATED, EXTENDED RELEASE ORAL at 09:51

## 2021-10-22 RX ADMIN — LEVOTHYROXINE SODIUM 100 MCG: 0.1 TABLET ORAL at 09:51

## 2021-10-22 NOTE — PLAN OF CARE
NSG DISCHARGE NOTE    Patient discharged to long term care facility at 1:15 PM via wheel chair. Accompanied by son and staff. Discharge instructions reviewed with  [facility] , opportunity offered to ask questions. Prescriptions - None ordered for discharge. All belongings sent with patient.    Nisa Reza RN

## 2021-10-22 NOTE — PROGRESS NOTES
10/22/21 0922   Signing Clinician's Name / Credentials   Signing clinician's name / credentials Vic Washington DPT   Quick Adds   Rehab Discipline PT   Quick Adds Mobility;Therapeutic Activity   Functional Transfer Training   Symptoms Noted During/After Treatment increased pain;other (see comments)  (Less pain today)   Treatment Detail Able to stand and pivot to chair with mod assist of 2 today to her R. Completed with less pain but assistance to prevent early sit.   Therapeutic Activity   Symptoms Noted During/After Treatment Increased pain   Treatment Detail Mod assist of 2 for supine to sit using pad for help pivoting in bed. Trunk support for upright position and balance.    PT Discharge Planning    PT Discharge Recommendation (DC Rec) Transitional Care Facility   PT Rationale for DC Rec to promote strength, stabiltiy and safe mobility   PT Brief overview of current status  Remains an assist of 2 for mobility. Able to stand a little easier today but still needed assist with balance and supine to sit transfer.   Additional Documentation   PT Plan Pt to d/c later today   Total Session Time   Total Session Time (minutes) 20 minutes

## 2021-10-22 NOTE — DISCHARGE SUMMARY
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  No follow up labs or test are   needed.             Unresulted Labs Ordered in the Past 30 Days of this Admission     No orders found from 9/20/2021 to 10/21/2021.      These results will be followed up by Fransisca Rocha    Discharge Disposition   Discharged to short-term care facility  Condition at discharge: Satisfactory      Hospital Course   Periprosthetic fracture around internal prosthetic hip joint, initial encounter  Fall at nursing home last night with right hip pain  X-ray showing periprosthetic fracture with minimal displacement  Has been brought in for pain management  Over the phone consultation with orthopedics recommends medical management with weightbearing as tolerated  PT/OT worked with patient, will discharge to SNF for ongoing cares  [facility] has accepted patient    Essential hypertension  Controlled taking daily Cardizem  Continue home dosing    Hypothyroidism  Taking daily Synthroid  Continue home dosing        Consultations This Hospital Stay   ORTHOPEDIC SURGERY IP CONSULT  PHYSICAL THERAPY ADULT IP CONSULT  SOCIAL WORK IP CONSULT  OCCUPATIONAL THERAPY ADULT IP CONSULT  PHYSICAL THERAPY ADULT IP CONSULT  OCCUPATIONAL THERAPY ADULT IP CONSULT    Code Status   Full Code    Time Spent on this Encounter   ISuleiman MD, personally saw the patient today and spent less than or equal to 30 minutes discharging this patient.       Suleiman Moore MD  [facility]  [facility]  [address]  [place] [address]  Phone: [phone]  Fax: [phone]  ______________________________________________________________________    Physical Exam   Vital Signs: Temp: 99.2  F (37.3  C) Temp src: Tympanic BP: 121/69 Pulse: 79   Resp: 16 SpO2: 91 % O2 Device: None (Room air)    Weight: 134 lbs .63 oz  Constitutional: awake, alert, cooperative, no apparent distress, and appears stated age  Respiratory: No increased work of breathing, good air exchange, clear to auscultation bilaterally, no crackles or wheezing  Cardiovascular: regular rate and rhythm  GI: normal bowel sounds, soft and non-distended       Primary Care Physician   Fransisca Rocha    Discharge Orders      General info for SNF    Length of Stay Estimate: Short Term Care: Estimated # of Days <30  Condition at Discharge: Stable  Level of care:skilled   Rehabilitation Potential: Fair  Admission H&P remains valid and up-to-date: Yes(If no, please update H&P)  Recent Chemotherapy: N/A  Use Nursing Home Standing Orders: Yes     Mantoux instructions    Give two-step Mantoux (PPD) Per Facility Policy Yes     Follow Up and recommended labs and tests    Follow up with Nursing home physician.  No follow up labs or test are needed.     Reason for your hospital stay    Admitted after a fall with a fracture of right hip near hip prosthesis     Activity - Up with nursing assistance     Weight bearing status    As tolerated     Full Code     Physical Therapy Adult Consult    Evaluate and treat as clinically indicated.    Reason:  right hip fracture     Occupational Therapy Adult Consult    Evaluate and treat as clinically indicated.    Reason:  right hip fracture     Diet    Follow this diet upon discharge: Orders Placed This Encounter      Regular Diet Adult       Significant Results and Procedures   Results for orders placed or performed during the hospital encounter of 10/20/21   XR Pelvis and Hip Right 1  View    Narrative    XR PELVIS AND HIP RIGHT 1 VIEW    HISTORY: fall right hip pain .    COMPARISON: 11/17/2020.    TECHNIQUE: AP pelvis and 2 views right hip.    FINDINGS:    Osteopenia/osteoporosis.  There is an acute periprosthetic fracture of  the proximal right femur, new when compared to 11/17/2020, involving  the greater trochanter.     Mild irregularity of the left anterior pubic ring also appears new  when compared to prior, without a definite fracture line.    Bowel gas limits assessment for sacral insufficiency fracture.    Recommend follow-up.    KATELIN CLEMENTS MD         SYSTEM ID:  [id number]   CT Head w/o Contrast    Narrative    PROCEDURE: CT HEAD W/O CONTRAST     HISTORY: Head trauma, minor (Age >= 65y).    COMPARISON: 11/21/2020    TECHNIQUE:  Helical images of the head from the foramen magnum to the  vertex were obtained without contrast.    FINDINGS: The ventricles and sulci are prominent, compatible with  moderate to advanced, generalized volume loss. No acute intracranial  hemorrhage, mass effect, midline shift, hydrocephalus or basilar  cystern effacement are present.    No acute transcortical ischemia is identified. Confluent  hypoattenuation within the supratentorial subcortical and  periventricular white matter is most compatible with advanced chronic  microvascular ischemic change.     The calvarium is intact.       Impression    IMPRESSION: No acute intracranial hemorrhage.      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 acetaminophen (ACETAMINOPHEN 8 HOUR) 650 MG CR tablet Take 650 mg by mouth daily as needed for mild pain or fever      !! acetaminophen (TYLENOL) 650 MG CR tablet Take 650 mg by mouth 3 times daily       cetirizine (ZYRTEC) 10 MG tablet Take 10 mg by mouth daily as needed      diltiazem ER COATED BEADS (CARDIZEM CD/CARTIA XT) 180 MG 24 hr capsule TAKE 1 CAPSULE BY MOUTH ONCE DAILY      docusate sodium (COLACE) 100 MG capsule Take 1 capsule (100 mg) by mouth 2 times daily Hold for loose stools  Qty: 120 capsule, Refills: 4    Associated Diagnoses: Constipation, unspecified constipation type      DULoxetine (CYMBALTA) 20 MG capsule Take 1 capsule (20 mg) by mouth 2 times daily  Qty: 120 capsule, Refills: 3    Associated Diagnoses: Spinal stenosis of lumbar region, unspecified whether neurogenic claudication present; Dysthymia; Closed wedge compression fracture of T9 vertebra with routine healing, subsequent encounter; Neuropathy      fish oil-omega-3 fatty acids 1000 MG capsule Take 1 capsule by mouth daily      !! Incontinence Supply Disposable (DEPEND EASY FIT UNDERGARMENTS) MISC Medium size disposable under pants covered by insurance  Qty: 100 each, Refills: 11    Associated Diagnoses: Neurogenic bladder; Urinary incontinence, unspecified type      !! Incontinence Supply Disposable (POISE PAD) PADS Change pad 3 x daily, #6 long Maxi pads  Qty: 90 each, Refills: 11    Associated Diagnoses: Mixed stress and urge urinary incontinence      levothyroxine (SYNTHROID/LEVOTHROID) 100 MCG tablet TAKE 1 TABLET (100 MCG) BY MOUTH DAILY  Qty: 90 tablet, Refills: 3    Comments: 2ND FAX REQUEST.  Associated Diagnoses: Other specified hypothyroidism      magnesium hydroxide (MILK OF MAGNESIA) 400 MG/5ML suspension Take 15  mLs by mouth every 6 hours as needed for constipation or heartburn      menthol (ICY HOT) 5 % PTCH Apply 1 patch topically 4 times daily as needed for muscle soreness (max of 8 hours for each patch)      multivitamin, therapeutic (THERA-VIT) TABS tablet Take 1 tablet by mouth daily      mupirocin (BACTROBAN) 2 % external ointment Use with dressing changes  Qty: 30 g, Refills: 1    Associated Diagnoses: Skin ulcer, unspecified ulcer stage (H)      Nutritional Supplements (BOOST HIGH PROTEIN) LIQD 1 can daily  Qty:        !! order for DME Equipment being ordered: Spikes for cane  Qty: 1 Units, Refills: 1    Associated Diagnoses: Unsteady gait      !! order for DME Equipment being ordered: plastic underpants  Qty: 1 Units, Refills: 1    Associated Diagnoses: Urinary incontinence, unspecified type      SM SENNA-S 8.6-50 MG tablet TAKE 1 TAB BY MOUTH ONCE DAILY      sodium chloride (OCEAN) 0.65 % nasal spray Spray 2 sprays into both nostrils daily as needed for congestion       !! - Potential duplicate medications found. Please discuss with provider.      STOP taking these medications       acetaminophen-codeine (TYLENOL #3) 300-30 MG tablet Comments:   Reason for Stopping:             Allergies   Allergies   Allergen Reactions     Alendronic Acid Nausea     Stomach upset     Celebrex [Celecoxib] Other (See Comments)     Light headed     Oxybutynin Other (See Comments)     Felt High     Tolterodine      Other reaction(s): Intolerance-Can't Take  Was unable to sleep at night     Tramadol Other (See Comments)     Adverse  effects     Trospium Other (See Comments)     drowsy

## 2021-10-22 NOTE — PHARMACY
[facility]  Part of [facility]  [address] [place] [address]    October 22, 2021    Dear Pharmacist,    Your customer, Geno Horn, born on 9/21/1926, was recently discharged from [facility].  We have updated her medication list and want to alert you to the following:       Review of your medicines      CONTINUE these medicines which may have CHANGED, or have new prescriptions. If we are uncertain of the size of tablets/capsules you have at home, strength may be listed as something that might have changed.      Dose / Directions   HYDROcodone-acetaminophen 5-325 MG tablet  Commonly known as: NORCO  This may have changed: when to take this      Dose: 1 tablet  Take 1 tablet by mouth every 4 hours as needed for breakthrough pain or moderate to severe pain  Quantity: 25 tablet  Refills: 0        CONTINUE these medicines which have NOT CHANGED      Dose / Directions   * Acetaminophen 8 Hour 650 MG CR tablet  Generic drug: acetaminophen      Dose: 650 mg  Take 650 mg by mouth daily as needed for mild pain or fever  Refills: 0     * acetaminophen 650 MG CR tablet  Commonly known as: TYLENOL      Dose: 650 mg  Take 650 mg by mouth 3 times daily  Refills: 0     Boost High Protein Liqd      1 can daily  Quantity:    Refills: 0     carbamide peroxide 6.5 % otic solution  Commonly known as: DEBROX  Used for: Impacted cerumen, unspecified laterality      Dose: 5 drop  Place 5 drops into both ears At Bedtime  Quantity: 15 mL  Refills: 0     cetirizine 10 MG tablet  Commonly known as: zyrTEC      Dose: 10 mg  Take 10 mg by mouth daily as needed  Refills: 0     * Depend Easy Fit Undergarments Misc  Used for: Neurogenic bladder, Urinary incontinence, unspecified type      Medium size disposable under pants covered by insurance  Quantity: 100 each  Refills: 11     * Poise Pad Pads  Used for: Mixed stress and urge urinary incontinence      Change pad 3 x daily, #6 long  Maxi pads  Quantity: 90 each  Refills: 11     diltiazem ER COATED BEADS 180 MG 24 hr capsule  Commonly known as: CARDIZEM CD/CARTIA XT      TAKE 1 CAPSULE BY MOUTH ONCE DAILY  Refills: 0     docusate sodium 100 MG capsule  Commonly known as: COLACE  Used for: Constipation, unspecified constipation type      Dose: 100 mg  Take 1 capsule (100 mg) by mouth 2 times daily Hold for loose stools  Quantity: 120 capsule  Refills: 4     DULoxetine 20 MG capsule  Commonly known as: CYMBALTA  Used for: Spinal stenosis of lumbar region, unspecified whether neurogenic claudication present, Dysthymia, Closed wedge compression fracture of T9 vertebra with routine healing, subsequent encounter, Neuropathy      Dose: 20 mg  Take 1 capsule (20 mg) by mouth 2 times daily  Quantity: 120 capsule  Refills: 3     fish oil-omega-3 fatty acids 1000 MG capsule      Dose: 1 capsule  Take 1 capsule by mouth daily  Refills: 0     levothyroxine 100 MCG tablet  Commonly known as: SYNTHROID/LEVOTHROID      Dose: 100 mcg  TAKE 1 TABLET (100 MCG) BY MOUTH DAILY  Quantity: 90 tablet  Refills: 3     magnesium hydroxide 400 MG/5ML suspension  Commonly known as: MILK OF MAGNESIA      Dose: 15 mL  Take 15 mLs by mouth every 6 hours as needed for constipation or heartburn  Refills: 0     menthol 5 % Ptch  Commonly known as: ICY HOT      Dose: 1 patch  Apply 1 patch topically 4 times daily as needed for muscle soreness (max of 8 hours for each patch)  Refills: 0     multivitamin, therapeutic Tabs tablet      Dose: 1 tablet  Take 1 tablet by mouth daily  Refills: 0     mupirocin 2 % external ointment  Commonly known as: BACTROBAN  Used for: Skin ulcer, unspecified ulcer stage (H)      Use with dressing changes  Quantity: 30 g  Refills: 1     order for DME  Used for: Unsteady gait      Equipment being ordered: Spikes for cane  Quantity: 1 Units  Refills: 1     order for DME  Used for: Urinary incontinence, unspecified type      Equipment being ordered:  plastic underpants  Quantity: 1 Units  Refills: 1     SM Senna-S 8.6-50 MG tablet  Generic drug: senna-docusate      TAKE 1 TAB BY MOUTH ONCE DAILY  Refills: 0     sodium chloride 0.65 % nasal spray  Commonly known as: OCEAN      Dose: 2 spray  Spray 2 sprays into both nostrils daily as needed for congestion  Refills: 0         * This list has 4 medication(s) that are the same as other medications prescribed for you. Read the directions carefully, and ask your doctor or other care provider to review them with you.            STOP taking    acetaminophen-codeine 300-30 MG tablet  Commonly known as: TYLENOL #3              Where to get your medicines      These medications were sent to [id number] - [place], [place]  [address]  [address] [facility] [address]    Phone: [phone]     HYDROcodone-acetaminophen 5-325 MG tablet         We also reviewed Geno Horn's allergy list and updated it as needed:  Allergies: Alendronic acid, Celebrex [celecoxib], Oxybutynin, Tolterodine, Tramadol, and Trospium    Thank you for continuing to care for Geno Horn.  We look forward to working together with you in the future.    Sincerely,  Jordan Brumfield, [facility]

## 2021-10-22 NOTE — PLAN OF CARE
Pt is alert and oriented with intermittent confusion. She complains of pain in right hip and shoulder, Norco given with decrease in pain. Using ice pack. VSS. LS clear. Allevyn intact to skin tear on right arm and shin. Repo'd every 2 hours.     /60   Pulse 80   Temp 98.4  F (36.9  C) (Tympanic)   Resp 16   Wt 60.8 kg (134 lb 0.6 oz)   SpO2 91%   BMI 21.63 kg/m

## 2021-10-22 NOTE — PROGRESS NOTES
10/22/21 1448   Signing Clinician's Name / Credentials   Signing clinician's name / credentials Karen Grigsby OTR/L    Quick Adds   Rehab Discipline OT   Functional Transfer Training   Symptoms Noted During/After Treatment increased pain   Treatment Detail stand pivot with mod assist of 2, less complaints of pain today   Therapeutic Activity   Symptoms Noted During/After Treatment Increased pain   Treatment Detail mod assist of 2 to pivot with HHA    OT Discharge Planning    OT Discharge Recommendation (DC Rec) Transitional Care Facility   OT Rationale for DC Rec Pt will benefit from on-going OT at [facility] prior to returning to [facility]    OT Brief overview of current status  Pt very pleasant, reports feeling better today, managed bed mobility and transfer with less pain, mod assist of 2    Additional Documentation   OT Plan d/c to [facility] today    Total Session Time   Total Session Time (minutes) 25 minutes

## 2021-10-22 NOTE — PHARMACY - DISCHARGE MEDICATION RECONCILIATION AND EDUCATION
Pharmacy:  Discharge Counseling and Medication Reconciliation    Geno Horn  [address]  [place] [address]  [phone] (home)   89 year old female  PCP: Fransisca Rocha    Allergies: Alendronic acid, Celebrex [celecoxib], Oxybutynin, Tolterodine, Tramadol, and Trospium    Discharge Counseling:    Pharmacist did not meet with patient prior to discharge today, as patient is being discharged back to the [facility], where facility staff administers her medications.    Summary of Education: N/A- pharmacist did not speak with patient prior to discharge.    Materials Provided:  MedCounselor sheets printed from Clinical Pharmacology on: N/A- modification made to Hydrocodone dosing, but no new medications prescribed.    Discharge Medication Reconciliation:    It has been determined that the patient has an adequate supply of medications available or which can be obtained from the [facility]'s preferred pharmacy, which has been confirmed as: [id number] [An updated medication list will be faxed to the patient's pharmacy.]    Thank you for the consult.    Jordan Brumfield [facility]........October 22, 2021 11:12 AM

## 2021-10-24 LAB
ATRIAL RATE - MUSE: 72 BPM
ATRIAL RATE - MUSE: 84 BPM
DIASTOLIC BLOOD PRESSURE - MUSE: NORMAL MMHG
DIASTOLIC BLOOD PRESSURE - MUSE: NORMAL MMHG
INTERPRETATION ECG - MUSE: NORMAL
INTERPRETATION ECG - MUSE: NORMAL
P AXIS - MUSE: 61 DEGREES
P AXIS - MUSE: 63 DEGREES
PR INTERVAL - MUSE: 186 MS
PR INTERVAL - MUSE: 188 MS
QRS DURATION - MUSE: 94 MS
QRS DURATION - MUSE: 96 MS
QT - MUSE: 388 MS
QT - MUSE: 408 MS
QTC - MUSE: 446 MS
QTC - MUSE: 458 MS
R AXIS - MUSE: -34 DEGREES
R AXIS - MUSE: -38 DEGREES
SYSTOLIC BLOOD PRESSURE - MUSE: NORMAL MMHG
SYSTOLIC BLOOD PRESSURE - MUSE: NORMAL MMHG
T AXIS - MUSE: 49 DEGREES
T AXIS - MUSE: 51 DEGREES
VENTRICULAR RATE- MUSE: 72 BPM
VENTRICULAR RATE- MUSE: 84 BPM

## 2021-10-25 ENCOUNTER — PATIENT OUTREACH (OUTPATIENT)
Dept: CARE COORDINATION | Facility: CLINIC | Age: 86
End: 2021-10-25

## 2021-10-25 NOTE — PROGRESS NOTES
Transitional Care Management    Patient discharged to skilled nursing facility for short term rehab. No TCM call required per policy.   Karen Jett RN  10/25/2021 10:02 AM

## 2021-10-28 DIAGNOSIS — S81.811A LACERATION WITHOUT FOREIGN BODY, RIGHT LOWER LEG, INITIAL ENCOUNTER: Primary | ICD-10-CM

## 2021-10-28 NOTE — PROGRESS NOTES
Sierra Ray NP reviewed and completed the following home care or hospice form(s) for Geno Horn on 10/28/21.   This covers the certification period effective 9/27/21 to 11/25/21.  Jeanine Fox LPN on 10/28/2021 at 9:35 AM

## 2021-11-04 ENCOUNTER — NURSING HOME VISIT (OUTPATIENT)
Dept: GERIATRICS | Facility: OTHER | Age: 86
End: 2021-11-04
Payer: COMMERCIAL

## 2021-11-04 ENCOUNTER — APPOINTMENT (OUTPATIENT)
Dept: GERIATRICS | Facility: OTHER | Age: 86
End: 2021-11-04
Attending: NURSE PRACTITIONER
Payer: COMMERCIAL

## 2021-11-04 VITALS
SYSTOLIC BLOOD PRESSURE: 112 MMHG | RESPIRATION RATE: 20 BRPM | HEART RATE: 86 BPM | WEIGHT: 132 LBS | DIASTOLIC BLOOD PRESSURE: 66 MMHG | TEMPERATURE: 97.1 F | BODY MASS INDEX: 21.31 KG/M2

## 2021-11-04 DIAGNOSIS — Z96.649 PERIPROSTHETIC FRACTURE AROUND INTERNAL PROSTHETIC HIP JOINT, INITIAL ENCOUNTER: Primary | ICD-10-CM

## 2021-11-04 DIAGNOSIS — E03.8 OTHER SPECIFIED HYPOTHYROIDISM: ICD-10-CM

## 2021-11-04 DIAGNOSIS — G30.9 ALZHEIMER'S DISEASE (H): ICD-10-CM

## 2021-11-04 DIAGNOSIS — R19.5 LOOSE STOOLS: ICD-10-CM

## 2021-11-04 DIAGNOSIS — I10 ESSENTIAL HYPERTENSION: ICD-10-CM

## 2021-11-04 DIAGNOSIS — F02.80 ALZHEIMER'S DISEASE (H): ICD-10-CM

## 2021-11-04 DIAGNOSIS — M97.8XXA PERIPROSTHETIC FRACTURE AROUND INTERNAL PROSTHETIC HIP JOINT, INITIAL ENCOUNTER: Primary | ICD-10-CM

## 2021-11-04 PROCEDURE — 99305 1ST NF CARE MODERATE MDM 35: CPT | Performed by: NURSE PRACTITIONER

## 2021-11-04 ASSESSMENT — ENCOUNTER SYMPTOMS
FEVER: 0
HEMATOCHEZIA: 0
COUGH: 0
CONFUSION: 1
VOMITING: 0
DIFFICULTY URINATING: 0
DIAPHORESIS: 0
TROUBLE SWALLOWING: 0
DIARRHEA: 1
WEAKNESS: 1
UNEXPECTED WEIGHT CHANGE: 0
ABDOMINAL DISTENTION: 0
SHORTNESS OF BREATH: 0

## 2021-11-04 NOTE — PROGRESS NOTES
Subjective:  Patient is seen today for initial nurse practitioner evaluation.  She was hospitalized at [facility] from October 20 through October 22, 2021 after she had a fall at assisted living with a periprosthetic fracture around internal prosthetic hip joint.  Orthopedics was consulted and it was recommended for medical management.  Her pain is been managed with hydrocodone acetaminophen.  She has Alzheimer's dementia.  She has not had any behaviors, falls nor injuries since being at skilled nursing facility.  She has hypertension which is well controlled.  She takes Synthroid for hypothyroidism.  Nursing staff report that she has been more tired and having loose stools.  She has been receiving Colace twice daily and senna S once daily.  She has not been on antibiotics recently.    Patient Active Problem List   Diagnosis     Allergic rhinitis     Coronary atherosclerosis of native coronary artery     H/O basal cell carcinoma excision     Hearing loss     Essential hypertension     Hypothyroidism     Osteoarthritis of both hips     Urinary urgency     Unsteady gait     Sepsis (H)     Spinal stenosis of lumbar region, unspecified whether neurogenic claudication present     Pneumonia due to 2019 novel coronavirus     Hyponatremia     COVID-19 virus infection     Falls frequently     Contusion of right hip, initial encounter     General unsteadiness     Closed wedge compression fracture of T9 vertebra with routine healing, subsequent encounter     History of 2019 novel coronavirus disease (COVID-19)     Skin tear of left upper extremity     Multiple skin tears     H/O dizziness     Sciatica     Hyperlipidemia     Osteoarthritis of foot joint     Osteoporosis     Urinary incontinence     Periprosthetic fracture around internal prosthetic hip joint, initial encounter     Past Medical History:   Diagnosis Date     Hypertension      Hypothyroid      Osteoarthritis      ST elevation (STEMI) myocardial infarction (H)      1981; angioplasty     Past Surgical History:   Procedure Laterality Date     ARTHROPLASTY HIP Right     Dr Rodriguez     ARTHROPLASTY HIP Left 2015    Dr. Rodriguez     COMBINED CYSTOSCOPY, URETEROSCOPY, LASER HOLMIUM LITHOTRIPSY URETER(S) Right 7/23/2019    Procedure: CYSTOSCOPY, RIGHT STENT REMOVAL, URETEROSCOPY, LASER HOLMIUM LITHOTRIPSY RIGHT URETER WITH STENT REPLACEMENT;  Surgeon: Shawn Cole MD;  Location:  OR     CYSTOSCOPY, RETROGRADES, INSERT STENT URETER(S), COMBINED Right 6/17/2019    Procedure: CYSTOSCOPY, WITH RETROGRADE PYELOGRAM AND URETERAL STENT INSERTION;  Surgeon: Shawn Cole MD;  Location:  OR     Social History     Socioeconomic History     Marital status:      Spouse name: Not on file     Number of children: Not on file     Years of education: Not on file     Highest education level: Not on file   Occupational History     Not on file   Tobacco Use     Smoking status: Never Smoker     Smokeless tobacco: Never Used     Tobacco comment: no ecig   Vaping Use     Vaping Use: Never used   Substance and Sexual Activity     Alcohol use: Not Currently     Alcohol/week: 0.0 standard drinks     Drug use: No     Sexual activity: Not Currently     Partners: Male     Birth control/protection: Post-menopausal   Other Topics Concern     Parent/sibling w/ CABG, MI or angioplasty before 65F 55M? Not Asked   Social History Narrative    , lives in [place].  Previously from [place], [place].  Worked in drug store,  was a pharmacist.     Social Determinants of Health     Financial Resource Strain:      Difficulty of Paying Living Expenses:    Food Insecurity:      Worried About Running Out of Food in the Last Year:      Ran Out of Food in the Last Year:    Transportation Needs:      Lack of Transportation (Medical):      Lack of Transportation (Non-Medical):    Physical Activity:      Days of Exercise per Week:      Minutes of Exercise per Session:    Stress:      Feeling of Stress :     Social Connections:      Frequency of Communication with Friends and Family:      Frequency of Social Gatherings with Friends and Family:      Attends Anabaptist Services:      Active Member of Clubs or Organizations:      Attends Club or Organization Meetings:      Marital Status:    Intimate Partner Violence:      Fear of Current or Ex-Partner:      Emotionally Abused:      Physically Abused:      Sexually Abused:      Family History   Problem Relation Age of Onset     Heart Disease Mother 60         of mi     Heart Disease Father 60         of mi     Heart Disease Brother      Heart Disease Brother      Alendronic acid, Celebrex [celecoxib], Oxybutynin, Tolterodine, Tramadol, and Trospium    Skilled Nursing Facility Medication Record reviewed    End of Life Planning:   DNR/DNI    Review of Systems:  Review of Systems   Constitutional: Negative for diaphoresis, fever and unexpected weight change.   HENT: Negative for trouble swallowing.    Respiratory: Negative for cough and shortness of breath.    Cardiovascular: Negative for chest pain.   Gastrointestinal: Positive for diarrhea. Negative for abdominal distention, hematochezia and vomiting.   Genitourinary: Negative for difficulty urinating.   Musculoskeletal: Positive for gait problem.   Neurological: Positive for weakness.   Psychiatric/Behavioral: Positive for confusion. Negative for behavioral problems.       Objective:   /66   Pulse 86   Temp 97.1  F (36.2  C)   Resp 20   Wt 59.9 kg (132 lb)   BMI 21.31 kg/m    Physical Exam  Alert and pleasant.  She is comfortable in her wheelchair.  Skin color pink.  Mucous remains moist.  Neck supple.  Lung fields clear to auscultation.  Cardiovascular regular.  Abdomen is without tenderness and palpable masses.  Extremities without edema.  Mild discomfort with palpation over the right hip.    Hospitalization discharge notes, laboratory diagnostic studies all reviewed and discussed.  2021  normal TSH    Assessment:    ICD-10-CM    1. Periprosthetic fracture around internal prosthetic hip joint, initial encounter  M97.8XXA     Z96.649    2. Alzheimer's disease (H)  G30.9     F02.80    3. Essential hypertension  I10    4. Other specified hypothyroidism  E03.8    5. Loose stools  R19.5        Plan:   Stop Colace and senna S.  Would expect loose stools to clear within the next week.  If continues then may need further evaluation.  Continue with current pain management.    DRU Salcedo   11/4/2021  9:09 AM

## 2021-12-07 ENCOUNTER — NURSING HOME VISIT (OUTPATIENT)
Dept: GERIATRICS | Facility: OTHER | Age: 86
End: 2021-12-07
Payer: COMMERCIAL

## 2021-12-07 VITALS
TEMPERATURE: 97.3 F | WEIGHT: 130 LBS | BODY MASS INDEX: 20.98 KG/M2 | HEART RATE: 77 BPM | DIASTOLIC BLOOD PRESSURE: 70 MMHG | RESPIRATION RATE: 18 BRPM | OXYGEN SATURATION: 93 % | SYSTOLIC BLOOD PRESSURE: 115 MMHG

## 2021-12-07 DIAGNOSIS — R19.5 LOOSE STOOLS: ICD-10-CM

## 2021-12-07 DIAGNOSIS — Z96.649 PERIPROSTHETIC FRACTURE AROUND INTERNAL PROSTHETIC HIP JOINT, INITIAL ENCOUNTER: Primary | ICD-10-CM

## 2021-12-07 DIAGNOSIS — E03.8 OTHER SPECIFIED HYPOTHYROIDISM: ICD-10-CM

## 2021-12-07 DIAGNOSIS — M97.8XXA PERIPROSTHETIC FRACTURE AROUND INTERNAL PROSTHETIC HIP JOINT, INITIAL ENCOUNTER: Primary | ICD-10-CM

## 2021-12-07 DIAGNOSIS — G30.9 ALZHEIMER'S DISEASE (H): ICD-10-CM

## 2021-12-07 DIAGNOSIS — F02.80 ALZHEIMER'S DISEASE (H): ICD-10-CM

## 2021-12-07 DIAGNOSIS — I10 ESSENTIAL HYPERTENSION: ICD-10-CM

## 2021-12-07 PROCEDURE — 99309 SBSQ NF CARE MODERATE MDM 30: CPT | Performed by: NURSE PRACTITIONER

## 2021-12-07 ASSESSMENT — ENCOUNTER SYMPTOMS
VOMITING: 0
TROUBLE SWALLOWING: 0
DIFFICULTY URINATING: 0
COUGH: 0
WEAKNESS: 1
SHORTNESS OF BREATH: 0
CONFUSION: 1
DIAPHORESIS: 0
DIARRHEA: 0
ABDOMINAL DISTENTION: 0
FEVER: 0
HEMATOCHEZIA: 0
UNEXPECTED WEIGHT CHANGE: 0

## 2021-12-07 NOTE — PROGRESS NOTES
Subjective:  Patient is seen today for 30-day admission recertification.  She was admitted after periprosthetic hip fracture treated with medical management.  She is no longer using the hydrocodone acetaminophen.  She is nonambulatory and uses wheelchair.  She has Alzheimer's dementia.  She has not had any behaviors, falls nor injuries since being at skilled nursing facility.  She has hypertension which is well controlled.  She takes Synthroid for hypothyroidism.  She was having loose stools upon skilled nursing facility admission and stool softener and laxative was discontinued.  No longer having bowel difficulties.    Patient Active Problem List   Diagnosis     Allergic rhinitis     Coronary atherosclerosis of native coronary artery     H/O basal cell carcinoma excision     Hearing loss     Essential hypertension     Hypothyroidism     Osteoarthritis of both hips     Urinary urgency     Unsteady gait     Sepsis (H)     Spinal stenosis of lumbar region, unspecified whether neurogenic claudication present     Pneumonia due to 2019 novel coronavirus     Hyponatremia     COVID-19 virus infection     Falls frequently     Contusion of right hip, initial encounter     General unsteadiness     Closed wedge compression fracture of T9 vertebra with routine healing, subsequent encounter     History of 2019 novel coronavirus disease (COVID-19)     Skin tear of left upper extremity     Multiple skin tears     H/O dizziness     Sciatica     Hyperlipidemia     Osteoarthritis of foot joint     Osteoporosis     Urinary incontinence     Periprosthetic fracture around internal prosthetic hip joint, initial encounter     Past Medical History:   Diagnosis Date     Hypertension      Hypothyroid      Osteoarthritis      ST elevation (STEMI) myocardial infarction (H)     1981; angioplasty     Past Surgical History:   Procedure Laterality Date     ARTHROPLASTY HIP Right     Dr Rodriguez     ARTHROPLASTY HIP Left 2015    Dr. Rodriguez     COMBINED  CYSTOSCOPY, URETEROSCOPY, LASER HOLMIUM LITHOTRIPSY URETER(S) Right 2019    Procedure: CYSTOSCOPY, RIGHT STENT REMOVAL, URETEROSCOPY, LASER HOLMIUM LITHOTRIPSY RIGHT URETER WITH STENT REPLACEMENT;  Surgeon: Shawn Cole MD;  Location:  OR     CYSTOSCOPY, RETROGRADES, INSERT STENT URETER(S), COMBINED Right 2019    Procedure: CYSTOSCOPY, WITH RETROGRADE PYELOGRAM AND URETERAL STENT INSERTION;  Surgeon: Shawn Cole MD;  Location:  OR     Social History     Socioeconomic History     Marital status:      Spouse name: Not on file     Number of children: Not on file     Years of education: Not on file     Highest education level: Not on file   Occupational History     Not on file   Tobacco Use     Smoking status: Never Smoker     Smokeless tobacco: Never Used     Tobacco comment: no ecig   Vaping Use     Vaping Use: Never used   Substance and Sexual Activity     Alcohol use: Not Currently     Alcohol/week: 0.0 standard drinks     Drug use: No     Sexual activity: Not Currently     Partners: Male     Birth control/protection: Post-menopausal   Other Topics Concern     Parent/sibling w/ CABG, MI or angioplasty before 65F 55M? Not Asked   Social History Narrative    , lives in [place].  Previously from [place], [place].  Worked in drug store,  was a pharmacist.     Social Determinants of Health     Financial Resource Strain: Not on file   Food Insecurity: Not on file   Transportation Needs: Not on file   Physical Activity: Not on file   Stress: Not on file   Social Connections: Not on file   Intimate Partner Violence: Not on file   Housing Stability: Not on file     Family History   Problem Relation Age of Onset     Heart Disease Mother 60         of mi     Heart Disease Father 60         of mi     Heart Disease Brother      Heart Disease Brother      Alendronic acid, Celebrex [celecoxib], Oxybutynin, Tolterodine, Tramadol, and Trospium    Skilled Nursing Facility Medication  Record reviewed    End of Life Planning:   DNR/DNI    Review of Systems:  Review of Systems   Constitutional: Negative for diaphoresis, fever and unexpected weight change.   HENT: Negative for trouble swallowing.    Respiratory: Negative for cough and shortness of breath.    Cardiovascular: Negative for chest pain.   Gastrointestinal: Negative for abdominal distention, diarrhea, hematochezia and vomiting.   Genitourinary: Negative for difficulty urinating.   Musculoskeletal: Positive for gait problem.   Neurological: Positive for weakness.   Psychiatric/Behavioral: Positive for confusion. Negative for behavioral problems.       Objective:   /70   Pulse 77   Temp 97.3  F (36.3  C)   Resp 18   Wt 59 kg (130 lb)   SpO2 93%   BMI 20.98 kg/m    Physical Exam  Alert and pleasant.  Seated in her wheelchair.  Skin color pink.  Mucous membranes moist.  Neck supple.  Lung fields clear to auscultation.  Cardiovascular regular.  Abdomen is without tenderness and palpable masses.  Extremities without edema.  No hip pain with palpation.      Assessment:    ICD-10-CM    1. Periprosthetic fracture around internal prosthetic hip joint, initial encounter  M97.8XXA     Z96.649    2. Alzheimer's disease (H)  G30.9     F02.80    3. Essential hypertension  I10    4. Other specified hypothyroidism  E03.8    5. Loose stools  R19.5        Plan:   Discontinue hydrocodone acetaminophen.  Continue all other medications.  She is at fall risk secondary to impaired gait and memory difficulty.  Continue with fall precautions.    DRU Salcedo   12/7/2021   4:03 PM

## 2021-12-13 ENCOUNTER — NURSING HOME VISIT (OUTPATIENT)
Dept: GERIATRICS | Facility: OTHER | Age: 86
End: 2021-12-13
Payer: COMMERCIAL

## 2021-12-13 VITALS
HEART RATE: 77 BPM | WEIGHT: 131 LBS | SYSTOLIC BLOOD PRESSURE: 125 MMHG | DIASTOLIC BLOOD PRESSURE: 70 MMHG | BODY MASS INDEX: 21.14 KG/M2 | OXYGEN SATURATION: 94 % | RESPIRATION RATE: 18 BRPM

## 2021-12-13 DIAGNOSIS — E03.8 OTHER SPECIFIED HYPOTHYROIDISM: ICD-10-CM

## 2021-12-13 DIAGNOSIS — M97.8XXA PERIPROSTHETIC FRACTURE AROUND INTERNAL PROSTHETIC HIP JOINT, INITIAL ENCOUNTER: Primary | ICD-10-CM

## 2021-12-13 DIAGNOSIS — I10 ESSENTIAL HYPERTENSION: ICD-10-CM

## 2021-12-13 DIAGNOSIS — Z96.649 PERIPROSTHETIC FRACTURE AROUND INTERNAL PROSTHETIC HIP JOINT, INITIAL ENCOUNTER: Primary | ICD-10-CM

## 2021-12-13 DIAGNOSIS — F02.80 ALZHEIMER'S DISEASE (H): ICD-10-CM

## 2021-12-13 DIAGNOSIS — G30.9 ALZHEIMER'S DISEASE (H): ICD-10-CM

## 2021-12-13 PROCEDURE — 99315 NF DSCHRG MGMT 30 MIN/LESS: CPT | Performed by: NURSE PRACTITIONER

## 2021-12-13 ASSESSMENT — ENCOUNTER SYMPTOMS
UNEXPECTED WEIGHT CHANGE: 0
WEAKNESS: 1
WOUND: 0
PALPITATIONS: 0
NERVOUS/ANXIOUS: 0
SHORTNESS OF BREATH: 0
DYSURIA: 0
DYSPHORIC MOOD: 0
VOMITING: 0
CHEST TIGHTNESS: 0
CONFUSION: 1
FEVER: 0
DIFFICULTY URINATING: 0
ABDOMINAL PAIN: 0
AGITATION: 0

## 2021-12-13 NOTE — PROGRESS NOTES
Subjective:  Patient is seen today requesting to discharge to [facility] later this week.  She was admitted to skilled nursing facility back in November after she was hospitalized for a fall with periprosthetic right hip fracture.  Medical management was recommended.  She has worked with PT and OT.  She can do some transfer with assist but otherwise uses wheelchair for mobility.  She has Alzheimer's disease, hypertension and hypothyroidism.  These have been stable throughout skilled nursing facility stay.  Her pain medication was discontinued last week as she was no longer having right hip fracture pain.  She also was having loose stools at the time of admission and stool softener was discontinued.      Patient Active Problem List   Diagnosis     Allergic rhinitis     Coronary atherosclerosis of native coronary artery     H/O basal cell carcinoma excision     Hearing loss     Essential hypertension     Hypothyroidism     Osteoarthritis of both hips     Urinary urgency     Unsteady gait     Sepsis (H)     Spinal stenosis of lumbar region, unspecified whether neurogenic claudication present     Pneumonia due to 2019 novel coronavirus     Hyponatremia     COVID-19 virus infection     Falls frequently     Contusion of right hip, initial encounter     General unsteadiness     Closed wedge compression fracture of T9 vertebra with routine healing, subsequent encounter     History of 2019 novel coronavirus disease (COVID-19)     Skin tear of left upper extremity     Multiple skin tears     H/O dizziness     Sciatica     Hyperlipidemia     Osteoarthritis of foot joint     Osteoporosis     Urinary incontinence     Periprosthetic fracture around internal prosthetic hip joint, initial encounter     Past Medical History:   Diagnosis Date     Hypertension      Hypothyroid      Osteoarthritis      ST elevation (STEMI) myocardial infarction (H)     1981; angioplasty     Past Surgical History:   Procedure Laterality  Date     ARTHROPLASTY HIP Right     Dr Rodriguez     ARTHROPLASTY HIP Left     Dr. Rodriguez     COMBINED CYSTOSCOPY, URETEROSCOPY, LASER HOLMIUM LITHOTRIPSY URETER(S) Right 2019    Procedure: CYSTOSCOPY, RIGHT STENT REMOVAL, URETEROSCOPY, LASER HOLMIUM LITHOTRIPSY RIGHT URETER WITH STENT REPLACEMENT;  Surgeon: Shawn Cole MD;  Location:  OR     CYSTOSCOPY, RETROGRADES, INSERT STENT URETER(S), COMBINED Right 2019    Procedure: CYSTOSCOPY, WITH RETROGRADE PYELOGRAM AND URETERAL STENT INSERTION;  Surgeon: Shawn Cole MD;  Location:  OR     Social History     Socioeconomic History     Marital status:      Spouse name: Not on file     Number of children: Not on file     Years of education: Not on file     Highest education level: Not on file   Occupational History     Not on file   Tobacco Use     Smoking status: Never Smoker     Smokeless tobacco: Never Used     Tobacco comment: no ecig   Vaping Use     Vaping Use: Never used   Substance and Sexual Activity     Alcohol use: Not Currently     Alcohol/week: 0.0 standard drinks     Drug use: No     Sexual activity: Not Currently     Partners: Male     Birth control/protection: Post-menopausal   Other Topics Concern     Parent/sibling w/ CABG, MI or angioplasty before 65F 55M? Not Asked   Social History Narrative    , lives in [place].  Previously from [place], [place].  Worked in drug store,  was a pharmacist.     Social Determinants of Health     Financial Resource Strain: Not on file   Food Insecurity: Not on file   Transportation Needs: Not on file   Physical Activity: Not on file   Stress: Not on file   Social Connections: Not on file   Intimate Partner Violence: Not on file   Housing Stability: Not on file     Family History   Problem Relation Age of Onset     Heart Disease Mother 60         of mi     Heart Disease Father 60         of mi     Heart Disease Brother      Heart Disease Brother      Alendronic acid, Celebrex  [celecoxib], Oxybutynin, Tolterodine, Tramadol, and Trospium    Skilled Nursing Facility Medication Record reviewed    End of Life Planning:   DNR/DNI    Review of Systems:  Review of Systems   Constitutional: Negative for fever and unexpected weight change.   Respiratory: Negative for chest tightness and shortness of breath.    Cardiovascular: Negative for chest pain and palpitations.   Gastrointestinal: Negative for abdominal pain and vomiting.   Genitourinary: Negative for difficulty urinating and dysuria.   Musculoskeletal: Positive for gait problem.   Skin: Negative for wound.   Neurological: Positive for weakness. Negative for syncope.   Psychiatric/Behavioral: Positive for confusion. Negative for agitation, behavioral problems and dysphoric mood. The patient is not nervous/anxious.        Objective:   /70   Pulse 77   Resp 18   Wt 59.4 kg (131 lb)   SpO2 94%   BMI 21.14 kg/m    Physical Exam  Pleasant female sitting in her wheelchair no acute distress.  She appears comfortable and is visiting with her roommate.  Skin color pink.  Mucous membranes moist.  Neck supple.  Lung fields clear to auscultation.  Cardiovascular regular, no S3.  Abdomen is soft and without masses, tenderness and organomegaly.  Extremities without edema.  No pain of the right hip.    Assessment:    ICD-10-CM    1. Periprosthetic fracture around internal prosthetic hip joint, initial encounter  M97.8XXA     Z96.649    2. Alzheimer's disease (H)  G30.9     F02.80    3. Essential hypertension  I10    4. Other specified hypothyroidism  E03.8        Plan:   May discharge to [facility] assisted living with current medications.  I have recommended an orthopedic follow up of right hip periprosthetic fracture that was treated medically.  She has completed therapy and is at baseling functional status.    DRU Salcedo   12/13/2021  3:53 PM

## 2021-12-16 ENCOUNTER — HOSPITAL ENCOUNTER (OUTPATIENT)
Dept: GENERAL RADIOLOGY | Facility: OTHER | Age: 86
End: 2021-12-16
Attending: FAMILY MEDICINE
Payer: COMMERCIAL

## 2021-12-16 ENCOUNTER — OFFICE VISIT (OUTPATIENT)
Dept: FAMILY MEDICINE | Facility: OTHER | Age: 86
End: 2021-12-16
Attending: FAMILY MEDICINE
Payer: COMMERCIAL

## 2021-12-16 VITALS
HEART RATE: 74 BPM | WEIGHT: 131 LBS | TEMPERATURE: 95.4 F | DIASTOLIC BLOOD PRESSURE: 72 MMHG | OXYGEN SATURATION: 98 % | RESPIRATION RATE: 14 BRPM | SYSTOLIC BLOOD PRESSURE: 134 MMHG | BODY MASS INDEX: 21.14 KG/M2

## 2021-12-16 DIAGNOSIS — Z96.649 PERIPROSTHETIC FRACTURE AROUND INTERNAL PROSTHETIC HIP JOINT, INITIAL ENCOUNTER: Primary | ICD-10-CM

## 2021-12-16 DIAGNOSIS — M97.8XXA PERIPROSTHETIC FRACTURE AROUND INTERNAL PROSTHETIC HIP JOINT, INITIAL ENCOUNTER: Primary | ICD-10-CM

## 2021-12-16 PROCEDURE — 73502 X-RAY EXAM HIP UNI 2-3 VIEWS: CPT

## 2021-12-16 PROCEDURE — 27500 TREATMENT OF THIGH FRACTURE: CPT | Performed by: FAMILY MEDICINE

## 2021-12-16 PROCEDURE — G0463 HOSPITAL OUTPT CLINIC VISIT: HCPCS

## 2021-12-16 ASSESSMENT — PAIN SCALES - GENERAL: PAINLEVEL: NO PAIN (0)

## 2021-12-16 NOTE — PROGRESS NOTES
HPI:  89-year-old female with underlying Alzheimer's dementia coming in for follow-up evaluation of a right hip fracture about a prosthesis from a previous LORA that occurred during a fall in her nursing home facility on 10/20.  She was hospitalized for 2 nights and was discharged to a SNF.  She recently transitioned away from skilled nursing back to assisted living.  She is no longer endorsing hip pain.  She is ambulating with use of a wheelchair.  She has been working with PT and OT.  No new injuries.  She is currently not endorsing any pain.      EXAM:  /72 (BP Location: Right arm, Patient Position: Sitting, Cuff Size: Adult Regular)   Pulse 74   Temp (!) 95.4  F (35.2  C) (Tympanic)   Resp 14   Wt 59.4 kg (131 lb)   SpO2 98%   BMI 21.14 kg/m    MUSCULOSKELETAL EXAM:  RIGHT HIP  Inspection:  -No gross deformity    Tenderness to palpation of the:  -Greater trochanter: Mild pain  -Proximal femoral diaphysis: Mild pain  -Pelvic ala:  Negative    Strength:  -Knee extension:  5/5  -Knee flexion:  5/5  -Hip abduction:  5/5  -Hip adduction:  5/5    Special Tests:  -Logrolling maneuver:  Negative    Other:  -Intact sensation to light touch distally.  -No signs of cyanosis. Normal skin temperature of the lower extremity.  -Knee/foot/ankle:  No gross deformity. Full range of motion.  -Left hip:  No gross deformity. No palpable tenderness. Normal strength and ROM.      IMAGING:  10/20/2021: Pelvis and 2 view right hip x-ray  -There is a small step-off through the proximal femoral metaphysis just below the greater trochanter.  Likely a nondisplaced fracture.  No signs of hardware fracture or loosening.  No acute bony abnormalities of the pelvic ring.    12/16/2021: Pelvis and 2 view right hip x-rays  -Fracture through the proximal femoral metaphysis is again noted.  No change in bony alignment.  Hardware appears intact.  There is significant interval bony healing about the fracture  site.      ASSESSMENT/PLAN:  Diagnoses and all orders for this visit:  Periprosthetic fracture around internal prosthetic hip joint, initial encounter  -     XR Pelvis w Hip Right G/E 2 Views  -     CLOSED RX FEMUR SHAFT FX    89-year-old female with a fracture about the prosthesis of a previous LOAR without displacement.  She is 8 weeks and 1 day out from her injury.  X-rays from 10/20 and 12/16 were both personally viewed in the office with the findings as demonstrated above by my interpretation.  At this time she demonstrates good clinical and radiographic evidence of healing.  She was weightbearing with use of a walker prior to her injury.  I feel it is reasonable for her to have a goal of getting back to that level of functionality following this injury.  -Okay to transfer to assisted living  -Recommend continuation with physical therapy  -Ambulation as tolerated  -Weightbearing as able  -Form filled out for residential facility  -Follow-up as needed    Global fracture code billing (CPT:  31547)       Miguel Harrison MD  12/16/2021  10:55 AM    Total time spent with this patient was 29 minutes which included chart review, visualization and interpretation of images, time spent with the patient, and documentation.            Documentation of Face-to-Face and Certification for Home Health Services     Patient: Geno Horn   YOB: 1926  MR Number: [id number]  Today's Date: 12/16/2021    I certify that patient: Geno Horn is under my care and that I, or a nurse practitioner or physician's assistant working with me, had a face-to-face encounter that meets the physician face-to-face encounter requirements with this patient on: 12/16/2021.    This encounter with the patient was in whole, or in part, for the following medical condition, which is the primary reason for home health care: Right hip fracture.    I certify that, based on my findings, the following services are medically necessary home health  services: Physical Therapy.    My clinical findings support the need for the above services because: Physical Therapy Services are needed to assess and treat the following functional impairments: Decreased mobility and difficulty with ambulation following right hip fracture.    Further, I certify that my clinical findings support that this patient is homebound (i.e. absences from home require considerable and taxing effort and are for medical reasons or [employer] services or infrequently or of short duration when for other reasons) because: Leaving home is medically contraindicated for the following reason(s): Safety concerns..    Based on the above findings. I certify that this patient is confined to the home and needs intermittent skilled nursing care, physical therapy and/or speech therapy.  The patient is under my care, and I have initiated the establishment of the plan of care.  This patient will be followed by a physician who will periodically review the plan of care.  Physician/Provider to provide follow up care: Fransisca Rocha    Responsible Medicare certified PECOS Physician: Miguel Harrison MD  Physician Signature: See electronic signature associated with these discharge orders.  Date: 12/16/2021

## 2021-12-16 NOTE — NURSING NOTE
"Chief Complaint   Patient presents with     Injury     right hip injury, DOI: 10/20/21     Patient presents to clinic today for right hip injury. DOI: 10/20/21. Injured from falling while trying to  something from the floor. Pain 0/10.     Initial /72 (BP Location: Right arm, Patient Position: Sitting, Cuff Size: Adult Regular)   Pulse 74   Temp (!) 95.4  F (35.2  C) (Tympanic)   Resp 14   Wt 59.4 kg (131 lb)   SpO2 98%   BMI 21.14 kg/m   Estimated body mass index is 21.14 kg/m  as calculated from the following:    Height as of 9/30/21: 1.676 m (5' 6\").    Weight as of this encounter: 59.4 kg (131 lb).       Medication Reconciliation: Complete        Magy Barbour LPN   "

## 2021-12-24 DIAGNOSIS — J30.9 ALLERGIC RHINITIS, UNSPECIFIED SEASONALITY, UNSPECIFIED TRIGGER: Primary | ICD-10-CM

## 2021-12-27 RX ORDER — CETIRIZINE HYDROCHLORIDE 5 MG/1
TABLET ORAL
Qty: 31 TABLET | Refills: 11 | Status: SHIPPED | OUTPATIENT
Start: 2021-12-27 | End: 2022-01-27

## 2021-12-27 NOTE — TELEPHONE ENCOUNTER
Routing refill request to provider for review/approval because:  Medication is reported/historical  Antihistamines Protocol Failed 12/24/2021 04:20 PM   Protocol Details  Patient is 3-64 years of age     LOV   12/13/2021 [facility]   Mady Richmond RN on 12/27/2021 at 10:35 AM

## 2022-01-06 ENCOUNTER — TELEPHONE (OUTPATIENT)
Dept: FAMILY MEDICINE | Facility: OTHER | Age: 87
End: 2022-01-06
Payer: COMMERCIAL

## 2022-01-06 DIAGNOSIS — M97.8XXA PERIPROSTHETIC FRACTURE AROUND INTERNAL PROSTHETIC HIP JOINT, INITIAL ENCOUNTER: Primary | ICD-10-CM

## 2022-01-06 DIAGNOSIS — Z96.649 PERIPROSTHETIC FRACTURE AROUND INTERNAL PROSTHETIC HIP JOINT, INITIAL ENCOUNTER: Primary | ICD-10-CM

## 2022-01-06 DIAGNOSIS — M15.0 PRIMARY OSTEOARTHRITIS INVOLVING MULTIPLE JOINTS: ICD-10-CM

## 2022-01-06 NOTE — TELEPHONE ENCOUNTER
Forrest Eduardo    I was at [facility] living today and appears you did a face to face for home care home care therapy/skilled nursing services    Calling [facility] homecare they did not receive the order    I am teeing up the home care services order and if you approved please sign orders and Homecare can get started with her next week    Thanks    Sierra Ray, APRN CNP   January 6, 2022

## 2022-01-10 ENCOUNTER — TELEPHONE (OUTPATIENT)
Dept: INTERNAL MEDICINE | Facility: OTHER | Age: 87
End: 2022-01-10
Payer: COMMERCIAL

## 2022-01-10 PROCEDURE — G0180 MD CERTIFICATION HHA PATIENT: HCPCS | Performed by: NURSE PRACTITIONER

## 2022-01-10 NOTE — TELEPHONE ENCOUNTER
Request for Home Care Physical Therapy orders as follows:    PT eval and treat date:  1/10/22    Continuation frequency =  2 x week x 4 weeks                Effective date = 1/10/22    Interventions include:    Therapeutic Exercise  Therapeutic Activity  Gait Training  Transfer Training  Gait/Balance/Dysfunction  Home Exercise Program  Home Safety Assessment         Therapist:  Erica Cardenas  Please respond with .HOMECAREAGREE, if you are in agreement. Please sign with your comments and signature, if you are not in agreement.

## 2022-01-10 NOTE — TELEPHONE ENCOUNTER
Sorry, I meant to add these onto the PT orders that were placed earlier...      Request for Home Care Occupational Therapy orders as follows:    OT eval and treat date:  1/10/22    Continuation frequency =  2 x week x 4 weeks                 Effective date = 1/10/22    Interventions include:    Therapeutic Exercise  ADL training  Adaptive equipment  Home safety assessment  Caregiver training              For therapist: Kamilla VALIENTE   Please respond with .HOMECAREAGREE, if you are in agreement. Please sign with your comments and signature, if you are not in agreement.

## 2022-01-11 ENCOUNTER — TELEPHONE (OUTPATIENT)
Dept: INTERNAL MEDICINE | Facility: OTHER | Age: 87
End: 2022-01-11
Payer: COMMERCIAL

## 2022-01-11 NOTE — TELEPHONE ENCOUNTER
Request for Home Care Skilled Nursing orders as follows:    SN eval and treat date: 1/10/22    Continuation frequency =  ____1___ X week X ___2____ weeks  &  PRN           Effective date=      Interventions include:    Assessment  Patient/caregiver education      Fall risk: instruct on fall prevention strategies, home safety, assess environment Observe for signs and symptoms of depression, assess effectiveness of medication, if at risk  Instruct on pain relief measures and assess pain with each visit, if has pain. Assess effectiveness of medications.  Instruct on pressure relief methods to prevent pressure ulcers      Please respond with .HOMECAREAGREE, if you are in agreement. Please sign with your comments and signature, if you are not in agreement.      Thank you for your time,  Amber Dasilva RN

## 2022-01-11 NOTE — TELEPHONE ENCOUNTER
"URGENT: per CMS best practice, response is requested within 24 hours.    Home Care regulation mandates that you are notified about drug discrepancies, interactions & contraindications. Response within a 24 hour timeframe is established by CMS as \"best practice\" for the delivery of home health care. Home Care is required to report if the 24 hour timeframe was met. The home health clinician will contact you again if this timeframe is not met or if the response does not address all concerns.       Situation:        The patient was admitted to [facility] on 1/10/22 Upon admission, a med reconciliation was completed to identify any drug discrepancies, interactions or contraindications. Home Care's drug regime review has revealed significant medication issues.     You are being contacted for clarifying orders related to the medication issues.     Background: Noted with medication review the following medication interactions and discrepancies     Assessment:       Discrepancies:    1. Discontinue Zyrtec 10 mg (client is taking 5 mg daily)  2. Discontinue Colace, client does not take  3. Discontinue Norco- client does not take  4. Discontinue Bactroban- client does not use  5. Discontinue Senna S- client does not use    Interactions  Moderate level  1. Multivitamin/ Levothyroxine  Recommendations:    Please evaluate this information and indicate below whether or not changes are required. A copy of the patient's drug interaction/contraindications report is available upon request.       CLINIC NURSE - If changes are made, please update the Epic medication profile.     Please sign this encounter with your electronic signature.     Please review and update Epic med list,   Thanks for your time,   Amber Dasilva RN    "

## 2022-01-12 ENCOUNTER — MYC MEDICAL ADVICE (OUTPATIENT)
Dept: GERIATRICS | Facility: OTHER | Age: 87
End: 2022-01-12
Payer: COMMERCIAL

## 2022-01-13 ENCOUNTER — NURSING HOME VISIT (OUTPATIENT)
Dept: GERIATRICS | Facility: OTHER | Age: 87
End: 2022-01-13
Attending: NURSE PRACTITIONER
Payer: COMMERCIAL

## 2022-01-13 DIAGNOSIS — M48.061 SPINAL STENOSIS OF LUMBAR REGION, UNSPECIFIED WHETHER NEUROGENIC CLAUDICATION PRESENT: ICD-10-CM

## 2022-01-13 DIAGNOSIS — M80.00XS OSTEOPOROSIS WITH PATHOLOGICAL FRACTURE, SEQUELA: ICD-10-CM

## 2022-01-13 DIAGNOSIS — S22.070D CLOSED WEDGE COMPRESSION FRACTURE OF T9 VERTEBRA WITH ROUTINE HEALING, SUBSEQUENT ENCOUNTER: ICD-10-CM

## 2022-01-13 DIAGNOSIS — M48.50XS PATHOLOGICAL COMPRESSION FRACTURE OF VERTEBRA, SEQUELA: ICD-10-CM

## 2022-01-13 DIAGNOSIS — S22.060S CLOSED WEDGE COMPRESSION FRACTURE OF T7 VERTEBRA, SEQUELA: ICD-10-CM

## 2022-01-13 DIAGNOSIS — R29.6 MULTIPLE FALLS: ICD-10-CM

## 2022-01-13 DIAGNOSIS — M97.01XS PERIPROSTHETIC FRACTURE AROUND INTERNAL PROSTHETIC RIGHT HIP JOINT, SEQUELA: Primary | ICD-10-CM

## 2022-01-13 DIAGNOSIS — T07.XXXA MULTIPLE FRACTURES: ICD-10-CM

## 2022-01-13 DIAGNOSIS — R26.81 GAIT INSTABILITY: ICD-10-CM

## 2022-01-13 NOTE — PROGRESS NOTES
Geno Horn is a 89 year old female being seen today for comprehensive and follow up visit at [facility].    Code Status: Advance Directives: YES   Health Care Power of : Extended Emergency Contact Information  Primary Emergency Contact: Daniel Horn  Address: [address]           [place] [address] [facility]  Mobile Phone: [phone]  Relation: Son  Secondary Emergency Contact: Adri Chaudhary  Address: [address] [place] [address] [facility]  Home Phone: [phone]  Mobile Phone: [phone]  Relation: Daughter     Allergies: Alendronic acid, Celebrex [celecoxib], Oxybutynin, Tolterodine, Tramadol, and Trospium     Chief Complaint / HPI: Follow-up for face-to-face visit for DME wheelchair assessment.  Resident currently using borrowed wheelchair.  Resident is wheelchair dependent for mobility.  Significant history of thoracic vertebral fracture, recent T7 pathologic fracture, right hip periprosthetic fracture, multiple rib fractures, and Manubrium fracture all related to self transferring falls.  Currently working with home care therapy services to maximize mobility and independence. Resident will need her own wheelchair that will adequately meet her needs permanently.  Resident recently admitted to [facility] from SNF, acclimating well to facility.        Documentation of Face-to-Face and Certification for Home Health DME     Patient: Geno Horn   YOB: 1926  MR Number: [id number]  Today's Date: 1/13/2022    I certify that patient: Geno Horn is under my care and that I, or a nurse practitioner or physician's assistant working with me, had a face-to-face encounter that meets the physician face-to-face encounter requirements with this patient on: 1/13/2022.    This encounter with the patient was in whole, or in part, for the following medical condition, which is the primary reason for home health care: (M97.01XS) Periprosthetic fracture around internal  prosthetic right hip joint, sequela  (primary encounter diagnosis)(M48.061) Spinal stenosis of lumbar region, unspecified whether neurogenic claudication present  (M80.00XS) Osteoporosis with pathological fracture, sequela  (T07.XXXA) Multiple fractures  (R26.81) Gait instability  (R29.6) Multiple falls      I certify that, based on my findings, the following services are medically necessary home health services: DME Wheelchair.    My clinical findings support the need for the above services because: Wheelchair dependent for safe mobility and independence    Further, I certify that my clinical findings support that this patient is homebound (i.e. absences from home require considerable and taxing effort and are for medical reasons or [employer]ondo[employer] services or infrequently or of short duration when for other reasons) because: wheelchair dependent, completely dependent  For safe transfers with medical transportation, non ambulatory.    Based on the above findings. I certify that this patient is confined to the home and needs intermittent skilled nursing care, physical therapy and/or speech therapy.  The patient is under my care, and I have initiated the establishment of the plan of care.  This patient will be followed by a physician who will periodically review the plan of care.  Physician/Provider to provide follow up care: Fransisca Rocha    Responsible Medicare certified PEC Physician: Yulissa Rocha  Physician Signature: See electronic signature associated with these discharge orders.  Date: 1/13/2022      Past Medical, Surgical, Family and Social History reviewed: YES.     Medications: reviewed  Medications - recent changes:     Review of Systems:  General: positive for weakness  Musculoskeletal: positive for fracture, back pain, arthritis, joint pain and muscular weakness  Neurologic: positive for local weakness and numbness or tingling of feet  All other systems negative  Toileting:    Continent of Bowel:  Yes   Continent of Bladder: Yes  Mobility: walker and wheelchair    Recent Labs: reviewed        Current Therapies: physical therapy, occupational therapy and Skilled Nursing Home care    Exam:  Vital signs reviewed.   GENERAL APPEARANCE: alert and no distress  EYES: Eyes grossly normal to inspection, conjunctivae and sclerae normal  NECK: no adenopathy, no asymmetry  RESP: lungs clear   MS: no visible apparent musculoskeletal defects are noted and seated comfortably in wheelchair  SKIN: Warm and dry  NEURO: mentation intact and speech normal  PSYCH: mentation appears normal and affect normal/bright, pleasant conversational and social    Assessment and Plan:    Assisted living resident will require wheelchair and would benefit from physical therapy assessment for wheelchair  With adaptive features to meet mobility needs.  Resident has chosen custom medical DME provider.  Resident is homebound, significant generalized osteoporosis with pathologic spinal fractures, lumbar stenosis, currently periprosthetic fracture  With chronic unstable gait at high risk for falls and pathologic fractures. Resident is dependent on wheelchair for mobility  To meet daily needs and maximize independence.        (M97.01XS) Periprosthetic fracture around internal prosthetic right hip joint, sequela  (primary encounter diagnosis)      (S22.070D) Closed wedge compression fracture of T9 vertebra with routine healing, subsequent encounter    Plan: Physical Therapy Referral            (M48.061) Spinal stenosis of lumbar region, unspecified whether neurogenic claudication present      (M80.00XS) Osteoporosis with pathological fracture, sequela      (T07.XXXA) Multiple fractures      (M48.50XS) Pathological compression fracture of vertebra, sequela      (S22.060S) Closed wedge compression fracture of T7 vertebra, sequela      (R29.6) Multiple falls    (R26.81) Gait instability

## 2022-01-13 NOTE — Clinical Note
HIM,  Please send copy of note to [facility] for Wheelchair DME  Thanks  Sierra

## 2022-01-17 DIAGNOSIS — E78.5 HYPERLIPIDEMIA: ICD-10-CM

## 2022-01-17 DIAGNOSIS — M48.061 SPINAL STENOSIS OF LUMBAR REGION, UNSPECIFIED WHETHER NEUROGENIC CLAUDICATION PRESENT: ICD-10-CM

## 2022-01-17 DIAGNOSIS — S22.070D CLOSED WEDGE COMPRESSION FRACTURE OF T9 VERTEBRA WITH ROUTINE HEALING, SUBSEQUENT ENCOUNTER: ICD-10-CM

## 2022-01-17 DIAGNOSIS — G62.9 NEUROPATHY: ICD-10-CM

## 2022-01-17 DIAGNOSIS — F34.1 DYSTHYMIA: ICD-10-CM

## 2022-01-17 DIAGNOSIS — I10 ESSENTIAL HYPERTENSION: Primary | ICD-10-CM

## 2022-01-17 DIAGNOSIS — E03.8 OTHER SPECIFIED HYPOTHYROIDISM: ICD-10-CM

## 2022-01-17 DIAGNOSIS — Z00.00 HEALTH CARE MAINTENANCE: ICD-10-CM

## 2022-01-18 DIAGNOSIS — K59.00 CONSTIPATION: Primary | ICD-10-CM

## 2022-01-19 RX ORDER — LEVOTHYROXINE SODIUM 100 UG/1
TABLET ORAL
Qty: 28 TABLET | Refills: 11 | Status: SHIPPED | OUTPATIENT
Start: 2022-01-19 | End: 2022-04-14 | Stop reason: DRUGHIGH

## 2022-01-19 RX ORDER — DILTIAZEM HYDROCHLORIDE 180 MG/1
CAPSULE, EXTENDED RELEASE ORAL
Qty: 28 CAPSULE | Refills: 11 | Status: SHIPPED | OUTPATIENT
Start: 2022-01-19 | End: 2022-03-10

## 2022-01-19 RX ORDER — ACETAMINOPHEN 650 MG/1
TABLET, FILM COATED, EXTENDED RELEASE ORAL
Qty: 84 TABLET | Refills: 11 | Status: SHIPPED | OUTPATIENT
Start: 2022-01-19 | End: 2022-04-14

## 2022-01-19 RX ORDER — DULOXETIN HYDROCHLORIDE 20 MG/1
CAPSULE, DELAYED RELEASE ORAL
Qty: 56 CAPSULE | Refills: 11 | Status: SHIPPED | OUTPATIENT
Start: 2022-01-19 | End: 2022-03-10

## 2022-01-19 RX ORDER — MULTIVITAMIN WITH FOLIC ACID 400 MCG
TABLET ORAL
Qty: 28 TABLET | Refills: 11 | Status: SHIPPED | OUTPATIENT
Start: 2022-01-19 | End: 2022-11-30

## 2022-01-19 RX ORDER — ASCORBIC ACID 500 MG
TABLET ORAL
Qty: 28 CAPSULE | Refills: 11 | Status: SHIPPED | OUTPATIENT
Start: 2022-01-19 | End: 2022-11-30

## 2022-01-19 NOTE — TELEPHONE ENCOUNTER
[facility] [place] sent Rx request for the following:   MILK OF MAGNESIA 400 MG/5ML suspension  SigTAKE 15ML BY MOUTH EVERY 6 HOURS AS NEEDED (CONSTIPATION/ HEARTBURN)    Last Prescription Date:   Historical  Last Fill Qty/Refills:         0, R-0    Last Office Visit:              01/13/2022 (NH visit)   Future Office visit:           None noted    Routing refill request to provider for review/approval because:  Drug not on the [facility] or [facility] refill protocol or controlled substance    Unable to complete prescription refill per RN Medication Refill Policy.................... Tamia Brady RN ....................  1/19/2022   4:35 PM

## 2022-01-19 NOTE — TELEPHONE ENCOUNTER
Routing refill request to provider for review/approval because:  Medication is reported/historical    LOV: 1/13/2022 NH Sierra Ray.    Mady Richmond RN on 1/19/2022 at 10:27 AM

## 2022-01-24 ENCOUNTER — TELEPHONE (OUTPATIENT)
Dept: INTERNAL MEDICINE | Facility: OTHER | Age: 87
End: 2022-01-24
Payer: COMMERCIAL

## 2022-01-24 DIAGNOSIS — M97.01XD PERIPROSTHETIC FRACTURE AROUND INTERNAL PROSTHETIC RIGHT HIP JOINT, SUBSEQUENT ENCOUNTER: Primary | ICD-10-CM

## 2022-01-24 NOTE — TELEPHONE ENCOUNTER
Yulissa Rocha NP reviewed and completed the following home care or hospice form(s) for Geno Horn on 01/10/2022. This covers the certification period effective 01/10/2022 to 03/10/2022.

## 2022-01-27 ENCOUNTER — NURSING HOME VISIT (OUTPATIENT)
Dept: GERIATRICS | Facility: OTHER | Age: 87
End: 2022-01-27
Attending: NURSE PRACTITIONER
Payer: COMMERCIAL

## 2022-01-27 DIAGNOSIS — S81.812A NONINFECTED SKIN TEAR OF LEFT LOWER EXTREMITY, INITIAL ENCOUNTER: ICD-10-CM

## 2022-01-27 DIAGNOSIS — H90.6 MIXED CONDUCTIVE AND SENSORINEURAL HEARING LOSS OF BOTH EARS: ICD-10-CM

## 2022-01-27 DIAGNOSIS — L98.9 FACIAL LESION: Primary | ICD-10-CM

## 2022-01-27 DIAGNOSIS — L29.9 ITCHING: ICD-10-CM

## 2022-01-27 DIAGNOSIS — M25.511 RIGHT SHOULDER PAIN, UNSPECIFIED CHRONICITY: ICD-10-CM

## 2022-01-27 DIAGNOSIS — H61.20 IMPACTED CERUMEN, UNSPECIFIED LATERALITY: ICD-10-CM

## 2022-01-27 PROCEDURE — 69210 REMOVE IMPACTED EAR WAX UNI: CPT | Performed by: NURSE PRACTITIONER

## 2022-01-27 RX ORDER — MUPIROCIN 20 MG/G
OINTMENT TOPICAL
Qty: 30 G | Refills: 1 | Status: SHIPPED | OUTPATIENT
Start: 2022-01-27

## 2022-01-27 RX ORDER — CETIRIZINE HYDROCHLORIDE 5 MG/1
TABLET ORAL
Qty: 60 TABLET | Refills: 0 | Status: SHIPPED | OUTPATIENT
Start: 2022-01-27 | End: 2022-02-16

## 2022-01-27 NOTE — PROGRESS NOTES
Geno Horn is a 89 year old female being seen today for follow up visit visit at [facility].    Code Status: Advance Directives: YES  Health Care Power of : Extended Emergency Contact Information  Primary Emergency Contact: Daniel Horn  Address: [address]           [place] [address] [facility]  Mobile Phone: [phone]  Relation: Son  Secondary Emergency Contact: Adri Chaudhary  Address: [address] [place] [address] [facility]  Home Phone: [phone]  Mobile Phone: [phone]  Relation: Daughter     Allergies: Alendronic acid, Celebrex [celecoxib], Oxybutynin, Tolterodine, Tramadol, and Trospium     Chief Complaint / HPI: Resident requests followup on bilateral cerumen impactions, wears hearing aids, batteries recently changed.  Staff would like a look at two skin lesions, one just adjacent to left ear that has been itchy, scratching frequently and has had some bleeding.  Uncertain when started, daughter feels this may be from wearing a mask.  Has new skin tear wound left posterior calf, skin is fragile and friable. History of multiple skin tear wounds.    Past Medical, Surgical, Family and Social History reviewed: YES.     Medications: Reviewed  Medications - recent changes: none    Review of Systems:  General: positive for weakness  Skin: positive for pigmentation, itching, lumps or bumps  ENT: positive for hearing loss and cerumen impactions  Musculoskeletal: positive for back pain and muscular weakness  All other systems negative  Toileting:    Continent of Bowel: Yes   Continent of Bladder: Yes  Mobility: wheelchair    Recent Labs: reviewed      Current Therapies: physical therapy, occupational therapy and skilled nursing Home Care services    Exam:        Vital signs reviewed.   GENERAL APPEARANCE: alert and no distress  EYES: Eyes grossly normal to inspection, conjunctivae and sclerae normal  HENT:Bilateral ear canals impacted with brown dry cerumen plugs,  Four 9 mm pieces of cerumen removed easily with curette right external canal, hearing aid microphone button found in first removed cerumen plug--Left external canal dry brown cerumen plugs--easily removed three 10 mm cerumen pieces with curette until external canal free of debris  With TM's intact, tolerated well no complications  NECK: no adenopathy, no asymmetry  RESP: lungs clear   CV: regular rate, no peripheral edema   MS: no musculoskeletal defects are noted and wheelchair dependent  SKIN: 3 cm plaque looking pink lesion with irregular border, some scale with 1 cm crusty brown scab preauricular edge, itchy.  4 cm skin tear wound left posterior calf, beefy wound bed with serous film, no periwound erythema or focal edema, no evidence of active purulence.  PSYCH: mentation appears normal and affect normal/bright    Assessment and Plan:    Left facial preauricular located plaque like pick lesion with some scale--suspicious looking with crusty honey colored scab preauricular edge about 1.5 cm  Probable secondary impetiginized from repetitive scratching. History BCC removal on back 2018 Dr Fajardo on problem list, resident and daughter did not remember.  Discussed with daughter Maria Antonia, will trial up to 2 weeks of Bactroban ointment BID and PRN for secondary infection from picking and scratching.  Will [place] back in 2 weeks on progress and review situation and picture with surgeon on recommendations if fails topical therapy.  Recommend wound wash to right posterior calf skin tear wound, dry and apply thin layer Bactroban oint to wound bed and cover with foam dressing, change every 3 days and PRN.    Home care skilled nursing wound care will assess and provide education and staff oversight until healed.    Will increase daily zyrtec from 5 mg daily to 5 mg BID for a few weeks for staff reported general pruritis symptom management    Daughter requesting PT/OT assessment and therapy for right shoulder pain, no injury or  falls associated.  Home care therapist will evaluate and treat as indicated for conservative approach.    Facility RN reports resident has had generalized skin pruritis, observed scratching arms, shoulders, legs  No corresponding lesions. On daily zyrtec 5 mg for allergic rhinitis. Will trial increasing to 5 mg BID for a couple weeks  And followup on response. Facility RN will review with Maria Antonia, daughter.    Will followup in 2 weeks, sooner PRN    (L98.9) Facial lesion  (primary encounter diagnosis)    Plan: mupirocin (BACTROBAN) 2 % external ointment            (M25.511) Right shoulder pain, unspecified chronicity      (J30.9) Allergic rhinitis, unspecified seasonality, unspecified trigger    Plan: cetirizine (ZYRTEC) 5 MG tablet            (L29.9) Itching    Plan: cetirizine (ZYRTEC) 5 MG tablet            (S81.812A) Noninfected skin tear of left lower extremity, initial encounter    Plan: mupirocin (BACTROBAN) 2 % external ointment

## 2022-02-03 ENCOUNTER — TELEPHONE (OUTPATIENT)
Dept: INTERNAL MEDICINE | Facility: OTHER | Age: 87
End: 2022-02-03
Payer: COMMERCIAL

## 2022-02-03 NOTE — TELEPHONE ENCOUNTER
Request for Home Care Physical Therapy orders as follows:      Continuation frequency =  2 x week x _3___ weeks                Effective date = 2/7/22    Interventions include:    Therapeutic Exercise  Therapeutic Activity  Gait Training  Transfer Training  Gait/Balance/Dysfunction  Home Exercise Program  Home Safety Assessment      Therapist: Erica Cardenas PT  Please respond with .HOMECAREAGREE, if you are in agreement. Please sign with your comments and signature, if you are not in agreement.

## 2022-02-04 ENCOUNTER — TELEPHONE (OUTPATIENT)
Dept: INTERNAL MEDICINE | Facility: OTHER | Age: 87
End: 2022-02-04
Payer: COMMERCIAL

## 2022-02-04 NOTE — TELEPHONE ENCOUNTER
Request for Home Care Occupational Therapy orders as follows:      Continuation frequency =  2 x week x __4__ weeks                 Effective date = 2/7/22    Interventions include:    Therapeutic Exercise  ADL training  Adaptive equipment  Home safety assessment  Caregiver training                                     For therapist: Kamilla Sadler OTR/L  Please respond with .HOMECAREAGREE, if you are in agreement. Please sign with your comments and signature, if you are not in agreement.

## 2022-02-10 ENCOUNTER — NURSING HOME VISIT (OUTPATIENT)
Dept: GERIATRICS | Facility: OTHER | Age: 87
End: 2022-02-10
Attending: NURSE PRACTITIONER
Payer: COMMERCIAL

## 2022-02-10 DIAGNOSIS — Z85.828 HISTORY OF BASAL CELL CARCINOMA (BCC) OF SKIN: ICD-10-CM

## 2022-02-10 DIAGNOSIS — L98.9 CHANGING SKIN LESION: Primary | ICD-10-CM

## 2022-02-10 DIAGNOSIS — Z87.898 H/O DIZZINESS: ICD-10-CM

## 2022-02-10 DIAGNOSIS — L98.9 FACIAL SKIN LESION: ICD-10-CM

## 2022-02-10 NOTE — PROGRESS NOTES
Geno Horn is a 89 year old female being seen today for episodic and follow up visit visit at [facility].    Code Status: Advance Directives: YES-.   Health Care Power of : Extended Emergency Contact Information  Primary Emergency Contact: Daniel Horn  Address: [address]           [place] [address] [facility]  Mobile Phone: [phone]  Relation: Son  Secondary Emergency Contact: Adri Chaudhary  Address: [address] [place] [address] [facility]  Home Phone: [phone]  Mobile Phone: [phone]  Relation: Daughter     Allergies: Alendronic acid, Celebrex [celecoxib], Oxybutynin, Tolterodine, Tramadol, and Trospium     Chief Complaint / HPI: Follow-up on pruritic lesion left side of face preauricular--staff report has been there for possibly a couple of months--resident has been picking area had raised scale and erythema bordering ear.  Trialed a couple of weeks of Bactroban some improvement, denies pruritus, has not been picking  Chart review history of basal cell lesion base of neck--area in question located surrounding suspicious scaly irregular shaped lesion.  The other issue is resident intermittently reports dizzy spells--she is usually sitting as she is wheelchair dependent mostly.  I did review past work-up that was done 2019 in 2020 at [facility] vestibular balance center--report assessment negative for any benign positional vertigo  Had recommended some lower extremity exercises thought possibly due to circulation and orthostasis.  I have known the resident for a couple of years and she has reported dizziness intermittently.  She is very sensitive to medications.  I did speak with her daughter and she would like to avoid trying a low-dose meclizine--there is a foot pumping exercise device in her room  Lives would like staff to offer regularly and especially when onsets of dizzy spells.        Past Medical, Surgical, Family and Social History reviewed: YES.      Medications: reviewed  Medications - recent changes: mupirocin          Review of Systems:  General: positive for weakness  Skin: positive for pigmentation, lesion  Musculoskeletal: positive for muscular weakness  Neurologic: positive for local weakness and dizziness    All other systems negative  Toileting:    Continent of Bowel: Yes   Continent of Bladder: Yes  Mobility: walker and wheelchair    Recent Labs: most recent reviewed      Current Therapies: physical therapy, occupational therapy and skilled nursing    Exam:  Vital signs reviewed.   GENERAL APPEARANCE:  alert and no distress  EYES: Eyes grossly normal to inspection, conjunctivae and sclerae normal  HENT: Scaly irregular shaped pigmented lesion pre auricular,  oral mucous membranes moist  NECK: no adenopathy, no asymmetry, masses, or scars and thyroid normal to palpation  RESP: lungs clear to auscultation - no rales, rhonchi or wheezes  SKIN: Suspicious pigmented irregular lesion about 2-3 cm, no surrounding erythema, focal edema or tenderness  NEURO: mentation intact and speech normal  PSYCH: mentation appears normal and affect normal/bright    Assessment and Plan:      Suspicious looking lesion with suspicious features and positive history of BCC.     Did review skin lesion pictures with Dr. Denson who agrees suspicious looking.  Spoke with daughter and she is very agreeable to scheduling appointment with Dr. Denson to excise and biopsy lesion.    Has had assessment in the past for dizziness--may be related to micro vascular ischemic changes ??  Assessment negative for BPV. Would like staff to encourage conservative measures with lower body exercise and using cycling exercise device--basically pedal only device.  We discussed trying low dose meclizine 12.5 mg PRN dizziness--daughter will consider and let me know if willing to try for symptom management.    Followup PRN      (L98.9) Changing skin lesion  (primary encounter diagnosis)    Plan: Adult  General Surg Referral            (Z85.828) History of basal cell carcinoma (BCC) of skin  Plan: Adult General Surg Referral            (L98.9) Facial skin lesion  Plan: Adult General Surg Referral            (Z87.898) H/O dizziness

## 2022-02-15 ENCOUNTER — OFFICE VISIT (OUTPATIENT)
Dept: SURGERY | Facility: OTHER | Age: 87
End: 2022-02-15
Attending: SURGERY
Payer: COMMERCIAL

## 2022-02-15 ENCOUNTER — TELEPHONE (OUTPATIENT)
Dept: INTERNAL MEDICINE | Facility: OTHER | Age: 87
End: 2022-02-15
Payer: COMMERCIAL

## 2022-02-15 VITALS
SYSTOLIC BLOOD PRESSURE: 120 MMHG | OXYGEN SATURATION: 94 % | HEART RATE: 78 BPM | DIASTOLIC BLOOD PRESSURE: 80 MMHG | RESPIRATION RATE: 16 BRPM | TEMPERATURE: 97.9 F

## 2022-02-15 DIAGNOSIS — Z85.828 HISTORY OF BASAL CELL CARCINOMA (BCC) OF SKIN: ICD-10-CM

## 2022-02-15 DIAGNOSIS — L98.9 CHANGING SKIN LESION: ICD-10-CM

## 2022-02-15 DIAGNOSIS — L29.9 ITCHING: ICD-10-CM

## 2022-02-15 DIAGNOSIS — L98.9 FACIAL SKIN LESION: Primary | ICD-10-CM

## 2022-02-15 PROCEDURE — 88305 TISSUE EXAM BY PATHOLOGIST: CPT

## 2022-02-15 PROCEDURE — G0463 HOSPITAL OUTPT CLINIC VISIT: HCPCS | Mod: 25

## 2022-02-15 PROCEDURE — 11642 EXC F/E/E/N/L MAL+MRG 1.1-2: CPT | Performed by: SURGERY

## 2022-02-15 PROCEDURE — 11643 EXC F/E/E/N/L MAL+MRG 2.1-3: CPT | Performed by: SURGERY

## 2022-02-15 ASSESSMENT — PAIN SCALES - GENERAL: PAINLEVEL: NO PAIN (0)

## 2022-02-15 NOTE — TELEPHONE ENCOUNTER
S = Situation: chief complaint PATIENT FALL     Your patient sustained a fall. Injury = No injury      B  = Background: history & factors leading up to event     Frequency of falls:1x since admitting to homecare    Suspected cause of falls:Poor balance, strength and cognition.    A  =  Assessment: what is happening now     Home Care services being provided: PT and OT    R =   Request or Recommendation: action needed     Continue homecare services.          Please respond with .HOMECAREAGREE, if you are in agreement. Please sign with your comments and signature, if you are not in agreement.

## 2022-02-15 NOTE — PROGRESS NOTES
SUBJECTIVE:  89 year old female presents for lesion removal. This lesion has been present for a year or so-it is growing and irritated. She has had a previous basal cell skin cancer removed.    OBJECTIVE:  /80 (BP Location: Left arm, Patient Position: Sitting, Cuff Size: Adult Regular)   Pulse 78   Temp 97.9  F (36.6  C) (Tympanic)   Resp 16   SpO2 94%   General: no acute distress  Skin: left cheek with 2.2 x 1.7 x 0.3 cm nodular ulcerated lesion    ASSESSMENT:  Lesion size: as above  Defect size: lesion and margin : 2.5 x 2.0 x 0.6 cm -simple closure    PROCEDURE:  The pathophysiology of skin lesions and skin cancers was discussed with the patient. The risks, benefits and alternatives to excision of the lesion were discussed with the patient, including the risks of infection,scarring, bruising, bleeding and the possible need for further procedures. The patient expressed understanding and wishes to proceed.    Betadine was used to cleanse the skinin the area of the lesion. 1% Lidocaine with epinephrine was infiltrated in the skin and subcutaneous tissue in the area of the lesion. When appropriate anesthesia had been achieved, the lesion was sharply excised with a margin of grossly normal tissue. The wound was closed using 4-0 Monocryl. Sterile dressing was applied. The specimen was labelled and sent to pathology for evaluation. The procedure was well tolerated without complications. Patient was given post procedure instructions and denied further questions. We will call the patient with pathology results.

## 2022-02-15 NOTE — NURSING NOTE
Prior to the start of the procedure and with procedural staff participation, I verbally confirmed the patient s identity using two indicators, relevant allergies, that the procedure was appropriate and matched the consent or emergent situation, and that the correct equipment/implants were available. Immediately prior to starting the procedure I conducted the Time Out with the procedural staff and re-confirmed the patient s name, procedure, and site/side. (The Joint Commission universal protocol was followed.)  Yes    Sedation (Moderate or Deep): None  Alexandra Parekh LPN .......2/15/2022 3:52 PM

## 2022-02-15 NOTE — NURSING NOTE
"Chief Complaint   Patient presents with     Procedure     left facial lesion       Initial /80 (BP Location: Left arm, Patient Position: Sitting, Cuff Size: Adult Regular)   Pulse 78   Temp 97.9  F (36.6  C) (Tympanic)   Resp 16   SpO2 94%  Estimated body mass index is 21.14 kg/m  as calculated from the following:    Height as of 9/30/21: 1.676 m (5' 6\").    Weight as of 12/16/21: 59.4 kg (131 lb).  Medication Reconciliation: complete    Alexandra Parekh LPN    "

## 2022-02-16 RX ORDER — CETIRIZINE HYDROCHLORIDE 5 MG/1
TABLET ORAL
Qty: 56 TABLET | Refills: 11 | Status: SHIPPED | OUTPATIENT
Start: 2022-02-16 | End: 2022-08-11

## 2022-02-16 NOTE — TELEPHONE ENCOUNTER
Routing refill request to provider for review/approval because:  Antihistamines Protocol Failed 02/15/2022 06:18 PM   Protocol Details  Patient is 3-64 years of age     LOV: 2/10/2022 [facility] Sierra Richmond RN on 2/16/2022 at 9:38 AM

## 2022-02-17 LAB
PATH REPORT.COMMENTS IMP SPEC: NORMAL
PATH REPORT.FINAL DX SPEC: NORMAL
PHOTO IMAGE: NORMAL

## 2022-02-18 ENCOUNTER — NURSING HOME VISIT (OUTPATIENT)
Dept: GERIATRICS | Facility: OTHER | Age: 87
End: 2022-02-18
Attending: NURSE PRACTITIONER
Payer: COMMERCIAL

## 2022-02-18 DIAGNOSIS — R42 DIZZINESS: ICD-10-CM

## 2022-02-18 DIAGNOSIS — R42 DIZZINESSES: Primary | ICD-10-CM

## 2022-02-18 DIAGNOSIS — R11.0 NAUSEA: ICD-10-CM

## 2022-02-18 DIAGNOSIS — M15.0 PRIMARY OSTEOARTHRITIS INVOLVING MULTIPLE JOINTS: Primary | ICD-10-CM

## 2022-02-18 DIAGNOSIS — Z79.899 ENCOUNTER FOR MEDICATION REVIEW: ICD-10-CM

## 2022-02-18 RX ORDER — SCOLOPAMINE TRANSDERMAL SYSTEM 1 MG/1
1 PATCH, EXTENDED RELEASE TRANSDERMAL
Qty: 4 PATCH | Refills: 0 | Status: SHIPPED | OUTPATIENT
Start: 2022-02-18 | End: 2022-02-23

## 2022-02-18 RX ORDER — SENNOSIDES 8.6 MG
650 CAPSULE ORAL 2 TIMES DAILY
Qty: 60 TABLET | Refills: 4 | Status: SHIPPED | OUTPATIENT
Start: 2022-02-18 | End: 2023-09-28

## 2022-02-18 RX ORDER — MECLIZINE HCL 12.5 MG 12.5 MG/1
12.5 TABLET ORAL 3 TIMES DAILY PRN
Qty: 12 TABLET | Refills: 1 | Status: SHIPPED | OUTPATIENT
Start: 2022-02-18 | End: 2022-03-18

## 2022-02-18 RX ORDER — ONDANSETRON 4 MG/1
4 TABLET, FILM COATED ORAL EVERY 8 HOURS PRN
Qty: 12 TABLET | Refills: 1 | Status: SHIPPED | OUTPATIENT
Start: 2022-02-18 | End: 2023-06-07

## 2022-02-18 RX ORDER — SCOLOPAMINE TRANSDERMAL SYSTEM 1 MG/1
1 PATCH, EXTENDED RELEASE TRANSDERMAL
Qty: 4 PATCH | Refills: 0 | Status: SHIPPED | OUTPATIENT
Start: 2022-02-18 | End: 2022-02-18

## 2022-02-18 NOTE — PROGRESS NOTES
Geno Horn is a 89 year old female being seen today for episodic and follow up visit  at [facility].    Code Status: Advance Directives: YES-.   Health Care Power of : Extended Emergency Contact Information  Primary Emergency Contact: Daniel Horn  Address: [address]           [place] [address] [facility]  Mobile Phone: [phone]  Relation: Son  Secondary Emergency Contact: Adri Chaudhary  Address: [address] [place] [address] [facility]  Home Phone: [phone]  Mobile Phone: [phone]  Relation: Daughter     Allergies: Alendronic acid, Celebrex [celecoxib], Oxybutynin, Tolterodine, Tramadol, and Trospium     Chief Complaint / HPI: Staff requesting medication review and PRNs that could be available for dizziness episodes.  Resident has a history of dizziness which has been chronic intermittent at least for 2 years--had an extensive vestibular balance assessment at [place] [address] that was negative for BPV.  Recommendations were to trial lower extremity exercise, peddling as resident is wheelchair dependent  Impression was a vasomotor component to dizziness.  Staff report episodes come on random no observed triggers--no position change triggers.  Staff report had an episode yesterday where the dizziness was so intense it was causing nausea.  I had talked with Maria Antonia, her daughter a week ago about possibly trying a low-dose meclizine 12.5 mg PRN for symptom management to see if this was helpful.  At that time wanted to consider. Lor RN spoke with Maria Antonia and she is open to trying low dose Meclizine--Resident is independent    Past Medical, Surgical, Family and Social History reviewed: YES.     Medications: reviewed  Medications - recent changes: none    Review of Systems:  General: positive for dizziness, weakness  Musculoskeletal: positive for back pain, arthritis, joint pain and muscular weakness  Neurologic: positive for memory problems  All other systems  negative    Toileting:    Continent of Bowel: Yes   Continent of Bladder: Yes  Mobility: wheelchair    Recent Labs: most recent reviewed      Current Therapies: physical therapy, occupational therapy and SN Home care    Exam:  Vital signs reviewed.   GENERAL APPEARANCE:  alert and no distress  EYES: Eyes grossly normal to inspection,conjunctivae and sclerae normal  NECK: no adenopathy, no asymmetry  CV: Mild Bilateral peripheral edema   MS: no musculoskeletal defects are noted--wheelchair dependent  NEURO:  mentation intact and speech normal  PSYCH: Alert, pleasant cooperative    Assessment and Plan:      Lor HART at [facility] reports having a phone conversation with Maria Antonia who is agreeable with trying PRN medication for comfort--patient has sensitivity with medications and patient has requested reducing scheduled Tylenol from 3 times a day to twice daily.    Will have meclizine 12.5 mg--lowest possible dose available as needed for dizziness episodes.  Will also have Zofran available for any nausea associated.  Consider scopolamine patch--will order PRN every 72 hours--if daughter Maria Antonia is agreeable--Lor will speak with Maria Antonia about trial    Would like staff to encourage using foot pedal device--- and lymphedema compression stocking evaluation per [place] physical therapist home care  And recommendations for graduated compression knee-high stockings to manage edema and vascular triggers to dizziness episodes.      (R42) Dizzinesses  (primary encounter diagnosis)      (Z79.899) Encounter for medication review      (R42) Dizziness    Plan: scopolamine (TRANSDERM) 1 MG/3DAYS 72 hr patch

## 2022-02-21 ENCOUNTER — MYC MEDICAL ADVICE (OUTPATIENT)
Dept: GERIATRICS | Facility: OTHER | Age: 87
End: 2022-02-21
Payer: COMMERCIAL

## 2022-02-22 ENCOUNTER — TELEPHONE (OUTPATIENT)
Dept: FAMILY MEDICINE | Facility: OTHER | Age: 87
End: 2022-02-22
Payer: COMMERCIAL

## 2022-02-22 ENCOUNTER — MYC MEDICAL ADVICE (OUTPATIENT)
Dept: GERIATRICS | Facility: OTHER | Age: 87
End: 2022-02-22
Payer: COMMERCIAL

## 2022-02-22 DIAGNOSIS — R42 DIZZINESSES: Primary | ICD-10-CM

## 2022-02-22 NOTE — TELEPHONE ENCOUNTER
Sherly from  care called needing a prior auth for Scopolamine 72 hour patch.  She needs some further information regarding this.

## 2022-02-23 ENCOUNTER — TELEPHONE (OUTPATIENT)
Dept: INTERNAL MEDICINE | Facility: OTHER | Age: 87
End: 2022-02-23
Payer: COMMERCIAL

## 2022-02-23 NOTE — TELEPHONE ENCOUNTER
[facility] refill RN does not handle prior authorizations. Routing back to  nurse pool. Unable to complete prescription refill per RN Medication Refill Policy.................... Tamia Brady RN ....................  2/23/2022   8:40 AM

## 2022-02-23 NOTE — TELEPHONE ENCOUNTER
Sherly called back from [facility] and states   scolpamine was prescribed and is not appropriate for an elderly patient, and the request is to have this medication withdrawn as its not needed for an elderly patient.  .....eRta Nielsen LPN on 2/23/2022 at 9:47 AM

## 2022-02-23 NOTE — TELEPHONE ENCOUNTER
Request for Home Care Physical Therapy orders as follows:      Continuation frequency =  2 x week x __2__ weeks                Effective date = 2/28/22    Interventions include:    Therapeutic Exercise  Therapeutic Activity  Gait Training  Transfer Training  Gait/Balance/Dysfunction  Home Exercise Program  Home Safety Assessment                            Lymphedema                 Therapist: Erica Cardenas PT  Please respond with .HOMECAREAGREE, if you are in agreement. Please sign with your comments and signature, if you are not in agreement.

## 2022-02-24 NOTE — TELEPHONE ENCOUNTER
Called tato from [facility] that its okay to remove scolpamine patch.  ...Reta Nielsen, LPN on 2/24/2022 at 9:34 AM

## 2022-03-07 ENCOUNTER — TRANSFERRED RECORDS (OUTPATIENT)
Dept: HEALTH INFORMATION MANAGEMENT | Facility: CLINIC | Age: 87
End: 2022-03-07
Payer: COMMERCIAL

## 2022-03-08 NOTE — PROGRESS NOTES
Chief Complaint   Patient presents with     Consult     Referred by Fransisca Rocha for dizziness.     History of Present Illness - Geno Horn is a very pleasant 89 year old female for concerns of dizziness. Patient has noticed concerns for intermittent chronic dizziness for the last 2 years. She completed an extensive vestibular balance assessment at . Negative [facility] and recommended lower extremity exercises. They did complete a trial of Meclizine which had possible some relief.   She is with her daughter, Maria Antonia, for evaluation.   Symptoms have been ongoing for several years, but has been ongoing.  She has been trying to evaluate for variety of sources in aid of relief. As noted above, she had completed full vestibular evaluations and recently completed testing at her facility for BPV which was negative.     Geno states she has general feelings of lightheadedness, eyes moving throughout the day. She does have it at rest and intensities with movement. Upon questioning, she feels like it is always there and her eyes are 'twirling' and could just close her eyes and sleep.     Denies associated n/v  Denies associated fluctuating hearing loss or tinnitus  She does have a hx of migraines but no recent episodes of migraines or headaches.   No photosensitivity.   She has seen ophthalmology with stable exam.     Denies COM or otologic surgeries.   Denies otalgia, otorrhea  Denies worrisome tinnitus  Denies fluctuating hearing loss or tinnitus.   Denies vertigo or facial paraesthesia.     She had CT of Head 10/2021.   MRI of Brain on 10/18/18- Moderate general volume loss, severe chronic microvascular.       Audiogram- 5/19/20  Type A tympanograms  Thresholds are moderate to severe SNHL  SRT=PTA  WRS-  Right- 100%@95dB  Left- 80% @95 dB    Audiogram-   Type A tympanograms  Thresholds- Right moderate to profound SNHL, change at 4000 to 8000 Hz from 2020. Left moderate to profound SNHL Change at 8960-9626 Hz from  2020.   SRT=PTA  WRS-  Right- 84%@90dB  Left- 72% @90 dB      Past Medical History -   Patient Active Problem List   Diagnosis     Allergic rhinitis     Coronary atherosclerosis of native coronary artery     H/O basal cell carcinoma excision     Hearing loss     Essential hypertension     Hypothyroidism     Osteoarthritis of both hips     Urinary urgency     Unsteady gait     Sepsis (H)     Spinal stenosis of lumbar region, unspecified whether neurogenic claudication present     Pneumonia due to 2019 novel coronavirus     Hyponatremia     COVID-19 virus infection     Falls frequently     Contusion of right hip, initial encounter     General unsteadiness     Closed wedge compression fracture of T9 vertebra with routine healing, subsequent encounter     History of 2019 novel coronavirus disease (COVID-19)     Skin tear of left upper extremity     Multiple skin tears     H/O dizziness     Sciatica     Hyperlipidemia     Osteoarthritis of foot joint     Osteoporosis     Urinary incontinence     Periprosthetic fracture around internal prosthetic hip joint, initial encounter       Current Medications -   Current Outpatient Medications:      acetaminophen (ACETAMINOPHEN 8 HOUR) 650 MG CR tablet, Take 650 mg by mouth daily as needed for mild pain or fever, Disp: , Rfl:      acetaminophen (TYLENOL) 650 MG CR tablet, Take 1 tablet (650 mg) by mouth 2 times daily, Disp: 60 tablet, Rfl: 4     carbamide peroxide (DEBROX) 6.5 % otic solution, Place 5 drops into both ears At Bedtime, Disp: 15 mL, Rfl: 0     cetirizine (ZYRTEC) 5 MG tablet, TAKE 1 TABLET BY MOUTH TWO TIMES A DAY, Disp: 56 tablet, Rfl: 11     diltiazem ER (TIAZAC) 180 MG 24 hr ER beaded capsule, TAKE 1 CAPSULE BY MOUTH ONCE DAILY (HTN), Disp: 28 capsule, Rfl: 11     diltiazem ER COATED BEADS (CARDIZEM CD/CARTIA XT) 180 MG 24 hr capsule, TAKE 1 CAPSULE BY MOUTH ONCE DAILY, Disp: , Rfl:      DULoxetine (CYMBALTA) 20 MG capsule, TAKE 1 CAPSULE BY MOUTH TWO TIMES A DAY  (FRACTURE), Disp: 56 capsule, Rfl: 11     Incontinence Supply Disposable (DEPEND EASY FIT UNDERGARMENTS) MISC, Medium size disposable under pants covered by insurance, Disp: 100 each, Rfl: 11     Incontinence Supply Disposable (POISE PAD) PADS, Change pad 3 x daily, #6 long Maxi pads, Disp: 90 each, Rfl: 11     levothyroxine (SYNTHROID/LEVOTHROID) 100 MCG tablet, TAKE 1 TABLET BY MOUTH ONCE DAILY, Disp: 28 tablet, Rfl: 11     magnesium hydroxide (MILK OF MAGNESIA) 400 MG/5ML suspension, Take 15 ml daily PRN for no BM 3 days, Disp: 473 mL, Rfl: 1     MAPAP ARTHRITIS PAIN 650 MG CR tablet, TAKE 1 TABLET BY MOUTH THREE TIMES A DAY, Disp: 84 tablet, Rfl: 11     meclizine (ANTIVERT) 12.5 MG tablet, Take 1 tablet (12.5 mg) by mouth 3 times daily as needed for dizziness, Disp: 12 tablet, Rfl: 1     menthol (ICY HOT) 5 % PTCH, Apply 1 patch topically 4 times daily as needed for muscle soreness (max of 8 hours for each patch), Disp: , Rfl:      Multiple Vitamin (TAB-A-RADHA) TABS, TAKE 1 TABLET BY MOUTH ONCE DAILY (HEALTH MAINT), Disp: 28 tablet, Rfl: 11     mupirocin (BACTROBAN) 2 % external ointment, Apply to skin tear wound with dressing changes and to left facial lesion BID x 2 weeks, Disp: 30 g, Rfl: 1     Nutritional Supplements (BOOST HIGH PROTEIN) LIQD, 1 can daily, Disp:  , Rfl:      Omega-3 Fatty Acids (SM FISH OIL) 1000 MG CAPS, TAKE 1 CAPSULE BY MOUTH ONCE DAILY (SPINAL STENOSIS), Disp: 28 capsule, Rfl: 11     ondansetron (ZOFRAN) 4 MG tablet, Take 1 tablet (4 mg) by mouth every 8 hours as needed for nausea, Disp: 12 tablet, Rfl: 1     order for DME, Equipment being ordered: Spikes for cane, Disp: 1 Units, Rfl: 1     order for DME, Equipment being ordered: plastic underpants, Disp: 1 Units, Rfl: 1     sodium chloride (OCEAN) 0.65 % nasal spray, Spray 2 sprays into both nostrils daily as needed for congestion, Disp: , Rfl:     Allergies -   Allergies   Allergen Reactions     Alendronic Acid Nausea     Stomach upset      Celebrex [Celecoxib] Other (See Comments)     Light headed     Oxybutynin Other (See Comments)     Felt High     Tolterodine      Other reaction(s): Intolerance-Can't Take  Was unable to sleep at night     Tramadol Other (See Comments)     Adverse  effects     Trospium Other (See Comments)     drowsy       Social History -   Social History     Socioeconomic History     Marital status:      Spouse name: Not on file     Number of children: Not on file     Years of education: Not on file     Highest education level: Not on file   Occupational History     Not on file   Tobacco Use     Smoking status: Never Smoker     Smokeless tobacco: Never Used     Tobacco comment: no ecig   Vaping Use     Vaping Use: Never used   Substance and Sexual Activity     Alcohol use: Not Currently     Alcohol/week: 0.0 standard drinks     Drug use: No     Sexual activity: Not Currently     Partners: Male     Birth control/protection: Post-menopausal   Other Topics Concern     Parent/sibling w/ CABG, MI or angioplasty before 65F 55M? Not Asked   Social History Narrative    , lives in [place].  Previously from [place], [place].  Worked in drug store,  was a pharmacist.     Social Determinants of Health     Financial Resource Strain: Not on file   Food Insecurity: Not on file   Transportation Needs: Not on file   Physical Activity: Not on file   Stress: Not on file   Social Connections: Not on file   Intimate Partner Violence: Not on file   Housing Stability: Not on file       Family History -   Family History   Problem Relation Age of Onset     Heart Disease Mother 60         of mi     Heart Disease Father 60         of mi     Heart Disease Brother      Heart Disease Brother        Review of Systems - As per HPI and PMHx, otherwise 10+ system review of the head and neck, and general constitution is negative.    Physical Exam    /64 (BP Location: Right arm, Cuff Size: Adult Regular)   Pulse 73   Temp  "97.9  F (36.6  C) (Tympanic)   Ht 1.549 m (5' 1\")   Wt 61.2 kg (135 lb)   SpO2 96%   BMI 25.51 kg/m      General - The patient is well nourished and well developed, and appears to have good nutritional status.  Alert and oriented to person and place, answers questions and cooperates with examination appropriately.   Head and Face - Normocephalic and atraumatic, with no gross asymmetry noted of the contour of the facial features.  The facial nerve is intact, with strong symmetric movements.  Voice and Breathing - The patient was breathing comfortably without the use of accessory muscles. There was no wheezing, stridor, or stertor.  The patients voice was clear and strong, and had appropriate pitch and quality.  Ears - The tympanic membranes are normal in appearance, bony landmarks are intact.  No retraction, perforation, or masses.  No fluid or purulence was seen in the external canal or the middle ear. No evidence of infection of the middle ear or external canal, cerumen was normal in appearance.  Eyes - Extraocular movements intact, and the pupils were reactive to light.  Sclera were not icteric or injected, conjunctiva were pink and moist.  Mouth - Examination of the oral cavity showed pink, healthy oral mucosa. No lesions or ulcerations noted.  The tongue was mobile and midline, and the dentition were in good condition.    Throat - The walls of the oropharynx were smooth, pink, moist, symmetric, and had no lesions or ulcerations.  The tonsillar pillars and soft palate were symmetric.  The uvula was midline on elevation.    Neck - Normal midline excursion of the laryngotracheal complex during swallowing.  Full range of motion on passive movement.  Palpation of the occipital, submental, submandibular, internal jugular chain, and supraclavicular nodes did not demonstrate any abnormal lymph nodes or masses.  The carotid pulse was palpable bilaterally.  Palpation of the thyroid was soft and smooth, with no nodules " or goiter appreciated.  The trachea was mobile and midline.  Nose - External contour is symmetric, no gross deflection or scars.  Nasal mucosa is pink and moist with no abnormal mucus.  The septum was midline and non-obstructive, turbinates of normal size and position.  No polyps, masses, or purulence noted on examination.    ASSESSMENT/ PLAN:      ICD-10-CM    1. Disequilibrium  R42 MR Internal Auditory Canal wo&w Contrast     MR Brain w/o & w Contrast     Physical Therapy Referral   2. Dizzinesses  R42 Otolaryngology Referral     MR Internal Auditory Canal wo&w Contrast     MR Brain w/o & w Contrast     Physical Therapy Referral   3. Sensorineural hearing loss (SNHL) of both ears  H90.3 Physical Therapy Referral       Geno has chronic dizziness/ imbalance which has been persistent for quite sometime. They have tried several routine testing processes at this which has not had definitive answers or improvement.   She has no ismael vestibular source at this time. I do not appreciate Meniere's, BPPV (past testing has been -), vestibular migraines. However, she has continued complaints of dizziness which is likely multifactorial between medications, aging, deconditioning.     Upon discussion, we will complete MRI of IAC to rule out an acoustic neuroma. Continue with general core strengthening and physical therapy.   Maintain good water intake, discussed meals throughout the day.     Consider further testing, however, would complete the above prior to proceeding with additional testing.     They agree with this plan.         Adeline Hilton PA-C  ENT  [facility], [place]

## 2022-03-09 ENCOUNTER — OFFICE VISIT (OUTPATIENT)
Dept: OTOLARYNGOLOGY | Facility: OTHER | Age: 87
End: 2022-03-09
Attending: NURSE PRACTITIONER
Payer: COMMERCIAL

## 2022-03-09 VITALS
HEIGHT: 61 IN | WEIGHT: 135 LBS | DIASTOLIC BLOOD PRESSURE: 64 MMHG | OXYGEN SATURATION: 96 % | HEART RATE: 73 BPM | TEMPERATURE: 97.9 F | SYSTOLIC BLOOD PRESSURE: 120 MMHG | BODY MASS INDEX: 25.49 KG/M2

## 2022-03-09 DIAGNOSIS — H90.3 SENSORINEURAL HEARING LOSS (SNHL) OF BOTH EARS: ICD-10-CM

## 2022-03-09 DIAGNOSIS — R42 DISEQUILIBRIUM: Primary | ICD-10-CM

## 2022-03-09 DIAGNOSIS — R42 DIZZINESSES: ICD-10-CM

## 2022-03-09 PROCEDURE — G0463 HOSPITAL OUTPT CLINIC VISIT: HCPCS

## 2022-03-09 PROCEDURE — 99213 OFFICE O/P EST LOW 20 MIN: CPT | Performed by: PHYSICIAN ASSISTANT

## 2022-03-09 ASSESSMENT — PAIN SCALES - GENERAL: PAINLEVEL: NO PAIN (0)

## 2022-03-09 NOTE — PATIENT INSTRUCTIONS
Ears look well. No fluid or infection.   Complete MRI of inner ear/ Brain.   Continue w/ physical therapy/ strengthening.   Increase water intake/ eat small meals throughout day.     Consider further imaging if there is no improvement  Follow up in 4-6 weeks.     Thank you for allowing Adeline Hilton PA-C and our ENT team to participate in your care.  If your medications are too expensive, please give the nurse a call.  We can possibly change this medication.  If you have a scheduling or an appointment question please contact our Health Unit Coordinator at [phone], Ext. [phone].    ALL nursing questions or concerns can be directed to your ENT nurse at: [phone] [facility]

## 2022-03-09 NOTE — NURSING NOTE
"Chief Complaint   Patient presents with     Consult     Referred by Fransisca Rocha for dizziness.       Initial /64 (BP Location: Right arm, Cuff Size: Adult Regular)   Pulse 73   Temp 97.9  F (36.6  C) (Tympanic)   Ht 1.549 m (5' 1\")   Wt 61.2 kg (135 lb)   SpO2 96%   BMI 25.51 kg/m   Estimated body mass index is 25.51 kg/m  as calculated from the following:    Height as of this encounter: 1.549 m (5' 1\").    Weight as of this encounter: 61.2 kg (135 lb).  Medication Reconciliation: complete  Indigo Brumfield LPN    "

## 2022-03-10 ENCOUNTER — TELEPHONE (OUTPATIENT)
Dept: INTERNAL MEDICINE | Facility: OTHER | Age: 87
End: 2022-03-10

## 2022-03-10 ENCOUNTER — NURSING HOME VISIT (OUTPATIENT)
Dept: GERIATRICS | Facility: OTHER | Age: 87
End: 2022-03-10
Attending: NURSE PRACTITIONER
Payer: COMMERCIAL

## 2022-03-10 DIAGNOSIS — F34.1 DYSTHYMIA: ICD-10-CM

## 2022-03-10 DIAGNOSIS — R42 LIGHTHEADEDNESS: ICD-10-CM

## 2022-03-10 DIAGNOSIS — M48.061 SPINAL STENOSIS OF LUMBAR REGION, UNSPECIFIED WHETHER NEUROGENIC CLAUDICATION PRESENT: ICD-10-CM

## 2022-03-10 DIAGNOSIS — G62.9 NEUROPATHY: ICD-10-CM

## 2022-03-10 DIAGNOSIS — I10 ESSENTIAL HYPERTENSION: ICD-10-CM

## 2022-03-10 DIAGNOSIS — R26.81 UNSTEADY GAIT: ICD-10-CM

## 2022-03-10 DIAGNOSIS — R42 DIZZINESS: ICD-10-CM

## 2022-03-10 DIAGNOSIS — S22.070D CLOSED WEDGE COMPRESSION FRACTURE OF T9 VERTEBRA WITH ROUTINE HEALING, SUBSEQUENT ENCOUNTER: ICD-10-CM

## 2022-03-10 DIAGNOSIS — R26.89 BALANCE PROBLEMS: Primary | ICD-10-CM

## 2022-03-10 RX ORDER — DULOXETIN HYDROCHLORIDE 20 MG/1
CAPSULE, DELAYED RELEASE ORAL
Qty: 56 CAPSULE | Refills: 11 | COMMUNITY
Start: 2022-03-10 | End: 2022-03-11

## 2022-03-10 RX ORDER — DILTIAZEM HYDROCHLORIDE 180 MG/1
CAPSULE, EXTENDED RELEASE ORAL
Qty: 28 CAPSULE | Refills: 11 | COMMUNITY
Start: 2022-03-10 | End: 2022-03-24

## 2022-03-10 RX ORDER — DILTIAZEM HYDROCHLORIDE 180 MG/1
CAPSULE, EXTENDED RELEASE ORAL
Qty: 28 CAPSULE | Refills: 11 | COMMUNITY
Start: 2022-03-10 | End: 2022-03-10

## 2022-03-10 NOTE — TELEPHONE ENCOUNTER
Pt has been seen by PT in home since 1/10/22.  Pt has been plateauing with progress and my plan was to discharge from home PT with HEP for strengthening and walking program with staff assist.    PT attempted a josé miguel hallpike and Epley manuever and had difficulty with assessing for nystagmus due to difficulty with maintaining head position especially while turning, and keeping eyes open for test.  Pt denied any change in her dizziness.   Pt is now wearing compression socks for her edema and to possibly help with the dizziness as well as occasionally taking Meclizine.  Pt reports that sometimes she feels the Meclizine helps and other times she doesn't feel it helps and hasn't been taking it regularly.  Staff informed PT that an order to continue therapy was received.  Is there something specific that you want PT to work on in the home? If more specific vestibular testing is needed she should go in to outpatient where they are set-up for the testing and see a therapist that has more training in vestibular rehab.  Please advise me of what you are looking for from PT.    Thanks  Erica Cardenas, PT

## 2022-03-11 RX ORDER — DULOXETIN HYDROCHLORIDE 20 MG/1
CAPSULE, DELAYED RELEASE ORAL
Qty: 30 CAPSULE | Refills: 2 | Status: SHIPPED | OUTPATIENT
Start: 2022-03-11 | End: 2022-03-24

## 2022-03-11 NOTE — TELEPHONE ENCOUNTER
Routing refill request to provider for review/approval because:    LOV: yesterday 3/10    Mady Richmond RN on 3/11/2022 at 12:41 PM

## 2022-03-11 NOTE — TELEPHONE ENCOUNTER
Is there something specific that you want PT to work on in the home? If more specific vestibular testing is needed she should go in to outpatient where they are set-up for the testing and see a therapist that has more training in vestibular rehab.  Please advise me of what you are looking for from PT.  Thanks  Erica Cardenas, PT

## 2022-03-11 NOTE — TELEPHONE ENCOUNTER
Forrest Maldonado    I did not request continuation. I believe Yulissa is the overseeing provider on current homecare services.  I did want you to do lymphedema assessment and recommendations for graduated compression stockings and any other compression therapy you feel is indicated.    I will defer any vestibular therapy recommendations to ENT. I'm sure you read the ENT notes and followup up imaging scheduled.     Thankyou for your care of this patient.    Sierra Ray, APRN CNP   March 10, 2022

## 2022-03-11 NOTE — PROGRESS NOTES
"Geno Horn is a 89 year old female being seen today for episodic and follow up visit at [facility].    Code Status: Advance Directives: YES-.   Health Care Power of : Extended Emergency Contact Information  Primary Emergency Contact: Daniel Horn  Address: [address]           [place] [address] [facility]  Mobile Phone: [phone]  Relation: Son  Secondary Emergency Contact: Adri Chaudhary  Address: [address] [place] [id number] [facility]  Home Phone: [phone]  Mobile Phone: [phone]  Relation: Daughter     Allergies: Alendronic acid, Celebrex [celecoxib], Oxybutynin, Tolterodine, Tramadol, and Trospium     Chief Complaint / HPI: Follow-up on couple years of intermittent dizziness with occasional nausea and vertigo symptoms.  Saw ENT yesterday whose impressions were probably multifactorial secondary to age, medications and deconditioning as she is mostly wheelchair dependent. Ambulation is limited with standby assistance.  There was no ismael vestibular etiology on exam, resident has had negative vestibular and  balance testing in the past. ENT did not feel meniere's or vestibular migraine related.  Staff have questioned possibly related to hypertension overcorrection--reviewed blood pressures today-mostly in the low 100s  Resident feeling well today, denies any symptoms--participating in social activity.  Staff do not raise any other concerns--they feel the meclizine has been effective when used--resident feels it is effective \"sometimes\".  IMCARE will not cover scopolamine patch--I had reviewed with Pharm.D. who felt this would be an effective symptom management tool  And suggested starting at half a patch.    Past Medical, Surgical, Family and Social History reviewed: YES.     Medications: reviewed and reconciled  Medications - recent changes: PRN Meclizine 12.5 mg, Zofran PRN    Review of Systems:  General: positive for dizziness, weakness  Musculoskeletal: " positive for muscular weakness  Neurologic: positive for mild memory loss  All other systems negative    Toileting:    Continent of Bowel: Yes   Continent of Bladder: Yes  Mobility: walker and wheelchair    Recent Labs: none      Current Therapies: Homecare PT,OT    Exam:  Vital signs reviewed.   GENERAL APPEARANCE: healthy, alert and no distress  EYES: Eyes grossly normal to inspection, PERRL and conjunctivae and sclerae normal  RESP: lungs clear to auscultation - no rales, rhonchi or wheezes  MS: no musculoskeletal defects are noted and gait is age appropriate without ataxia  SKIN: no suspicious lesions or rashes  PSYCH: mentation appears normal and affect normal/bright    Assessment and Plan:    Chronic intermittent dizziness episodes at rest, unknown trigger. Medications may be a trigger, will try reductions and eliminating possible triggers.    I did speak with Maria Antonia, her daughter about trying to hold diltiazem for a week with regular blood pressure monitoring--- she was very agreeable  Would also like to reduce Cymbalta 50% from twice daily to once daily--- this was started for dual benefits with neuropathy, chronic compression fracture neurogenic pain.  I am uncertain the Cymbalta has been an effective pain management intervention.  Daughter Maria Antonia is agreeable with adjusting this down as well.    I feel holding the diltiazem for a week with staff monitoring blood pressures twice daily and as needed is a reasonable initial approach--perhaps this is no longer needed. Resident has a history of medication sensitivity--streamlining medications would be helpful if tolerated.  Will check labs next lab day 3/22/22      Will follow-up in a week on progress--sooner as needed.      Has MRI with IAC eval scheduled next week and ENT follow-up next month.              (I10) Essential hypertension  Plan: diltiazem ER (TIAZAC) 180 MG 24 hr ER beaded         capsule, DISCONTINUED: diltiazem ER (TIAZAC)         180 MG 24 hr ER  beaded capsule            (M48.061) Spinal stenosis of lumbar region, unspecified whether neurogenic claudication present    Plan: DULoxetine (CYMBALTA) 20 MG capsule            (F34.1) Dysthymia    Plan: DULoxetine (CYMBALTA) 20 MG capsule            (S22.070D) Closed wedge compression fracture of T9 vertebra with routine healing, subsequent encounter  Plan: DULoxetine (CYMBALTA) 20 MG capsule            (G62.9) Neuropathy  Plan: DULoxetine (CYMBALTA) 20 MG capsule

## 2022-03-16 NOTE — TELEPHONE ENCOUNTER
Complete Vestibular testing at [facility]. May be a worthwhile to ensure no other variable source. I would still have her continue with PT as planned. TR

## 2022-03-17 DIAGNOSIS — H90.3 SENSORINEURAL HEARING LOSS, BILATERAL: ICD-10-CM

## 2022-03-17 DIAGNOSIS — R42 DIZZINESS: ICD-10-CM

## 2022-03-17 DIAGNOSIS — R42 DISEQUILIBRIUM: Primary | ICD-10-CM

## 2022-03-18 ENCOUNTER — HOSPITAL ENCOUNTER (OUTPATIENT)
Dept: MRI IMAGING | Facility: OTHER | Age: 87
Discharge: HOME | End: 2022-03-18
Attending: PHYSICIAN ASSISTANT | Admitting: PHYSICIAN ASSISTANT
Payer: COMMERCIAL

## 2022-03-18 DIAGNOSIS — R42 DIZZINESS: ICD-10-CM

## 2022-03-18 DIAGNOSIS — R42 DISEQUILIBRIUM: ICD-10-CM

## 2022-03-18 DIAGNOSIS — R42 DIZZINESSES: ICD-10-CM

## 2022-03-18 PROCEDURE — 70543 MRI ORBT/FAC/NCK W/O &W/DYE: CPT

## 2022-03-18 PROCEDURE — 255N000002 HC RX 255 OP 636: Performed by: PHYSICIAN ASSISTANT

## 2022-03-18 PROCEDURE — A9575 INJ GADOTERATE MEGLUMI 0.1ML: HCPCS | Performed by: PHYSICIAN ASSISTANT

## 2022-03-18 RX ORDER — MECLIZINE HCL 12.5 MG 12.5 MG/1
12.5 TABLET ORAL 3 TIMES DAILY PRN
Qty: 90 TABLET | Refills: 1 | Status: SHIPPED | OUTPATIENT
Start: 2022-03-18 | End: 2022-08-11

## 2022-03-18 RX ADMIN — GADOTERATE MEGLUMINE 12 ML: 376.9 INJECTION INTRAVENOUS at 15:41

## 2022-03-24 ENCOUNTER — NURSING HOME VISIT (OUTPATIENT)
Dept: GERIATRICS | Facility: OTHER | Age: 87
End: 2022-03-24
Attending: NURSE PRACTITIONER
Payer: COMMERCIAL

## 2022-03-24 DIAGNOSIS — Z86.16 HISTORY OF 2019 NOVEL CORONAVIRUS DISEASE (COVID-19): ICD-10-CM

## 2022-03-24 DIAGNOSIS — E03.8 OTHER SPECIFIED HYPOTHYROIDISM: ICD-10-CM

## 2022-03-24 DIAGNOSIS — Z78.9 LIVING IN ASSISTED LIVING: ICD-10-CM

## 2022-03-24 DIAGNOSIS — M48.50XS PATHOLOGICAL COMPRESSION FRACTURE OF VERTEBRA, SEQUELA: ICD-10-CM

## 2022-03-24 DIAGNOSIS — R29.6 MULTIPLE FALLS: ICD-10-CM

## 2022-03-24 DIAGNOSIS — R42 DIZZINESS: Primary | ICD-10-CM

## 2022-03-24 DIAGNOSIS — I67.9 CEREBRAL VASCULAR DISEASE: ICD-10-CM

## 2022-03-24 NOTE — PROGRESS NOTES
Geno Horn is a 89 year old female being seen today for comprehensive and follow up visit visit at [facility].    Code Status: Advance Directives: YES-.   Health Care Power of : Extended Emergency Contact Information  Primary Emergency Contact: Daniel Horn  Address: [address]           [place] [address] [facility]  Mobile Phone: [phone]  Relation: Son  Secondary Emergency Contact: Adri Chaudhary  Address: [address] [address] [facility]  Home Phone: [phone]  Mobile Phone: [phone]  Relation: Daughter     Allergies: Alendronic acid, Celebrex [celecoxib], Oxybutynin, Tolterodine, Tramadol, and Trospium     Chief Complaint / HPI: Staff requesting follow-up on blood pressures and other medications--- had held diltiazem for the last couple of weeks  Reviewed blood pressures recorded at facility all within 110 systolic--highest 130s  Had also reduced Cymbalta by 50%--to determine need for Cymbalta and diltiazem--and if any side effects contributing to chronic intermittent dizziness.  Recent evaluation with ENT, exam appeared to be negative for Ménière's, vestibular migraine and other inner ear disorders.  Recently completed MRI with IAC evaluation which was negative for any findings related to symptoms, positive for vascular ischemic changes which is not a new finding and was evident on previous CT evaluations.  We have tried compression stockings, lower leg exercises and other compression strategies for any possible vascular autonomic cause for symptoms.  I did speak with her daughter Maria Antonia who verifies that ENT is recommending another vestibular assessment and has an appointment with physical therapy next week.  Daughter does not feel the Cymbalta reduction has made any difference and is doubtful that it has been beneficial for neuropathy and lower back pain secondary to compression fractures.      Past Medical, Surgical, Family and Social History  reviewed: YES.     Medications: reviewed  Medications - recent changes: Meclizine 12.5 mg PRN    Review of Systems:  General: positive for dizziness, weakness  Musculoskeletal: positive for back pain, arthritis and muscular weakness  Neurologic: positive for memory problems  All other systems negative    Toileting:    Continent of Bowel: Yes   Continent of Bladder: partially  Mobility: walker and wheelchair    Recent Labs: scheduled 4/12/22 facility lab day    EXAM:  MR INTERNAL AUDITORY CANAL(IAC) WO & W CONTRAST     HISTORY:  Dizziness; Disequilibrium. .     TECHNIQUE:  Sagittal T1, axial T1, T2, FLAIR, diffusion as well as  axial postcontrast imaging of the whole brain was performed. Dedicated  IAC imaging consisting of thin axial and coronal T1 and T1  fat-saturated postcontrast sequences as well as 3-D T2 sequences were  also obtained.     MEDS/CONTRAST: 12ml Dotarem     COMPARISON:  CT head 10/20/2021.      FINDINGS:    Prominence of the ventricles and sulci is compatible with moderate to  advanced, generalized volume loss. No abnormal extra-axial collection,  hydrocephalus, mass effect or midline shift is seen. The basal  cisterns are preserved.     Severe microvascular ischemic changes are seen throughout the  supratentorial white matter. No abnormal intracranial enhancement is  identified. No restricted diffusion is present. The major intracranial  vascular flow voids are preserved.     Dedicated imaging through the IACs demonstrates normal course and  caliber of the cisternal and intracanalicular 7th and 8th cranial  nerves. No IAC or CP angle enhancing mass is present. The fluid-filled  inner ear ear structures are grossly preserved.     The T1 marrow signal is unremarkable.                                                                       IMPRESSION: No IAC or CP angle mass.     Severe chronic microvascular ischemic changes.     KATELIN CLEMENTS MD            Current Therapies: PT    Exam:  Vital  signs reviewed.   GENERAL APPEARANCE:  alert and no distress  EYES: Eyes grossly normal to inspection, conjunctivae and sclerae normal  RESP: lungs clear   MS: no musculoskeletal defects are noted, seated comfortably in wheelchair  SKIN: warm and dry  PSYCH: mentation appears normal and affect normal/bright    Assessment and Plan:    Will discontinue diltiazem as blood pressure has been stable after being held for the last 2 weeks, and will discontinue Cymbalta as the risks far outweigh the benefits for side effects.    Will obtain updated labs including thyroid labs at next facility lab day.    The staff feel the as needed low-dose meclizine has been beneficial when used--will refill as requested today for symptom management, has Zofran PRN  Staff report has not needed as of this date.  Had discussed trying scopolamine patch--would try half a patch and daughter is agreeable with trial  Will trial low dose and if any toleration issues DC    Scheduled for physical therapy vestibular assessment--await final impressions    ENT suggests neurologic consult which I think is reasonable for completeness of evaluation and work-up  Daughter is agreeable--referral sent to see Dr. 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

## 2022-04-01 ENCOUNTER — HOSPITAL ENCOUNTER (OUTPATIENT)
Dept: PHYSICAL THERAPY | Facility: OTHER | Age: 87
Setting detail: THERAPIES SERIES
Discharge: HOME | End: 2022-04-01
Attending: NURSE PRACTITIONER
Payer: COMMERCIAL

## 2022-04-01 DIAGNOSIS — R42 DIZZINESS: ICD-10-CM

## 2022-04-01 DIAGNOSIS — R26.89 BALANCE PROBLEMS: ICD-10-CM

## 2022-04-01 DIAGNOSIS — H90.3 SENSORINEURAL HEARING LOSS, BILATERAL: ICD-10-CM

## 2022-04-01 DIAGNOSIS — R42 DISEQUILIBRIUM: ICD-10-CM

## 2022-04-01 DIAGNOSIS — R26.81 UNSTEADY GAIT: ICD-10-CM

## 2022-04-01 DIAGNOSIS — R42 LIGHTHEADEDNESS: ICD-10-CM

## 2022-04-01 PROCEDURE — 97110 THERAPEUTIC EXERCISES: CPT | Mod: GP | Performed by: PHYSICAL THERAPIST

## 2022-04-01 PROCEDURE — 97163 PT EVAL HIGH COMPLEX 45 MIN: CPT | Mod: GP | Performed by: PHYSICAL THERAPIST

## 2022-04-01 NOTE — PROGRESS NOTES
[facility]                                                                                   OUTPATIENT PHYSICAL THERAPY FUNCTIONAL EVALUATION  PLAN OF TREATMENT FOR OUTPATIENT REHABILITATION  (COMPLETE FOR INITIAL CLAIMS ONLY)  Patient's Last Name, First Name, M.I.  YOB: 1926  Geno Horn     Provider's Name   [facility]   Medical Record No.  [id number]     Start of Care Date:  04/01/22   Onset Date:  03/16/22   Type:     _X__PT   ____OT  ____SLP Medical Diagnosis:  1) Balance problems   2) unsteady gait   3) dizziness   4) lightheadedness     PT Diagnosis:  impaired mobility Visits from SOC:  1                              __________________________________________________________________________________  Plan of Treatment/Functional Goals:  strengthening, transfer training           GOALS  transfers  Patient will demonstrate safe wheelchair to bed transfers with FWW and CGA x 1.  05/27/22    transfer  Patient will demonstrate safe sit-stand transfer with FWW and CGA x 1.  05/27/22    walking  Patient will walk 30 ft x 2 (to bathroom and back) with FWW and CGA x 1.  05/27/22                  Therapy Frequency:  2 times/Week   Predicted Duration of Therapy Intervention:  8 weeks    Eileen Tello, PT                                    I CERTIFY THE NEED FOR THESE SERVICES FURNISHED UNDER        THIS PLAN OF TREATMENT AND WHILE UNDER MY CARE     (Physician co-signature of this document indicates review and certification of the therapy plan).                Certification Date From:  04/01/22   Certification Date To:  05/27/22    Referring Provider:  SABA Rocha NP    Initial Assessment  See Epic Evaluation- Start of Care Date: 04/01/22

## 2022-04-01 NOTE — PROGRESS NOTES
"   04/01/22 1000   Quick Adds   Quick Adds Certification;Vestibular Eval   Type of Visit Initial OP PT Evaluation   General Information   Start of Care Date 04/01/22   Referring Physician SABA Rocha NP   Orders Evaluate and Treat as Indicated   Order Date 03/16/22   Medical Diagnosis 1) Balance problems   2) unsteady gait   3) dizziness   4) lightheadedness   Onset of illness/injury or Date of Surgery 03/16/22   Precautions/Limitations fall precautions   Surgical/Medical history reviewed Yes   Pertinent history of current problem (include personal factors and/or comorbidities that impact the POC) Patient reports she is spinning, the room is spinning, her whole body is spinning.  Feels like this all this time (states \"I could go to sleep\").  Also feels lightheaded at times, random, no pattern or related to any position or activity.  Not sure if there is anything that makes her dizziness worse.  Might feel better when she closes her eyes.  Patient's daughter gave patient a library book recently, and patient couldn't read is because she felt like her eyes were bouncing around, denies that the letters were just blurry.  Patient has tried meclizine.  Just tried scopolimine patch yesterday and is still in place today.  Patient doesn't really think it helps.  Nurse at [facility] thinks the meclizine may have helped a little, but nothing seems to take the dizziness away.  Patient feels unsteady with gait and needs a walker and someone walking with her.  Since hip fracture last October, hasn't been doing much walking, mostly transferring from wheelchair to bed.  No sudden hearing changes, uses hearing aides bilaterally, and some new ones order now.  Audiology eval was just a couple weeks ago.   Living environment Assisted living   Current Assistive Devices Front Wheeled Walker   Fall Risk Screen   Fall screen completed by PT   Have you fallen 2 or more times in the past year? No   Have you fallen and had an injury in the " past year? Yes   Is patient a fall risk? Yes   Pain   Patient currently in pain No   Pain comments Pain in right shoulder with motion due to arthritis.   Cognitive Status Examination   Orientation person   Posture   Posture Comments kyphosis and forward head with increased cervical extension   Strength   Strength Comments Impaired; Not able to complete manual muscle testing today due to time limitations, but patient requires moderate assist for sit-stand transfers due to weakness and instability;    Bed Mobility   Bed Mobility Comments Max assist x 2 for sit-supine transfers for Positional testing due to mobility impairments, mid back pain, and instability.    Gait   Gait Comments Not able to assess due to time limitations.   Balance   Balance Comments Requires assistance to maintain standing balance.  No other tests completed today due to time limitations.   Oculomotor Exam   Smooth Pursuit Abnormal   Smooth Pursuit Comment Not able to complete smooth motion in any direction.   Saccades Abnormal   Saccades Comments overshoots in horizontal direction   VOR Abnormal   VOR Comments Able to maintain focus on target, but not at required pace.   VOR Cancellation Other   VOR Cancellation Comments Insonsistent but able to maintain focus on target with cues.   Rapid Head Thrust Other   Rapid Head Thrust Comments Inconsistent bilaterally   Convergence Testing Normal   Infrared Goggle Exam or Frenzel Lense Exam   Vestibular Suppressant in Last 24 Hours? Yes   Exam completed with Frenzel Lenses   Spontaneous Nystagmus Negative   Gaze Evoked Nystagmus Negative   Gaze Evoked Nystagmus comments eye motion present, but no fast/slow phase   Head Shake Horizontal Nystagmus Negative   Paula-Hallpike (right) Downbeating   Narberth-Hallpike (right) comments intermittent nystagmus; dizziness with return to sitting   Paula-Hallpike (Left) Negative   Narberth-Hallpike (left) comments dizziness with return to sitting up   Planned Therapy Interventions    Planned Therapy Interventions strengthening;transfer training   Clinical Impression   Criteria for Skilled Therapeutic Interventions Met yes, treatment indicated   PT Diagnosis impaired mobility   Influenced by the following impairments impaired balance, impaired strength; dizziness; lightheadedness;    Functional limitations due to impairments transfers, standing, walking, head motion   Clinical Presentation Unstable/Unpredictable   Clinical Presentation Rationale clinical observation   Clinical Decision Making (Complexity) High complexity   Therapy Frequency 2 times/Week   Predicted Duration of Therapy Intervention (days/wks) 8 weeks   Risk & Benefits of therapy have been explained Yes   Patient, Family & other staff in agreement with plan of care Yes   Clinical Impression Comments Patient demonstrates signs and symptoms consistent with central vestibular dysfunction.  Objective testing includes impaired smooth pursuits, saccades, and downbeats with Richardson Hallpike testing.  Instructed patient and daughter regarding prognosis and plan of care.  Recommend trial of LE stengthening to improve stability with transfers and walking.  Recommend patient always transfer and walk with at least CGA for safety.  Likely no change in dizziness going forward.  May want to discontinue use of medication for dizziness to see if patient feels a little more steady on her feet and with transfers.  Patient and daughter voiced understanding and agreement.    GOALS   PT Eval Goals 1;2;3   Goal 1   Goal Identifier transfers   Goal Description Patient will demonstrate safe wheelchair to bed transfers with FWW and CGA x 1.   Target Date 05/27/22   Goal 2   Goal Identifier transfer   Goal Description Patient will demonstrate safe sit-stand transfer with FWW and CGA x 1.   Target Date 05/27/22   Goal 3   Goal Identifier walking   Goal Description Patient will walk 30 ft x 2 (to bathroom and back) with FWW and CGA x 1.   Target Date 05/27/22    Total Evaluation Time   PT Miguelangel, High Complexity Minutes ([id number]) 60   Therapy Certification   Certification date from 04/01/22   Certification date to 05/27/22   Medical Diagnosis 1) Balance problems   2) unsteady gait   3) dizziness   4) lightheadedness

## 2022-04-06 ENCOUNTER — HOSPITAL ENCOUNTER (OUTPATIENT)
Dept: PHYSICAL THERAPY | Facility: OTHER | Age: 87
Setting detail: THERAPIES SERIES
Discharge: HOME | End: 2022-04-06
Attending: NURSE PRACTITIONER
Payer: COMMERCIAL

## 2022-04-06 PROCEDURE — 97110 THERAPEUTIC EXERCISES: CPT | Mod: GP | Performed by: PHYSICAL THERAPIST

## 2022-04-08 ENCOUNTER — HOSPITAL ENCOUNTER (OUTPATIENT)
Dept: PHYSICAL THERAPY | Facility: OTHER | Age: 87
Setting detail: THERAPIES SERIES
Discharge: HOME | End: 2022-04-08
Attending: NURSE PRACTITIONER
Payer: COMMERCIAL

## 2022-04-08 PROCEDURE — 97110 THERAPEUTIC EXERCISES: CPT | Mod: GP | Performed by: PHYSICAL THERAPIST

## 2022-04-11 ENCOUNTER — HOSPITAL ENCOUNTER (OUTPATIENT)
Dept: PHYSICAL THERAPY | Facility: OTHER | Age: 87
Setting detail: THERAPIES SERIES
Discharge: HOME | End: 2022-04-11
Attending: NURSE PRACTITIONER
Payer: COMMERCIAL

## 2022-04-11 PROCEDURE — 97110 THERAPEUTIC EXERCISES: CPT | Mod: GP | Performed by: PHYSICAL THERAPIST

## 2022-04-12 ENCOUNTER — LAB REQUISITION (OUTPATIENT)
Dept: LAB | Facility: OTHER | Age: 87
End: 2022-04-12
Payer: COMMERCIAL

## 2022-04-12 DIAGNOSIS — E03.9 HYPOTHYROIDISM, UNSPECIFIED: ICD-10-CM

## 2022-04-12 DIAGNOSIS — Q78.2 OSTEOPETROSIS: ICD-10-CM

## 2022-04-12 LAB
ANION GAP SERPL CALCULATED.3IONS-SCNC: 7 MMOL/L (ref 3–14)
BASOPHILS # BLD AUTO: 0.1 10E3/UL (ref 0–0.2)
BASOPHILS NFR BLD AUTO: 1 %
BUN SERPL-MCNC: 17 MG/DL (ref 7–25)
CALCIUM SERPL-MCNC: 9.4 MG/DL (ref 8.6–10.3)
CHLORIDE BLD-SCNC: 105 MMOL/L (ref 98–107)
CO2 SERPL-SCNC: 28 MMOL/L (ref 21–31)
CREAT SERPL-MCNC: 0.61 MG/DL (ref 0.6–1.2)
DEPRECATED CALCIDIOL+CALCIFEROL SERPL-MC: 37 UG/L (ref 30–100)
EOSINOPHIL # BLD AUTO: 0.1 10E3/UL (ref 0–0.7)
EOSINOPHIL NFR BLD AUTO: 1 %
ERYTHROCYTE [DISTWIDTH] IN BLOOD BY AUTOMATED COUNT: 14.5 % (ref 10–15)
GFR SERPL CREATININE-BSD FRML MDRD: 82 ML/MIN/1.73M2
GLUCOSE BLD-MCNC: 116 MG/DL (ref 70–105)
HCT VFR BLD AUTO: 33.2 % (ref 35–47)
HGB BLD-MCNC: 10.6 G/DL (ref 11.7–15.7)
IMM GRANULOCYTES # BLD: 0 10E3/UL
IMM GRANULOCYTES NFR BLD: 0 %
LYMPHOCYTES # BLD AUTO: 1.9 10E3/UL (ref 0.8–5.3)
LYMPHOCYTES NFR BLD AUTO: 24 %
MCH RBC QN AUTO: 33 PG (ref 26.5–33)
MCHC RBC AUTO-ENTMCNC: 31.9 G/DL (ref 31.5–36.5)
MCV RBC AUTO: 103 FL (ref 78–100)
MONOCYTES # BLD AUTO: 0.5 10E3/UL (ref 0–1.3)
MONOCYTES NFR BLD AUTO: 6 %
NEUTROPHILS # BLD AUTO: 5.5 10E3/UL (ref 1.6–8.3)
NEUTROPHILS NFR BLD AUTO: 68 %
NRBC # BLD AUTO: 0 10E3/UL
NRBC BLD AUTO-RTO: 0 /100
PLATELET # BLD AUTO: 367 10E3/UL (ref 150–450)
POTASSIUM BLD-SCNC: 4.3 MMOL/L (ref 3.5–5.1)
RBC # BLD AUTO: 3.21 10E6/UL (ref 3.8–5.2)
SODIUM SERPL-SCNC: 140 MMOL/L (ref 134–144)
T4 FREE SERPL-MCNC: 1.25 NG/DL (ref 0.6–1.6)
TSH SERPL DL<=0.005 MIU/L-ACNC: 0.03 MU/L (ref 0.4–4)
VIT B12 SERPL-MCNC: 387 PG/ML (ref 180–914)
WBC # BLD AUTO: 8 10E3/UL (ref 4–11)

## 2022-04-12 PROCEDURE — 84439 ASSAY OF FREE THYROXINE: CPT | Performed by: NURSE PRACTITIONER

## 2022-04-12 PROCEDURE — 80048 BASIC METABOLIC PNL TOTAL CA: CPT | Performed by: NURSE PRACTITIONER

## 2022-04-12 PROCEDURE — 82607 VITAMIN B-12: CPT | Performed by: NURSE PRACTITIONER

## 2022-04-12 PROCEDURE — 36415 COLL VENOUS BLD VENIPUNCTURE: CPT | Performed by: NURSE PRACTITIONER

## 2022-04-12 PROCEDURE — 85025 COMPLETE CBC W/AUTO DIFF WBC: CPT | Performed by: NURSE PRACTITIONER

## 2022-04-12 PROCEDURE — 82306 VITAMIN D 25 HYDROXY: CPT | Performed by: NURSE PRACTITIONER

## 2022-04-12 PROCEDURE — 84443 ASSAY THYROID STIM HORMONE: CPT | Performed by: NURSE PRACTITIONER

## 2022-04-12 NOTE — PROGRESS NOTES
Chief Complaint   Patient presents with     Dizziness     Follow up disequilibrium and dizziness.       Patient returns to ENT for follow-up exam.  Patient was last seen 3/9/2022 for concerns of dizziness.  And had expressed concerns that she always feels dizziness she feels like her eyes are moving and she feels the need to close her eyes and rest.  At her last visit there was no clear vestibular source of her dyspnea dizziness.  We did complete MRI of the IAC due to the asymmetrical hearing loss and dizziness.  MRI of the IAC was negative for acoustic neuroma.  She did have severe chronic microvascular ischemic changes.  Neurology consult was completed.  She did recommend to continue physical therapy and complete vestibular testing to ensure there is no vestibular source.  Patient does have a history of having extensive evaluation at the balance center [facility] in [facility] with an unclear otology at that time.    Today, and presents for follow-up.  Her daughter Maria Antonia accompanies her today.   Geno reports symptoms have persisted. She has felt ongoing dizziness- describes eyes twirling and remains present the entire time unless she closes her eyes.       Audiogram- 5/19/20  Type A tympanograms  Thresholds are moderate to severe SNHL  SRT=PTA  WRS-  Right- 100%@95dB  Left- 80% @95 dB     Audiogram-   Type A tympanograms  Thresholds- Right moderate to profound SNHL, change at 4000 to 8000 Hz from 2020. Left moderate to profound SNHL Change at 7194-0449 Hz from 2020.   SRT=PTA  WRS-  Right- 84%@90dB  Left- 72% @90 dB     Past Medical History:   Diagnosis Date     Hypertension      Hypothyroid      Osteoarthritis      ST elevation (STEMI) myocardial infarction (H)     1981; angioplasty        Allergies   Allergen Reactions     Alendronic Acid Nausea     Stomach upset     Celebrex [Celecoxib] Other (See Comments)     Light headed     Oxybutynin Other (See Comments)     Felt High     Tolterodine      Other reaction(s):  "Intolerance-Can't Take  Was unable to sleep at night     Tramadol Other (See Comments)     Adverse  effects     Trospium Other (See Comments)     drowsy     Current Outpatient Medications   Medication     acetaminophen (ACETAMINOPHEN 8 HOUR) 650 MG CR tablet     acetaminophen (TYLENOL) 650 MG CR tablet     carbamide peroxide (DEBROX) 6.5 % otic solution     cetirizine (ZYRTEC) 5 MG tablet     Incontinence Supply Disposable (DEPEND EASY FIT UNDERGARMENTS) MISC     Incontinence Supply Disposable (POISE PAD) PADS     levothyroxine (SYNTHROID/LEVOTHROID) 100 MCG tablet     magnesium hydroxide (MILK OF MAGNESIA) 400 MG/5ML suspension     MAPAP ARTHRITIS PAIN 650 MG CR tablet     meclizine (ANTIVERT) 12.5 MG tablet     menthol (ICY HOT) 5 % PTCH     Multiple Vitamin (TAB-A-RADHA) TABS     mupirocin (BACTROBAN) 2 % external ointment     Nutritional Supplements (BOOST HIGH PROTEIN) LIQD     Omega-3 Fatty Acids (SM FISH OIL) 1000 MG CAPS     ondansetron (ZOFRAN) 4 MG tablet     order for DME     order for DME     sodium chloride (OCEAN) 0.65 % nasal spray     No current facility-administered medications for this visit.     ROS- SEE HPI  /62 (BP Location: Right arm, Cuff Size: Adult Regular)   Pulse 89   Temp 97.1  F (36.2  C) (Tympanic)   Ht 1.549 m (5' 1\")   Wt 60.8 kg (134 lb)   SpO2 98%   BMI 25.32 kg/m      General - The patient is well nourished and well developed, and appears to have good nutritional status. Elderly, stating her eyes are twirling. She does answer questions, daughter does provide additional details.   Head and Face - Normocephalic and atraumatic, with no gross asymmetry noted of the contour of the facial features.  The facial nerve is intact, with strong symmetric movements.  Voice and Breathing - The patient was breathing comfortably without the use of accessory muscles. There was no wheezing, stridor, or stertor.  The patients voice was clear and strong, and had appropriate pitch and " quality.  Ears - The tympanic membranes are normal in appearance, bony landmarks are intact.  No retraction, perforation, or masses.  No fluid or purulence was seen in the external canal or the middle ear. No evidence of infection of the middle ear or external canal, cerumen was normal in appearance.  Eyes - Extraocular movements intact, and the pupils were reactive to light.  Sclera were not icteric or injected, conjunctiva were pink and moist.  Mouth - Examination of the oral cavity showed pink, healthy oral mucosa. No lesions or ulcerations noted.  The tongue was mobile and midline, and the dentition were in good condition.    Throat - The walls of the oropharynx were smooth, pink, moist, symmetric, and had no lesions or ulcerations.  The tonsillar pillars and soft palate were symmetric.  The uvula was midline on elevation.    Neck - Normal midline excursion of the laryngotracheal complex during swallowing.  Full range of motion on passive movement.  Palpation of the occipital, submental, submandibular, internal jugular chain, and supraclavicular nodes did not demonstrate any abnormal lymph nodes or masses.  The carotid pulse was palpable bilaterally.  Palpation of the thyroid was soft and smooth, with no nodules or goiter appreciated.  The trachea was mobile and midline.         ASSESSMENT/ PLAN:    ICD-10-CM    1. Dizzinesses  R42    2. General unsteadiness  R26.81    3. Sensorineural hearing loss (SNHL) of both ears  H90.3        Continue with current physical therapy plan.   I agree with LE strengthening and to continue with PT plan.   Complete neuro consult. Her dizziness is central vestibular dysfunction origin.   She has completed past testing at the [facility] center without improvement.       Adeline Hilton PA-C  ENT  [facility]

## 2022-04-13 ENCOUNTER — OFFICE VISIT (OUTPATIENT)
Dept: OTOLARYNGOLOGY | Facility: OTHER | Age: 87
End: 2022-04-13
Attending: PHYSICIAN ASSISTANT
Payer: COMMERCIAL

## 2022-04-13 VITALS
BODY MASS INDEX: 25.3 KG/M2 | DIASTOLIC BLOOD PRESSURE: 62 MMHG | TEMPERATURE: 97.1 F | OXYGEN SATURATION: 98 % | HEART RATE: 89 BPM | SYSTOLIC BLOOD PRESSURE: 108 MMHG | HEIGHT: 61 IN | WEIGHT: 134 LBS

## 2022-04-13 DIAGNOSIS — R42 DIZZINESSES: Primary | ICD-10-CM

## 2022-04-13 DIAGNOSIS — R26.81 GENERAL UNSTEADINESS: ICD-10-CM

## 2022-04-13 DIAGNOSIS — H90.3 SENSORINEURAL HEARING LOSS (SNHL) OF BOTH EARS: ICD-10-CM

## 2022-04-13 PROCEDURE — G0463 HOSPITAL OUTPT CLINIC VISIT: HCPCS

## 2022-04-13 PROCEDURE — 99213 OFFICE O/P EST LOW 20 MIN: CPT | Performed by: PHYSICIAN ASSISTANT

## 2022-04-13 ASSESSMENT — PAIN SCALES - GENERAL: PAINLEVEL: EXTREME PAIN (8)

## 2022-04-13 NOTE — NURSING NOTE
"Chief Complaint   Patient presents with     Dizziness     Follow up disequilibrium and dizziness.  Pt states that her dizziness is terrible.       Initial /62 (BP Location: Right arm, Cuff Size: Adult Regular)   Pulse 89   Temp 97.1  F (36.2  C) (Tympanic)   Ht 1.549 m (5' 1\")   Wt 60.8 kg (134 lb)   SpO2 98%   BMI 25.32 kg/m   Estimated body mass index is 25.32 kg/m  as calculated from the following:    Height as of this encounter: 1.549 m (5' 1\").    Weight as of this encounter: 60.8 kg (134 lb).  Medication Reconciliation: complete  Indigo Brumfield LPN    "

## 2022-04-13 NOTE — PATIENT INSTRUCTIONS
Follow up as needed  Complete Neurology consult  Continue with physical therapy for lower extremity strengthening.    Agree with physical therapy.   MRI of inner ear- negative for mass.     Thank you for allowing Adeline Hilton PA-C and our ENT team to participate in your care.  If your medications are too expensive, please give the nurse a call.  We can possibly change this medication.  If you have a scheduling or an appointment question please contact our Health Unit Coordinator at [phone], Ext. [phone].    ALL nursing questions or concerns can be directed to your ENT nurse at: [phone] Indigo

## 2022-04-14 ENCOUNTER — NURSING HOME VISIT (OUTPATIENT)
Dept: GERIATRICS | Facility: OTHER | Age: 87
End: 2022-04-14
Attending: NURSE PRACTITIONER
Payer: COMMERCIAL

## 2022-04-14 VITALS — WEIGHT: 130 LBS | BODY MASS INDEX: 24.56 KG/M2

## 2022-04-14 DIAGNOSIS — Z79.899 ENCOUNTER FOR MEDICATION REVIEW: Primary | ICD-10-CM

## 2022-04-14 DIAGNOSIS — R42 DIZZINESS: ICD-10-CM

## 2022-04-14 DIAGNOSIS — D50.8 OTHER IRON DEFICIENCY ANEMIA: ICD-10-CM

## 2022-04-14 DIAGNOSIS — E53.8 VITAMIN B12 DEFICIENCY (NON ANEMIC): ICD-10-CM

## 2022-04-14 DIAGNOSIS — Z79.899 ENCOUNTER FOR LONG-TERM (CURRENT) USE OF MEDICATIONS: ICD-10-CM

## 2022-04-14 DIAGNOSIS — E03.8 OTHER SPECIFIED HYPOTHYROIDISM: ICD-10-CM

## 2022-04-14 RX ORDER — LEVOTHYROXINE SODIUM 88 UG/1
88 TABLET ORAL DAILY
Qty: 45 TABLET | Refills: 0 | Status: SHIPPED | OUTPATIENT
Start: 2022-04-14 | End: 2022-05-12

## 2022-04-14 RX ORDER — UREA 10 %
500 LOTION (ML) TOPICAL DAILY
Qty: 90 TABLET | Refills: 0 | Status: SHIPPED | OUTPATIENT
Start: 2022-04-14 | End: 2022-06-21

## 2022-04-14 NOTE — PROGRESS NOTES
Geno Horn is a 89 year old female being seen today for episodic and follow up visit at [facility].    Code Status: Advance Directives: YES-.   Health Care Power of : Extended Emergency Contact Information  Primary Emergency Contact: Daniel Horn  Address: [address]           [place] [address] [facility]  Mobile Phone: [phone]  Relation: Son  Secondary Emergency Contact: Adri Chaudhary  Address: [address] [place] [id number] [facility]  Home Phone: [phone]  Mobile Phone: [phone]  Relation: Daughter     Allergies: Alendronic acid, Celebrex [celecoxib], Oxybutynin, Tolterodine, Tramadol, and Trospium     Chief Complaint / HPI: Follow-up for medication review and adjustments with recent facility lab results April 12,2022.  Currently resident is attending outpatient physical therapy for vestibular therapy and lower extremity strengthening.  Recent vestibular therapy assessment impression Central vertigo.  Had trialed half patch of scopolamine for 36 hours and resident continued to have vertigo episodes per usual--did not experience any side effects from medication--this was discontinued,.  Has meclizine 12.5 mg as needed for symptom management--- staff consistently report they feel it there has been some benefit with symptom management  So would continue PRN only--if resident is agreeable.  Staff report decrease in appetite--resident has lost a couple of pounds--- daughter is agreeable with starting protein nutritional supplement drinks between meals.  Resident does not raise any specific concerns, has acclimated well to assisted living facility, social with other residents and attends most social activities.      Past Medical, Surgical, Family and Social History reviewed: YES.     Medications: reviewed  Medications - recent changes: none    Review of Systems:  General: positive for weakness  Musculoskeletal: positive for muscular weakness  Neurologic: positive  for numbness or tingling of feet and memory problems, dizziness  Psychiatric: positive for anxiety  Endocrine: Positive for Hypothyroid  Hematologic: positive for anemia  All other systems negative    Toileting:    Continent of Bowel: Yes   Continent of Bladder: Yes  Mobility: wheelchair    Recent Labs:   Lab Requisition on 04/12/2022   Component Date Value Ref Range Status     Sodium 04/12/2022 140  134 - 144 mmol/L Final     Potassium 04/12/2022 4.3  3.5 - 5.1 mmol/L Final     Chloride 04/12/2022 105  98 - 107 mmol/L Final     Carbon Dioxide (CO2) 04/12/2022 28  21 - 31 mmol/L Final     Anion Gap 04/12/2022 7  3 - 14 mmol/L Final     Urea Nitrogen 04/12/2022 17  7 - 25 mg/dL Final     Creatinine 04/12/2022 0.61  0.60 - 1.20 mg/dL Final     Calcium 04/12/2022 9.4  8.6 - 10.3 mg/dL Final     Glucose 04/12/2022 116 (A) 70 - 105 mg/dL Final     GFR Estimate 04/12/2022 82  >60 mL/min/1.73m2 Final    Effective December 21, 2021 eGFRcr in adults is calculated using the 2021 CKD-EPI creatinine equation which includes age and gender (Sidney et al., NE, DOI: 10.1056/ZDGXkm1454117)     Vitamin B12 04/12/2022 387  180 - 914 pg/mL Final     Free T4 04/12/2022 1.25  0.60 - 1.60 ng/dL Final     TSH 04/12/2022 0.03 (A) 0.40 - 4.00 mU/L Final     Vitamin D, Total (25-Hydroxy) 04/12/2022 37  30 - 100 ug/L Final    Deficiency:          <10 ug/L  Insufficiency:       10-29 ug/L  Sufficiency:          ug/L  Possible Toxicity:   >100 ug/L       WBC Count 04/12/2022 8.0  4.0 - 11.0 10e3/uL Final     RBC Count 04/12/2022 3.21 (A) 3.80 - 5.20 10e6/uL Final     Hemoglobin 04/12/2022 10.6 (A) 11.7 - 15.7 g/dL Final     Hematocrit 04/12/2022 33.2 (A) 35.0 - 47.0 % Final     MCV 04/12/2022 103 (A) 78 - 100 fL Final     MCH 04/12/2022 33.0  26.5 - 33.0 pg Final     MCHC 04/12/2022 31.9  31.5 - 36.5 g/dL Final     RDW 04/12/2022 14.5  10.0 - 15.0 % Final     Platelet Count 04/12/2022 367  150 - 450 10e3/uL Final     % Neutrophils  04/12/2022 68  % Final     % Lymphocytes 04/12/2022 24  % Final     % Monocytes 04/12/2022 6  % Final     % Eosinophils 04/12/2022 1  % Final     % Basophils 04/12/2022 1  % Final     % Immature Granulocytes 04/12/2022 0  % Final     NRBCs per 100 WBC 04/12/2022 0  <1 /100 Final     Absolute Neutrophils 04/12/2022 5.5  1.6 - 8.3 10e3/uL Final     Absolute Lymphocytes 04/12/2022 1.9  0.8 - 5.3 10e3/uL Final     Absolute Monocytes 04/12/2022 0.5  0.0 - 1.3 10e3/uL Final     Absolute Eosinophils 04/12/2022 0.1  0.0 - 0.7 10e3/uL Final     Absolute Basophils 04/12/2022 0.1  0.0 - 0.2 10e3/uL Final     Absolute Immature Granulocytes 04/12/2022 0.0  <=0.4 10e3/uL Final     Absolute NRBCs 04/12/2022 0.0  10e3/uL Final       Current Therapies: physical therapy    Exam:  Vital signs reviewed.  GENERAL APPEARANCE: alert and no distress  EYES: Eyes grossly normal to inspection, conjunctivae and sclerae normal  RESP: lungs clear   MS: no musculoskeletal defects are noted and mobility wheelchair dependent  SKIN: warm and dry  NEURO: mentation intact and speech normal  PSYCH: mentation appears normal and affect normal/bright    Assessment and Plan:    Recent labs positive for low TSH, levothyroxine dose adjusted from 100 to 88 mcg daily with follow-up lab in 6 weeks.  CBC positive for anemia--this has been an issue in the past for the resident and she had responded well to iron--poor appetite and food intake contributory as well--we will restart Vitron-C  B12 lower end of range--we will start B12 500 mcg daily     Resident will continue outpatient physical therapy for lower extremity strengthening and support with transfers and mobility with spontaneous vertigo.  Neurology referral for consultation on persistent spontaneous dizziness scheduled in July soonest available appointment.     Followup labs in 6 weeks.    Followup PRN          (Z79.899) Encounter for medication review  (primary encounter diagnosis)      (E03.8) Other  specified hypothyroidism  Plan: levothyroxine (SYNTHROID/LEVOTHROID) 88 MCG         tablet           (Z79.899) Encounter for long-term (current) use of medications  Plan: levothyroxine (SYNTHROID/LEVOTHROID) 88 MCG         tablet            (D50.8) Other iron deficiency anemia  Plan: ferrous fumarate 65 mg, Cheesh-Na. FE,-Vitamin C 125        mg (VITRON C)  MG TABS tablet            (E53.8) Vitamin B12 deficiency (non anemic)    Plan: cyanocobalamin (VITAMIN B-12) 500 MCG tablet            (R42) Dizziness

## 2022-04-20 ENCOUNTER — HOSPITAL ENCOUNTER (OUTPATIENT)
Dept: PHYSICAL THERAPY | Facility: OTHER | Age: 87
Setting detail: THERAPIES SERIES
Discharge: HOME | End: 2022-04-20
Attending: NURSE PRACTITIONER
Payer: COMMERCIAL

## 2022-04-20 PROCEDURE — 97110 THERAPEUTIC EXERCISES: CPT | Mod: GP | Performed by: PHYSICAL THERAPIST

## 2022-04-27 ENCOUNTER — HOSPITAL ENCOUNTER (OUTPATIENT)
Dept: PHYSICAL THERAPY | Facility: OTHER | Age: 87
Setting detail: THERAPIES SERIES
Discharge: HOME | End: 2022-04-27
Attending: NURSE PRACTITIONER
Payer: COMMERCIAL

## 2022-04-27 PROCEDURE — 97110 THERAPEUTIC EXERCISES: CPT | Mod: GP | Performed by: PHYSICAL THERAPIST

## 2022-04-28 ENCOUNTER — NURSING HOME VISIT (OUTPATIENT)
Dept: GERIATRICS | Facility: OTHER | Age: 87
End: 2022-04-28
Attending: NURSE PRACTITIONER
Payer: COMMERCIAL

## 2022-04-28 DIAGNOSIS — J30.89 SEASONAL ALLERGIC RHINITIS DUE TO OTHER ALLERGIC TRIGGER: Primary | ICD-10-CM

## 2022-04-28 RX ORDER — FLUTICASONE PROPIONATE 50 MCG
2 SPRAY, SUSPENSION (ML) NASAL DAILY
Qty: 16 G | Refills: 11 | Status: SHIPPED | OUTPATIENT
Start: 2022-04-28 | End: 2023-05-09

## 2022-05-06 ENCOUNTER — HOSPITAL ENCOUNTER (OUTPATIENT)
Dept: PHYSICAL THERAPY | Facility: OTHER | Age: 87
Setting detail: THERAPIES SERIES
Discharge: HOME | End: 2022-05-06
Attending: NURSE PRACTITIONER
Payer: COMMERCIAL

## 2022-05-06 PROCEDURE — 97110 THERAPEUTIC EXERCISES: CPT | Mod: GP | Performed by: PHYSICAL THERAPIST

## 2022-05-09 DIAGNOSIS — E03.8 OTHER SPECIFIED HYPOTHYROIDISM: ICD-10-CM

## 2022-05-09 DIAGNOSIS — Z79.899 ENCOUNTER FOR LONG-TERM (CURRENT) USE OF MEDICATIONS: ICD-10-CM

## 2022-05-10 NOTE — TELEPHONE ENCOUNTER
"   Disp Refills Start End MEGAN   levothyroxine (SYNTHROID/LEVOTHROID) 88 MCG tablet 45 tablet 0 4/14/2022  --   Sig - Route: Take 1 tablet (88 mcg) by mouth daily - Oral       LOV: 4/28/2022  Future Office visit: No future appointment scheduled at this time.        Routing refill request to provider for review/approval.  Per quality report medicare annual wellness, immunization, PHQ-9.    PHQ-9 sent via [employer].    Requested Prescriptions   Pending Prescriptions Disp Refills     levothyroxine (SYNTHROID/LEVOTHROID) 88 MCG tablet [Pharmacy Med Name: LEVOTHYROXINE 88MCG TAB] 28 tablet 11     Sig: TAKE 1 TABLET BY MOUTH ONCE DAILY       Thyroid Protocol Failed - 5/9/2022  6:44 AM        Failed - Normal TSH on file in past 12 months     Recent Labs   Lab Test 04/12/22  0915   TSH 0.03*              Passed - Patient is 12 years or older        Passed - Recent (12 mo) or future (30 days) visit within the authorizing provider's specialty     Patient has had an office visit with the authorizing provider or a provider within the authorizing providers department within the previous 12 mos or has a future within next 30 days. See \"Patient Info\" tab in inbasket, or \"Choose Columns\" in Meds & Orders section of the refill encounter.              Passed - Medication is active on med list        Passed - No active pregnancy on record     If patient is pregnant or has had a positive pregnancy test, please check TSH.          Passed - No positive pregnancy test in past 12 months     If patient is pregnant or has had a positive pregnancy test, please check TSH.             Radha Becerril RN  ....................  5/10/2022   4:33 PM      "

## 2022-05-12 RX ORDER — LEVOTHYROXINE SODIUM 88 UG/1
TABLET ORAL
Qty: 28 TABLET | Refills: 0 | Status: SHIPPED | OUTPATIENT
Start: 2022-05-12 | End: 2022-05-31 | Stop reason: DRUGHIGH

## 2022-05-13 ENCOUNTER — HOSPITAL ENCOUNTER (OUTPATIENT)
Dept: PHYSICAL THERAPY | Facility: OTHER | Age: 87
Setting detail: THERAPIES SERIES
Discharge: HOME | End: 2022-05-13
Attending: NURSE PRACTITIONER
Payer: COMMERCIAL

## 2022-05-13 PROCEDURE — 97110 THERAPEUTIC EXERCISES: CPT | Mod: GP | Performed by: PHYSICAL THERAPIST

## 2022-05-16 ENCOUNTER — HOSPITAL ENCOUNTER (OUTPATIENT)
Dept: PHYSICAL THERAPY | Facility: OTHER | Age: 87
Setting detail: THERAPIES SERIES
Discharge: HOME | End: 2022-05-16
Attending: NURSE PRACTITIONER
Payer: COMMERCIAL

## 2022-05-16 PROCEDURE — 97110 THERAPEUTIC EXERCISES: CPT | Mod: GP | Performed by: PHYSICAL THERAPIST

## 2022-05-18 ENCOUNTER — MYC MEDICAL ADVICE (OUTPATIENT)
Dept: GERIATRICS | Facility: OTHER | Age: 87
End: 2022-05-18
Payer: COMMERCIAL

## 2022-05-19 NOTE — TELEPHONE ENCOUNTER
[facility]     Lucero Acosta on 5/19/2022 at 11:30 AM

## 2022-05-23 NOTE — TELEPHONE ENCOUNTER
3rd and final attempt to contact to schedule annual visit. Can a letter be sent to notify.     [facility]     Lucero Acosta on 5/23/2022 at 12:31 PM

## 2022-05-24 ENCOUNTER — LAB REQUISITION (OUTPATIENT)
Dept: LAB | Facility: OTHER | Age: 87
End: 2022-05-24
Payer: COMMERCIAL

## 2022-05-24 DIAGNOSIS — E03.9 HYPOTHYROIDISM, UNSPECIFIED: ICD-10-CM

## 2022-05-24 DIAGNOSIS — D64.9 ANEMIA, UNSPECIFIED: ICD-10-CM

## 2022-05-24 LAB
BASOPHILS # BLD AUTO: 0 10E3/UL (ref 0–0.2)
BASOPHILS NFR BLD AUTO: 1 %
EOSINOPHIL # BLD AUTO: 0.1 10E3/UL (ref 0–0.7)
EOSINOPHIL NFR BLD AUTO: 1 %
ERYTHROCYTE [DISTWIDTH] IN BLOOD BY AUTOMATED COUNT: 14.6 % (ref 10–15)
HCT VFR BLD AUTO: 32.2 % (ref 35–47)
HGB BLD-MCNC: 10.4 G/DL (ref 11.7–15.7)
IMM GRANULOCYTES # BLD: 0 10E3/UL
IMM GRANULOCYTES NFR BLD: 0 %
LYMPHOCYTES # BLD AUTO: 2.2 10E3/UL (ref 0.8–5.3)
LYMPHOCYTES NFR BLD AUTO: 31 %
MCH RBC QN AUTO: 33.1 PG (ref 26.5–33)
MCHC RBC AUTO-ENTMCNC: 32.3 G/DL (ref 31.5–36.5)
MCV RBC AUTO: 103 FL (ref 78–100)
MONOCYTES # BLD AUTO: 0.4 10E3/UL (ref 0–1.3)
MONOCYTES NFR BLD AUTO: 6 %
NEUTROPHILS # BLD AUTO: 4.2 10E3/UL (ref 1.6–8.3)
NEUTROPHILS NFR BLD AUTO: 61 %
NRBC # BLD AUTO: 0 10E3/UL
NRBC BLD AUTO-RTO: 0 /100
PLATELET # BLD AUTO: 359 10E3/UL (ref 150–450)
RBC # BLD AUTO: 3.14 10E6/UL (ref 3.8–5.2)
T4 FREE SERPL-MCNC: 0.95 NG/DL (ref 0.6–1.6)
TSH SERPL DL<=0.005 MIU/L-ACNC: 0.08 MU/L (ref 0.4–4)
WBC # BLD AUTO: 7 10E3/UL (ref 4–11)

## 2022-05-24 PROCEDURE — 84443 ASSAY THYROID STIM HORMONE: CPT | Performed by: NURSE PRACTITIONER

## 2022-05-24 PROCEDURE — 85025 COMPLETE CBC W/AUTO DIFF WBC: CPT | Performed by: NURSE PRACTITIONER

## 2022-05-24 PROCEDURE — 36415 COLL VENOUS BLD VENIPUNCTURE: CPT | Performed by: NURSE PRACTITIONER

## 2022-05-24 PROCEDURE — 84439 ASSAY OF FREE THYROXINE: CPT | Performed by: NURSE PRACTITIONER

## 2022-05-25 ENCOUNTER — HOSPITAL ENCOUNTER (OUTPATIENT)
Dept: PHYSICAL THERAPY | Facility: OTHER | Age: 87
Setting detail: THERAPIES SERIES
Discharge: HOME | End: 2022-05-25
Attending: NURSE PRACTITIONER
Payer: COMMERCIAL

## 2022-05-25 PROCEDURE — 97110 THERAPEUTIC EXERCISES: CPT | Mod: GP | Performed by: PHYSICAL THERAPIST

## 2022-05-26 ENCOUNTER — MYC MEDICAL ADVICE (OUTPATIENT)
Dept: GERIATRICS | Facility: OTHER | Age: 87
End: 2022-05-26
Payer: COMMERCIAL

## 2022-05-26 NOTE — TELEPHONE ENCOUNTER
Please find out who sends out these messages to patient for physicals and request that they discontinue sending to her.  She is managed at [facility] by NP.  Let patients daughter know that she does not need to come in for a px.

## 2022-05-27 ENCOUNTER — HOSPITAL ENCOUNTER (OUTPATIENT)
Dept: PHYSICAL THERAPY | Facility: OTHER | Age: 87
Setting detail: THERAPIES SERIES
Discharge: HOME | End: 2022-05-27
Attending: NURSE PRACTITIONER
Payer: COMMERCIAL

## 2022-05-27 PROCEDURE — 97110 THERAPEUTIC EXERCISES: CPT | Mod: GP | Performed by: PHYSICAL THERAPIST

## 2022-05-27 NOTE — PROGRESS NOTES
[facility]    Outpatient Physical Therapy Discharge Note      Patient: Geno Horn  : 1926    Beginning/End Dates of Reporting Period:  22 to 22    Referring Provider: SABA Rocha NP    Therapy Diagnosis: 1) Balance problems   2) unsteady gait   3) dizziness   4) lightheadedness         Client Self Report: Patient's daughter reports she is told staff is trying to walk with patient more consistently.  Not observed how much functional change with patient's mobility skill other than that she is walking more and had previously been primarily using her wheelchair.    Objective Measurements:  Patient demonstrated consistent increased tolerance for weight and repetition of MedEx LE strengthening exercises, and improved distance and speed of walking with FWW and CGA x 1.    Continues to require min to moderate assist wc-bed transfers for safety.       Goals:  Goal Identifier transfers   Goal Description Patient will demonstrate safe wheelchair to bed transfers with FWW and CGA x 1.   Target Date 22   Date Met      Progress (detail required for progress note): Not met - Patient continues to require min to moderate assist for wc-bed transfer and verbal cues for technique for safety.     Goal Identifier transfer   Goal Description Patient will demonstrate safe sit-stand transfer with FWW and CGA x 1.   Target Date 22   Date Met  22   Progress (detail required for progress note): Goal met - CGA x 1 and verbal cues needed for sit-stand from wheelchair.     Goal Identifier walking   Goal Description Patient will walk 30 ft x 2 (to bathroom and back) with FWW and CGA x 1.   Target Date 22   Date Met  22   Progress (detail required for progress note): Goal met - walking 100 ft x 2 with FWW and CGA x 1 with cues for sit-stand transfer technique.       Plan:  Discharge from  therapy.    Reason for Discharge: No further expectation of progress.  Patient's vertigo symptoms are central in origin, and not anticipated to improve.  Patient has short term memory impairment and doesn't seem to remember from session to session that this will likely not change and why.  Questions answered consistently.  Also discussed with patient's daughter possible treatment options for patients ongoing right shoulder pain.    Equipment Issued: none    Discharge Plan: Patient to continue home program including walking to meals and bathroom (or any other opportunity when time and staffing are available).

## 2022-05-31 ENCOUNTER — TELEPHONE (OUTPATIENT)
Dept: FAMILY MEDICINE | Facility: OTHER | Age: 87
End: 2022-05-31
Payer: COMMERCIAL

## 2022-05-31 DIAGNOSIS — E03.9 HYPOTHYROIDISM, UNSPECIFIED TYPE: Primary | ICD-10-CM

## 2022-05-31 RX ORDER — LEVOTHYROXINE SODIUM 50 UG/1
50 TABLET ORAL DAILY
Qty: 60 TABLET | Refills: 0 | Status: SHIPPED | OUTPATIENT
Start: 2022-05-31 | End: 2022-07-19

## 2022-05-31 NOTE — TELEPHONE ENCOUNTER
Lor HART [place] notified lab result--dose adjusted down from 88 to 50 mcg  Recheck July 26th lab day    Adjusted Rx sent to [facility] pharmacy    Sierra Ray, APRN CNP   May 31, 2022

## 2022-06-02 ENCOUNTER — TELEPHONE (OUTPATIENT)
Dept: FAMILY MEDICINE | Facility: OTHER | Age: 87
End: 2022-06-02
Payer: COMMERCIAL

## 2022-06-02 DIAGNOSIS — M15.0 PRIMARY OSTEOARTHRITIS INVOLVING MULTIPLE JOINTS: ICD-10-CM

## 2022-06-02 DIAGNOSIS — M25.511 RIGHT SHOULDER PAIN, UNSPECIFIED CHRONICITY: Primary | ICD-10-CM

## 2022-06-02 NOTE — TELEPHONE ENCOUNTER
Marion Ovalles RN reports increased right shoulder pain--probably from overuse with wheelchair--daughter   Would like outpatient PT for shoulder pain and a referral to Dr Eduardo for right shoulder pain and injection.    Referrals sent for both.        Sierra Ray, LARISA CNP   June 2, 2022

## 2022-06-21 DIAGNOSIS — D50.8 OTHER IRON DEFICIENCY ANEMIA: ICD-10-CM

## 2022-06-21 DIAGNOSIS — M15.0 PRIMARY OSTEOARTHRITIS INVOLVING MULTIPLE JOINTS: Primary | ICD-10-CM

## 2022-06-21 DIAGNOSIS — E53.8 VITAMIN B12 DEFICIENCY (NON ANEMIC): ICD-10-CM

## 2022-06-21 RX ORDER — BACILLUS COAGULANS 1B CELL
CAPSULE ORAL
Qty: 90 TABLET | Refills: 2 | Status: SHIPPED | OUTPATIENT
Start: 2022-06-21 | End: 2022-09-29

## 2022-06-21 RX ORDER — ACETAMINOPHEN 650 MG/1
TABLET, FILM COATED, EXTENDED RELEASE ORAL
Qty: 180 TABLET | Refills: 2 | Status: SHIPPED | OUTPATIENT
Start: 2022-06-21 | End: 2022-12-29

## 2022-06-21 RX ORDER — UREA 10 %
LOTION (ML) TOPICAL
Qty: 90 TABLET | Refills: 2 | Status: SHIPPED | OUTPATIENT
Start: 2022-06-21 | End: 2023-02-22

## 2022-06-21 NOTE — TELEPHONE ENCOUNTER
[facility] [place] sent Rx request for the following:      Requested Prescriptions   Pending Prescriptions Disp Refills     MAPAP ARTHRITIS PAIN 650 MG CR tablet [Pharmacy Med Name: ACETAMINOPHEN  MG TAB] 56 tablet 11     Sig: TAKE 1 TABLET BY MOUTH TWO TIMES A DAY   Last Prescription Date:   2/18/22  Last Fill Qty/Refills:         60, R-4         VITRON-C  MG TABS tablet [Pharmacy Med Name: VITRON-C TAB] 28 tablet 11     Sig: TAKE 1 TABLET BY MOUTH ONCE DAILY ON EMPTY STOMACH   Last Prescription Date:   4/14/22  Last Fill Qty/Refills:         90, R-0         cyanocobalamin (VITAMIN B-12) 500 MCG tablet [Pharmacy Med Name: VITAMIN B-12 500MCG TAB] 28 tablet 11     Sig: TAKE 1 TABLET BY MOUTH ONCE DAILY   Last Prescription Date:   4/14/22  Last Fill Qty/Refills:         90, R-0 (End: 7/13/22)      Last Office Visit:              4/28/22 ([facility] Visit, Sierra Ray)  Future Office visit:           None    Karen Simon RN .............. 6/21/2022  3:52 PM

## 2022-07-06 ENCOUNTER — HOSPITAL ENCOUNTER (OUTPATIENT)
Dept: PHYSICAL THERAPY | Facility: OTHER | Age: 87
Setting detail: THERAPIES SERIES
Discharge: HOME | End: 2022-07-06
Attending: NURSE PRACTITIONER
Payer: COMMERCIAL

## 2022-07-06 DIAGNOSIS — M15.0 PRIMARY OSTEOARTHRITIS INVOLVING MULTIPLE JOINTS: ICD-10-CM

## 2022-07-06 DIAGNOSIS — M25.511 RIGHT SHOULDER PAIN, UNSPECIFIED CHRONICITY: ICD-10-CM

## 2022-07-06 PROCEDURE — 97110 THERAPEUTIC EXERCISES: CPT | Mod: GP | Performed by: PHYSICAL THERAPIST

## 2022-07-06 PROCEDURE — 97035 APP MDLTY 1+ULTRASOUND EA 15: CPT | Mod: GP | Performed by: PHYSICAL THERAPIST

## 2022-07-06 PROCEDURE — 97161 PT EVAL LOW COMPLEX 20 MIN: CPT | Mod: GP | Performed by: PHYSICAL THERAPIST

## 2022-07-07 NOTE — PROGRESS NOTES
07/06/22 1300   General Information   Type of Visit Initial OP Ortho PT Evaluation   Start of Care Date 07/06/22   Referring Physician FROY Ray NP   Orders Evaluate and Treat   Certification Required? Yes   Medical Diagnosis 1) Right shoulder pain   2) primary osteoarthritis involving multiple joints   Surgical/Medical history reviewed Yes   Precautions/Limitations fall precautions   Body Part(s)   Body Part(s) Shoulder   Presentation and Etiology   Pertinent history of current problem (include personal factors and/or comorbidities that impact the POC) Patient's right shoulder has been more sore since starting to use a manual wheelchair more.  Has been using it more since around the end of October.  Does use feet to assist as well.  Not able to rate pain.  Holds left hand cupped over the entire right shoulder as area of discomfort.  Difficulty with dressing, reaching, and propelling wheelchair.  Requires max assist stand-pivot transfers due to balance impairments related to vestibular dysfunction.  Able to walk with a FWW with CGA when walking straight ahead. Trendelenburg gait due to significant left hip weakness.   Impairments A. Pain;D. Decreased ROM;E. Decreased flexibility;F. Decreased strength and endurance   Functional Limitations perform activities of daily living   Chronicity Chronic   Current Level of Function   Living environment Assisted living   Fall Risk Screen   Fall screen completed by PT   Have you fallen 2 or more times in the past year? No   Have you fallen and had an injury in the past year? Yes   Is patient a fall risk? Yes   Shoulder Objective Findings   Side (if bilateral, select both right and left) Right;Left   Palpation Tenderness along bilateral upper back muscles.  Discomfort along right teres muscles and lateral right shoulder/upper arm.  Not really any rhomboid pain or infraspinatus pain bilaterally.   Right Shoulder Flexion AROM 55, pain (rates 10/10)   Right Shoulder Flexion PROM  134, pain (able to eccentrically lower from 90 degrees)   Left Shoulder Flexion AROM 115, mild discomfort at end range   Left Shoulder Flexion PROM NT   Right Shoulder Flexion Strength 2-/5, pain   Left Shoulder Flexion Strength 3+/5, no pain   Planned Therapy Interventions   Planned Therapy Interventions manual therapy;ROM;strengthening;stretching   Planned Modality Interventions   Planned Modality Interventions Ultrasound   Clinical Impression   Criteria for Skilled Therapeutic Interventions Met yes, treatment indicated   PT Diagnosis Impaired UE mobility   Influenced by the following impairments Impaired ROM, flexibility, strength; Pain;   Functional limitations due to impairments dressing, reaching, propelling wheelchair   Clinical Presentation Evolving/Changing   Clinical Presentation Rationale clinical observation   Clinical Decision Making (Complexity) Low complexity   Therapy Frequency 2 times/Week   Predicted Duration of Therapy Intervention (days/wks) 8 weeks   Risk & Benefits of therapy have been explained Yes   Patient, Family & other staff in agreement with plan of care Yes   Clinical Impression Comments Patient would beneift from trial of physical therapy for pain relieving modalities, gentle ROM, and gradual progression of shoulder stabilization exercises.   Education Assessment   Barriers to Learning Cognitive  (memory impairment)   ORTHO GOALS   PT Ortho Eval Goals 1;2;3   Ortho Goal 1   Goal Identifier R shoulder Flexion ROM   Goal Description Patient will demonstrate increased active right shoulder flexion from 55 degrees to 70 degrees or more for increased ability to reach for items in her room.   Target Date 08/31/22   Ortho Goal 2   Goal Identifier R shoulder ER ROM:   Goal Description Patient will demonstrate 35 degrees or more External Rotation with arm neurtral or slightly abducted, for improved ability to don a shirt and/or jacket with decreased pain.   Target Date 08/31/22   Ortho Goal 3    Goal Identifier wheelchair mobility   Goal Description Patient will be able to propell wheelchair 50 feet or more with no increased shoulder pain for increased ability to get to dining room from bedroom at [facility].   Target Date 08/31/22   Total Evaluation Time   PT Eval, Low Complexity Minutes (35382) 22   Therapy Certification   Certification date from 07/06/22   Certification date to 08/31/22

## 2022-07-07 NOTE — PROGRESS NOTES
[facility]    OUTPATIENT PHYSICAL THERAPY ORTHOPEDIC EVALUATION  PLAN OF TREATMENT FOR OUTPATIENT REHABILITATION  (COMPLETE FOR INITIAL CLAIMS ONLY)  Patient's Last Name, First Name, M.I.  YOB: 1926  Geno Horn    Provider s Name:  [facility]   Medical Record No.  [id number]   Start of Care Date:  07/06/22   Onset Date:   10/1/2021   Type:     _X__PT   ___OT   ___SLP Medical Diagnosis:   1) Right shoulder pain     2) primary osteoarthritis, multiple joints     PT Diagnosis:  Impaired UE mobility   Visits from SOC:  1      _________________________________________________________________________________  Plan of Treatment/Functional Goals:  manual therapy, ROM, strengthening, stretching, Ultrasound     Goals  Goal Identifier: R shoulder Flexion ROM  Goal Description: Patient will demonstrate increased active right shoulder flexion from 55 degrees to 70 degrees or more for increased ability to reach for items in her room.  Target Date: 08/31/22    Goal Identifier: R shoulder ER ROM:  Goal Description: Patient will demonstrate 35 degrees or more External Rotation with arm neurtral or slightly abducted, for improved ability to don a shirt and/or jacket with decreased pain.  Target Date: 08/31/22    Goal Identifier: wheelchair mobility  Goal Description: Patient will be able to propell wheelchair 50 feet or more with no increased shoulder pain for increased ability to get to dining room from bedroom at [facility].  Target Date: 08/31/22                  Therapy Frequency:  2 times/Week  Predicted Duration of Therapy Intervention:  8 weeks    Eileen Tello, PT                 I CERTIFY THE NEED FOR THESE SERVICES FURNISHED UNDER        THIS PLAN OF TREATMENT AND WHILE UNDER MY CARE     (Physician co-signature of this document indicates review and certification of  the therapy plan).                     Certification Date From:  07/06/22   Certification Date To:  08/31/22    Referring Provider:  FROY Ray NP    Initial Assessment        See Epic Evaluation Start of Care Date: 07/06/22

## 2022-07-13 ENCOUNTER — HOSPITAL ENCOUNTER (OUTPATIENT)
Dept: PHYSICAL THERAPY | Facility: OTHER | Age: 87
Setting detail: THERAPIES SERIES
Discharge: HOME | End: 2022-07-13
Attending: NURSE PRACTITIONER
Payer: COMMERCIAL

## 2022-07-13 PROCEDURE — 97110 THERAPEUTIC EXERCISES: CPT | Mod: GP | Performed by: PHYSICAL THERAPIST

## 2022-07-13 PROCEDURE — 97035 APP MDLTY 1+ULTRASOUND EA 15: CPT | Mod: GP | Performed by: PHYSICAL THERAPIST

## 2022-07-18 ENCOUNTER — HOSPITAL ENCOUNTER (OUTPATIENT)
Dept: PHYSICAL THERAPY | Facility: OTHER | Age: 87
Setting detail: THERAPIES SERIES
Discharge: HOME | End: 2022-07-18
Attending: NURSE PRACTITIONER
Payer: COMMERCIAL

## 2022-07-18 DIAGNOSIS — E03.9 HYPOTHYROIDISM, UNSPECIFIED TYPE: ICD-10-CM

## 2022-07-18 PROCEDURE — 97110 THERAPEUTIC EXERCISES: CPT | Mod: GP | Performed by: PHYSICAL THERAPIST

## 2022-07-18 PROCEDURE — 97035 APP MDLTY 1+ULTRASOUND EA 15: CPT | Mod: GP | Performed by: PHYSICAL THERAPIST

## 2022-07-19 RX ORDER — LEVOTHYROXINE SODIUM 50 UG/1
TABLET ORAL
Qty: 30 TABLET | Refills: 0 | Status: SHIPPED | OUTPATIENT
Start: 2022-07-19 | End: 2022-07-28

## 2022-07-19 NOTE — TELEPHONE ENCOUNTER
[facility] [place] sent Rx request for the following:      Requested Prescriptions   Pending Prescriptions Disp Refills     levothyroxine (SYNTHROID/LEVOTHROID) 50 MCG tablet [Pharmacy Med Name: LEVOTHYROXINE 50MCG TAB] 28 tablet 11     Sig: TAKE 1 TABLET BY MOUTH ONCE DAILY       Thyroid Protocol Failed - 7/19/2022  3:11 PM        Failed - Normal TSH on file in past 12 months     Recent Labs   Lab Test 05/24/22  1038   TSH 0.08*        Last Prescription Date:   5/31/22  Last Fill Qty/Refills:         60, R-0    Last Office Visit:              4/28/22 ([facility] Visit, Sierra Ray)  Future Office visit:           None    Per last prescription note:  Notes to Pharmacy: Dose reduction--stop 88 mcg,No refills until lab recheck--[facility]    Routing to Sierra Ray to review and advise and for lab ordering consideration. Karen Simon RN .............. 7/19/2022  3:16 PM

## 2022-07-20 ENCOUNTER — HOSPITAL ENCOUNTER (OUTPATIENT)
Dept: PHYSICAL THERAPY | Facility: OTHER | Age: 87
Setting detail: THERAPIES SERIES
Discharge: HOME | End: 2022-07-20
Attending: NURSE PRACTITIONER
Payer: COMMERCIAL

## 2022-07-20 PROCEDURE — 97110 THERAPEUTIC EXERCISES: CPT | Mod: GP | Performed by: PHYSICAL THERAPIST

## 2022-07-20 PROCEDURE — 97035 APP MDLTY 1+ULTRASOUND EA 15: CPT | Mod: GP | Performed by: PHYSICAL THERAPIST

## 2022-07-26 ENCOUNTER — LAB REQUISITION (OUTPATIENT)
Dept: LAB | Facility: OTHER | Age: 87
End: 2022-07-26
Payer: COMMERCIAL

## 2022-07-26 DIAGNOSIS — D64.9 ANEMIA, UNSPECIFIED: ICD-10-CM

## 2022-07-26 DIAGNOSIS — E03.9 HYPOTHYROIDISM, UNSPECIFIED: ICD-10-CM

## 2022-07-26 LAB
BASOPHILS # BLD AUTO: 0.1 10E3/UL (ref 0–0.2)
BASOPHILS NFR BLD AUTO: 1 %
EOSINOPHIL # BLD AUTO: 0.1 10E3/UL (ref 0–0.7)
EOSINOPHIL NFR BLD AUTO: 2 %
ERYTHROCYTE [DISTWIDTH] IN BLOOD BY AUTOMATED COUNT: 15.3 % (ref 10–15)
HCT VFR BLD AUTO: 34.2 % (ref 35–47)
HGB BLD-MCNC: 11.6 G/DL (ref 11.7–15.7)
IMM GRANULOCYTES # BLD: 0 10E3/UL
IMM GRANULOCYTES NFR BLD: 0 %
LYMPHOCYTES # BLD AUTO: 2.2 10E3/UL (ref 0.8–5.3)
LYMPHOCYTES NFR BLD AUTO: 27 %
MCH RBC QN AUTO: 34 PG (ref 26.5–33)
MCHC RBC AUTO-ENTMCNC: 33.9 G/DL (ref 31.5–36.5)
MCV RBC AUTO: 100 FL (ref 78–100)
MONOCYTES # BLD AUTO: 0.5 10E3/UL (ref 0–1.3)
MONOCYTES NFR BLD AUTO: 6 %
NEUTROPHILS # BLD AUTO: 5.1 10E3/UL (ref 1.6–8.3)
NEUTROPHILS NFR BLD AUTO: 64 %
NRBC # BLD AUTO: 0 10E3/UL
NRBC BLD AUTO-RTO: 0 /100
PLATELET # BLD AUTO: 348 10E3/UL (ref 150–450)
RBC # BLD AUTO: 3.41 10E6/UL (ref 3.8–5.2)
T4 FREE SERPL-MCNC: 0.77 NG/DL (ref 0.6–1.6)
TSH SERPL DL<=0.005 MIU/L-ACNC: 11.11 MU/L (ref 0.4–4)
WBC # BLD AUTO: 8 10E3/UL (ref 4–11)

## 2022-07-26 PROCEDURE — 84439 ASSAY OF FREE THYROXINE: CPT | Performed by: NURSE PRACTITIONER

## 2022-07-26 PROCEDURE — 84443 ASSAY THYROID STIM HORMONE: CPT | Performed by: NURSE PRACTITIONER

## 2022-07-26 PROCEDURE — 36415 COLL VENOUS BLD VENIPUNCTURE: CPT | Performed by: NURSE PRACTITIONER

## 2022-07-26 PROCEDURE — 85025 COMPLETE CBC W/AUTO DIFF WBC: CPT | Performed by: NURSE PRACTITIONER

## 2022-07-27 ENCOUNTER — HOSPITAL ENCOUNTER (OUTPATIENT)
Dept: PHYSICAL THERAPY | Facility: OTHER | Age: 87
Setting detail: THERAPIES SERIES
Discharge: HOME | End: 2022-07-27
Attending: NURSE PRACTITIONER
Payer: COMMERCIAL

## 2022-07-27 PROCEDURE — 97110 THERAPEUTIC EXERCISES: CPT | Mod: GP | Performed by: PHYSICAL THERAPIST

## 2022-07-27 PROCEDURE — 97035 APP MDLTY 1+ULTRASOUND EA 15: CPT | Mod: GP | Performed by: PHYSICAL THERAPIST

## 2022-07-28 ENCOUNTER — NURSING HOME VISIT (OUTPATIENT)
Dept: GERIATRICS | Facility: OTHER | Age: 87
End: 2022-07-28
Attending: NURSE PRACTITIONER
Payer: COMMERCIAL

## 2022-07-28 DIAGNOSIS — E03.9 HYPOTHYROIDISM, UNSPECIFIED TYPE: Primary | ICD-10-CM

## 2022-07-28 DIAGNOSIS — D64.9 ANEMIA, UNSPECIFIED TYPE: ICD-10-CM

## 2022-07-28 DIAGNOSIS — R42 DIZZINESS: ICD-10-CM

## 2022-07-28 DIAGNOSIS — Z79.899 ENCOUNTER FOR MEDICATION REVIEW: ICD-10-CM

## 2022-07-28 DIAGNOSIS — E03.8 OTHER SPECIFIED HYPOTHYROIDISM: ICD-10-CM

## 2022-07-28 RX ORDER — LEVOTHYROXINE SODIUM 50 UG/1
50 TABLET ORAL DAILY
Qty: 30 TABLET | Refills: 0 | Status: SHIPPED | OUTPATIENT
Start: 2022-07-28 | End: 2022-08-26

## 2022-07-28 RX ORDER — LEVOTHYROXINE SODIUM 25 UG/1
12.5 TABLET ORAL DAILY
Qty: 15 TABLET | Refills: 3 | Status: SHIPPED | OUTPATIENT
Start: 2022-07-28 | End: 2022-09-29

## 2022-07-28 NOTE — PROGRESS NOTES
Geno Horn is a 89 year old female being seen today for episodic and follow up  visit at [facility].    Code Status: Advance Directives: YES  Health Care Power of : Extended Emergency Contact Information  Primary Emergency Contact: Daniel Horn  Address: [address]           [place] [address] [facility]  Mobile Phone: [phone]  Relation: Son  Secondary Emergency Contact: Adri Chaudhary  Address: [address] [place] [address] [facility]  Home Phone: [phone]  Mobile Phone: [phone]  Relation: Daughter     Allergies: Alendronic acid, Celebrex [celecoxib], Oxybutynin, Tolterodine, Tramadol, and Trospium     Chief Complaint / HPI: Follow-up on recent labs and medication adjustments.   Resident and nursing staff report less frequency with dizziness episodes--has been working with outpatient physical therapy.  Recently adjusted levothyroxine to 50 mcg 6 weeks ago, recent monitoring labs completed July 26.  Staff and resident do not raise any concerns-resident has acclimated well to facility, very social, interactive with residents and staff.      Past Medical, Surgical, Family and Social History reviewed: YES.     Medications: reviewed  Medications - recent changes: none    Review of Systems:  General: positive for dizziness, weakness  Musculoskeletal: positive for muscular weakness  Neurologic: positive for memory problems  Endocrine: positive for thyroid disorder  All other systems negative  Toileting:    Continent of Bowel: Yes   Continent of Bladder: Yes  Mobility: walker and wheelchair    Recent Labs:   Recent Results (from the past 240 hour(s))   TSH with free T4 reflex    Collection Time: 07/26/22  8:58 AM   Result Value Ref Range    TSH 11.11 (H) 0.40 - 4.00 mU/L   T4 free    Collection Time: 07/26/22  8:58 AM   Result Value Ref Range    Free T4 0.77 0.60 - 1.60 ng/dL   CBC with platelets and differential    Collection Time: 07/26/22  8:58 AM   Result Value  Ref Range    WBC Count 8.0 4.0 - 11.0 10e3/uL    RBC Count 3.41 (L) 3.80 - 5.20 10e6/uL    Hemoglobin 11.6 (L) 11.7 - 15.7 g/dL    Hematocrit 34.2 (L) 35.0 - 47.0 %     78 - 100 fL    MCH 34.0 (H) 26.5 - 33.0 pg    MCHC 33.9 31.5 - 36.5 g/dL    RDW 15.3 (H) 10.0 - 15.0 %    Platelet Count 348 150 - 450 10e3/uL    % Neutrophils 64 %    % Lymphocytes 27 %    % Monocytes 6 %    % Eosinophils 2 %    % Basophils 1 %    % Immature Granulocytes 0 %    NRBCs per 100 WBC 0 <1 /100    Absolute Neutrophils 5.1 1.6 - 8.3 10e3/uL    Absolute Lymphocytes 2.2 0.8 - 5.3 10e3/uL    Absolute Monocytes 0.5 0.0 - 1.3 10e3/uL    Absolute Eosinophils 0.1 0.0 - 0.7 10e3/uL    Absolute Basophils 0.1 0.0 - 0.2 10e3/uL    Absolute Immature Granulocytes 0.0 <=0.4 10e3/uL    Absolute NRBCs 0.0 10e3/uL           Current Therapies: PT outpatient    Exam:  Vital signs reviewed.   GENERAL APPEARANCE: alert and no distress  EYES: Eyes grossly normal to inspection,  conjunctivae and sclerae normal  CV: no peripheral edema   MS: no musculoskeletal defects are noted   SKIN: warm and dry  NEURO: Normal strength and tone, sensory exam grossly normal, mentation intact and speech normal  PSYCH: mentation appears normal and affect normal/bright    Assessment and Plan:    Medications and recent monitoring labs reviewed--will increase levothyroxine from 50mcg to 62.5 mg with a  50 mcg tab together with 12.5 mcg or one half 25 mcg tab daily--will recheck labs in 6 weeks.  Hemoglobin improved will continue daily Vitron-C and recheck with thyroid labs    Nursing staffl reported there was a mixup with transportation and resident missed her neurology consultation appointment last week.  Dizziness episodes have decreased in frequency and much more manageable since ambulatory outpatient therapy.  Will defer rescheduling neurology consultation to daughter Maria Antonia, who is most involved in her care and care decisions.    Follow-up labs in 6 weeks, sooner  PRN    (E03.9) Hypothyroidism, unspecified type  (primary encounter diagnosis)    Plan: levothyroxine (SYNTHROID/LEVOTHROID) 50 MCG         tablet            (E03.8) Other specified hypothyroidism  Plan: levothyroxine (SYNTHROID/LEVOTHROID) 25 MCG         tablet            (Z79.899) Encounter for medication review      (D64.9) Anemia, unspecified type    (R42) Dizziness

## 2022-08-01 ENCOUNTER — HOSPITAL ENCOUNTER (OUTPATIENT)
Dept: GENERAL RADIOLOGY | Facility: OTHER | Age: 87
Discharge: HOME | End: 2022-08-01
Attending: FAMILY MEDICINE
Payer: COMMERCIAL

## 2022-08-01 ENCOUNTER — OFFICE VISIT (OUTPATIENT)
Dept: FAMILY MEDICINE | Facility: OTHER | Age: 87
End: 2022-08-01
Attending: FAMILY MEDICINE
Payer: COMMERCIAL

## 2022-08-01 VITALS
SYSTOLIC BLOOD PRESSURE: 120 MMHG | BODY MASS INDEX: 24.56 KG/M2 | HEART RATE: 70 BPM | TEMPERATURE: 97.2 F | OXYGEN SATURATION: 98 % | RESPIRATION RATE: 16 BRPM | DIASTOLIC BLOOD PRESSURE: 78 MMHG | WEIGHT: 130 LBS

## 2022-08-01 DIAGNOSIS — M75.41 SUBACROMIAL IMPINGEMENT OF RIGHT SHOULDER: Primary | ICD-10-CM

## 2022-08-01 DIAGNOSIS — M19.011 ARTHRITIS OF RIGHT GLENOHUMERAL JOINT: ICD-10-CM

## 2022-08-01 PROCEDURE — G0463 HOSPITAL OUTPT CLINIC VISIT: HCPCS | Mod: 25

## 2022-08-01 PROCEDURE — 99214 OFFICE O/P EST MOD 30 MIN: CPT | Mod: 25 | Performed by: FAMILY MEDICINE

## 2022-08-01 PROCEDURE — G0463 HOSPITAL OUTPT CLINIC VISIT: HCPCS

## 2022-08-01 PROCEDURE — 250N000011 HC RX IP 250 OP 636: Performed by: FAMILY MEDICINE

## 2022-08-01 PROCEDURE — 73030 X-RAY EXAM OF SHOULDER: CPT | Mod: RT

## 2022-08-01 PROCEDURE — 20610 DRAIN/INJ JOINT/BURSA W/O US: CPT | Mod: RT | Performed by: FAMILY MEDICINE

## 2022-08-01 PROCEDURE — 250N000009 HC RX 250: Performed by: FAMILY MEDICINE

## 2022-08-01 RX ORDER — DEXAMETHASONE SODIUM PHOSPHATE 10 MG/ML
5 INJECTION, SOLUTION INTRAMUSCULAR; INTRAVENOUS ONCE
Status: COMPLETED | OUTPATIENT
Start: 2022-08-01 | End: 2022-08-01

## 2022-08-01 RX ORDER — BUPIVACAINE HYDROCHLORIDE 5 MG/ML
2 INJECTION, SOLUTION EPIDURAL; INTRACAUDAL ONCE
Status: COMPLETED | OUTPATIENT
Start: 2022-08-01 | End: 2022-08-01

## 2022-08-01 RX ORDER — LIDOCAINE HYDROCHLORIDE 10 MG/ML
2 INJECTION, SOLUTION EPIDURAL; INFILTRATION; INTRACAUDAL; PERINEURAL ONCE
Status: COMPLETED | OUTPATIENT
Start: 2022-08-01 | End: 2022-08-01

## 2022-08-01 RX ADMIN — DEXAMETHASONE SODIUM PHOSPHATE 5 MG: 10 INJECTION, SOLUTION INTRAMUSCULAR; INTRAVENOUS at 11:16

## 2022-08-01 RX ADMIN — LIDOCAINE HYDROCHLORIDE 2 ML: 10 INJECTION, SOLUTION INFILTRATION; PERINEURAL at 11:16

## 2022-08-01 RX ADMIN — BUPIVACAINE HYDROCHLORIDE 10 MG: 5 INJECTION, SOLUTION EPIDURAL; INTRACAUDAL; PERINEURAL at 11:16

## 2022-08-01 ASSESSMENT — PAIN SCALES - GENERAL: PAINLEVEL: EXTREME PAIN (8)

## 2022-08-01 NOTE — PROGRESS NOTES
Sports Medicine Office Note    HPI:  89-year-old female coming in for evaluation of right shoulder pain.  Her pain has been present for 4-5 months.  She feels she has developed this pain because she has been using a wheelchair due to a previous pelvis fracture that occurred in the fall .  She localizes the pain to the lateral aspect of the shoulder and upper arm.  She rates her pain a 7-2010.  She characterizes the pain as achy.  She has difficulty with lifting and using her wheelchair.  She is currently undergoing physical therapy.  In addition she has tried heat, ice, topical medications, and APAP.      EXAM:  /78 (BP Location: Right arm, Patient Position: Sitting, Cuff Size: Adult Regular)   Pulse 70   Temp 97.2  F (36.2  C) (Temporal)   Resp 16   Wt 59 kg (130 lb)   SpO2 98%   BMI 24.56 kg/m    MUSCULOSKELETAL EXAM:  RIGHT SHOULDER  Inspection:  -No gross deformity  -No bruising or swelling    Tenderness to palpation of the:  -SC joint:  Negative  -AC joint:  Negative  -Clavicle:  Negative  -Biceps tendon in bicipital groove:  Negative  -Deltoid musculature:  Negative  -Upper trapezius musculature:  Negative    Range of Motion:  -Active elevation: 140 bilaterally    Strength:  -Supraspinatus:  4-/5  -Infraspinatus:  4/5  -Subscapularis:  5/5  -Deltoid:  4/5    Special Tests:  -Clare test: Positive pain and weakness  -Langley test: Positive  -Mid-arc pain: Present  -Lag sign:  Negative    Other:  -Intact sensation to light touch distally.  -No signs of cyanosis. Normal skin temperature of the upper extremity.  -Elbow:  No gross deformity. Full range of motion.  -Hand/wrist:  No gross deformity. Full range of motion.  -Left shoulder:  No gross deformity. No palpable tenderness. Normal strength.      IMAGIN2022: 3 view right shoulder x-ray  - Severe degenerative changes with bone-on-bone arthritis of the glenohumeral joint.  Moderate degenerative changes of the AC joint.  Diffuse  osteopenia.      ASSESSMENT/PLAN:  Diagnoses and all orders for this visit:  Subacromial impingement of right shoulder  -     XR Shoulder Right G/E 3 Views  -     lidocaine (PF) (XYLOCAINE) 1 % injection 2 mL  -     Bupivacaine 0.5% 2mL Infiltration  -     DRAIN/INJECT LARGE JOINT/BURSA  -     dexamethasone PF (DECADRON) injection 5 mg  Arthritis of right glenohumeral joint    89-year-old female with significant arthritis of the right glenohumeral joint with signs of subacromial impingement on exam.  X-rays were performed in the office today and personally reviewed by me with the findings as demonstrated above by my interpretation.  Treatment options include physical therapy, ice, topical NSAIDs, topical lidocaine-based products, APAP, or CSI.  I do not feel this patient would be a good surgical candidate due to age and other underlying health comorbidities.  After reviewing the risks/benefits, the patient elects to proceed with a subacromial CSI.  See procedure note below:    PROCEDURE:  Subacromial Injection of the Right Shoulder      PROCEDURAL PAUSE:    A procedural pause was conducted to verify:  correct patient identity, procedure to be performed, and as applicable, correct side, site, and correct patient position.      INFORMED CONSENT:   I discussed the risks, possible benefits, and alternatives to injection.  Following denial of allergy and review of potential side effects and complications (including but not necessarily limited to infection, allergic reaction, fat necrosis, skin depigmentation, local tissue breakdown, systemic effects of corticosteroids, elevation of blood glucose, injury to soft tissue and/or nerves, and seizure), all questions were answered, and consent was given to proceed.  Patient verbalized understanding.      PROCEDURE DETAILS:    Side and site were marked, and a time out was performed to verify appropriate patient identifiers.  Following this the right posterolateral shoulder, just  inferior to the acromion, was prepped with chlorhexidine.  Utilizing a 25-gauge needle the right subacromial bursa was injected using a posterolateral to anteromedial approach with 2mL of 1% Lidocaine, 2mL of 0.5% bupivacaine, and 0.5mL dexamethasone (10mg/mL).  0mL was wasted.      The patient tolerated the procedure without complication and was discharged in good condition after a short observation period.  The patient was instructed to contact me regarding any questions pertaining to the procedure.      DIAGNOSIS:    -Successful injection of the right subacromial bursa without immediate complication      PLAN:   -Post-procedure care reviewed, including avoiding submersion of the injection site for 48 hours   -Return precautions reviewed for signs/symptoms that would be concerning for infection   -The patient was instructed to ice and take APAP this evening if needed   -Continue with physical therapy  - Continue with OTC APAP as needed  - Form filled out for residential facility  -Return to clinic as needed  - If this injection fails to provide desired relief, consider ultrasound-guided glenohumeral injection      Miguel Harrison MD  8/1/2022  10:07 AM    Total time spent with this patient was 48 minutes which included chart review, visualization and independent interpretation of images, time spent with the patient, and documentation.    Procedure time:  4 minute(s)

## 2022-08-01 NOTE — NURSING NOTE
"Chief Complaint   Patient presents with     Shoulder Pain     Right shoulder pain, ongoing for 4-5 months       Patient presents for right shoulder pain ongoing for 4-5 months. No known injury. Pain 7-8/10. Started noticing pain when moving to [facility] and they had patient change from an electric wheel chair to a manual wheel chair.     Initial /78 (BP Location: Right arm, Patient Position: Sitting, Cuff Size: Adult Regular)   Pulse 70   Temp 97.2  F (36.2  C) (Temporal)   Resp 16   Wt 59 kg (130 lb)   SpO2 98%   BMI 24.56 kg/m   Estimated body mass index is 24.56 kg/m  as calculated from the following:    Height as of 4/13/22: 1.549 m (5' 1\").    Weight as of this encounter: 59 kg (130 lb).       Medication Reconciliation: Complete    Magy Barbour LPN .......  8/1/2022  10:05 AM   "

## 2022-08-05 ENCOUNTER — HOSPITAL ENCOUNTER (OUTPATIENT)
Dept: PHYSICAL THERAPY | Facility: OTHER | Age: 87
Setting detail: THERAPIES SERIES
Discharge: HOME | End: 2022-08-05
Attending: NURSE PRACTITIONER
Payer: COMMERCIAL

## 2022-08-05 PROCEDURE — 97035 APP MDLTY 1+ULTRASOUND EA 15: CPT | Mod: GP | Performed by: PHYSICAL THERAPIST

## 2022-08-05 PROCEDURE — 97110 THERAPEUTIC EXERCISES: CPT | Mod: GP | Performed by: PHYSICAL THERAPIST

## 2022-08-05 NOTE — PROGRESS NOTES
[facility]    Outpatient Physical Therapy Discharge Note    Patient: Geno Horn  : 1926    Beginning/End Dates of Reporting Period:  22 to 22    Referring Provider: FROY Ray NP    Therapy Diagnosis: 1) Right shoulder pain   2) primary osteoarthritis involving multiple joints         Client Self Report: Patient got an injection on .  Was initially more sore, but seems to be improving now.  Reports she has been doing HEP with staff and doing some walking with staff and FWW.  Still gets sore from wheeling herself in her wheelchair at times.    Objective Measurements:  Objective Measure: R shoulder active flexion:   = 55         = 57        = 132, no wincing       Goals:  Goal Identifier R shoulder Flexion ROM   Goal Description Patient will demonstrate increased active right shoulder flexion from 55 degrees to 70 degrees or more for increased ability to reach for items in her room.   Target Date 22   Date Met  22   Progress (detail required for progress note):  Goal met     Goal Identifier R shoulder ER ROM:   Goal Description Patient will demonstrate 35 degrees or more External Rotation with arm neurtral or slightly abducted, for improved ability to don a shirt and/or jacket with decreased pain.   Target Date 22   Date Met      Progress (detail required for progress note):  NT     Goal Identifier wheelchair mobility   Goal Description Patient will be able to propell wheelchair 50 feet or more with no increased shoulder pain for increased ability to get to dining room from bedroom at [facility].   Target Date 22   Date Met      Progress (detail required for progress note):  Not met           Plan:  Discharge from therapy.    Reason for Discharge: Minimal change in function or pain reports with physical therapy.  Patient has had most improvement after  getting an injection in the right shoulder.      Equipment Issued: none    Discharge Plan: Patient to continue home program.

## 2022-08-11 ENCOUNTER — LAB (OUTPATIENT)
Dept: LAB | Facility: OTHER | Age: 87
End: 2022-08-11
Attending: NURSE PRACTITIONER
Payer: COMMERCIAL

## 2022-08-11 ENCOUNTER — NURSING HOME VISIT (OUTPATIENT)
Dept: GERIATRICS | Facility: OTHER | Age: 87
End: 2022-08-11
Attending: NURSE PRACTITIONER
Payer: COMMERCIAL

## 2022-08-11 DIAGNOSIS — R35.89 FREQUENCY OF URINATION AND POLYURIA: ICD-10-CM

## 2022-08-11 DIAGNOSIS — R35.0 FREQUENCY OF URINATION AND POLYURIA: ICD-10-CM

## 2022-08-11 DIAGNOSIS — N30.00 ACUTE CYSTITIS WITHOUT HEMATURIA: Primary | ICD-10-CM

## 2022-08-11 DIAGNOSIS — R42 DIZZINESS: ICD-10-CM

## 2022-08-11 DIAGNOSIS — R35.81 NOCTURNAL POLYURIA: ICD-10-CM

## 2022-08-11 DIAGNOSIS — E03.9 HYPOTHYROIDISM, UNSPECIFIED TYPE: ICD-10-CM

## 2022-08-11 DIAGNOSIS — L29.9 ITCHING: ICD-10-CM

## 2022-08-11 DIAGNOSIS — J30.2 SEASONAL ALLERGIC RHINITIS, UNSPECIFIED TRIGGER: ICD-10-CM

## 2022-08-11 LAB
ALBUMIN UR-MCNC: NEGATIVE MG/DL
AMORPH CRY #/AREA URNS HPF: ABNORMAL /HPF
APPEARANCE UR: CLEAR
BACTERIA #/AREA URNS HPF: ABNORMAL /HPF
BILIRUB UR QL STRIP: NEGATIVE
COLOR UR AUTO: YELLOW
GLUCOSE UR STRIP-MCNC: NEGATIVE MG/DL
HGB UR QL STRIP: NEGATIVE
KETONES UR STRIP-MCNC: NEGATIVE MG/DL
LEUKOCYTE ESTERASE UR QL STRIP: NEGATIVE
MUCOUS THREADS #/AREA URNS LPF: PRESENT /LPF
NITRATE UR QL: POSITIVE
PH UR STRIP: 7 [PH] (ref 5–9)
RBC URINE: <1 /HPF
SP GR UR STRIP: 1.02 (ref 1–1.03)
UROBILINOGEN UR STRIP-MCNC: NORMAL MG/DL
WBC URINE: 1 /HPF

## 2022-08-11 PROCEDURE — 81001 URINALYSIS AUTO W/SCOPE: CPT | Mod: ZL

## 2022-08-11 PROCEDURE — 87086 URINE CULTURE/COLONY COUNT: CPT | Mod: ZL

## 2022-08-11 RX ORDER — MECLIZINE HCL 12.5 MG 12.5 MG/1
12.5 TABLET ORAL 3 TIMES DAILY PRN
Qty: 90 TABLET | Refills: 1 | COMMUNITY
Start: 2022-08-11

## 2022-08-11 RX ORDER — CETIRIZINE HYDROCHLORIDE 5 MG/1
TABLET ORAL
Qty: 56 TABLET | Refills: 11 | COMMUNITY
Start: 2022-08-11 | End: 2022-12-29

## 2022-08-11 RX ORDER — NITROFURANTOIN 25; 75 MG/1; MG/1
100 CAPSULE ORAL 2 TIMES DAILY
Qty: 14 CAPSULE | Refills: 0 | Status: SHIPPED | OUTPATIENT
Start: 2022-08-11 | End: 2022-08-18

## 2022-08-11 ASSESSMENT — ENCOUNTER SYMPTOMS
SPEECH DIFFICULTY: 0
EYE ITCHING: 1
DIZZINESS: 1
SHORTNESS OF BREATH: 0
FEVER: 0
WOUND: 0
DIFFICULTY URINATING: 0
EYE PAIN: 0
EYE REDNESS: 0
CHEST TIGHTNESS: 0
HEMATOLOGIC/LYMPHATIC NEGATIVE: 1
MUSCULOSKELETAL NEGATIVE: 1
NUMBNESS: 0
SORE THROAT: 0
HEMATURIA: 0
FLANK PAIN: 0
TROUBLE SWALLOWING: 0
PARESTHESIAS: 0
CHILLS: 0
POLYPHAGIA: 0
DYSURIA: 0
UNEXPECTED WEIGHT CHANGE: 0
PHOTOPHOBIA: 0
POLYDIPSIA: 0
EYE DISCHARGE: 0
LIGHT-HEADEDNESS: 0
COUGH: 0
APPETITE CHANGE: 0
DIAPHORESIS: 0
FACIAL SWELLING: 0
HEADACHES: 0
ACTIVITY CHANGE: 0
PSYCHIATRIC NEGATIVE: 1
FATIGUE: 0
FREQUENCY: 1

## 2022-08-11 ASSESSMENT — VISUAL ACUITY: OU: 1

## 2022-08-11 NOTE — PATIENT INSTRUCTIONS
"Results for orders placed or performed in visit on 08/11/22   UA with Microscopic reflex to Culture     Status: Abnormal    Specimen: Urine, NOS   Result Value Ref Range    Color Urine Yellow Colorless, Straw, Light Yellow, Yellow    Appearance Urine Clear Clear    Glucose Urine Negative Negative mg/dL    Bilirubin Urine Negative Negative    Ketones Urine Negative Negative mg/dL    Specific Gravity Urine 1.020 1.005 - 1.030    Blood Urine Negative Negative    pH Urine 7.0 5.0 - 9.0    Protein Albumin Urine Negative Negative mg/dL    Urobilinogen Urine Normal Normal, 2.0 mg/dL    Nitrite Urine Positive (A) Negative    Leukocyte Esterase Urine Negative Negative    Bacteria Urine Few (A) None Seen /HPF    Mucus Urine Present (A) None Seen /LPF    Amorphous Crystals Urine Few (A) None Seen /HPF    RBC Urine <1 <=2 /HPF    WBC Urine 1 <=5 /HPF    Narrative    Urine Culture ordered based on laboratory criteria      What is the urinary tract?  The urinary tract is the group of organs in the body that handle urine (figure 1). The urinary tract includes the:    ?Kidneys - These are 2 bean-shaped organs that filter the blood to make urine.    ?Bladder - This is a balloon-shaped organ that stores urine.    ?Ureters - These are 2 tubes that carry urine from the kidneys to the bladder.    ?Urethra - This is the tube that carries urine from the bladder to the outside of the body.    What are urinary tract infections?  Urinary tract infections, also called \"UTIs,\" are infections that affect either the bladder or the kidneys:    ?Bladder infections are more common than kidney infections. They happen when bacteria get into the urethra and travel up into the bladder. The medical term for bladder infection is \"cystitis.\"    ?Kidney infections happen when the bacteria travel even higher, up into the kidneys. The medical term for kidney infection is \"pyelonephritis.\"    Both bladder and kidney infections are more common in females than " in males.    The risk of UTIs is also higher in people who have a urinary catheter. A catheter is a thin, flexible tube that drains urine from the bladder. It might be used in people who are in the hospital and cannot urinate in the normal way.    What are the symptoms of a bladder infection?  The symptoms include:    ?Pain or a burning feeling when you urinate    ?The need to urinate often    ?The need to urinate suddenly or in a hurry    ?Blood in the urine    What are the symptoms of a kidney infection?  The symptoms of a kidney infection can include the symptoms of a bladder infection, but kidney infections can also cause:    ?Fever    ?Back pain    ?Nausea or vomiting    How do I find out if I have a urinary tract infection?  If you think you might have a urinary tract infection, call your doctor or nurse. Sometimes, they can tell if you have a urinary tract infection just by learning about your symptoms.    Your doctor or nurse might do a simple urine test. If they think you might have a kidney infection or are unsure what is causing your symptoms, they might also do a more involved urine test to check for bacteria.    How are urinary tract infections treated?  Most urinary tract infections are treated with antibiotic pills. These pills work by killing the germs that cause the infection.    If you have a bladder infection, you will probably need to take antibiotics for 3 to 7 days. If you have a kidney infection, you will probably need to take antibiotics for longer - maybe for up to 2 weeks. If you have a kidney infection, it's also possible you will need to be treated in the hospital.    Your symptoms should begin to improve within a day of starting antibiotics. But you should finish all the antibiotic pills you get. Otherwise your infection might come back.    If needed, you can also take a medicine to numb your bladder. This medicine eases the pain caused by urinary tract infections. It also reduces the  "need to urinate.    What if I get bladder infections a lot?  First, check with your doctor or nurse to make sure that you are really having bladder infections. The symptoms of bladder infection can be caused by other things. Your doctor or nurse will want to see if those problems might be causing your symptoms.    But if you are really dealing with repeated infections, there are things you can do to keep from getting more infections. These include:    ?Drinking more fluid - This can help prevent bladder infections.    ?Vaginal estrogen - If you are a female who has already been through menopause, your doctor might suggest this. Vaginal estrogen comes in a cream or a flexible ring that you put into your vagina. It can help prevent bladder infections.    Other things that might help include:    If you get a lot of bladder infections, and the above methods have not helped, your doctor might give you antibiotics to help prevent infection. But long-term use of antibiotics has downsides, so doctors usually suggest trying other things first.    Can cranberry juice or other products prevent bladder infections?  People often wonder about \"natural\" products that claim to help prevent bladder infections. These include cranberry juice and other cranberry products, probiotics, vitamin C, and D-mannose. There is not good evidence that these things work. However, there is also no clear evidence that they are harmful. If you have questions about these or other products, talk with your doctor or nurse.   "

## 2022-08-11 NOTE — PROGRESS NOTES
Geno Horn is a 89 year old female being seen today for acute visit at [facility] [place].    Assessment & Plan       ICD-10-CM    1. Acute cystitis without hematuria  N30.00 nitroFURantoin macrocrystal-monohydrate (MACROBID) 100 MG capsule   2. Frequency of urination and polyuria  R35.0 UA with Microscopic reflex to Culture    R35.89 nitroFURantoin macrocrystal-monohydrate (MACROBID) 100 MG capsule   3. Nocturnal polyuria  R35.81 UA with Microscopic reflex to Culture     nitroFURantoin macrocrystal-monohydrate (MACROBID) 100 MG capsule   4. Hypothyroidism, unspecified type  E03.9    5. Seasonal allergic rhinitis, unspecified trigger  J30.2 cetirizine (ZYRTEC) 5 MG tablet     dextran 70-hypromellose (TEARS NATURALE FREE PF) 0.1-0.3 % ophthalmic solution   6. Dizziness  R42 meclizine (ANTIVERT) 12.5 MG tablet   7. Itching  L29.9 cetirizine (ZYRTEC) 5 MG tablet          Geno is being seen today for Increased frequency urination that began 8/4/22. She was going hourly and that is not normal for her.. Patient states shes drinking more water. Not enough to increase urine though. This week, she has been using the bathroom hourly during the day. Tuesday 8/9/22, noctural polyuria- went 4 times overnight, large enough amount to wring out brief, Chux on her bed is often soiled as well.  No burning, no problem starting stopping flow of urine. No associated polydipsia. Staff report that she is drinking her usual amount of fluid throughout the day.    She has a history significant for hypothyroidism, with her last labs indicating a TSH of 11.11 mU/L and a Free T4 of 0.77 ng/dL on 7/26/22. Her levothyroxine was increased by 12.5mcg for a total daily dose of 62.5 mcg/day.    Has not been wearing open-toed compressing stockings due to fungal infection, lotrimin applied to feet daily. Clearing up well. Has had 2 + edema while holding the stockings, baseline edema when stockings are off per staff.    Reminded her to ask for meclizine PRN  for dizziness if she is having spells. Will place order for artifical tears three times daily as needed to help with eye irritation that she says is related to allergies. Eye exam is WNL, with the exception of slight conjunctival injection bilaterally.    Will send in a UA today to first assess for any infection that may be causing the polyuria. If UA is unremarkable for infection, would like to get BMP to assess electrolyte status, especially sodium, as she has had  and  kidney function,         No follow-ups on file.    [facility]     Code Status: Full Code.   Health Care Power of : Extended Emergency Contact Information  Primary Emergency Contact: Daniel Horn  Address: [address]           [place] [address] [facility]  Mobile Phone: [phone]  Relation: Son  Secondary Emergency Contact: Adri Chaudhary  Address: [address] [place] [address] [facility]  Home Phone: [phone]  Mobile Phone: [phone]  Relation: Daughter     Allergies: Alendronic acid, Celebrex [celecoxib], Oxybutynin, Tolterodine, Tramadol, and Trospium     Past Medical, Surgical, Family and Social History reviewed: YES.     Medications:   Current Outpatient Medications   Medication Sig Dispense Refill     cetirizine (ZYRTEC) 5 MG tablet TAKE 1 TABLET BY MOUTH TWO TIMES A DAY 56 tablet 11     dextran 70-hypromellose (TEARS NATURALE FREE PF) 0.1-0.3 % ophthalmic solution Place 1 drop into both eyes 3 times daily as needed (eye dryness, irritation, itching) 90 each 3     meclizine (ANTIVERT) 12.5 MG tablet Take 1 tablet (12.5 mg) by mouth 3 times daily as needed for dizziness 90 tablet 1     nitroFURantoin macrocrystal-monohydrate (MACROBID) 100 MG capsule Take 1 capsule (100 mg) by mouth 2 times daily for 7 days 14 capsule 0     acetaminophen (TYLENOL) 650 MG CR tablet Take 1 tablet (650 mg) by mouth 2 times daily 60 tablet 4     acetaminophen (TYLENOL)  650 MG CR tablet Take 650 mg by mouth daily as needed for mild pain or fever       carbamide peroxide (DEBROX) 6.5 % otic solution Place 5 drops into both ears At Bedtime 15 mL 0     cyanocobalamin (VITAMIN B-12) 500 MCG tablet TAKE 1 TABLET BY MOUTH ONCE DAILY 90 tablet 2     fluticasone (FLONASE) 50 MCG/ACT nasal spray Spray 2 sprays into both nostrils daily 16 g 11     Incontinence Supply Disposable (DEPEND EASY FIT UNDERGARMENTS) MISC Medium size disposable under pants covered by insurance 100 each 11     Incontinence Supply Disposable (POISE PAD) PADS Change pad 3 x daily, #6 long Maxi pads 90 each 11     levothyroxine (SYNTHROID/LEVOTHROID) 25 MCG tablet Take 0.5 tablets (12.5 mcg) by mouth daily Together with 50 mcg tab for daily total 62.5 MCG daily 15 tablet 3     levothyroxine (SYNTHROID/LEVOTHROID) 50 MCG tablet Take 1 tablet (50 mcg) by mouth daily Together with 1/2  of 25 mcg tab (12.5mcg) for daily total 62.5 mcg daily 30 tablet 0     magnesium hydroxide (MILK OF MAGNESIA) 400 MG/5ML suspension Take 15 ml daily PRN for no BM 3 days 473 mL 1     MAPAP ARTHRITIS PAIN 650 MG CR tablet TAKE 1 TABLET BY MOUTH TWO TIMES A  tablet 2     menthol (ICY HOT) 5 % PTCH Apply 1 patch topically 4 times daily as needed for muscle soreness (max of 8 hours for each patch)       Multiple Vitamin (TAB-A-RADHA) TABS TAKE 1 TABLET BY MOUTH ONCE DAILY (HEALTH MAINT) 28 tablet 11     mupirocin (BACTROBAN) 2 % external ointment Apply to skin tear wound with dressing changes and to left facial lesion BID x 2 weeks 30 g 1     Nutritional Supplements (BOOST HIGH PROTEIN) LIQD 1 can daily       Omega-3 Fatty Acids (SM FISH OIL) 1000 MG CAPS TAKE 1 CAPSULE BY MOUTH ONCE DAILY (SPINAL STENOSIS) 28 capsule 11     ondansetron (ZOFRAN) 4 MG tablet Take 1 tablet (4 mg) by mouth every 8 hours as needed for nausea 12 tablet 1     order for DME Equipment being ordered: Spikes for cane 1 Units 1     order for DME Equipment being  ordered: plastic underpants 1 Units 1     sodium chloride (OCEAN) 0.65 % nasal spray Spray 2 sprays into both nostrils daily as needed for congestion       VITRON-C  MG TABS tablet TAKE 1 TABLET BY MOUTH ONCE DAILY ON EMPTY STOMACH 90 tablet 2       HPI  Review of Systems   Constitutional: Negative for activity change, appetite change, chills, diaphoresis, fatigue, fever and unexpected weight change.   HENT: Negative for congestion, ear discharge, ear pain, facial swelling, sore throat and trouble swallowing.    Eyes: Positive for itching. Negative for photophobia, pain, discharge, redness and visual disturbance.   Respiratory: Negative for cough, chest tightness and shortness of breath.    Cardiovascular: Positive for peripheral edema (she is not wearing opened-toed compression socks at this time due to healing fungal infection of toes.). Negative for chest pain.   Endocrine: Positive for polyuria. Negative for cold intolerance, heat intolerance, polydipsia and polyphagia.   Genitourinary: Positive for frequency and urgency. Negative for difficulty urinating, dyspareunia, dysuria, enuresis, flank pain, genital sores, hematuria, menstrual problem and pelvic pain. Decreased urine volume: patient reports decreased urine, staff states it has increased in amount and frequency.   Musculoskeletal: Negative.    Skin: Negative for rash and wound.   Allergic/Immunologic: Positive for environmental allergies. Negative for food allergies and immunocompromised state.   Neurological: Positive for dizziness (has taken meclizine in the past for dizziness). Negative for speech difficulty, light-headedness, numbness, headaches and paresthesias.   Hematological: Negative.    Psychiatric/Behavioral: Negative.        Toileting:    Continent of Bowel: Yes   Continent of Bladder: Yes, recent nocturnal incontinence and polyuria.  Mobility: wheelchair    Recent Labs: Thyroid labs and CBC reviewed from 7/26/22  Results for orders  placed or performed in visit on 08/11/22   UA with Microscopic reflex to Culture     Status: Abnormal    Specimen: Urine, NOS   Result Value Ref Range    Color Urine Yellow Colorless, Straw, Light Yellow, Yellow    Appearance Urine Clear Clear    Glucose Urine Negative Negative mg/dL    Bilirubin Urine Negative Negative    Ketones Urine Negative Negative mg/dL    Specific Gravity Urine 1.020 1.005 - 1.030    Blood Urine Negative Negative    pH Urine 7.0 5.0 - 9.0    Protein Albumin Urine Negative Negative mg/dL    Urobilinogen Urine Normal Normal, 2.0 mg/dL    Nitrite Urine Positive (A) Negative    Leukocyte Esterase Urine Negative Negative    Bacteria Urine Few (A) None Seen /HPF    Mucus Urine Present (A) None Seen /LPF    Amorphous Crystals Urine Few (A) None Seen /HPF    RBC Urine <1 <=2 /HPF    WBC Urine 1 <=5 /HPF    Narrative    Urine Culture ordered based on laboratory criteria       Pertinent Screening Tool results: None    Current Therapies: none    Physical Exam  Constitutional:       Appearance: Normal appearance.   HENT:      Head: Normocephalic and atraumatic.   Eyes:      General: Lids are normal. Lids are everted, no foreign bodies appreciated. Vision grossly intact. No allergic shiner or visual field deficit.     Conjunctiva/sclera:      Right eye: Right conjunctiva is injected. No exudate.     Left eye: Left conjunctiva is injected. No exudate.  Cardiovascular:      Rate and Rhythm: Normal rate and regular rhythm.      Pulses: Normal pulses.      Heart sounds: Normal heart sounds. No murmur heard.  Pulmonary:      Effort: Pulmonary effort is normal. No respiratory distress.      Breath sounds: Normal breath sounds and air entry. No stridor. No wheezing.   Abdominal:      General: Abdomen is flat. Bowel sounds are normal.      Palpations: Abdomen is soft.      Tenderness: There is no right CVA tenderness or left CVA tenderness.   Musculoskeletal:         General: Normal range of motion.   Skin:      General: Skin is warm and dry.      Capillary Refill: Capillary refill takes less than 2 seconds.   Neurological:      General: No focal deficit present.      Mental Status: She is alert and oriented to person, place, and time. Mental status is at baseline.   Psychiatric:         Mood and Affect: Mood normal.         Behavior: Behavior normal.

## 2022-08-13 LAB — BACTERIA UR CULT: NO GROWTH

## 2022-08-24 DIAGNOSIS — E03.9 HYPOTHYROIDISM, UNSPECIFIED TYPE: Primary | ICD-10-CM

## 2022-08-26 RX ORDER — LEVOTHYROXINE SODIUM 50 UG/1
TABLET ORAL
Qty: 28 TABLET | Refills: 0 | Status: SHIPPED | OUTPATIENT
Start: 2022-08-26 | End: 2022-09-30

## 2022-08-26 NOTE — TELEPHONE ENCOUNTER
[facility] sent Rx request for the following:      LEVOTHYROXINE 50MCG TAB      Last Prescription Date:   7/28/2022  Last Fill Qty/Refills:         30, R-0    Last Office Visit:              8/11/2022   Future Office visit:           none    Cornell Shoemaker RN, BSN  ....................  8/26/2022   10:18 AM

## 2022-09-01 ENCOUNTER — NURSING HOME VISIT (OUTPATIENT)
Dept: GERIATRICS | Facility: OTHER | Age: 87
End: 2022-09-01
Attending: NURSE PRACTITIONER
Payer: COMMERCIAL

## 2022-09-01 DIAGNOSIS — E03.9 HYPOTHYROIDISM, UNSPECIFIED TYPE: ICD-10-CM

## 2022-09-01 DIAGNOSIS — T14.8XXA MULTIPLE SKIN TEARS: Primary | ICD-10-CM

## 2022-09-01 DIAGNOSIS — Z99.3 WHEELCHAIR DEPENDENCE: ICD-10-CM

## 2022-09-01 RX ORDER — TRANSPARENT DRESSING 4"X4.8"
1 BANDAGE TOPICAL
Qty: 30 EACH | Refills: 11 | COMMUNITY
Start: 2022-09-01 | End: 2024-02-29

## 2022-09-01 NOTE — PROGRESS NOTES
Geno Horn is a 89 year old female being seen today for acute and follow up visit visit at [facility].    Code Status: Advance Directives: YES-.   Health Care Power of : Extended Emergency Contact Information  Primary Emergency Contact: Daniel Horn  Address: [address]           [place] [address] [facility]  Mobile Phone: [phone]  Relation: Son  Secondary Emergency Contact: Adri Chaudhary  Address: [address] [place] [address] [facility]  Home Phone: [phone]  Mobile Phone: [phone]  Relation: Daughter     Allergies: Alendronic acid, Celebrex [celecoxib], Oxybutynin, Tolterodine, Tramadol, and Trospium     Chief Complaint / HPI: [facility] RN reports new skin tear wounds on lower left leg corresponding to wheelchair rests--currently covering with Foam dressing.  Resident has significant history of skin tear wounds from past history of falls and daily wheelchair transfer injuries.  [facility] staff have padded all prominent hardware on wheelchair to prevent further injuries.  Resident appears happy, comfortable--has acclimated well to [place] from previous facility and is very social and often seen  With other residents dining and participating in social activities. RN feels the vertigo symptoms have been much less and more rare than daily as previous.  Recently discharged from outpatient PT.  No other concerns raised today.    Past Medical, Surgical, Family and Social History reviewed: YES.     Medications: reviewed  Medications - recent changes:     Review of Systems:  General: positive for weakness  Skin: positive for skin tear ulcers left lower leg  Musculoskeletal: positive for back pain, arthritis, joint pain and muscular weakness  All other systems negative  Toileting:    Continent of Bowel: Yes   Continent of Bladder: Yes  Mobility: wheelchair    Recent Labs: reviewed      Current Therapies: Outpatient PT    Exam:  Vital signs reviewed.   GENERAL APPEARANCE:  healthy, alert and no distress  EYES: Eyes grossly normal to inspection,conjunctivae and sclerae normal  RESP: lungs clear   CV: Mild equal dependent peripheral edema   MS: no musculoskeletal defects are noted and wheelchair dependent  SKIN: Two 2 cm skin tear wounds corresponding to wheelchair foot rest hardware  NEURO: mentation intact and speech normal  PSYCH: mentation appears normal and affect normal/bright    Assessment and Plan:    Very pleasant lady who resides at [facility] with significant history of multiple skin tear wounds--No concerns with infection  Or complication at this time--facility has covered and padded all prominent surfaces on wheelchair for daily repetitive trauma  With transfers.    Will dress with soap and water, thin later vaseline and Mepitel transparent dressing cover--change daily PRN with drainage and then every 3 days until healed  Due for followup Thyroid labs end of month on adjusted levothyroxine--9/27/22  Facility RN will provide dressing care and oversight.    Followup PRN    (T14.8XXA) Multiple skin tears  (primary encounter diagnosis)      (Z99.3) Wheelchair dependence      (E03.9) Hypothyroidism, unspecified type

## 2022-09-15 ENCOUNTER — NURSING HOME VISIT (OUTPATIENT)
Dept: GERIATRICS | Facility: OTHER | Age: 87
End: 2022-09-15
Attending: NURSE PRACTITIONER
Payer: COMMERCIAL

## 2022-09-15 DIAGNOSIS — S81.812A SKIN TEAR OF LEFT LOWER LEG WITHOUT COMPLICATION, INITIAL ENCOUNTER: ICD-10-CM

## 2022-09-15 DIAGNOSIS — I87.2 VENOUS (PERIPHERAL) INSUFFICIENCY: Primary | ICD-10-CM

## 2022-09-15 DIAGNOSIS — R60.0 BILATERAL LEG EDEMA: ICD-10-CM

## 2022-09-17 ENCOUNTER — HEALTH MAINTENANCE LETTER (OUTPATIENT)
Age: 87
End: 2022-09-17

## 2022-09-20 NOTE — PROGRESS NOTES
Geno Horn is a 89 year old female being seen today for acute and follow up visit at [facility].    Code Status: Advance Directives: YES-.   Health Care Power of : Extended Emergency Contact Information  Primary Emergency Contact: Daniel Horn  Address: [address]           [place] [address] [facility]  Mobile Phone: [phone]  Relation: Son  Secondary Emergency Contact: Adri Chaudhary  Address: [address] [place] [address] [facility]  Home Phone: [phone]  Mobile Phone: [phone]  Relation: Daughter     Allergies: Alendronic acid, Celebrex [celecoxib], Oxybutynin, Tolterodine, Tramadol, and Trospium     Chief Complaint / HPI: Nursing staff requested follow-up on a new skin tear injury left lower extremity corresponding to wheelchair foot rest hardware.  Resident has a past history of multiple skin tear injuries on both upper and lower extremities from abrasions with transferring in and out of wheelchair  And against wheelchair hardware and other contact heart services.  Does not appear to be bothersome  No evidence of infection at this time--nursing staff have had a fall prominent hardware surfaces on wheelchair leg rests for prevention.  Nursing staff requesting refill on lower extremity graduated compression stockings for dependent edema.  No concerns raised    Past Medical, Surgical, Family and Social History reviewed: YES.     Medications: reviewed  Medications - recent changes: Levothyroxine adjustment    Review of Systems:  General: positive for weakness  Skin: positive for skin tear injury  CV: Edema  Musculoskeletal: positive for arthritis and muscular weakness  Endocrine: positive for thyroid disorder  All other systems negative  Toileting:    Continent of Bowel: Yes   Continent of Bladder: Yes  Mobility: wheelchair    Recent Labs: reviewed      Current Therapies: none    Exam:  Vital signs reviewed.   GENERAL APPEARANCE:  alert and no distress  EYES:  Eyes grossly normal to inspection, conjunctivae and sclerae normal  CV: 1-2+ equal bilateral lower extremity edema, no erythema.  MS: no musculoskeletal defects are noted and wheelchair dependent  SKIN: Superficial skin tear irregular edges about 3 cm left lower leg corresponds to wheelchair foot rest hardware  Dressing clean and dry  NEURO:  mentation intact and speech normal  PSYCH: mentation appears normal and affect normal/bright    Assessment and Plan:    RN at assisted living facility can manage small skin tear wound with dressing changes every 3 days and as needed until healed  Would expect resolution in 2 weeks--staff will notify for any healing concerns.  Preventive measures already in place.    Rx completed for graduated compression lower extremity stockings    Due for followup Thyroid labs next week 9/27/22    (I87.2) Venous (peripheral) insufficiency  (primary encounter diagnosis)      (R60.0) Bilateral leg edema      (S81.812A) Skin tear of left lower leg without complication, initial encounter

## 2022-09-27 ENCOUNTER — LAB REQUISITION (OUTPATIENT)
Dept: LAB | Facility: OTHER | Age: 87
End: 2022-09-27
Payer: COMMERCIAL

## 2022-09-27 DIAGNOSIS — D64.9 ANEMIA, UNSPECIFIED TYPE: Primary | ICD-10-CM

## 2022-09-27 DIAGNOSIS — E03.9 HYPOTHYROIDISM, UNSPECIFIED: ICD-10-CM

## 2022-09-27 DIAGNOSIS — D64.9 ANEMIA, UNSPECIFIED: ICD-10-CM

## 2022-09-27 LAB
BASOPHILS # BLD AUTO: 0.1 10E3/UL (ref 0–0.2)
BASOPHILS NFR BLD AUTO: 1 %
EOSINOPHIL # BLD AUTO: 0.1 10E3/UL (ref 0–0.7)
EOSINOPHIL NFR BLD AUTO: 1 %
ERYTHROCYTE [DISTWIDTH] IN BLOOD BY AUTOMATED COUNT: 15 % (ref 10–15)
FERRITIN SERPL-MCNC: 271 NG/ML (ref 24–336)
HCT VFR BLD AUTO: 32.1 % (ref 35–47)
HGB BLD-MCNC: 10.7 G/DL (ref 11.7–15.7)
IMM GRANULOCYTES # BLD: 0 10E3/UL
IMM GRANULOCYTES NFR BLD: 0 %
IRON SATN MFR SERPL: 35 % (ref 20–55)
IRON SERPL-MCNC: 93 UG/DL (ref 50–212)
LYMPHOCYTES # BLD AUTO: 2.3 10E3/UL (ref 0.8–5.3)
LYMPHOCYTES NFR BLD AUTO: 29 %
MCH RBC QN AUTO: 34.5 PG (ref 26.5–33)
MCHC RBC AUTO-ENTMCNC: 33.3 G/DL (ref 31.5–36.5)
MCV RBC AUTO: 104 FL (ref 78–100)
MONOCYTES # BLD AUTO: 0.5 10E3/UL (ref 0–1.3)
MONOCYTES NFR BLD AUTO: 6 %
NEUTROPHILS # BLD AUTO: 4.9 10E3/UL (ref 1.6–8.3)
NEUTROPHILS NFR BLD AUTO: 63 %
NRBC # BLD AUTO: 0 10E3/UL
NRBC BLD AUTO-RTO: 0 /100
PLATELET # BLD AUTO: 361 10E3/UL (ref 150–450)
RBC # BLD AUTO: 3.1 10E6/UL (ref 3.8–5.2)
TIBC SERPL-MCNC: 267.4 UG/DL (ref 245–400)
TRANSFERRIN SERPL-MCNC: 191 MG/DL (ref 203–362)
TSH SERPL DL<=0.005 MIU/L-ACNC: 1.2 MU/L (ref 0.4–4)
UIBC (UNSATURATED): 174.4 MG/DL
WBC # BLD AUTO: 7.8 10E3/UL (ref 4–11)

## 2022-09-27 PROCEDURE — 36415 COLL VENOUS BLD VENIPUNCTURE: CPT | Performed by: NURSE PRACTITIONER

## 2022-09-27 PROCEDURE — 83550 IRON BINDING TEST: CPT | Performed by: NURSE PRACTITIONER

## 2022-09-27 PROCEDURE — 82728 ASSAY OF FERRITIN: CPT | Performed by: NURSE PRACTITIONER

## 2022-09-27 PROCEDURE — 84443 ASSAY THYROID STIM HORMONE: CPT | Performed by: NURSE PRACTITIONER

## 2022-09-27 PROCEDURE — 85025 COMPLETE CBC W/AUTO DIFF WBC: CPT | Performed by: NURSE PRACTITIONER

## 2022-09-29 DIAGNOSIS — D50.8 OTHER IRON DEFICIENCY ANEMIA: ICD-10-CM

## 2022-09-29 DIAGNOSIS — E03.9 HYPOTHYROIDISM, UNSPECIFIED TYPE: ICD-10-CM

## 2022-09-29 DIAGNOSIS — E03.8 OTHER SPECIFIED HYPOTHYROIDISM: ICD-10-CM

## 2022-09-29 RX ORDER — BACILLUS COAGULANS 1B CELL
CAPSULE ORAL
Qty: 160 TABLET | Refills: 2 | Status: SHIPPED | OUTPATIENT
Start: 2022-09-29 | End: 2023-05-19

## 2022-09-29 RX ORDER — LEVOTHYROXINE SODIUM 25 UG/1
12.5 TABLET ORAL DAILY
Qty: 90 TABLET | Refills: 3 | Status: SHIPPED | OUTPATIENT
Start: 2022-09-29 | End: 2023-09-08

## 2022-09-30 RX ORDER — LEVOTHYROXINE SODIUM 50 UG/1
TABLET ORAL
Qty: 90 TABLET | Refills: 3 | Status: SHIPPED | OUTPATIENT
Start: 2022-09-30 | End: 2023-10-09

## 2022-09-30 NOTE — TELEPHONE ENCOUNTER
[facility] sent Rx request for the following:      Requested Prescriptions   Pending Prescriptions Disp Refills     levothyroxine (SYNTHROID/LEVOTHROID) 50 MCG tablet [Pharmacy Med Name: LEVOTHYROXINE 50MCG TAB] 30 tablet 11     Sig: TAKE 1 TABLET BY MOUTH ONCE DAILY (TAKE ALONG WITH 12.5MCG = 62.5MCG)   Last Prescription Date:   8/26/22  Last Fill Qty/Refills:         28, R-0 (End: 9/16/22)    Last Office Visit:              9/15/22 ([facility] Visit, [facility])   Future Office visit:           None    Karen Simon RN .............. 9/30/2022  11:01 AM

## 2022-10-24 ENCOUNTER — OFFICE VISIT (OUTPATIENT)
Dept: FAMILY MEDICINE | Facility: OTHER | Age: 87
End: 2022-10-24
Attending: FAMILY MEDICINE
Payer: COMMERCIAL

## 2022-10-24 VITALS
BODY MASS INDEX: 25.6 KG/M2 | HEART RATE: 60 BPM | WEIGHT: 135.5 LBS | OXYGEN SATURATION: 94 % | SYSTOLIC BLOOD PRESSURE: 116 MMHG | TEMPERATURE: 97.8 F | DIASTOLIC BLOOD PRESSURE: 78 MMHG | RESPIRATION RATE: 16 BRPM

## 2022-10-24 DIAGNOSIS — M19.011 ARTHRITIS OF RIGHT GLENOHUMERAL JOINT: Primary | ICD-10-CM

## 2022-10-24 PROCEDURE — G0463 HOSPITAL OUTPT CLINIC VISIT: HCPCS

## 2022-10-24 PROCEDURE — 250N000009 HC RX 250: Performed by: FAMILY MEDICINE

## 2022-10-24 PROCEDURE — 250N000011 HC RX IP 250 OP 636: Performed by: FAMILY MEDICINE

## 2022-10-24 PROCEDURE — 20611 DRAIN/INJ JOINT/BURSA W/US: CPT | Mod: RT | Performed by: FAMILY MEDICINE

## 2022-10-24 RX ORDER — LIDOCAINE HYDROCHLORIDE 10 MG/ML
3 INJECTION, SOLUTION EPIDURAL; INFILTRATION; INTRACAUDAL; PERINEURAL ONCE
Status: COMPLETED | OUTPATIENT
Start: 2022-10-24 | End: 2022-10-24

## 2022-10-24 RX ORDER — POLYVINYL ALCOHOL 14 MG/ML
SOLUTION/ DROPS OPHTHALMIC
COMMUNITY
Start: 2022-10-12 | End: 2022-10-27

## 2022-10-24 RX ORDER — TRIAMCINOLONE ACETONIDE 40 MG/ML
40 INJECTION, SUSPENSION INTRA-ARTICULAR; INTRAMUSCULAR ONCE
Status: COMPLETED | OUTPATIENT
Start: 2022-10-24 | End: 2022-10-24

## 2022-10-24 RX ORDER — LIDOCAINE HYDROCHLORIDE 10 MG/ML
2 INJECTION, SOLUTION EPIDURAL; INFILTRATION; INTRACAUDAL; PERINEURAL ONCE
Status: COMPLETED | OUTPATIENT
Start: 2022-10-24 | End: 2022-10-24

## 2022-10-24 RX ADMIN — LIDOCAINE HYDROCHLORIDE 3 ML: 10 INJECTION, SOLUTION INFILTRATION; PERINEURAL at 11:42

## 2022-10-24 RX ADMIN — LIDOCAINE HYDROCHLORIDE 2 ML: 10 INJECTION, SOLUTION INFILTRATION; PERINEURAL at 11:42

## 2022-10-24 RX ADMIN — TRIAMCINOLONE ACETONIDE 40 MG: 40 INJECTION, SUSPENSION INTRA-ARTICULAR; INTRAMUSCULAR at 11:42

## 2022-10-24 ASSESSMENT — PAIN SCALES - GENERAL: PAINLEVEL: EXTREME PAIN (9)

## 2022-10-24 NOTE — PROGRESS NOTES
Sports Medicine Office Note    HPI:  89-year-old female coming in for follow-up evaluation of right shoulder pain.  Her pain has been present for 6-7 months.  She had a fall in fall  where she suffered a pelvis fracture.  She was in wheelchair due to this.  She feels that using her wheelchair has precipitated her shoulder pain.  She rates her pain 8-9/10.  She characterizes the pain as dull and achy.  She has been using Tylenol and ice to help with her pain.  She has had 6 sessions with physical therapy.  No new injuries.      EXAM:  /78 (BP Location: Left arm, Patient Position: Sitting, Cuff Size: Adult Regular)   Pulse 60   Temp 97.8  F (36.6  C) (Tympanic)   Resp 16   Wt 61.5 kg (135 lb 8 oz)   SpO2 94%   BMI 25.60 kg/m    Musculoskeletal exam: Right shoulder  - No gross deformity  - No bruising or swelling  - Normal skin temperature without cyanosis  - Mild pain with passive internal and external ROM      IMAGIN2022: 3 view right shoulder x-ray  - Severe degenerative changes with bone-on-bone arthritis of the glenohumeral joint.  Moderate degenerative changes of the AC joint.  Diffuse osteopenia.      ASSESSMENT/PLAN:  Diagnoses and all orders for this visit:  Arthritis of right glenohumeral joint  -     SD ARTHROCENTESIS ASPIR&/INJ MAJOR JT/BURSA W/US  -     POC US SOFT TISSUE  -     triamcinolone (KENALOG-40) injection 40 mg  -     lidocaine (PF) (XYLOCAINE) 1 % injection 3 mL  -     lidocaine (PF) (XYLOCAINE) 1 % injection 2 mL    89-year-old female with significant glenohumeral arthritis of the right shoulder.  X-rays from  were again personally reviewed in the office with the findings as demonstrated above by my interpretation.  After reviewing the risks/benefits, we elected to proceed with a right glenohumeral injection under ultrasound guidance.  See procedure note below:    PROCEDURE:  Ultrasound Guided Injection of the Right Glenohumeral Joint      EQUIPMENT:  [employer]  70 with Linear eL18-4 (2-22MHz) Transducer.      PROCEDURAL PAUSE:    A procedural pause was conducted to verify:  correct patient identity, procedure to be performed, and as applicable, correct side, site, and correct patient position.      INFORMED CONSENT:   I discussed the risks, possible benefits, and alternatives to injection.  Following denial of allergy and review of potential side effects and complications (including but not necessarily limited to infection, allergic reaction, fat necrosis, skin depigmentation, local tissue breakdown, systemic effects of corticosteroids, elevation of blood glucose, injury to soft tissue and/or nerves, and seizure), all questions were answered, and consent was given to proceed.  Patient verbalized understanding.      PROCEDURE DETAILS:    The use of direct ultrasound visualization of the needle (rather than a non-guided ultrasound procedure) was required to increase patient safety by excluding inadvertent intramuscular or intratendinous placement and minimizing bleeding and injury by avoiding osteochondral and nearby neurovascular structures.  Guidance also maximizes accurate injection placement and likely clinical benefit beyond that obtained with a non-guided injection.  This allows increased diagnostic specificity when evaluating effectiveness of the injection.      Prior to the procedure, the right shoulder was examined with a 12MHz linear transducer to determine the optimal needle path and visualize the posterior glenohumeral joint and labrum deep to the infraspinatus musculotendinous junction.  Following this, the skin was marked, and the right shoulder was prepared with chlorhexidine.  Local anesthesia was obtained with 2mL of 1% lidocaine.  Then this area was re-examined using the same transducer, a sterile ultrasound transducer cover, sterile gloves, and sterile gel.  The transducer was placed at the posterior shoulder in an axial plane.  Under ultrasound guidance,  a 2-inch 21-gauge needle was advanced from lateral to medial with an in-plane approach into the posterior glenohumeral joint.  One needle pass was performed.  After ultrasound visualization of the needle tip in the target area and negative aspiration for blood, a mixture of 3mL of 1% lidocaine and 1mL of triamcinolone(40mg/mL) was injected into the right glenohumeral joint.  0mL was wasted.  Images have been saved on the musculoskeletal ultrasound in clinic.     The patient tolerated the procedure without complication and was discharged in good condition after a short observation period.  The patient was instructed to contact me regarding any questions pertaining to the procedure.      DIAGNOSIS:    -Successful ultrasound guided injection of the right glenohumeral joint without immediate complication      PLAN:   -Post-procedure care reviewed, including avoiding submersion of the injection site for 48 hours   -Return precautions reviewed for signs/symptoms that would be concerning for infection   -The patient was instructed to ice and take APAP this evening if needed   -Return to clinic as needed      Miguel Harrison MD  10/24/2022  10:39 AM    Total time spent with this patient was 39 minutes which included chart review, visualization and independent interpretation of images, time spent with the patient, and documentation.    Procedure time:  22 minute(s)

## 2022-10-24 NOTE — NURSING NOTE
"Chief Complaint   Patient presents with     Imm/Inj     Ultrasound guided right shoulder injection      Patient presents for ultrasound guided right glenohumeral injection. Pain 8-9/10. Last injection: 8/1/22    Initial /78 (BP Location: Left arm, Patient Position: Sitting, Cuff Size: Adult Regular)   Pulse 60   Temp 97.8  F (36.6  C) (Tympanic)   Resp 16   Wt 61.5 kg (135 lb 8 oz)   SpO2 94%   BMI 25.60 kg/m   Estimated body mass index is 25.6 kg/m  as calculated from the following:    Height as of 4/13/22: 1.549 m (5' 1\").    Weight as of this encounter: 61.5 kg (135 lb 8 oz).       Medication Reconciliation: Complete    Magy Barbour LPN .......  10/24/2022  10:32 AM   "

## 2022-10-25 DIAGNOSIS — H04.123 DRY EYES: Primary | ICD-10-CM

## 2022-10-27 RX ORDER — POLYVINYL ALCOHOL 14 MG/ML
SOLUTION/ DROPS OPHTHALMIC
Qty: 15 ML | Refills: 10 | Status: SHIPPED | OUTPATIENT
Start: 2022-10-27 | End: 2023-11-01

## 2022-10-27 NOTE — TELEPHONE ENCOUNTER
[facility] sent Rx request for the following:      Requested Prescriptions   Pending Prescriptions Disp Refills     polyvinyl alcohol (LIQUIFILM TEARS) 1.4 % ophthalmic solution [Pharmacy Med Name: ARTIF TEAR 1.4 % POLY ALC] 15 mL PRN     Sig: INSTILL 1 DROP INTO BOTH EYES THREE TIMES DAILY INSTILL 1 DROP INTO BOTH EYES 3 TIMES DAILY AS NEEDED     Historically reported by patient.   Last Office Visit:              9/15/22   Future Office visit:           None    In clinical absence of patient's primary, Fransisca Rocha, patient is requesting that this message be sent to the covering provider for consideration please.    Pastora Cox RN on 10/27/2022 at 2:16 PM

## 2022-11-17 ENCOUNTER — NURSING HOME VISIT (OUTPATIENT)
Dept: GERIATRICS | Facility: OTHER | Age: 87
End: 2022-11-17
Attending: NURSE PRACTITIONER
Payer: COMMERCIAL

## 2022-11-17 DIAGNOSIS — Z53.9 ERRONEOUS ENCOUNTER--DISREGARD: Primary | ICD-10-CM

## 2022-11-22 ENCOUNTER — LAB REQUISITION (OUTPATIENT)
Dept: LAB | Facility: OTHER | Age: 87
End: 2022-11-22
Payer: COMMERCIAL

## 2022-11-22 DIAGNOSIS — D64.9 ANEMIA, UNSPECIFIED: ICD-10-CM

## 2022-11-22 DIAGNOSIS — E03.9 HYPOTHYROIDISM, UNSPECIFIED: ICD-10-CM

## 2022-11-22 LAB
HGB BLD-MCNC: 10.7 G/DL (ref 11.7–15.7)
T4 FREE SERPL-MCNC: 0.98 NG/DL (ref 0.9–1.7)
TSH SERPL DL<=0.005 MIU/L-ACNC: 7.94 UIU/ML (ref 0.3–4.2)

## 2022-11-22 PROCEDURE — 85018 HEMOGLOBIN: CPT | Performed by: NURSE PRACTITIONER

## 2022-11-22 PROCEDURE — 36415 COLL VENOUS BLD VENIPUNCTURE: CPT | Performed by: NURSE PRACTITIONER

## 2022-11-22 PROCEDURE — 84439 ASSAY OF FREE THYROXINE: CPT | Performed by: NURSE PRACTITIONER

## 2022-11-22 PROCEDURE — 84443 ASSAY THYROID STIM HORMONE: CPT | Performed by: NURSE PRACTITIONER

## 2022-11-29 DIAGNOSIS — E78.5 HYPERLIPIDEMIA: ICD-10-CM

## 2022-11-29 DIAGNOSIS — Z00.00 HEALTH CARE MAINTENANCE: ICD-10-CM

## 2022-11-30 RX ORDER — MULTIVITAMIN WITH FOLIC ACID 400 MCG
TABLET ORAL
Qty: 28 TABLET | Refills: 11 | Status: SHIPPED | OUTPATIENT
Start: 2022-11-30 | End: 2023-11-01

## 2022-11-30 RX ORDER — ASCORBIC ACID 500 MG
TABLET ORAL
Qty: 28 CAPSULE | Refills: 11 | Status: SHIPPED | OUTPATIENT
Start: 2022-11-30 | End: 2023-11-01

## 2022-11-30 NOTE — TELEPHONE ENCOUNTER
[facility] sent Rx request for the following:      FISH OIL 1,000 MG CAP    Last Prescription Date:   1/19/22  Last Fill Qty/Refills:         28, R-11    Last Office Visit:              9/15/22 (Ray)   Future Office visit:           None    MULTIVIT TAB-A-RADHA TAB    Last Prescription Date:   1/19/22  Last Fill Qty/Refills:         28, R-11      Unable to complete prescription refill per RN Medication Refill Policy.     Kalyani Hand RN .............. 11/30/2022  9:56 AM

## 2022-12-27 DIAGNOSIS — R39.9 UTI SYMPTOMS: Primary | ICD-10-CM

## 2022-12-28 ENCOUNTER — LAB (OUTPATIENT)
Dept: LAB | Facility: OTHER | Age: 87
End: 2022-12-28
Attending: NURSE PRACTITIONER
Payer: COMMERCIAL

## 2022-12-28 DIAGNOSIS — R82.90 ABNORMAL URINE FINDINGS: ICD-10-CM

## 2022-12-28 DIAGNOSIS — R39.9 UTI SYMPTOMS: ICD-10-CM

## 2022-12-28 DIAGNOSIS — R39.9 UTI SYMPTOMS: Primary | ICD-10-CM

## 2022-12-28 LAB
ALBUMIN UR-MCNC: NEGATIVE MG/DL
APPEARANCE UR: ABNORMAL
BACTERIA #/AREA URNS HPF: ABNORMAL /HPF
BILIRUB UR QL STRIP: NEGATIVE
COLOR UR AUTO: ABNORMAL
GLUCOSE UR STRIP-MCNC: NEGATIVE MG/DL
HGB UR QL STRIP: NEGATIVE
KETONES UR STRIP-MCNC: NEGATIVE MG/DL
LEUKOCYTE ESTERASE UR QL STRIP: ABNORMAL
MUCOUS THREADS #/AREA URNS LPF: PRESENT /LPF
NITRATE UR QL: POSITIVE
PH UR STRIP: 7.5 [PH] (ref 5–9)
RBC URINE: 1 /HPF
SP GR UR STRIP: 1.01 (ref 1–1.03)
SQUAMOUS EPITHELIAL: 6 /HPF
UROBILINOGEN UR STRIP-MCNC: NORMAL MG/DL
WBC URINE: 10 /HPF
YEAST #/AREA URNS HPF: ABNORMAL /HPF

## 2022-12-28 PROCEDURE — 81001 URINALYSIS AUTO W/SCOPE: CPT | Mod: ZL

## 2022-12-28 PROCEDURE — 87186 SC STD MICRODIL/AGAR DIL: CPT | Mod: ZL

## 2022-12-28 RX ORDER — NITROFURANTOIN 25; 75 MG/1; MG/1
100 CAPSULE ORAL 2 TIMES DAILY
Qty: 14 CAPSULE | Refills: 0 | Status: SHIPPED | OUTPATIENT
Start: 2022-12-28 | End: 2023-01-04

## 2022-12-29 ENCOUNTER — NURSING HOME VISIT (OUTPATIENT)
Dept: GERIATRICS | Facility: OTHER | Age: 87
End: 2022-12-29
Attending: NURSE PRACTITIONER
Payer: COMMERCIAL

## 2022-12-29 DIAGNOSIS — Z79.899 ENCOUNTER FOR MEDICATION REVIEW: Primary | ICD-10-CM

## 2022-12-29 DIAGNOSIS — Z78.9 LIVING IN ASSISTED LIVING: ICD-10-CM

## 2022-12-29 DIAGNOSIS — M15.0 PRIMARY OSTEOARTHRITIS INVOLVING MULTIPLE JOINTS: ICD-10-CM

## 2022-12-29 DIAGNOSIS — R39.9 UTI SYMPTOMS: ICD-10-CM

## 2022-12-29 DIAGNOSIS — E03.9 HYPOTHYROIDISM, UNSPECIFIED TYPE: ICD-10-CM

## 2022-12-29 RX ORDER — SENNOSIDES 8.6 MG
CAPSULE ORAL
Qty: 90 TABLET | Refills: 3 | Status: SHIPPED | OUTPATIENT
Start: 2022-12-29 | End: 2023-09-28

## 2022-12-29 NOTE — PROGRESS NOTES
Geno Horn is a 89 year old female being seen today for acute and follow up visit at [facility].    Code Status: Advance Directives: YES-.   Health Care Power of : Extended Emergency Contact Information  Primary Emergency Contact: Daniel Horn  Address: [address]           [place] [address] [facility]  Mobile Phone: [phone]  Relation: Son  Secondary Emergency Contact: Adri Chaudhary  Address: [address] [place] [address] [facility]  Home Phone: [phone]  Mobile Phone: [phone]  Relation: Daughter     Allergies: Alendronic acid, Celebrex [celecoxib], Other environmental allergy, Oxybutynin, Tolterodine, Tramadol, and Trospium     Chief Complaint / HPI: Follow-up on increased urinary frequency, urgency with mild dysuria--- ordered UA UC  Results suspicious started on antibiotic final culture pending.  Uncertain response in past 24 hours certainly no worsening symptoms.  Resident does not raise any concerns--- nursing staff do not raise additional concerns  Medication review and reconciliation--due for follow-up CBC and TSH for medication monitoring    Past Medical, Surgical, Family and Social History reviewed: YES.     Medications: reviewed and reconciled  Medications - recent changes: none    Review of Systems:  General: positive for weakness  : positive for frequency and urgency  Endocrine: positive for thyroid disorder  MS: arthritis    Toileting:    Continent of Bowel: Yes   Continent of Bladder: Yes  Mobility: walker and wheelchair    Recent Labs:   Recent Results (from the past 240 hour(s))   UA reflex to Microscopic and Culture    Collection Time: 12/28/22 10:37 AM    Specimen: Urine, Midstream   Result Value Ref Range    Color Urine Light Yellow Colorless, Straw, Light Yellow, Yellow    Appearance Urine Slightly Cloudy (A) Clear    Glucose Urine Negative Negative mg/dL    Bilirubin Urine Negative Negative    Ketones Urine Negative Negative mg/dL     Specific Gravity Urine 1.012 1.000 - 1.030    Blood Urine Negative Negative    pH Urine 7.5 5.0 - 9.0    Protein Albumin Urine Negative Negative mg/dL    Urobilinogen Urine Normal Normal, 2.0 mg/dL    Nitrite Urine Positive (A) Negative    Leukocyte Esterase Urine Moderate (A) Negative    Bacteria Urine Many (A) None Seen /HPF    Budding Yeast Urine Few (A) None Seen /HPF    Mucus Urine Present (A) None Seen /LPF    RBC Urine 1 <=2 /HPF    WBC Urine 10 (H) <=5 /HPF    Squamous Epithelials Urine 6 (H) <=1 /HPF   Urine Culture    Collection Time: 12/28/22 10:37 AM    Specimen: Urine, Midstream   Result Value Ref Range    Culture >100,000 CFU/mL Escherichia coli (A)        Susceptibility    Escherichia coli - XIN     Amoxicillin/Clavulanate <=8 Susceptible      Ampicillin <=8 Susceptible      Ampicillin/ Sulbactam <=8 Susceptible      Aztreonam <=4 Susceptible      Cefazolin 4 Susceptible      Cefepime <=2 Susceptible      Ceftazidime <=1 Susceptible      Ceftriaxone <=1 Susceptible      Cefoxitin <=8 Susceptible      Ciprofloxacin*         * Ciprofloxacin not resistant.  Due to test limitations, lab cannot provide XIN to determine susceptibility.     Ertapenem <=0.5 Susceptible      Gentamicin <=4 Susceptible      Imipenem <=1 Susceptible      Levofloxacin*         * Levofloxacin not resistant.  Due to test limitations, lab cannot provide XIN to determine susceptibility.     Nitrofurantoin <=32 Susceptible      Piperacillin/Tazobactam <=16 Intermediate      Tetracycline <=4 Susceptible      Tobramycin <=4 Susceptible      Trimethoprim/Sulfamethoxazole <=2/38 Susceptible      Cefpodoxime <=2 Susceptible      Cefuroxime 8 Susceptible      Trimethoprim <=8 Susceptible            Current Therapies: none    Exam:  Vital signs reviewed.   GENERAL APPEARANCE: alert and no distress  EYES: Eyes grossly normal to inspection  RESP: lungs clear   MS: Seated comfortably in wheelchair, mobility wheelchair dependent with walker for  transfers  SKIN: Warm and dry  NEURO: Alert mentation intact and speech normal  PSYCH: mentation appears normal and affect normal/bright    Assessment and Plan:      Very pleasant gracious lady who resides in assisted living facility--recent UTI symptoms manifested as frequency, mild dysuria and urgency  UA UC ordered initial results suspicious started on Macrobid--- await final culture  Staff will encourage fluids    15 minutes spent in chart review, medication review and reconciliation, ordering of UA UC, medication management  Due for follow-up TSH and CBC to monitor and adjust medications as indicated pending results    Followup PRN    (Z79.899) Encounter for medication review  (primary encounter diagnosis)      (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

## 2022-12-30 LAB — BACTERIA UR CULT: ABNORMAL

## 2023-01-12 ENCOUNTER — NURSING HOME VISIT (OUTPATIENT)
Dept: GERIATRICS | Facility: OTHER | Age: 88
End: 2023-01-12
Attending: NURSE PRACTITIONER
Payer: COMMERCIAL

## 2023-01-12 DIAGNOSIS — M79.601 PAIN OF RIGHT UPPER EXTREMITY: ICD-10-CM

## 2023-01-12 DIAGNOSIS — M15.0 PRIMARY OSTEOARTHRITIS INVOLVING MULTIPLE JOINTS: Primary | ICD-10-CM

## 2023-01-12 DIAGNOSIS — D64.9 ANEMIA, UNSPECIFIED TYPE: ICD-10-CM

## 2023-01-12 DIAGNOSIS — M48.061 SPINAL STENOSIS OF LUMBAR REGION WITHOUT NEUROGENIC CLAUDICATION: ICD-10-CM

## 2023-01-12 DIAGNOSIS — Z78.9 LIVING IN ASSISTED LIVING: ICD-10-CM

## 2023-01-12 DIAGNOSIS — Z78.9 DECREASED ACTIVITIES OF DAILY LIVING (ADL): ICD-10-CM

## 2023-01-12 DIAGNOSIS — E03.8 OTHER SPECIFIED HYPOTHYROIDISM: ICD-10-CM

## 2023-01-12 DIAGNOSIS — M81.0 POSTMENOPAUSAL OSTEOPOROSIS: ICD-10-CM

## 2023-01-12 PROCEDURE — 99349 HOME/RES VST EST MOD MDM 40: CPT | Performed by: NURSE PRACTITIONER

## 2023-01-12 NOTE — PROGRESS NOTES
Geno Horn is a 89 year old female being seen today for comprehensive and follow up visit visit at [facility].    Code Status: Advance Directives: YES-.   Health Care Power of : Extended Emergency Contact Information  Primary Emergency Contact: Daniel Horn  Address: [address]           [place] [address] [facility]  Mobile Phone: [phone]  Relation: Son  Secondary Emergency Contact: Adri Chaudhary  Address: [address] [place] [address] [facility]  Home Phone: [phone]  Mobile Phone: [phone]  Relation: Daughter     Allergies: Alendronic acid, Celebrex [celecoxib], Other environmental allergy, Oxybutynin, Tolterodine, Tramadol, and Trospium     Chief Complaint / HPI: Follow-up visit as requested by facility RN for increasing generalized weakness unable to walk with walker from apartment to the dining room as previous and right shoulder has been extremely painful and has been difficult with eating, dressing and essential ADLs.  Staff report there have been no falls or injuries, resident has history of severe osteoarthritis, spinal compression fractures, spinal stenosis.  Has had several image guided shoulder injections for pain relief with limited response.  Resident would benefit from PT and OT services to reduce pain, improve and maximize mobility and optimize independence.  History provided by resident and staff.  No additional concerns raised--appetite, bowel and bladder habits have been at baseline\.    Past Medical, Surgical, Family and Social History reviewed: YES.     Medications: reviewed  Medications - recent changes: CBD topical OTC    Review of Systems:  General: positive for weakness  Musculoskeletal: positive for back pain, arthritis, joint pain and muscular weakness  Neurologic: positive for numbness or tingling of feet and memory changes  Endocrine: positive for thyroid disorder  All other systems negative  Toileting:    Continent of Bowel: Yes    Continent of Bladder: Yes  Mobility: walker and wheelchair    Recent Labs: None      Current Therapies: none    Exam:  Vital signs reviewed.   GENERAL APPEARANCE:  alert and no distress  EYES: Eyes grossly normal to inspection  MS: no musculoskeletal defects are noted and gait is unsteady--wheelchair/walker dependent, limited mobility right upper extremity  SKIN: warm and dry  NEURO: Alert, mentation intact and speech normal  PSYCH: mentation appears normal and affect normal/bright    Assessment and Plan:    Very pleasant gracious lady who resides at assisted living apartment with severe osteoporosis and osteoarthritis with spinal stenosis, history of multiple compression fractures and intermittent issues with right upper extremity pain due to shoulder osteoarthritis with limited response to image guided injections.  Resident and staff report mobility has been reduced to wheelchair with assistance with transfers--- previously able to ambulate with walker standby assistance to and from dining room.  Osteoarthritis pain and weakness severely limiting all ADLs including dressing and eating.  Resident will benefit from home care therapy services--resident is very motivated to work with therapists to improve mobility, ADLs, pain reduction and optimize independence    Chronic history of anemia and hypothyroidism--due for monitoring labs and medicationreview January 24 facility lab draw day--CBC, TSH with reflex free T4 orders placed-    Resident would like to use over-the-counter CBD topical to the joints for comfort--we will have available twice daily and twice daily as needed      Over 35 minutes spent in direct exam, chart and medication review and management, ordering of 2 unique lab tests  Home care face-to-face, orders and referral placed    (M15.9) Primary osteoarthritis involving multiple joints  (primary encounter diagnosis)    Plan: Home Care Referral           (M48.061) Spinal stenosis of lumbar region without  neurogenic claudication    Plan: Home Care Referral           (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

## 2023-01-20 PROCEDURE — G0180 MD CERTIFICATION HHA PATIENT: HCPCS | Performed by: NURSE PRACTITIONER

## 2023-01-23 ENCOUNTER — TELEPHONE (OUTPATIENT)
Dept: FAMILY MEDICINE | Facility: OTHER | Age: 88
End: 2023-01-23
Payer: COMMERCIAL

## 2023-01-23 NOTE — TELEPHONE ENCOUNTER
I agree with the Home Care order requests below.  LARISA Mathis CNP on 1/23/2023 at 9:01 AM    Agree with all home orders and treatment plan    LARISA Mathis CNP   January 23, 2023

## 2023-01-23 NOTE — TELEPHONE ENCOUNTER
Forrest Erazo    Apparently IT did not resolve the problem last time--what I did was send orders through my Family Practice   Route    Like  Nursing home   Family Practice    Apparently the Nursing home route does not work for phone notes--could someone report this please again?        Sierra Ray, APRN CNP   January 23, 2023

## 2023-01-24 ENCOUNTER — LAB REQUISITION (OUTPATIENT)
Dept: LAB | Facility: OTHER | Age: 88
End: 2023-01-24
Payer: COMMERCIAL

## 2023-01-24 DIAGNOSIS — E03.9 HYPOTHYROIDISM, UNSPECIFIED: ICD-10-CM

## 2023-01-24 LAB
T4 FREE SERPL-MCNC: 1.25 NG/DL (ref 0.9–1.7)
TSH SERPL DL<=0.005 MIU/L-ACNC: 5.51 UIU/ML (ref 0.3–4.2)

## 2023-01-24 PROCEDURE — 84439 ASSAY OF FREE THYROXINE: CPT | Performed by: NURSE PRACTITIONER

## 2023-01-24 PROCEDURE — 84443 ASSAY THYROID STIM HORMONE: CPT | Performed by: NURSE PRACTITIONER

## 2023-01-24 PROCEDURE — 36415 COLL VENOUS BLD VENIPUNCTURE: CPT | Performed by: NURSE PRACTITIONER

## 2023-01-26 ENCOUNTER — NURSING HOME VISIT (OUTPATIENT)
Dept: GERIATRICS | Facility: OTHER | Age: 88
End: 2023-01-26
Attending: NURSE PRACTITIONER
Payer: COMMERCIAL

## 2023-01-26 VITALS
TEMPERATURE: 97.5 F | HEART RATE: 60 BPM | SYSTOLIC BLOOD PRESSURE: 121 MMHG | RESPIRATION RATE: 18 BRPM | BODY MASS INDEX: 25.41 KG/M2 | DIASTOLIC BLOOD PRESSURE: 85 MMHG | WEIGHT: 134.5 LBS | OXYGEN SATURATION: 97 %

## 2023-01-26 DIAGNOSIS — E03.9 HYPOTHYROIDISM, UNSPECIFIED TYPE: ICD-10-CM

## 2023-01-26 DIAGNOSIS — Z79.899 ENCOUNTER FOR MEDICATION REVIEW: Primary | ICD-10-CM

## 2023-01-26 PROCEDURE — 99347 HOME/RES VST EST SF MDM 20: CPT | Performed by: NURSE PRACTITIONER

## 2023-01-26 NOTE — PROGRESS NOTES
Geno Horn is a 89 year old female being seen today for follow up visit visit at [facility].    Code Status: Advance Directives: YES-.   Health Care Power of : Extended Emergency Contact Information  Primary Emergency Contact: Daniel Horn  Address: [address]           [place] [address] [facility]  Mobile Phone: [phone]  Relation: Son  Secondary Emergency Contact: Adri Chaudhary  Address: [address] [place] [address] [facility]  Home Phone: [phone]  Mobile Phone: [phone]  Relation: Daughter     Allergies: Alendronic acid, Celebrex [celecoxib], Other environmental allergy, Oxybutynin, Tolterodine, Tramadol, and Trospium     Chief Complaint / HPI: Follow-up on thyroid monitoring labs and medication management.  Thyroid function labs past history unstable  Recently adjusted dose to 62.5 mcg.  Resident has remained asymptomatic.  Recently started home care therapy services to improve mobility, ADLs, and maximize independence and current assisted living residence.  Has recently started a CBD topical for arthritis pain which resident and staff report has been beneficial for pain relief.  No other concerns raised today.     Past Medical, Surgical, Family and Social History reviewed: YES.     Medications: Reviewed  Medications - recent changes: none    Review of Systems:  General: positive for weakness  Musculoskeletal: positive for back pain, arthritis and muscular weakness  Endocrine: positive for thyroid disorder  All other systems negative  Toileting:    Continent of Bowel: Yes   Continent of Bladder: Yes  Mobility: walker and wheelchair    Recent Labs:   Recent Results (from the past 240 hour(s))   TSH with free T4 reflex    Collection Time: 01/24/23  8:46 AM   Result Value Ref Range    TSH 5.51 (H) 0.30 - 4.20 uIU/mL   T4 free    Collection Time: 01/24/23  8:46 AM   Result Value Ref Range    Free T4 1.25 0.90 - 1.70 ng/dL       Current Therapies: Home care  PT/OT    Exam:  Vital signs reviewed.   GENERAL APPEARANCE:  alert and no distress  EYES: Eyes grossly normal to inspection  MS: no musculoskeletal defects are noted   SKIN: no suspicious lesions or rashes  NEURO:mentation intact and speech normal  PSYCH: mentation appears normal and affect normal/bright    Assessment and Plan:    Thyroid function lab levels stable on current dose we will plan to recheck in 6 months due to consistent stability on current adjusted dose.    Working with Homecare PT/OT to improve mobility and independence, very motivated.    Followup PRN      (Z79.899) Encounter for medication review  (primary encounter diagnosis)      (E03.9) Hypothyroidism, unspecified type

## 2023-02-01 DIAGNOSIS — K59.00 CONSTIPATION: ICD-10-CM

## 2023-02-03 DIAGNOSIS — M15.9 OSTEOARTHRITIS OF MULTIPLE JOINTS, UNSPECIFIED OSTEOARTHRITIS TYPE: Primary | ICD-10-CM

## 2023-02-03 NOTE — PROGRESS NOTES
Sierra Ray NP reviewed and completed the following home care or hospice form(s) for Geno Horn  on February 3, 2023 .  This covers the certification period effective 1/20/2023-3/20/2023.    [facility]

## 2023-02-22 DIAGNOSIS — E53.8 VITAMIN B12 DEFICIENCY (NON ANEMIC): ICD-10-CM

## 2023-02-22 RX ORDER — UREA 10 %
LOTION (ML) TOPICAL
Qty: 90 TABLET | Refills: 3 | Status: SHIPPED | OUTPATIENT
Start: 2023-02-22 | End: 2024-01-25

## 2023-02-22 NOTE — TELEPHONE ENCOUNTER
[facility] sent Rx request for the following:      Requested Prescriptions   Pending Prescriptions Disp Refills     cyanocobalamin (VITAMIN B-12) 500 MCG tablet [Pharmacy Med Name: VITAMIN B-12 500MCG TAB] 28 tablet 11     Sig: TAKE 1 TABLET BY MOUTH ONCE DAILY       Vitamin Supplements (Adult) Protocol Failed - 2/22/2023  2:18 PM        Failed - Recent (12 mo) or future (30 days) visit within the authorizing provider's specialty     Last Prescription Date:   6/21/22  Last Fill Qty/Refills:         90, R-2    Last Office Visit:              1/26/23 ([facility] Visit, Sierra Ray)  Future Office visit:           None    Karen Simon RN .............. 2/22/2023  2:20 PM

## 2023-02-23 ENCOUNTER — NURSING HOME VISIT (OUTPATIENT)
Dept: GERIATRICS | Facility: OTHER | Age: 88
End: 2023-02-23
Attending: NURSE PRACTITIONER
Payer: COMMERCIAL

## 2023-02-23 VITALS
DIASTOLIC BLOOD PRESSURE: 77 MMHG | WEIGHT: 136 LBS | OXYGEN SATURATION: 97 % | TEMPERATURE: 97.3 F | SYSTOLIC BLOOD PRESSURE: 113 MMHG | RESPIRATION RATE: 16 BRPM | HEART RATE: 70 BPM | BODY MASS INDEX: 25.7 KG/M2

## 2023-02-23 DIAGNOSIS — B37.2 YEAST DERMATITIS: ICD-10-CM

## 2023-02-23 DIAGNOSIS — B37.31 VULVOVAGINAL CANDIDIASIS: ICD-10-CM

## 2023-02-23 DIAGNOSIS — R32 DERMATITIS ASSOCIATED WITH MOISTURE FROM URINARY INCONTINENCE: Primary | ICD-10-CM

## 2023-02-23 DIAGNOSIS — L25.8 DERMATITIS ASSOCIATED WITH MOISTURE FROM URINARY INCONTINENCE: Primary | ICD-10-CM

## 2023-02-23 PROCEDURE — 99348 HOME/RES VST EST LOW MDM 30: CPT | Performed by: NURSE PRACTITIONER

## 2023-02-23 RX ORDER — FLUCONAZOLE 150 MG/1
150 TABLET ORAL
Qty: 3 TABLET | Refills: 0 | Status: SHIPPED | OUTPATIENT
Start: 2023-02-23 | End: 2023-03-02

## 2023-02-23 RX ORDER — MENTHOL AND ZINC OXIDE .44; 20.625 G/100G; G/100G
OINTMENT TOPICAL 2 TIMES DAILY
Qty: 113 G | Refills: 3 | Status: SHIPPED | OUTPATIENT
Start: 2023-02-23 | End: 2023-02-24 | Stop reason: ALTCHOICE

## 2023-02-23 NOTE — PROGRESS NOTES
"Geno Horn is a 89 year old female being seen today for acute visit at [facility].    Assessment & Plan       ICD-10-CM    1. Dermatitis associated with moisture from urinary incontinence  L25.8 vitamin A & D (BABY) external ointment    R32       2. Vulvovaginal candidiasis  B37.31 fluconazole (DIFLUCAN) 150 MG tablet     DISCONTINUED: menthol-zinc oxide (CALMOSEPTINE) 0.44-20.625 % OINT ointment      3. Yeast dermatitis  B37.2 DISCONTINUED: menthol-zinc oxide (CALMOSEPTINE) 0.44-20.625 % OINT ointment        Nursing staff at [place] as well as Geno bring forth concerns of increased vaginal itching. She denies any dysuria, frequency, or urgency. She has not recently been on antibiotics, and she is continent of bowel and bladder. She does need reminding at times to get help with toileting/ amelie care. Physical exam consistent with vulvovaginal candidiasis. Plan to start fluconazole 150 mg 1 tablet q 3 days x 3 doses. Also adding a barrier cream to protect the skin BID and with toileting.  Reminded Geno to use her pendant to call for help with transfers to and from the toilet.  Geno does have a history of dizziness and has PRN meclizine available. She stated that she was a bit \"dizzy\" and I reminded her that she does have the PRN Meclizine. Staff was going to bring in a dose with her next med pass. We discussed standing up slowly and asking staff for help with transfers. She brings forth no other concerns today.      30 minutes spent on the date of the encounter doing chart review, patient visit and documentation         Return if symptoms worsen or fail to improve.    LARISA Schmidt CNP  [facility]     Code Status: Full Code.   Health Care Power of : Extended Emergency Contact Information  Primary Emergency Contact: Daniel Horn  Address: [address]           [place], [place] [address] [place] [address]  Mobile Phone: [phone]  Relation: Son  Secondary Emergency Contact: " "Elena Chaudharyzabeth  Address: [address] [place] [address] [place]  Home Phone: [phone]  Mobile Phone: [phone]  Relation: Daughter     Allergies: Alendronic acid, Celebrex [celecoxib], Other environmental allergy, Oxybutynin, Tolterodine, Tramadol, and Trospium     Past Medical, Surgical, Family and Social History reviewed: YES.     Medications:   Current Outpatient Medications   Medication Sig Dispense Refill     fluconazole (DIFLUCAN) 150 MG tablet Take 1 tablet (150 mg) by mouth every 3 days for 3 doses 3 tablet 0     vitamin A & D (BABY) external ointment Apply topically 2 times daily And PRN with toileting to affected amelie areas for skin barrier cream 42 g 4     acetaminophen (TYLENOL) 650 MG CR tablet 1 tab TID 90 tablet 3     acetaminophen (TYLENOL) 650 MG CR tablet Take 1 tablet (650 mg) by mouth 2 times daily 60 tablet 4     Adhesive Tape (MEDIPORE H SURGICAL 2\"X10YD) TAPE MEDIPORE H 2 x10yds TAPE ITEM #2862 DX L98.9 formerly [facility]  3ROLLS/30DS       carbamide peroxide (DEBROX) 6.5 % otic solution Place 5 drops into both ears nightly as needed for other 15 mL 0     cyanocobalamin (VITAMIN B-12) 500 MCG tablet TAKE 1 TABLET BY MOUTH ONCE DAILY 90 tablet 3     ferrous fumarate 65 mg, [place]. FE,-Vitamin C 125 mg (VITRON-C)  MG TABS tablet TAKE 1 TABLET BY MOUTH TWICE DAILY ON EMPTY STOMACH 160 tablet 2     fluticasone (FLONASE) 50 MCG/ACT nasal spray Spray 2 sprays into both nostrils daily 16 g 11     Gauze Pads & Dressings (KERLIX BANDAGE ROLL) [facility] KERLIX 4-1/2 GAUZE ROLL ITEM #6715 DX L98.9 formerly [facility]  #30EA/30DS       Incontinence Supply Disposable (DEPEND EASY FIT UNDERGARMENTS) MISC Medium size disposable under pants covered by insurance 100 each 11     Incontinence Supply Disposable (POISE PAD) PADS Change pad 3 x daily, #6 long Maxi pads 90 each 11     levothyroxine (SYNTHROID/LEVOTHROID) 25 MCG tablet Take 0.5 tablets (12.5 mcg) by mouth daily Together with 50 mcg tab " for daily total 62.5 MCG daily 90 tablet 3     levothyroxine (SYNTHROID/LEVOTHROID) 50 MCG tablet TAKE 1 TABLET BY MOUTH ONCE DAILY (TAKE ALONG WITH 12.5MCG = 62.5MCG) 90 tablet 3     magnesium hydroxide (MILK OF MAGNESIA) 400 MG/5ML suspension TAKE 15ML BY MOUTH ONCE DAILY AS NEEDED FOR NO BM IN 3 DAYS 473 mL 11     meclizine (ANTIVERT) 12.5 MG tablet Take 1 tablet (12.5 mg) by mouth 3 times daily as needed for dizziness 90 tablet 1     menthol (ICY HOT) 5 % PTCH Apply 1 patch topically 4 times daily as needed for muscle soreness (max of 8 hours for each patch)       Multiple Vitamin (TAB-A-RADHA) TABS TAKE 1 TABLET BY MOUTH ONCE DAILY (HEALTH MAINT) 28 tablet 11     mupirocin (BACTROBAN) 2 % external ointment Apply to skin tear wound with dressing changes and to left facial lesion BID x 2 weeks 30 g 1     Omega-3 Fatty Acids (SM FISH OIL) 1000 MG CAPS TAKE 1 CAPSULE BY MOUTH ONCE DAILY (SPINAL STENOSIS) 28 capsule 11     ondansetron (ZOFRAN) 4 MG tablet Take 1 tablet (4 mg) by mouth every 8 hours as needed for nausea 12 tablet 1     order for DME Equipment being ordered: Spikes for cane 1 Units 1     order for DME Equipment being ordered: plastic underpants 1 Units 1     polyvinyl alcohol (LIQUIFILM TEARS) 1.4 % ophthalmic solution INSTILL 1 DROP INTO BOTH EYES THREE TIMES DAILY INSTILL 1 DROP INTO BOTH EYES 3 TIMES DAILY AS NEEDED 15 mL 10     Transparent Dressings ([employer] MEPITEL TRANSPARENT FILM) MISC 1 each every 3 days And as needed for skin tears--Thrifty White 30 each 11       HPI  Review of Systems   Constitutional: Negative for chills, fatigue and fever.   HENT: Negative for congestion.    Eyes: Negative for visual disturbance.   Respiratory: Negative for chest tightness and shortness of breath.    Cardiovascular: Negative for chest pain.   Gastrointestinal: Negative for abdominal pain.   Endocrine:        Thyroid disorder   Genitourinary: Negative for dysuria, flank pain, frequency, hematuria and urgency.         Vaginal itching   Musculoskeletal: Positive for arthralgias, back pain and gait problem.   Skin: Negative for rash and wound.   Neurological: Positive for weakness. Negative for syncope.   Hematological: Does not bruise/bleed easily.   Psychiatric/Behavioral: Negative for confusion.       Toileting:    Continent of Bowel: Yes   Continent of Bladder: Yes  Mobility: walker and wheelchair    Recent Labs: Reviewed    Current Therapies: none    Physical Exam  Vitals reviewed.   Constitutional:       Appearance: Normal appearance.   HENT:      Head: Normocephalic and atraumatic.   Eyes:      Conjunctiva/sclera: Conjunctivae normal.   Cardiovascular:      Rate and Rhythm: Normal rate and regular rhythm.      Pulses: Normal pulses.      Heart sounds: Normal heart sounds.   Pulmonary:      Effort: Pulmonary effort is normal.      Breath sounds: Normal breath sounds.   Abdominal:      General: Abdomen is flat.      Palpations: Abdomen is soft.   Genitourinary:     Pubic Area: Rash present.      Labia:         Right: Rash (bright red, no associated discharge) present.         Left: Rash (bright red, no associated discharge) present.    Musculoskeletal:         General: Normal range of motion.   Skin:     General: Skin is warm and dry.      Capillary Refill: Capillary refill takes less than 2 seconds.   Neurological:      Mental Status: She is alert. Mental status is at baseline.   Psychiatric:         Mood and Affect: Mood normal.

## 2023-02-24 RX ORDER — PETROLATUM,WHITE/LANOLIN
OINTMENT (GRAM) TOPICAL 2 TIMES DAILY
Qty: 42 G | Refills: 4 | Status: SHIPPED | OUTPATIENT
Start: 2023-02-24 | End: 2024-01-11

## 2023-02-27 ASSESSMENT — ENCOUNTER SYMPTOMS
WOUND: 0
ABDOMINAL PAIN: 0
FATIGUE: 0
DYSURIA: 0
BRUISES/BLEEDS EASILY: 0
FREQUENCY: 0
FEVER: 0
CHEST TIGHTNESS: 0
ARTHRALGIAS: 1
ENDOCRINE COMMENTS: THYROID DISORDER
HEMATURIA: 0
WEAKNESS: 1
BACK PAIN: 1
SHORTNESS OF BREATH: 0
FLANK PAIN: 0
CHILLS: 0
CONFUSION: 0

## 2023-03-27 NOTE — PROGRESS NOTES
Geno Horn is a 89 year old female being seen today for follow up visit visit at [facility].    Code Status: Advance Directives: YES-.   Health Care Power of : Extended Emergency Contact Information  Primary Emergency Contact: BOBBI OSBORNE   [facility]  Home Phone: [phone]  Mobile Phone: [phone]  Relation: Daughter     Allergies: Alendronic acid, Oxybutynin, Tramadol, Trospium, Celebrex [celecoxib], and Tolterodine     Chief Complaint / HPI: Follow-up on progress with physical therapy and Occupational Therapy working on improving strength with transfers and improved self mobility and independence with walker and power motorized wheelchair.  Patient and staff report multiple skin tear wounds have all healed.  Has been followed by home care skilled nursing and therapy services.  Patient feels she is making good progress however her bilateral knee osteoarthritis pain has been affecting her mobility progress, recently seen by [facility] for Synvisc injections, has also previously trialed corticosteroid injections.   Overall patient is optimistic and motivated to regain mobility.  Patient raises no concerns    She is due for follow-up labs for anemia, has been on an iron supplement  And thyroid labs.    Past Medical, Surgical, Family and Social History reviewed: YES.     Medications: reviewed  Medications - recent changes: Recent Synvisc injection    Review of Systems:  General: positive for weakness  Musculoskeletal: positive for arthritis, joint pain and muscular weakness  Neurologic: positive for local weakness  All other systems negative  Toileting:    Continent of Bowel: Yes   Continent of Bladder: Yes  Mobility: walker and wheelchair    Recent Labs: reviewed        Current Therapies: physical therapy, occupational therapy and skilled nursing    Exam:  Vital signs reviewed.   GENERAL APPEARANCE:  alert and no distress  EYES: Eyes grossly normal to  Pt notified Morgan mishra inspection,conjunctivae and sclerae normal  RESP: lungs clear   MS: no musculoskeletal defects are noted   SKIN: warm and dry  PSYCH: mentation appears normal and affect normal/bright    Assessment and Plan:    Will check labs September 14 facility lab day, CBC, BMP and TSH and adjust medications as indicated pending results    Will discuss medication management with patient and daughter next week increasing Cymbalta, and consider Lyrica for back pain secondary to compression fractures and bone-on-bone osteoarthritis pain after labs completed.      (Z79.899) Encounter for medication review  (primary encounter diagnosis)      (V00.812S) Wheelchair (powered) colliding with stationary object, sequela      (D50.8) Other iron deficiency anemia      (E03.8) Other specified hypothyroidism      (M17.0) Primary osteoarthritis of both knees      (T14.8XXA) Multiple skin tears    (S22.070D) Closed wedge compression fracture of T9 vertebra with routine healing, subsequent encounter

## 2023-03-30 ENCOUNTER — NURSING HOME VISIT (OUTPATIENT)
Dept: GERIATRICS | Facility: OTHER | Age: 88
End: 2023-03-30
Attending: NURSE PRACTITIONER
Payer: COMMERCIAL

## 2023-03-30 DIAGNOSIS — M48.061 SPINAL STENOSIS OF LUMBAR REGION WITHOUT NEUROGENIC CLAUDICATION: ICD-10-CM

## 2023-03-30 DIAGNOSIS — Z78.9 DECREASED ACTIVITIES OF DAILY LIVING (ADL): ICD-10-CM

## 2023-03-30 DIAGNOSIS — M15.0 PRIMARY OSTEOARTHRITIS INVOLVING MULTIPLE JOINTS: ICD-10-CM

## 2023-03-30 DIAGNOSIS — Z78.9 LIVES IN ASSISTED LIVING FACILITY: ICD-10-CM

## 2023-03-30 DIAGNOSIS — M79.601 PAIN OF RIGHT UPPER EXTREMITY: Primary | ICD-10-CM

## 2023-03-30 PROCEDURE — 99348 HOME/RES VST EST LOW MDM 30: CPT | Performed by: NURSE PRACTITIONER

## 2023-03-30 ASSESSMENT — ENCOUNTER SYMPTOMS
CHEST TIGHTNESS: 0
BRUISES/BLEEDS EASILY: 0
CHILLS: 0
WEAKNESS: 1
WOUND: 0
ARTHRALGIAS: 1
BACK PAIN: 1
SHORTNESS OF BREATH: 0
CONFUSION: 0
HEMATURIA: 0
FEVER: 0
ABDOMINAL PAIN: 0

## 2023-03-30 NOTE — PROGRESS NOTES
Geno Horn is a 89 year old female being seen today for comprehensive visit at [facility].    Assessment & Plan       ICD-10-CM    1. Pain of right upper extremity  M79.601 Home Care Referral      2. Primary osteoarthritis involving multiple joints  M15.9 Home Care Referral      3. Decreased activities of daily living (ADL)  Z78.9 Home Care Referral      4. Spinal stenosis of lumbar region without neurogenic claudication  M48.061 Home Care Referral      5. Lives in assisted living facility  Z59.3         [facility] nursing staff requesting F2F visit for homecare PT and OT for Beatrice. She recently recieved home care physical therapy services through [facility], which ended 3/20/23. She has not made much progress while working with PT, per nursing staff and Beatrice.     She still has significant right shoulder pain and has osteoarthritis of both hips. She has tried corticosteroid injections in the past and has been using an OTC topical CBD/methol product, which has been beneficial in reducing her discomfort. Beatrice would benefit from PT and OT services to reduce pain, improve and maximize mobility and optimize her independence with ADL's.    History provided by resident and staff.  No additional concerns raised--appetite, bowel and bladder habits have been at baseline.    Call placed to daughter Adri, to discuss plan of care going forward and she is in agreement. The family had a positive experience with [facility]  formerly [facility]) and would like to use them for physical therapy and occupational therapy services.     30 minutes spent by me on the date of the encounter doing chart review, patient visit, documentation and discussion with family         Return if symptoms worsen or fail to improve.    LARISA Schmidt [facility]     Code Status: Full Code.   Health Care Power of : Extended Emergency Contact Information  Primary Emergency Contact: KoryDaniel  Address:  "[address]           [place] [address] [facility]  Mobile Phone: [phone]  Relation: Son  Secondary Emergency Contact: Adri Chaudhary  Address: [address] [place] [address] [facility]  Home Phone: [phone]  Mobile Phone: [phone]  Relation: Daughter     Allergies: Alendronic acid, Celebrex [celecoxib], Other environmental allergy, Oxybutynin, Tolterodine, Tramadol, and Trospium     Past Medical, Surgical, Family and Social History reviewed: YES.     Medications:   Current Outpatient Medications   Medication Sig Dispense Refill     acetaminophen (TYLENOL) 650 MG CR tablet 1 tab TID 90 tablet 3     acetaminophen (TYLENOL) 650 MG CR tablet Take 1 tablet (650 mg) by mouth 2 times daily 60 tablet 4     Adhesive Tape (MEDIPORE H SURGICAL 2\"X10YD) TAPE MEDIPORE H 2 x10yds TAPE ITEM #2862 DX L98.9 [facility]  3ROLLS/30DS       carbamide peroxide (DEBROX) 6.5 % otic solution Place 5 drops into both ears nightly as needed for other 15 mL 0     cyanocobalamin (VITAMIN B-12) 500 MCG tablet TAKE 1 TABLET BY MOUTH ONCE DAILY 90 tablet 3     ferrous fumarate 65 mg, [place]. FE,-Vitamin C 125 mg (VITRON-C)  MG TABS tablet TAKE 1 TABLET BY MOUTH TWICE DAILY ON EMPTY STOMACH 160 tablet 2     fluticasone (FLONASE) 50 MCG/ACT nasal spray Spray 2 sprays into both nostrils daily 16 g 11     Gauze Pads & Dressings (KERLIX BANDAGE ROLL) [facility] KERLIX 4-1/2 GAUZE ROLL ITEM #6715 DX L98.9 [facility]  #30EA/30DS       Incontinence Supply Disposable (DEPEND EASY FIT UNDERGARMENTS) MISC Medium size disposable under pants covered by insurance 100 each 11     Incontinence Supply Disposable (POISE PAD) PADS Change pad 3 x daily, #6 long Maxi pads 90 each 11     levothyroxine (SYNTHROID/LEVOTHROID) 25 MCG tablet Take 0.5 tablets (12.5 mcg) by mouth daily Together with 50 mcg tab for daily total 62.5 MCG daily 90 tablet 3     levothyroxine (SYNTHROID/LEVOTHROID) 50 MCG tablet TAKE 1 TABLET BY MOUTH ONCE DAILY " (TAKE ALONG WITH 12.5MCG = 62.5MCG) 90 tablet 3     magnesium hydroxide (MILK OF MAGNESIA) 400 MG/5ML suspension TAKE 15ML BY MOUTH ONCE DAILY AS NEEDED FOR NO BM IN 3 DAYS 473 mL 11     meclizine (ANTIVERT) 12.5 MG tablet Take 1 tablet (12.5 mg) by mouth 3 times daily as needed for dizziness 90 tablet 1     menthol (ICY HOT) 5 % PTCH Apply 1 patch topically 4 times daily as needed for muscle soreness (max of 8 hours for each patch)       Multiple Vitamin (TAB-A-RADHA) TABS TAKE 1 TABLET BY MOUTH ONCE DAILY (HEALTH MAINT) 28 tablet 11     mupirocin (BACTROBAN) 2 % external ointment Apply to skin tear wound with dressing changes and to left facial lesion BID x 2 weeks 30 g 1     Omega-3 Fatty Acids (SM FISH OIL) 1000 MG CAPS TAKE 1 CAPSULE BY MOUTH ONCE DAILY (SPINAL STENOSIS) 28 capsule 11     ondansetron (ZOFRAN) 4 MG tablet Take 1 tablet (4 mg) by mouth every 8 hours as needed for nausea 12 tablet 1     order for DME Equipment being ordered: Spikes for cane 1 Units 1     order for DME Equipment being ordered: plastic underpants 1 Units 1     polyvinyl alcohol (LIQUIFILM TEARS) 1.4 % ophthalmic solution INSTILL 1 DROP INTO BOTH EYES THREE TIMES DAILY INSTILL 1 DROP INTO BOTH EYES 3 TIMES DAILY AS NEEDED 15 mL 10     Transparent Dressings ([employer] MEPITEL TRANSPARENT FILM) MISC 1 each every 3 days And as needed for skin tears--Thrifty White 30 each 11     vitamin A & D (BABY) external ointment Apply topically 2 times daily And PRN with toileting to affected amelie areas for skin barrier cream 42 g 4       HPI  Review of Systems   Constitutional: Negative for chills and fever.   HENT: Negative for congestion.    Eyes: Negative for visual disturbance.   Respiratory: Negative for chest tightness and shortness of breath.    Cardiovascular: Negative for chest pain.   Gastrointestinal: Negative for abdominal pain.   Endocrine:        Positive for thyroid disorder     Genitourinary: Negative for hematuria.   Musculoskeletal:  Positive for arthralgias, back pain and gait problem (uses walker and wheelchair).   Skin: Negative for rash and wound.   Neurological: Positive for weakness. Negative for syncope.   Hematological: Does not bruise/bleed easily.   Psychiatric/Behavioral: Negative for confusion.   All other systems reviewed and are negative.      Toileting:    Continent of Bowel: Yes   Continent of Bladder: Yes  Mobility: walker and wheelchair    Recent Labs: Most recent labs reviewed.    Current Therapies: none    Physical Exam  HENT:      Head: Atraumatic.      Mouth/Throat:      Mouth: Mucous membranes are moist.      Pharynx: Oropharynx is clear.   Pulmonary:      Effort: Pulmonary effort is normal.   Musculoskeletal:         General: Tenderness (right shoulder) present.      Cervical back: Normal range of motion.   Skin:     General: Skin is warm and dry.      Capillary Refill: Capillary refill takes less than 2 seconds.   Neurological:      Mental Status: She is oriented to person, place, and time. Mental status is at baseline.   Psychiatric:         Mood and Affect: Mood and affect normal.

## 2023-04-03 NOTE — PROGRESS NOTES
o      11/04/20 1405   Quick Adds   Type of Visit Initial Occupational Therapy Evaluation   Living Environment   People in home alone   Current Living Arrangements apartment   Home Accessibility stairs within home   Number of Stairs, Within Home, Primary 9   Stair Railings, Within Home, Primary railings on both sides of stairs   Transportation Anticipated family or friend will provide   Self-Care   Usual Activity Tolerance moderate   Current Activity Tolerance fair   Equipment Currently Used at Home cane, quad;walker, rolling;shower chair;grab bar, toilet;grab bar, tub/shower   Disability/Function   Hearing Difficulty or Deaf yes   Describe hearing loss bilateral hearing loss   Wear Glasses or Blind no   Difficulty Communicating no   Fall history within last six months yes   Cognitive Status Examination   Orientation Status orientation to person, place and time   Affect/Mental Status (Cognitive) WFL   Follows Commands WFL   Safety Deficit minimal deficit   Memory Deficit minimal deficit   Visual Perception   Visual Impairment/Limitations WFL   Pain Assessment   Patient Currently in Pain Yes, see Vital Sign flowsheet   Range of Motion Comprehensive   General Range of Motion no range of motion deficits identified   Coordination   Upper Extremity Coordination No deficits were identified   Sit-Stand Transfer   Sit-Stand [place] (Transfers) minimum assist (75% patient effort)   Assistive Device (Sit-Stand Transfers) walker, front-wheeled   Balance   Balance Assessment standing balance: dynamic   Standing Balance: Dynamic fair balance   Clinical Impression   Criteria for Skilled Therapeutic Interventions Met (OT) yes   OT Diagnosis weakness/frequent falls   OT Problem List-Impairments impacting ADL activity tolerance impaired   Assessment of Occupational Performance 1-3 Performance Deficits   Identified Performance Deficits mobility and self care    Planned Therapy Interventions (OT) ADL retraining   Clinical Decision  Making Complexity (OT) low complexity   Therapy Frequency (OT) Daily   Predicted Duration of Therapy 1-2 days   Anticipated Equipment Needs Upon Discharge (OT) commode chair   Risks and Benefits of Treatment have been explained. Yes   Patient, Family & other staff in agreement with plan of care Yes   OT Discharge Planning    OT Discharge Recommendation (DC Rec) home with home care occupational therapy   OT Rationale for DC Rec pt would benefit from home care therapies and assist from family    OT Brief overview of current status  Pt ambulated in room with min assist, had some balance difficulties requiring min assist from Therapist, pt c/o pain in lower back.    Total Evaluation Time (Minutes)   Total Evaluation Time (Minutes) 15

## 2023-04-06 ENCOUNTER — LAB REQUISITION (OUTPATIENT)
Dept: LAB | Facility: OTHER | Age: 88
End: 2023-04-06
Payer: COMMERCIAL

## 2023-04-06 ENCOUNTER — NURSING HOME VISIT (OUTPATIENT)
Dept: GERIATRICS | Facility: OTHER | Age: 88
End: 2023-04-06
Attending: NURSE PRACTITIONER
Payer: COMMERCIAL

## 2023-04-06 VITALS
HEART RATE: 72 BPM | BODY MASS INDEX: 25.32 KG/M2 | TEMPERATURE: 97.8 F | OXYGEN SATURATION: 96 % | DIASTOLIC BLOOD PRESSURE: 72 MMHG | WEIGHT: 134 LBS | SYSTOLIC BLOOD PRESSURE: 120 MMHG | RESPIRATION RATE: 16 BRPM

## 2023-04-06 DIAGNOSIS — Z78.9 LIVING IN ASSISTED LIVING: ICD-10-CM

## 2023-04-06 DIAGNOSIS — R09.81 NASAL CONGESTION: ICD-10-CM

## 2023-04-06 DIAGNOSIS — R05.9 COUGH, UNSPECIFIED TYPE: ICD-10-CM

## 2023-04-06 DIAGNOSIS — J01.00 ACUTE MAXILLARY SINUSITIS, RECURRENCE NOT SPECIFIED: Primary | ICD-10-CM

## 2023-04-06 LAB
FLUAV RNA SPEC QL NAA+PROBE: NEGATIVE
FLUBV RNA RESP QL NAA+PROBE: NEGATIVE
RSV RNA SPEC NAA+PROBE: NEGATIVE
SARS-COV-2 RNA RESP QL NAA+PROBE: NEGATIVE

## 2023-04-06 PROCEDURE — 99348 HOME/RES VST EST LOW MDM 30: CPT | Performed by: NURSE PRACTITIONER

## 2023-04-06 PROCEDURE — 87637 SARSCOV2&INF A&B&RSV AMP PRB: CPT | Performed by: NURSE PRACTITIONER

## 2023-04-06 RX ORDER — AMOXICILLIN 875 MG
875 TABLET ORAL 2 TIMES DAILY
Qty: 10 TABLET | Refills: 0 | Status: SHIPPED | OUTPATIENT
Start: 2023-04-06 | End: 2023-04-11

## 2023-04-06 RX ORDER — GUAIFENESIN 200 MG/10ML
10 LIQUID ORAL 2 TIMES DAILY
Qty: 236 ML | Refills: 1 | Status: SHIPPED | OUTPATIENT
Start: 2023-04-06 | End: 2023-06-01

## 2023-04-06 NOTE — PROGRESS NOTES
Geno Horn is a 89 year old female being seen today for acute and follow up visit visit at [facility].    Code Status: Advance Directives: YES-.   Health Care Power of : Extended Emergency Contact Information  Primary Emergency Contact: Daniel Horn  Address: [address]           [place] [address] [facility]  Mobile Phone: [phone]  Relation: Son  Secondary Emergency Contact: Adri Chaudhary  Address: [address] [place] [address] [facility]  Home Phone: [phone]  Mobile Phone: [phone]  Relation: Daughter     Allergies: Alendronic acid, Celebrex [celecoxib], Other environmental allergy, Oxybutynin, Tolterodine, Tramadol, and Trospium     Chief Complaint / HPI: Follow-up with resident for 6 days nasal congestion, maxillary sinus pain and pressure to the point where it is keeping her from resting at night has an intermittent nonproductive cough.Exposures to COVID 19 at [facility].  Has remained afebrile, taking liquids well, poor appetite bowel and bladder habits at baseline  Was given Robitussin cough syrup per facility standing orders and felt that was helpful--- her daughter is agreeable with guaifenesin/Mucinex for symptom management  Already uses Flonase daily.  Resident and staff do not raise any additional concerns    Past Medical, Surgical, Family and Social History reviewed: YES.     Medications: Reviewed  Medications - recent changes: none    Review of Systems:  General: positive for weakness  ENT: positive for sinus congestion, postnasal drainage and hoarse voice  Resp: positive for cough  Musculoskeletal: positive for back pain, arthritis and muscular weakness  Endocrine: positive for thyroid disorder  All other systems negative  Toileting:    Continent of Bowel: Yes   Continent of Bladder: Yes  Mobility: wheelchair    Recent Labs:  Recent Results (from the past 240 hour(s))   Influenza A/B, RSV, & SARS-CoV2 PCR (COVID-19)    Collection Time: 04/06/23 12:01  PM    Specimen: Nose; Swab   Result Value Ref Range    Influenza A PCR Negative Negative    Influenza B PCR Negative Negative    RSV PCR Negative Negative    SARS CoV2 PCR Negative Negative           Current Therapies: none    Exam:  Vital signs reviewed.   GENERAL APPEARANCE: healthy, alert and no distress  EYES: Eyes grossly normal to inspection, PERRL and conjunctivae and sclerae normal  HENT: ear canals and TM's normal, nose and mouth without ulcers or lesions, oropharynx clear and oral mucous membranes moist  NECK: no adenopathy, no asymmetry, masses, or scars and thyroid normal to palpation  RESP: lungs clear to auscultation - no rales, rhonchi or wheezes  ABDOMEN: soft, nontender, no hepatosplenomegaly, no masses and bowel sounds normal  MS: no musculoskeletal defects are noted and gait is age appropriate without ataxia  SKIN: no suspicious lesions or rashes  NEURO: Normal strength and tone, sensory exam grossly normal, mentation intact and speech normal  PSYCH: mentation appears normal and affect normal/bright    Assessment and Plan:    Resident with almost a week of nasal congestion, nonproductive cough disrupting sleep living in assisted living with exposures to COVID and other circulating respiratory viruses.  I did speak with her daughter who reports she has had a past history of sinus infections which have been treated.   In addition to increasing fluid intake to maintain hydration sipping warm tea and other supportive therapies-we will start amoxicillin.  Daughter agreeable with trying Mucinex--resident prefers cough syrup formulation we will start scheduled guanfacine syrup twice daily.  PCR multiplex negative.    Staff will continue to monitor and send in for any abrupt condition change          (J01.00) Acute maxillary sinusitis, recurrence not specified  (primary encounter diagnosis)    Plan: amoxicillin (AMOXIL) 875 MG tablet            (R09.81) Nasal congestion    Plan: guaiFENesin (ROBITUSSIN)  20 mg/mL liquid,         amoxicillin (AMOXIL) 875 MG tablet            (Z59.3) Living in assisted living    Plan: guaiFENesin (ROBITUSSIN) 20 mg/mL liquid,         amoxicillin (AMOXIL) 875 MG tablet            (R05.9) Cough, unspecified type    Plan: guaiFENesin (ROBITUSSIN) 20 mg/mL liquid,         amoxicillin (AMOXIL) 875 MG tablet

## 2023-04-20 ENCOUNTER — NURSING HOME VISIT (OUTPATIENT)
Dept: GERIATRICS | Facility: OTHER | Age: 88
End: 2023-04-20
Attending: NURSE PRACTITIONER
Payer: COMMERCIAL

## 2023-04-20 DIAGNOSIS — M15.0 PRIMARY OSTEOARTHRITIS INVOLVING MULTIPLE JOINTS: Primary | ICD-10-CM

## 2023-04-20 DIAGNOSIS — K59.00 CONSTIPATION, UNSPECIFIED CONSTIPATION TYPE: Primary | ICD-10-CM

## 2023-04-20 RX ORDER — AMOXICILLIN 250 MG
1 CAPSULE ORAL DAILY
Qty: 90 TABLET | Refills: 1 | Status: SHIPPED | OUTPATIENT
Start: 2023-04-20 | End: 2023-06-15

## 2023-04-23 RX ORDER — SENNOSIDES 8.6 MG
CAPSULE ORAL
Qty: 84 TABLET | Refills: 11 | Status: SHIPPED | OUTPATIENT
Start: 2023-04-23 | End: 2023-09-28

## 2023-04-25 ENCOUNTER — LAB REQUISITION (OUTPATIENT)
Dept: LAB | Facility: OTHER | Age: 88
End: 2023-04-25
Payer: COMMERCIAL

## 2023-04-25 DIAGNOSIS — E03.9 HYPOTHYROIDISM, UNSPECIFIED: ICD-10-CM

## 2023-04-25 DIAGNOSIS — N18.9 CHRONIC KIDNEY DISEASE, UNSPECIFIED: ICD-10-CM

## 2023-04-25 DIAGNOSIS — D64.9 ANEMIA, UNSPECIFIED: ICD-10-CM

## 2023-04-25 DIAGNOSIS — Z79.899 OTHER LONG TERM (CURRENT) DRUG THERAPY: ICD-10-CM

## 2023-04-25 LAB
ANION GAP SERPL CALCULATED.3IONS-SCNC: 10 MMOL/L (ref 7–15)
BASOPHILS # BLD AUTO: 0.1 10E3/UL (ref 0–0.2)
BASOPHILS NFR BLD AUTO: 1 %
BUN SERPL-MCNC: 12.8 MG/DL (ref 8–23)
CALCIUM SERPL-MCNC: 9 MG/DL (ref 8.2–9.6)
CHLORIDE SERPL-SCNC: 97 MMOL/L (ref 98–107)
CREAT SERPL-MCNC: 0.56 MG/DL (ref 0.51–0.95)
DEPRECATED HCO3 PLAS-SCNC: 26 MMOL/L (ref 22–29)
EOSINOPHIL # BLD AUTO: 0.1 10E3/UL (ref 0–0.7)
EOSINOPHIL NFR BLD AUTO: 2 %
ERYTHROCYTE [DISTWIDTH] IN BLOOD BY AUTOMATED COUNT: 14.4 % (ref 10–15)
GFR SERPL CREATININE-BSD FRML MDRD: 83 ML/MIN/1.73M2
GLUCOSE SERPL-MCNC: 103 MG/DL (ref 70–99)
HCT VFR BLD AUTO: 32.5 % (ref 35–47)
HGB BLD-MCNC: 11.1 G/DL (ref 11.7–15.7)
IMM GRANULOCYTES # BLD: 0 10E3/UL
IMM GRANULOCYTES NFR BLD: 0 %
LYMPHOCYTES # BLD AUTO: 1.9 10E3/UL (ref 0.8–5.3)
LYMPHOCYTES NFR BLD AUTO: 26 %
MCH RBC QN AUTO: 35.4 PG (ref 26.5–33)
MCHC RBC AUTO-ENTMCNC: 34.2 G/DL (ref 31.5–36.5)
MCV RBC AUTO: 104 FL (ref 78–100)
MONOCYTES # BLD AUTO: 0.4 10E3/UL (ref 0–1.3)
MONOCYTES NFR BLD AUTO: 6 %
NEUTROPHILS # BLD AUTO: 4.8 10E3/UL (ref 1.6–8.3)
NEUTROPHILS NFR BLD AUTO: 65 %
NRBC # BLD AUTO: 0 10E3/UL
NRBC BLD AUTO-RTO: 0 /100
PLATELET # BLD AUTO: 332 10E3/UL (ref 150–450)
POTASSIUM SERPL-SCNC: 4.6 MMOL/L (ref 3.4–5.3)
RBC # BLD AUTO: 3.14 10E6/UL (ref 3.8–5.2)
SODIUM SERPL-SCNC: 133 MMOL/L (ref 136–145)
T4 FREE SERPL-MCNC: 1.02 NG/DL (ref 0.9–1.7)
TSH SERPL DL<=0.005 MIU/L-ACNC: 6.45 UIU/ML (ref 0.3–4.2)
VIT B12 SERPL-MCNC: 1033 PG/ML (ref 232–1245)
WBC # BLD AUTO: 7.4 10E3/UL (ref 4–11)

## 2023-04-25 PROCEDURE — 36415 COLL VENOUS BLD VENIPUNCTURE: CPT | Performed by: NURSE PRACTITIONER

## 2023-04-25 PROCEDURE — 85025 COMPLETE CBC W/AUTO DIFF WBC: CPT | Performed by: NURSE PRACTITIONER

## 2023-04-25 PROCEDURE — 82607 VITAMIN B-12: CPT | Performed by: NURSE PRACTITIONER

## 2023-04-25 PROCEDURE — 84439 ASSAY OF FREE THYROXINE: CPT | Performed by: NURSE PRACTITIONER

## 2023-04-25 PROCEDURE — 84443 ASSAY THYROID STIM HORMONE: CPT | Performed by: NURSE PRACTITIONER

## 2023-04-25 PROCEDURE — 80048 BASIC METABOLIC PNL TOTAL CA: CPT | Performed by: NURSE PRACTITIONER

## 2023-05-08 DIAGNOSIS — J30.89 SEASONAL ALLERGIC RHINITIS DUE TO OTHER ALLERGIC TRIGGER: ICD-10-CM

## 2023-05-09 RX ORDER — FLUTICASONE PROPIONATE 50 MCG
SPRAY, SUSPENSION (ML) NASAL
Qty: 16 G | Refills: 11 | Status: SHIPPED | OUTPATIENT
Start: 2023-05-09 | End: 2023-06-08

## 2023-05-09 NOTE — TELEPHONE ENCOUNTER
[facility] [place] sent Rx request for the following:      Requested Prescriptions   Pending Prescriptions Disp Refills     fluticasone (FLONASE) 50 MCG/ACT nasal spray [Pharmacy Med Name: FLUTICASONE 50MCG NSL(120)] 16 g 0     Sig: SPRAY 2 SPRAYS INTO EACH NOSTRIL ONCE DAILY       Nasal Allergy Protocol Failed - 5/9/2023  2:57 PM        Failed - Recent (12 mo) or future (30 days) visit within the authorizing provider's specialty     Last Prescription Date:   4/28/22  Last Fill Qty/Refills:         16 g, R-11    Last Office Visit:              4/6/23 ([facility] Visit, Sierra Ray NP)  Future Office visit:           None    Karen Simon RN .............. 5/9/2023  3:02 PM

## 2023-05-10 ENCOUNTER — DOCUMENTATION ONLY (OUTPATIENT)
Dept: OTHER | Facility: CLINIC | Age: 88
End: 2023-05-10
Payer: COMMERCIAL

## 2023-05-17 ENCOUNTER — NURSING HOME VISIT (OUTPATIENT)
Dept: GERIATRICS | Facility: OTHER | Age: 88
End: 2023-05-17
Attending: NURSE PRACTITIONER
Payer: COMMERCIAL

## 2023-05-17 DIAGNOSIS — Z78.9 LIVING IN ASSISTED LIVING: ICD-10-CM

## 2023-05-17 DIAGNOSIS — Z99.3 WHEELCHAIR DEPENDENT: ICD-10-CM

## 2023-05-17 DIAGNOSIS — S90.822A BLISTER (NONTHERMAL), LEFT FOOT, INITIAL ENCOUNTER: Primary | ICD-10-CM

## 2023-05-17 PROCEDURE — 99347 HOME/RES VST EST SF MDM 20: CPT | Performed by: NURSE PRACTITIONER

## 2023-05-17 NOTE — PROGRESS NOTES
Geno Horn is a 89 year old female being seen today for acute and follow up  visit at [facility].    Code Status: Advance Directives: YES- Hospitalist to discuss with patient and family further.   Health Care Power of : Extended Emergency Contact Information  Primary Emergency Contact: Daniel Horn  Address: [address]           [place] [address] [facility]  Mobile Phone: [phone]  Relation: Son  Secondary Emergency Contact: Adri Chaudhary  Address: [address] [place] [address] [facility]  Home Phone: [phone]  Mobile Phone: [phone]  Relation: Daughter     Allergies: Alendronate, Celebrex [celecoxib], Other environmental allergy, Oxybutynin, Tolterodine, Tramadol, and Trospium     Chief Complaint / HPI: Nursing staff requested follow-up on a friction blister injury left foot below lateral malleolus.  Resident is primarily nonambulatory does self transfer to toilet and chair.  Resident does not have any concerns--does not feel it is related to her current shoes.    Past Medical, Surgical, Family and Social History reviewed: YES.     Medications: reviewed  Medications - recent changes:     Review of Systems:  General: positive for weakness  Skin: positive for blister  Musculoskeletal: positive for muscular weakness  All other systems negative  Toileting:    Continent of Bowel: Yes   Continent of Bladder: Yes  Mobility: walker and wheelchair    Recent Labs: reviewed      Current Therapies: recently discharged    Exam:  Vital signs reviewed.   GENERAL APPEARANCE:  alert and no distress  EYES: Eyes grossly normal to inspection,  CV: no peripheral edema and peripheral pulses strong  MS: no musculoskeletal defects are noted and gait is walker dependent   SKIN: 2 cm fluid-filled blister, clean and dry, no erythema, no drainage nontender with palpation  NEURO: Alert,mentation intact and speech normal  PSYCH: mentation appears normal and affect  normal/bright    Assessment and Plan:    Very pleasant gracious lady who resides in assisted living primarily wheelchair dependent for mobility can self transfer from chair to chair and to the toilet--walker with standby assist with recently discovered friction blister left foot lateral aspect--- cannot really find any correlation to shoes  Area was cleansed with wound wash, dried, covered with small foam Alevn dressing  Recommend offloading pressure until completely healed and monitor daily during assistance with cares and compression stockings  Staff directed to change dressing twice a week and PRN--would expect healing in the next 2 to 3 weeks if responsive to above treatment  Staff will notify for any concerns--monitor for secondary infection    Over 20 minutes spent in chart review, face-to-face visit with wound care, medication review      (Y95.850C) Blister (nonthermal), left foot, initial encounter  (primary encounter diagnosis)      (Z59.3) Living in assisted living      (Z99.3) Wheelchair dependent

## 2023-05-18 DIAGNOSIS — D50.8 OTHER IRON DEFICIENCY ANEMIA: ICD-10-CM

## 2023-05-19 NOTE — TELEPHONE ENCOUNTER
Requested Prescriptions   Pending Prescriptions Disp Refills     ferrous fumarate 65 mg ([place]. FE)-Vitamin C 125 mg (VITRON-C)  MG TABS tablet [Pharmacy Med Name: VITRON-C TAB] 56 tablet 11     Sig: TAKE 1 TABLET BY MOUTH TWO TIMES A DAY   Last Prescription Date:   9/29/22  Last Fill Qty/Refills:         160, R-2    Last Office Visit:              5/17/23 ([facility] Visit, Sierra Ray NP)  Future Office visit:           None    Karen Simon RN .............. 5/19/2023  4:25 PM

## 2023-05-22 RX ORDER — BACILLUS COAGULANS 1B CELL
CAPSULE ORAL
Qty: 180 TABLET | Refills: 3 | Status: SHIPPED | OUTPATIENT
Start: 2023-05-22 | End: 2023-11-09 | Stop reason: ALTCHOICE

## 2023-05-31 DIAGNOSIS — R11.0 NAUSEA: ICD-10-CM

## 2023-06-01 ENCOUNTER — NURSING HOME VISIT (OUTPATIENT)
Dept: GERIATRICS | Facility: OTHER | Age: 88
End: 2023-06-01
Attending: NURSE PRACTITIONER
Payer: COMMERCIAL

## 2023-06-01 DIAGNOSIS — Z78.9 LIVING IN ASSISTED LIVING: ICD-10-CM

## 2023-06-01 DIAGNOSIS — R05.9 COUGH, UNSPECIFIED TYPE: ICD-10-CM

## 2023-06-01 DIAGNOSIS — R09.81 NASAL CONGESTION: ICD-10-CM

## 2023-06-01 DIAGNOSIS — J01.00 ACUTE MAXILLARY SINUSITIS, RECURRENCE NOT SPECIFIED: Primary | ICD-10-CM

## 2023-06-01 DIAGNOSIS — J30.2 SEASONAL ALLERGIC RHINITIS, UNSPECIFIED TRIGGER: ICD-10-CM

## 2023-06-01 PROCEDURE — 99349 HOME/RES VST EST MOD MDM 40: CPT | Performed by: NURSE PRACTITIONER

## 2023-06-01 RX ORDER — CETIRIZINE HYDROCHLORIDE 10 MG/1
10 TABLET ORAL AT BEDTIME
Qty: 90 TABLET | Refills: 1 | Status: SHIPPED | OUTPATIENT
Start: 2023-06-01 | End: 2023-11-01

## 2023-06-01 RX ORDER — GUAIFENESIN 200 MG/10ML
10 LIQUID ORAL EVERY 6 HOURS PRN
Qty: 236 ML | Refills: 1 | Status: SHIPPED | OUTPATIENT
Start: 2023-06-01 | End: 2024-02-29

## 2023-06-01 ASSESSMENT — ENCOUNTER SYMPTOMS
RHINORRHEA: 1
SINUS PAIN: 0
FEVER: 0
DIARRHEA: 1
APPETITE CHANGE: 1
WEAKNESS: 1
FATIGUE: 1
SINUS PRESSURE: 1

## 2023-06-01 NOTE — PROGRESS NOTES
"Geno Horn is a 89 year old female being seen today for acute visit at [facility].    Assessment & Plan       ICD-10-CM    1. Acute maxillary sinusitis, recurrence not specified  J01.00 amoxicillin-clavulanate (AUGMENTIN) 875-125 MG tablet     cetirizine (ZYRTEC) 10 MG tablet      2. Nasal congestion  R09.81 guaiFENesin (ROBITUSSIN) 20 mg/mL liquid      3. Living in assisted living  Z59.3 guaiFENesin (ROBITUSSIN) 20 mg/mL liquid      4. Cough, unspecified type  R05.9 guaiFENesin (ROBITUSSIN) 20 mg/mL liquid      5. Seasonal allergic rhinitis, unspecified trigger  J30.2 cetirizine (ZYRTEC) 10 MG tablet        [facility] staff as well as Geno report 5 days of nasal congestion, rhinorrhea, \"scratchy\"  throat and sinus/ head pressure. Negative Covid-19 tests yesterday and today. She denies any SOB, chest tightness, or chest pain. She has remained afebrile, PO intake is slightly decreased and she had one episode of diarrhea last evening, bladder habits are at baseline.   Facility has standing order to give robitussin PO PRN, she received a dose and found this helpful and is requesting to be able to get it more often, as it really helps control her cough. She currently uses Flonase daily, resident and staff do not bring forth any other concerns during today's visit.  Physical exam consistent with acute maxillary sinusitis and allergic rhinitis. Start Augmentin PO BID x 5 days. Changed robitussin order from daily to q 6 hours PRN cough. Start cetirizine 10 mg PO @ HS. If Geno reports increased daytime sleepiness, can look at other options other than the Zyrtec.      Discussed signs/ symptoms that would warrant urgent/ emergent follow-up. All questions and concerns were addressed and answered to patient's satisfaction. I was able to talk with Geno's daughter, Adri about my exam findings and plan going forward and she is in agreement.          40 minutes spent by me on the date of the encounter doing chart " "review, patient visit, documentation and discussion with family       Return if symptoms worsen or fail to improve.    LARISA Schmidt [facility]     Code Status: DNR / DNI.   Health Care Power of : Extended Emergency Contact Information  Primary Emergency Contact: Daniel Horn  Address: [address]           [place] [address] [facility]  Mobile Phone: [phone]  Relation: Son  Secondary Emergency Contact: Adri Chaudhary  Address: [address] [address] [facility]  Home Phone: [phone]  Mobile Phone: [phone]  Relation: Daughter     Allergies: Alendronate, Celebrex [celecoxib], Other environmental allergy, Oxybutynin, Tolterodine, Tramadol, and Trospium     Past Medical, Surgical, Family and Social History reviewed: YES.     Medications:   Current Outpatient Medications   Medication Sig Dispense Refill     amoxicillin-clavulanate (AUGMENTIN) 875-125 MG tablet Take 1 tablet by mouth 2 times daily for 5 days 10 tablet 0     cetirizine (ZYRTEC) 10 MG tablet Take 1 tablet (10 mg) by mouth At Bedtime 90 tablet 1     guaiFENesin (ROBITUSSIN) 20 mg/mL liquid Take 10 mLs by mouth every 6 hours as needed for cough 236 mL 1     acetaminophen (TYLENOL) 650 MG CR tablet TAKE 1 TABLET BY MOUTH THREE TIMES A DAY 84 tablet 11     acetaminophen (TYLENOL) 650 MG CR tablet 1 tab TID 90 tablet 3     acetaminophen (TYLENOL) 650 MG CR tablet Take 1 tablet (650 mg) by mouth 2 times daily 60 tablet 4     Adhesive Tape (MEDIPORE H SURGICAL 2\"X10YD) TAPE MEDIPORE H 2 x10yds TAPE ITEM #2862 DX L98.9 [facility]  3ROLLS/30DS       carbamide peroxide (DEBROX) 6.5 % otic solution Place 5 drops into both ears nightly as needed for other 15 mL 0     cyanocobalamin (VITAMIN B-12) 500 MCG tablet TAKE 1 TABLET BY MOUTH ONCE DAILY 90 tablet 3     ferrous fumarate 65 mg, [place]. FE,-Vitamin C 125 mg (VITRON-C)  MG TABS tablet TAKE 1 TABLET BY MOUTH TWO TIMES A DAY " 180 tablet 3     fluticasone (FLONASE) 50 MCG/ACT nasal spray SPRAY 2 SPRAYS INTO EACH NOSTRIL ONCE DAILY 16 g 11     Gauze Pads & Dressings (KERLIX BANDAGE ROLL) [facility] KERLIX 4-1/2 GAUZE ROLL ITEM #6715 DX L98.9 [facility]  #30EA/30DS       Incontinence Supply Disposable (DEPEND EASY FIT UNDERGARMENTS) MISC Medium size disposable under pants covered by insurance 100 each 11     Incontinence Supply Disposable (POISE PAD) PADS Change pad 3 x daily, #6 long Maxi pads 90 each 11     levothyroxine (SYNTHROID/LEVOTHROID) 25 MCG tablet Take 0.5 tablets (12.5 mcg) by mouth daily Together with 50 mcg tab for daily total 62.5 MCG daily 90 tablet 3     levothyroxine (SYNTHROID/LEVOTHROID) 50 MCG tablet TAKE 1 TABLET BY MOUTH ONCE DAILY (TAKE ALONG WITH 12.5MCG = 62.5MCG) 90 tablet 3     magnesium hydroxide (MILK OF MAGNESIA) 400 MG/5ML suspension TAKE 15ML BY MOUTH ONCE DAILY AS NEEDED FOR NO BM IN 3 DAYS 473 mL 11     meclizine (ANTIVERT) 12.5 MG tablet Take 1 tablet (12.5 mg) by mouth 3 times daily as needed for dizziness 90 tablet 1     menthol (ICY HOT) 5 % PTCH Apply 1 patch topically 4 times daily as needed for muscle soreness (max of 8 hours for each patch)       Multiple Vitamin (TAB-A-RADHA) TABS TAKE 1 TABLET BY MOUTH ONCE DAILY (HEALTH MAINT) 28 tablet 11     mupirocin (BACTROBAN) 2 % external ointment Apply to skin tear wound with dressing changes and to left facial lesion BID x 2 weeks 30 g 1     Omega-3 Fatty Acids (SM FISH OIL) 1000 MG CAPS TAKE 1 CAPSULE BY MOUTH ONCE DAILY (SPINAL STENOSIS) 28 capsule 11     ondansetron (ZOFRAN) 4 MG tablet Take 1 tablet (4 mg) by mouth every 8 hours as needed for nausea 12 tablet 1     order for DME Equipment being ordered: Spikes for cane 1 Units 1     order for DME Equipment being ordered: plastic underpants 1 Units 1     polyvinyl alcohol (LIQUIFILM TEARS) 1.4 % ophthalmic solution INSTILL 1 DROP INTO BOTH EYES THREE TIMES DAILY INSTILL 1 DROP INTO BOTH EYES 3 TIMES DAILY AS  NEEDED 15 mL 10     senna-docusate (SENOKOT-S/PERICOLACE) 8.6-50 MG tablet Take 1 tablet by mouth daily Hold for loose stools 90 tablet 1     Transparent Dressings ([employer] MEPITEL TRANSPARENT FILM) MISC 1 each every 3 days And as needed for skin tears--Thrifty White 30 each 11     vitamin A & D (BABY) external ointment Apply topically 2 times daily And PRN with toileting to affected amelie areas for skin barrier cream 42 g 4       HPI  Review of Systems   Constitutional: Positive for appetite change (decreased) and fatigue. Negative for fever.   HENT: Positive for congestion, postnasal drip, rhinorrhea and sinus pressure. Negative for ear discharge, ear pain and sinus pain.    Gastrointestinal: Positive for diarrhea.   Allergic/Immunologic: Positive for environmental allergies.   Neurological: Positive for weakness.   All other systems reviewed and are negative.      Toileting:    Continent of Bowel: Yes   Continent of Bladder: Yes  Mobility: wheelchair    Recent Labs: Most recent labs reviewed    Current Therapies: none    Physical Exam  Constitutional:       Appearance: Normal appearance.   HENT:      Head: Normocephalic and atraumatic.      Nose: Congestion present.      Right Sinus: Maxillary sinus tenderness present.      Left Sinus: Maxillary sinus tenderness present.      Mouth/Throat:      Mouth: Mucous membranes are moist.      Pharynx: Oropharynx is clear.   Eyes:      Conjunctiva/sclera: Conjunctivae normal.   Cardiovascular:      Rate and Rhythm: Normal rate and regular rhythm.      Pulses: Normal pulses.      Heart sounds: Normal heart sounds.   Pulmonary:      Effort: Pulmonary effort is normal. No respiratory distress.      Breath sounds: Normal breath sounds. No wheezing.   Abdominal:      General: Abdomen is flat. Bowel sounds are normal.      Palpations: Abdomen is soft.   Musculoskeletal:      Cervical back: Normal range of motion.   Skin:     General: Skin is warm and dry.      Capillary Refill:  Capillary refill takes less than 2 seconds.   Neurological:      Mental Status: She is alert. Mental status is at baseline.

## 2023-06-02 NOTE — TELEPHONE ENCOUNTER
"  Last Prescription Date: 2/18/22  Last Qty/Refills: 12 / 1  Last Office Visit: 6/1/23  Future Office Visit: none     Corinne R Thayer, RN on 6/2/2023 at 6:24 PM    Requested Prescriptions   Pending Prescriptions Disp Refills     ondansetron (ZOFRAN) 4 MG tablet [Pharmacy Med Name: ONDANSETRON 4MG TAB] 12 tablet 5     Sig: TAKE 1 TABLET BY MOUTH EVERY 8 HOURS AS NEEDED ([place]USE[place])        Antivertigo/Antiemetic Agents Failed - 5/31/2023  5:47 AM        Failed - Recent (12 mo) or future (30 days) visit within the authorizing provider's specialty     Patient has had an office visit with the authorizing provider or a provider within the authorizing providers department within the previous 12 mos or has a future within next 30 days. See \"Patient Info\" tab in inbasket, or \"Choose Columns\" in Meds & Orders section of the refill encounter.              Passed - Medication is active on med list        Passed - Patient is 18 years of age or older             "

## 2023-06-07 RX ORDER — ONDANSETRON 4 MG/1
TABLET, FILM COATED ORAL
Qty: 12 TABLET | Refills: 5 | Status: SHIPPED | OUTPATIENT
Start: 2023-06-07 | End: 2024-08-21

## 2023-06-08 DIAGNOSIS — J30.89 SEASONAL ALLERGIC RHINITIS DUE TO OTHER ALLERGIC TRIGGER: ICD-10-CM

## 2023-06-08 DIAGNOSIS — M19.071 OSTEOARTHRITIS OF BOTH FEET, UNSPECIFIED OSTEOARTHRITIS TYPE: Primary | ICD-10-CM

## 2023-06-08 DIAGNOSIS — M19.072 OSTEOARTHRITIS OF BOTH FEET, UNSPECIFIED OSTEOARTHRITIS TYPE: Primary | ICD-10-CM

## 2023-06-08 RX ORDER — FLUTICASONE PROPIONATE 50 MCG
2 SPRAY, SUSPENSION (ML) NASAL DAILY PRN
Qty: 16 G | Refills: 11 | COMMUNITY
Start: 2023-06-08 | End: 2023-06-15

## 2023-06-15 ENCOUNTER — NURSING HOME VISIT (OUTPATIENT)
Dept: GERIATRICS | Facility: OTHER | Age: 88
End: 2023-06-15
Attending: NURSE PRACTITIONER
Payer: COMMERCIAL

## 2023-06-15 DIAGNOSIS — K59.00 CONSTIPATION, UNSPECIFIED CONSTIPATION TYPE: Primary | ICD-10-CM

## 2023-06-15 DIAGNOSIS — J30.89 SEASONAL ALLERGIC RHINITIS DUE TO OTHER ALLERGIC TRIGGER: ICD-10-CM

## 2023-06-15 DIAGNOSIS — B35.3 TINEA PEDIS OF BOTH FEET: ICD-10-CM

## 2023-06-15 DIAGNOSIS — Z78.9 LIVING IN ASSISTED LIVING: ICD-10-CM

## 2023-06-15 PROCEDURE — 99349 HOME/RES VST EST MOD MDM 40: CPT | Performed by: NURSE PRACTITIONER

## 2023-06-15 RX ORDER — FLUTICASONE PROPIONATE 50 MCG
2 SPRAY, SUSPENSION (ML) NASAL DAILY
Qty: 15.8 ML | Refills: 3 | Status: SHIPPED | OUTPATIENT
Start: 2023-06-15 | End: 2023-12-12

## 2023-06-15 RX ORDER — AMOXICILLIN 250 MG
1 CAPSULE ORAL 2 TIMES DAILY
Qty: 180 TABLET | Refills: 1 | Status: SHIPPED | OUTPATIENT
Start: 2023-06-15 | End: 2023-08-03

## 2023-06-15 RX ORDER — CLOTRIMAZOLE 1 %
CREAM (GRAM) TOPICAL 2 TIMES DAILY
Qty: 28 G | Refills: 0 | COMMUNITY
Start: 2023-06-15 | End: 2023-10-12

## 2023-06-15 ASSESSMENT — ENCOUNTER SYMPTOMS
SINUS PAIN: 0
WEAKNESS: 1
RHINORRHEA: 1
FEVER: 0
APPETITE CHANGE: 1
FATIGUE: 1

## 2023-06-15 NOTE — PROGRESS NOTES
"Geno Horn is a 89 year old female being seen today for acute visit at [facility].    Assessment & Plan       ICD-10-CM    1. Constipation, unspecified constipation type  K59.00 senna-docusate (SENOKOT-S/PERICOLACE) 8.6-50 MG tablet      2. Seasonal allergic rhinitis due to other allergic trigger  J30.89 fluticasone (FLONASE) 50 MCG/ACT nasal spray      3. Tinea pedis of both feet  B35.3 clotrimazole (LOTRIMIN) 1 % external cream      4. Living in assisted living  Z59.3          [facility] nursing staff bring concerns of increased constipation. Geno also requesting that her Flonase be scheduled instead of PRN.     Geno was treated for acute maxillary sinusitis on 6/1/23 with Augmentin ans was instructed to start Flonase daily as needed. She reports that her symptoms have greatly improved but is still experiencing rhinorrhea and nasal congestion. She does have a history of environmental allergies and has recently started taking cetirizine 10 mg PO at night. Having the Flonase listed as PRN on her MAR requires Geno to ask for it. Given that she is experiencing more allergy symptoms at this time she would benefit from having the Flonase scheduled daily.      Geno also reports that she has had increased episodes of constipation. She denies having to strain or any blood in her stool. She is currently taking senna-docusate, 1 tablet PO daily. We will have her add an additional dose so she will be getting 1 tablet PO BID in hopes that this will help her have more regular bowel movements. We discussed that we can increase the dose of the senna-docusate up to a maximum of 4 tablets PO BID. Encouraged adequate fluid and fiber intake. Staff to hold senna-docusate for loose stools.     [facility] nursing staff report that Geno's daughter brought her lotrimin 1% cream for athletes foot symptoms of itching, and flaking between her toes as well as \"thick\" toenails. Medication added to facility medication list to reflect " "application BID x 10 days- may use one week past resolution of symptoms. Today on exam, she has her compression stockings in place and is using the bathroom, so I was not able to visualize her feet. Geno states that there is no open areas but her feet are \"moist\". We talked about how the compression stockings do not allow for adequate air flow and with the warmer weather and shoes- its hard to get adequate ventilation. Encouraged staff to keep her feet as clean and dry as possible, especially when she takes them off for the evening/night.       Discussed signs/ symptoms that would warrant urgent/ emergent follow-up. All questions and concerns were addressed and answered to patient's satisfaction.        40 minutes spent by me on the date of the encounter doing chart review, patient visit, documentation and discussion with family       Return if symptoms worsen or fail to improve.    LARISA Schmidt [facility]     Code Status: DNR / DNI.   Health Care Power of : Extended Emergency Contact Information  Primary Emergency Contact: Daniel Horn  Address: [facility] [address]           [place] [address] [facility]  Mobile Phone: [phone]  Relation: Son  Secondary Emergency Contact: Adri Chaudhary  Address: [address] [place] [address] [facility]  Home Phone: [phone]  Mobile Phone: [phone]  Relation: Daughter     Allergies: Alendronate, Celebrex [celecoxib], Other environmental allergy, Oxybutynin, Tolterodine, Tramadol, and Trospium     Past Medical, Surgical, Family and Social History reviewed: YES.     Medications:   Current Outpatient Medications   Medication Sig Dispense Refill     clotrimazole (LOTRIMIN) 1 % external cream Apply topically 2 times daily 28 g 0     fluticasone (FLONASE) 50 MCG/ACT nasal spray Spray 2 sprays into both nostrils daily 15.8 mL 3     senna-docusate (SENOKOT-S/PERICOLACE) 8.6-50 MG tablet Take 1 tablet by mouth 2 times " "daily Hold for loose stools 180 tablet 1     acetaminophen (TYLENOL) 650 MG CR tablet TAKE 1 TABLET BY MOUTH THREE TIMES A DAY 84 tablet 11     acetaminophen (TYLENOL) 650 MG CR tablet 1 tab TID 90 tablet 3     acetaminophen (TYLENOL) 650 MG CR tablet Take 1 tablet (650 mg) by mouth 2 times daily 60 tablet 4     Adhesive Tape (MEDIPORE H SURGICAL 2\"X10YD) TAPE MEDIPORE H 2 x10yds TAPE ITEM #2862 DX L98.9 [facility]  3ROLLS/30DS       carbamide peroxide (DEBROX) 6.5 % otic solution Place 5 drops into both ears nightly as needed for other 15 mL 0     cetirizine (ZYRTEC) 10 MG tablet Take 1 tablet (10 mg) by mouth At Bedtime 90 tablet 1     cyanocobalamin (VITAMIN B-12) 500 MCG tablet TAKE 1 TABLET BY MOUTH ONCE DAILY 90 tablet 3     diclofenac (VOLTAREN) 1 % topical gel Apply 4 g topically 3 times daily as needed for moderate pain Family to provide       ferrous fumarate 65 mg, [place]. FE,-Vitamin C 125 mg (VITRON-C)  MG TABS tablet TAKE 1 TABLET BY MOUTH TWO TIMES A  tablet 3     Gauze Pads & Dressings (KERLIX BANDAGE ROLL) [facility] KERLIX 4-1/2 GAUZE ROLL ITEM #6715 DX L98.9 [facility]  #30EA/30DS       guaiFENesin (ROBITUSSIN) 20 mg/mL liquid Take 10 mLs by mouth every 6 hours as needed for cough 236 mL 1     Incontinence Supply Disposable (DEPEND EASY FIT UNDERGARMENTS) MISC Medium size disposable under pants covered by insurance 100 each 11     Incontinence Supply Disposable (POISE PAD) PADS Change pad 3 x daily, #6 long Maxi pads 90 each 11     levothyroxine (SYNTHROID/LEVOTHROID) 25 MCG tablet Take 0.5 tablets (12.5 mcg) by mouth daily Together with 50 mcg tab for daily total 62.5 MCG daily 90 tablet 3     levothyroxine (SYNTHROID/LEVOTHROID) 50 MCG tablet TAKE 1 TABLET BY MOUTH ONCE DAILY (TAKE ALONG WITH 12.5MCG = 62.5MCG) 90 tablet 3     magnesium hydroxide (MILK OF MAGNESIA) 400 MG/5ML suspension TAKE 15ML BY MOUTH ONCE DAILY AS NEEDED FOR NO BM IN 3 DAYS 473 mL 11     meclizine (ANTIVERT) 12.5 MG " tablet Take 1 tablet (12.5 mg) by mouth 3 times daily as needed for dizziness 90 tablet 1     menthol (ICY HOT) 5 % PTCH Apply 1 patch topically 4 times daily as needed for muscle soreness (max of 8 hours for each patch)       Multiple Vitamin (TAB-A-RADHA) TABS TAKE 1 TABLET BY MOUTH ONCE DAILY (HEALTH MAINT) 28 tablet 11     mupirocin (BACTROBAN) 2 % external ointment Apply to skin tear wound with dressing changes and to left facial lesion BID x 2 weeks 30 g 1     Omega-3 Fatty Acids (SM FISH OIL) 1000 MG CAPS TAKE 1 CAPSULE BY MOUTH ONCE DAILY (SPINAL STENOSIS) 28 capsule 11     ondansetron (ZOFRAN) 4 MG tablet TAKE 1 TABLET BY MOUTH EVERY 8 HOURS AS NEEDED (NAUSEA) 12 tablet 5     order for DME Equipment being ordered: Spikes for cane 1 Units 1     order for DME Equipment being ordered: plastic underpants 1 Units 1     polyvinyl alcohol (LIQUIFILM TEARS) 1.4 % ophthalmic solution INSTILL 1 DROP INTO BOTH EYES THREE TIMES DAILY INSTILL 1 DROP INTO BOTH EYES 3 TIMES DAILY AS NEEDED 15 mL 10     Transparent Dressings ([employer] MEPITEL TRANSPARENT FILM) MISC 1 each every 3 days And as needed for skin tears--Thrifty White 30 each 11     vitamin A & D (BABY) external ointment Apply topically 2 times daily And PRN with toileting to affected amelie areas for skin barrier cream 42 g 4       HPI  Review of Systems   Constitutional: Positive for appetite change (decreased) and fatigue. Negative for fever.   HENT: Positive for congestion, postnasal drip and rhinorrhea. Negative for ear discharge, ear pain, sinus pressure and sinus pain.    Respiratory: Negative for cough and shortness of breath.    Cardiovascular: Negative for chest pain.   Gastrointestinal: Positive for constipation.   Musculoskeletal: Positive for arthralgias and gait problem.   Skin:        Tinea pedis both feet     Allergic/Immunologic: Positive for environmental allergies.   Neurological: Positive for weakness.   All other systems reviewed and are  negative.      Toileting:    Continent of Bowel: Yes   Continent of Bladder: Yes  Mobility: wheelchair    Recent Labs: Most recent labs reviewed    Current Therapies: none    Physical Exam  Constitutional:       Appearance: Normal appearance.   HENT:      Head: Normocephalic and atraumatic.      Nose: Congestion and rhinorrhea present. Rhinorrhea is clear.      Right Sinus: No maxillary sinus tenderness or frontal sinus tenderness.      Left Sinus: No maxillary sinus tenderness or frontal sinus tenderness.      Mouth/Throat:      Mouth: Mucous membranes are moist.      Pharynx: Oropharynx is clear.   Eyes:      Conjunctiva/sclera: Conjunctivae normal.   Cardiovascular:      Rate and Rhythm: Normal rate and regular rhythm.      Pulses: Normal pulses.      Heart sounds: Normal heart sounds.   Pulmonary:      Effort: Pulmonary effort is normal. No respiratory distress.      Breath sounds: Normal breath sounds. No wheezing.   Abdominal:      General: Abdomen is flat. Bowel sounds are normal.      Palpations: Abdomen is soft.   Musculoskeletal:      Cervical back: Normal range of motion.   Skin:     General: Skin is warm and dry.      Capillary Refill: Capillary refill takes less than 2 seconds.   Neurological:      Mental Status: She is alert. Mental status is at baseline.

## 2023-06-16 ASSESSMENT — ENCOUNTER SYMPTOMS
SINUS PRESSURE: 0
COUGH: 0
ARTHRALGIAS: 1
CONSTIPATION: 1
SHORTNESS OF BREATH: 0

## 2023-08-03 ENCOUNTER — NURSING HOME VISIT (OUTPATIENT)
Dept: GERIATRICS | Facility: OTHER | Age: 88
End: 2023-08-03
Attending: NURSE PRACTITIONER
Payer: COMMERCIAL

## 2023-08-03 DIAGNOSIS — R11.0 NAUSEA: ICD-10-CM

## 2023-08-03 DIAGNOSIS — H10.45 CHRONIC ALLERGIC CONJUNCTIVITIS: ICD-10-CM

## 2023-08-03 DIAGNOSIS — K59.00 CONSTIPATION, UNSPECIFIED CONSTIPATION TYPE: Primary | ICD-10-CM

## 2023-08-03 DIAGNOSIS — D50.8 OTHER IRON DEFICIENCY ANEMIA: ICD-10-CM

## 2023-08-03 PROCEDURE — 99348 HOME/RES VST EST LOW MDM 30: CPT | Performed by: NURSE PRACTITIONER

## 2023-08-03 RX ORDER — OLOPATADINE HYDROCHLORIDE 2 MG/ML
1 SOLUTION/ DROPS OPHTHALMIC DAILY
Qty: 2.5 ML | Refills: 3 | Status: SHIPPED | OUTPATIENT
Start: 2023-08-03 | End: 2023-12-06

## 2023-08-03 RX ORDER — AMOXICILLIN 250 MG
2 CAPSULE ORAL 2 TIMES DAILY
Qty: 360 TABLET | Refills: 4 | Status: SHIPPED | OUTPATIENT
Start: 2023-08-03 | End: 2023-09-14

## 2023-08-03 ASSESSMENT — ENCOUNTER SYMPTOMS
SHORTNESS OF BREATH: 0
EYE DISCHARGE: 0
EYE PAIN: 0
FEVER: 0
SINUS PRESSURE: 0
ARTHRALGIAS: 1
VOMITING: 0
EYE ITCHING: 1
COUGH: 0
CONSTIPATION: 1
EYE REDNESS: 1
NAUSEA: 1
SINUS PAIN: 0
RHINORRHEA: 1

## 2023-08-03 NOTE — PROGRESS NOTES
Geno Horn is a 89 year old female being seen today for acute visit at [facility].    Assessment & Plan       ICD-10-CM    1. Constipation, unspecified constipation type  K59.00 senna-docusate (SENOKOT-S/PERICOLACE) 8.6-50 MG tablet      2. Chronic allergic conjunctivitis  H10.45 olopatadine (PATADAY) 0.2 % ophthalmic solution      3. Other iron deficiency anemia  D50.8 senna-docusate (SENOKOT-S/PERICOLACE) 8.6-50 MG tablet      4. Nausea  R11.0         Geno reports increased episodes of constipation, with a BM occurring q 3 days. Staff report that she will have diarrhea and nausea while trying to have a BM. Geno denies having to strain or any blood in her stool. She is currently taking senna-docusate, 1 tablet PO BID. This was increased from 1 tab PO daily to BID on 6/15/23.  She does have available milk of magnesia to be given after 3 days of no BM. She has not received this.   She is currently on senna-docusate 1 tablet PO BID. Will increase to 2 tablets PO BID. Encouraged adequate fluid and fiber intake. Staff to hold senna-docusate for loose stools. She is also on an iron supplement for iron deficiency anemia, and iron is known to exacerbate constipation and nausea. Geno does have PRN ondansetron available and asks for it when she needs it. Appetite is at baseline, staff report no changes.     Geno has been using OTC eye itch relief drops (ketotifen fumerate) ophthalmic solution 0.035%  TID PRN. She was asking for them to be administered 1-2 times daily on average. Facility RN informed me that the eye drops accidentally went through the wash when a staff member forgot to remove them from their pocket. Prescription Pataday drops would be covered by her insurance. Script sent for olopatadine 0.2% opthalmic solution, place 1 drop into both eyes daily.     Geno brings forth no other concerns during our visit today. Discussed signs/ symptoms that would warrant urgent/ emergent follow-up. Patient in agreement  "with plan. All questions and concerns addressed this visit.       30  minutes spent by me on the date of the encounter doing chart review, history and exam, documentation and further activities per the note      Return if symptoms worsen or fail to improve.    LARISA Schmidt [facility]     Code Status: DNR / DNI.   Health Care Power of : Extended Emergency Contact Information  Primary Emergency Contact: Daniel Horn  Address: [address]           [place] [address] [facility]  Mobile Phone: [phone]  Relation: Son  Secondary Emergency Contact: Adri Chaudhary  Address: [address] [place] [address] [facility]  Home Phone: [phone]  Mobile Phone: [phone]  Relation: Daughter     Allergies: Alendronate, Celebrex [celecoxib], Other environmental allergy, Oxybutynin, Tolterodine, Tramadol, and Trospium     Past Medical, Surgical, Family and Social History reviewed: YES.     Medications:   Current Outpatient Medications   Medication Sig Dispense Refill    olopatadine (PATADAY) 0.2 % ophthalmic solution Place 0.05 mLs (1 drop) into both eyes daily 2.5 mL 3    senna-docusate (SENOKOT-S/PERICOLACE) 8.6-50 MG tablet Take 2 tablets by mouth 2 times daily Hold for loose stools 360 tablet 4    acetaminophen (TYLENOL) 650 MG CR tablet TAKE 1 TABLET BY MOUTH THREE TIMES A DAY 84 tablet 11    acetaminophen (TYLENOL) 650 MG CR tablet 1 tab TID 90 tablet 3    acetaminophen (TYLENOL) 650 MG CR tablet Take 1 tablet (650 mg) by mouth 2 times daily 60 tablet 4    Adhesive Tape (MEDIPORE H SURGICAL 2\"X10YD) TAPE MEDIPORE H 2 x10yds TAPE ITEM #2862 DX L98.9 [facility]  3ROLLS/30DS      carbamide peroxide (DEBROX) 6.5 % otic solution Place 5 drops into both ears nightly as needed for other 15 mL 0    cetirizine (ZYRTEC) 10 MG tablet Take 1 tablet (10 mg) by mouth At Bedtime 90 tablet 1    clotrimazole (LOTRIMIN) 1 % external cream Apply topically 2 times daily " 28 g 0    cyanocobalamin (VITAMIN B-12) 500 MCG tablet TAKE 1 TABLET BY MOUTH ONCE DAILY 90 tablet 3    diclofenac (VOLTAREN) 1 % topical gel Apply 4 g topically 3 times daily as needed for moderate pain Family to provide      ferrous fumarate 65 mg, [place]. FE,-Vitamin C 125 mg (VITRON-C)  MG TABS tablet TAKE 1 TABLET BY MOUTH TWO TIMES A  tablet 3    fluticasone (FLONASE) 50 MCG/ACT nasal spray Spray 2 sprays into both nostrils daily 15.8 mL 3    Gauze Pads & Dressings (KERLIX BANDAGE ROLL) [facility] [place] KERLIX 4-1/2 GAUZE ROLL ITEM #6715 DX L98.9 [facility]  #30EA/30DS      guaiFENesin (ROBITUSSIN) 20 mg/mL liquid Take 10 mLs by mouth every 6 hours as needed for cough 236 mL 1    Incontinence Supply Disposable (DEPEND EASY FIT UNDERGARMENTS) MISC Medium size disposable under pants covered by insurance 100 each 11    Incontinence Supply Disposable (POISE PAD) PADS Change pad 3 x daily, #6 long Maxi pads 90 each 11    levothyroxine (SYNTHROID/LEVOTHROID) 25 MCG tablet Take 0.5 tablets (12.5 mcg) by mouth daily Together with 50 mcg tab for daily total 62.5 MCG daily 90 tablet 3    levothyroxine (SYNTHROID/LEVOTHROID) 50 MCG tablet TAKE 1 TABLET BY MOUTH ONCE DAILY (TAKE ALONG WITH 12.5MCG = 62.5MCG) 90 tablet 3    magnesium hydroxide (MILK OF MAGNESIA) 400 MG/5ML suspension TAKE 15ML BY MOUTH ONCE DAILY AS NEEDED FOR NO BM IN 3 DAYS 473 mL 11    meclizine (ANTIVERT) 12.5 MG tablet Take 1 tablet (12.5 mg) by mouth 3 times daily as needed for dizziness 90 tablet 1    menthol (ICY HOT) 5 % PTCH Apply 1 patch topically 4 times daily as needed for muscle soreness (max of 8 hours for each patch)      Multiple Vitamin (TAB-A-RADHA) TABS TAKE 1 TABLET BY MOUTH ONCE DAILY (HEALTH MAINT) 28 tablet 11    mupirocin (BACTROBAN) 2 % external ointment Apply to skin tear wound with dressing changes and to left facial lesion BID x 2 weeks 30 g 1    Omega-3 Fatty Acids (SM FISH OIL) 1000 MG CAPS TAKE 1 CAPSULE BY MOUTH ONCE DAILY  (SPINAL STENOSIS) 28 capsule 11    ondansetron (ZOFRAN) 4 MG tablet TAKE 1 TABLET BY MOUTH EVERY 8 HOURS AS NEEDED (NAUSEA) 12 tablet 5    order for DME Equipment being ordered: Spikes for cane 1 Units 1    order for DME Equipment being ordered: plastic underpants 1 Units 1    polyvinyl alcohol (LIQUIFILM TEARS) 1.4 % ophthalmic solution INSTILL 1 DROP INTO BOTH EYES THREE TIMES DAILY INSTILL 1 DROP INTO BOTH EYES 3 TIMES DAILY AS NEEDED 15 mL 10    Transparent Dressings ([employer] MEPITEL TRANSPARENT FILM) MISC 1 each every 3 days And as needed for skin tears--Thrifty White 30 each 11    vitamin A & D (BABY) external ointment Apply topically 2 times daily And PRN with toileting to affected amelie areas for skin barrier cream 42 g 4         Review of Systems   Constitutional:  Negative for fever.   HENT:  Positive for postnasal drip and rhinorrhea. Negative for ear discharge, ear pain, sinus pressure and sinus pain.    Eyes:  Positive for redness and itching. Negative for pain and discharge.   Respiratory:  Negative for cough and shortness of breath.    Cardiovascular:  Negative for chest pain.   Gastrointestinal:  Positive for constipation and nausea. Negative for vomiting.   Musculoskeletal:  Positive for arthralgias and gait problem.   Allergic/Immunologic: Positive for environmental allergies.   All other systems reviewed and are negative.      Toileting:    Continent of Bowel: Yes   Continent of Bladder: Yes  Mobility: wheelchair    Recent Labs: None    Current Therapies: none    Physical Exam  Constitutional:       Appearance: Normal appearance.   HENT:      Head: Normocephalic and atraumatic.      Nose: Congestion and rhinorrhea present. Rhinorrhea is clear.      Right Sinus: No maxillary sinus tenderness or frontal sinus tenderness.      Left Sinus: No maxillary sinus tenderness or frontal sinus tenderness.   Eyes:      General: Lids are normal. Lids are everted, no foreign bodies appreciated.         Right eye: No  foreign body or discharge.         Left eye: No foreign body or discharge.      Conjunctiva/sclera:      Right eye: Right conjunctiva is injected (minimally).      Left eye: Left conjunctiva is injected (minimally).      Pupils: Pupils are equal, round, and reactive to light.   Cardiovascular:      Rate and Rhythm: Normal rate and regular rhythm.      Pulses: Normal pulses.      Heart sounds: Normal heart sounds.   Pulmonary:      Effort: Pulmonary effort is normal. No respiratory distress.      Breath sounds: Normal breath sounds. No wheezing.   Abdominal:      General: Abdomen is flat. Bowel sounds are normal. There is no distension.      Palpations: Abdomen is soft.      Tenderness: There is no abdominal tenderness.   Musculoskeletal:      Cervical back: Normal range of motion.   Neurological:      Mental Status: She is alert. Mental status is at baseline.      Gait: Gait abnormal (uses wheelchair).   Psychiatric:         Mood and Affect: Mood normal.         Behavior: Behavior normal.         Thought Content: Thought content normal.

## 2023-08-15 DIAGNOSIS — J30.2 SEASONAL ALLERGIC RHINITIS, UNSPECIFIED TRIGGER: Primary | ICD-10-CM

## 2023-08-15 DIAGNOSIS — H61.23 BILATERAL IMPACTED CERUMEN: ICD-10-CM

## 2023-08-21 NOTE — TELEPHONE ENCOUNTER
[facility] [place] sent Rx request for the following:      Requested Prescriptions   Pending Prescriptions Disp Refills    MURINE EAR WAX REMOVAL SYSTEM 6.5 % otic solution [Pharmacy Med Name: MURINE EAR WAX REMOVAL SYSTEM] 15 mL PRN     Sig: INSTILL 5 DROPS INTO EACH EAR AT BEDTIME (IMPACTED CERUMEN)   Not on active medication list. Provider to associate diagnosis.      DEEP SEA NASAL SPRAY 0.65 % nasal spray [Pharmacy Med Name: DEEP SEA 0.65% NSL (44ML) SPR] 44 mL PRN   Not on active medication list. Provider to associate diagnosis.    Last Office Visit:              8/3/23 (NH Visit, EMBER Frey)   Future Office visit:           None    Karen Simon RN .............. 8/21/2023  10:52 AM

## 2023-08-22 RX ORDER — CARBAMIDE PEROXIDE 6.5 %
DROPS OTIC (EAR)
Qty: 15 ML | Refills: 11 | Status: SHIPPED | OUTPATIENT
Start: 2023-08-22 | End: 2024-02-29

## 2023-08-22 RX ORDER — SODIUM CHLORIDE 0.65 %
AEROSOL, SPRAY (ML) NASAL
Qty: 44 ML | Refills: 11 | Status: SHIPPED | OUTPATIENT
Start: 2023-08-22

## 2023-09-03 DIAGNOSIS — E03.8 OTHER SPECIFIED HYPOTHYROIDISM: ICD-10-CM

## 2023-09-03 DIAGNOSIS — E03.9 HYPOTHYROIDISM, UNSPECIFIED TYPE: ICD-10-CM

## 2023-09-08 RX ORDER — LEVOTHYROXINE SODIUM 50 UG/1
TABLET ORAL
Qty: 90 TABLET | Refills: 3 | OUTPATIENT
Start: 2023-09-08 | End: 2023-09-29

## 2023-09-08 RX ORDER — LEVOTHYROXINE SODIUM 25 UG/1
TABLET ORAL
Qty: 90 TABLET | Refills: 3 | Status: SHIPPED | OUTPATIENT
Start: 2023-09-08 | End: 2024-08-14

## 2023-09-08 NOTE — TELEPHONE ENCOUNTER
[facility] sent Rx request for the following:    LEVOTHYROXINE 50MCG TAB   Last Prescription Date:   9/30/22  Last Fill Qty/Refills:         90, R-3    Last Office Visit:              8/3/23   Future Office visit:           none     Mary Ellen Santos RN on 9/8/2023 at 1:34 PM

## 2023-09-08 NOTE — TELEPHONE ENCOUNTER
[facility] sent Rx request for the following:    LEVOTHYROXINE 25MCG TAB   Last Prescription Date:   9/29/22  Last Fill Qty/Refills:         90, R-3    Last Office Visit:              8/3/23 [place]   Future Office visit:           none     Mary Ellen Santos RN on 9/8/2023 at 1:33 PM

## 2023-09-12 ENCOUNTER — LAB REQUISITION (OUTPATIENT)
Dept: LAB | Facility: OTHER | Age: 88
End: 2023-09-12
Payer: COMMERCIAL

## 2023-09-12 DIAGNOSIS — D64.9 ANEMIA, UNSPECIFIED: ICD-10-CM

## 2023-09-12 DIAGNOSIS — N18.9 CHRONIC KIDNEY DISEASE, UNSPECIFIED: ICD-10-CM

## 2023-09-12 DIAGNOSIS — E03.9 HYPOTHYROIDISM, UNSPECIFIED: ICD-10-CM

## 2023-09-12 LAB
ANION GAP SERPL CALCULATED.3IONS-SCNC: 9 MMOL/L (ref 7–15)
BASOPHILS # BLD AUTO: 0.1 10E3/UL (ref 0–0.2)
BASOPHILS NFR BLD AUTO: 1 %
BUN SERPL-MCNC: 11.9 MG/DL (ref 8–23)
CALCIUM SERPL-MCNC: 9.1 MG/DL (ref 8.2–9.6)
CHLORIDE SERPL-SCNC: 99 MMOL/L (ref 98–107)
CREAT SERPL-MCNC: 0.59 MG/DL (ref 0.51–0.95)
DEPRECATED HCO3 PLAS-SCNC: 27 MMOL/L (ref 22–29)
EGFRCR SERPLBLD CKD-EPI 2021: 82 ML/MIN/1.73M2
EOSINOPHIL # BLD AUTO: 0.1 10E3/UL (ref 0–0.7)
EOSINOPHIL NFR BLD AUTO: 2 %
ERYTHROCYTE [DISTWIDTH] IN BLOOD BY AUTOMATED COUNT: 15.1 % (ref 10–15)
GLUCOSE SERPL-MCNC: 86 MG/DL (ref 70–99)
HCT VFR BLD AUTO: 33.2 % (ref 35–47)
HGB BLD-MCNC: 11 G/DL (ref 11.7–15.7)
IMM GRANULOCYTES # BLD: 0 10E3/UL
IMM GRANULOCYTES NFR BLD: 0 %
LYMPHOCYTES # BLD AUTO: 1.8 10E3/UL (ref 0.8–5.3)
LYMPHOCYTES NFR BLD AUTO: 27 %
MCH RBC QN AUTO: 33.6 PG (ref 26.5–33)
MCHC RBC AUTO-ENTMCNC: 33.1 G/DL (ref 31.5–36.5)
MCV RBC AUTO: 102 FL (ref 78–100)
MONOCYTES # BLD AUTO: 0.5 10E3/UL (ref 0–1.3)
MONOCYTES NFR BLD AUTO: 7 %
NEUTROPHILS # BLD AUTO: 4.2 10E3/UL (ref 1.6–8.3)
NEUTROPHILS NFR BLD AUTO: 63 %
NRBC # BLD AUTO: 0 10E3/UL
NRBC BLD AUTO-RTO: 0 /100
PLATELET # BLD AUTO: 315 10E3/UL (ref 150–450)
POTASSIUM SERPL-SCNC: 4.4 MMOL/L (ref 3.4–5.3)
RBC # BLD AUTO: 3.27 10E6/UL (ref 3.8–5.2)
SODIUM SERPL-SCNC: 135 MMOL/L (ref 136–145)
T4 FREE SERPL-MCNC: 1.17 NG/DL (ref 0.9–1.7)
TSH SERPL DL<=0.005 MIU/L-ACNC: 6.88 UIU/ML (ref 0.3–4.2)
WBC # BLD AUTO: 6.6 10E3/UL (ref 4–11)

## 2023-09-12 PROCEDURE — 36415 COLL VENOUS BLD VENIPUNCTURE: CPT | Performed by: NURSE PRACTITIONER

## 2023-09-12 PROCEDURE — 84439 ASSAY OF FREE THYROXINE: CPT | Performed by: NURSE PRACTITIONER

## 2023-09-12 PROCEDURE — 80048 BASIC METABOLIC PNL TOTAL CA: CPT | Performed by: NURSE PRACTITIONER

## 2023-09-12 PROCEDURE — 84443 ASSAY THYROID STIM HORMONE: CPT | Performed by: NURSE PRACTITIONER

## 2023-09-12 PROCEDURE — 85025 COMPLETE CBC W/AUTO DIFF WBC: CPT | Performed by: NURSE PRACTITIONER

## 2023-09-14 ENCOUNTER — NURSING HOME VISIT (OUTPATIENT)
Dept: GERIATRICS | Facility: OTHER | Age: 88
End: 2023-09-14
Attending: NURSE PRACTITIONER
Payer: COMMERCIAL

## 2023-09-14 DIAGNOSIS — D50.8 OTHER IRON DEFICIENCY ANEMIA: ICD-10-CM

## 2023-09-14 DIAGNOSIS — K59.00 CONSTIPATION, UNSPECIFIED CONSTIPATION TYPE: ICD-10-CM

## 2023-09-14 DIAGNOSIS — Z99.3 WHEELCHAIR DEPENDENCE: ICD-10-CM

## 2023-09-14 DIAGNOSIS — Z78.9 LIVING IN ASSISTED LIVING: ICD-10-CM

## 2023-09-14 DIAGNOSIS — Z79.899 ENCOUNTER FOR MEDICATION REVIEW: Primary | ICD-10-CM

## 2023-09-14 PROCEDURE — 99349 HOME/RES VST EST MOD MDM 40: CPT | Performed by: NURSE PRACTITIONER

## 2023-09-14 RX ORDER — AMOXICILLIN 250 MG
2 CAPSULE ORAL DAILY PRN
Qty: 360 TABLET | Refills: 4 | Status: SHIPPED | OUTPATIENT
Start: 2023-09-14 | End: 2023-12-11

## 2023-09-14 RX ORDER — POLYETHYLENE GLYCOL 3350 17 G/17G
17 POWDER, FOR SOLUTION ORAL DAILY
Qty: 578 G | Refills: 3 | Status: SHIPPED | OUTPATIENT
Start: 2023-09-14 | End: 2023-12-11

## 2023-09-14 NOTE — PROGRESS NOTES
Geno Horn is a 89 year old female being seen today for follow up visit at [facility].    Code Status: DNR / DNI, Advance Directives: YES  Health Care Power of : Extended Emergency Contact Information  Primary Emergency Contact: Daniel Horn  Address: [address]           [place] [address] [facility]  Mobile Phone: [phone]  Relation: Son  Secondary Emergency Contact: Adri Chaudhary  Address: [address] [place] [address] [facility]  Home Phone: [phone]  Mobile Phone: [phone]  Relation: Daughter     Allergies: Alendronate, Celebrex [celecoxib], Other environmental allergy, Oxybutynin, Tolterodine, Tramadol, and Trospium     Chief Complaint / HPI: Follow-up on lab results for monitoring anemia, chronic kidney disease, hypothyroid.  Nursing staff report constipation has been an issue--- resident has been on iron daily for anemia.  Discussion with Maria Antonia her daughter and POA regarding lab results--TSH mildly elevated certainly acceptable range for age and health history and free T4 normal  Would hesitate to make any levothyroxine adjustments with risks including atrial fibrillation, tachycardia far outweigh any possible benefits of adjustment and would base adjustments on free T4.  Anemia is stable reviewing over the last year and a half improved by 1 g from 6 months ago--- nursing staff and daughter do not feel iron has been causing any side effects constipation has been an intermittent ongoing issue--- resident has been reporting abdominal cramping with senna----we will trial MiraLAX with as needed milk of magnesia and/or senna no bowel movement 3 days  No other concerns raised      Past Medical, Surgical, Family and Social History reviewed: YES.     Medications: Reviewed  Medications - recent changes:     Review of Systems:  General: positive for weakness  GI: positive for constipation  Musculoskeletal: positive for back pain, arthritis, and muscular  weakness  Hematologic: positive for anemia  Endocrine: positive for thyroid disorder  All other systems negative  Toileting:    Continent of Bowel: Yes   Continent of Bladder: Yes  Mobility: wheelchair    Recent Labs:   Recent Results (from the past 240 hour(s))   Basic metabolic panel    Collection Time: 09/12/23  8:32 AM   Result Value Ref Range    Sodium 135 (L) 136 - 145 mmol/L    Potassium 4.4 3.4 - 5.3 mmol/L    Chloride 99 98 - 107 mmol/L    Carbon Dioxide (CO2) 27 22 - 29 mmol/L    Anion Gap 9 7 - 15 mmol/L    Urea Nitrogen 11.9 8.0 - 23.0 mg/dL    Creatinine 0.59 0.51 - 0.95 mg/dL    Calcium 9.1 8.2 - 9.6 mg/dL    Glucose 86 70 - 99 mg/dL    GFR Estimate 82 >60 mL/min/1.73m2   TSH with free T4 reflex    Collection Time: 09/12/23  8:32 AM   Result Value Ref Range    TSH 6.88 (H) 0.30 - 4.20 uIU/mL   CBC with platelets and differential    Collection Time: 09/12/23  8:32 AM   Result Value Ref Range    WBC Count 6.6 4.0 - 11.0 10e3/uL    RBC Count 3.27 (L) 3.80 - 5.20 10e6/uL    Hemoglobin 11.0 (L) 11.7 - 15.7 g/dL    Hematocrit 33.2 (L) 35.0 - 47.0 %     (H) 78 - 100 fL    MCH 33.6 (H) 26.5 - 33.0 pg    MCHC 33.1 31.5 - 36.5 g/dL    RDW 15.1 (H) 10.0 - 15.0 %    Platelet Count 315 150 - 450 10e3/uL    % Neutrophils 63 %    % Lymphocytes 27 %    % Monocytes 7 %    % Eosinophils 2 %    % Basophils 1 %    % Immature Granulocytes 0 %    NRBCs per 100 WBC 0 <1 /100    Absolute Neutrophils 4.2 1.6 - 8.3 10e3/uL    Absolute Lymphocytes 1.8 0.8 - 5.3 10e3/uL    Absolute Monocytes 0.5 0.0 - 1.3 10e3/uL    Absolute Eosinophils 0.1 0.0 - 0.7 10e3/uL    Absolute Basophils 0.1 0.0 - 0.2 10e3/uL    Absolute Immature Granulocytes 0.0 <=0.4 10e3/uL    Absolute NRBCs 0.0 10e3/uL   T4 free    Collection Time: 09/12/23  8:32 AM   Result Value Ref Range    Free T4 1.17 0.90 - 1.70 ng/dL          Current Therapies: none    Exam:  Vital signs reviewed.   GENERAL APPEARANCE:  alert and no distress  EYES: Eyes grossly normal  to inspection  ABDOMEN: soft, nontender  MS: no musculoskeletal defects are noted and wheelchair dependent mobility  SKIN: warm and dry  NEURO: Alert, mentation intact and speech normal  PSYCH: affect normal/bright    Assessment and Plan:    Very pleasant gracious lady who appears comfortable, has acclimated well to her assisted living apartment is very social and good quality of life  Will continue daily iron--hemoglobin has been stable may be her new normal--- aspirin was discontinued over 6 months ago to optimize iron absorption  Labs reviewed and discussed with Maria Antonia daughter and POA--- we will recheck free T4 in 3 months for stability along with monitoring hemoglobin  Renal function has been stable and acceptable range for age and health history.    Constipation has been an intermittent issue I suspect limited mobility has been 1 trigger--- staff will encourage fluids, will trial daily MiraLAX with milk of magnesia and/or senna if no bowel movement in 3 days          (Z79.899) Encounter for medication review  (primary encounter diagnosis)      (K59.00) Constipation, unspecified constipation type  Plan: senna-docusate (SENOKOT-S/PERICOLACE) 8.6-50 MG        tablet, polyethylene glycol (MIRALAX) 17         GM/Dose powder            (D50.8) Other iron deficiency anemia    Plan: senna-docusate (SENOKOT-S/PERICOLACE) 8.6-50 MG        tablet            (Z59.3) Living in assisted living      (Z99.3) Wheelchair dependence

## 2023-09-28 ENCOUNTER — NURSING HOME VISIT (OUTPATIENT)
Dept: GERIATRICS | Facility: OTHER | Age: 88
End: 2023-09-28
Attending: NURSE PRACTITIONER
Payer: COMMERCIAL

## 2023-09-28 DIAGNOSIS — H01.00A BLEPHARITIS OF UPPER AND LOWER EYELIDS OF BOTH EYES, UNSPECIFIED TYPE: Primary | ICD-10-CM

## 2023-09-28 DIAGNOSIS — L72.0 EPIDERMAL CYST OF FACE: ICD-10-CM

## 2023-09-28 DIAGNOSIS — Z78.9 LIVING IN ASSISTED LIVING: ICD-10-CM

## 2023-09-28 DIAGNOSIS — M15.0 PRIMARY OSTEOARTHRITIS INVOLVING MULTIPLE JOINTS: ICD-10-CM

## 2023-09-28 DIAGNOSIS — H01.00B BLEPHARITIS OF UPPER AND LOWER EYELIDS OF BOTH EYES, UNSPECIFIED TYPE: Primary | ICD-10-CM

## 2023-09-28 DIAGNOSIS — Z99.3 WHEELCHAIR DEPENDENCE: ICD-10-CM

## 2023-09-28 PROCEDURE — 99349 HOME/RES VST EST MOD MDM 40: CPT | Performed by: NURSE PRACTITIONER

## 2023-09-28 RX ORDER — SENNOSIDES 8.6 MG
CAPSULE ORAL
Qty: 100 TABLET | Refills: 3 | Status: SHIPPED | OUTPATIENT
Start: 2023-09-28 | End: 2024-01-25

## 2023-09-28 RX ORDER — ERYTHROMYCIN 5 MG/G
0.5 OINTMENT OPHTHALMIC 2 TIMES DAILY
Qty: 7 G | Refills: 0 | Status: SHIPPED | OUTPATIENT
Start: 2023-09-28 | End: 2023-10-05

## 2023-09-28 NOTE — PROGRESS NOTES
Geno Horn is a 89 year old female being seen today for acute and follow up visit at [facility].    Code Status: DNR / DNI, Advance Directives: YES  Health Care Power of : Extended Emergency Contact Information  Primary Emergency Contact: Daniel Horn  Address: [address]           [place] [address] [place] [facility]  Mobile Phone: [phone]  Relation: Son  Secondary Emergency Contact: Adri Chaudhary  Address: [address] [place] [address] [place]  Home Phone: [phone]  Mobile Phone: [phone]  Relation: Daughter     Allergies: Alendronate, Celebrex [celecoxib], Other environmental allergy, Oxybutynin, Tolterodine, Tramadol, and Trospium     Chief Complaint / HPI: Assisted-living resident and staff requesting follow-up on some recently noticed clusters of skin cysts approximately 2 to 3 mm sized lateral to both eyelid external margins-some of the cysts have blackheads--also has a firm cyst on her forehead about 2 mm.  Side of right malar cheek and nose--the cysts are not tender or itchy--no erythema  Staff are uncertain when they appeared  Also resident reports eyes are watery burning and itchy has been using her daily lubricating drops and Pataday  Without much relief.  She denies any changes in vision or any pain.  Staff report appetite, bowel and bladder habits and mobility are at baseline--does participate in social activities.    Skin cysts firm--non tender both eyes, forehead, right malar cheek, side of nose--black heads cysts near eyes    Past Medical, Surgical, Family and Social History reviewed: YES.     Medications: Reviewed  Medications - recent changes: none    Review of Systems:  General: positive for weakness  Skin: positive for lumps or bumps  Eyes: positive for tearing, itching  Musculoskeletal: positive for arthritis, joint pain, and muscular weakness  Endocrine: positive for thyroid disorder  All other systems negative  Toileting:    Continent of  Bowel: Yes   Continent of Bladder: Yes  Mobility: wheelchair    Recent Labs: reviewed      Current Therapies: none    Exam:        Vital signs reviewed.   GENERAL APPEARANCE: alert and no distress  EYES: Eyes PERRL and conjunctivae mild injection bilateral, upper and lower eyelid margins edema some dilation of meibomian glands mild erythema--no periorbital erythema or edema--- eyes watering frequently  NECK: no adenopathy, no asymmetry  MS: no musculoskeletal defects are noted and wheelchair dependent  SKIN: warm and dry---Skin cysts firm--non tender  lateral to both eyes, forehead, right malar cheek, side of nose--black heads on cyst clusters near lateral eyelid margins  NEURO: Alert ,mentation intact and speech normal  PSYCH: affect normal/bright, pleasant conversational and social    Assessment and Plan:    We will treat blepharitis with erythromycin ophthalmic ointment bilaterally twice daily for the next week we will follow-up next week on progress--we will hold other daily lubricating drops until next week--- have asked staff to notify if any intolerance with treatment or onset of pain or vision changes  Resident has asked for a warm moist washcloth over her eyes which seems very reasonable and may be soothing    Multiple epidermal cysts cyst clusters with blackheads lateral to both eyelid margins and multiple areas on face including forehead, right malar cheek, side of nose--- fortunately they are not painful or itchy--- staff are uncertain when first noticed I do not remember noticing the cyst clusters near her eyelids with blackheads however she has multiple seborrheic keratosis and deep facial  Fold wrinkles and other cyst areas may not have been as noticeable.  Will consult with one of our surgeons--considering a mild facial cleansing daily regime to massage follicles and pores as I am uncertain what her daily skin care regime as.  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

## 2023-10-01 DIAGNOSIS — E03.9 HYPOTHYROIDISM, UNSPECIFIED TYPE: ICD-10-CM

## 2023-10-03 DIAGNOSIS — M15.0 PRIMARY OSTEOARTHRITIS INVOLVING MULTIPLE JOINTS: ICD-10-CM

## 2023-10-06 NOTE — TELEPHONE ENCOUNTER
"[facility]  sent Rx request for the following:      Requested Prescriptions   Pending Prescriptions Disp Refills    levothyroxine (SYNTHROID/LEVOTHROID) 50 MCG tablet [Pharmacy Med Name: LEVOTHYROXINE 50MCG TAB] 28 tablet 0     Sig: TAKE 1 TABLET BY MOUTH ONCE DAILY (TAKE ALONG WITH 12.5MCG = 62.5MCG)       Thyroid Protocol Failed - 10/6/2023  9:54 AM        Failed - Recent (12 mo) or future (30 days) visit within the authorizing provider's specialty     Patient has had an office visit with the authorizing provider or a provider within the authorizing providers department within the previous 12 mos or has a future within next 30 days. See \"Patient Info\" tab in inbasket, or \"Choose Columns\" in Meds & Orders section of the refill encounter.          Failed - Normal TSH on file in past 12 months     Recent Labs   Lab Test 09/12/23  0832   TSH 6.88*        Last Prescription Date:   09/30/22 END 10/21/22  Last Fill Qty/Refills:         90, R-3  Last Office Visit:              10/05/23   Future Office visit:           None    Noted that prescription had an end date.     Unable to complete prescription refill per RN Medication Refill Policy.     Mabel Meyer RN on 10/6/2023 at 10:07 AM            "

## 2023-10-08 ENCOUNTER — HEALTH MAINTENANCE LETTER (OUTPATIENT)
Age: 88
End: 2023-10-08

## 2023-10-09 RX ORDER — LEVOTHYROXINE SODIUM 50 UG/1
TABLET ORAL
Qty: 90 TABLET | Refills: 3 | Status: SHIPPED | OUTPATIENT
Start: 2023-10-09 | End: 2024-09-13

## 2023-10-10 RX ORDER — SENNOSIDES 8.6 MG
CAPSULE ORAL
Qty: 120 TABLET | Refills: 11 | OUTPATIENT
Start: 2023-10-10

## 2023-10-10 NOTE — TELEPHONE ENCOUNTER
[facility] sent Rx request for the following:      Requested Prescriptions   Pending Prescriptions Disp Refills    acetaminophen (TYLENOL) 650 MG CR tablet [Pharmacy Med Name: ACETAMINOPHEN  MG TAB] 120 tablet 11     Sig: TAKE 1 TABLET BY MOUTH THREE TIMES A DAY TAKE 1 TABLET BY MOUTH ONCE DAILY AS NEEDED       Analgesics (Non-Narcotic Tylenol and ASA Only) Failed - 10/3/2023  9:32 AM     Last Prescription Date:   9/28/23  Last Fill Qty/Refills:         100, R-3    Last Office Visit:              9/28/23 [place]  Future Office visit:           none     Redundant refill request refused: Too soon:  Mary Ellen Santos RN on 10/10/2023 at 1:02 PM

## 2023-10-18 ENCOUNTER — LAB REQUISITION (OUTPATIENT)
Dept: LAB | Facility: OTHER | Age: 88
End: 2023-10-18
Payer: COMMERCIAL

## 2023-10-18 DIAGNOSIS — R39.9 UTI SYMPTOMS: Primary | ICD-10-CM

## 2023-10-18 DIAGNOSIS — R35.0 FREQUENCY OF MICTURITION: ICD-10-CM

## 2023-10-18 DIAGNOSIS — H10.45 CHRONIC ALLERGIC CONJUNCTIVITIS: ICD-10-CM

## 2023-10-18 LAB
ALBUMIN UR-MCNC: 10 MG/DL
APPEARANCE UR: ABNORMAL
BACTERIA #/AREA URNS HPF: ABNORMAL /HPF
BILIRUB UR QL STRIP: NEGATIVE
COLOR UR AUTO: YELLOW
GLUCOSE UR STRIP-MCNC: NEGATIVE MG/DL
HGB UR QL STRIP: NEGATIVE
KETONES UR STRIP-MCNC: NEGATIVE MG/DL
LEUKOCYTE ESTERASE UR QL STRIP: ABNORMAL
MUCOUS THREADS #/AREA URNS LPF: PRESENT /LPF
NITRATE UR QL: NEGATIVE
PH UR STRIP: 6 [PH] (ref 5–9)
RBC URINE: 1 /HPF
SP GR UR STRIP: 1.02 (ref 1–1.03)
SQUAMOUS EPITHELIAL: 3 /HPF
UROBILINOGEN UR STRIP-MCNC: 2 MG/DL
WBC URINE: 79 /HPF

## 2023-10-18 PROCEDURE — 81001 URINALYSIS AUTO W/SCOPE: CPT | Performed by: NURSE PRACTITIONER

## 2023-10-18 PROCEDURE — 87186 SC STD MICRODIL/AGAR DIL: CPT | Performed by: NURSE PRACTITIONER

## 2023-10-18 RX ORDER — CEFUROXIME AXETIL 500 MG/1
500 TABLET ORAL 2 TIMES DAILY
Qty: 14 TABLET | Refills: 0 | Status: SHIPPED | OUTPATIENT
Start: 2023-10-18 | End: 2023-10-25

## 2023-10-20 LAB — BACTERIA UR CULT: ABNORMAL

## 2023-10-30 DIAGNOSIS — H04.123 DRY EYES: ICD-10-CM

## 2023-10-30 DIAGNOSIS — Z00.00 HEALTH CARE MAINTENANCE: Primary | ICD-10-CM

## 2023-10-30 DIAGNOSIS — J30.2 SEASONAL ALLERGIC RHINITIS, UNSPECIFIED TRIGGER: ICD-10-CM

## 2023-10-30 DIAGNOSIS — E78.5 HYPERLIPIDEMIA: ICD-10-CM

## 2023-10-30 DIAGNOSIS — J01.00 ACUTE MAXILLARY SINUSITIS, RECURRENCE NOT SPECIFIED: ICD-10-CM

## 2023-11-01 RX ORDER — METAPROTERENOL SULFATE 10 MG
500 TABLET ORAL DAILY
Qty: 90 CAPSULE | Refills: 3 | Status: SHIPPED | OUTPATIENT
Start: 2023-11-01 | End: 2023-11-07 | Stop reason: DRUGHIGH

## 2023-11-01 RX ORDER — MULTIVITAMIN WITH FOLIC ACID 400 MCG
TABLET ORAL
Qty: 100 TABLET | Refills: 2 | Status: SHIPPED | OUTPATIENT
Start: 2023-11-01 | End: 2024-03-07

## 2023-11-01 RX ORDER — POLYVINYL ALCOHOL 14 MG/ML
SOLUTION/ DROPS OPHTHALMIC
Qty: 15 ML | Refills: 10 | Status: SHIPPED | OUTPATIENT
Start: 2023-11-01 | End: 2023-11-02

## 2023-11-01 RX ORDER — CETIRIZINE HYDROCHLORIDE 10 MG/1
10 TABLET ORAL AT BEDTIME
Qty: 90 TABLET | Refills: 2 | Status: SHIPPED | OUTPATIENT
Start: 2023-11-01 | End: 2024-03-07

## 2023-11-01 NOTE — TELEPHONE ENCOUNTER
[facility] sent Rx request for the following:      Requested Prescriptions   Pending Prescriptions Disp Refills    OMEGA-3 FISH OIL 1000 MG capsule [Pharmacy Med Name: FISH OIL 1,000MG CAP] 28 capsule 0     Sig: TAKE 1 CAPSULE BY MOUTH ONCE DAILY (SPINAL STENOSIS)   Last Prescription Date:   11/30/22  Last Fill Qty/Refills:         28, R-11      Vitamin Supplements (Adult) Protocol Failed - 11/1/2023 10:52 AM        Failed - Recent (12 mo) or future (30 days) visit within the authorizing provider's specialty       Multiple Vitamin (TAB-A-RADHA) TABS [Pharmacy Med Name: MULTIVIT TAB-A-RADHA TAB] 28 tablet 0     Sig: TAKE 1 TABLET BY MOUTH ONCE DAILY (HEALTH MAINT)   Last Prescription Date:   11/30/22  Last Fill Qty/Refills:         28, R-11      Vitamin Supplements (Adult) Protocol Failed - 11/1/2023 10:52 AM        Failed - Recent (12 mo) or future (30 days) visit within the authorizing provider's specialty     Last Office Visit:              9/28/23 (NH Visit, Sierra Ray)   Future Office visit:           None     Unable to complete prescription refill per RN Medication Refill Policy.      Karen Simon RN .............. 11/1/2023  10:53 AM

## 2023-11-01 NOTE — TELEPHONE ENCOUNTER
[facility] sent Rx request for the following:      Requested Prescriptions   Pending Prescriptions Disp Refills    cetirizine (ZYRTEC) 10 MG tablet [Pharmacy Med Name: CETIRIZINE 10MG TAB] 28 tablet 0     Sig: TAKE 1 TABLET BY MOUTH ONCE AT BEDTIME       Antihistamines Protocol Failed - 11/1/2023 10:52 AM        Failed - Patient is 3-64 years of age     Apply weight-based dosing for peds patients age 3 - 12 years of age.    Forward request to provider for patients under the age of 3 or over the age of 64.        Failed - Recent (12 mo) or future (30 days) visit within the authorizing provider's specialty     Last Prescription Date:   6/1/23  Last Fill Qty/Refills:         90, R-1    Last Office Visit:              9/28/23 ([facility] Visit, Sierra Ray)   Future Office visit:           None    Unable to complete prescription refill per RN Medication Refill Policy.     Karen Simon RN .............. 11/1/2023  10:53 AM

## 2023-11-02 DIAGNOSIS — H04.123 DRY EYES: Primary | ICD-10-CM

## 2023-11-02 RX ORDER — POLYVINYL ALCOHOL 14 MG/ML
SOLUTION/ DROPS OPHTHALMIC
Qty: 15 ML | Refills: 10 | Status: SHIPPED | OUTPATIENT
Start: 2023-11-02 | End: 2024-02-29

## 2023-11-07 DIAGNOSIS — Z00.00 HEALTHCARE MAINTENANCE: Primary | ICD-10-CM

## 2023-11-07 RX ORDER — CHLORAL HYDRATE 500 MG
1 CAPSULE ORAL DAILY
Qty: 90 CAPSULE | Refills: 3 | Status: SHIPPED | OUTPATIENT
Start: 2023-11-07 | End: 2024-03-07

## 2023-11-09 DIAGNOSIS — D50.8 OTHER IRON DEFICIENCY ANEMIA: Primary | ICD-10-CM

## 2023-11-09 RX ORDER — FERROUS SULFATE 325(65) MG
325 TABLET ORAL
Qty: 90 TABLET | Refills: 3 | Status: SHIPPED | OUTPATIENT
Start: 2023-11-09 | End: 2024-03-07

## 2023-11-09 NOTE — PROGRESS NOTES
Marion Squires RN reports that Geno does not like taking her vitron-c due to the size of the tablet. Vitron-c discontinued. Order placed for ferrous sulfate (FEROSUL) 325 (65 Fe) MG tablet; Take 1 tablet (325 mg) by mouth daily (with breakfast).    LARISA Schmidt CNP on 11/9/2023 at 12:03 PM

## 2023-12-02 DIAGNOSIS — H10.45 CHRONIC ALLERGIC CONJUNCTIVITIS: ICD-10-CM

## 2023-12-06 RX ORDER — OLOPATADINE HYDROCHLORIDE 2 MG/ML
SOLUTION/ DROPS OPHTHALMIC
Qty: 2.5 ML | Refills: 5 | Status: SHIPPED | OUTPATIENT
Start: 2023-12-06 | End: 2024-02-29

## 2023-12-06 NOTE — TELEPHONE ENCOUNTER
[facility] sent Rx request for the following:      Requested Prescriptions   Pending Prescriptions Disp Refills    olopatadine (PATADAY) 0.2 % ophthalmic solution [Pharmacy Med Name: OLOPATADINE 0.2% EYE SOL] 2.5 mL 5     Sig: INSTILL 1 DROP INTO EACH EYE ONCE DAILY     Last Prescription Date:   8/3/23  Last Fill Qty/Refills:         2.5 ml, R-3    Last Office Visit:              9/28/23 ([facility] visit, Sierra Ray)   Future Office visit:           Tomorrow    Appointment tomorrow.    Karen Simon RN .............. 12/6/2023  4:02 PM

## 2023-12-07 ENCOUNTER — NURSING HOME VISIT (OUTPATIENT)
Dept: GERIATRICS | Facility: OTHER | Age: 88
End: 2023-12-07
Attending: NURSE PRACTITIONER
Payer: COMMERCIAL

## 2023-12-07 DIAGNOSIS — L72.0 EPIDERMAL CYST OF FACE: ICD-10-CM

## 2023-12-07 DIAGNOSIS — L72.0 EPIDERMAL CYST OF NECK: Primary | ICD-10-CM

## 2023-12-07 DIAGNOSIS — Z78.9 LIVING IN ASSISTED LIVING: ICD-10-CM

## 2023-12-07 PROCEDURE — 99347 HOME/RES VST EST SF MDM 20: CPT | Performed by: NURSE PRACTITIONER

## 2023-12-07 ASSESSMENT — ENCOUNTER SYMPTOMS
ARTHRALGIAS: 1
SHORTNESS OF BREATH: 0
FEVER: 0

## 2023-12-07 NOTE — PROGRESS NOTES
"Geno Horn is a 89 year old female being seen today for acute visit at [facility].    Assessment & Plan       ICD-10-CM    1. Epidermal cyst of neck  L72.0       2. Living in assisted living  Z59.3       3. Epidermal cyst of face  L72.0           Geno brings forth concerns of non-painful \"lump\" to left lower neck. Geno reports that she has had something similar on her temple and her forehead. She denies any itching, pain, drainage, fever or chills.      Today on exam, there is a small epidermal cyst noted along the lower left portion of her neck. There is no surrounding erythema, cyst is pliable, not rigid. Geno denies any pain with palpation. Writer explained that is a cyst becomes inflamed, we could treat with antibiotics. Generally cysts are not excised unless they are impinging on a nerve or chronically infected. At this time, Geno does not wish to see general surgery about a possible excision, and would prefer watchful waiting.     Geno brings forth no other concerns during our visit today.     Discussed signs/ symptoms that would warrant urgent/ emergent follow-up. Patient in agreement with plan. All questions and concerns addressed this visit.       25  minutes spent by me on the date of the encounter doing chart review, history and exam, documentation and further activities per the note      Return if symptoms worsen or fail to improve.    LARISA Schmidt [facility]     Code Status: DNR / DNI.   Health Care Power of : Extended Emergency Contact Information  Primary Emergency Contact: Daniel Horn  Address: [address]           [place] [address] [facility]  Mobile Phone: [phone]  Relation: Son  Secondary Emergency Contact: Adri Chaudhary  Address: [address] [address] [facility]  Home Phone: [phone]  Mobile Phone: [phone]  Relation: Daughter     Allergies: Alendronate, Celebrex [celecoxib], Other environmental " "allergy, Oxybutynin, Tolterodine, Tramadol, and Trospium     Past Medical, Surgical, Family and Social History reviewed: YES.     Medications:   Current Outpatient Medications   Medication Sig Dispense Refill    acetaminophen (TYLENOL) 650 MG CR tablet 1 tab TID and 1 tab BID daily  tablet 3    Adhesive Tape (MEDIPORE H SURGICAL 2\"X10YD) TAPE MEDIPORE H 2 x10yds TAPE ITEM #[id number] DX L98.9 [facility]  3ROLLS/30DS      cetirizine (ZYRTEC) 10 MG tablet TAKE 1 TABLET BY MOUTH ONCE AT BEDTIME 90 tablet 2    cyanocobalamin (VITAMIN B-12) 500 MCG tablet TAKE 1 TABLET BY MOUTH ONCE DAILY 90 tablet 3    DEEP SEA NASAL SPRAY 0.65 % nasal spray SPRAY 2 SPRAYS INTO EACH NOSTRIL AS NEEDED 44 mL 11    diclofenac (VOLTAREN) 1 % topical gel Apply 4 g topically 3 times daily as needed for moderate pain Family to provide      ferrous sulfate (FEROSUL) 325 (65 Fe) MG tablet Take 1 tablet (325 mg) by mouth daily (with breakfast) 90 tablet 3    fish oil-omega-3 fatty acids 1000 MG capsule Take 1 capsule (1 g) by mouth daily 90 capsule 3    fluticasone (FLONASE) 50 MCG/ACT nasal spray Spray 2 sprays into both nostrils daily 15.8 mL 3    Gauze Pads & Dressings (KERLIX BANDAGE ROLL) [facility] [place] [place] KERLIX 4-1/2 GAUZE ROLL ITEM #6715 DX L98.9 [facility]  #30EA/30DS      guaiFENesin (ROBITUSSIN) 20 mg/mL liquid Take 10 mLs by mouth every 6 hours as needed for cough 236 mL 1    Incontinence Supply Disposable (DEPEND EASY FIT UNDERGARMENTS) MISC Medium size disposable under pants covered by insurance 100 each 11    Incontinence Supply Disposable (POISE PAD) PADS Change pad 3 x daily, #6 long Maxi pads 90 each 11    levothyroxine (SYNTHROID/LEVOTHROID) 25 MCG tablet TAKE 1/2 TABLET (12.5MCG) BY MOUTH ONCE DAILY (TAKE ALONG WITH 50MCG = 62.5MCG) 90 tablet 3    levothyroxine (SYNTHROID/LEVOTHROID) 50 MCG tablet TAKE 1 TABLET BY MOUTH ONCE DAILY (TAKE ALONG WITH 12.5MCG = 62.5MCG) 90 tablet 3    magnesium hydroxide (MILK OF MAGNESIA) 400 MG/5ML suspension " "TAKE 15ML BY MOUTH ONCE DAILY AS NEEDED FOR NO BM IN 3 DAYS 473 mL 11    meclizine (ANTIVERT) 12.5 MG tablet Take 1 tablet (12.5 mg) by mouth 3 times daily as needed for dizziness 90 tablet 1    Multiple Vitamin (TAB-A-RADHA) TABS TAKE 1 TABLET BY MOUTH ONCE DAILY (HEALTH MAINT) 100 tablet 2    mupirocin (BACTROBAN) 2 % external ointment Apply to skin tear wound with dressing changes and to left facial lesion BID x 2 weeks 30 g 1    MURINE EAR WAX REMOVAL SYSTEM 6.5 % otic solution INSTILL 5 DROPS INTO EACH EAR AT BEDTIME (IMPACTED CERUMEN) 15 mL 11    olopatadine (PATADAY) 0.2 % ophthalmic solution INSTILL 1 DROP INTO EACH EYE ONCE DAILY 2.5 mL 5    ondansetron (ZOFRAN) 4 MG tablet TAKE 1 TABLET BY MOUTH EVERY 8 HOURS AS NEEDED (NAUSEA) 12 tablet 5    order for DME Equipment being ordered: Spikes for cane 1 Units 1    order for DME Equipment being ordered: plastic underpants 1 Units 1    polyethylene glycol (MIRALAX) 17 GM/Dose powder Take 17 g by mouth daily as needed for constipation 578 g 3    polyvinyl alcohol (LIQUIFILM TEARS) 1.4 % ophthalmic solution INSTILL 1 DROP INTO BOTH EYES THREE TIMES DAILY INSTILL 1 DROP INTO BOTH EYES 3 TIMES DAILY AS NEEDED 15 mL 10    senna-docusate (SENOKOT-S/PERICOLACE) 8.6-50 MG tablet Take 1 tablet by mouth daily 360 tablet 4    Transparent Dressings ([employer] MEPITEL TRANSPARENT FILM) MISC 1 each every 3 days And as needed for skin tears--Thrifty White 30 each 11    vitamin A & D (BABY) external ointment Apply topically 2 times daily And PRN with toileting to affected amelie areas for skin barrier cream 42 g 4         Review of Systems   Constitutional:  Negative for fever.   Respiratory:  Negative for shortness of breath.    Cardiovascular:  Negative for chest pain.   Musculoskeletal:  Positive for arthralgias and gait problem.   Skin:         \"Lump\" noted to left lower neck     Allergic/Immunologic: Positive for environmental allergies.   All other systems reviewed and are " negative.      Toileting:    Continent of Bowel: Yes   Continent of Bladder: Yes  Mobility: wheelchair    Recent Labs: None    Current Therapies: none    Physical Exam  Constitutional:       Appearance: Normal appearance.   Cardiovascular:      Pulses: Normal pulses.   Pulmonary:      Effort: Pulmonary effort is normal.   Musculoskeletal:      Cervical back: Normal range of motion.   Skin:     Findings: Lesion (small, pencil eraser-sized epidermal cyst noted to left lower neck. No surrounding erythema.) present.   Neurological:      Mental Status: She is alert. Mental status is at baseline.      Gait: Gait abnormal (uses wheelchair).   Psychiatric:         Mood and Affect: Mood normal.         Behavior: Behavior normal.         Thought Content: Thought content normal.

## 2023-12-11 ENCOUNTER — TELEPHONE (OUTPATIENT)
Dept: GERIATRICS | Facility: OTHER | Age: 88
End: 2023-12-11
Payer: COMMERCIAL

## 2023-12-11 DIAGNOSIS — D50.8 OTHER IRON DEFICIENCY ANEMIA: ICD-10-CM

## 2023-12-11 DIAGNOSIS — K59.00 CONSTIPATION, UNSPECIFIED CONSTIPATION TYPE: ICD-10-CM

## 2023-12-11 RX ORDER — AMOXICILLIN 250 MG
1 CAPSULE ORAL DAILY
Qty: 360 TABLET | Refills: 4 | Status: SHIPPED | OUTPATIENT
Start: 2023-12-11 | End: 2024-02-01

## 2023-12-11 RX ORDER — POLYETHYLENE GLYCOL 3350 17 G/17G
17 POWDER, FOR SOLUTION ORAL DAILY PRN
Qty: 578 G | Refills: 3 | Status: SHIPPED | OUTPATIENT
Start: 2023-12-11 | End: 2024-02-29

## 2023-12-11 NOTE — TELEPHONE ENCOUNTER
Email received from [facility] RN stating that Geno is refusing to take her Miralax and will not drink fluids, as she thinks her Miralax is in it. Per RN, she would prefer senokot scheduled and Miralax PRN. Geno has PRN senna-docusate 2 tablets PRN no stool x 3 days. Plan to start with 1 tablet PO daily and change Miralax to PRN.     1. Constipation, unspecified constipation type    - polyethylene glycol (MIRALAX) 17 GM/Dose powder; Take 17 g by mouth daily as needed for constipation  Dispense: 578 g; Refill: 3  - senna-docusate (SENOKOT-S/PERICOLACE) 8.6-50 MG tablet; Take 1 tablet by mouth daily  Dispense: 360 tablet; Refill: 4    2. Other iron deficiency anemia    - senna-docusate (SENOKOT-S/PERICOLACE) 8.6-50 MG tablet; Take 1 tablet by mouth daily  Dispense: 360 tablet; Refill: 4      LARISA Schmidt CNP on 12/11/2023 at 10:53 AM

## 2023-12-12 DIAGNOSIS — J30.89 SEASONAL ALLERGIC RHINITIS DUE TO OTHER ALLERGIC TRIGGER: ICD-10-CM

## 2023-12-12 RX ORDER — FLUTICASONE PROPIONATE 50 MCG
SPRAY, SUSPENSION (ML) NASAL
Qty: 16 G | Refills: 11 | Status: SHIPPED | OUTPATIENT
Start: 2023-12-12

## 2023-12-12 NOTE — TELEPHONE ENCOUNTER
[facility] sent Rx request for the following:      Requested Prescriptions   Pending Prescriptions Disp Refills    fluticasone (FLONASE) 50 MCG/ACT nasal spray [Pharmacy Med Name: FLUTICASONE 50MCG NSL(120)] 16 g 0     Sig: USE 2 SPRAYS IN EACH NOSTRIL ONCE A DAY       Nasal Allergy Protocol Failed - 12/12/2023  4:53 PM        Failed - Recent (12 mo) or future (30 days) visit within the authorizing provider's specialty     Last Prescription Date:   6/15/23  Last Fill Qty/Refills:         15.8 ml, R-3    Last Office Visit:              12/7/23 (NH Visit, EMBER Frey)   Future Office visit:           None    Unable to complete prescription refill per RN Medication Refill Policy.     Karen Simon RN .............. 12/12/2023  4:54 PM

## 2023-12-13 ENCOUNTER — MEDICAL CORRESPONDENCE (OUTPATIENT)
Dept: HEALTH INFORMATION MANAGEMENT | Facility: OTHER | Age: 88
End: 2023-12-13
Payer: COMMERCIAL

## 2024-01-11 DIAGNOSIS — R32 DERMATITIS ASSOCIATED WITH MOISTURE FROM URINARY INCONTINENCE: ICD-10-CM

## 2024-01-11 DIAGNOSIS — H04.123 DRY EYES: Primary | ICD-10-CM

## 2024-01-11 DIAGNOSIS — L25.8 DERMATITIS ASSOCIATED WITH MOISTURE FROM URINARY INCONTINENCE: ICD-10-CM

## 2024-01-11 RX ORDER — MEDPURA VITAMIN A AND D 95 G/100G
OINTMENT TOPICAL
Qty: 113 G | Refills: 3 | Status: SHIPPED | OUTPATIENT
Start: 2024-01-11 | End: 2024-07-08

## 2024-01-11 RX ORDER — DEXTRAN 70 AND HYPROMELLOSE 2910 1; 3 MG/ML; MG/ML
1 SOLUTION/ DROPS OPHTHALMIC 3 TIMES DAILY
Qty: 30 ML | Refills: 1 | Status: SHIPPED | OUTPATIENT
Start: 2024-01-11 | End: 2024-02-29

## 2024-01-11 NOTE — TELEPHONE ENCOUNTER
[facility] sent Rx request for the following:      Requested Prescriptions   Pending Prescriptions Disp Refills    Vitamins A & D (MEDPURA VITAMIN A & D) OINT [Pharmacy Med Name: VITAMIN A & D ONT] 113 g 0     Sig: APPLY TOPICALLY TO AFFECTED AREA(S) TWICE DAILY APPLY TOPICALLY TO AFFECTED AREA(S) AS NEEDED       There is no refill protocol information for this order      Last Prescription Date:   02/24/23  Last Fill Qty/Refills:         42 g, R-4      Last Office Visit:              12/07/23 ([place])   Future Office visit:           none    Routing refill request to provider for review/approval because:  Drug not on the [facility] refill protocol     Christa Altamirano RN on 1/11/2024 at 10:06 AM      1. Dermatitis associated with moisture from urinary incontinence    - Vitamins A & D (MEDPURA VITAMIN A & D) OINT; APPLY TOPICALLY TO AFFECTED AREA(S) TWICE DAILY APPLY TOPICALLY TO AFFECTED AREA(S) AS NEEDED  Dispense: 113 g; Refill: 3    2. Dry eyes    - hypromellose-dextran (LUBRICATING TEARS EYE DROPS) 0.1-0.3 % ophthalmic solution; Place 1 drop into both eyes 3 times daily  Dispense: 30 mL; Refill: 1      LARISA Schmidt CNP on 1/11/2024 at 11:30 AM

## 2024-01-12 ENCOUNTER — MEDICAL CORRESPONDENCE (OUTPATIENT)
Dept: HEALTH INFORMATION MANAGEMENT | Facility: OTHER | Age: 89
End: 2024-01-12
Payer: COMMERCIAL

## 2024-01-15 RX ORDER — GLIPIZIDE 10 MG/1
1 TABLET ORAL 3 TIMES DAILY
Qty: 15 ML | Refills: 3 | Status: SHIPPED | OUTPATIENT
Start: 2024-01-15 | End: 2024-05-23

## 2024-01-22 DIAGNOSIS — E53.8 VITAMIN B12 DEFICIENCY (NON ANEMIC): ICD-10-CM

## 2024-01-22 DIAGNOSIS — M15.0 PRIMARY OSTEOARTHRITIS INVOLVING MULTIPLE JOINTS: ICD-10-CM

## 2024-01-25 RX ORDER — SENNOSIDES 8.6 MG
CAPSULE ORAL
Qty: 84 TABLET | Refills: 2 | Status: SHIPPED | OUTPATIENT
Start: 2024-01-25 | End: 2024-04-16

## 2024-01-25 RX ORDER — UREA 10 %
LOTION (ML) TOPICAL
Qty: 90 TABLET | Refills: 3 | Status: SHIPPED | OUTPATIENT
Start: 2024-01-25

## 2024-01-25 NOTE — TELEPHONE ENCOUNTER
[facility] sent Rx request for the following:      Requested Prescriptions   Pending Prescriptions Disp Refills    cyanocobalamin (VITAMIN B-12) 500 MCG tablet [Pharmacy Med Name: VITAMIN B-12 500MCG TAB] 28 tablet 0     Sig: TAKE 1 TABLET BY MOUTH ONCE DAILY       Vitamin Supplements Protocol Failed - 1/25/2024  3:19 PM        Failed - Recent (12 mo) or future (90 days) visit within the authorizing provider's specialty     Last Prescription Date:   2/22/23  Last Fill Qty/Refills:         90, R-3    Last Office Visit:              12/7/23 ([facility] Visit, [facility] Ray)   Future Office visit:           None     Unable to complete prescription refill per RN Medication Refill Policy.      Karen Simon RN .............. 1/25/2024  3:21 PM

## 2024-01-25 NOTE — TELEPHONE ENCOUNTER
[facility] sent Rx request for the following:      Requested Prescriptions   Pending Prescriptions Disp Refills    acetaminophen (TYLENOL) 650 MG CR tablet [Pharmacy Med Name: ACETAMINOPHEN  MG TAB] 84 tablet 0     Sig: TAKE 1 TABLET BY MOUTH THREE TIMES A DAY TAKE 1 TABLET BY MOUTH TWICE DAILY AS NEEDED       Analgesics (Non-Narcotic Tylenol and ASA Only) Failed - 1/25/2024  3:20 PM        Failed - Medication matches indication     Acetaminophen is associated with one or more of the following diagnoses:  Pain  Fever        Failed - Recent (12 mo) or future (90 days) visit within the authorizing provider's specialty     Last Prescription Date:   9/28/23  Last Fill Qty/Refills:         100, R-3    Last Office Visit:              12/7/23 ([facility] Visit, [facility] Ray)   Future Office visit:           None    Unable to complete prescription refill per RN Medication Refill Policy.     Karen Simon RN .............. 1/25/2024  3:21 PM

## 2024-02-01 DIAGNOSIS — K59.00 CONSTIPATION, UNSPECIFIED CONSTIPATION TYPE: Primary | ICD-10-CM

## 2024-02-01 DIAGNOSIS — D50.8 OTHER IRON DEFICIENCY ANEMIA: ICD-10-CM

## 2024-02-01 RX ORDER — AMOXICILLIN 250 MG
CAPSULE ORAL
Qty: 90 TABLET | Refills: 11 | Status: SHIPPED | OUTPATIENT
Start: 2024-02-01

## 2024-02-02 ENCOUNTER — MEDICAL CORRESPONDENCE (OUTPATIENT)
Dept: HEALTH INFORMATION MANAGEMENT | Facility: OTHER | Age: 89
End: 2024-02-02
Payer: COMMERCIAL

## 2024-02-03 ENCOUNTER — OFFICE VISIT (OUTPATIENT)
Dept: FAMILY MEDICINE | Facility: OTHER | Age: 89
End: 2024-02-03
Payer: COMMERCIAL

## 2024-02-03 VITALS
SYSTOLIC BLOOD PRESSURE: 124 MMHG | HEART RATE: 74 BPM | DIASTOLIC BLOOD PRESSURE: 76 MMHG | TEMPERATURE: 97.8 F | RESPIRATION RATE: 16 BRPM | WEIGHT: 130 LBS | HEIGHT: 61 IN | BODY MASS INDEX: 24.55 KG/M2 | OXYGEN SATURATION: 99 %

## 2024-02-03 DIAGNOSIS — N30.00 ACUTE CYSTITIS WITHOUT HEMATURIA: Primary | ICD-10-CM

## 2024-02-03 PROCEDURE — G0463 HOSPITAL OUTPT CLINIC VISIT: HCPCS

## 2024-02-03 PROCEDURE — 99213 OFFICE O/P EST LOW 20 MIN: CPT

## 2024-02-03 RX ORDER — LIFITEGRAST 50 MG/ML
1 SOLUTION/ DROPS OPHTHALMIC 2 TIMES DAILY
COMMUNITY
Start: 2024-01-30 | End: 2024-07-11

## 2024-02-03 ASSESSMENT — PATIENT HEALTH QUESTIONNAIRE - PHQ9
10. IF YOU CHECKED OFF ANY PROBLEMS, HOW DIFFICULT HAVE THESE PROBLEMS MADE IT FOR YOU TO DO YOUR WORK, TAKE CARE OF THINGS AT HOME, OR GET ALONG WITH OTHER PEOPLE: NOT DIFFICULT AT ALL
SUM OF ALL RESPONSES TO PHQ QUESTIONS 1-9: 0
SUM OF ALL RESPONSES TO PHQ QUESTIONS 1-9: 0

## 2024-02-03 ASSESSMENT — PAIN SCALES - GENERAL: PAINLEVEL: NO PAIN (0)

## 2024-02-03 NOTE — NURSING NOTE
"Chief Complaint   Patient presents with    Dysuria     More confused than usual. And cramps.    Initial /76   Pulse 74   Temp 97.8  F (36.6  C) (Tympanic)   Resp 16   Ht 1.549 m (5' 1\")   Wt 59 kg (130 lb)   LMP 02/03/1984 (Approximate)   SpO2 99%   Breastfeeding No   BMI 24.56 kg/m   Estimated body mass index is 24.56 kg/m  as calculated from the following:    Height as of this encounter: 1.549 m (5' 1\").    Weight as of this encounter: 59 kg (130 lb).  Medication Review: complete    The next two questions are to help us understand your food security.  If you are feeling you need any assistance in this area, we have resources available to support you today.           No data to display                  Health Care Directive:  Patient has a Health Care Directive on file      Norma J. Gosselin, LPN      "

## 2024-02-03 NOTE — PROGRESS NOTES
"ASSESSMENT/PLAN:    (N30.00) Acute cystitis without hematuria  (primary encounter diagnosis)  Comment: Patient presents with her daughter from [facility] with concerns of a UTI.  Staff at [facility] have been concerned that she has been somewhat more confused than normal.  Daughter has not noticed this recently.  They would like to rule out a UTI.  Patient has had constipation recently and has been treated with senna.  On exam vital signs are stable and she is afebrile, negative for suprapubic or CVA tenderness.  Unable to obtain urine in clinic as samples have been contaminated with stool.  She did return urine the next day and it did show nitrites and leukocytes.  Will opt to start her at this point on antibiotics.  Plan: UA Macroscopic with reflex to Microscopic and         Culture, Urine Culture, cephALEXin (KEFLEX) 500        MG capsule, DISCONTINUED: cephALEXin (KEFLEX)         500 MG capsule  Vitals stable. PE available for review below. UA results: Below. Based off UA results, patient does meet criteria for antibiotic therapy. I did discuss with patient that if a urine culture was performed, that we will inform patient if a change to their treatment plan needs to occur based off culture results - with urine cultures typically returning in 1-3 days. In the meantime, recommend, alternating tylenol and ibuprofen every 4-6 hours as needed, warm heating pad, pushing fluids/hydration, cranberry juice/pills, urinating when urge arises. May also try over the counter remedies such as Azo (as long as pyridium not already prescribed). Patient aware that if they do take Azo, that this medication can change color of urine to an \"orange\" color. If fevers, chills, flank pain/back pain, inability to urinate/struggle to urinate, signs of dehydration or other worrisome signs occur, patient agreeable to follow up for reevaluation. Patient is in agreement and understanding of the above treatment plan. All questions and concerns " were addressed and answered to patient's satisfaction. AVS reviewed with patient.       Discussed warning signs/symptoms indicative of need to f/u    Follow up if symptoms persist or worsen or concerns    I have reviewed the nursing notes.  I have reviewed the findings, diagnosis, plan and need for follow up with the patient.    I explained my diagnostic considerations and recommendations to the patient, who voiced understanding and agreement with the treatment plan. All questions were answered. We discussed potential side effects of any prescribed or recommended therapies, as well as expectations for response to treatments.    TOMMY BAIRES, LARISA CNP  2/3/2024  2:58 PM    HPI:    Geno Horn is a 89 year old female  who presents to [facility] today for concerns of UTI.    possible UTI, x 1 day    Symptoms: No dysuria.  [facility] at [facility] reports she has been more confused, her daughter who accompanies her to this visit has not noticed this to this point.   No back or side pain  No hematuria/blood in urine  Last Urination: Today  YES: she is  able to completely void/empty  YES: she had one episode of emesis three days ago, no  fevers, chills  YES: she endorses constipation  change in bowel habits (diarrhea, constipation, etc.)    YES: she has a  history of UTIs    Allergies: As listed    PCP: Sabine Ray      Past Medical History:   Diagnosis Date    Hypertension     Hypothyroid     Osteoarthritis     ST elevation (STEMI) myocardial infarction (H)     1981; angioplasty     Past Surgical History:   Procedure Laterality Date    ARTHROPLASTY HIP Right     Dr Rodriguez    ARTHROPLASTY HIP Left 2015    Dr. Rodriguez    COMBINED CYSTOSCOPY, URETEROSCOPY, LASER HOLMIUM LITHOTRIPSY URETER(S) Right 7/23/2019    Procedure: CYSTOSCOPY, RIGHT STENT REMOVAL, URETEROSCOPY, LASER HOLMIUM LITHOTRIPSY RIGHT URETER WITH STENT REPLACEMENT;  Surgeon: Shawn Cole MD;  Location:  OR    CYSTOSCOPY, RETROGRADES, INSERT STENT URETER(S),  "COMBINED Right 6/17/2019    Procedure: CYSTOSCOPY, WITH RETROGRADE PYELOGRAM AND URETERAL STENT INSERTION;  Surgeon: Shawn Cole MD;  Location:  OR     Social History     Tobacco Use    Smoking status: Never    Smokeless tobacco: Never    Tobacco comments:     no ecig   Substance Use Topics    Alcohol use: Not Currently     Alcohol/week: 0.0 standard drinks of alcohol     Current Outpatient Medications   Medication Sig Dispense Refill    acetaminophen (TYLENOL) 650 MG CR tablet TAKE 1 TABLET BY MOUTH THREE TIMES A DAY TAKE 1 TABLET BY MOUTH TWICE DAILY AS NEEDED 84 tablet 2    Adhesive Tape (MEDIPORE H SURGICAL 2\"X10YD) TAPE MEDIPORE H 2 x10yds TAPE ITEM #[id number] DX L98.9 [facility]  3ROLLS/30DS      artificial tears (GENTEAL) 0.1-0.2-0.3 % ophthalmic solution Place 1 drop into both eyes 3 times daily 15 mL 3    cetirizine (ZYRTEC) 10 MG tablet TAKE 1 TABLET BY MOUTH ONCE AT BEDTIME 90 tablet 2    cyanocobalamin (VITAMIN B-12) 500 MCG tablet TAKE 1 TABLET BY MOUTH ONCE DAILY 90 tablet 3    DEEP SEA NASAL SPRAY 0.65 % nasal spray SPRAY 2 SPRAYS INTO EACH NOSTRIL AS NEEDED 44 mL 11    diclofenac (VOLTAREN) 1 % topical gel Apply 4 g topically 3 times daily as needed for moderate pain Family to provide      ferrous sulfate (FEROSUL) 325 (65 Fe) MG tablet Take 1 tablet (325 mg) by mouth daily (with breakfast) 90 tablet 3    fish oil-omega-3 fatty acids 1000 MG capsule Take 1 capsule (1 g) by mouth daily 90 capsule 3    fluticasone (FLONASE) 50 MCG/ACT nasal spray USE 2 SPRAYS IN EACH NOSTRIL ONCE A DAY 16 g 11    Gauze Pads & Dressings (KERLIX BANDAGE ROLL) [facility] KERLIX 4-1/2 GAUZE ROLL ITEM #[id number] DX L98.9 [facility]  #30EA/30DS      guaiFENesin (ROBITUSSIN) 20 mg/mL liquid Take 10 mLs by mouth every 6 hours as needed for cough 236 mL 1    hypromellose-dextran (LUBRICATING TEARS EYE DROPS) 0.1-0.3 % ophthalmic solution Place 1 drop into both eyes 3 times daily 30 mL 1    Incontinence Supply Disposable (DEPEND EASY FIT " UNDERGARMENTS) MISC Medium size disposable under pants covered by insurance 100 each 11    Incontinence Supply Disposable (POISE PAD) PADS Change pad 3 x daily, #6 long Maxi pads 90 each 11    levothyroxine (SYNTHROID/LEVOTHROID) 25 MCG tablet TAKE 1/2 TABLET (12.5MCG) BY MOUTH ONCE DAILY (TAKE ALONG WITH 50MCG = 62.5MCG) 90 tablet 3    magnesium hydroxide (MILK OF MAGNESIA) 400 MG/5ML suspension TAKE 15ML BY MOUTH ONCE DAILY AS NEEDED FOR NO BM IN 3 DAYS 473 mL 11    meclizine (ANTIVERT) 12.5 MG tablet Take 1 tablet (12.5 mg) by mouth 3 times daily as needed for dizziness 90 tablet 1    Multiple Vitamin (TAB-A-RADHA) TABS TAKE 1 TABLET BY MOUTH ONCE DAILY (HEALTH MAINT) 100 tablet 2    order for DME Equipment being ordered: Spikes for cane 1 Units 1    order for DME Equipment being ordered: plastic underpants 1 Units 1    polyethylene glycol (MIRALAX) 17 GM/Dose powder Take 17 g by mouth daily as needed for constipation 578 g 3    senna-docusate (SENOKOT-S/PERICOLACE) 8.6-50 MG tablet 2 tabs in the morning and 1 tab at HS 90 tablet 11    Vitamins A & D (MEDPURA VITAMIN A & D) OINT APPLY TOPICALLY TO AFFECTED AREA(S) TWICE DAILY APPLY TOPICALLY TO AFFECTED AREA(S) AS NEEDED 113 g 3    XIIDRA 5 % opthalmic solution Place 1 drop into both eyes 2 times daily      levothyroxine (SYNTHROID/LEVOTHROID) 50 MCG tablet TAKE 1 TABLET BY MOUTH ONCE DAILY (TAKE ALONG WITH 12.5MCG = 62.5MCG) 90 tablet 3    mupirocin (BACTROBAN) 2 % external ointment Apply to skin tear wound with dressing changes and to left facial lesion BID x 2 weeks 30 g 1    MURINE EAR WAX REMOVAL SYSTEM 6.5 % otic solution INSTILL 5 DROPS INTO EACH EAR AT BEDTIME (IMPACTED CERUMEN) (Patient not taking: Reported on 2/3/2024) 15 mL 11    olopatadine (PATADAY) 0.2 % ophthalmic solution INSTILL 1 DROP INTO EACH EYE ONCE DAILY (Patient not taking: Reported on 2/3/2024) 2.5 mL 5    ondansetron (ZOFRAN) 4 MG tablet TAKE 1 TABLET BY MOUTH EVERY 8 HOURS AS NEEDED  "(NAUSEA) (Patient not taking: Reported on 2/3/2024) 12 tablet 5    polyvinyl alcohol (LIQUIFILM TEARS) 1.4 % ophthalmic solution INSTILL 1 DROP INTO BOTH EYES THREE TIMES DAILY INSTILL 1 DROP INTO BOTH EYES 3 TIMES DAILY AS NEEDED (Patient not taking: Reported on 2/3/2024) 15 mL 10    Transparent Dressings ([employer] MEPITEL TRANSPARENT FILM) MISC 1 each every 3 days And as needed for skin tears--[employer] (Patient not taking: Reported on 2/3/2024) 30 each 11     Allergies   Allergen Reactions    Alendronate Nausea     Stomach upset    Celebrex [Celecoxib] Other (See Comments)     Light headed    Other Environmental Allergy Other (See Comments)     Itchy,Runny Eyes    Oxybutynin Other (See Comments)     Felt High    Tolterodine      Other reaction(s): Intolerance-Can't Take  Was unable to sleep at night    Tramadol Other (See Comments)     Adverse  effects    Trospium Other (See Comments)     drowsy     Past medical history, past surgical history, current medications and allergies reviewed and accurate to the best of my knowledge.      ROS:  Refer to HPI    /76   Pulse 74   Temp 97.8  F (36.6  C) (Tympanic)   Resp 16   Ht 1.549 m (5' 1\")   Wt 59 kg (130 lb)   LMP 02/03/1984 (Approximate)   SpO2 99%   Breastfeeding No   BMI 24.56 kg/m      EXAM:  General Appearance: Well appearing 89 year old female, appropriate appearance for age. No acute distress Eyes: conjunctivae normal without erythema or irritation, corneas clear, no drainage or crusting, no eyelid swelling, pupils equal   Oropharynx: moist mucous membranes, no exudates or petechiae, no post nasal drip seen, no trismus, voice clear.    Sinuses:  No sinus tenderness upon palpation of the frontal or maxillary sinuses  Nose:  Bilateral nares: no erythema, no edema, no drainage or congestion   Neck: supple without adenopathy  Respiratory: normal chest wall and respirations.  Normal effort.  Clear to auscultation bilaterally, no wheezing, crackles or " rhonchi.  No increased work of breathing.  No cough appreciated.  Cardiac: RRR with no murmurs  Abdomen: soft, nontender, no rigidity, no rebound tenderness or guarding, normal bowel sounds present  :  No suprapubic tenderness to palpation.  Absent CVA tenderness to palpation.    Musculoskeletal:  Equal movement of bilateral upper extremities.  Equal movement of bilateral lower extremities.  Normal gait.    Dermatological: no rashes noted of exposed skin  Neuro: Alert and oriented to person, place, and time.  Cranial nerves II-XII grossly intact with no focal or lateralizing deficits.  Muscle tone normal.  Gait normal. No tremor.   Psychological: normal affect, alert, oriented, and pleasant.     Labs:  Results for orders placed or performed in visit on 02/03/24   UA Macroscopic with reflex to Microscopic and Culture     Status: Abnormal    Specimen: Urine, Midstream   Result Value Ref Range    Color Urine Light Yellow Colorless, Straw, Light Yellow, Yellow    Appearance Urine Slightly Cloudy (A) Clear    Glucose Urine Negative Negative mg/dL    Bilirubin Urine Negative Negative    Ketones Urine Negative Negative mg/dL    Specific Gravity Urine 1.009 1.000 - 1.030    Blood Urine Negative Negative    pH Urine 6.5 5.0 - 9.0    Protein Albumin Urine Negative Negative mg/dL    Urobilinogen Urine Normal Normal, 2.0 mg/dL    Nitrite Urine Positive (A) Negative    Leukocyte Esterase Urine Moderate (A) Negative    Bacteria Urine Moderate (A) None Seen /HPF    Mucus Urine Present (A) None Seen /LPF    RBC Urine 1 <=2 /HPF    WBC Urine 35 (H) <=5 /HPF    Narrative    Urine Culture ordered based on laboratory criteria       Answers submitted by the patient for this visit:  Patient Health Questionnaire (Submitted on 2/3/2024)  If you checked off any problems, how difficult have these problems made it for you to do your work, take care of things at home, or get along with other people?: Not difficult at all  PHQ9 TOTAL SCORE:  0

## 2024-02-04 LAB
ALBUMIN UR-MCNC: NEGATIVE MG/DL
APPEARANCE UR: ABNORMAL
BACTERIA #/AREA URNS HPF: ABNORMAL /HPF
BILIRUB UR QL STRIP: NEGATIVE
COLOR UR AUTO: ABNORMAL
GLUCOSE UR STRIP-MCNC: NEGATIVE MG/DL
HGB UR QL STRIP: NEGATIVE
KETONES UR STRIP-MCNC: NEGATIVE MG/DL
LEUKOCYTE ESTERASE UR QL STRIP: ABNORMAL
MUCOUS THREADS #/AREA URNS LPF: PRESENT /LPF
NITRATE UR QL: POSITIVE
PH UR STRIP: 6.5 [PH] (ref 5–9)
RBC URINE: 1 /HPF
SP GR UR STRIP: 1.01 (ref 1–1.03)
UROBILINOGEN UR STRIP-MCNC: NORMAL MG/DL
WBC URINE: 35 /HPF

## 2024-02-04 PROCEDURE — 81001 URINALYSIS AUTO W/SCOPE: CPT | Mod: ZL

## 2024-02-04 PROCEDURE — 87186 SC STD MICRODIL/AGAR DIL: CPT | Mod: ZL

## 2024-02-04 PROCEDURE — 87086 URINE CULTURE/COLONY COUNT: CPT | Mod: ZL

## 2024-02-04 RX ORDER — CEPHALEXIN 500 MG/1
500 CAPSULE ORAL 2 TIMES DAILY
Qty: 14 CAPSULE | Refills: 0 | Status: SHIPPED | OUTPATIENT
Start: 2024-02-04 | End: 2024-02-11

## 2024-02-04 RX ORDER — CEPHALEXIN 500 MG/1
500 CAPSULE ORAL 2 TIMES DAILY
Qty: 14 CAPSULE | Refills: 0 | Status: SHIPPED | OUTPATIENT
Start: 2024-02-04 | End: 2024-02-04

## 2024-02-06 LAB — BACTERIA UR CULT: ABNORMAL

## 2024-02-15 ENCOUNTER — NURSING HOME VISIT (OUTPATIENT)
Dept: GERIATRICS | Facility: OTHER | Age: 89
End: 2024-02-15
Attending: NURSE PRACTITIONER
Payer: COMMERCIAL

## 2024-02-15 VITALS
SYSTOLIC BLOOD PRESSURE: 120 MMHG | DIASTOLIC BLOOD PRESSURE: 80 MMHG | HEART RATE: 84 BPM | BODY MASS INDEX: 25.6 KG/M2 | WEIGHT: 135.5 LBS | OXYGEN SATURATION: 94 %

## 2024-02-15 DIAGNOSIS — G30.9 ALZHEIMER'S DISEASE (H): ICD-10-CM

## 2024-02-15 DIAGNOSIS — Z79.899 ENCOUNTER FOR MEDICATION REVIEW: ICD-10-CM

## 2024-02-15 DIAGNOSIS — S81.811A SKIN TEAR OF RIGHT LOWER LEG WITHOUT COMPLICATION, INITIAL ENCOUNTER: ICD-10-CM

## 2024-02-15 DIAGNOSIS — M25.511 BILATERAL SHOULDER PAIN, UNSPECIFIED CHRONICITY: ICD-10-CM

## 2024-02-15 DIAGNOSIS — Z78.9 LIVING IN ASSISTED LIVING: ICD-10-CM

## 2024-02-15 DIAGNOSIS — M25.512 BILATERAL SHOULDER PAIN, UNSPECIFIED CHRONICITY: ICD-10-CM
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
## 2024-02-15 DIAGNOSIS — F02.80 ALZHEIMER'S DISEASE (H): ICD-10-CM

## 2024-02-15 DIAGNOSIS — M15.9 OSTEOARTHRITIS OF MULTIPLE JOINTS, UNSPECIFIED OSTEOARTHRITIS TYPE: ICD-10-CM

## 2024-02-15 PROCEDURE — 99349 HOME/RES VST EST MOD MDM 40: CPT | Performed by: NURSE PRACTITIONER
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
## 2024-02-20 PROBLEM — F02.80 ALZHEIMER'S DISEASE (H): Status: ACTIVE | Noted: 2024-02-20

## 2024-02-20 PROBLEM — G30.9 ALZHEIMER'S DISEASE (H): Status: ACTIVE | Noted: 2024-02-20

## 2024-02-27 ENCOUNTER — LAB REQUISITION (OUTPATIENT)
Dept: LAB | Facility: OTHER | Age: 89
End: 2024-02-27
Payer: COMMERCIAL

## 2024-02-27 DIAGNOSIS — D64.9 ANEMIA, UNSPECIFIED: ICD-10-CM

## 2024-02-27 DIAGNOSIS — E87.1 HYPO-OSMOLALITY AND HYPONATREMIA: ICD-10-CM

## 2024-02-27 DIAGNOSIS — E03.9 HYPOTHYROIDISM, UNSPECIFIED: ICD-10-CM

## 2024-02-27 LAB
ANION GAP SERPL CALCULATED.3IONS-SCNC: 9 MMOL/L (ref 7–15)
BUN SERPL-MCNC: 9.9 MG/DL (ref 8–23)
CALCIUM SERPL-MCNC: 9.2 MG/DL (ref 8.2–9.6)
CHLORIDE SERPL-SCNC: 97 MMOL/L (ref 98–107)
CREAT SERPL-MCNC: 0.67 MG/DL (ref 0.51–0.95)
DEPRECATED HCO3 PLAS-SCNC: 26 MMOL/L (ref 22–29)
EGFRCR SERPLBLD CKD-EPI 2021: 79 ML/MIN/1.73M2
GLUCOSE SERPL-MCNC: 123 MG/DL (ref 70–99)
HGB BLD-MCNC: 10.8 G/DL (ref 11.7–15.7)
POTASSIUM SERPL-SCNC: 4.6 MMOL/L (ref 3.4–5.3)
SODIUM SERPL-SCNC: 132 MMOL/L (ref 135–145)
T4 FREE SERPL-MCNC: 1.02 NG/DL (ref 0.9–1.7)
TSH SERPL DL<=0.005 MIU/L-ACNC: 9.34 UIU/ML (ref 0.3–4.2)

## 2024-02-27 PROCEDURE — 80048 BASIC METABOLIC PNL TOTAL CA: CPT | Performed by: NURSE PRACTITIONER

## 2024-02-27 PROCEDURE — 84439 ASSAY OF FREE THYROXINE: CPT | Performed by: NURSE PRACTITIONER

## 2024-02-27 PROCEDURE — 36415 COLL VENOUS BLD VENIPUNCTURE: CPT | Performed by: NURSE PRACTITIONER

## 2024-02-27 PROCEDURE — 85018 HEMOGLOBIN: CPT | Performed by: NURSE PRACTITIONER

## 2024-02-27 PROCEDURE — 84443 ASSAY THYROID STIM HORMONE: CPT | Performed by: NURSE PRACTITIONER

## 2024-02-29 ENCOUNTER — NURSING HOME VISIT (OUTPATIENT)
Dept: GERIATRICS | Facility: OTHER | Age: 89
End: 2024-02-29
Attending: NURSE PRACTITIONER
Payer: COMMERCIAL

## 2024-02-29 DIAGNOSIS — E03.9 HYPOTHYROIDISM, UNSPECIFIED TYPE: ICD-10-CM

## 2024-02-29 DIAGNOSIS — Z78.9 LIVING IN ASSISTED LIVING: ICD-10-CM

## 2024-02-29 DIAGNOSIS — S81.811S: Primary | ICD-10-CM

## 2024-02-29 PROCEDURE — 99347 HOME/RES VST EST SF MDM 20: CPT | Performed by: NURSE PRACTITIONER

## 2024-02-29 RX ORDER — TRANSPARENT DRESSING 3.5"X6"
BANDAGE TOPICAL
Qty: 1 EACH | Refills: 2 | Status: SHIPPED | OUTPATIENT
Start: 2024-02-29 | End: 2024-03-07

## 2024-02-29 NOTE — PROGRESS NOTES
Geno Horn is a 89 year old female being seen today for comprehensive visit at [facility].    Code Status: DNR / DNI, Advance Directives: YES  Health Care Power of : Extended Emergency Contact Information  Primary Emergency Contact: Daniel Horn  Address: [address]           [place] [address] [facility]  Mobile Phone: [phone]  Relation: Son  Secondary Emergency Contact: Adri Chaudhary  Address: [address] [place] [address] [facility]  Home Phone: [phone]  Mobile Phone: [phone]  Relation: Daughter     Allergies: Alendronate, Celebrex [celecoxib], Other environmental allergy, Oxybutynin, Tolterodine, Tramadol, and Trospium     Chief Complaint / HPI: Followup on recent fall with right upper leg skin tear wound and recent labs.  Was using mepitel porous dressing, staff report was getting displaced due to location of wound.  Wound healing at expected stage--staff requesting Tegaderm dressing orders.  Resident raises no concerns    Past Medical, Surgical, Family and Social History reviewed: YES.     Medications: reviewed  Medications - recent changes:     Review of Systems:  General: positive for weakness  Skin: positive for skin tear injury  Musculoskeletal: positive for back pain, arthritis, joint pain, and muscular weakness  Endocrine: positive for thyroid disorder  All other systems negative  Toileting:    Continent of Bowel: Yes   Continent of Bladder: Yes  Mobility: wheelchair    Recent Labs:   Recent Results (from the past 240 hour(s))   Hemoglobin    Collection Time: 02/27/24  8:32 AM   Result Value Ref Range    Hemoglobin 10.8 (L) 11.7 - 15.7 g/dL   Basic metabolic panel    Collection Time: 02/27/24  8:37 AM   Result Value Ref Range    Sodium 132 (L) 135 - 145 mmol/L    Potassium 4.6 3.4 - 5.3 mmol/L    Chloride 97 (L) 98 - 107 mmol/L    Carbon Dioxide (CO2) 26 22 - 29 mmol/L    Anion Gap 9 7 - 15 mmol/L    Urea Nitrogen 9.9 8.0 - 23.0 mg/dL     "Creatinine 0.67 0.51 - 0.95 mg/dL    GFR Estimate 79 >60 mL/min/1.73m2    Calcium 9.2 8.2 - 9.6 mg/dL    Glucose 123 (H) 70 - 99 mg/dL   TSH with free T4 reflex    Collection Time: 02/27/24  8:37 AM   Result Value Ref Range    TSH 9.34 (H) 0.30 - 4.20 uIU/mL   T4 free    Collection Time: 02/27/24  8:37 AM   Result Value Ref Range    Free T4 1.02 0.90 - 1.70 ng/dL       Current Therapies: none    Exam:  Vital signs reviewed.   GENERAL APPEARANCE: alert and no distress  EYES: Eyes grossly normal to inspection  MS: no musculoskeletal defects are noted and wheelchair/walker dependent  SKIN: warm and dry  NEURO: Alert, mentation intact and speech normal  PSYCH: mentation appears normal and affect normal/bright    Assessment and Plan:    Tegaderm dressings ordered for wound based on location upper lateral leg, healing expected stage.  Labs reviewed hemoglobin unchanged but stable    Labs reviewed sodium decreased, TSH elevated but Free T4 normal range. Kidney function within normal range.  Will continue current Levothyroxine dose and iron supplement.  Will recheck Free T 4 and Sodium in a few weeks and adjust medication as indicated.    (E05.878A) Noninfected skin tear of right leg, sequela  (primary encounter diagnosis)    Plan: DISCONTINUED: Transparent Dressings (TEGADERM +        PAD 3-1/2\"X6\") MISC            (Z59.3) Living in assisted living      (E03.9) Hypothyroidism, unspecified type          "

## 2024-03-07 ENCOUNTER — NURSING HOME VISIT (OUTPATIENT)
Dept: GERIATRICS | Facility: OTHER | Age: 89
End: 2024-03-07
Attending: NURSE PRACTITIONER
Payer: COMMERCIAL

## 2024-03-07 DIAGNOSIS — J01.00 ACUTE MAXILLARY SINUSITIS, RECURRENCE NOT SPECIFIED: ICD-10-CM

## 2024-03-07 DIAGNOSIS — Z79.899 ENCOUNTER FOR MEDICATION REVIEW: Primary | ICD-10-CM

## 2024-03-07 DIAGNOSIS — S81.811S: ICD-10-CM

## 2024-03-07 DIAGNOSIS — D50.8 OTHER IRON DEFICIENCY ANEMIA: ICD-10-CM

## 2024-03-07 DIAGNOSIS — J30.2 SEASONAL ALLERGIC RHINITIS, UNSPECIFIED TRIGGER: ICD-10-CM

## 2024-03-07 DIAGNOSIS — Z78.9 LIVES IN ASSISTED LIVING FACILITY: ICD-10-CM

## 2024-03-07 DIAGNOSIS — Z00.00 HEALTH CARE MAINTENANCE: ICD-10-CM

## 2024-03-07 PROCEDURE — 99347 HOME/RES VST EST SF MDM 20: CPT | Performed by: NURSE PRACTITIONER

## 2024-03-07 RX ORDER — CETIRIZINE HYDROCHLORIDE 10 MG/1
5 TABLET ORAL AT BEDTIME
Qty: 90 TABLET | Refills: 2 | Status: SHIPPED | OUTPATIENT
Start: 2024-03-07

## 2024-03-07 RX ORDER — MULTIVITAMIN WITH FOLIC ACID 400 MCG
TABLET ORAL
Qty: 90 TABLET | Refills: 3 | Status: SHIPPED | OUTPATIENT
Start: 2024-03-07

## 2024-03-07 RX ORDER — TRANSPARENT DRESSING 3.5"X6"
BANDAGE TOPICAL
Qty: 1 EACH | Refills: 2 | Status: SHIPPED | OUTPATIENT
Start: 2024-03-07

## 2024-03-07 RX ORDER — FERROUS SULFATE 325(65) MG
325 TABLET ORAL DAILY
Qty: 90 TABLET | Refills: 3 | Status: SHIPPED | OUTPATIENT
Start: 2024-03-07

## 2024-03-07 ASSESSMENT — ENCOUNTER SYMPTOMS
ARTHRALGIAS: 1
SHORTNESS OF BREATH: 0
FEVER: 0

## 2024-03-07 NOTE — PROGRESS NOTES
"Marion Ovalles RN reports that Geno's Tegaderm dressings, originally ordered 2/29/24 have not been delivered due to the script \"expiring\".    Order was sent to [facility] and the receipt was confirmed by pharmacy on 2/29/24.  [employer] Employee who specializes in DME is not available today.    Script will be resent, as it was not on Geno's medication list at the pharmacy.    1. Noninfected skin tear of right leg, sequela    - Transparent Dressings (TEGADERM + PAD 3-1/2\"X6\") MISC; Apply to skin tear wound daily after cleanse and PRN if dislodged  Dispense: 1 each; Refill: 2      LARISA Schmidt CNP on 3/7/2024 at 9:50 AM      "

## 2024-03-07 NOTE — PROGRESS NOTES
Assessment & Plan     ICD-10-CM    1. Encounter for medication review  Z79.899       2. Lives in assisted living facility  Z59.3       3. Acute maxillary sinusitis, recurrence not specified  J01.00 cetirizine (ZYRTEC) 10 MG tablet      4. Seasonal allergic rhinitis, unspecified trigger  J30.2 cetirizine (ZYRTEC) 10 MG tablet      5. Other iron deficiency anemia  D50.8 ferrous sulfate (FEROSUL) 325 (65 Fe) MG tablet      6. Health care maintenance  Z00.00 Multiple Vitamin (TAB-A-RADHA) TABS      [place] recently had an independent pharmacist come to review all resident medications and make recommendations in regard to reducing, changing, or discontinuing patient medications.   Pharmacist recommending decreasing cetirizine from 10 mg to 5 mg PO daily, discontinue fish oil, change multivitamin with iron from 8 AM to 12 PM. Pharmacist also recommended changing levothyroxine from 5 AM to 8 AM.    Fish oil discontinued, multivitamin and iron changed to reflect change to 1200 pm dosing. Cetrizine decreased from 10 mg PO HS to 5 mg PO HS.    1. Encounter for medication review      2. Lives in assisted living facility      3. Acute maxillary sinusitis, recurrence not specified    - cetirizine (ZYRTEC) 10 MG tablet; Take 0.5 tablets (5 mg) by mouth at bedtime  Dispense: 90 tablet; Refill: 2    4. Seasonal allergic rhinitis, unspecified trigger    - cetirizine (ZYRTEC) 10 MG tablet; Take 0.5 tablets (5 mg) by mouth at bedtime  Dispense: 90 tablet; Refill: 2    5. Other iron deficiency anemia    - ferrous sulfate (FEROSUL) 325 (65 Fe) MG tablet; Take 1 tablet (325 mg) by mouth daily At 1200 pm  Dispense: 90 tablet; Refill: 3    6. Health care maintenance    - Multiple Vitamin (TAB-A-RADHA) TABS; Take 1 tablet by mouth daily at 1200 pm  Dispense: 90 tablet; Refill: 3           20 minutes spent by me on the date of the encounter doing chart review, history and exam, documentation and further activities per the note         Return  if symptoms worsen or fail to improve.      LARISA Schmidt CNP  [facility]    Review of Systems   Constitutional:  Negative for fever.   Respiratory:  Negative for shortness of breath.    Cardiovascular:  Negative for chest pain.   Musculoskeletal:  Positive for arthralgias and gait problem.   Skin:              Allergic/Immunologic: Positive for environmental allergies.   All other systems reviewed and are negative.

## 2024-03-11 ENCOUNTER — MEDICAL CORRESPONDENCE (OUTPATIENT)
Dept: HEALTH INFORMATION MANAGEMENT | Facility: OTHER | Age: 89
End: 2024-03-11
Payer: COMMERCIAL

## 2024-03-21 ENCOUNTER — MEDICAL CORRESPONDENCE (OUTPATIENT)
Dept: HEALTH INFORMATION MANAGEMENT | Facility: OTHER | Age: 89
End: 2024-03-21
Payer: COMMERCIAL

## 2024-03-26 ENCOUNTER — LAB REQUISITION (OUTPATIENT)
Dept: LAB | Facility: OTHER | Age: 89
End: 2024-03-26
Payer: COMMERCIAL

## 2024-03-26 DIAGNOSIS — E87.1 HYPO-OSMOLALITY AND HYPONATREMIA: ICD-10-CM

## 2024-03-26 DIAGNOSIS — D64.9 ANEMIA, UNSPECIFIED: ICD-10-CM

## 2024-03-26 LAB
ANION GAP SERPL CALCULATED.3IONS-SCNC: 10 MMOL/L (ref 7–15)
BUN SERPL-MCNC: 11.2 MG/DL (ref 8–23)
CALCIUM SERPL-MCNC: 9.1 MG/DL (ref 8.2–9.6)
CHLORIDE SERPL-SCNC: 98 MMOL/L (ref 98–107)
CREAT SERPL-MCNC: 0.67 MG/DL (ref 0.51–0.95)
DEPRECATED HCO3 PLAS-SCNC: 27 MMOL/L (ref 22–29)
EGFRCR SERPLBLD CKD-EPI 2021: 79 ML/MIN/1.73M2
GLUCOSE SERPL-MCNC: 80 MG/DL (ref 70–99)
HOLD SPECIMEN: NORMAL
POTASSIUM SERPL-SCNC: 4.4 MMOL/L (ref 3.4–5.3)
SODIUM SERPL-SCNC: 135 MMOL/L (ref 135–145)
T4 FREE SERPL-MCNC: 1.03 NG/DL (ref 0.9–1.7)

## 2024-03-26 PROCEDURE — 84439 ASSAY OF FREE THYROXINE: CPT | Performed by: NURSE PRACTITIONER

## 2024-03-26 PROCEDURE — 80048 BASIC METABOLIC PNL TOTAL CA: CPT | Performed by: NURSE PRACTITIONER

## 2024-03-26 PROCEDURE — 36415 COLL VENOUS BLD VENIPUNCTURE: CPT | Performed by: NURSE PRACTITIONER

## 2024-04-10 DIAGNOSIS — M15.0 PRIMARY OSTEOARTHRITIS INVOLVING MULTIPLE JOINTS: ICD-10-CM

## 2024-04-15 NOTE — TELEPHONE ENCOUNTER
[facility]  sent Rx request for the following:      Requested Prescriptions   Pending Prescriptions Disp Refills    acetaminophen (TYLENOL) 650 MG CR tablet [Pharmacy Med Name: ACETAMINOPHEN  MG TAB] 120 tablet 11     Sig: TAKE 1 TABLET BY MOUTH THREE TIMES A DAY TAKE 1 TABLET BY MOUTH TWICE DAILY AS NEEDED       Analgesics (Non-Narcotic Tylenol and ASA Only) Failed - 4/10/2024  3:13 PM        Failed - Medication matches indication     Acetaminophen is associated with one or more of the following diagnoses:  Pain  Fever            Last Prescription Date:   1/25/2024  Last Fill Qty/Refills:         84, R-2    Last Office Visit:              3/7/2024   Future Office visit:           None    Camille HART.......4/15/04315:20 PM

## 2024-04-16 RX ORDER — SENNOSIDES 8.6 MG
CAPSULE ORAL
Qty: 84 TABLET | Refills: 2 | Status: SHIPPED | OUTPATIENT
Start: 2024-04-16 | End: 2024-07-10

## 2024-04-18 DIAGNOSIS — M15.0 PRIMARY OSTEOARTHRITIS INVOLVING MULTIPLE JOINTS: ICD-10-CM

## 2024-04-18 DIAGNOSIS — M48.061 SPINAL STENOSIS OF LUMBAR REGION, UNSPECIFIED WHETHER NEUROGENIC CLAUDICATION PRESENT: ICD-10-CM

## 2024-04-18 DIAGNOSIS — M15.9 OSTEOARTHRITIS OF MULTIPLE JOINTS, UNSPECIFIED OSTEOARTHRITIS TYPE: Primary | ICD-10-CM

## 2024-04-18 NOTE — PROGRESS NOTES
Geno is requesting a topical arthritis pain-relief cream to self administer and use PRN.      1. Osteoarthritis of multiple joints, unspecified osteoarthritis type    - Menthol, Topical Analgesic, 4 % GEL; Externally apply topically 3 times daily as needed (pain relief for minor muscle/joint aches) OKAY TO KEEP AT BEDSIDE  Dispense: 150 mL; Refill: 3    2. Primary osteoarthritis involving multiple joints    - Menthol, Topical Analgesic, 4 % GEL; Externally apply topically 3 times daily as needed (pain relief for minor muscle/joint aches) OKAY TO KEEP AT BEDSIDE  Dispense: 150 mL; Refill: 3    3. Spinal stenosis of lumbar region, unspecified whether neurogenic claudication present    - Menthol, Topical Analgesic, 4 % GEL; Externally apply topically 3 times daily as needed (pain relief for minor muscle/joint aches) OKAY TO KEEP AT BEDSIDE  Dispense: 150 mL; Refill: 3      LARISA Schmidt CNP on 4/18/2024 at 11:37 AM

## 2024-05-23 ENCOUNTER — NURSING HOME VISIT (OUTPATIENT)
Dept: GERIATRICS | Facility: OTHER | Age: 89
End: 2024-05-23
Attending: NURSE PRACTITIONER
Payer: COMMERCIAL

## 2024-05-23 ENCOUNTER — APPOINTMENT (OUTPATIENT)
Dept: LAB | Facility: OTHER | Age: 89
End: 2024-05-23
Attending: NURSE PRACTITIONER
Payer: COMMERCIAL

## 2024-05-23 DIAGNOSIS — R32 URINARY INCONTINENCE, UNSPECIFIED TYPE: ICD-10-CM

## 2024-05-23 DIAGNOSIS — H04.123 DRY EYES: ICD-10-CM

## 2024-05-23 DIAGNOSIS — N30.00 ACUTE CYSTITIS WITHOUT HEMATURIA: Primary | ICD-10-CM

## 2024-05-23 DIAGNOSIS — F02.80 ALZHEIMER'S DISEASE (H): ICD-10-CM

## 2024-05-23 DIAGNOSIS — Z78.9 LIVING IN ASSISTED LIVING: ICD-10-CM

## 2024-05-23 DIAGNOSIS — R39.9 URINARY TRACT INFECTION SYMPTOMS: ICD-10-CM

## 2024-05-23 DIAGNOSIS — R41.0 ACUTE CONFUSION: ICD-10-CM

## 2024-05-23 DIAGNOSIS — G30.9 ALZHEIMER'S DISEASE (H): ICD-10-CM

## 2024-05-23 DIAGNOSIS — R30.0 DYSURIA: ICD-10-CM

## 2024-05-23 LAB
ALBUMIN UR-MCNC: NEGATIVE MG/DL
APPEARANCE UR: ABNORMAL
BACTERIA #/AREA URNS HPF: ABNORMAL /HPF
BILIRUB UR QL STRIP: NEGATIVE
COLOR UR AUTO: YELLOW
GLUCOSE UR STRIP-MCNC: NEGATIVE MG/DL
HGB UR QL STRIP: NEGATIVE
KETONES UR STRIP-MCNC: NEGATIVE MG/DL
LEUKOCYTE ESTERASE UR QL STRIP: ABNORMAL
MUCOUS THREADS #/AREA URNS LPF: PRESENT /LPF
NITRATE UR QL: POSITIVE
PH UR STRIP: 6 [PH] (ref 5–9)
RBC URINE: 3 /HPF
SP GR UR STRIP: 1.01 (ref 1–1.03)
SQUAMOUS EPITHELIAL: 4 /HPF
UROBILINOGEN UR STRIP-MCNC: NORMAL MG/DL
WBC URINE: 39 /HPF

## 2024-05-23 PROCEDURE — 99348 HOME/RES VST EST LOW MDM 30: CPT | Performed by: NURSE PRACTITIONER

## 2024-05-23 RX ORDER — GLIPIZIDE 10 MG/1
1 TABLET ORAL 3 TIMES DAILY
Qty: 30 ML | Refills: 3 | Status: SHIPPED | OUTPATIENT
Start: 2024-05-23

## 2024-05-23 RX ORDER — AMOXICILLIN AND CLAVULANATE POTASSIUM 500; 125 MG/1; MG/1
1 TABLET, FILM COATED ORAL 2 TIMES DAILY
Qty: 14 TABLET | Refills: 0 | Status: SHIPPED | OUTPATIENT
Start: 2024-05-23 | End: 2024-05-30

## 2024-05-23 ASSESSMENT — ENCOUNTER SYMPTOMS
ARTHRALGIAS: 1
CONFUSION: 1
SHORTNESS OF BREATH: 0
DYSURIA: 1
FEVER: 0
CHILLS: 0

## 2024-05-23 NOTE — PROGRESS NOTES
"Geno Horn is a 89 year old female being seen today for acute visit at [facility].    Assessment & Plan       ICD-10-CM    1. Acute cystitis without hematuria  N30.00 amoxicillin-clavulanate (AUGMENTIN) 500-125 MG tablet      2. Dysuria  R30.0 UA with Microscopic reflex to Culture     Urine Culture      3. Urinary tract infection symptoms  R39.9 UA with Microscopic reflex to Culture     Urine Culture      4. Acute confusion  R41.0 UA with Microscopic reflex to Culture     Urine Culture      5. Urinary incontinence, unspecified type  R32 UA with Microscopic reflex to Culture     Urine Culture      6. Alzheimer's disease (H)  G30.9     F02.80       7. Living in assisted living  Z59.3       8. Dry eyes  H04.123 artificial tears (GENTEAL) 0.1-0.2-0.3 % ophthalmic solution        [place] staff bring forth concerns of dysuria, malodorous urine (change from baseline), and increased confusion. This morning staff noted that Geno was holding for her hearing aid remote and trying to use it as nasal spray, and using her nasal spray as the remote for her hearing aids. Geno has Alzheimer's dementia, but is not commonly confused at baseline. Staff also report very malodorous urine that is different than Geno's baseline. She is also complaining of burning with urination.  Chart review shows that and last urinalysis was from February 4, 2024. Her urine did culture out > 100,000 CFU/mL E.Coli. She was treated with Keflex 500 mg PO BID times seven days. She finished her course of treatment and has not had any acute urinary symptoms since that time.    Today on exam, Geno is seated in her wheelchair in her room. She endorses lower abdominal \"cramping\" and right lower back pain. She is drinking adequate fluids but \"doesn't feel myself\". She turned down going outside to help plant flowers, when normally, this would be an activity she enjoys.     UA/UC order placed in Epic.     - Physical exam and UA indicative of acute cystitis " without hematuria   - Will add on urine culture and adjust treatment as indicated  - Will treat with antibiotics; Augmentin 500-125 mg tablets, take 1 tablet PO BID x 7 days.  - Encouraged intake of probiotics/yogurt while on antibiotics and for 1 week after  - Educated on prevention including urination after intercourse, avoidance of bubble baths, douching, scented underware/pads/cosemetics  - May drink cranberry juice to help prevent  - Return/call as needed for follow-up should any new symptoms develop, for worsening of current symptoms or if symptoms do not resolve with above plan.     Marion Ovalles is in need of refill for Genteal eye drops. Refill sent today.      Geno brings forth no other concerns during our visit today.     Discussed signs/ symptoms that would warrant urgent/ emergent follow-up. Patient in agreement with plan. All questions and concerns addressed this visit.       38 minutes spent by me on the date of the encounter doing chart review, history and exam, documentation and further activities per the note      Return if symptoms worsen or fail to improve.    LARISA Schmidt [facility]     Code Status: DNR / DNI.   Health Care Power of : Extended Emergency Contact Information  Primary Emergency Contact: Daniel Horn  Address: [address]           [place] [address] [facility]  Mobile Phone: [phone]  Relation: Son  Secondary Emergency Contact: Adri Chaudhary  Address: [address] [address] [facility]  Home Phone: [phone]  Mobile Phone: [phone]  Relation: Daughter     Allergies: Alendronate, Celebrex [celecoxib], Other environmental allergy, Oxybutynin, Tolterodine, Tramadol, and Trospium     Past Medical, Surgical, Family and Social History reviewed: YES.     Medications:   Current Outpatient Medications   Medication Sig Dispense Refill    amoxicillin-clavulanate (AUGMENTIN) 500-125 MG tablet Take 1 tablet by  "mouth 2 times daily for 7 days 14 tablet 0    artificial tears (GENTEAL) 0.1-0.2-0.3 % ophthalmic solution Place 1 drop into both eyes 3 times daily 30 mL 3    acetaminophen (TYLENOL) 650 MG CR tablet TAKE 1 TABLET BY MOUTH THREE TIMES A DAY TAKE 1 TABLET BY MOUTH TWICE DAILY AS NEEDED 84 tablet 2    Adhesive Tape (MEDIPORE H SURGICAL 2\"X10YD) TAPE MEDIPORE H 2 x10yds TAPE ITEM #[id number] DX L98.9 [facility]  3ROLLS/30DS      cetirizine (ZYRTEC) 10 MG tablet Take 0.5 tablets (5 mg) by mouth at bedtime 90 tablet 2    cyanocobalamin (VITAMIN B-12) 500 MCG tablet TAKE 1 TABLET BY MOUTH ONCE DAILY 90 tablet 3    DEEP SEA NASAL SPRAY 0.65 % nasal spray SPRAY 2 SPRAYS INTO EACH NOSTRIL AS NEEDED 44 mL 11    ferrous sulfate (FEROSUL) 325 (65 Fe) MG tablet Take 1 tablet (325 mg) by mouth daily At 1200 pm 90 tablet 3    fluticasone (FLONASE) 50 MCG/ACT nasal spray USE 2 SPRAYS IN EACH NOSTRIL ONCE A DAY 16 g 11    Incontinence Supply Disposable (DEPEND EASY FIT UNDERGARMENTS) MISC Medium size disposable under pants covered by insurance 100 each 11    Incontinence Supply Disposable (POISE PAD) PADS Change pad 3 x daily, #6 long Maxi pads 90 each 11    levothyroxine (SYNTHROID/LEVOTHROID) 25 MCG tablet TAKE 1/2 TABLET (12.5MCG) BY MOUTH ONCE DAILY (TAKE ALONG WITH 50MCG = 62.5MCG) 90 tablet 3    levothyroxine (SYNTHROID/LEVOTHROID) 50 MCG tablet TAKE 1 TABLET BY MOUTH ONCE DAILY (TAKE ALONG WITH 12.5MCG = 62.5MCG) 90 tablet 3    magnesium hydroxide (MILK OF MAGNESIA) 400 MG/5ML suspension TAKE 15ML BY MOUTH ONCE DAILY AS NEEDED FOR NO BM IN 3 DAYS 473 mL 11    meclizine (ANTIVERT) 12.5 MG tablet Take 1 tablet (12.5 mg) by mouth 3 times daily as needed for dizziness 90 tablet 1    Menthol, Topical Analgesic, 4 % GEL Externally apply topically 3 times daily as needed (pain relief for minor muscle/joint aches) OKAY TO KEEP AT BEDSIDE 150 mL 3    Multiple Vitamin (TAB-A-RADHA) TABS Take 1 tablet by mouth daily at 1200 pm 90 tablet 3    " "mupirocin (BACTROBAN) 2 % external ointment Apply to skin tear wound with dressing changes and to left facial lesion BID x 2 weeks 30 g 1    ondansetron (ZOFRAN) 4 MG tablet TAKE 1 TABLET BY MOUTH EVERY 8 HOURS AS NEEDED (NAUSEA) (Patient not taking: Reported on 2/3/2024) 12 tablet 5    order for DME Equipment being ordered: Spikes for cane 1 Units 1    order for DME Equipment being ordered: plastic underpants 1 Units 1    senna-docusate (SENOKOT-S/PERICOLACE) 8.6-50 MG tablet 2 tabs in the morning and 1 tab at HS 90 tablet 11    Transparent Dressings (TEGADERM + PAD 3-1/2\"X6\") MISC Apply to skin tear wound daily after cleanse and PRN if dislodged 1 each 2    Vitamins A & D (MEDPURA VITAMIN A & D) OINT APPLY TOPICALLY TO AFFECTED AREA(S) TWICE DAILY APPLY TOPICALLY TO AFFECTED AREA(S) AS NEEDED 113 g 3    XIIDRA 5 % opthalmic solution Place 1 drop into both eyes 2 times daily           Review of Systems   Constitutional:  Negative for chills and fever.   Respiratory:  Negative for shortness of breath.    Cardiovascular:  Negative for chest pain.   Genitourinary:  Positive for dysuria.        Malodorous urine   Musculoskeletal:  Positive for arthralgias and gait problem.   Skin:         \"Lump\" noted to left lower neck     Allergic/Immunologic: Positive for environmental allergies.   Psychiatric/Behavioral:  Positive for confusion (increased from baseline).    All other systems reviewed and are negative.      Toileting:    Continent of Bowel: Yes   Continent of Bladder: Yes  Mobility: wheelchair    Recent Labs: None    Current Therapies: none    Physical Exam  Constitutional:       Appearance: Normal appearance.   Cardiovascular:      Pulses: Normal pulses.   Pulmonary:      Effort: Pulmonary effort is normal.   Abdominal:      Tenderness: There is abdominal tenderness (lower abdominal \"cramping\"). There is right CVA tenderness. There is no left CVA tenderness.   Musculoskeletal:      Cervical back: Normal range of " motion.   Neurological:      Mental Status: She is alert. Mental status is at baseline.      Gait: Gait abnormal (uses wheelchair).   Psychiatric:         Mood and Affect: Mood normal.         Behavior: Behavior normal.         Thought Content: Thought content normal.

## 2024-05-25 LAB — BACTERIA UR CULT: ABNORMAL

## 2024-05-30 RX ORDER — GLIPIZIDE 10 MG/1
TABLET ORAL
Qty: 15 ML | Refills: 11 | OUTPATIENT
Start: 2024-05-30

## 2024-05-30 NOTE — TELEPHONE ENCOUNTER
[facility] sent Rx request for the following:      Requested Prescriptions   Pending Prescriptions Disp Refills    artificial tears (GENTEAL) 0.1-0.2-0.3 % ophthalmic solution [Pharmacy Med Name: GENTEAL MOD EYE DROP] 15 mL 11     Sig: INSTILL 1 DROP INTO BOTH EYES THREE TIMES DAILY     Approved 5/23/24 for #30 ml and 3 additional refills. Pharmacy notified. Karen Simon RN .............. 5/30/2024  9:12 AM

## 2024-06-06 DIAGNOSIS — Z86.19: ICD-10-CM

## 2024-06-06 DIAGNOSIS — B35.3 TINEA PEDIS OF BOTH FEET: Primary | ICD-10-CM

## 2024-06-06 RX ORDER — TOLNAFTATE 1 G/100G
POWDER TOPICAL 2 TIMES DAILY PRN
Qty: 90 G | Refills: 2 | Status: SHIPPED | OUTPATIENT
Start: 2024-06-06

## 2024-06-06 NOTE — PROGRESS NOTES
1. Tinea pedis of both feet    - tolnaftate (TINACTIN) 1 % external powder; Apply topically 2 times daily as needed for itching or irritation OKAY TO KEEP BEDSIDE  Dispense: 90 g; Refill: 2    2. H/O athlete's foot    - tolnaftate (TINACTIN) 1 % external powder; Apply topically 2 times daily as needed for itching or irritation OKAY TO KEEP BEDSIDE  Dispense: 90 g; Refill: 2      LARISA Schmidt CNP on 6/6/2024 at 12:33 PM

## 2024-06-13 ENCOUNTER — NURSING HOME VISIT (OUTPATIENT)
Dept: GERIATRICS | Facility: OTHER | Age: 89
End: 2024-06-13
Attending: NURSE PRACTITIONER
Payer: COMMERCIAL

## 2024-06-13 DIAGNOSIS — M48.061 SPINAL STENOSIS OF LUMBAR REGION, UNSPECIFIED WHETHER NEUROGENIC CLAUDICATION PRESENT: ICD-10-CM

## 2024-06-13 DIAGNOSIS — S81.811A SKIN TEAR OF RIGHT LOWER LEG WITHOUT COMPLICATION, INITIAL ENCOUNTER: Primary | ICD-10-CM

## 2024-06-13 DIAGNOSIS — R26.81 GENERAL UNSTEADINESS: ICD-10-CM

## 2024-06-13 DIAGNOSIS — D23.9 DILATED PORE OF WINER: ICD-10-CM

## 2024-06-13 DIAGNOSIS — R26.81 UNSTEADY GAIT: ICD-10-CM

## 2024-06-13 DIAGNOSIS — Z78.9 LIVING IN ASSISTED LIVING: ICD-10-CM

## 2024-06-13 DIAGNOSIS — Z99.3 WHEELCHAIR DEPENDENCE: ICD-10-CM

## 2024-06-13 DIAGNOSIS — F02.80 ALZHEIMER'S DISEASE (H): ICD-10-CM

## 2024-06-13 DIAGNOSIS — M19.079 OSTEOARTHRITIS OF FOOT, UNSPECIFIED LATERALITY, UNSPECIFIED OSTEOARTHRITIS TYPE: ICD-10-CM

## 2024-06-13 DIAGNOSIS — G30.9 ALZHEIMER'S DISEASE (H): ICD-10-CM

## 2024-06-13 DIAGNOSIS — M15.0 PRIMARY OSTEOARTHRITIS INVOLVING MULTIPLE JOINTS: ICD-10-CM

## 2024-06-13 DIAGNOSIS — M16.0 PRIMARY OSTEOARTHRITIS OF BOTH HIPS: ICD-10-CM

## 2024-06-13 PROCEDURE — 99348 HOME/RES VST EST LOW MDM 30: CPT | Performed by: NURSE PRACTITIONER

## 2024-06-13 ASSESSMENT — ENCOUNTER SYMPTOMS
FEVER: 0
ARTHRALGIAS: 1
CHILLS: 0
SHORTNESS OF BREATH: 0

## 2024-06-13 NOTE — PROGRESS NOTES
"Geno Horn is a 89 year old female being seen today for acute visit at [facility].    Assessment & Plan       ICD-10-CM    1. Skin tear of right lower leg without complication, initial encounter  S81.811A Home Care Referral      2. Dilated pore of Meghan  D23.9       3. Primary osteoarthritis of both hips  M16.0 Home Care Referral     Electric Wheelchair Order for DME with OT or PT Referral      4. Unsteady gait  R26.81 Home Care Referral     Electric Wheelchair Order for DME with OT or PT Referral      5. General unsteadiness  R26.81 Home Care Referral     Electric Wheelchair Order for DME with OT or PT Referral      6. Alzheimer's disease (H)  G30.9 Home Care Referral    F02.80 Electric Wheelchair Order for DME with OT or PT Referral      7. Osteoarthritis of foot, unspecified laterality, unspecified osteoarthritis type  M19.079 Home Care Referral     Electric Wheelchair Order for DME with OT or PT Referral      8. Spinal stenosis of lumbar region, unspecified whether neurogenic claudication present  M48.061 Home Care Referral     Electric Wheelchair Order for DME with OT or PT Referral      9. Wheelchair dependence  Z99.3 Electric Wheelchair Order for DME with OT or PT Referral      10. Primary osteoarthritis involving multiple joints  M15.9 Home Care Referral     Electric Wheelchair Order for DME with OT or PT Referral      11. Living in assisted living  Z59.3       [place] staff bring forth concerns of repeated bruising and skin tears to bilateral LE's due to poor fitting wheelchair. Staff also bring forth concerns of \"boil\" on Geno's right breast.    Geno currently has a standard wheelchair which has duct tape to bilateral foot pedal insertion sites. Staff note that she will often bump or scrape her legs on this area resulting in repeated bruising and superficial skin tears.  Today on exam, there is noted to be a 1 cm x 1 cm superficial skin tear noted just below and's right knee. Prior to examination " the area is covered with a bandage however drainage can be seen. After careful removal of bandage, there was noted to be some losing of blood. No surrounding erythema, purulent drainage, or boggy skin texture noted. She does also have generalized bruising noted more so to the right lower thigh, knee, and shin areas. Both of her compression stockings are in place.    Skin tear cleansed with wound cleanser, small amount of mupirocin applied as well as a foam border dressing due to oozing of blood. Staff was instructed to remove this after three days, as the oozing should cease.    Geno would benefit from a seated and wheeled mobility assessment for a custom wheelchair as she is experiencing ongoing skin tears and bruising from her current chair. She would also like to work with physical therapy for transfer training, therapeutic exercise, and range of motion exercises. Skilled nursing would be able to provide wound care, if they deem necessary.       [place] staff also bring forth concerns of a boil noted to Geno's right breast. The area in question is consistent with a dilated pore of Meghan. Gentle pressure applied to try and expel some of the matter, however no matter or drainage was able to be expelled. There is no surrounding erythema or purulent drainage noted. I explained that these are quite common and there is no need for removal unless signs or symptoms of infection occur, such as erythema, purulent drainage, fever, or chills.    Geno brings forth no other concerns during our visit today.     Discussed signs/ symptoms that would warrant urgent/ emergent follow-up. Patient in agreement with plan. All questions and concerns addressed this visit.       30 minutes spent by me on the date of the encounter doing chart review, history and exam, documentation and further activities per the note    Documentation of Face to Face and Certification for Home Health Services     I attest that I saw or will see Geno FREY  Kory on this date:  6/13/2024     This encounter with the patient was in whole, or in part, for medical condition, which is the primary reason for Home Health Care:       Patient Active Problem List   Diagnosis    Allergic rhinitis    Coronary atherosclerosis of native coronary artery    H/O basal cell carcinoma excision    Hearing loss    Essential hypertension    Hypothyroidism    Osteoarthritis of both hips    Urinary urgency    Unsteady gait    Sepsis (H)    Spinal stenosis of lumbar region, unspecified whether neurogenic claudication present    Pneumonia due to 2019 novel coronavirus    Hyponatremia    COVID-19 virus infection    Falls frequently    Contusion of right hip, initial encounter    General unsteadiness    Closed wedge compression fracture of T9 vertebra with routine healing, subsequent encounter    History of 2019 novel coronavirus disease (COVID-19)    Skin tear of left upper extremity    Multiple skin tears    H/O dizziness    Sciatica    Hyperlipidemia    Osteoarthritis of foot joint    Osteoporosis    Urinary incontinence    Periprosthetic fracture around internal prosthetic hip joint, initial encounter    Alzheimer's disease (H)      I certify that, based on my findings, the following services are medically necessary Home Health Services: Skilled Nursing  Physical Therapy Wound assessment and care  Range of Motion  Therapeutic Exercise  Transfer Training     Additional services needed:        Further, I certify that my clinical findings support that this patient is homebound (i.e. absences from home require considerable and taxing effort and are for medical reasons or [employer] services or infrequently or of short duration when for other reasons) related to:Patient has difficulty ambulating >100 ft  Requires assistance of another person or specialized equipment is needed     Based on the above findings, I certify that this patient is confined to the home and needs intermittent skilled nursing  "care, physical therapy and/or speech therapy. The patient is under my care, and I have initiated the establishment of the plan of care. This patient will be followed by a physician who will periodically review the plan of care.        Physician/Provider to provide follow up care: Provider to follow patient: MEGAN ORTIZ    Return if symptoms worsen or fail to improve.    LARISA Schmidt [facility]     Code Status: DNR / DNI.   Health Care Power of : Extended Emergency Contact Information  Primary Emergency Contact: Daniel Horn  Address: [address]           [place] [address] [facility] [employer]  Mobile Phone: [phone]  Relation: Son  Secondary Emergency Contact: NeenaAdri  Address: [address] [place] [address] [facility]  Home Phone: [phone]  Mobile Phone: [phone]  Relation: Daughter     Allergies: Alendronate, Celebrex [celecoxib], Other environmental allergy, Oxybutynin, Tolterodine, Tramadol, and Trospium     Past Medical, Surgical, Family and Social History reviewed: YES.     Medications:   Current Outpatient Medications   Medication Sig Dispense Refill    acetaminophen (TYLENOL) 650 MG CR tablet TAKE 1 TABLET BY MOUTH THREE TIMES A DAY TAKE 1 TABLET BY MOUTH TWICE DAILY AS NEEDED 84 tablet 2    Adhesive Tape (MEDIPORE H SURGICAL 2\"X10YD) TAPE MEDIPORE H 2 x10yds TAPE ITEM #2862 DX L98.9 [facility]  3ROLLS/30DS      artificial tears (GENTEAL) 0.1-0.2-0.3 % ophthalmic solution Place 1 drop into both eyes 3 times daily 30 mL 3    cetirizine (ZYRTEC) 10 MG tablet Take 0.5 tablets (5 mg) by mouth at bedtime 90 tablet 2    cyanocobalamin (VITAMIN B-12) 500 MCG tablet TAKE 1 TABLET BY MOUTH ONCE DAILY 90 tablet 3    DEEP SEA NASAL SPRAY 0.65 % nasal spray SPRAY 2 SPRAYS INTO EACH NOSTRIL AS NEEDED 44 mL 11    ferrous sulfate (FEROSUL) 325 (65 Fe) MG tablet Take 1 tablet (325 mg) by mouth daily At 1200 pm 90 tablet 3    fluticasone " "(FLONASE) 50 MCG/ACT nasal spray USE 2 SPRAYS IN EACH NOSTRIL ONCE A DAY 16 g 11    Incontinence Supply Disposable (DEPEND EASY FIT UNDERGARMENTS) MISC Medium size disposable under pants covered by insurance 100 each 11    Incontinence Supply Disposable (POISE PAD) PADS Change pad 3 x daily, #6 long Maxi pads 90 each 11    levothyroxine (SYNTHROID/LEVOTHROID) 25 MCG tablet TAKE 1/2 TABLET (12.5MCG) BY MOUTH ONCE DAILY (TAKE ALONG WITH 50MCG = 62.5MCG) 90 tablet 3    levothyroxine (SYNTHROID/LEVOTHROID) 50 MCG tablet TAKE 1 TABLET BY MOUTH ONCE DAILY (TAKE ALONG WITH 12.5MCG = 62.5MCG) 90 tablet 3    magnesium hydroxide (MILK OF MAGNESIA) 400 MG/5ML suspension TAKE 15ML BY MOUTH ONCE DAILY AS NEEDED FOR NO BM IN 3 DAYS 473 mL 11    meclizine (ANTIVERT) 12.5 MG tablet Take 1 tablet (12.5 mg) by mouth 3 times daily as needed for dizziness 90 tablet 1    Menthol, Topical Analgesic, 4 % GEL Externally apply topically 3 times daily as needed (pain relief for minor muscle/joint aches) OKAY TO KEEP AT BEDSIDE 150 mL 3    Multiple Vitamin (TAB-A-RADHA) TABS Take 1 tablet by mouth daily at 1200 pm 90 tablet 3    mupirocin (BACTROBAN) 2 % external ointment Apply to skin tear wound with dressing changes and to left facial lesion BID x 2 weeks 30 g 1    ondansetron (ZOFRAN) 4 MG tablet TAKE 1 TABLET BY MOUTH EVERY 8 HOURS AS NEEDED (NAUSEA) (Patient not taking: Reported on 2/3/2024) 12 tablet 5    order for DME Equipment being ordered: Spikes for cane 1 Units 1    order for DME Equipment being ordered: plastic underpants 1 Units 1    senna-docusate (SENOKOT-S/PERICOLACE) 8.6-50 MG tablet 2 tabs in the morning and 1 tab at HS 90 tablet 11    tolnaftate (TINACTIN) 1 % external powder Apply topically 2 times daily as needed for itching or irritation OKAY TO KEEP BEDSIDE 90 g 2    Transparent Dressings (TEGADERM + PAD 3-1/2\"X6\") MISC Apply to skin tear wound daily after cleanse and PRN if dislodged 1 each 2    Vitamins A & D (MEDPURA " "VITAMIN A & D) OINT APPLY TOPICALLY TO AFFECTED AREA(S) TWICE DAILY APPLY TOPICALLY TO AFFECTED AREA(S) AS NEEDED 113 g 3    XIIDRA 5 % opthalmic solution Place 1 drop into both eyes 2 times daily           Review of Systems   Constitutional:  Negative for chills and fever.   Respiratory:  Negative for shortness of breath.    Cardiovascular:  Negative for chest pain.   Genitourinary:         Malodorous urine   Musculoskeletal:  Positive for arthralgias and gait problem.   Skin:         \"Boil\" noted to right breast   Allergic/Immunologic: Positive for environmental allergies.   Neurological:  Positive for weakness.   Psychiatric/Behavioral:  Positive for confusion (baseline).    All other systems reviewed and are negative.      Toileting:    Continent of Bowel: Yes   Continent of Bladder: Yes  Mobility: wheelchair    Recent Labs: None    Current Therapies: none    Physical Exam  Constitutional:       Appearance: Normal appearance.   Cardiovascular:      Pulses: Normal pulses.   Pulmonary:      Effort: Pulmonary effort is normal.   Musculoskeletal:      Cervical back: Normal range of motion.   Skin:     General: Skin is warm and dry.      Findings: Abrasion (1 cm x 1 cm superficial skin tear, below right knee. No surrounding erythema. Scant sanguinous drainage noted) and bruising (RLE) present.      Comments: Dilated pore of Meghan noted to left breast.   Neurological:      Mental Status: She is alert. Mental status is at baseline.      Gait: Gait abnormal (uses wheelchair).   Psychiatric:         Mood and Affect: Mood normal.         Behavior: Behavior normal.         Thought Content: Thought content normal.                 "

## 2024-06-17 ASSESSMENT — ENCOUNTER SYMPTOMS
CONFUSION: 1
WEAKNESS: 1

## 2024-06-29 DIAGNOSIS — L25.8 DERMATITIS ASSOCIATED WITH MOISTURE FROM URINARY INCONTINENCE: ICD-10-CM

## 2024-06-29 DIAGNOSIS — R32 DERMATITIS ASSOCIATED WITH MOISTURE FROM URINARY INCONTINENCE: ICD-10-CM

## 2024-07-05 DIAGNOSIS — M15.0 PRIMARY OSTEOARTHRITIS INVOLVING MULTIPLE JOINTS: ICD-10-CM

## 2024-07-05 NOTE — TELEPHONE ENCOUNTER
[facility] sent Rx request for the following:      Requested Prescriptions   Pending Prescriptions Disp Refills    Vitamins A & D (MEDPURA VITAMIN A & D) OINT [Pharmacy Med Name: VITAMIN A & D ONT]  5     Sig: APPLY TOPICALLY TO AFFECTED AREA(S) TWICE DAILY APPLY TOPICALLY TO AFFECTED AREA(S) AS NEEDED       There is no refill protocol information for this order        Last Prescription Date:   1/11/24  Last Fill Qty/Refills:         113 g, R-3    Last Office Visit:              6/13/24 (NH Visit, EMBER Frey)   Future Office visit:           None    Unable to complete prescription refill per RN Medication Refill Policy. Karen Simon RN .............. 7/5/2024  11:19 AM

## 2024-07-08 ENCOUNTER — TELEPHONE (OUTPATIENT)
Dept: GERIATRICS | Facility: OTHER | Age: 89
End: 2024-07-08
Payer: COMMERCIAL

## 2024-07-08 RX ORDER — MEDPURA VITAMIN A AND D 95 G/100G
OINTMENT TOPICAL
Qty: 113 G | Refills: 5 | Status: SHIPPED | OUTPATIENT
Start: 2024-07-08

## 2024-07-08 NOTE — TELEPHONE ENCOUNTER
Home Care received your referral. We are unable to start services within 48 hours of the referral. We are requesting OK to begin services on ....7/9/24......... The patient is aware and agreeable.    Barbara Craft LPN on 7/8/2024 at 3:42 PM

## 2024-07-09 PROCEDURE — G0180 MD CERTIFICATION HHA PATIENT: HCPCS | Performed by: NURSE PRACTITIONER

## 2024-07-09 NOTE — TELEPHONE ENCOUNTER
I agree with the Home Care start date as shown below.  LARISA Schmidt CNP on 7/9/2024 at 9:36 AM

## 2024-07-10 ENCOUNTER — TELEPHONE (OUTPATIENT)
Dept: GERIATRICS | Facility: OTHER | Age: 89
End: 2024-07-10
Payer: COMMERCIAL

## 2024-07-10 RX ORDER — SENNOSIDES 8.6 MG
CAPSULE ORAL
Qty: 84 TABLET | Refills: 11 | Status: SHIPPED | OUTPATIENT
Start: 2024-07-10

## 2024-07-10 NOTE — TELEPHONE ENCOUNTER
[facility] sent Rx request for the following:      Requested Prescriptions   Pending Prescriptions Disp Refills    acetaminophen (TYLENOL) 650 MG CR tablet [Pharmacy Med Name: ACETAMINOPHEN  MG TAB] 84 tablet 11     Sig: TAKE 1 TABLET BY MOUTH THREE TIMES A DAY TAKE 1 TABLET BY MOUTH TWICE DAILY AS NEEDED       Analgesics (Non-Narcotic Tylenol and ASA Only) Failed - 7/10/2024  9:09 AM   Last Prescription Date:   4/16/24  Last Fill Qty/Refills:         84, R-2    Last Office Visit:              6/13/24 [place]   Future Office visit:            none    Mary Ellen Santos RN on 7/10/2024 at 9:10 AM

## 2024-07-10 NOTE — TELEPHONE ENCOUNTER
The physical therapy evaluation and treatment that was ordered was completed on 7/9/10      Request for additional Home Care Physical Therapy orders as follows:      Continuation frequency =   ___1___  x week x  ___1___ week(s)    Effective date = 7/10/24      Continuation frequency =   ___2___  x week x  ___8___ week(s)    Effective date = 7/15/24            Therapist: Mauro Tello PT    Please respond with .HOMECAREAGREE, if you are in agreement. Please sign with your comments and signature, if you are not in agreement.

## 2024-07-12 ENCOUNTER — MEDICAL CORRESPONDENCE (OUTPATIENT)
Dept: HEALTH INFORMATION MANAGEMENT | Facility: OTHER | Age: 89
End: 2024-07-12
Payer: COMMERCIAL

## 2024-07-12 DIAGNOSIS — G89.29 OTHER CHRONIC PAIN: Primary | ICD-10-CM

## 2024-07-18 ENCOUNTER — TELEPHONE (OUTPATIENT)
Dept: FAMILY MEDICINE | Facility: OTHER | Age: 89
End: 2024-07-18
Payer: COMMERCIAL

## 2024-07-18 DIAGNOSIS — M15.0 PRIMARY OSTEOARTHRITIS INVOLVING MULTIPLE JOINTS: Primary | ICD-10-CM

## 2024-07-18 NOTE — TELEPHONE ENCOUNTER
Johana Shante, NP was given home care or hospice form(s) for review and signature. Certification period effective 7/9/2024 to 9/6/2024.  Yana Butler RN on 7/18/2024 at 10:34 AM

## 2024-08-12 ENCOUNTER — TELEPHONE (OUTPATIENT)
Dept: FAMILY MEDICINE | Facility: OTHER | Age: 89
End: 2024-08-12

## 2024-08-12 ENCOUNTER — LAB REQUISITION (OUTPATIENT)
Dept: LAB | Facility: OTHER | Age: 89
End: 2024-08-12
Payer: COMMERCIAL

## 2024-08-12 DIAGNOSIS — R35.0 URINARY FREQUENCY: ICD-10-CM

## 2024-08-12 DIAGNOSIS — R30.0 DYSURIA: Primary | ICD-10-CM

## 2024-08-12 DIAGNOSIS — Z78.9 LIVING IN ASSISTED LIVING: ICD-10-CM

## 2024-08-12 DIAGNOSIS — E03.8 OTHER SPECIFIED HYPOTHYROIDISM: ICD-10-CM

## 2024-08-12 LAB
ALBUMIN UR-MCNC: 10 MG/DL
APPEARANCE UR: ABNORMAL
BACTERIA #/AREA URNS HPF: ABNORMAL /HPF
BILIRUB UR QL STRIP: NEGATIVE
COLOR UR AUTO: YELLOW
GLUCOSE UR STRIP-MCNC: NEGATIVE MG/DL
HGB UR QL STRIP: NEGATIVE
KETONES UR STRIP-MCNC: NEGATIVE MG/DL
LEUKOCYTE ESTERASE UR QL STRIP: ABNORMAL
MUCOUS THREADS #/AREA URNS LPF: PRESENT /LPF
NITRATE UR QL: NEGATIVE
PH UR STRIP: 6 [PH] (ref 5–9)
RBC URINE: 4 /HPF
SP GR UR STRIP: 1.02 (ref 1–1.03)
SQUAMOUS EPITHELIAL: 5 /HPF
UROBILINOGEN UR STRIP-MCNC: NORMAL MG/DL
WBC URINE: 38 /HPF

## 2024-08-12 PROCEDURE — 81001 URINALYSIS AUTO W/SCOPE: CPT | Performed by: NURSE PRACTITIONER

## 2024-08-12 PROCEDURE — 87086 URINE CULTURE/COLONY COUNT: CPT | Performed by: NURSE PRACTITIONER

## 2024-08-13 DIAGNOSIS — R39.9 UTI SYMPTOMS: Primary | ICD-10-CM

## 2024-08-13 RX ORDER — CEFDINIR 300 MG/1
300 CAPSULE ORAL 2 TIMES DAILY
Qty: 14 CAPSULE | Refills: 0 | Status: SHIPPED | OUTPATIENT
Start: 2024-08-13 | End: 2024-08-14

## 2024-08-14 DIAGNOSIS — R39.9 UTI SYMPTOMS: Primary | ICD-10-CM

## 2024-08-14 LAB — BACTERIA UR CULT: ABNORMAL

## 2024-08-14 RX ORDER — LEVOTHYROXINE SODIUM 25 UG/1
TABLET ORAL
Qty: 45 TABLET | Refills: 2 | Status: SHIPPED | OUTPATIENT
Start: 2024-08-14

## 2024-08-14 NOTE — TELEPHONE ENCOUNTER
[facility] sent Rx request for the following:      Requested Prescriptions   Pending Prescriptions Disp Refills    levothyroxine (SYNTHROID/LEVOTHROID) 25 MCG tablet [Pharmacy Med Name: LEVOTHYROXINE 25MCG TAB] 14 tablet 23     Sig: TAKE 1/2 TABLET (12.5MCG) BY MOUTH ONCE DAILY (TAKE ALONG WITH 50MCG = 62.5MCG)       Thyroid Protocol Failed - 8/14/2024  9:51 AM        Failed - Normal TSH on file in past 12 months     Recent Labs   Lab Test 02/27/24  0837   TSH 9.34*        Last Prescription Date:   9/8/23  Last Fill Qty/Refills:         90, R-3    Last Office Visit:              6/13/24 (EMBER Frey- NH Visit)   Future Office visit:           None    Unable to complete prescription refill per RN Medication Refill Policy. Karen Simon RN .............. 8/14/2024  9:52 AM          Adult

## 2024-08-21 DIAGNOSIS — R11.0 NAUSEA: ICD-10-CM

## 2024-08-22 RX ORDER — ONDANSETRON 4 MG/1
4 TABLET, FILM COATED ORAL EVERY 8 HOURS PRN
Qty: 12 TABLET | Refills: 2 | Status: SHIPPED | OUTPATIENT
Start: 2024-08-22

## 2024-08-22 NOTE — TELEPHONE ENCOUNTER
[facility] the following:      Requested Prescriptions   Pending Prescriptions Disp Refills    ondansetron (ZOFRAN) 4 MG tablet 12 tablet 5   Sig: TAKE 1 TABLET BY MOUTH EVERY 8 HOURS AS NEEDED (NAUSEA)                  Last Prescription Date:   6/7/23  Last Fill Qty/Refills:         12, R-5    Last Office Visit:              6/13/24 (NH Visit, EMBER Frey)   Future Office visit:           None    Prescription refilled per RN Medication Refill Policy.................... Karen Simon RN ....................  8/22/2024   9:27 AM

## 2024-08-28 ENCOUNTER — MEDICAL CORRESPONDENCE (OUTPATIENT)
Dept: HEALTH INFORMATION MANAGEMENT | Facility: OTHER | Age: 89
End: 2024-08-28
Payer: COMMERCIAL

## 2024-08-29 ENCOUNTER — MEDICAL CORRESPONDENCE (OUTPATIENT)
Dept: HEALTH INFORMATION MANAGEMENT | Facility: OTHER | Age: 89
End: 2024-08-29
Payer: COMMERCIAL

## 2024-09-09 DIAGNOSIS — E03.9 HYPOTHYROIDISM, UNSPECIFIED TYPE: ICD-10-CM

## 2024-09-13 RX ORDER — LEVOTHYROXINE SODIUM 50 UG/1
TABLET ORAL
Qty: 28 TABLET | Refills: 3 | Status: SHIPPED | OUTPATIENT
Start: 2024-09-13 | End: 2024-10-04

## 2024-09-13 NOTE — TELEPHONE ENCOUNTER
[facility] pharmacy sent Rx request for the following:      Please review; the order for this had a end date added for 10/30/2023, think new sig is needed , I put refill dates if the medication didn't have end date. I do see that pcp wants both medication doses given together.   Unable to complete prescription refill per RN Medication Refill Policy.   Routed to pcp for review and approval.   Garland Perez RN on 9/13/2024 at 2:19 PM    Requested Prescriptions   Pending Prescriptions Disp Refills    levothyroxine (SYNTHROID/LEVOTHROID) 50 MCG tablet [Pharmacy Med Name: LEVOTHYROXINE 50MCG TAB] 28 tablet 23     Sig: TAKE 1 TABLET BY MOUTH ONCE DAILY (TAKE ALONG WITH 12.5MCG = 62.5MCG)       Thyroid Protocol Failed - 9/9/2024  1:47 PM        Failed - Normal TSH on file in past 12 months     Recent Labs   Lab Test 02/27/24  0837   TSH 9.34*           Last Prescription Date:   10/9/2023  with end date 10/30/2023  Last Fill Qty/Refills:         90, R-3    Last Office Visit:              last nursing home visit 6/13/2024   Future Office visit:            none

## 2024-09-19 ENCOUNTER — NURSING HOME VISIT (OUTPATIENT)
Dept: GERIATRICS | Facility: OTHER | Age: 89
End: 2024-09-19
Attending: NURSE PRACTITIONER
Payer: COMMERCIAL

## 2024-09-19 DIAGNOSIS — F02.80 ALZHEIMER'S DISEASE (H): ICD-10-CM

## 2024-09-19 DIAGNOSIS — Z78.9 LIVING IN ASSISTED LIVING: ICD-10-CM

## 2024-09-19 DIAGNOSIS — G30.9 ALZHEIMER'S DISEASE (H): ICD-10-CM

## 2024-09-19 DIAGNOSIS — N60.81 SEBACEOUS CYST OF BREAST, RIGHT: Primary | ICD-10-CM

## 2024-09-19 PROCEDURE — 99347 HOME/RES VST EST SF MDM 20: CPT | Performed by: NURSE PRACTITIONER

## 2024-09-19 NOTE — PROGRESS NOTES
Geno Horn is a 89 year old female being seen today for acute and follow up visit at [facility].    Code Status: DNR / DNI, Advance Directives: YES  Health Care Power of : Extended Emergency Contact Information  Primary Emergency Contact: Daniel Horn  Address: [address]           [place] [address] [facility]  Mobile Phone: [phone]  Relation: Son  Secondary Emergency Contact: Adri Chaudhary  Address: [address] [place] [address] [facility]  Home Phone: [phone]  Mobile Phone: [phone]  Relation: Daughter     Allergies: Alendronate, Celebrex [celecoxib], Other environmental allergy, Oxybutynin, Tolterodine, Tramadol, and Trospium     Chief Complaint / HPI: Follow-up as requested by nursing staff on skin cyst right breast--staff report previous issue over same area.  Resident unable to provide complete history due to memory loss however appears generally happy, comfortable area does not appear overly bothersome.  No fevers or systemic symptoms no other concerns raised today    Past Medical, Surgical, Family and Social History reviewed: YES.     Medications: Reviewed  Medications - recent changes:     Review of Systems:  General: positive for weakness  Skin: positive for skin cyst  Musculoskeletal: positive for arthritis and muscular weakness  Neuro: memory changes  Toileting:    Continent of Bowel: Yes   Continent of Bladder: No  Mobility: wheelchair    Recent Labs: none      Current Therapies: none    Exam:      Vital signs reviewed.   GENERAL APPEARANCE: alert and no distress  EYES: Eyes grossly normal to inspection  BREAST: No tenderness or nipple discharge   ABDOMEN: soft, nontender  MS: no musculoskeletal defects are noted and wheelchair dependent  SKIN: About a 2 cm cystic mass medial aspect right breast--- touching area cheesy waxy sebum exudes--- area expressed multiple times and in no remaining visible sebum--about quarter teaspoon teaspoon  expressed  No surrounding erythema or evidence of secondary skin infection, central pore remains open area cleansed, triple antibiotic ointment, covered with Band-Aid dressing  Above photo after sebum expressed  NEURO: Alert,mentation intact and speech normal  PSYCH: mentation appears normal and affect normal/bright    Assessment and Plan:    Very pleasant gracious lady who resides in assisted living apartment--history of sebaceous cysts around same area right breast--- staff have been using warm packs and sebum easily expressed through central pore  No remaining visible exudate.  Area cleansed, and dressed with triple antibiotic ointment and a Band-Aid dressing  Staff can use warm packs to encourage drainage until healed--- perhaps warm shower spa bath would be ideal    Over 25 minutes spent in visit, exam, drainage of sebaceous cyst, wound care treatment and follow-up parameters    Followup PRN    (N60.81) Sebaceous cyst of breast, right  (primary encounter diagnosis)      (G30.9,  F02.80) Alzheimer's disease (H)      (Z59.3) Living in assisted living

## 2024-09-24 ENCOUNTER — LAB REQUISITION (OUTPATIENT)
Dept: LAB | Facility: OTHER | Age: 89
End: 2024-09-24
Payer: COMMERCIAL

## 2024-09-24 DIAGNOSIS — D64.9 ANEMIA, UNSPECIFIED: ICD-10-CM

## 2024-09-24 DIAGNOSIS — E03.9 HYPOTHYROIDISM, UNSPECIFIED: ICD-10-CM

## 2024-09-24 DIAGNOSIS — E87.1 HYPO-OSMOLALITY AND HYPONATREMIA: ICD-10-CM

## 2024-09-24 LAB
ANION GAP SERPL CALCULATED.3IONS-SCNC: 11 MMOL/L (ref 7–15)
BASOPHILS # BLD AUTO: 0.1 10E3/UL (ref 0–0.2)
BASOPHILS NFR BLD AUTO: 1 %
BUN SERPL-MCNC: 12 MG/DL (ref 8–23)
CALCIUM SERPL-MCNC: 8.8 MG/DL (ref 8.8–10.4)
CHLORIDE SERPL-SCNC: 97 MMOL/L (ref 98–107)
CREAT SERPL-MCNC: 0.64 MG/DL (ref 0.51–0.95)
EGFRCR SERPLBLD CKD-EPI 2021: 79 ML/MIN/1.73M2
EOSINOPHIL # BLD AUTO: 0.2 10E3/UL (ref 0–0.7)
EOSINOPHIL NFR BLD AUTO: 3 %
ERYTHROCYTE [DISTWIDTH] IN BLOOD BY AUTOMATED COUNT: 15.9 % (ref 10–15)
FERRITIN SERPL-MCNC: 958 NG/ML (ref 11–328)
GLUCOSE SERPL-MCNC: 79 MG/DL (ref 70–99)
HCO3 SERPL-SCNC: 27 MMOL/L (ref 22–29)
HCT VFR BLD AUTO: 33.6 % (ref 35–47)
HGB BLD-MCNC: 10.9 G/DL (ref 11.7–15.7)
IMM GRANULOCYTES # BLD: 0 10E3/UL
IMM GRANULOCYTES NFR BLD: 0 %
IRON BINDING CAPACITY (ROCHE): 206 UG/DL (ref 240–430)
IRON SATN MFR SERPL: 45 % (ref 15–46)
IRON SERPL-MCNC: 93 UG/DL (ref 37–145)
LYMPHOCYTES # BLD AUTO: 0.6 10E3/UL (ref 0.8–5.3)
LYMPHOCYTES NFR BLD AUTO: 12 %
MCH RBC QN AUTO: 33.5 PG (ref 26.5–33)
MCHC RBC AUTO-ENTMCNC: 32.4 G/DL (ref 31.5–36.5)
MCV RBC AUTO: 103 FL (ref 78–100)
MONOCYTES # BLD AUTO: 0.4 10E3/UL (ref 0–1.3)
MONOCYTES NFR BLD AUTO: 8 %
NEUTROPHILS # BLD AUTO: 3.7 10E3/UL (ref 1.6–8.3)
NEUTROPHILS NFR BLD AUTO: 76 %
NRBC # BLD AUTO: 0 10E3/UL
NRBC BLD AUTO-RTO: 0 /100
PLATELET # BLD AUTO: 300 10E3/UL (ref 150–450)
POTASSIUM SERPL-SCNC: 4.4 MMOL/L (ref 3.4–5.3)
RBC # BLD AUTO: 3.25 10E6/UL (ref 3.8–5.2)
SODIUM SERPL-SCNC: 135 MMOL/L (ref 135–145)
T4 FREE SERPL-MCNC: 0.96 NG/DL (ref 0.9–1.7)
WBC # BLD AUTO: 4.9 10E3/UL (ref 4–11)

## 2024-09-24 PROCEDURE — 84439 ASSAY OF FREE THYROXINE: CPT | Performed by: NURSE PRACTITIONER

## 2024-09-24 PROCEDURE — 80048 BASIC METABOLIC PNL TOTAL CA: CPT | Performed by: NURSE PRACTITIONER

## 2024-09-24 PROCEDURE — 36415 COLL VENOUS BLD VENIPUNCTURE: CPT | Performed by: NURSE PRACTITIONER

## 2024-09-24 PROCEDURE — 83550 IRON BINDING TEST: CPT | Performed by: NURSE PRACTITIONER

## 2024-09-24 PROCEDURE — 85025 COMPLETE CBC W/AUTO DIFF WBC: CPT | Performed by: NURSE PRACTITIONER

## 2024-09-24 PROCEDURE — 82728 ASSAY OF FERRITIN: CPT | Performed by: NURSE PRACTITIONER

## 2024-10-07 NOTE — PATIENT INSTRUCTIONS
Please refer to your AVS for follow up and pain/symptoms management recommendations (I.e.: medications, helpful conservative treatment modalities, appropriate follow up if need to a specialist or family practice, etc.). Please return to urgent care if your symptoms change or worsen.     Discharge instructions:  -If you were prescribed a medication(s), please take this as prescribed/directed  -Monitor your symptoms, if changing/worsening, return to UC/ER or PCP for follow up    For pain control - we recommend alternating Tylenol and Ibuprofen if you are able to take these medications. Alternate every 4 hours as needed. I.e.: Ibuprofen at 8am, Tylenol 12pm, Ibuprofen 4pm    -Daily maximum of Tylenol is 4000mg (recommend staying under 3000mg)   -Daily maximum of Ibuprofen is 1200mg (take no more than six 200mg pills a day)    Have a nurse listen to lungs twice a day  Pain is typically the worst over the first 3-5 days  Rib injuries can take 4-6 weeks to improve.   Your Xray did show that you did NOT break any of your ribs and your lungs look good     Reason for call:   [x] Refill   [] Prior Auth  [] Other:     Office:   [x] PCP/Provider - Dr Nunez  [] Specialty/Provider -     Medication: omeprazole    Dose/Frequency: 20 mg take one daily     Quantity: 90    Pharmacy:  Pharmacy [facility]    Does the patient have enough for 3 days?   [] Yes   [x] No - Send as HP to POD- this was prescribed while pt was in the hospital, he'd like pcp to take over refills

## 2024-10-10 ENCOUNTER — NURSING HOME VISIT (OUTPATIENT)
Dept: GERIATRICS | Facility: OTHER | Age: 89
End: 2024-10-10
Attending: NURSE PRACTITIONER
Payer: COMMERCIAL

## 2024-10-10 DIAGNOSIS — B37.9 ANTIBIOTIC-INDUCED YEAST INFECTION: ICD-10-CM

## 2024-10-10 DIAGNOSIS — Z78.9 LIVES IN ASSISTED LIVING FACILITY: ICD-10-CM

## 2024-10-10 DIAGNOSIS — B37.31 VAGINAL YEAST INFECTION: Primary | ICD-10-CM

## 2024-10-10 DIAGNOSIS — T36.95XA ANTIBIOTIC-INDUCED YEAST INFECTION: ICD-10-CM

## 2024-10-10 PROCEDURE — 99347 HOME/RES VST EST SF MDM 20: CPT | Performed by: NURSE PRACTITIONER

## 2024-10-10 RX ORDER — CALCIUM CARBONATE 500 MG/1
2 TABLET, CHEWABLE ORAL 4 TIMES DAILY PRN
COMMUNITY

## 2024-10-10 RX ORDER — FLUCONAZOLE 150 MG/1
TABLET ORAL
Qty: 2 TABLET | Refills: 0 | Status: SHIPPED | OUTPATIENT
Start: 2024-10-10 | End: 2024-10-31

## 2024-10-10 RX ORDER — LEVOTHYROXINE SODIUM 50 UG/1
50 TABLET ORAL DAILY
Qty: 28 TABLET | Refills: 3 | Status: SHIPPED | OUTPATIENT
Start: 2024-10-10 | End: 2024-10-31

## 2024-10-10 RX ORDER — ALUMINA, MAGNESIA, AND SIMETHICONE 2400; 2400; 240 MG/30ML; MG/30ML; MG/30ML
15 SUSPENSION ORAL 4 TIMES DAILY PRN
COMMUNITY

## 2024-10-10 RX ORDER — LEVOTHYROXINE SODIUM 25 UG/1
25 TABLET ORAL DAILY
Qty: 45 TABLET | Refills: 3 | Status: SHIPPED | OUTPATIENT
Start: 2024-10-10 | End: 2024-10-31

## 2024-10-10 NOTE — PROGRESS NOTES
Geno Horn is a 89 year old female being seen today for acute and follow up visit at [facility].    Code Status: DNR / DNI, Advance Directives: YES-  Health Care Power of : Extended Emergency Contact Information  Primary Emergency Contact: Daniel Horn  Address: [address]           [place] [address] [facility]  Mobile Phone: [phone]  Relation: Son  Secondary Emergency Contact: Adri Chaudhary  Address: [address] [place] [address] [facility]  Home Phone: [phone]  Mobile Phone: [phone]  Relation: Daughter     Allergies: Alendronate, Celebrex [celecoxib], Other environmental allergy, Oxybutynin, Tolterodine, Tramadol, and Trospium     Chief Complaint / HPI: Follow-up on reports of severe internal vaginal itching by resident, chart review she has been treated in the last few months with antibiotics  For urinary tract infection. Nursing staff report no visible external perineum and external vaginal rash.  No additional concerns raised      Past Medical, Surgical, Family and Social History reviewed: YES.     Medications: Reviewed and reconciled  Medications - recent changes:     Review of Systems:  General: positive for weakness  Skin: positive for itching  Musculoskeletal: positive for muscular weakness  Toileting:    Continent of Bowel: Yes   Continent of Bladder: No  Mobility: wheelchair    Recent Labs: None    Current Therapies: none    Exam:  Vital signs reviewed.   GENERAL APPEARANCE: alert and no distress  EYES: Eyes grossly normal to inspection  ABDOMEN: soft, nontender  MS: no musculoskeletal defects are noted and wheelchair dependent  SKIN: dry---no visible external rash in area of concern  NEURO: mentation intact and speech normal  PSYCH: affect normal/bright    Assessment and Plan:    Based on history and symptoms we will treat for yeast vaginitis with Diflucan x 2 doses    Staff will monitor for any changes    Follow-up as needed      15 minutes  spent in chart review, visit, medication review and management    (B37.31) Vaginal yeast infection  (primary encounter diagnosis)    Plan: fluconazole (DIFLUCAN) 150 MG tablet            (B37.9,  T36.95XA) Antibiotic-induced yeast infection    Plan: fluconazole (DIFLUCAN) 150 MG tablet            (Z78.9) Lives in assisted living facility

## 2024-10-27 ENCOUNTER — OFFICE VISIT (OUTPATIENT)
Dept: FAMILY MEDICINE | Facility: OTHER | Age: 89
End: 2024-10-27
Attending: NURSE PRACTITIONER
Payer: COMMERCIAL

## 2024-10-27 VITALS
TEMPERATURE: 97.8 F | DIASTOLIC BLOOD PRESSURE: 70 MMHG | SYSTOLIC BLOOD PRESSURE: 130 MMHG | HEART RATE: 70 BPM | HEIGHT: 61 IN | RESPIRATION RATE: 16 BRPM | BODY MASS INDEX: 25.6 KG/M2 | OXYGEN SATURATION: 97 %

## 2024-10-27 DIAGNOSIS — B37.31 CANDIDIASIS OF VAGINA: ICD-10-CM

## 2024-10-27 DIAGNOSIS — N39.9 URINARY PROBLEM IN FEMALE: Primary | ICD-10-CM

## 2024-10-27 PROCEDURE — 99213 OFFICE O/P EST LOW 20 MIN: CPT | Performed by: STUDENT IN AN ORGANIZED HEALTH CARE EDUCATION/TRAINING PROGRAM

## 2024-10-27 PROCEDURE — G0463 HOSPITAL OUTPT CLINIC VISIT: HCPCS

## 2024-10-27 RX ORDER — AMOXICILLIN 875 MG/1
875 TABLET, COATED ORAL 2 TIMES DAILY
Qty: 14 TABLET | Refills: 0 | Status: SHIPPED | OUTPATIENT
Start: 2024-10-27 | End: 2024-11-03

## 2024-10-27 RX ORDER — FLUCONAZOLE 150 MG/1
150 TABLET ORAL
Qty: 3 TABLET | Refills: 0 | Status: SHIPPED | OUTPATIENT
Start: 2024-10-27 | End: 2024-11-03

## 2024-10-27 ASSESSMENT — PATIENT HEALTH QUESTIONNAIRE - PHQ9
SUM OF ALL RESPONSES TO PHQ QUESTIONS 1-9: 0
10. IF YOU CHECKED OFF ANY PROBLEMS, HOW DIFFICULT HAVE THESE PROBLEMS MADE IT FOR YOU TO DO YOUR WORK, TAKE CARE OF THINGS AT HOME, OR GET ALONG WITH OTHER PEOPLE: NOT DIFFICULT AT ALL
SUM OF ALL RESPONSES TO PHQ QUESTIONS 1-9: 0

## 2024-10-27 ASSESSMENT — PAIN SCALES - GENERAL: PAINLEVEL_OUTOF10: NO PAIN (0)

## 2024-10-27 NOTE — PATIENT INSTRUCTIONS
Urinary Tract Infection Symptoms    Amoxicillin twice a day for seven days.    Diflucan once every three days x 3 doses.    Fluids.    Follow up as needed.  Return to rapid clinic/ER for worsening symptoms.

## 2024-10-27 NOTE — NURSING NOTE
"Chief Complaint   Patient presents with    Dysuria     Burning and itching- lives at [facility]       Initial /70   Pulse 70   Temp 97.8  F (36.6  C) (Temporal)   Resp 16   Ht 1.549 m (5' 1\")   LMP 02/03/1984 (Approximate)   SpO2 97%   BMI 25.60 kg/m   Estimated body mass index is 25.6 kg/m  as calculated from the following:    Height as of this encounter: 1.549 m (5' 1\").    Weight as of 2/15/24: 61.5 kg (135 lb 8 oz).  Medication Review: complete    The next two questions are to help us understand your food security.  If you are feeling you need any assistance in this area, we have resources available to support you today.           No data to display                  Health Care Directive:  Patient has a Health Care Directive on file      Norma J. Gosselin, LPN      "

## 2024-10-27 NOTE — PROGRESS NOTES
"  Assessment & Plan     (N39.9) Urinary problem in female  (primary encounter diagnosis)    Comment: Urinary concerns.  Recent culture on 8/12/2024 did grow E. coli.  Sensitive to penicillins.  She was unable to leave a sample today, will return sample tomorrow.  No evidence of complicated infection at this time.    Plan: UA Macroscopic with reflex to Microscopic and         Culture, amoxicillin (AMOXIL) 875 MG tablet          Discussed to complete sample collection prior to antibiotic therapy.  After sample is collected, start amoxicillin twice a day for 1 week.    (B37.31) Candidiasis of vagina  Comment: Vaginal candidiasis.  Antibiotic induced.  Plan: fluconazole (DIFLUCAN) 150 MG tablet          Diflucan x 3.  Follow-up as needed.          Galina Lora is a 89 year old, presenting for the following health issues:  Dysuria (Burning and itching- lives at [facility])    HPI    Patient presents today with daughter for concerns of burning, frequency of urination.  She has also had some vaginal itching.  States symptoms started a couple days ago, have been worsening.  She has not any fevers, abnormal abdominal pain, nausea.  She always has a poor fluid intake.      She does live at [facility].      Review of Systems  Constitutional, HEENT, cardiovascular, pulmonary, gi and gu systems are negative, except as otherwise noted.          Objective    /70   Pulse 70   Temp 97.8  F (36.6  C) (Temporal)   Resp 16   Ht 1.549 m (5' 1\")   LMP 02/03/1984 (Approximate)   SpO2 97%   BMI 25.60 kg/m    Body mass index is 25.6 kg/m .    Physical Exam   GENERAL: alert and no distress  RESP: lungs clear to auscultation - no rales, rhonchi or wheezes  CV: regular rate and rhythm, normal S1 S2, no S3 or S4, no murmur, click or rub, no peripheral edema  ABDOMEN: soft, nontender, no hepatosplenomegaly, no masses and bowel sounds normal  MS: no gross musculoskeletal defects noted, no edema            Signed Electronically by: " Nicolasa Presley PA-C

## 2024-10-28 ENCOUNTER — LAB (OUTPATIENT)
Dept: LAB | Facility: OTHER | Age: 89
End: 2024-10-28
Attending: STUDENT IN AN ORGANIZED HEALTH CARE EDUCATION/TRAINING PROGRAM
Payer: COMMERCIAL

## 2024-10-28 DIAGNOSIS — N39.9 URINARY PROBLEM IN FEMALE: ICD-10-CM

## 2024-10-28 LAB
ALBUMIN UR-MCNC: NEGATIVE MG/DL
APPEARANCE UR: ABNORMAL
BACTERIA #/AREA URNS HPF: ABNORMAL /HPF
BILIRUB UR QL STRIP: NEGATIVE
COLOR UR AUTO: ABNORMAL
GLUCOSE UR STRIP-MCNC: NEGATIVE MG/DL
HGB UR QL STRIP: NEGATIVE
KETONES UR STRIP-MCNC: NEGATIVE MG/DL
LEUKOCYTE ESTERASE UR QL STRIP: ABNORMAL
MUCOUS THREADS #/AREA URNS LPF: PRESENT /LPF
NITRATE UR QL: POSITIVE
PH UR STRIP: 7 [PH] (ref 5–9)
RBC URINE: 1 /HPF
SP GR UR STRIP: 1.01 (ref 1–1.03)
SQUAMOUS EPITHELIAL: 4 /HPF
UROBILINOGEN UR STRIP-MCNC: NORMAL MG/DL
WBC URINE: 24 /HPF

## 2024-10-28 PROCEDURE — 87186 SC STD MICRODIL/AGAR DIL: CPT | Mod: ZL

## 2024-10-28 PROCEDURE — 81003 URINALYSIS AUTO W/O SCOPE: CPT | Mod: ZL

## 2024-10-30 ENCOUNTER — TELEPHONE (OUTPATIENT)
Dept: GERIATRICS | Facility: OTHER | Age: 89
End: 2024-10-30
Payer: COMMERCIAL

## 2024-10-30 LAB — BACTERIA UR CULT: ABNORMAL

## 2024-10-31 ENCOUNTER — OFFICE VISIT (OUTPATIENT)
Dept: GERIATRICS | Facility: OTHER | Age: 89
End: 2024-10-31
Attending: NURSE PRACTITIONER
Payer: COMMERCIAL

## 2024-10-31 VITALS
DIASTOLIC BLOOD PRESSURE: 70 MMHG | SYSTOLIC BLOOD PRESSURE: 132 MMHG | HEART RATE: 78 BPM | TEMPERATURE: 98.5 F | RESPIRATION RATE: 16 BRPM | OXYGEN SATURATION: 96 %

## 2024-10-31 DIAGNOSIS — Z13.220 SCREENING FOR HYPERLIPIDEMIA: ICD-10-CM

## 2024-10-31 DIAGNOSIS — E03.8 OTHER SPECIFIED HYPOTHYROIDISM: ICD-10-CM

## 2024-10-31 DIAGNOSIS — E53.8 VITAMIN B12 DEFICIENCY (NON ANEMIC): ICD-10-CM

## 2024-10-31 DIAGNOSIS — D50.8 OTHER IRON DEFICIENCY ANEMIA: ICD-10-CM

## 2024-10-31 DIAGNOSIS — H04.123 DRY EYES: ICD-10-CM

## 2024-10-31 DIAGNOSIS — Z00.00 HEALTH CARE MAINTENANCE: ICD-10-CM

## 2024-10-31 DIAGNOSIS — M15.0 PRIMARY OSTEOARTHRITIS INVOLVING MULTIPLE JOINTS: ICD-10-CM

## 2024-10-31 DIAGNOSIS — K59.00 CONSTIPATION, UNSPECIFIED CONSTIPATION TYPE: ICD-10-CM

## 2024-10-31 DIAGNOSIS — Z00.00 ENCOUNTER FOR MEDICARE ANNUAL WELLNESS EXAM: Primary | ICD-10-CM

## 2024-10-31 DIAGNOSIS — J30.2 SEASONAL ALLERGIC RHINITIS, UNSPECIFIED TRIGGER: ICD-10-CM

## 2024-10-31 DIAGNOSIS — J01.00 ACUTE MAXILLARY SINUSITIS, RECURRENCE NOT SPECIFIED: ICD-10-CM

## 2024-10-31 DIAGNOSIS — J30.89 SEASONAL ALLERGIC RHINITIS DUE TO OTHER ALLERGIC TRIGGER: ICD-10-CM

## 2024-10-31 RX ORDER — CETIRIZINE HYDROCHLORIDE 10 MG/1
5 TABLET ORAL AT BEDTIME
Qty: 45 TABLET | Refills: 3 | Status: SHIPPED | OUTPATIENT
Start: 2024-10-31

## 2024-10-31 RX ORDER — FLUTICASONE PROPIONATE 50 MCG
2 SPRAY, SUSPENSION (ML) NASAL DAILY
Qty: 16 G | Refills: 11 | Status: SHIPPED | OUTPATIENT
Start: 2024-10-31 | End: 2024-11-07

## 2024-10-31 RX ORDER — SENNOSIDES 8.6 MG
CAPSULE ORAL
Qty: 180 TABLET | Refills: 3 | Status: SHIPPED | OUTPATIENT
Start: 2024-10-31

## 2024-10-31 RX ORDER — UREA 10 %
500 LOTION (ML) TOPICAL DAILY
Qty: 90 TABLET | Refills: 3 | Status: SHIPPED | OUTPATIENT
Start: 2024-10-31

## 2024-10-31 RX ORDER — MULTIVITAMIN WITH FOLIC ACID 400 MCG
TABLET ORAL
Qty: 90 TABLET | Refills: 3 | Status: SHIPPED | OUTPATIENT
Start: 2024-10-31

## 2024-10-31 RX ORDER — GLIPIZIDE 10 MG/1
1 TABLET ORAL 3 TIMES DAILY
Qty: 30 ML | Refills: 3 | Status: SHIPPED | OUTPATIENT
Start: 2024-10-31

## 2024-10-31 RX ORDER — LEVOTHYROXINE SODIUM 50 UG/1
50 TABLET ORAL DAILY
Qty: 90 TABLET | Refills: 3 | Status: SHIPPED | OUTPATIENT
Start: 2024-10-31

## 2024-10-31 RX ORDER — LEVOTHYROXINE SODIUM 25 UG/1
12.5 TABLET ORAL DAILY
Qty: 45 TABLET | Refills: 3 | Status: SHIPPED | OUTPATIENT
Start: 2024-10-31

## 2024-10-31 RX ORDER — FERROUS SULFATE 325(65) MG
325 TABLET ORAL DAILY
Qty: 90 TABLET | Refills: 3 | Status: SHIPPED | OUTPATIENT
Start: 2024-10-31

## 2024-10-31 RX ORDER — AMOXICILLIN 250 MG
CAPSULE ORAL
Qty: 180 TABLET | Refills: 3 | Status: SHIPPED | OUTPATIENT
Start: 2024-10-31

## 2024-10-31 SDOH — HEALTH STABILITY: PHYSICAL HEALTH: ON AVERAGE, HOW MANY DAYS PER WEEK DO YOU ENGAGE IN MODERATE TO STRENUOUS EXERCISE (LIKE A BRISK WALK)?: 0 DAYS

## 2024-10-31 SDOH — HEALTH STABILITY: PHYSICAL HEALTH: ON AVERAGE, HOW MANY MINUTES DO YOU ENGAGE IN EXERCISE AT THIS LEVEL?: 0 MIN

## 2024-10-31 ASSESSMENT — SOCIAL DETERMINANTS OF HEALTH (SDOH): HOW OFTEN DO YOU GET TOGETHER WITH FRIENDS OR RELATIVES?: TWICE A WEEK

## 2024-10-31 ASSESSMENT — PATIENT HEALTH QUESTIONNAIRE - PHQ9: SUM OF ALL RESPONSES TO PHQ QUESTIONS 1-9: 1

## 2024-10-31 NOTE — PROGRESS NOTES
Preventive Care Visit  [facility]  LARISA Schmidt CNP, Family Medicine  Oct 31, 2024      Assessment & Plan     Encounter for Medicare annual wellness exam at [facility]  Mammo done no records   -DEXA done 9/9/2002 (impression: osteoporosis). No further tersting  -Colon cancer screening done via colonoscopy on 10/5/2006  declines further  -Immunizations: Patient is due for the following: Shingrix   -Derm: Does patient regularly see dermatologist? no  Refills pended for requested medications.  -Labs pended.    1. Encounter for Medicare annual wellness exam (Primary)      2. Primary osteoarthritis involving multiple joints    - acetaminophen (TYLENOL) 650 MG CR tablet; TAKE 1 TABLET BY MOUTH THREE TIMES A DAY TAKE 1 TABLET BY MOUTH TWICE DAILY AS NEEDED  Dispense: 180 tablet; Refill: 3    3. Dry eyes    - artificial tears (GENTEAL) 0.1-0.2-0.3 % ophthalmic solution; Place 1 drop into both eyes 3 times daily.  Dispense: 30 mL; Refill: 3    4. Acute maxillary sinusitis, recurrence not specified    - cetirizine (ZYRTEC) 10 MG tablet; Take 0.5 tablets (5 mg) by mouth at bedtime.  Dispense: 45 tablet; Refill: 3    5. Seasonal allergic rhinitis, unspecified trigger    - cetirizine (ZYRTEC) 10 MG tablet; Take 0.5 tablets (5 mg) by mouth at bedtime.  Dispense: 45 tablet; Refill: 3    6. Vitamin B12 deficiency (non anemic)    - cyanocobalamin (VITAMIN B-12) 500 MCG tablet; Take 1 tablet (500 mcg) by mouth daily.  Dispense: 90 tablet; Refill: 3    7. Other iron deficiency anemia    - ferrous sulfate (FEROSUL) 325 (65 Fe) MG tablet; Take 1 tablet (325 mg) by mouth daily. At 1200 pm  Dispense: 90 tablet; Refill: 3  - senna-docusate (SENOKOT-S/PERICOLACE) 8.6-50 MG tablet; 2 tabs in the morning and 1 tab at HS  Dispense: 180 tablet; Refill: 3    8. Seasonal allergic rhinitis due to other allergic trigger    - fluticasone (FLONASE) 50 MCG/ACT nasal spray; Spray 2 sprays into both nostrils daily.  Renewal only, patient will call for refills.  Dispense: 16 g; Refill: 11    9. Health care maintenance    - Multiple Vitamin (TAB-A-RADHA) TABS; Take 1 tablet by mouth daily at 1200 pm  Dispense: 90 tablet; Refill: 3    10. Constipation, unspecified constipation type    - senna-docusate (SENOKOT-S/PERICOLACE) 8.6-50 MG tablet; 2 tabs in the morning and 1 tab at HS  Dispense: 180 tablet; Refill: 3    11. Screening for hyperlipidemia    - Lipid Profile; Future    12. Other specified hypothyroidism    - levothyroxine (SYNTHROID/LEVOTHROID) 25 MCG tablet; Take 0.5 tablets (12.5 mcg) by mouth daily. With 50 mcg for total daily dose 62.5 mcg  Dispense: 45 tablet; Refill: 3  - levothyroxine (SYNTHROID/LEVOTHROID) 50 MCG tablet; Take 1 tablet (50 mcg) by mouth daily. With 12.5 mcg for total daily dose 62.5 mcg  Dispense: 90 tablet; Refill: 3      Patient has been advised of split billing requirements and indicates understanding: Yes        Counseling  Appropriate preventive services were addressed with this patient via screening, questionnaire, or discussion as appropriate for fall prevention, nutrition, physical activity, Tobacco-use cessation, social engagement, weight loss and cognition.  Checklist reviewing preventive services available has been given to the patient.  Reviewed patient's diet, addressing concerns and/or questions.   The patient was instructed to see the dentist every 6 months.   Updated plan of care.  Patient reported difficulty with activities of daily living were addressed today.Information on urinary incontinence and treatment options given to patient.         Return if symptoms worsen or fail to improve.    Galina Lora is a 89 year old, presenting for the following:  Medicare Visit      Health Care Directive  Patient has a Health Care Directive on file  Advance care planning document is on file and is current.      10/31/2024   General Health   How would you rate your overall physical health?  (!) FAIR   Feel stress (tense, anxious, or unable to sleep) Not at all            10/31/2024   Nutrition   Diet: Regular (no restrictions)            10/31/2024   Exercise   Days per week of moderate/strenous exercise 0 days   Average minutes spent exercising at this level 0 min      (!) EXERCISE CONCERN      10/31/2024   Social Factors   Frequency of gathering with friends or relatives Twice a week   Worry food won't last until get money to buy more No   Food not last or not have enough money for food? No   Do you have housing? (Housing is defined as stable permanent housing and does not include staying ouside in a car, in a tent, in an abandoned building, in an overnight shelter, or couch-surfing.) Yes   Are you worried about losing your housing? No   Lack of transportation? No   Unable to get utilities (heat,electricity)? No            10/31/2024   Fall Risk   Fallen 2 or more times in the past year? Yes    Trouble with walking or balance? Yes    Reason Gait Speed Test Not Completed Patient declines       Patient-reported          10/31/2024   Activities of Daily Living- Home Safety   Needs help with the following daily activites Telephone use    Transportation    Shopping    Preparing meals    Housework    Bathing    Laundry    Medication administration    Money management    Toileting    Dressing   Safety concerns in the home None of the above       Multiple values from one day are sorted in reverse-chronological order         10/31/2024   Dental   Dentist two times every year? (!) NO            10/31/2024   Hearing Screening   Hearing concerns? None of the above            10/31/2024   Driving Risk Screening   Patient/family members have concerns about driving (!) DECLINE            10/31/2024   General Alertness/Fatigue Screening   Have you been more tired than usual lately? No            10/31/2024   Urinary Incontinence Screening   Bothered by leaking urine in past 6 months Yes            10/31/2024   TB  Screening   Were you born outside of the [place]? Yes          Today's PHQ-9 Score:       10/31/2024    10:55 AM   PHQ-9 SCORE   PHQ-9 Total Score 1         10/31/2024   Substance Use   Alcohol more than 3/day or more than 7/wk Not Applicable   Do you have a current opioid prescription? No   How severe/bad is pain from 1 to 10? 6/10   Do you use any other substances recreationally? No        Social History     Tobacco Use    Smoking status: Never    Smokeless tobacco: Never    Tobacco comments:     no ecig   Vaping Use    Vaping status: Every Day   Substance Use Topics    Alcohol use: Not Currently     Alcohol/week: 0.0 standard drinks of alcohol    Drug use: No       Reviewed and updated as needed this visit by Provider                      Current providers sharing in care for this patient include:  Patient Care Team:  Fransisca Rocha NP as PCP - General (Internal Medicine)  Sierra Ray APRN CNP as Nurse Practitioner (Family Medicine)  Sierra Ray APRN CNP as Assigned PCP    The following health maintenance items are reviewed in Epic and correct as of today:  Health Maintenance   Topic Date Due    LIPID  Never done    URINE DRUG SCREEN  Never done    DEPRESSION ACTION PLAN  Never done    INFLUENZA VACCINE (1) 09/01/2024    ZOSTER IMMUNIZATION (3 of 3) 10/15/2024    PHQ-9  04/30/2025    BMP  09/24/2025    TSH W/FREE T4 REFLEX  09/24/2025    MEDICARE ANNUAL WELLNESS VISIT  10/31/2025    FALL RISK ASSESSMENT  10/31/2025    ADVANCE CARE PLANNING  10/31/2029    DTAP/TDAP/TD IMMUNIZATION (2 - Td or Tdap) 10/29/2030    Pneumococcal Vaccine: 65+ Years  Completed    RSV VACCINE  Completed    COVID-19 Vaccine  Completed    HPV IMMUNIZATION  Aged Out    MENINGITIS IMMUNIZATION  Aged Out    RSV MONOCLONAL ANTIBODY  Aged Out            Objective    Exam  /70 (BP Location: Left arm, Patient Position: Sitting, Cuff Size: Adult Regular)   Pulse 78   Temp 98.5  F (36.9  C)   Resp 16   LMP 02/03/1984  "(Approximate)   SpO2 96%   Breastfeeding No    Estimated body mass index is 25.6 kg/m  as calculated from the following:    Height as of 10/27/24: 1.549 m (5' 1\").    Weight as of 2/15/24: 61.5 kg (135 lb 8 oz).            10/31/2024   Mini Cog   Mini-Cog Not Completed (choose reason) Known dementia                 Signed Electronically by: LARISA Schmidt CNP    "

## 2024-11-07 NOTE — PROGRESS NOTES
And is requesting discontinuation of her Flonase nasal spray. She reports that it is causing discomfort in her nose.    Flonase discontinued today.    LARISA Schmidt CNP on 11/7/2024 at 1:03 PM

## 2024-11-07 NOTE — TELEPHONE ENCOUNTER
Problem: Knowledge Deficit  Goal: Patient/family/caregiver demonstrates understanding of disease process, treatment plan, medications, and discharge instructions  Description: Complete learning assessment and assess knowledge base.  Interventions:  - Provide teaching at level of understanding  - Provide teaching via preferred learning methods  Outcome: Progressing      [facility] sent Rx request for the following:      Requested Prescriptions   Pending Prescriptions Disp Refills    polyvinyl alcohol (LIQUIFILM TEARS) 1.4 % ophthalmic solution [Pharmacy Med Name: ARTIF TEAR 1.4 % POLY ALC] 15 mL 0     Sig: INSTILL 1 DROP INTO BOTH EYES THREE TIMES DAILY INSTILL 1 DROP INTO BOTH EYES 3 TIMES DAILY AS NEEDED       There is no refill protocol information for this order        Last Prescription Date:   10/27/22  Last Fill Qty/Refills:         15 ml, R-10    Last Office Visit:              9/28/23 ([facility] Visit, Sierra Ray)   Future Office visit:           None    Unable to complete prescription refill per RN Medication Refill Policy.     [facility] RN .............. 11/1/2023  11:27 AM

## 2024-11-12 ENCOUNTER — LAB REQUISITION (OUTPATIENT)
Dept: LAB | Facility: OTHER | Age: 89
End: 2024-11-12
Payer: COMMERCIAL

## 2024-11-12 DIAGNOSIS — Z13.220 ENCOUNTER FOR SCREENING FOR LIPOID DISORDERS: ICD-10-CM

## 2024-11-12 LAB
CHOLEST SERPL-MCNC: 159 MG/DL
FASTING STATUS PATIENT QL REPORTED: NORMAL
HDLC SERPL-MCNC: 55 MG/DL
LDLC SERPL CALC-MCNC: 86 MG/DL
NONHDLC SERPL-MCNC: 104 MG/DL
TRIGL SERPL-MCNC: 89 MG/DL

## 2024-11-12 PROCEDURE — 80061 LIPID PANEL: CPT | Performed by: NURSE PRACTITIONER

## 2024-11-12 PROCEDURE — 36415 COLL VENOUS BLD VENIPUNCTURE: CPT | Performed by: NURSE PRACTITIONER

## 2024-11-25 ENCOUNTER — APPOINTMENT (OUTPATIENT)
Dept: GENERAL RADIOLOGY | Facility: OTHER | Age: 89
End: 2024-11-25
Payer: COMMERCIAL

## 2024-11-25 ENCOUNTER — APPOINTMENT (OUTPATIENT)
Dept: CARDIOLOGY | Facility: OTHER | Age: 89
End: 2024-11-25
Attending: HOSPITALIST
Payer: COMMERCIAL

## 2024-11-25 ENCOUNTER — NURSING HOME VISIT (OUTPATIENT)
Dept: GERIATRICS | Facility: OTHER | Age: 89
End: 2024-11-25
Attending: NURSE PRACTITIONER
Payer: COMMERCIAL

## 2024-11-25 ENCOUNTER — HOSPITAL ENCOUNTER (INPATIENT)
Facility: OTHER | Age: 89
LOS: 2 days | Discharge: NURSING, REHAB OR LONG-TERM CARE | End: 2024-11-27
Admitting: HOSPITALIST
Payer: COMMERCIAL

## 2024-11-25 VITALS
RESPIRATION RATE: 16 BRPM | DIASTOLIC BLOOD PRESSURE: 70 MMHG | TEMPERATURE: 100.2 F | SYSTOLIC BLOOD PRESSURE: 140 MMHG | HEART RATE: 88 BPM | OXYGEN SATURATION: 92 %

## 2024-11-25 DIAGNOSIS — E87.1 HYPONATREMIA: ICD-10-CM

## 2024-11-25 DIAGNOSIS — A41.51 SEPSIS DUE TO ESCHERICHIA COLI WITHOUT ACUTE ORGAN DYSFUNCTION (H): ICD-10-CM

## 2024-11-25 DIAGNOSIS — F02.80 ALZHEIMER'S DISEASE (H): ICD-10-CM

## 2024-11-25 DIAGNOSIS — Z78.9 LIVES IN ASSISTED LIVING FACILITY: ICD-10-CM

## 2024-11-25 DIAGNOSIS — R53.1 GENERALIZED WEAKNESS: ICD-10-CM

## 2024-11-25 DIAGNOSIS — G30.9 ALZHEIMER'S DISEASE (H): ICD-10-CM

## 2024-11-25 DIAGNOSIS — R50.9 FEVER, UNSPECIFIED FEVER CAUSE: ICD-10-CM

## 2024-11-25 DIAGNOSIS — Z20.822 LAB TEST NEGATIVE FOR COVID-19 VIRUS: ICD-10-CM

## 2024-11-25 DIAGNOSIS — R41.82 ACUTE ALTERATION IN MENTAL STATUS: Primary | ICD-10-CM

## 2024-11-25 DIAGNOSIS — R53.83 LETHARGIC: ICD-10-CM

## 2024-11-25 DIAGNOSIS — N39.0 URINARY TRACT INFECTION WITHOUT HEMATURIA, SITE UNSPECIFIED: Primary | ICD-10-CM

## 2024-11-25 DIAGNOSIS — N39.0 UTI (URINARY TRACT INFECTION): ICD-10-CM

## 2024-11-25 LAB
ALBUMIN SERPL BCG-MCNC: 3.8 G/DL (ref 3.5–5.2)
ALBUMIN UR-MCNC: 50 MG/DL
ALP SERPL-CCNC: 93 U/L (ref 40–150)
ALT SERPL W P-5'-P-CCNC: 32 U/L (ref 0–50)
ANION GAP SERPL CALCULATED.3IONS-SCNC: 9 MMOL/L (ref 7–15)
APPEARANCE UR: ABNORMAL
AST SERPL W P-5'-P-CCNC: 40 U/L (ref 0–45)
BACTERIA #/AREA URNS HPF: ABNORMAL /HPF
BASOPHILS # BLD AUTO: 0 10E3/UL (ref 0–0.2)
BASOPHILS NFR BLD AUTO: 0 %
BILIRUB SERPL-MCNC: 0.9 MG/DL
BILIRUB UR QL STRIP: NEGATIVE
BUN SERPL-MCNC: 15.3 MG/DL (ref 8–23)
CALCIUM SERPL-MCNC: 8.8 MG/DL (ref 8.8–10.4)
CHLORIDE SERPL-SCNC: 96 MMOL/L (ref 98–107)
COLOR UR AUTO: YELLOW
CREAT SERPL-MCNC: 0.63 MG/DL (ref 0.51–0.95)
EGFRCR SERPLBLD CKD-EPI 2021: 80 ML/MIN/1.73M2
EOSINOPHIL # BLD AUTO: 0 10E3/UL (ref 0–0.7)
EOSINOPHIL NFR BLD AUTO: 0 %
ERYTHROCYTE [DISTWIDTH] IN BLOOD BY AUTOMATED COUNT: 16.7 % (ref 10–15)
FLUAV RNA SPEC QL NAA+PROBE: NEGATIVE
FLUBV RNA RESP QL NAA+PROBE: NEGATIVE
GLUCOSE SERPL-MCNC: 145 MG/DL (ref 70–99)
GLUCOSE UR STRIP-MCNC: NEGATIVE MG/DL
HCO3 SERPL-SCNC: 25 MMOL/L (ref 22–29)
HCT VFR BLD AUTO: 28.7 % (ref 35–47)
HGB BLD-MCNC: 9.5 G/DL (ref 11.7–15.7)
HGB UR QL STRIP: NEGATIVE
HOLD SPECIMEN: NORMAL
HOLD SPECIMEN: NORMAL
IMM GRANULOCYTES # BLD: 0.1 10E3/UL
IMM GRANULOCYTES NFR BLD: 1 %
KETONES UR STRIP-MCNC: 10 MG/DL
LACTATE SERPL-SCNC: 1.8 MMOL/L (ref 0.7–2)
LEUKOCYTE ESTERASE UR QL STRIP: ABNORMAL
LVEF ECHO: NORMAL
LYMPHOCYTES # BLD AUTO: 0.6 10E3/UL (ref 0.8–5.3)
LYMPHOCYTES NFR BLD AUTO: 4 %
MCH RBC QN AUTO: 32.9 PG (ref 26.5–33)
MCHC RBC AUTO-ENTMCNC: 33.1 G/DL (ref 31.5–36.5)
MCV RBC AUTO: 99 FL (ref 78–100)
MONOCYTES # BLD AUTO: 0.7 10E3/UL (ref 0–1.3)
MONOCYTES NFR BLD AUTO: 5 %
MUCOUS THREADS #/AREA URNS LPF: PRESENT /LPF
NEUTROPHILS # BLD AUTO: 14.4 10E3/UL (ref 1.6–8.3)
NEUTROPHILS NFR BLD AUTO: 91 %
NITRATE UR QL: NEGATIVE
NRBC # BLD AUTO: 0 10E3/UL
NRBC BLD AUTO-RTO: 0 /100
NT-PROBNP SERPL-MCNC: 2902 PG/ML (ref 0–1800)
PH UR STRIP: 5.5 [PH] (ref 5–9)
PLATELET # BLD AUTO: 359 10E3/UL (ref 150–450)
POTASSIUM SERPL-SCNC: 4 MMOL/L (ref 3.4–5.3)
PROT SERPL-MCNC: 6.6 G/DL (ref 6.4–8.3)
RBC # BLD AUTO: 2.89 10E6/UL (ref 3.8–5.2)
RBC URINE: 9 /HPF
RSV RNA SPEC NAA+PROBE: NEGATIVE
SARS-COV-2 RNA RESP QL NAA+PROBE: NEGATIVE
SODIUM SERPL-SCNC: 130 MMOL/L (ref 135–145)
SP GR UR STRIP: 1.03 (ref 1–1.03)
SQUAMOUS EPITHELIAL: 1 /HPF
TSH SERPL DL<=0.005 MIU/L-ACNC: 2.64 UIU/ML (ref 0.3–4.2)
UROBILINOGEN UR STRIP-MCNC: 2 MG/DL
WBC # BLD AUTO: 15.8 10E3/UL (ref 4–11)
WBC CLUMPS #/AREA URNS HPF: PRESENT /HPF
WBC URINE: 87 /HPF

## 2024-11-25 PROCEDURE — 96365 THER/PROPH/DIAG IV INF INIT: CPT

## 2024-11-25 PROCEDURE — 120N000001 HC R&B MED SURG/OB

## 2024-11-25 PROCEDURE — 71045 X-RAY EXAM CHEST 1 VIEW: CPT

## 2024-11-25 PROCEDURE — 84443 ASSAY THYROID STIM HORMONE: CPT | Performed by: HOSPITALIST

## 2024-11-25 PROCEDURE — 99291 CRITICAL CARE FIRST HOUR: CPT | Mod: 25

## 2024-11-25 PROCEDURE — 250N000013 HC RX MED GY IP 250 OP 250 PS 637: Performed by: HOSPITALIST

## 2024-11-25 PROCEDURE — 85004 AUTOMATED DIFF WBC COUNT: CPT

## 2024-11-25 PROCEDURE — 93306 TTE W/DOPPLER COMPLETE: CPT | Mod: 26 | Performed by: INTERNAL MEDICINE

## 2024-11-25 PROCEDURE — 99223 1ST HOSP IP/OBS HIGH 75: CPT | Mod: AI | Performed by: HOSPITALIST

## 2024-11-25 PROCEDURE — 36415 COLL VENOUS BLD VENIPUNCTURE: CPT

## 2024-11-25 PROCEDURE — 82310 ASSAY OF CALCIUM: CPT

## 2024-11-25 PROCEDURE — 83880 ASSAY OF NATRIURETIC PEPTIDE: CPT | Performed by: HOSPITALIST

## 2024-11-25 PROCEDURE — 83605 ASSAY OF LACTIC ACID: CPT

## 2024-11-25 PROCEDURE — 99285 EMERGENCY DEPT VISIT HI MDM: CPT

## 2024-11-25 PROCEDURE — 96361 HYDRATE IV INFUSION ADD-ON: CPT

## 2024-11-25 PROCEDURE — 87186 SC STD MICRODIL/AGAR DIL: CPT

## 2024-11-25 PROCEDURE — 258N000003 HC RX IP 258 OP 636

## 2024-11-25 PROCEDURE — 85048 AUTOMATED LEUKOCYTE COUNT: CPT

## 2024-11-25 PROCEDURE — 93306 TTE W/DOPPLER COMPLETE: CPT

## 2024-11-25 PROCEDURE — 87040 BLOOD CULTURE FOR BACTERIA: CPT

## 2024-11-25 PROCEDURE — 250N000011 HC RX IP 250 OP 636: Performed by: HOSPITALIST

## 2024-11-25 PROCEDURE — 87637 SARSCOV2&INF A&B&RSV AMP PRB: CPT

## 2024-11-25 PROCEDURE — 250N000011 HC RX IP 250 OP 636

## 2024-11-25 PROCEDURE — 81001 URINALYSIS AUTO W/SCOPE: CPT

## 2024-11-25 PROCEDURE — 82247 BILIRUBIN TOTAL: CPT

## 2024-11-25 RX ORDER — ALUMINA, MAGNESIA, AND SIMETHICONE 2400; 2400; 240 MG/30ML; MG/30ML; MG/30ML
15 SUSPENSION ORAL 4 TIMES DAILY PRN
Status: DISCONTINUED | OUTPATIENT
Start: 2024-11-25 | End: 2024-11-25

## 2024-11-25 RX ORDER — NALOXONE HYDROCHLORIDE 0.4 MG/ML
0.4 INJECTION, SOLUTION INTRAMUSCULAR; INTRAVENOUS; SUBCUTANEOUS
Status: DISCONTINUED | OUTPATIENT
Start: 2024-11-25 | End: 2024-11-27 | Stop reason: HOSPADM

## 2024-11-25 RX ORDER — ONDANSETRON 4 MG/1
4 TABLET, ORALLY DISINTEGRATING ORAL EVERY 6 HOURS PRN
Status: DISCONTINUED | OUTPATIENT
Start: 2024-11-25 | End: 2024-11-27 | Stop reason: HOSPADM

## 2024-11-25 RX ORDER — NALOXONE HYDROCHLORIDE 0.4 MG/ML
0.2 INJECTION, SOLUTION INTRAMUSCULAR; INTRAVENOUS; SUBCUTANEOUS
Status: DISCONTINUED | OUTPATIENT
Start: 2024-11-25 | End: 2024-11-27 | Stop reason: HOSPADM

## 2024-11-25 RX ORDER — AMOXICILLIN 250 MG
2 CAPSULE ORAL 2 TIMES DAILY PRN
Status: DISCONTINUED | OUTPATIENT
Start: 2024-11-25 | End: 2024-11-27 | Stop reason: HOSPADM

## 2024-11-25 RX ORDER — ONDANSETRON 2 MG/ML
4 INJECTION INTRAMUSCULAR; INTRAVENOUS EVERY 6 HOURS PRN
Status: DISCONTINUED | OUTPATIENT
Start: 2024-11-25 | End: 2024-11-27 | Stop reason: HOSPADM

## 2024-11-25 RX ORDER — CEFTRIAXONE 1 G/1
1 INJECTION, POWDER, FOR SOLUTION INTRAMUSCULAR; INTRAVENOUS ONCE
Status: COMPLETED | OUTPATIENT
Start: 2024-11-25 | End: 2024-11-25

## 2024-11-25 RX ORDER — LEVOTHYROXINE SODIUM 50 UG/1
50 TABLET ORAL DAILY
Status: DISCONTINUED | OUTPATIENT
Start: 2024-11-26 | End: 2024-11-25

## 2024-11-25 RX ORDER — TOLNAFTATE 1 G/100G
POWDER TOPICAL 2 TIMES DAILY PRN
Status: DISCONTINUED | OUTPATIENT
Start: 2024-11-25 | End: 2024-11-25

## 2024-11-25 RX ORDER — CALCIUM CARBONATE 500 MG/1
1000 TABLET, CHEWABLE ORAL 4 TIMES DAILY PRN
Status: DISCONTINUED | OUTPATIENT
Start: 2024-11-25 | End: 2024-11-25

## 2024-11-25 RX ORDER — LIDOCAINE 40 MG/G
CREAM TOPICAL
Status: DISCONTINUED | OUTPATIENT
Start: 2024-11-25 | End: 2024-11-27 | Stop reason: HOSPADM

## 2024-11-25 RX ORDER — AMOXICILLIN 250 MG
1 CAPSULE ORAL 2 TIMES DAILY PRN
Status: DISCONTINUED | OUTPATIENT
Start: 2024-11-25 | End: 2024-11-27 | Stop reason: HOSPADM

## 2024-11-25 RX ORDER — LORATADINE 10 MG/1
10 TABLET ORAL AT BEDTIME
Status: DISCONTINUED | OUTPATIENT
Start: 2024-11-25 | End: 2024-11-27 | Stop reason: HOSPADM

## 2024-11-25 RX ORDER — CEFTRIAXONE 1 G/1
1 INJECTION, POWDER, FOR SOLUTION INTRAMUSCULAR; INTRAVENOUS EVERY 24 HOURS
Status: DISCONTINUED | OUTPATIENT
Start: 2024-11-26 | End: 2024-11-27 | Stop reason: HOSPADM

## 2024-11-25 RX ORDER — ENOXAPARIN SODIUM 100 MG/ML
40 INJECTION SUBCUTANEOUS EVERY 24 HOURS
Status: DISCONTINUED | OUTPATIENT
Start: 2024-11-25 | End: 2024-11-27 | Stop reason: HOSPADM

## 2024-11-25 RX ORDER — ACETAMINOPHEN 325 MG/1
650 TABLET ORAL EVERY 8 HOURS SCHEDULED
Status: DISCONTINUED | OUTPATIENT
Start: 2024-11-25 | End: 2024-11-27 | Stop reason: HOSPADM

## 2024-11-25 RX ORDER — GLIPIZIDE 10 MG/1
1 TABLET ORAL 3 TIMES DAILY
Status: DISCONTINUED | OUTPATIENT
Start: 2024-11-25 | End: 2024-11-27 | Stop reason: HOSPADM

## 2024-11-25 RX ORDER — HYDROMORPHONE HYDROCHLORIDE 2 MG/1
2 TABLET ORAL EVERY 4 HOURS PRN
Status: DISCONTINUED | OUTPATIENT
Start: 2024-11-25 | End: 2024-11-27 | Stop reason: HOSPADM

## 2024-11-25 RX ORDER — MECLIZINE HCL 12.5 MG 12.5 MG/1
12.5 TABLET ORAL 3 TIMES DAILY PRN
Status: DISCONTINUED | OUTPATIENT
Start: 2024-11-25 | End: 2024-11-27 | Stop reason: HOSPADM

## 2024-11-25 RX ORDER — AMOXICILLIN 250 MG
2 CAPSULE ORAL
Status: DISCONTINUED | OUTPATIENT
Start: 2024-11-25 | End: 2024-11-25

## 2024-11-25 RX ORDER — UREA 10 %
500 LOTION (ML) TOPICAL DAILY
Status: DISCONTINUED | OUTPATIENT
Start: 2024-11-26 | End: 2024-11-27 | Stop reason: HOSPADM

## 2024-11-25 RX ORDER — CALCIUM CARBONATE 500 MG/1
1000 TABLET, CHEWABLE ORAL 4 TIMES DAILY PRN
Status: DISCONTINUED | OUTPATIENT
Start: 2024-11-25 | End: 2024-11-27 | Stop reason: HOSPADM

## 2024-11-25 RX ADMIN — ACETAMINOPHEN 650 MG: 325 TABLET, FILM COATED ORAL at 21:13

## 2024-11-25 RX ADMIN — DEXTRAN 70, GLYCERIN, HYPROMELLOSE 1 DROP: 1; 2; 3 SOLUTION/ DROPS OPHTHALMIC at 21:13

## 2024-11-25 RX ADMIN — ENOXAPARIN SODIUM 40 MG: 40 INJECTION SUBCUTANEOUS at 21:13

## 2024-11-25 RX ADMIN — CEFTRIAXONE SODIUM 1 G: 1 INJECTION, POWDER, FOR SOLUTION INTRAMUSCULAR; INTRAVENOUS at 11:06

## 2024-11-25 RX ADMIN — ACETAMINOPHEN 650 MG: 325 TABLET, FILM COATED ORAL at 15:07

## 2024-11-25 RX ADMIN — LORATADINE 10 MG: 10 TABLET ORAL at 21:13

## 2024-11-25 RX ADMIN — SODIUM CHLORIDE 1000 ML: 9 INJECTION, SOLUTION INTRAVENOUS at 10:27

## 2024-11-25 RX ADMIN — DEXTRAN 70, GLYCERIN, HYPROMELLOSE 1 DROP: 1; 2; 3 SOLUTION/ DROPS OPHTHALMIC at 15:07

## 2024-11-25 ASSESSMENT — ACTIVITIES OF DAILY LIVING (ADL)
ADLS_ACUITY_SCORE: 0
ADLS_ACUITY_SCORE: 62
ADLS_ACUITY_SCORE: 52
ADLS_ACUITY_SCORE: 62
ADLS_ACUITY_SCORE: 62
ADLS_ACUITY_SCORE: 52
ADLS_ACUITY_SCORE: 62

## 2024-11-25 ASSESSMENT — ENCOUNTER SYMPTOMS
DIZZINESS: 1
WHEEZING: 0
SHORTNESS OF BREATH: 0
FEVER: 1
CONFUSION: 1
CHILLS: 0
CHEST TIGHTNESS: 0
WEAKNESS: 1
ARTHRALGIAS: 1
FATIGUE: 1
APPETITE CHANGE: 1

## 2024-11-25 ASSESSMENT — COLUMBIA-SUICIDE SEVERITY RATING SCALE - C-SSRS
1. IN THE PAST MONTH, HAVE YOU WISHED YOU WERE DEAD OR WISHED YOU COULD GO TO SLEEP AND NOT WAKE UP?: NO
6. HAVE YOU EVER DONE ANYTHING, STARTED TO DO ANYTHING, OR PREPARED TO DO ANYTHING TO END YOUR LIFE?: NO
2. HAVE YOU ACTUALLY HAD ANY THOUGHTS OF KILLING YOURSELF IN THE PAST MONTH?: NO

## 2024-11-25 NOTE — H&P
[facility]    History and Physical - Hospitalist Service       Date of Admission:  11/25/2024    Assessment & Plan      Geno Horn is a 89 year old female admitted on 11/25/2024. She has a istory of hypertension, hypothyroidism, osteoarthritis, senile dementia resident of [place] facility was sent to the ED with increasing fatigue, fevers and lethargy was admitted with UTI and acute toxic metabolic encephalopathy.  She has the following acute medical issues:    Acute toxic metabolic encephalopathy: Complicated by her dementia.  This is probably due to urinary tract infection.  Blood and urine cultures have been sent.  The patient will be treated with Rocephin 1 g daily.  Monitor her mental status closely.  According to the family no aggressive measures.  Continue supportive care as well.    Urinary tract infection: The patient grew E. coli on 10/28/2024 for which she completed a course of amoxicillin.  She will be covered with Rocephin here.  Cultures have been sent.    Sepsis: The patient has leukocytosis, altered mental status, fevers and source of infection consistent with sepsis.  Monitor hemodynamics.  The patient was given a bolus in the ED.  Avoid further fluid resuscitation due to pulmonary congestion.    Hyponatremia: The patient has mild hyponatremia probably is hypovolemic hyponatremia.  Gentle hydration.  Continue to monitor for now.    Hypothyroidism: Check TSH and reflex T4 levels.  Continue on levothyroxine.    Senile disease: The patient is at a facility.  According to the family she is DNR/DNI.  No aggressive measures desired.  If she declines then we will switch the care to comfort care only.    History of constipation: Will keep her on a good bowel regimen.    Osteoarthritis: Continue with pain management.    Pulmonary congestion: The patient has pulmonary congestion on the chest x-ray.  Her BNP is elevated as well.  I will get an echocardiogram to evaluate left  "ventricular function.      Diet:  As tolerated   DVT Prophylaxis: Enoxaparin (Lovenox) SQ  Riggs Catheter: Not present  Lines: None     Cardiac Monitoring: None  Code Status:  DNR/DNI    Clinically Significant Risk Factors Present on Admission         # Hyponatremia: Lowest Na = 130 mmol/L in last 2 days, will monitor as appropriate  # Hypochloremia: Lowest Cl = 96 mmol/L in last 2 days, will monitor as appropriate          # Hypertension: Noted on problem list     # Dementia: noted on problem list  # Anemia: based on hgb <11       # Overweight: Estimated body mass index is 25.51 kg/m  as calculated from the following:    Height as of this encounter: 1.549 m (5' 1\").    Weight as of this encounter: 61.2 kg (135 lb).              Disposition Plan     Medically Ready for Discharge: Anticipated in 2-4 Days           Carlos Briceno MD  Hospitalist Service  [facility]  Securely message with [employer] (more info)  Text page via [facility] Paging/Directory     ______________________________________________________________________    Chief Complaint   Weakness fevers and lethargy    History is obtained from the family and paperchart    History of Present Illness   Geno Horn is a 89 year old female with history of hypertension, hypothyroidism, osteoarthritis, Senile dementia resident of [facility] was sent to the ED with increasing fatigue, fevers and lethargy.  The patient's CODE STATUS is DNR/DNI and is almost getting comfort measures at the assisted living facility.  She was previously seen in [facility] on 10/28/2024 and was treated for urinary tract infection with Augmentin completed on 11/3/20. According to the daughter the patient does not have Alzheimer's dementia but rather it is age-related memory issues.  She has had recurrent UTIs leading to altered mental status.  This time again it seems to be the same issue.  The patient herself is hard of hearing and does not have any specific " complaints.      Past Medical History    Past Medical History:   Diagnosis Date    Hypertension     Hypothyroid     Osteoarthritis     ST elevation (STEMI) myocardial infarction (H)     1981; angioplasty   Senile dementia    Past Surgical History   Past Surgical History:   Procedure Laterality Date    ARTHROPLASTY HIP Right     Dr Rodriguez    ARTHROPLASTY HIP Left 2015    Dr. Rodriguez    COMBINED CYSTOSCOPY, URETEROSCOPY, LASER HOLMIUM LITHOTRIPSY URETER(S) Right 7/23/2019    Procedure: CYSTOSCOPY, RIGHT STENT REMOVAL, URETEROSCOPY, LASER HOLMIUM LITHOTRIPSY RIGHT URETER WITH STENT REPLACEMENT;  Surgeon: Shawn Cole MD;  Location:  OR    CYSTOSCOPY, RETROGRADES, INSERT STENT URETER(S), COMBINED Right 6/17/2019    Procedure: CYSTOSCOPY, WITH RETROGRADE PYELOGRAM AND URETERAL STENT INSERTION;  Surgeon: Shawn Cole MD;  Location:  OR       Prior to Admission Medications   Prior to Admission Medications   Prescriptions Last Dose Informant Patient Reported? Taking?   DEEP SEA NASAL SPRAY 0.65 % nasal spray More than a month Nursing Home No Yes   Sig: SPRAY 2 SPRAYS INTO EACH NOSTRIL AS NEEDED   Incontinence Supply Disposable (DEPEND EASY FIT UNDERGARMENTS) MISC  Nursing Home No No   Sig: Medium size disposable under pants covered by insurance   Incontinence Supply Disposable (POISE PAD) PADS  Nursing Home No No   Sig: Change pad 3 x daily, #6 long Maxi pads   Menthol, Topical Analgesic, 4 % GEL 11/25/2024 Morning Nursing Home No Yes   Sig: Externally apply topically 3 times daily as needed (pain relief for minor muscle/joint aches) OKAY TO KEEP AT BEDSIDE   Multiple Vitamin (TAB-A-RADHA) TABS 11/24/2024 Noon Nursing Home No Yes   Sig: Take 1 tablet by mouth daily at 1200 pm   Vitamins A & D (MEDPURA VITAMIN A & D) OINT 11/25/2024 Morning Nursing Home No Yes   Sig: APPLY TOPICALLY TO AFFECTED AREA(S) TWICE DAILY APPLY TOPICALLY TO AFFECTED AREA(S) AS NEEDED   acetaminophen (TYLENOL) 650 MG CR tablet 11/25/2024 Morning  Nursing Home No Yes   Sig: TAKE 1 TABLET BY MOUTH THREE TIMES A DAY TAKE 1 TABLET BY MOUTH TWICE DAILY AS NEEDED   alum & mag hydroxide-simethicone (MAALOX  ES) 400-400-40 MG/5ML SUSP suspension More than a month Nursing Home Yes Yes   Sig: Take 15 mLs by mouth 4 times daily as needed for indigestion.   artificial tears (GENTEAL) 0.1-0.2-0.3 % ophthalmic solution 11/25/2024 Morning Nursing Home No Yes   Sig: Place 1 drop into both eyes 3 times daily.   calcium carbonate (TUMS) 500 MG chewable tablet More than a month Nursing Home Yes Yes   Sig: Take 2 chew tab by mouth 4 times daily as needed for heartburn.   cetirizine (ZYRTEC) 10 MG tablet 11/24/2024 Evening Nursing Home No Yes   Sig: Take 0.5 tablets (5 mg) by mouth at bedtime.   cyanocobalamin (VITAMIN B-12) 500 MCG tablet 11/25/2024 Morning Nursing Home No Yes   Sig: Take 1 tablet (500 mcg) by mouth daily.   ferrous sulfate (FEROSUL) 325 (65 Fe) MG tablet 11/24/2024 No[place] Nursing Home No Yes   Sig: Take 1 tablet (325 mg) by mouth daily. At 1200 pm   glycerin (ADULT) 2 g suppository More than a month Nursing Home Yes Yes   Sig: Place 1 suppository rectally daily as needed for constipation.   levothyroxine (SYNTHROID/LEVOTHROID) 25 MCG tablet 11/25/2024 Morning Nursing Home No Yes   Sig: Take 0.5 tablets (12.5 mcg) by mouth daily. With 50 mcg for total daily dose 62.5 mcg   levothyroxine (SYNTHROID/LEVOTHROID) 50 MCG tablet 11/25/2024 Morning Nursing Home No Yes   Sig: Take 1 tablet (50 mcg) by mouth daily. With 12.5 mcg for total daily dose 62.5 mcg   magnesium hydroxide (MILK OF MAGNESIA) 400 MG/5ML suspension More than a month Nursing Home No Yes   Sig: TAKE 15ML BY MOUTH ONCE DAILY AS NEEDED FOR NO BM IN 3 DAYS   meclizine (ANTIVERT) 12.5 MG tablet More than a month Nursing Home Yes Yes   Sig: Take 1 tablet (12.5 mg) by mouth 3 times daily as needed for dizziness   naloxone (NARCAN) 4 MG/0.1ML nasal spray  Nursing Home No Yes   Sig: Spray 1 spray (4 mg) into  one nostril alternating nostrils as needed for opioid reversal every 2-3 minutes until assistance arrives   ondansetron (ZOFRAN) 4 MG tablet  Nursing Home No Yes   Sig: Take 1 tablet (4 mg) by mouth every 8 hours as needed for nausea.   order for DME  Self, Nursing Home No No   Sig: Equipment being ordered: Spikes for cane   order for DME  Self, Nursing Home No No   Sig: Equipment being ordered: plastic underpants   senna-docusate (SENOKOT-S/PERICOLACE) 8.6-50 MG tablet 2024 Morning Nursing Home No Yes   Si tabs in the morning and 1 tab at HS   tolnaftate (TINACTIN) 1 % external powder More than a month Nursing Home No Yes   Sig: Apply topically 2 times daily as needed for itching or irritation OKAY TO KEEP BEDSIDE      Facility-Administered Medications: None        Review of Systems    The 10 point Review of Systems is negative other than noted in the HPI or here.  No nausea or vomiting.  No complaints of abdominal pain.  No complaints of chest pain or shortness of breath.  Rest of the ROS was unobtainable.    Social History   I have reviewed this patient's social history and updated it with pertinent information if needed.  Social History     Tobacco Use    Smoking status: Never    Smokeless tobacco: Never    Tobacco comments:     no ecig   Vaping Use    Vaping status: Every Day   Substance Use Topics    Alcohol use: Not Currently     Alcohol/week: 0.0 standard drinks of alcohol    Drug use: No         Allergies   Allergies   Allergen Reactions    Alendronate Nausea     Stomach upset    Celebrex [Celecoxib] Other (See Comments)     Light headed    Other Environmental Allergy Other (See Comments)     Itchy,Runny Eyes    Oxybutynin Other (See Comments)     Felt High    Tolterodine      Other reaction(s): Intolerance-Can't Take  Was unable to sleep at night    Tramadol Other (See Comments)     Adverse  effects    Trospium Other (See Comments)     drowsy        Physical Exam   Vital Signs: Temp: 98.8  F  (37.1  C) Temp src: Tympanic BP: 110/63 Pulse: 64   Resp: 20 SpO2: 96 % O2 Device: None (Room air)    Weight: 135 lbs 0 oz    GENERAL APPEARANCE: Elderly lady who looks lethargic lying in bed.  Ill-appearing  HEENT: Normocephalic and atraumatic. No scleral icterus.PERRLA. No conjunctival injection is noted. No oropharyngeal lesions.  NECK: Supple. Trachea is midline.  No lymphadenopathy or tenderness.  CHEST: Symmetric. Nontender to palpation.  LUNGS: Breath sounds are equal and clear bilaterally. No wheezes, rhonchi, or rales.  HEART: Regular rate and rhythm with normal S1 and S2. No murmurs, gallops, or rubs.  ABDOMEN: Soft, flat, and benign. No mass, tenderness, guarding, or rebound. Bowel sounds are present. No CVA tenderness or flank mass.  RECTAL: Deferred  EXTREMITIES: No cyanosis, clubbing, or edema.  NEUROLOGIC: Lethargic, moving all extremities, responding to questions but very hard of hearing.  PSYCHIATRIC: The patient is rather lethargic, demented  SKIN: dry skin, no pressure injuries found        Medical Decision Making       76 MINUTES SPENT BY ME on the date of service doing chart review, history, exam, documentation & further activities per the note.      Data     I have personally reviewed the following data over the past 24 hrs:    15.8 (H)  \   9.5 (L)   / 359     130 (L) 96 (L) 15.3 /  145 (H)   4.0 25 0.63 \     ALT: 32 AST: 40 AP: 93 TBILI: 0.9   ALB: 3.8 TOT PROTEIN: 6.6 LIPASE: N/A     Procal: N/A CRP: N/A Lactic Acid: 1.8         Imaging results reviewed over the past 24 hrs:   Recent Results (from the past 24 hours)   XR Chest Port 1 View    Narrative    PROCEDURE:  XR CHEST PORT 1 VIEW    HISTORY: fever, lethargy, ? pneumonia. .    COMPARISON:  2/26/2016, 9/30/2021    FINDINGS:    The cardiomediastinal contours are enlarged. There is calcific aortic  atherosclerosis.   No focal consolidation. Chronic calcified nodules. Interstitial edema.  Trace fluid along the minor fissure. No  pneumothorax.      Impression    IMPRESSION:  Findings suggest interstitial pulmonary edema. Atypical  infection could have an overlapping appearance.      KATELIN CLEMENTS MD         SYSTEM ID:  [id number]

## 2024-11-25 NOTE — PROGRESS NOTES
Geno Horn is a 89 year old female being seen today for acute visit at [facility].    Assessment & Plan       ICD-10-CM    1. Acute alteration in mental status  R41.82       2. Alzheimer's disease (H)  G30.9     F02.80       3. Lives in assisted living facility  Z78.9       4. Fever, unspecified fever cause  R50.9       5. Lethargic  R53.83         [facility] staff bring forth concerns of increased weakness, lethargy, and fever.     Over the weekend, [facility] nursing staff noted that and was a bit more fatigued than usual, even requiring once to be lowered to the floor during a transfer. At baseline, she is alert and oriented to person in place. Staff denied any recent falls or injuries. Home COVID and influenza test completed by facility on November 25, 2024 were both negative.    Geno was seen in [facility] on 10/28/24 and was treated for a UTI. She completed her course of amoxicillin on 11/3/24.     Today on exam, Geno is seated in her wheelchair at the common dining room table. An aide is trying to help her eat. She is sleeping while in her chair and is quite difficult to arouse. She does arouse to gentle shaking and voice. She denies any pain. Limited physical exam due to altered level of consciousness and inability to follow instructions.   Given her change in mentation, increased weakness, and fever. Geno would benefit from evaluation in the ER.     I was able to discuss my exam findings with Geno's daughter Adri this morning and she is in agreement and will plan to meet her mother at the ER.         20 minutes spent by me on the date of the encounter doing chart review, history and exam, documentation and further activities per the note    Triaged from: [facility]  DIAGNOSIS:   1. Acute alteration in mental status    2. Alzheimer's disease (H)    3. Lives in assisted living facility    4. Fever, unspecified fever cause    5. Lethargic        Initial LMP 02/03/1984 (Approximate)  Estimated  "body mass index is 25.6 kg/m  as calculated from the following:    Height as of 10/27/24: 1.549 m (5' 1\").    Weight as of 2/15/24: 61.5 kg (135 lb 8 oz).    Patient will be transferred to higher level of care.    LARISA Schmidt CNP, APRN CNP   [facility]     Code Status: DNR / DNI.   Health Care Power of : Extended Emergency Contact Information  Primary Emergency Contact: Daniel Horn  Address: [address]           [place] [address] [facility]  Mobile Phone: [phone]  Relation: Son  Secondary Emergency Contact: NeenaAdri  Address: [address] [place] [address] [facility]  Home Phone: [phone]  Mobile Phone: [phone]  Relation: Daughter     Allergies: Alendronate, Celebrex [celecoxib], Other environmental allergy, Oxybutynin, Tolterodine, Tramadol, and Trospium     Past Medical, Surgical, Family and Social History reviewed: YES.     Medications:   Current Outpatient Medications   Medication Sig Dispense Refill    acetaminophen (TYLENOL) 650 MG CR tablet TAKE 1 TABLET BY MOUTH THREE TIMES A DAY TAKE 1 TABLET BY MOUTH TWICE DAILY AS NEEDED 180 tablet 3    alum & mag hydroxide-simethicone (MAALOX  ES) 400-400-40 MG/5ML SUSP suspension Take 15 mLs by mouth 4 times daily as needed for indigestion.      artificial tears (GENTEAL) 0.1-0.2-0.3 % ophthalmic solution Place 1 drop into both eyes 3 times daily. 30 mL 3    calcium carbonate (TUMS) 500 MG chewable tablet Take 2 chew tab by mouth 4 times daily as needed for heartburn.      cetirizine (ZYRTEC) 10 MG tablet Take 0.5 tablets (5 mg) by mouth at bedtime. 45 tablet 3    cyanocobalamin (VITAMIN B-12) 500 MCG tablet Take 1 tablet (500 mcg) by mouth daily. 90 tablet 3    DEEP SEA NASAL SPRAY 0.65 % nasal spray SPRAY 2 SPRAYS INTO EACH NOSTRIL AS NEEDED 44 mL 11    ferrous sulfate (FEROSUL) 325 (65 Fe) MG tablet Take 1 tablet (325 mg) by mouth daily. At 1200 pm 90 " tablet 3    glycerin (ADULT) 2 g suppository Place 1 suppository rectally daily as needed for constipation.      Incontinence Supply Disposable (DEPEND EASY FIT UNDERGARMENTS) MISC Medium size disposable under pants covered by insurance 100 each 11    Incontinence Supply Disposable (POISE PAD) PADS Change pad 3 x daily, #6 long Maxi pads 90 each 11    levothyroxine (SYNTHROID/LEVOTHROID) 25 MCG tablet Take 0.5 tablets (12.5 mcg) by mouth daily. With 50 mcg for total daily dose 62.5 mcg 45 tablet 3    levothyroxine (SYNTHROID/LEVOTHROID) 50 MCG tablet Take 1 tablet (50 mcg) by mouth daily. With 12.5 mcg for total daily dose 62.5 mcg 90 tablet 3    magnesium hydroxide (MILK OF MAGNESIA) 400 MG/5ML suspension TAKE 15ML BY MOUTH ONCE DAILY AS NEEDED FOR NO BM IN 3 DAYS 473 mL 11    meclizine (ANTIVERT) 12.5 MG tablet Take 1 tablet (12.5 mg) by mouth 3 times daily as needed for dizziness 90 tablet 1    Menthol, Topical Analgesic, 4 % GEL Externally apply topically 3 times daily as needed (pain relief for minor muscle/joint aches) OKAY TO KEEP AT BEDSIDE 150 mL 3    Multiple Vitamin (TAB-A-RADHA) TABS Take 1 tablet by mouth daily at 1200 pm 90 tablet 3    naloxone (NARCAN) 4 MG/0.1ML nasal spray Spray 1 spray (4 mg) into one nostril alternating nostrils as needed for opioid reversal every 2-3 minutes until assistance arrives 0.2 mL 1    ondansetron (ZOFRAN) 4 MG tablet Take 1 tablet (4 mg) by mouth every 8 hours as needed for nausea. 12 tablet 2    order for DME Equipment being ordered: Spikes for cane 1 Units 1    order for DME Equipment being ordered: plastic underpants 1 Units 1    senna-docusate (SENOKOT-S/PERICOLACE) 8.6-50 MG tablet 2 tabs in the morning and 1 tab at  tablet 3    tolnaftate (TINACTIN) 1 % external powder Apply topically 2 times daily as needed for itching or irritation OKAY TO KEEP BEDSIDE 90 g 2    Vitamins A & D (MEDPURA VITAMIN A & D) OINT APPLY TOPICALLY TO AFFECTED AREA(S) TWICE DAILY  APPLY TOPICALLY TO AFFECTED AREA(S) AS NEEDED 113 g 5         Review of Systems   Constitutional:  Positive for appetite change, fatigue and fever. Negative for chills.   Respiratory:  Negative for chest tightness, shortness of breath and wheezing.    Cardiovascular:  Negative for chest pain.   Musculoskeletal:  Positive for arthralgias and gait problem.   Allergic/Immunologic: Positive for environmental allergies.   Neurological:  Positive for dizziness and weakness.   Psychiatric/Behavioral:  Positive for confusion (baseline).    All other systems reviewed and are negative.      Toileting:    Continent of Bowel: Yes   Continent of Bladder: Yes  Mobility: wheelchair    Recent Labs: Most recent labs reviewed.    Current Therapies: none    Physical Exam  Vitals reviewed.   Constitutional:       Appearance: She is ill-appearing.   Cardiovascular:      Rate and Rhythm: Normal rate.      Pulses: Normal pulses.   Pulmonary:      Effort: Pulmonary effort is normal.      Comments: Limited lung exam. Unable to follow requests to take deep breaths.  Musculoskeletal:      Cervical back: Normal range of motion.   Skin:     General: Skin is warm and dry.   Neurological:      Mental Status: She is lethargic and disoriented.      Motor: Weakness present.      Gait: Gait abnormal (uses wheelchair).   Psychiatric:         Cognition and Memory: Memory is impaired. She exhibits impaired recent memory and impaired remote memory.

## 2024-11-25 NOTE — PLAN OF CARE
"VSS. Afebrile. C/o right shoulder hurting, scheduled tylenol given. Hot pack in place.   Orientated only to self, disorientated to place, time, and situation. Pleasantly confused.   Lung sounds clear   Bruising on arms and legs, redness noted to amelie area, barrier cream applied. Dressing to R lower leg    Pure wick in place, urine orange with sediment and odor. Low urine output    Hard of hearing, needs redirection   Pt to weak to stand, CCRQ2     BP (!) 141/62 (BP Location: Left arm, Patient Position: Semi-Flores's, Cuff Size: Adult Regular)   Pulse 73   Temp 97.3  F (36.3  C) (Tympanic)   Resp 18   Ht 1.549 m (5' 1\")   Wt 61.2 kg (135 lb)   LMP 02/03/1984 (Approximate)   SpO2 96%   BMI 25.51 kg/m      Karyn Moreno RN on 11/25/2024 at 4:34 PM      "

## 2024-11-25 NOTE — Clinical Note
ISABELLE, sent to ER 11/25/24 in the setting of fever and lethargy. [facility] Covid/Influenza A&B negative at facility this AM.   Thank you for allowing me to participate in your patient's care.  Johana PERES, CNP

## 2024-11-25 NOTE — PHARMACY-ADMISSION MEDICATION HISTORY
Pharmacist Admission Medication History    Admission medication history is complete. The information provided in this note is only as accurate as the sources available at the time of the update.    Information Source(s): Facility ([facility] medication list/MAR and CareEverywhere/SureScripts via phone    Pertinent Information: Got MAR from [facility] where medications are managed for the patient. Patient finished 7 day course of Amoxicillin for UTI 10/27-11/4/24 per MAR. Has used PRN Menthol gel 6 times in the last month and PRN APAP once. No use of other PRN meds.     Changes made to PTA medication list:  Added: None  Deleted: None  Changed: None    Allergies reviewed with CareEverywhere: yes    Medication History Completed By: Braulio De Leon [facility] 11/25/2024 11:55 AM    PTA Med List   Medication Sig Last Dose/Taking    acetaminophen (TYLENOL) 650 MG CR tablet TAKE 1 TABLET BY MOUTH THREE TIMES A DAY TAKE 1 TABLET BY MOUTH TWICE DAILY AS NEEDED 11/25/2024 Morning    alum & mag hydroxide-simethicone (MAALOX  ES) 400-400-40 MG/5ML SUSP suspension Take 15 mLs by mouth 4 times daily as needed for indigestion. More than a month    artificial tears (GENTEAL) 0.1-0.2-0.3 % ophthalmic solution Place 1 drop into both eyes 3 times daily. 11/25/2024 Morning    calcium carbonate (TUMS) 500 MG chewable tablet Take 2 chew tab by mouth 4 times daily as needed for heartburn. More than a month    cetirizine (ZYRTEC) 10 MG tablet Take 0.5 tablets (5 mg) by mouth at bedtime. 11/24/2024 Evening    cyanocobalamin (VITAMIN B-12) 500 MCG tablet Take 1 tablet (500 mcg) by mouth daily. 11/25/2024 Morning    DEEP SEA NASAL SPRAY 0.65 % nasal spray SPRAY 2 SPRAYS INTO EACH NOSTRIL AS NEEDED More than a month    ferrous sulfate (FEROSUL) 325 (65 Fe) MG tablet Take 1 tablet (325 mg) by mouth daily. At 1200 pm 11/24/2024 Noon    glycerin (ADULT) 2 g suppository Place 1 suppository rectally daily as needed for constipation. More than a month     levothyroxine (SYNTHROID/LEVOTHROID) 25 MCG tablet Take 0.5 tablets (12.5 mcg) by mouth daily. With 50 mcg for total daily dose 62.5 mcg 11/25/2024 Morning    levothyroxine (SYNTHROID/LEVOTHROID) 50 MCG tablet Take 1 tablet (50 mcg) by mouth daily. With 12.5 mcg for total daily dose 62.5 mcg 11/25/2024 Morning    magnesium hydroxide (MILK OF MAGNESIA) 400 MG/5ML suspension TAKE 15ML BY MOUTH ONCE DAILY AS NEEDED FOR NO BM IN 3 DAYS More than a month    meclizine (ANTIVERT) 12.5 MG tablet Take 1 tablet (12.5 mg) by mouth 3 times daily as needed for dizziness More than a month    Menthol, Topical Analgesic, 4 % GEL Externally apply topically 3 times daily as needed (pain relief for minor muscle/joint aches) OKAY TO KEEP AT BEDSIDE 11/25/2024 Morning    Multiple Vitamin (TAB-A-RADHA) TABS Take 1 tablet by mouth daily at 1200 pm 11/24/2024 Noon    naloxone (NARCAN) 4 MG/0.1ML nasal spray Spray 1 spray (4 mg) into one nostril alternating nostrils as needed for opioid reversal every 2-3 minutes until assistance arrives Taking As Needed    ondansetron (ZOFRAN) 4 MG tablet Take 1 tablet (4 mg) by mouth every 8 hours as needed for nausea. Taking As Needed    senna-docusate (SENOKOT-S/PERICOLACE) 8.6-50 MG tablet 2 tabs in the morning and 1 tab at HS 11/25/2024 Morning    tolnaftate (TINACTIN) 1 % external powder Apply topically 2 times daily as needed for itching or irritation OKAY TO KEEP BEDSIDE More than a month    Vitamins A & D (MEDPURA VITAMIN A & D) OINT APPLY TOPICALLY TO AFFECTED AREA(S) TWICE DAILY APPLY TOPICALLY TO AFFECTED AREA(S) AS NEEDED 11/25/2024 Morning

## 2024-11-25 NOTE — ED TRIAGE NOTES
Pt arrives VIA EMS from [place] with concerns of increasing weakness and fever. Facility states their temp was 101.4. Pt tested negative for COVID and FLU at the facility this morning. Pt is confused on arrival.  /63   Pulse 64   Temp 98.8  F (37.1  C) (Tympanic)   Resp 20   LMP 02/03/1984 (Approximate)   SpO2 96%       Triage Assessment (Adult)       Row Name 11/25/24 1010          Triage Assessment    Airway WDL WDL        Respiratory WDL    Respiratory WDL WDL        Skin Circulation/Temperature WDL    Skin Circulation/Temperature WDL WDL        Cardiac WDL    Cardiac WDL WDL        Peripheral/Neurovascular WDL    Peripheral Neurovascular WDL WDL        Cognitive/Neuro/Behavioral WDL    Cognitive/Neuro/Behavioral WDL X

## 2024-11-25 NOTE — PROVIDER NOTIFICATION
11/25/24 1400   Initial Information   Patient Belongings remains with patient   Patient Belongings Remaining with Patient clothing   Did you bring any home meds/supplements to the hospital?  No     [facility] will make every effort per our policy to help keep your items safe while in the hospital.  If you choose to keep any items at the bedside, we cannot be held responsible for any items that are lost or broken.        I have reviewed my belongings list on admission and verify that it is correct.     Patient signature_______________________________  Date/Time_____________________    2nd Staff person if patient unable to sign __________________________  Date/Time ______________________      I have received all my belongings noted above at discharge.    Patient signature________________________________  Date/Time  __________________________

## 2024-11-25 NOTE — ED PROVIDER NOTES
History     Chief Complaint   Patient presents with    Fever    Generalized Weakness     The history is provided by the EMS personnel and medical records.     Geno Horn is a 89 year old female who presents to the ER today from [facility] for a fever, lethargy, weakness and decreased intake. It was reported that patient was more confused today and had a fever of 101.3.  It was reported that she had a negative flu and COVID this morning.  She was given Tylenol prior to arrival.  Evaluation of patient's chart finds that she was seen by nurse practitioner this morning.  Staff reported that patient is more fatigued over the weekend.  She is very sleepy during evaluation.  Patient recently seen in the rapid clinic on 10/27 for urinary tract infection that was treated with amoxicillin, urine culture was susceptible.    POLST 2023: She is a DNR/DNI comfort focused treatment, daughterAdri is at bedside and respects these wishes as well    PMH: Alzheimer disease coronary artery disease, hypertension, hypothyroidism, sepsis, hyponatremia, frequent falls, hyperlipidemia, osteoporosis    Allergies:  Allergies   Allergen Reactions    Alendronate Nausea     Stomach upset    Celebrex [Celecoxib] Other (See Comments)     Light headed    Other Environmental Allergy Other (See Comments)     Itchy,Runny Eyes    Oxybutynin Other (See Comments)     Felt High    Tolterodine      Other reaction(s): Intolerance-Can't Take  Was unable to sleep at night    Tramadol Other (See Comments)     Adverse  effects    Trospium Other (See Comments)     drowsy       Problem List:    Patient Active Problem List    Diagnosis Date Noted    UTI (urinary tract infection) 11/25/2024     Priority: Medium    Generalized weakness 11/25/2024     Priority: Medium    Alzheimer's disease (H) 02/20/2024     Priority: Medium    Periprosthetic fracture around internal prosthetic hip joint, initial encounter 10/21/2021     Priority: Medium     Multiple skin tears 02/25/2021     Priority: Medium    H/O dizziness 02/25/2021     Priority: Medium    Skin tear of left upper extremity 02/20/2021     Priority: Medium    Falls frequently 11/17/2020     Priority: Medium    Contusion of right hip, initial encounter 11/17/2020     Priority: Medium    General unsteadiness 11/17/2020     Priority: Medium    Closed wedge compression fracture of T9 vertebra with routine healing, subsequent encounter 11/17/2020     Priority: Medium    History of 2019 novel coronavirus disease (COVID-19) 11/17/2020     Priority: Medium    Pneumonia due to 2019 novel coronavirus 11/01/2020     Priority: Medium    Hyponatremia 11/01/2020     Priority: Medium    COVID-19 virus infection 11/01/2020     Priority: Medium    Spinal stenosis of lumbar region, unspecified whether neurogenic claudication present 02/19/2020     Priority: Medium    Sepsis (H) 06/17/2019     Priority: Medium    Unsteady gait 05/21/2018     Priority: Medium    Urinary urgency 07/24/2017     Priority: Medium    H/O basal cell carcinoma excision 07/20/2016     Priority: Medium    Osteoarthritis of both hips 01/21/2015     Priority: Medium    Allergic rhinitis 03/16/2012     Priority: Medium    Urinary incontinence 11/01/2010     Priority: Medium     Formatting of this note might be different from the original.  IMO Update 10/11      Osteoarthritis of foot joint 05/26/2009     Priority: Medium     Formatting of this note might be different from the original.  Right midfoot      Hearing loss 04/10/2008     Priority: Medium     Formatting of this note might be different from the original.  IMO Update      Coronary atherosclerosis of native coronary artery 05/09/2007     Priority: Medium     Formatting of this note might be different from the original.  IMO Update 10/11      Essential hypertension 05/09/2007     Priority: Medium     Formatting of this note might be different from the original.  IMO Update      Hypothyroidism  2007     Priority: Medium     Overview:   tsh and t4 normal on 2015     Formatting of this note might be different from the original.  IMO Update 10/11      Hyperlipidemia 2003     Priority: Medium     Formatting of this note might be different from the original.  IMO Update 10/      Osteoporosis 2003     Priority: Medium     Formatting of this note might be different from the original.  Has had DEXA x 2.  IMO Update      Sciatica 2002     Priority: Medium     Formatting of this note might be different from the original.  Apparently has hx. of coronary disease.  Is S/P angioplasty in .  Had normal EKG done 04 in [place].  IMO Update 10/11    Formatting of this note might be different from the original.  IMO Update 10/11    Formatting of this note might be different from the original.  [facility]          Past Medical History:    Past Medical History:   Diagnosis Date    Hypertension     Hypothyroid     Osteoarthritis     ST elevation (STEMI) myocardial infarction (H)        Past Surgical History:    Past Surgical History:   Procedure Laterality Date    ARTHROPLASTY HIP Right     Dr Rodriguez    ARTHROPLASTY HIP Left     Dr. Rodriguez    COMBINED CYSTOSCOPY, URETEROSCOPY, LASER HOLMIUM LITHOTRIPSY URETER(S) Right 2019    Procedure: CYSTOSCOPY, RIGHT STENT REMOVAL, URETEROSCOPY, LASER HOLMIUM LITHOTRIPSY RIGHT URETER WITH STENT REPLACEMENT;  Surgeon: Shawn Cole MD;  Location:  OR    CYSTOSCOPY, RETROGRADES, INSERT STENT URETER(S), COMBINED Right 2019    Procedure: CYSTOSCOPY, WITH RETROGRADE PYELOGRAM AND URETERAL STENT INSERTION;  Surgeon: Shawn Cole MD;  Location:  OR       Family History:    Family History   Problem Relation Age of Onset    Heart Disease Mother 60         of mi    Heart Disease Father 60         of mi    Heart Disease Brother     Heart Disease Brother        Social History:  Marital Status:   [5]  Social History     Tobacco Use     Smoking status: Never    Smokeless tobacco: Never    Tobacco comments:     no ecig   Vaping Use    Vaping status: Every Day   Substance Use Topics    Alcohol use: Not Currently     Alcohol/week: 0.0 standard drinks of alcohol    Drug use: No        Medications:    acetaminophen (TYLENOL) 650 MG CR tablet  alum & mag hydroxide-simethicone (MAALOX  ES) 400-400-40 MG/5ML SUSP suspension  artificial tears (GENTEAL) 0.1-0.2-0.3 % ophthalmic solution  calcium carbonate (TUMS) 500 MG chewable tablet  cetirizine (ZYRTEC) 10 MG tablet  cyanocobalamin (VITAMIN B-12) 500 MCG tablet  DEEP SEA NASAL SPRAY 0.65 % nasal spray  ferrous sulfate (FEROSUL) 325 (65 Fe) MG tablet  glycerin (ADULT) 2 g suppository  Incontinence Supply Disposable (DEPEND EASY FIT UNDERGARMENTS) MISC  Incontinence Supply Disposable (POISE PAD) PADS  levothyroxine (SYNTHROID/LEVOTHROID) 25 MCG tablet  levothyroxine (SYNTHROID/LEVOTHROID) 50 MCG tablet  magnesium hydroxide (MILK OF MAGNESIA) 400 MG/5ML suspension  meclizine (ANTIVERT) 12.5 MG tablet  Menthol, Topical Analgesic, 4 % GEL  Multiple Vitamin (TAB-A-RADHA) TABS  naloxone (NARCAN) 4 MG/0.1ML nasal spray  ondansetron (ZOFRAN) 4 MG tablet  order for DME  order for DME  senna-docusate (SENOKOT-S/PERICOLACE) 8.6-50 MG tablet  tolnaftate (TINACTIN) 1 % external powder  Vitamins A & D (MEDPURA VITAMIN A & D) OINT          Review of Systems   Unable to perform ROS: Mental status change   See HPI    Physical Exam   BP: 110/63  Pulse: 64  Temp: 98.8  F (37.1  C)  Resp: 20  SpO2: 96 %      Physical Exam  Vitals and nursing note reviewed.   Constitutional:       Appearance: She is ill-appearing. She is not toxic-appearing.   HENT:      Head: Normocephalic.      Nose: Nose normal.      Mouth/Throat:      Mouth: Mucous membranes are dry.   Cardiovascular:      Rate and Rhythm: Normal rate.      Pulses: Normal pulses.      Comments: Regular irregular  Pulmonary:      Effort: Pulmonary effort is normal.       Breath sounds: Normal breath sounds.   Abdominal:      General: Abdomen is flat.      Palpations: Abdomen is soft.   Musculoskeletal:      Cervical back: Neck supple.   Skin:     General: Skin is warm.      Capillary Refill: Capillary refill takes less than 2 seconds.         ED Course         Results for orders placed or performed during the hospital encounter of 11/25/24 (from the past 24 hours)   CBC with platelets differential    Narrative    The following orders were created for panel order CBC with platelets differential.  Procedure                               Abnormality         Status                     ---------                               -----------         ------                     CBC with platelets and d...[[id number]]  Abnormal            Final result                 Please view results for these tests on the individual orders.   Comprehensive metabolic panel   Result Value Ref Range    Sodium 130 (L) 135 - 145 mmol/L    Potassium 4.0 3.4 - 5.3 mmol/L    Carbon Dioxide (CO2) 25 22 - 29 mmol/L    Anion Gap 9 7 - 15 mmol/L    Urea Nitrogen 15.3 8.0 - 23.0 mg/dL    Creatinine 0.63 0.51 - 0.95 mg/dL    GFR Estimate 80 >60 mL/min/1.73m2    Calcium 8.8 8.8 - 10.4 mg/dL    Chloride 96 (L) 98 - 107 mmol/L    Glucose 145 (H) 70 - 99 mg/dL    Alkaline Phosphatase 93 40 - 150 U/L    AST 40 0 - 45 U/L    ALT 32 0 - 50 U/L    Protein Total 6.6 6.4 - 8.3 g/dL    Albumin 3.8 3.5 - 5.2 g/dL    Bilirubin Total 0.9 <=1.2 mg/dL   Lactic acid whole blood with 1x repeat in 2 hr when >2   Result Value Ref Range    Lactic Acid, Initial 1.8 0.7 - 2.0 mmol/L   CBC with platelets and differential   Result Value Ref Range    WBC Count 15.8 (H) 4.0 - 11.0 10e3/uL    RBC Count 2.89 (L) 3.80 - 5.20 10e6/uL    Hemoglobin 9.5 (L) 11.7 - 15.7 g/dL    Hematocrit 28.7 (L) 35.0 - 47.0 %    MCV 99 78 - 100 fL    MCH 32.9 26.5 - 33.0 pg    MCHC 33.1 31.5 - 36.5 g/dL    RDW 16.7 (H) 10.0 - 15.0 %    Platelet Count 359 150 - 450  10e3/uL    % Neutrophils 91 %    % Lymphocytes 4 %    % Monocytes 5 %    % Eosinophils 0 %    % Basophils 0 %    % Immature Granulocytes 1 %    NRBCs per 100 WBC 0 <1 /100    Absolute Neutrophils 14.4 (H) 1.6 - 8.3 10e3/uL    Absolute Lymphocytes 0.6 (L) 0.8 - 5.3 10e3/uL    Absolute Monocytes 0.7 0.0 - 1.3 10e3/uL    Absolute Eosinophils 0.0 0.0 - 0.7 10e3/uL    Absolute Basophils 0.0 0.0 - 0.2 10e3/uL    Absolute Immature Granulocytes 0.1 <=0.4 10e3/uL    Absolute NRBCs 0.0 10e3/uL   Extra Tube    Narrative    The following orders were created for panel order Extra Tube.  Procedure                               Abnormality         Status                     ---------                               -----------         ------                     Extra Blue Top Tube[[id number]]                              In process                 Extra Red Top Tube[[id number]]                               In process                   Please view results for these tests on the individual orders.   UA with Microscopic reflex to Culture    Specimen: Urine, Catheter   Result Value Ref Range    Color Urine Yellow Colorless, Straw, Light Yellow, Yellow    Appearance Urine Slightly Cloudy (A) Clear    Glucose Urine Negative Negative mg/dL    Bilirubin Urine Negative Negative    Ketones Urine 10 (A) Negative mg/dL    Specific Gravity Urine 1.027 1.000 - 1.030    Blood Urine Negative Negative    pH Urine 5.5 5.0 - 9.0    Protein Albumin Urine 50 (A) Negative mg/dL    Urobilinogen Urine 2.0 Normal, 2.0 mg/dL    Nitrite Urine Negative Negative    Leukocyte Esterase Urine Moderate (A) Negative    Bacteria Urine Many (A) None Seen /HPF    WBC Clumps Urine Present (A) None Seen /HPF    Mucus Urine Present (A) None Seen /LPF    RBC Urine 9 (H) <=2 /HPF    WBC Urine 87 (H) <=5 /HPF    Squamous Epithelials Urine 1 <=1 /HPF    Narrative    Urine Culture ordered based on laboratory criteria   Influenza A/B, RSV and SARS-CoV2 PCR (COVID-19) Nose     Specimen: Nose; Swab   Result Value Ref Range    Influenza A PCR Negative Negative    Influenza B PCR Negative Negative    RSV PCR Negative Negative    SARS CoV2 PCR Negative Negative    Narrative    Testing was performed using the Xpert Xpress CoV2/Flu/RSV Assay on the [employer] GeneXpert Instrument. This test should be ordered for the detection of SARS-CoV2, influenza, and RSV viruses in individuals with signs and symptoms of respiratory tract infection. This test is for in vitro diagnostic use under the [place] FDA for laboratories certified under CLIA to perform high or moderate complexity testing. This test has been [place] FDA cleared. A negative result does not rule out the presence of PCR inhibitors in the specimen or target RNA in concentration below the limit of detection for the assay. If only one viral target is positive but coinfection with multiple targets is suspected, the sample should be re-tested with another FDA cleared, approved, or authorized test, if coninfection would change clinical management. This test was validated by the [facility]. These laboratories are certified under the Clinical Laboratory Improvement Amendments of 1988 (CLIA-88) as qualified to perfom high complexity laboratory testing.   XR Chest Port 1 View    Narrative    PROCEDURE:  XR CHEST PORT 1 VIEW    HISTORY: fever, lethargy, ? pneumonia. .    COMPARISON:  2/26/2016, 9/30/2021    FINDINGS:    The cardiomediastinal contours are enlarged. There is calcific aortic  atherosclerosis.   No focal consolidation. Chronic calcified nodules. Interstitial edema.  Trace fluid along the minor fissure. No pneumothorax.      Impression    IMPRESSION:  Findings suggest interstitial pulmonary edema. Atypical  infection could have an overlapping appearance.      KATELIN CLEMENTS MD         SYSTEM ID:  [id number]       Medications   sodium chloride 0.9% BOLUS 1,000 mL (1,000 mLs Intravenous $New Bag 11/25/24 1027)   cefTRIAXone  (ROCEPHIN) 1 g vial to attach to  mL bag for ADULTS or NS 50 mL bag for PEDS (1 g Intravenous $New Bag 11/25/24 1106)       Assessments & Plan (with Medical Decision Making)  Geno Horn is a 89 year old female who presents to the ER today from [facility] for a fever, lethargy, weakness and decreased intake. It was reported that patient was more confused today and had a fever of 101.3.  It was reported that she had a negative flu and COVID this morning.  She was given Tylenol prior to arrival.  Evaluation of patient's chart finds that she was seen by nurse practitioner this morning.  Staff reported that patient is more fatigued over the weekend.  She is very sleepy during evaluation.  Patient recently seen in the rapid clinic on 10/27 for urinary tract infection that was treated with amoxicillin, urine culture was susceptible.    POLST 2023: She is a DNR/DNI comfort focused treatment, daughterAdri is at bedside and respects these wishes as well  PMH: Alzheimer disease coronary artery disease, hypertension, hypothyroidism, sepsis, hyponatremia, frequent falls, hyperlipidemia, osteoporosis  Upon arrival : 110/63, 98.8 HR 64, RR 20 96% on room air.   She is alert to her self, non-toxic but ill appearing, fatigued. She is non-distressed. Breathing is easy, unlabored. Abdomen soft.   Labs & Radiology results interpreted by radiologist:   ED Course as of 11/25/24 1120   Mon Nov 25, 2024   1040 Lactic acid whole blood with 1x repeat in 2 hr when >2  1.8   1040 CBC with platelets differential(!)  WBC 15.8, anemia- hemoglobin 9.5- 10.9 two months ago   1044 UA with Microscopic reflex to Culture(!)  UTI appreciated again-moderate leukocytes many bacteria WBCs- culture pending   1100 Comprehensive metabolic panel(!)  Hyponatremia- sodium 130, normal renal function, LFTs   1111 XR Chest Port 1 View  IMPRESSION:  Findings suggest interstitial pulmonary edema. Atypical  infection could have an  overlapping appearance     1118 Influenza A/B, RSV and SARS-CoV2 PCR (COVID-19) Nose  negative    -Blood culture and urine culture  pending    Symptoms likely related to UTI, treated with rocephin here based off most recent urine culture results. She is given IVF.   Discussed with family- daughter Adri at bedside and patient  admission for UTI, weakness and they are agreeable. Dr. Briceno kindly accepted patient for admission  Patient Vitals for the past 24 hrs:   BP Temp Temp src Pulse Resp SpO2   11/25/24 1011 110/63 -- -- -- -- --   11/25/24 1008 -- 98.8  F (37.1  C) Tympanic 64 20 96 %         I have reviewed the nursing notes.    I have reviewed the findings, diagnosis, plan and need for follow up with the patient.    Medical Decision Making  The patient's presentation was of moderate complexity (a chronic illness mild to moderate exacerbation, progression, or side effect of treatment).    The patient's evaluation involved:  review of external note(s) from 1 sources (see separate area of note for details)  review of 3+ test result(s) ordered prior to this encounter (see separate area of note for details)  ordering and/or review of 3+ test(s) in this encounter (see separate area of note for details)  discussion of management or test interpretation with another health professional (see separate area of note for details)    The patient's management necessitated high risk (a decision regarding hospitalization).      New Prescriptions    No medications on file       Final diagnoses:   UTI (urinary tract infection)   Generalized weakness   Hyponatremia       11/25/2024   [facility], APRN CNP  11/25/24 1235

## 2024-11-25 NOTE — PROGRESS NOTES
"[facility] ADMISSION NOTE    Patient admitted to room 306 at approximately 1400 via bed from emergency room. Patient was accompanied by transport tech and grand daughter.     Verbal SBAR report received from [place] prior to patient arrival.     Patient ambulated to bed with two assist. Patient alert and oriented X 1. The patient is not having any pain.  . Admission vital signs: Blood pressure 100/61, pulse 73, temperature 98.8  F (37.1  C), temperature source Tympanic, resp. rate 17, height 1.549 m (5' 1\"), weight 61.2 kg (135 lb), last menstrual period 02/03/1984, SpO2 97%, not currently breastfeeding. Patient was oriented to plan of care, call light, bed controls, tv, telephone, bathroom, and visiting hours.     Risk Assessment    The following safety risks were identified during admission: fall. Yellow risk band applied: YES.     Skin Initial Assessment    This writer admitted this patient and completed a full skin assessment and Nicholas score in the Adult PCS flowsheet.   Photo documentation of skin problem and/or wound competed via [employer] application (located under Media):  No    Appropriate interventions initiated as needed.     Secondary skin check completed by Sabina       Education    Patient has a [place] to Observation order: No  Observation education completed and documented: N/A      Karyn Moreno RN      "

## 2024-11-26 LAB
ANION GAP SERPL CALCULATED.3IONS-SCNC: 9 MMOL/L (ref 7–15)
BUN SERPL-MCNC: 12.9 MG/DL (ref 8–23)
CALCIUM SERPL-MCNC: 8.6 MG/DL (ref 8.8–10.4)
CHLORIDE SERPL-SCNC: 99 MMOL/L (ref 98–107)
CREAT SERPL-MCNC: 0.51 MG/DL (ref 0.51–0.95)
EGFRCR SERPLBLD CKD-EPI 2021: 84 ML/MIN/1.73M2
ERYTHROCYTE [DISTWIDTH] IN BLOOD BY AUTOMATED COUNT: 16.9 % (ref 10–15)
GLUCOSE SERPL-MCNC: 84 MG/DL (ref 70–99)
HCO3 SERPL-SCNC: 24 MMOL/L (ref 22–29)
HCT VFR BLD AUTO: 28.2 % (ref 35–47)
HGB BLD-MCNC: 8.4 G/DL (ref 11.7–15.7)
MCH RBC QN AUTO: 32.4 PG (ref 26.5–33)
MCHC RBC AUTO-ENTMCNC: 29.8 G/DL (ref 31.5–36.5)
MCV RBC AUTO: 109 FL (ref 78–100)
PLATELET # BLD AUTO: 262 10E3/UL (ref 150–450)
POTASSIUM SERPL-SCNC: 3.8 MMOL/L (ref 3.4–5.3)
RBC # BLD AUTO: 2.59 10E6/UL (ref 3.8–5.2)
SODIUM SERPL-SCNC: 132 MMOL/L (ref 135–145)
WBC # BLD AUTO: 6.5 10E3/UL (ref 4–11)

## 2024-11-26 PROCEDURE — 99232 SBSQ HOSP IP/OBS MODERATE 35: CPT | Performed by: HOSPITALIST

## 2024-11-26 PROCEDURE — 250N000011 HC RX IP 250 OP 636: Performed by: HOSPITALIST

## 2024-11-26 PROCEDURE — 85027 COMPLETE CBC AUTOMATED: CPT | Performed by: HOSPITALIST

## 2024-11-26 PROCEDURE — 250N000013 HC RX MED GY IP 250 OP 250 PS 637: Performed by: HOSPITALIST

## 2024-11-26 PROCEDURE — 120N000001 HC R&B MED SURG/OB

## 2024-11-26 PROCEDURE — 80048 BASIC METABOLIC PNL TOTAL CA: CPT | Performed by: HOSPITALIST

## 2024-11-26 PROCEDURE — 36415 COLL VENOUS BLD VENIPUNCTURE: CPT | Performed by: HOSPITALIST

## 2024-11-26 RX ADMIN — DEXTRAN 70, GLYCERIN, HYPROMELLOSE 1 DROP: 1; 2; 3 SOLUTION/ DROPS OPHTHALMIC at 05:17

## 2024-11-26 RX ADMIN — CYANOCOBALAMIN TAB 500 MCG 500 MCG: 500 TAB at 09:28

## 2024-11-26 RX ADMIN — LORATADINE 10 MG: 10 TABLET ORAL at 22:01

## 2024-11-26 RX ADMIN — ENOXAPARIN SODIUM 40 MG: 40 INJECTION SUBCUTANEOUS at 22:02

## 2024-11-26 RX ADMIN — DEXTRAN 70, GLYCERIN, HYPROMELLOSE 1 DROP: 1; 2; 3 SOLUTION/ DROPS OPHTHALMIC at 14:13

## 2024-11-26 RX ADMIN — CEFTRIAXONE SODIUM 1 G: 1 INJECTION, POWDER, FOR SOLUTION INTRAMUSCULAR; INTRAVENOUS at 09:28

## 2024-11-26 RX ADMIN — ACETAMINOPHEN 650 MG: 325 TABLET, FILM COATED ORAL at 05:17

## 2024-11-26 RX ADMIN — ACETAMINOPHEN 650 MG: 325 TABLET, FILM COATED ORAL at 14:13

## 2024-11-26 RX ADMIN — DEXTRAN 70, GLYCERIN, HYPROMELLOSE 1 DROP: 1; 2; 3 SOLUTION/ DROPS OPHTHALMIC at 22:02

## 2024-11-26 RX ADMIN — HYDROMORPHONE HYDROCHLORIDE 1 MG: 2 TABLET ORAL at 14:49

## 2024-11-26 RX ADMIN — ACETAMINOPHEN 650 MG: 325 TABLET, FILM COATED ORAL at 22:02

## 2024-11-26 RX ADMIN — LEVOTHYROXINE SODIUM 62.5 MCG: 0.12 TABLET ORAL at 09:28

## 2024-11-26 ASSESSMENT — ACTIVITIES OF DAILY LIVING (ADL)
ADLS_ACUITY_SCORE: 62
ADLS_ACUITY_SCORE: 74
ADLS_ACUITY_SCORE: 74
ADLS_ACUITY_SCORE: 76
ADLS_ACUITY_SCORE: 62
ADLS_ACUITY_SCORE: 62
ADLS_ACUITY_SCORE: 74
ADLS_ACUITY_SCORE: 62
ADLS_ACUITY_SCORE: 76
ADLS_ACUITY_SCORE: 74
ADLS_ACUITY_SCORE: 62
ADLS_ACUITY_SCORE: 74
ADLS_ACUITY_SCORE: 62
ADLS_ACUITY_SCORE: 74
ADLS_ACUITY_SCORE: 76
ADLS_ACUITY_SCORE: 74

## 2024-11-26 NOTE — PLAN OF CARE
Goal Outcome Evaluation:  Pt confused at baseline. VSS. Afebrile. LS diminished in bases. Blanchable redness to coccyx. CCRQ2H. Poor appetite. Urine dark ruiz and foul smelling, purewick in place. Pt had large soft/formed BM in bedside commode with incontinence as well. AO2.     Plan of Care Reviewed With: patient    Overall Patient Progress: improvingOverall Patient Progress: improving    Outcome Evaluation: VSS, afebrile, denies pain.

## 2024-11-26 NOTE — PROGRESS NOTES
SAFETY CHECKLIST  ID Bands and Risk clasps correct and in place (DNR, Fall risk, Allergy, Latex, Limb):  Yes  All Lines Reconciled and labeled correctly: Yes  Whiteboard updated:Yes  Environmental interventions: Yes  Verify Tele #: SANJAY Torre RN on 11/25/2024 at 7:00 PM

## 2024-11-26 NOTE — PROGRESS NOTES
:     Patient comes from [facility].     MADELYN Hinojosa on 11/26/2024 at 9:55 AM    Per discharge planning patient is anticipated to discharge back to [facility] later this week. Called and updated Indigo at [place].     MADELYN Hinojosa on 11/26/2024 at 2:07 PM

## 2024-11-26 NOTE — PROGRESS NOTES
[facility]    Medicine Progress Note - Hospitalist Service    Date of Admission:  11/25/2024    Assessment & Plan      Geno Horn is a 89 year old female admitted on 11/25/2024. She has a istory of hypertension, hypothyroidism, osteoarthritis, senile dementia resident of [facility] facility was sent to the ED with increasing fatigue, fevers and lethargy was admitted with UTI and acute toxic metabolic encephalopathy.  She has the following acute medical issues:    Acute toxic metabolic encephalopathy: Complicated by her dementia.  This is probably due to urinary tract infection.  Blood and urine cultures have been sent.  The patient is being treated with Rocephin 1 g daily.  Mental status is coming back to baseline.  According to the family no aggressive measures.  Continue supportive care as well.    Urinary tract infection: Urine cultures are growing gram-negative rods.  She is covered with Rocephin here.      Sepsis: The patient has leukocytosis, altered mental status, fevers and source of infection consistent with sepsis.  Monitor hemodynamics.  The patient was given a bolus in the ED.  Avoid further fluid resuscitation due to pulmonary congestion.    Hyponatremia: 132 The patient has mild hyponatremia probably is hypovolemic hyponatremia.  Continue to monitor for now.    Hypothyroidism: TSH was 2.64..  Continue on levothyroxine.    Senile dementia: The patient is at a facility.  According to the family she is DNR/DNI.  No aggressive measures desired.  If she declines then we will switch the care to comfort care only.    History of constipation: Will keep her on a good bowel regimen.    Osteoarthritis: Continue with pain management.    Pulmonary congestion: The patient has pulmonary congestion on the chest x-ray.  Her BNP is elevated as well.  Echocardiogram done showed preserved left ventricle ejection fraction of 60 to 65% with grade 2 diastolic dysfunction.  Avoid excessive  hydration.          Diet: Combination Diet Regular Diet Adult    DVT Prophylaxis: Enoxaparin (Lovenox) SQ  Riggs Catheter: Not present  Lines: None     Cardiac Monitoring: None  Code Status: No CPR- Do NOT Intubate      Clinically Significant Risk Factors         # Hyponatremia: Lowest Na = 130 mmol/L in last 2 days, will monitor as appropriate  # Hypochloremia: Lowest Cl = 96 mmol/L in last 2 days, will monitor as appropriate  # Hypocalcemia: Lowest Ca = 8.6 mg/dL in last 2 days, will monitor and replace as appropriate         # Hypertension: Noted on problem list     # Dementia: noted on problem list                   Social Drivers of Health    Physical Activity: Inactive (10/31/2024)    Exercise Vital Sign     Days of Exercise per Week: 0 days     Minutes of Exercise per Session: 0 min   Social Connections: Unknown (10/31/2024)    Social Connection and Isolation Panel [NHANES]     Frequency of Social Gatherings with Friends and Family: Twice a week          Disposition Plan     Medically Ready for Discharge: Anticipated in 2-4 Days             Carlos Briceno MD  Hospitalist Service  [facility]  Securely message with August (more info)  Text page via [employer] Paging/Directory   ______________________________________________________________________    Interval History   The patient was seen and examined.  Overnight events reviewed.  Discussed with nursing staff.  She is doing much better today.  Her mental status seems to be back to baseline.  She does have dementia.  Denies any complaints.  She gets forgetful.  Overall improvement.  No fevers or chills.    Gen: No fevers/chills  Resp: no SOB or cough  CVS: No chest pain or orthopnea  GI: no nausea/vomiting. No abdominal pain  Neuro: No headaches or Seizures     Physical Exam   Vital Signs: Temp: 97.7  F (36.5  C) Temp src: Tympanic BP: 122/57 Pulse: 74   Resp: 16 SpO2: 97 % O2 Device: None (Room air)    Weight: 128 lbs 8 oz    GENERAL APPEARANCE:  In no acute distress. Baseline dementia  HEENT: No oropharyngeal lesions.  NECK: Supple.   CHEST: Symmetric. Nontender to palpation.  LUNGS: B/l CTA  HEART: S1+S2 [place]  ABDOMEN: Soft, flat, and benign. BS +ve  EXTREMITIES: No cyanosis, clubbing, or edema.  NEUROLOGIC: Moving all extremities  PSYCHIATRIC: Demented  SKIN: No new rashes.        Medical Decision Making       39 MINUTES SPENT BY ME on the date of service doing chart review, history, exam, documentation & further activities per the note.      Data     I have personally reviewed the following data over the past 24 hrs:    6.5  \   8.4 (L)   / 262     132 (L) 99 12.9 /  84   3.8 24 0.51 \       Imaging results reviewed over the past 24 hrs:   Recent Results (from the past 24 hours)   Echocardiogram Complete   Result Value    LVEF  60-65%    Narrative    [id number]  [id number]  [id number]  [id number]^AIMEE^ANSHUL     [facility]  [address] [place] [address]     Name: SUSHIL GOZNALEZ  MRN: [id number]  : 1926  Study Date: 2024 02:40 PM  Age: 89 yrs  Gender: Female  Patient Location: [facility]  Reason For Study: CHF  Ordering Physician: ANSHUL YU  Performed By: Maye Pritchard     BSA: 1.6 m2  Height: 61 in  Weight: 135 lb  HR: 77  BP: 141/62 mmHg  ______________________________________________________________________________  Procedure  Complete Portable Echo Adult.  ______________________________________________________________________________  Interpretation Summary  Global and regional left ventricular function is normal with an EF of 60-65%.  Grade II or moderate diastolic dysfunction.  The right ventricle is normal size. Global right ventricular function is  normal.  Mild to moderate tricuspid insufficiency.  No pericardial effusion.  Normal IVC.  There is no prior study for direct comparison.  ______________________________________________________________________________  Left Ventricle  Left ventricular wall  thickness is normal. Left ventricular size is normal.  Global and regional left ventricular function is normal with an EF of 60-65%.  Grade II or moderate diastolic dysfunction. No regional wall motion  abnormalities are seen.     Right Ventricle  The right ventricle is normal size. Global right ventricular function is  normal.     Atria  Mild left atrial enlargement is present. Right atrium is poorly visualized. It  appears normal in size in the images available.     Mitral Valve  Mild to moderate mitral annular calcification is present. Mild mitral  insufficiency is present.     Aortic Valve  The aortic valve is tricuspid. Aortic valve sclerosis is present. On Doppler  interrogation, there is no significant stenosis or regurgitation.     Tricuspid Valve  Mild to moderate tricuspid insufficiency is present. Mild pulmonary  hypertension is present. Estimated pulmonary artery systolic pressure is 36  mmHg plus right atrial pressure.     Pulmonic Valve  The valve leaflets are not well visualized. Trace pulmonic insufficiency is  present.     Vessels  The aorta root is normal. The thoracic aorta is normal. The inferior vena cava  is normal.     Pericardium  No pericardial effusion is present.     Compared to Previous Study  There is no prior study for direct comparison.     Attestation  I have personally viewed the imaging and agree with the interpretation and  report as documented by the fellow, Saúl Bishop, and/or edited by me.  ______________________________________________________________________________  MMode/2D Measurements & Calculations     IVSd: 1.0 cm  LVIDd: 4.5 cm  LVIDs: 3.1 cm  LVPWd: 0.91 cm  FS: 30.2 %  LV mass(C)d: 144.3 grams  LV mass(C)dI: 90.3 grams/m2  Ao root diam: 3.3 cm  asc Aorta Diam: 3.3 cm  LVOT diam: 1.9 cm  LVOT area: 2.8 cm2  Ao root diam index Ht(cm/m): 2.1  Ao root diam index BSA (cm/m2): 2.1  Asc Ao diam index BSA (cm/m2): 2.1  Asc Ao diam index Ht(cm/m): 2.1  LA Volume (BP): 59.1  ml     LA Volume Index (BP): 36.9 ml/m2  RWT: 0.41  TAPSE: 2.2 cm     Doppler Measurements & Calculations  MV E max jordin: 72.8 cm/sec  MV A max jordin: 85.3 cm/sec  MV E/A: 0.85  MV dec time: 0.17 sec  Ao V2 max: 109.0 cm/sec  Ao max P.0 mmHg  Ao V2 mean: 76.6 cm/sec  Ao mean PG: 3.0 mmHg  Ao V2 VTI: 24.4 cm  MORELIA(I,D): 2.3 cm2  MORELIA(V,D): 2.2 cm2  LV V1 max P.9 mmHg  LV V1 max: 85.0 cm/sec  LV V1 VTI: 20.2 cm  SV(LVOT): 57.3 ml  SI(LVOT): 35.8 ml/m2  PA acc time: 0.11 sec  TR max jordin: 253.5 cm/sec  TR max P.1 mmHg  AV Jordin Ratio (DI): 0.78  MORELIA Index (cm2/m2): 1.5     E/E' av.2  Lateral E/e': 8.9  Medial E/e': 15.6     ______________________________________________________________________________  Report approved by: Andrew Hermosillo 2024 03:44 PM

## 2024-11-26 NOTE — PLAN OF CARE
"Goal Outcome Evaluation:    VSS, Intermittent confusion. Afebrile, denies pain, lung sounds clear. Slept well between cares. All questions and concerns addressed.     /56 (BP Location: Left arm, Patient Position: Semi-Flores's, Cuff Size: Adult Regular)   Pulse 74   Temp 97.6  F (36.4  C) (Tympanic)   Resp 18   Ht 1.549 m (5' 1\")   Wt 61.2 kg (135 lb)   LMP 02/03/1984 (Approximate)   SpO2 96%   BMI 25.51 kg/m          Plan of Care Reviewed With: patient    Overall Patient Progress: improving       Loulou Torre RN on 11/26/2024 at 5:30 AM    "

## 2024-11-26 NOTE — PROGRESS NOTES
Interdisciplinary Discharge Planning Note    Anticipated Discharge Date: 11/28-11/29    Anticipated Discharge Location: [facility]    Clinical Needs Before Discharge:   ABX and culture results    Treatment Needs After Discharge:  None identified    Potential Barriers to Discharge: None identified     MADELYN Hinojosa  11/26/2024,  1:30 PM

## 2024-11-27 VITALS
SYSTOLIC BLOOD PRESSURE: 115 MMHG | WEIGHT: 124 LBS | TEMPERATURE: 97.9 F | HEART RATE: 94 BPM | BODY MASS INDEX: 23.41 KG/M2 | DIASTOLIC BLOOD PRESSURE: 72 MMHG | RESPIRATION RATE: 14 BRPM | OXYGEN SATURATION: 96 % | HEIGHT: 61 IN

## 2024-11-27 LAB
ANION GAP SERPL CALCULATED.3IONS-SCNC: 8 MMOL/L (ref 7–15)
BACTERIA UR CULT: ABNORMAL
BUN SERPL-MCNC: 9.4 MG/DL (ref 8–23)
CALCIUM SERPL-MCNC: 8.6 MG/DL (ref 8.8–10.4)
CHLORIDE SERPL-SCNC: 101 MMOL/L (ref 98–107)
CREAT SERPL-MCNC: 0.52 MG/DL (ref 0.51–0.95)
EGFRCR SERPLBLD CKD-EPI 2021: 84 ML/MIN/1.73M2
ERYTHROCYTE [DISTWIDTH] IN BLOOD BY AUTOMATED COUNT: 16.3 % (ref 10–15)
GLUCOSE SERPL-MCNC: 88 MG/DL (ref 70–99)
HCO3 SERPL-SCNC: 25 MMOL/L (ref 22–29)
HCT VFR BLD AUTO: 26.2 % (ref 35–47)
HGB BLD-MCNC: 8.6 G/DL (ref 11.7–15.7)
HOLD SPECIMEN: NORMAL
HOLD SPECIMEN: NORMAL
MCH RBC QN AUTO: 32.1 PG (ref 26.5–33)
MCHC RBC AUTO-ENTMCNC: 32.8 G/DL (ref 31.5–36.5)
MCV RBC AUTO: 98 FL (ref 78–100)
PLATELET # BLD AUTO: 294 10E3/UL (ref 150–450)
POTASSIUM SERPL-SCNC: 3.6 MMOL/L (ref 3.4–5.3)
RBC # BLD AUTO: 2.68 10E6/UL (ref 3.8–5.2)
SODIUM SERPL-SCNC: 134 MMOL/L (ref 135–145)
WBC # BLD AUTO: 5 10E3/UL (ref 4–11)

## 2024-11-27 PROCEDURE — 80048 BASIC METABOLIC PNL TOTAL CA: CPT | Performed by: HOSPITALIST

## 2024-11-27 PROCEDURE — 250N000011 HC RX IP 250 OP 636: Performed by: HOSPITALIST

## 2024-11-27 PROCEDURE — 250N000013 HC RX MED GY IP 250 OP 250 PS 637: Performed by: HOSPITALIST

## 2024-11-27 PROCEDURE — 82565 ASSAY OF CREATININE: CPT | Performed by: HOSPITALIST

## 2024-11-27 PROCEDURE — 36415 COLL VENOUS BLD VENIPUNCTURE: CPT | Performed by: HOSPITALIST

## 2024-11-27 PROCEDURE — 99239 HOSP IP/OBS DSCHRG MGMT >30: CPT | Performed by: HOSPITALIST

## 2024-11-27 PROCEDURE — 85018 HEMOGLOBIN: CPT | Performed by: HOSPITALIST

## 2024-11-27 PROCEDURE — 85048 AUTOMATED LEUKOCYTE COUNT: CPT | Performed by: HOSPITALIST

## 2024-11-27 RX ORDER — SULFAMETHOXAZOLE AND TRIMETHOPRIM 800; 160 MG/1; MG/1
1 TABLET ORAL 2 TIMES DAILY
Qty: 10 TABLET | Refills: 0 | Status: SHIPPED | OUTPATIENT
Start: 2024-11-27 | End: 2024-12-02

## 2024-11-27 RX ADMIN — DEXTRAN 70, GLYCERIN, HYPROMELLOSE 1 DROP: 1; 2; 3 SOLUTION/ DROPS OPHTHALMIC at 06:11

## 2024-11-27 RX ADMIN — ACETAMINOPHEN 650 MG: 325 TABLET, FILM COATED ORAL at 06:11

## 2024-11-27 RX ADMIN — CEFTRIAXONE SODIUM 1 G: 1 INJECTION, POWDER, FOR SOLUTION INTRAMUSCULAR; INTRAVENOUS at 09:24

## 2024-11-27 RX ADMIN — CYANOCOBALAMIN TAB 500 MCG 500 MCG: 500 TAB at 09:24

## 2024-11-27 RX ADMIN — LEVOTHYROXINE SODIUM 62.5 MCG: 0.12 TABLET ORAL at 09:24

## 2024-11-27 ASSESSMENT — ACTIVITIES OF DAILY LIVING (ADL)
ADLS_ACUITY_SCORE: 70
ADLS_ACUITY_SCORE: 72
ADLS_ACUITY_SCORE: 74
ADLS_ACUITY_SCORE: 76
ADLS_ACUITY_SCORE: 72
ADLS_ACUITY_SCORE: 76
ADLS_ACUITY_SCORE: 76
ADLS_ACUITY_SCORE: 72

## 2024-11-27 NOTE — PLAN OF CARE
"Goal Outcome Evaluation:    VSS, intermittent confusion. Alert to self. Afebrile, Blanchable redness to coccyx and redness to RLE. Incontinent, [id number]. CO pain but denies pain meds. Took scheduled tylenol and appeared to help. All questions and concerns addressed.     BP (!) 151/81 (BP Location: Right arm, Patient Position: Semi-Flores's, Cuff Size: Adult Regular)   Pulse 72   Temp 98.1  F (36.7  C) (Tympanic)   Resp 18   Ht 1.549 m (5' 1\")   Wt 56.2 kg (124 lb)   LMP 02/03/1984 (Approximate)   SpO2 96%   BMI 23.43 kg/m          Plan of Care Reviewed With: patient    Overall Patient Progress: improving       Loulou Torre RN on 11/27/2024 at 5:15 AM    "

## 2024-11-27 NOTE — PHARMACY
[facility]  [facility] of [facility]  [address] [place] [address]    November 27, 2024    Dear Pharmacist,    Your customer, Geno Horn, born on 9/21/1926, was recently discharged from [facility].  We have updated her medication list and want to alert you to the following:       Review of your medicines        START taking        Dose / Directions   sulfamethoxazole-trimethoprim 800-160 MG tablet  Commonly known as: BACTRIM DS  Used for: Sepsis due to Escherichia coli without acute organ dysfunction (H)      Dose: 1 tablet  Take 1 tablet by mouth 2 times daily for 5 days.  Quantity: 10 tablet  Refills: 0            CONTINUE these medicines which have NOT CHANGED        Dose / Directions   acetaminophen 650 MG CR tablet  Commonly known as: TYLENOL  Used for: Primary osteoarthritis involving multiple joints      TAKE 1 TABLET BY MOUTH THREE TIMES A DAY TAKE 1 TABLET BY MOUTH TWICE DAILY AS NEEDED  Quantity: 180 tablet  Refills: 3     alum & mag hydroxide-simethicone 400-400-40 MG/5ML Susp suspension  Commonly known as: MAALOX  ES      Dose: 15 mL  Take 15 mLs by mouth 4 times daily as needed for indigestion.  Refills: 0     artificial tears 0.1-0.2-0.3 % ophthalmic solution  Used for: Dry eyes      Dose: 1 drop  Place 1 drop into both eyes 3 times daily.  Quantity: 30 mL  Refills: 3     calcium carbonate 500 MG chewable tablet  Commonly known as: TUMS      Dose: 2 chew tab  Take 2 chew tab by mouth 4 times daily as needed for heartburn.  Refills: 0     cetirizine 10 MG tablet  Commonly known as: zyrTEC  Used for: Acute maxillary sinusitis, recurrence not specified, Seasonal allergic rhinitis, unspecified trigger      Dose: 5 mg  Take 0.5 tablets (5 mg) by mouth at bedtime.  Quantity: 45 tablet  Refills: 3     cyanocobalamin 500 MCG tablet  Commonly known as: VITAMIN B-12  Used for: Vitamin B12 deficiency (non anemic)      Dose: 500 mcg  Take 1 tablet (500  mcg) by mouth daily.  Quantity: 90 tablet  Refills: 3     Deep Sea Nasal Spray 0.65 % nasal spray  Used for: Seasonal allergic rhinitis, unspecified trigger  Generic drug: sodium chloride      SPRAY 2 SPRAYS INTO EACH NOSTRIL AS NEEDED  Quantity: 44 mL  Refills: 11     * Depend Easy Fit Undergarments Misc  Used for: Neurogenic bladder, Urinary incontinence, unspecified type      Medium size disposable under pants covered by insurance  Quantity: 100 each  Refills: 11     * Poise Pad Pads  Used for: Mixed stress and urge urinary incontinence      Change pad 3 x daily, #6 long Maxi pads  Quantity: 90 each  Refills: 11     ferrous sulfate 325 (65 Fe) MG tablet  Commonly known as: FEROSUL  Used for: Other iron deficiency anemia      Dose: 325 mg  Take 1 tablet (325 mg) by mouth daily. At 1200 pm  Quantity: 90 tablet  Refills: 3     glycerin 2 g suppository  Commonly known as: ADULT      Dose: 1 suppository  Place 1 suppository rectally daily as needed for constipation.  Refills: 0     * levothyroxine 25 MCG tablet  Commonly known as: SYNTHROID/LEVOTHROID  Used for: Other specified hypothyroidism      Dose: 12.5 mcg  Take 0.5 tablets (12.5 mcg) by mouth daily. With 50 mcg for total daily dose 62.5 mcg  Quantity: 45 tablet  Refills: 3     * levothyroxine 50 MCG tablet  Commonly known as: SYNTHROID/LEVOTHROID  Used for: Other specified hypothyroidism      Dose: 50 mcg  Take 1 tablet (50 mcg) by mouth daily. With 12.5 mcg for total daily dose 62.5 mcg  Quantity: 90 tablet  Refills: 3     magnesium hydroxide 400 MG/5ML suspension  Commonly known as: Milk of Magnesia  Used for: Constipation      TAKE 15ML BY MOUTH ONCE DAILY AS NEEDED FOR NO BM IN 3 DAYS  Quantity: 473 mL  Refills: 11     meclizine 12.5 MG tablet  Commonly known as: ANTIVERT  Used for: Dizziness      Dose: 12.5 mg  Take 1 tablet (12.5 mg) by mouth 3 times daily as needed for dizziness  Quantity: 90 tablet  Refills: 1     Medpura Vitamin A & D Oint  Used for:  Dermatitis associated with moisture from urinary incontinence      APPLY TOPICALLY TO AFFECTED AREA(S) TWICE DAILY APPLY TOPICALLY TO AFFECTED AREA(S) AS NEEDED  Quantity: 113 g  Refills: 5     Menthol (Topical Analgesic) 4 % Gel  Used for: Osteoarthritis of multiple joints, unspecified osteoarthritis type, Primary osteoarthritis involving multiple joints, Spinal stenosis of lumbar region, unspecified whether neurogenic claudication present      Externally apply topically 3 times daily as needed (pain relief for minor muscle/joint aches) OKAY TO KEEP AT BEDSIDE  Quantity: 150 mL  Refills: 3     naloxone 4 MG/0.1ML nasal spray  Commonly known as: NARCAN  Used for: Other chronic pain      Dose: 4 mg  Spray 1 spray (4 mg) into one nostril alternating nostrils as needed for opioid reversal every 2-3 minutes until assistance arrives  Quantity: 0.2 mL  Refills: 1     ondansetron 4 MG tablet  Commonly known as: ZOFRAN  Used for: Nausea      Dose: 4 mg  Take 1 tablet (4 mg) by mouth every 8 hours as needed for nausea.  Quantity: 12 tablet  Refills: 2     order for DME  Used for: Unsteady gait      Equipment being ordered: Spikes for cane  Quantity: 1 Units  Refills: 1     order for DME  Used for: Urinary incontinence, unspecified type      Equipment being ordered: plastic underpants  Quantity: 1 Units  Refills: 1     senna-docusate 8.6-50 MG tablet  Commonly known as: SENOKOT-S/PERICOLACE  Used for: Other iron deficiency anemia, Constipation, unspecified constipation type      2 tabs in the morning and 1 tab at HS  Quantity: 180 tablet  Refills: 3     Tab-A-Vanita Tabs  Used for: Health care maintenance      Take 1 tablet by mouth daily at 1200 pm  Quantity: 90 tablet  Refills: 3     tolnaftate 1 % external powder  Commonly known as: TINACTIN  Used for: Tinea pedis of both feet, H/O athlete's foot      Apply topically 2 times daily as needed for itching or irritation OKAY TO KEEP BEDSIDE  Quantity: 90 g  Refills: 2            * This list has 4 medication(s) that are the same as other medications prescribed for you. Read the directions carefully, and ask your doctor or other care provider to review them with you.                   Where to get your medicines        These medications were sent to [facility] [address] [place] [address]  [address] [place], [facility] [address]      Phone: [phone]   sulfamethoxazole-trimethoprim 800-160 MG tablet         We also reviewed Geno Horn's allergy list and updated it as needed:  Allergies: Alendronate, Celebrex [celecoxib], Other environmental allergy, Oxybutynin, Tolterodine, Tramadol, and Trospium    Thank you for continuing to care for Geno Horn.  We look forward to working together with you in the future.    Sincerely,  Desiree Vargas [facility]

## 2024-11-27 NOTE — PROGRESS NOTES
SAFETY CHECKLIST  ID Bands and Risk clasps correct and in place (DNR, Fall risk, Allergy, Latex, Limb):  Yes  All Lines Reconciled and labeled correctly: Yes  Whiteboard updated:Yes  Environmental interventions: Yes  Verify Tele #: SANJAY Torre RN on 11/26/2024 at 7:29 PM

## 2024-11-27 NOTE — PLAN OF CARE
NS[facility] DISCHARGE NOTE    Patient discharged to assisted living at 1:04 PM via wheel chair. Accompanied by daughter and staff. Discharge instructions reviewed with  [facility] , opportunity offered to ask questions. Prescriptions sent to patients preferred pharmacy. All belongings sent with patient. Spoke with HAILEY Valente from [facility] and gave nurse to nurse report on patient. Pt will have follow up on 12/5/24 with Sierra Ray NP at [facility].      Magy Barbour RN

## 2024-11-27 NOTE — PLAN OF CARE
SAFETY CHECKLIST  ID Bands and Risk clasps correct and in place (DNR, Fall risk, Allergy, Latex, Limb):  Yes  All Lines Reconciled and labeled correctly: Yes  Whiteboard updated:Yes  Environmental interventions: Yes  Verify Tele #: N/A    Magy Barbour RN .......  11/27/2024  7:28 AM

## 2024-11-27 NOTE — PROGRESS NOTES
:     Patient will be discharging back to [facility] today. Patient's daughter brought patient's chair here. Sudhir can  at 13:00. Called and updated staff at [facility]. Staff wants to make sure prescriptions are sent to [facility] and that they will not be able to get meds until 21:00.     No further needs from  at this time.     MADELYN Hinojosa on 11/27/2024 at 12:01 PM     24.6

## 2024-11-27 NOTE — PHARMACY - DISCHARGE MEDICATION RECONCILIATION AND EDUCATION
Pharmacy:  Discharge Counseling and Medication Reconciliation    Geno Horn  [facility]  [address]  [place] [address]  [phone] (home)   89 year old female  PCP: Fransisca Rocha    Allergies: Alendronate, Celebrex [celecoxib], Other environmental allergy, Oxybutynin, Tolterodine, Tramadol, and Trospium    Discharge Counseling:    Pharmacist did not meet with patient/family today as patient is discharging to [facility] where all medications will be handled/administered for patient.      Discharge Medication Reconciliation:    It has been determined that the patient has an adequate supply of medications available or which can be obtained from the patient's preferred pharmacy, which HE/SHE has confirmed as: [id number]    Thank you for the consult.    Desiree Vargas RPH........November 27, 2024 10:44 AM

## 2024-11-27 NOTE — PLAN OF CARE
Disoriented to place, time and situation at baseline. VSS, afebrile. Denies pain. LS clear/diminished. Denies SCHULZ/SOB. Blanchable redness to sacrum and RLE. BLE edema 1+-2+. Right leg wound on admission, covered with dry gauze. Dressing CDI. Intermittent incontinence. Pivot assist with 2 people with gait belt and walker. Plan for patient to discharge to assisted living facility, [facility].     Temp: 97.9  F (36.6  C) Temp src: Tympanic BP: 115/72 Pulse: 94   Resp: 14 SpO2: 96 % O2 Device: None (Room air)      Magy Barbour RN .......  11/27/2024  12:11 PM      Goal Outcome Evaluation:      Plan of Care Reviewed With: patient    Overall Patient Progress: improving    Outcome Evaluation: VSS, afebrile, denies pain, pleasantly confused at baseline

## 2024-11-27 NOTE — DISCHARGE SUMMARY
[facility]  Hospitalist Discharge Summary      Date of Admission:  11/25/2024  Date of Discharge:  11/27/2024  Discharging Provider: Carlos Briceno MD  Discharge Service: Hospitalist Service    Discharge Diagnoses   Acute toxic metabolic encephalopathy  Urinary tract infection    Clinically Significant Risk Factors          Follow-ups Needed After Discharge   Follow-up Appointments       Follow-up and recommended labs and tests       Follow up with primary care provider, Fransisca Rocha, within 7 days to evaluate medication change and to evaluate treatment change.  The following labs/tests are recommended: BMP, CBC WITH DIFF.                Unresulted Labs Ordered in the Past 30 Days of this Admission       Date and Time Order Name Status Description    11/25/2024 10:15 AM Blood Culture Peripheral Blood Preliminary             Discharge Disposition   Discharged to assisted living  Condition at discharge: Stable    Hospital Course   Geno Horn is a 89 year old female admitted on 11/25/2024. She has a istory of hypertension, hypothyroidism, osteoarthritis, senile dementia resident of [facility] facility was sent to the ED with increasing fatigue, fevers and lethargy was admitted with UTI and acute toxic metabolic encephalopathy.  The patient has history of senile dementia.  She was admitted with altered mental status was found to have urinary tract infection growing E. coli at the urine that was resistant to cefuroxime and quinolones.  The patient received Rocephin during inpatient stay and is being discharged back to assisted living on Bactrim for 5 more days.  Her mental status is back to baseline now.  She has the following acute medical issues:    Acute toxic metabolic encephalopathy: Complicated by her dementia.  This is probably due to urinary tract infection.  Mental status is back to baseline with IV antibiotic therapy.      Urinary tract infection: The patient was treated with  Rocephin.  She grew E. coli resistant to cefuroxime and quinolones but sensitive to Bactrim and Augmentin.  I think Bactrim will be a good choice for her for 5 more days.  She has received Augmentin before.      Sepsis: The patient has leukocytosis, altered mental status, fevers and source of infection consistent with sepsis.  Resolved now.      Hyponatremia: 134 The patient has mild hyponatremia probably is hypovolemic hyponatremia.  Continue to monitor for now.    Hypothyroidism: TSH was 2.64..  Continue on levothyroxine.    Senile dementia: The patient is at a facility.  According to the family she is DNR/DNI.  No aggressive measures desired.  If she declines then we will switch the care to comfort care only.    History of constipation: Will keep her on a good bowel regimen.    Osteoarthritis: Continue with pain management.    Pulmonary congestion: The patient has pulmonary congestion on the chest x-ray.  Her BNP is elevated as well.  Echocardiogram done showed preserved left ventricle ejection fraction of 60 to 65% with grade 2 diastolic dysfunction.  Avoid excessive hydration.    Consultations This Hospital Stay   SOCIAL WORK IP CONSULT    Code Status   No CPR- Do NOT Intubate    Time Spent on this Encounter   I, Carlos Briceno MD, personally saw the patient today and spent greater than 30 minutes discharging this patient.       Carlos Briceno MD  [facility]  [address]  [place] [address]  Phone: [phone]  Fax: [phone]  ______________________________________________________________________    Physical Exam   Vital Signs: Temp: 97.9  F (36.6  C) Temp src: Tympanic BP: 115/72 Pulse: 94   Resp: 14 SpO2: 96 % O2 Device: None (Room air)    Weight: 124 lbs 0 oz  GENERAL APPEARANCE: In no acute distress.  Pleasantly demented  HEENT: No oropharyngeal lesions.  NECK: Supple.   CHEST: Symmetric. Nontender to palpation.  LUNGS: B/l CTA  HEART: S1+S2 [place]  ABDOMEN: Soft, flat,  and benign. BS +ve  EXTREMITIES: No cyanosis, clubbing, or edema.  NEUROLOGIC: Grossly nonfocal examination  PSYCHIATRIC: Pleasantly demented  SKIN: No new rashes.           Primary Care Physician   Fransisca Rocha    Discharge Orders      Reason for your hospital stay    Metabolic encephalopathy  Urinary tract infection     Follow-up and recommended labs and tests     Follow up with primary care provider, Fransisca Rocha, within 7 days to evaluate medication change and to evaluate treatment change.  The following labs/tests are recommended: BMP, CBC WITH DIFF.     Activity    Your activity upon discharge: activity as tolerated     Diet    Follow this diet upon discharge: Current Diet:Orders Placed This Encounter      Combination Diet Regular Diet Adult       Significant Results and Procedures   Most Recent 3 CBC's:  Recent Labs   Lab Test 11/27/24  0609 11/26/24  0639 11/25/24  1018   WBC 5.0 6.5 15.8*   HGB 8.6* 8.4* 9.5*   MCV 98 109* 99    262 359     Most Recent 3 BMP's:  Recent Labs   Lab Test 11/27/24  0608 11/26/24  0639 11/25/24  1018   * 132* 130*   POTASSIUM 3.6 3.8 4.0   CHLORIDE 101 99 96*   CO2 25 24 25   BUN 9.4 12.9 15.3   CR 0.52 0.51 0.63   ANIONGAP 8 9 9   BARB 8.6* 8.6* 8.8   GLC 88 84 145*     Most Recent 2 LFT's:  Recent Labs   Lab Test 11/25/24  1018 09/30/21  0907   AST 40 15   ALT 32 15   ALKPHOS 93 82   BILITOTAL 0.9 0.5   ,   Results for orders placed or performed during the hospital encounter of 11/25/24   XR Chest Port 1 View    Narrative    PROCEDURE:  XR CHEST PORT 1 VIEW    HISTORY: fever, lethargy, ? pneumonia. .    COMPARISON:  2/26/2016, 9/30/2021    FINDINGS:    The cardiomediastinal contours are enlarged. There is calcific aortic  atherosclerosis.   No focal consolidation. Chronic calcified nodules. Interstitial edema.  Trace fluid along the minor fissure. No pneumothorax.      Impression    IMPRESSION:  Findings suggest interstitial pulmonary edema.  Atypical  infection could have an overlapping appearance.      KATELIN CLEMENTS MD         SYSTEM ID:  [id number]   Echocardiogram Complete     Value    LVEF  60-65%    Narrative    [id number]  [id number]  [id number]  [id number]^AIMEE^ANSHUL     [facility]  [address] [place] [address]     Name: SUSHIL GONZALEZ  MRN: [id number]  : 1926  Study Date: 2024 02:40 PM  Age: 89 yrs  Gender: Female  Patient Location: [facility]  Reason For Study: CHF  Ordering Physician: ANSHUL YU  Performed By: Maye Pritchard     BSA: 1.6 m2  Height: 61 in  Weight: 135 lb  HR: 77  BP: 141/62 mmHg  ______________________________________________________________________________  Procedure  Complete Portable Echo Adult.  ______________________________________________________________________________  Interpretation Summary  Global and regional left ventricular function is normal with an EF of 60-65%.  Grade II or moderate diastolic dysfunction.  The right ventricle is normal size. Global right ventricular function is  normal.  Mild to moderate tricuspid insufficiency.  No pericardial effusion.  Normal IVC.  There is no prior study for direct comparison.  ______________________________________________________________________________  Left Ventricle  Left ventricular wall thickness is normal. Left ventricular size is normal.  Global and regional left ventricular function is normal with an EF of 60-65%.  Grade II or moderate diastolic dysfunction. No regional wall motion  abnormalities are seen.     Right Ventricle  The right ventricle is normal size. Global right ventricular function is  normal.     Atria  Mild left atrial enlargement is present. Right atrium is poorly visualized. It  appears normal in size in the images available.     Mitral Valve  Mild to moderate mitral annular calcification is present. Mild mitral  insufficiency is present.     Aortic Valve  The aortic valve is tricuspid. Aortic valve  sclerosis is present. On Doppler  interrogation, there is no significant stenosis or regurgitation.     Tricuspid Valve  Mild to moderate tricuspid insufficiency is present. Mild pulmonary  hypertension is present. Estimated pulmonary artery systolic pressure is 36  mmHg plus right atrial pressure.     Pulmonic Valve  The valve leaflets are not well visualized. Trace pulmonic insufficiency is  present.     Vessels  The aorta root is normal. The thoracic aorta is normal. The inferior vena cava  is normal.     Pericardium  No pericardial effusion is present.     Compared to Previous Study  There is no prior study for direct comparison.     Attestation  I have personally viewed the imaging and agree with the interpretation and  report as documented by the fellow, Saúl Bishop, and/or edited by me.  ______________________________________________________________________________  MMode/2D Measurements & Calculations     IVSd: 1.0 cm  LVIDd: 4.5 cm  LVIDs: 3.1 cm  LVPWd: 0.91 cm  FS: 30.2 %  LV mass(C)d: 144.3 grams  LV mass(C)dI: 90.3 grams/m2  Ao root diam: 3.3 cm  asc Aorta Diam: 3.3 cm  LVOT diam: 1.9 cm  LVOT area: 2.8 cm2  Ao root diam index Ht(cm/m): 2.1  Ao root diam index BSA (cm/m2): 2.1  Asc Ao diam index BSA (cm/m2): 2.1  Asc Ao diam index Ht(cm/m): 2.1  LA Volume (BP): 59.1 ml     LA Volume Index (BP): 36.9 ml/m2  RWT: 0.41  TAPSE: 2.2 cm     Doppler Measurements & Calculations  MV E max shreyas: 72.8 cm/sec  MV A max shreyas: 85.3 cm/sec  MV E/A: 0.85  MV dec time: 0.17 sec  Ao V2 max: 109.0 cm/sec  Ao max P.0 mmHg  Ao V2 mean: 76.6 cm/sec  Ao mean PG: 3.0 mmHg  Ao V2 VTI: 24.4 cm  MORELIA(I,D): 2.3 cm2  MORELIA(V,D): 2.2 cm2  LV V1 max P.9 mmHg  LV V1 max: 85.0 cm/sec  LV V1 VTI: 20.2 cm  SV(LVOT): 57.3 ml  SI(LVOT): 35.8 ml/m2  PA acc time: 0.11 sec  TR max shreyas: 253.5 cm/sec  TR max P.1 mmHg  AV Shreyas Ratio (DI): 0.78  MORELIA Index (cm2/m2): 1.5     E/E' av.2  Lateral E/e': 8.9  Medial E/e': 15.6      ______________________________________________________________________________  Report approved by: Andrew Hermosillo 11/25/2024 03:44 PM             Discharge Medications   Current Discharge Medication List        START taking these medications    Details   sulfamethoxazole-trimethoprim (BACTRIM DS) 800-160 MG tablet Take 1 tablet by mouth 2 times daily for 5 days.  Qty: 10 tablet, Refills: 0    Associated Diagnoses: Sepsis due to Escherichia coli without acute organ dysfunction (H)           CONTINUE these medications which have NOT CHANGED    Details   acetaminophen (TYLENOL) 650 MG CR tablet TAKE 1 TABLET BY MOUTH THREE TIMES A DAY TAKE 1 TABLET BY MOUTH TWICE DAILY AS NEEDED  Qty: 180 tablet, Refills: 3    Comments: Renewal only, patient will call for refills.  Associated Diagnoses: Primary osteoarthritis involving multiple joints      alum & mag hydroxide-simethicone (MAALOX  ES) 400-400-40 MG/5ML SUSP suspension Take 15 mLs by mouth 4 times daily as needed for indigestion.      artificial tears (GENTEAL) 0.1-0.2-0.3 % ophthalmic solution Place 1 drop into both eyes 3 times daily.  Qty: 30 mL, Refills: 3    Comments: Renewal only, patient will call for refills.  Associated Diagnoses: Dry eyes      calcium carbonate (TUMS) 500 MG chewable tablet Take 2 chew tab by mouth 4 times daily as needed for heartburn.      cetirizine (ZYRTEC) 10 MG tablet Take 0.5 tablets (5 mg) by mouth at bedtime.  Qty: 45 tablet, Refills: 3    Comments: Renewal only, patient will call for refills.  Associated Diagnoses: Acute maxillary sinusitis, recurrence not specified; Seasonal allergic rhinitis, unspecified trigger      cyanocobalamin (VITAMIN B-12) 500 MCG tablet Take 1 tablet (500 mcg) by mouth daily.  Qty: 90 tablet, Refills: 3    Comments: Renewal only, patient will call for refills.  Associated Diagnoses: Vitamin B12 deficiency (non anemic)      DEEP SEA NASAL SPRAY 0.65 % nasal spray SPRAY 2 SPRAYS INTO EACH NOSTRIL  AS NEEDED  Qty: 44 mL, Refills: 11    Associated Diagnoses: Seasonal allergic rhinitis, unspecified trigger      ferrous sulfate (FEROSUL) 325 (65 Fe) MG tablet Take 1 tablet (325 mg) by mouth daily. At 1200 pm  Qty: 90 tablet, Refills: 3    Comments: Renewal only, patient will call for refills.  Associated Diagnoses: Other iron deficiency anemia      glycerin (ADULT) 2 g suppository Place 1 suppository rectally daily as needed for constipation.      !! levothyroxine (SYNTHROID/LEVOTHROID) 25 MCG tablet Take 0.5 tablets (12.5 mcg) by mouth daily. With 50 mcg for total daily dose 62.5 mcg  Qty: 45 tablet, Refills: 3    Comments: Renewal only, patient will call for refills.  Associated Diagnoses: Other specified hypothyroidism      !! levothyroxine (SYNTHROID/LEVOTHROID) 50 MCG tablet Take 1 tablet (50 mcg) by mouth daily. With 12.5 mcg for total daily dose 62.5 mcg  Qty: 90 tablet, Refills: 3    Comments: Renewal only, patient will call for refills.  Associated Diagnoses: Other specified hypothyroidism      magnesium hydroxide (MILK OF MAGNESIA) 400 MG/5ML suspension TAKE 15ML BY MOUTH ONCE DAILY AS NEEDED FOR NO BM IN 3 DAYS  Qty: 473 mL, Refills: 11    Associated Diagnoses: Constipation      meclizine (ANTIVERT) 12.5 MG tablet Take 1 tablet (12.5 mg) by mouth 3 times daily as needed for dizziness  Qty: 90 tablet, Refills: 1    Associated Diagnoses: Dizziness      Menthol, Topical Analgesic, 4 % GEL Externally apply topically 3 times daily as needed (pain relief for minor muscle/joint aches) OKAY TO KEEP AT BEDSIDE  Qty: 150 mL, Refills: 3    Associated Diagnoses: Osteoarthritis of multiple joints, unspecified osteoarthritis type; Primary osteoarthritis involving multiple joints; Spinal stenosis of lumbar region, unspecified whether neurogenic claudication present      Multiple Vitamin (TAB-A-RADHA) TABS Take 1 tablet by mouth daily at 1200 pm  Qty: 90 tablet, Refills: 3    Comments: Renewal only, patient will call  for refills.  Associated Diagnoses: Health care maintenance      naloxone (NARCAN) 4 MG/0.1ML nasal spray Spray 1 spray (4 mg) into one nostril alternating nostrils as needed for opioid reversal every 2-3 minutes until assistance arrives  Qty: 0.2 mL, Refills: 1    Associated Diagnoses: Other chronic pain      ondansetron (ZOFRAN) 4 MG tablet Take 1 tablet (4 mg) by mouth every 8 hours as needed for nausea.  Qty: 12 tablet, Refills: 2    Associated Diagnoses: Nausea      senna-docusate (SENOKOT-S/PERICOLACE) 8.6-50 MG tablet 2 tabs in the morning and 1 tab at HS  Qty: 180 tablet, Refills: 3    Comments: Renewal only, patient will call for refills.  Associated Diagnoses: Other iron deficiency anemia; Constipation, unspecified constipation type      tolnaftate (TINACTIN) 1 % external powder Apply topically 2 times daily as needed for itching or irritation OKAY TO KEEP BEDSIDE  Qty: 90 g, Refills: 2    Associated Diagnoses: Tinea pedis of both feet; H/O athlete's foot      Vitamins A & D (MEDPURA VITAMIN A & D) OINT APPLY TOPICALLY TO AFFECTED AREA(S) TWICE DAILY APPLY TOPICALLY TO AFFECTED AREA(S) AS NEEDED  Qty: 113 g, Refills: 5    Associated Diagnoses: Dermatitis associated with moisture from urinary incontinence      !! Incontinence Supply Disposable (DEPEND EASY FIT UNDERGARMENTS) MISC Medium size disposable under pants covered by insurance  Qty: 100 each, Refills: 11    Associated Diagnoses: Neurogenic bladder; Urinary incontinence, unspecified type      !! Incontinence Supply Disposable (POISE PAD) PADS Change pad 3 x daily, #6 long Maxi pads  Qty: 90 each, Refills: 11    Associated Diagnoses: Mixed stress and urge urinary incontinence      !! order for DME Equipment being ordered: Spikes for cane  Qty: 1 Units, Refills: 1    Associated Diagnoses: Unsteady gait      !! order for DME Equipment being ordered: plastic underpants  Qty: 1 Units, Refills: 1    Associated Diagnoses: Urinary incontinence, unspecified  type       !! - Potential duplicate medications found. Please discuss with provider.        Allergies   Allergies   Allergen Reactions    Alendronate Nausea     Stomach upset    Celebrex [Celecoxib] Other (See Comments)     Light headed    Other Environmental Allergy Other (See Comments)     Itchy,Runny Eyes    Oxybutynin Other (See Comments)     Felt High    Tolterodine      Other reaction(s): Intolerance-Can't Take  Was unable to sleep at night    Tramadol Other (See Comments)     Adverse  effects    Trospium Other (See Comments)     drowsy

## 2024-11-28 LAB — BACTERIA BLD CULT: NORMAL

## 2024-11-29 ENCOUNTER — PATIENT OUTREACH (OUTPATIENT)
Dept: INTERNAL MEDICINE | Facility: OTHER | Age: 89
End: 2024-11-29
Payer: COMMERCIAL

## 2024-11-29 NOTE — TELEPHONE ENCOUNTER
Left message for patient to return call to an RN; need to complete TCM. Renée Watkins RN on 11/29/2024 at 1:14 PM

## 2024-11-30 LAB — BACTERIA BLD CULT: NO GROWTH

## 2024-12-02 NOTE — TELEPHONE ENCOUNTER
Yes we can see her Thursday    Please tell Maria Antonia will see her at [place] and we can get lab draw as recommended by discharge hospitalist    Thanks    Sierra Ray, APRN CNP   December 2, 2024

## 2024-12-02 NOTE — TELEPHONE ENCOUNTER
"FROY Ray, NP -   Routing as FYI only. Hospital documentation indicates she was to follow up with PCP with repeat labs. Daughter states you will be seeing her for the hospital follow-up.     \"Follow up with primary care provider, Fransisca Rocha, within 7 days to evaluate medication change and to evaluate treatment change. The following labs/tests are recommended: BMP, CBC WITH DIFF.\"    Renée Watkins RN on 12/2/2024 at 8:04 AM    Transitions of Care Outreach  Chief Complaint   Patient presents with    Hospital F/U       Most Recent Admission Date: 11/25/2024   Most Recent Admission Diagnosis: Hyponatremia - E87.1  UTI (urinary tract infection) - N39.0  Generalized weakness - R53.1     Most Recent Discharge Date: 11/27/2024   Most Recent Discharge Diagnosis: UTI (urinary tract infection) - N39.0  Generalized weakness - R53.1  Hyponatremia - E87.1  Urinary tract infection without hematuria, site unspecified - N39.0  Lab test negative for COVID-19 virus - Z20.822  Sepsis due to Escherichia coli without acute organ dysfunction (H) - A41.51     Transitions of Care Assessment    Discharge Assessment  How are you doing now that you are home?: Adri, daughter, states patient seems to be back at her base line.  How are your symptoms? (Red Flag symptoms escalate to triage hotline per guidelines): Improved  Do you know how to contact your clinic care team if you have future questions or changes to your health status? : Yes  Does the patient have their discharge instructions? : Yes    Follow up Plan          No future appointments.    Outpatient Plan as outlined on AVS reviewed with patient.    For any urgent concerns, please contact our 24 hour nurse triage line: [phone])       Reéne Watkins RN    "

## 2024-12-05 ENCOUNTER — LAB REQUISITION (OUTPATIENT)
Dept: LAB | Facility: OTHER | Age: 89
End: 2024-12-05
Payer: COMMERCIAL

## 2024-12-05 ENCOUNTER — NURSING HOME VISIT (OUTPATIENT)
Dept: GERIATRICS | Facility: OTHER | Age: 89
End: 2024-12-05
Attending: NURSE PRACTITIONER
Payer: COMMERCIAL

## 2024-12-05 DIAGNOSIS — G92.8 TOXIC METABOLIC ENCEPHALOPATHY: ICD-10-CM

## 2024-12-05 DIAGNOSIS — A41.9 SEPSIS SECONDARY TO UTI (H): ICD-10-CM

## 2024-12-05 DIAGNOSIS — G30.9 ALZHEIMER'S DISEASE (H): ICD-10-CM

## 2024-12-05 DIAGNOSIS — M16.0 PRIMARY OSTEOARTHRITIS OF BOTH HIPS: ICD-10-CM

## 2024-12-05 DIAGNOSIS — D64.9 ANEMIA, UNSPECIFIED: ICD-10-CM

## 2024-12-05 DIAGNOSIS — Z29.89 NEED FOR PROPHYLAXIS AGAINST URINARY TRACT INFECTION: ICD-10-CM

## 2024-12-05 DIAGNOSIS — Z09 HOSPITAL DISCHARGE FOLLOW-UP: Primary | ICD-10-CM

## 2024-12-05 DIAGNOSIS — N39.0 SEPSIS SECONDARY TO UTI (H): ICD-10-CM

## 2024-12-05 DIAGNOSIS — Z78.9 LIVING IN ASSISTED LIVING: ICD-10-CM

## 2024-12-05 DIAGNOSIS — E87.1 HYPO-OSMOLALITY AND HYPONATREMIA: ICD-10-CM

## 2024-12-05 DIAGNOSIS — R41.82 ACUTE ALTERATION IN MENTAL STATUS: ICD-10-CM

## 2024-12-05 DIAGNOSIS — Z09 ENCOUNTER FOR FOLLOW-UP EXAMINATION AFTER COMPLETED TREATMENT FOR CONDITIONS OTHER THAN MALIGNANT NEOPLASM: ICD-10-CM

## 2024-12-05 DIAGNOSIS — N39.0 RECURRENT UTI (URINARY TRACT INFECTION): ICD-10-CM

## 2024-12-05 DIAGNOSIS — F02.80 ALZHEIMER'S DISEASE (H): ICD-10-CM

## 2024-12-05 DIAGNOSIS — M48.061 SPINAL STENOSIS OF LUMBAR REGION, UNSPECIFIED WHETHER NEUROGENIC CLAUDICATION PRESENT: ICD-10-CM

## 2024-12-05 LAB
ANION GAP SERPL CALCULATED.3IONS-SCNC: 8 MMOL/L (ref 7–15)
BASOPHILS # BLD AUTO: 0.1 10E3/UL (ref 0–0.2)
BASOPHILS NFR BLD AUTO: 1 %
BUN SERPL-MCNC: 10.8 MG/DL (ref 8–23)
CALCIUM SERPL-MCNC: 8.8 MG/DL (ref 8.8–10.4)
CHLORIDE SERPL-SCNC: 98 MMOL/L (ref 98–107)
CREAT SERPL-MCNC: 0.55 MG/DL (ref 0.51–0.95)
EGFRCR SERPLBLD CKD-EPI 2021: 82 ML/MIN/1.73M2
EOSINOPHIL # BLD AUTO: 0.1 10E3/UL (ref 0–0.7)
EOSINOPHIL NFR BLD AUTO: 1 %
ERYTHROCYTE [DISTWIDTH] IN BLOOD BY AUTOMATED COUNT: 16.9 % (ref 10–15)
GLUCOSE SERPL-MCNC: 103 MG/DL (ref 70–99)
HCO3 SERPL-SCNC: 26 MMOL/L (ref 22–29)
HCT VFR BLD AUTO: 32.3 % (ref 35–47)
HGB BLD-MCNC: 10.5 G/DL (ref 11.7–15.7)
IMM GRANULOCYTES # BLD: 0.1 10E3/UL
IMM GRANULOCYTES NFR BLD: 1 %
LYMPHOCYTES # BLD AUTO: 0.7 10E3/UL (ref 0.8–5.3)
LYMPHOCYTES NFR BLD AUTO: 11 %
MCH RBC QN AUTO: 32.3 PG (ref 26.5–33)
MCHC RBC AUTO-ENTMCNC: 32.5 G/DL (ref 31.5–36.5)
MCV RBC AUTO: 99 FL (ref 78–100)
MONOCYTES # BLD AUTO: 0.5 10E3/UL (ref 0–1.3)
MONOCYTES NFR BLD AUTO: 7 %
NEUTROPHILS # BLD AUTO: 5 10E3/UL (ref 1.6–8.3)
NEUTROPHILS NFR BLD AUTO: 79 %
NRBC # BLD AUTO: 0 10E3/UL
NRBC BLD AUTO-RTO: 0 /100
PLATELET # BLD AUTO: 439 10E3/UL (ref 150–450)
POTASSIUM SERPL-SCNC: 4.4 MMOL/L (ref 3.4–5.3)
RBC # BLD AUTO: 3.25 10E6/UL (ref 3.8–5.2)
SODIUM SERPL-SCNC: 132 MMOL/L (ref 135–145)
WBC # BLD AUTO: 6.2 10E3/UL (ref 4–11)

## 2024-12-05 PROCEDURE — 85025 COMPLETE CBC W/AUTO DIFF WBC: CPT | Performed by: NURSE PRACTITIONER

## 2024-12-05 PROCEDURE — 80048 BASIC METABOLIC PNL TOTAL CA: CPT | Performed by: NURSE PRACTITIONER

## 2024-12-05 RX ORDER — TRIMETHOPRIM 100 MG/1
100 TABLET ORAL DAILY
Qty: 90 TABLET | Refills: 3 | Status: SHIPPED | OUTPATIENT
Start: 2024-12-05

## 2024-12-05 RX ORDER — LIDOCAINE 4 G/G
PATCH TOPICAL
COMMUNITY
Start: 2024-12-05

## 2024-12-05 ASSESSMENT — ENCOUNTER SYMPTOMS
APPETITE CHANGE: 0
SHORTNESS OF BREATH: 0
CHEST TIGHTNESS: 0
CONFUSION: 1
FEVER: 0
CHILLS: 0
FATIGUE: 0
WHEEZING: 0
ARTHRALGIAS: 1
WEAKNESS: 0
DIZZINESS: 0

## 2024-12-05 NOTE — PROGRESS NOTES
Geno Horn is a 89 year old female being seen today for hospital discharge follow-up visit at [facility] [place].    Assessment & Plan       ICD-10-CM    1. Hospital discharge follow-up  Z09       2. Toxic metabolic encephalopathy  G92.8       3. Recurrent UTI (urinary tract infection)  N39.0 trimethoprim (TRIMPEX) 100 MG tablet      4. Sepsis secondary to UTI (H)  A41.9 trimethoprim (TRIMPEX) 100 MG tablet    N39.0       5. Acute alteration in mental status  R41.82       6. Alzheimer's disease (H)  G30.9     F02.80       7. Primary osteoarthritis of both hips  M16.0 Lidocaine (LIDOCARE) 4 % Patch      8. Spinal stenosis of lumbar region, unspecified whether neurogenic claudication present  M48.061 Lidocaine (LIDOCARE) 4 % Patch      9. Living in assisted living  Z78.9       10. Need for prophylaxis against urinary tract infection  Z29.89 trimethoprim (TRIMPEX) 100 MG tablet          Hospital discharge follow-up:      Recurrent UTI's: family is inquiring about a possible prophylactic antibiotic for recurrent UTIs.  Geno has had five culture confirmed UTI's over the past year. Her most recent UTI causing sepsis and acute toxic metabolic encephalopathy.  Estimated CrCl of 65 ml/min.      Pain: family requesting order for PRN Salonpas patches.    *** minutes spent by me on the date of the encounter doing chart review, history and exam, documentation and further activities per the note    Encouraged regular exercise as tolerated.     Return if symptoms worsen or fail to improve.    LARISA Schmidt CNP  [facility]     Code Status: DNR / DNI.   Health Care Power of : Extended Emergency Contact Information  Primary Emergency Contact: Daniel Horn  Address: [address]           [place] [address] [employer]  Mobile Phone: [phone]  Relation: Son  Secondary Emergency Contact: Adri Chaudhary  Address: [address] [place] [address] [facility]  Home Phone:  [phone]  Mobile Phone: [phone]  Relation: Daughter     Allergies: Alendronate, Celebrex [celecoxib], Other environmental allergy, Oxybutynin, Tolterodine, Tramadol, and Trospium     Past Medical, Surgical, Family and Social History reviewed: YES.     Medications:   Current Outpatient Medications   Medication Sig Dispense Refill    acetaminophen (TYLENOL) 650 MG CR tablet TAKE 1 TABLET BY MOUTH THREE TIMES A DAY TAKE 1 TABLET BY MOUTH TWICE DAILY AS NEEDED 180 tablet 3    alum & mag hydroxide-simethicone (MAALOX  ES) 400-400-40 MG/5ML SUSP suspension Take 15 mLs by mouth 4 times daily as needed for indigestion.      artificial tears (GENTEAL) 0.1-0.2-0.3 % ophthalmic solution Place 1 drop into both eyes 3 times daily. 30 mL 3    calcium carbonate (TUMS) 500 MG chewable tablet Take 2 chew tab by mouth 4 times daily as needed for heartburn.      cetirizine (ZYRTEC) 10 MG tablet Take 0.5 tablets (5 mg) by mouth at bedtime. 45 tablet 3    cyanocobalamin (VITAMIN B-12) 500 MCG tablet Take 1 tablet (500 mcg) by mouth daily. 90 tablet 3    DEEP SEA NASAL SPRAY 0.65 % nasal spray SPRAY 2 SPRAYS INTO EACH NOSTRIL AS NEEDED 44 mL 11    ferrous sulfate (FEROSUL) 325 (65 Fe) MG tablet Take 1 tablet (325 mg) by mouth daily. At 1200 pm 90 tablet 3    glycerin (ADULT) 2 g suppository Place 1 suppository rectally daily as needed for constipation.      Incontinence Supply Disposable (DEPEND EASY FIT UNDERGARMENTS) MISC Medium size disposable under pants covered by insurance 100 each 11    Incontinence Supply Disposable (POISE PAD) PADS Change pad 3 x daily, #6 long Maxi pads 90 each 11    levothyroxine (SYNTHROID/LEVOTHROID) 25 MCG tablet Take 0.5 tablets (12.5 mcg) by mouth daily. With 50 mcg for total daily dose 62.5 mcg 45 tablet 3    levothyroxine (SYNTHROID/LEVOTHROID) 50 MCG tablet Take 1 tablet (50 mcg) by mouth daily. With 12.5 mcg for total daily dose 62.5 mcg 90 tablet 3    Lidocaine (LIDOCARE) 4 % Patch Apply one  patch at a time to affected area. Not more than 3-4 times daily. Remove patch from skin after at most 8 hour application. Family to Provide.      magnesium hydroxide (MILK OF MAGNESIA) 400 MG/5ML suspension TAKE 15ML BY MOUTH ONCE DAILY AS NEEDED FOR NO BM IN 3 DAYS 473 mL 11    meclizine (ANTIVERT) 12.5 MG tablet Take 1 tablet (12.5 mg) by mouth 3 times daily as needed for dizziness 90 tablet 1    Menthol, Topical Analgesic, 4 % GEL Externally apply topically 3 times daily as needed (pain relief for minor muscle/joint aches) OKAY TO KEEP AT BEDSIDE 150 mL 3    Multiple Vitamin (TAB-A-RADHA) TABS Take 1 tablet by mouth daily at 1200 pm 90 tablet 3    naloxone (NARCAN) 4 MG/0.1ML nasal spray Spray 1 spray (4 mg) into one nostril alternating nostrils as needed for opioid reversal every 2-3 minutes until assistance arrives 0.2 mL 1    ondansetron (ZOFRAN) 4 MG tablet Take 1 tablet (4 mg) by mouth every 8 hours as needed for nausea. 12 tablet 2    order for DME Equipment being ordered: Spikes for cane 1 Units 1    order for DME Equipment being ordered: plastic underpants 1 Units 1    senna-docusate (SENOKOT-S/PERICOLACE) 8.6-50 MG tablet 2 tabs in the morning and 1 tab at  tablet 3    tolnaftate (TINACTIN) 1 % external powder Apply topically 2 times daily as needed for itching or irritation OKAY TO KEEP BEDSIDE 90 g 2    trimethoprim (TRIMPEX) 100 MG tablet Take 1 tablet (100 mg) by mouth daily. For UTI prophylaxis 90 tablet 3    Vitamins A & D (MEDPURA VITAMIN A & D) OINT APPLY TOPICALLY TO AFFECTED AREA(S) TWICE DAILY APPLY TOPICALLY TO AFFECTED AREA(S) AS NEEDED 113 g 5          Hospital Follow-up Visit:    Hospital/Nursing Home/IP Rehab Facility: [facility]  Date of Admission: 11/25/24  Date of Discharge: 11/27/24  Reason(s) for Admission: Acute toxic metabolic encephalopathy secondary to urinary tract infection.  Was the patient in the ICU or did the patient experience delirium during  hospitalization?  No  Do you have any other stressors you would like to discuss with your provider? No    Problems taking medications regularly:  None, Geno has her medications set up and administered by [facility] staff.  Medication changes since discharge: None  Problems adhering to non-medication therapy:  None    Summary of hospitalization:  [facility] discharge summary reviewed  Diagnostic Tests/Treatments reviewed.  Follow up needed: BMP, CBC w PLT and diff to be drawn at [facility] on 12/5/24.  Other Healthcare Providers Involved in Patient's Care:         [facility] rounding NP's  Update since discharge: improved. Today, Geno is alert and is able to scoot herself around the facility in her wheelchair. She is back to her baseline.       Plan of care communicated with patient and family today, 12/5/24.              Review of Systems   Constitutional:  Negative for appetite change, chills, fatigue and fever.   Respiratory:  Negative for chest tightness, shortness of breath and wheezing.    Cardiovascular:  Negative for chest pain.   Musculoskeletal:  Positive for arthralgias and gait problem.   Allergic/Immunologic: Positive for environmental allergies.   Neurological:  Negative for dizziness and weakness.   Psychiatric/Behavioral:  Positive for confusion (baseline).    All other systems reviewed and are negative.      Toileting:    Continent of Bowel: Yes   Continent of Bladder: Yes, urinary urgency  Mobility: wheelchair    Recent Labs:   Recent Results (from the past 240 hours)   Basic metabolic panel    Collection Time: 11/26/24  6:39 AM   Result Value Ref Range    Sodium 132 (L) 135 - 145 mmol/L    Potassium 3.8 3.4 - 5.3 mmol/L    Chloride 99 98 - 107 mmol/L    Carbon Dioxide (CO2) 24 22 - 29 mmol/L    Anion Gap 9 7 - 15 mmol/L    Urea Nitrogen 12.9 8.0 - 23.0 mg/dL    Creatinine 0.51 0.51 - 0.95 mg/dL    GFR Estimate 84 >60 mL/min/1.73m2    Calcium 8.6 (L) 8.8 - 10.4 mg/dL    Glucose 84 70 - 99  mg/dL   CBC with platelets    Collection Time: 11/26/24  6:39 AM   Result Value Ref Range    WBC Count 6.5 4.0 - 11.0 10e3/uL    RBC Count 2.59 (L) 3.80 - 5.20 10e6/uL    Hemoglobin 8.4 (L) 11.7 - 15.7 g/dL    Hematocrit 28.2 (L) 35.0 - 47.0 %     (H) 78 - 100 fL    MCH 32.4 26.5 - 33.0 pg    MCHC 29.8 (L) 31.5 - 36.5 g/dL    RDW 16.9 (H) 10.0 - 15.0 %    Platelet Count 262 150 - 450 10e3/uL   Basic metabolic panel    Collection Time: 11/27/24  6:08 AM   Result Value Ref Range    Sodium 134 (L) 135 - 145 mmol/L    Potassium 3.6 3.4 - 5.3 mmol/L    Chloride 101 98 - 107 mmol/L    Carbon Dioxide (CO2) 25 22 - 29 mmol/L    Anion Gap 8 7 - 15 mmol/L    Urea Nitrogen 9.4 8.0 - 23.0 mg/dL    Creatinine 0.52 0.51 - 0.95 mg/dL    GFR Estimate 84 >60 mL/min/1.73m2    Calcium 8.6 (L) 8.8 - 10.4 mg/dL    Glucose 88 70 - 99 mg/dL   CBC with platelets    Collection Time: 11/27/24  6:09 AM   Result Value Ref Range    WBC Count 5.0 4.0 - 11.0 10e3/uL    RBC Count 2.68 (L) 3.80 - 5.20 10e6/uL    Hemoglobin 8.6 (L) 11.7 - 15.7 g/dL    Hematocrit 26.2 (L) 35.0 - 47.0 %    MCV 98 78 - 100 fL    MCH 32.1 26.5 - 33.0 pg    MCHC 32.8 31.5 - 36.5 g/dL    RDW 16.3 (H) 10.0 - 15.0 %    Platelet Count 294 150 - 450 10e3/uL   Extra Blue Top Tube    Collection Time: 11/27/24  6:09 AM   Result Value Ref Range    Hold Specimen JIC    Extra Red Top Tube    Collection Time: 11/27/24  6:09 AM   Result Value Ref Range    Hold Specimen JIC    Basic metabolic panel    Collection Time: 12/05/24 11:20 AM   Result Value Ref Range    Sodium 132 (L) 135 - 145 mmol/L    Potassium 4.4 3.4 - 5.3 mmol/L    Chloride 98 98 - 107 mmol/L    Carbon Dioxide (CO2) 26 22 - 29 mmol/L    Anion Gap 8 7 - 15 mmol/L    Urea Nitrogen 10.8 8.0 - 23.0 mg/dL    Creatinine 0.55 0.51 - 0.95 mg/dL    GFR Estimate 82 >60 mL/min/1.73m2    Calcium 8.8 8.8 - 10.4 mg/dL    Glucose 103 (H) 70 - 99 mg/dL   CBC with platelets and differential    Collection Time: 12/05/24 11:20 AM    Result Value Ref Range    WBC Count 6.2 4.0 - 11.0 10e3/uL    RBC Count 3.25 (L) 3.80 - 5.20 10e6/uL    Hemoglobin 10.5 (L) 11.7 - 15.7 g/dL    Hematocrit 32.3 (L) 35.0 - 47.0 %    MCV 99 78 - 100 fL    MCH 32.3 26.5 - 33.0 pg    MCHC 32.5 31.5 - 36.5 g/dL    RDW 16.9 (H) 10.0 - 15.0 %    Platelet Count 439 150 - 450 10e3/uL    % Neutrophils 79 %    % Lymphocytes 11 %    % Monocytes 7 %    % Eosinophils 1 %    % Basophils 1 %    % Immature Granulocytes 1 %    NRBCs per 100 WBC 0 <1 /100    Absolute Neutrophils 5.0 1.6 - 8.3 10e3/uL    Absolute Lymphocytes 0.7 (L) 0.8 - 5.3 10e3/uL    Absolute Monocytes 0.5 0.0 - 1.3 10e3/uL    Absolute Eosinophils 0.1 0.0 - 0.7 10e3/uL    Absolute Basophils 0.1 0.0 - 0.2 10e3/uL    Absolute Immature Granulocytes 0.1 <=0.4 10e3/uL    Absolute NRBCs 0.0 10e3/uL        Current Therapies: none    Physical Exam  Vitals reviewed.   Cardiovascular:      Rate and Rhythm: Normal rate.      Pulses: Normal pulses.   Pulmonary:      Effort: Pulmonary effort is normal.      Breath sounds: Normal breath sounds.   Musculoskeletal:      Cervical back: Normal range of motion.   Skin:     General: Skin is warm and dry.   Neurological:      Mental Status: She is alert. Mental status is at baseline.      Motor: Weakness present.      Gait: Gait abnormal (uses wheelchair).   Psychiatric:         Cognition and Memory: Memory is impaired. She exhibits impaired recent memory and impaired remote memory.

## 2025-01-08 ENCOUNTER — LAB REQUISITION (OUTPATIENT)
Dept: LAB | Facility: OTHER | Age: OVER 89
End: 2025-01-08
Payer: COMMERCIAL

## 2025-01-08 DIAGNOSIS — N39.0 RECURRENT UTI (URINARY TRACT INFECTION): ICD-10-CM

## 2025-01-08 DIAGNOSIS — N39.0 SEPSIS SECONDARY TO UTI (H): ICD-10-CM

## 2025-01-08 DIAGNOSIS — A41.9 SEPSIS SECONDARY TO UTI (H): ICD-10-CM

## 2025-01-08 DIAGNOSIS — N30.00 ACUTE CYSTITIS WITHOUT HEMATURIA: Primary | ICD-10-CM

## 2025-01-08 DIAGNOSIS — Z29.89 NEED FOR PROPHYLAXIS AGAINST URINARY TRACT INFECTION: ICD-10-CM

## 2025-01-08 DIAGNOSIS — R39.9 UNSPECIFIED SYMPTOMS AND SIGNS INVOLVING THE GENITOURINARY SYSTEM: ICD-10-CM

## 2025-01-08 DIAGNOSIS — R41.82 ACUTE ALTERATION IN MENTAL STATUS: ICD-10-CM

## 2025-01-08 DIAGNOSIS — R41.82 ALTERED MENTAL STATUS, UNSPECIFIED: ICD-10-CM

## 2025-01-08 DIAGNOSIS — N39.0 URINARY TRACT INFECTION, SITE NOT SPECIFIED: ICD-10-CM

## 2025-01-08 DIAGNOSIS — R39.9 UTI SYMPTOMS: Primary | ICD-10-CM

## 2025-01-08 LAB
ALBUMIN UR-MCNC: NEGATIVE MG/DL
APPEARANCE UR: ABNORMAL
BACTERIA #/AREA URNS HPF: ABNORMAL /HPF
BILIRUB UR QL STRIP: NEGATIVE
COLOR UR AUTO: YELLOW
GLUCOSE UR STRIP-MCNC: NEGATIVE MG/DL
HGB UR QL STRIP: NEGATIVE
KETONES UR STRIP-MCNC: NEGATIVE MG/DL
LEUKOCYTE ESTERASE UR QL STRIP: ABNORMAL
MUCOUS THREADS #/AREA URNS LPF: PRESENT /LPF
NITRATE UR QL: NEGATIVE
PH UR STRIP: 6 [PH] (ref 5–9)
RBC URINE: 10 /HPF
SP GR UR STRIP: 1.01 (ref 1–1.03)
SQUAMOUS EPITHELIAL: 140 /HPF
UROBILINOGEN UR STRIP-MCNC: NORMAL MG/DL
WBC CLUMPS #/AREA URNS HPF: PRESENT /HPF
WBC URINE: >182 /HPF

## 2025-01-08 RX ORDER — TRIMETHOPRIM 100 MG/1
100 TABLET ORAL DAILY
Qty: 90 TABLET | Refills: 3 | Status: SHIPPED | OUTPATIENT
Start: 2025-01-08

## 2025-01-08 RX ORDER — SULFAMETHOXAZOLE AND TRIMETHOPRIM 800; 160 MG/1; MG/1
1 TABLET ORAL EVERY 12 HOURS
Qty: 6 TABLET | Refills: 0 | Status: SHIPPED | OUTPATIENT
Start: 2025-01-08 | End: 2025-01-11

## 2025-01-08 NOTE — PROGRESS NOTES
- UA positive for acute cystitis without hematuria.   - Will add on urine culture and adjust treatment as indicated  - Will treat with antibiotics; Bactrim 800-160 mg tablets, take 1 tablet PO q 12 hours x 3 days. HOLD prophylactic trimethoprim while taking Bactrim.   - Encouraged intake of probiotics/yogurt while on antibiotics and for 1 week after  - Educated on prevention including urination after intercourse, avoidance of bubble baths, douching, scented underware/pads/cosemetics  - May drink cranberry juice to help prevent  - Return/call as needed for follow-up should any new symptoms develop, for worsening of current symptoms or if symptoms do not resolve with above plan.     Recent Results (from the past 240 hours)   UA reflex to Microscopic    Collection Time: 01/08/25  7:30 AM   Result Value Ref Range    Color Urine Yellow Colorless, Straw, Light Yellow, Yellow    Appearance Urine Cloudy (A) Clear    Glucose Urine Negative Negative mg/dL    Bilirubin Urine Negative Negative    Ketones Urine Negative Negative mg/dL    Specific Gravity Urine 1.015 1.005 - 1.030    Blood Urine Negative Negative    pH Urine 6.0 5.0 - 9.0    Protein Albumin Urine Negative Negative mg/dL    Urobilinogen Urine Normal 0.2, 1.0, Normal mg/dL    Nitrite Urine Negative Negative    Leukocyte Esterase Urine Moderate (A) Negative    Bacteria Urine Few (A) None Seen /HPF    RBC Urine 10 (H) <=2 /HPF    WBC Urine >182 (H) <=5 /HPF    Squamous Epithelials Urine 140 (H) <=1 /HPF    WBC Clumps Urine Present (A) None Seen /HPF    Mucus Urine Present (A) None Seen /LPF    1. Acute cystitis without hematuria (Primary)    - sulfamethoxazole-trimethoprim (BACTRIM DS) 800-160 MG tablet; Take 1 tablet by mouth every 12 hours for 3 days.  Dispense: 6 tablet; Refill: 0    2. Recurrent UTI (urinary tract infection)    - trimethoprim (TRIMPEX) 100 MG tablet; Take 1 tablet (100 mg) by mouth daily. For UTI prophylaxis. HOLD while taking other ABX for UTI.   Dispense: 90 tablet; Refill: 3    3. Sepsis secondary to UTI (H)    - trimethoprim (TRIMPEX) 100 MG tablet; Take 1 tablet (100 mg) by mouth daily. For UTI prophylaxis. HOLD while taking other ABX for UTI.  Dispense: 90 tablet; Refill: 3    4. Need for prophylaxis against urinary tract infection    - trimethoprim (TRIMPEX) 100 MG tablet; Take 1 tablet (100 mg) by mouth daily. For UTI prophylaxis. HOLD while taking other ABX for UTI.  Dispense: 90 tablet; Refill: 3      LARISA Schmidt CNP on 1/8/2025 at 12:13 PM

## 2025-01-08 NOTE — PROGRESS NOTES
Correspondence received from [facility] RN with concerns of increased lethargy and decreased appetite. In the past, when Geno has had similar symptoms, she's had a UTI. She has a history of sepsis related to UTI. Geno is currently taking trimethoprim PO daily for UTI prophylaxis.       UA/UC orders placed.    1. Recurrent UTI (urinary tract infection)    - UA with Microscopic reflex to Culture; Future    2. Sepsis secondary to UTI (H)      3. Acute alteration in mental status    - UA with Microscopic reflex to Culture; Future    4. UTI symptoms (Primary)    - UA with Microscopic reflex to Culture; Future      LARISA Schmidt CNP on 1/8/2025 at 10:51 AM

## 2025-01-09 LAB — BACTERIA UR CULT: ABNORMAL

## 2025-01-10 ENCOUNTER — MEDICAL CORRESPONDENCE (OUTPATIENT)
Dept: HEALTH INFORMATION MANAGEMENT | Facility: OTHER | Age: OVER 89
End: 2025-01-10
Payer: COMMERCIAL

## 2025-01-16 ENCOUNTER — NURSING HOME VISIT (OUTPATIENT)
Dept: GERIATRICS | Facility: OTHER | Age: OVER 89
End: 2025-01-16
Attending: NURSE PRACTITIONER
Payer: COMMERCIAL

## 2025-01-16 VITALS
SYSTOLIC BLOOD PRESSURE: 100 MMHG | WEIGHT: 127 LBS | DIASTOLIC BLOOD PRESSURE: 72 MMHG | BODY MASS INDEX: 24 KG/M2 | TEMPERATURE: 98.4 F | RESPIRATION RATE: 16 BRPM | HEART RATE: 81 BPM | OXYGEN SATURATION: 94 %

## 2025-01-16 DIAGNOSIS — F02.80 ALZHEIMER'S DISEASE (H): ICD-10-CM

## 2025-01-16 DIAGNOSIS — G30.9 ALZHEIMER'S DISEASE (H): ICD-10-CM

## 2025-01-16 DIAGNOSIS — Z78.9 LIVING IN ASSISTED LIVING: ICD-10-CM

## 2025-01-16 DIAGNOSIS — S81.812A SKIN TEAR OF LEFT LOWER LEG WITHOUT COMPLICATION, INITIAL ENCOUNTER: Primary | ICD-10-CM

## 2025-01-16 DIAGNOSIS — R63.4 WEIGHT LOSS: ICD-10-CM

## 2025-01-16 RX ORDER — DEXTRAN 70 AND HYPROMELLOSE 2910 1; 3 MG/ML; MG/ML
1 SOLUTION/ DROPS OPHTHALMIC
COMMUNITY

## 2025-01-16 NOTE — PROGRESS NOTES
Geno Horn is a 89 year old female being seen today for acute and follow up visit at [facility].    Code Status: DNR / DNI, Advance Directives: YES-   Health Care Power of : Extended Emergency Contact Information  Primary Emergency Contact: Daniel Horn  Address: [address]           [place] [address] [facility]  Mobile Phone: [phone]  Relation: Son  Secondary Emergency Contact: Adri Chaudhary  Address: [address] [place] [address] [facility]  Home Phone: [phone]  Mobile Phone: [phone]  Relation: Daughter     Allergies: Alendronate, Celebrex [celecoxib], Other environmental allergy, Oxybutynin, Tolterodine, Tramadol, and Trospium     Chief Complaint / HPI: Follow-up left lower posterior calf skin tear wound--noticed by staff a couple of days ago--no known injury--staff feel may have been from wheelchair foot supports while scooting in wheelchair.  Geno has had a recent hospitalization November for encephalopathy secondary to cystitis.  Currently just finishing up Ceftin for another recent bladder infection  In reviewing her weights it appears she has lost about 10 to 12 pounds since February 2024 in reviewing past documented weights.  Staff are trying to encourage appetite and intake--Geno reports she does not have much of an appetite  Wheelchair dependent--staff provide assistance with transfers and toileting  Nursing staff do not raise any additional concerns--- and does not report any pain or discomfort.    Past Medical, Surgical, Family and Social History reviewed: YES.     Medications: Reviewed and reconciled  Medications - recent changes: Ceftin for UTI    Review of Systems:  General: positive for weight loss, weakness  Skin: positive for Skin tear wound  Musculoskeletal: positive for arthritis and muscular weakness  All other systems negative  Toileting:    Continent of Bowel: Yes   Continent of Bladder: No  Mobility: walker and wheelchair    Recent  Labs: None      Current Therapies: none    Exam:      [facility]  Vital signs reviewed.   GENERAL APPEARANCE: alert and no distress  EYES: Eyes grossly normal to inspection, conjunctivae and sclerae normal  CV: regular rate, 1-2+ peripheral edema   MS: no musculoskeletal defects are noted, wheelchair dependent  SKIN: Approximately 4+ centimeter vertical elliptical shaped skin tear wound posterior left calf, wound bed appears healthy dry with no drainage  Lateral border residual skin flap about half a centimeter diameter adherent to lateral wound bed however scabbing crusting and beginning to slough off  No surrounding erythema, no evidence of focal fluctuance or purulence  NEURO: Alert,mentation intact and speech normal  PSYCH: mentation appears normal and affect normal/bright    Assessment and Plan:    Very pleasant gracious lady sustained a recent skin tear wound--Geno has a history of skin tear wounds on both extremities and upper extremities in the past since I have known her--her skin is very fragile and easily friable  Wound bed clean, would like the staff to do wound wash saline pat dry, bacitracin or mupirocin which has been used in the past and was left over on the wound bed, cover with Mepitel porous dressing, covered with secondary dressing foam with border adhesive and change weekly and as needed if any drainage--- would like staff to continue to monitor wound bed at least daily-covered with Kerlix and knee-high compression stocking cover    Generally deconditioned weak and decreased appetite from previous baseline--- consider nutritional shake supplementation  if she does not have one currently between meals and physical therapy to improve strength, self transfers, optimize mobility and independence in assisted living home--- will discuss with Maria Antonia her daughter and if agreeable we will send a referral to [facility] home care    Nursing staff will continue to monitor closely and will notify for any concerns  otherwise would follow-up on progress in 2 weeks--if responding to above care would expect at least 50% healing progress    Over 45 minutes spent in direct visit, exam, medication review and management, chart review, referral for therapy services      (V72.494T) Skin tear of left lower leg without complication, initial encounter  (primary encounter diagnosis)      (G30.9,  F02.80) Alzheimer's disease (H)      (Z78.9) Living in assisted living      (R63.4) Weight loss

## 2025-01-19 ENCOUNTER — HOSPITAL ENCOUNTER (EMERGENCY)
Facility: OTHER | Age: OVER 89
Discharge: HOME | End: 2025-01-19
Attending: EMERGENCY MEDICINE
Payer: COMMERCIAL

## 2025-01-19 VITALS
SYSTOLIC BLOOD PRESSURE: 104 MMHG | DIASTOLIC BLOOD PRESSURE: 42 MMHG | RESPIRATION RATE: 18 BRPM | TEMPERATURE: 97.4 F | OXYGEN SATURATION: 96 % | HEART RATE: 80 BPM | WEIGHT: 127 LBS | HEIGHT: 61 IN | BODY MASS INDEX: 23.98 KG/M2

## 2025-01-19 DIAGNOSIS — L08.9 TRAUMATIC OPEN WOUND OF LEFT LOWER LEG WITH INFECTION, INITIAL ENCOUNTER: ICD-10-CM

## 2025-01-19 DIAGNOSIS — S81.802A TRAUMATIC OPEN WOUND OF LEFT LOWER LEG WITH INFECTION, INITIAL ENCOUNTER: ICD-10-CM

## 2025-01-19 LAB
ANION GAP SERPL CALCULATED.3IONS-SCNC: 12 MMOL/L (ref 7–15)
BASOPHILS # BLD AUTO: 0 10E3/UL (ref 0–0.2)
BASOPHILS NFR BLD AUTO: 0 %
BUN SERPL-MCNC: 12.8 MG/DL (ref 8–23)
CALCIUM SERPL-MCNC: 9 MG/DL (ref 8.8–10.4)
CHLORIDE SERPL-SCNC: 95 MMOL/L (ref 98–107)
CREAT SERPL-MCNC: 0.57 MG/DL (ref 0.51–0.95)
CRP SERPL-MCNC: 19.5 MG/L
EGFRCR SERPLBLD CKD-EPI 2021: 82 ML/MIN/1.73M2
EOSINOPHIL # BLD AUTO: 0 10E3/UL (ref 0–0.7)
EOSINOPHIL NFR BLD AUTO: 0 %
ERYTHROCYTE [DISTWIDTH] IN BLOOD BY AUTOMATED COUNT: 19.9 % (ref 10–15)
GLUCOSE SERPL-MCNC: 127 MG/DL (ref 70–99)
HCO3 SERPL-SCNC: 22 MMOL/L (ref 22–29)
HCT VFR BLD AUTO: 31.9 % (ref 35–47)
HGB BLD-MCNC: 10.2 G/DL (ref 11.7–15.7)
HOLD SPECIMEN: NORMAL
IMM GRANULOCYTES # BLD: 0.1 10E3/UL
IMM GRANULOCYTES NFR BLD: 1 %
LYMPHOCYTES # BLD AUTO: 0.7 10E3/UL (ref 0.8–5.3)
LYMPHOCYTES NFR BLD AUTO: 8 %
MCH RBC QN AUTO: 31.1 PG (ref 26.5–33)
MCHC RBC AUTO-ENTMCNC: 32 G/DL (ref 31.5–36.5)
MCV RBC AUTO: 97 FL (ref 78–100)
MONOCYTES # BLD AUTO: 0.4 10E3/UL (ref 0–1.3)
MONOCYTES NFR BLD AUTO: 4 %
NEUTROPHILS # BLD AUTO: 7.7 10E3/UL (ref 1.6–8.3)
NEUTROPHILS NFR BLD AUTO: 87 %
NRBC # BLD AUTO: 0 10E3/UL
NRBC BLD AUTO-RTO: 0 /100
PLATELET # BLD AUTO: 396 10E3/UL (ref 150–450)
POTASSIUM SERPL-SCNC: 4.5 MMOL/L (ref 3.4–5.3)
RBC # BLD AUTO: 3.28 10E6/UL (ref 3.8–5.2)
SODIUM SERPL-SCNC: 129 MMOL/L (ref 135–145)
WBC # BLD AUTO: 8.9 10E3/UL (ref 4–11)

## 2025-01-19 PROCEDURE — 86140 C-REACTIVE PROTEIN: CPT | Performed by: EMERGENCY MEDICINE

## 2025-01-19 PROCEDURE — 85004 AUTOMATED DIFF WBC COUNT: CPT | Performed by: EMERGENCY MEDICINE

## 2025-01-19 PROCEDURE — 250N000013 HC RX MED GY IP 250 OP 250 PS 637: Performed by: EMERGENCY MEDICINE

## 2025-01-19 PROCEDURE — 99283 EMERGENCY DEPT VISIT LOW MDM: CPT | Performed by: EMERGENCY MEDICINE

## 2025-01-19 PROCEDURE — 80048 BASIC METABOLIC PNL TOTAL CA: CPT | Performed by: EMERGENCY MEDICINE

## 2025-01-19 PROCEDURE — 99284 EMERGENCY DEPT VISIT MOD MDM: CPT | Performed by: EMERGENCY MEDICINE

## 2025-01-19 PROCEDURE — 36415 COLL VENOUS BLD VENIPUNCTURE: CPT | Performed by: EMERGENCY MEDICINE

## 2025-01-19 RX ADMIN — CEPHALEXIN 250 MG: 250 CAPSULE ORAL at 15:50

## 2025-01-19 ASSESSMENT — COLUMBIA-SUICIDE SEVERITY RATING SCALE - C-SSRS
1. IN THE PAST MONTH, HAVE YOU WISHED YOU WERE DEAD OR WISHED YOU COULD GO TO SLEEP AND NOT WAKE UP?: NO
2. HAVE YOU ACTUALLY HAD ANY THOUGHTS OF KILLING YOURSELF IN THE PAST MONTH?: NO
6. HAVE YOU EVER DONE ANYTHING, STARTED TO DO ANYTHING, OR PREPARED TO DO ANYTHING TO END YOUR LIFE?: NO

## 2025-01-19 ASSESSMENT — ENCOUNTER SYMPTOMS
SHORTNESS OF BREATH: 0
AGITATION: 0
FEVER: 0
ARTHRALGIAS: 0
LIGHT-HEADEDNESS: 0
COLOR CHANGE: 1
NAUSEA: 0
WOUND: 1
CHEST TIGHTNESS: 0
CHILLS: 0
VOMITING: 0
DYSURIA: 0

## 2025-01-19 ASSESSMENT — ACTIVITIES OF DAILY LIVING (ADL)
ADLS_ACUITY_SCORE: 61
ADLS_ACUITY_SCORE: 61

## 2025-01-19 NOTE — ED PROVIDER NOTES
"  History     Chief Complaint   Patient presents with    Wound Infection     HPI  Geno Horn is a 89 year old female who comes in by ambulance from local assisted living facility with concern for infection in the back of her left lower leg.  Believes that she may have scratched herself and it has gotten infected.  She says it is painful but denies any other symptoms.  She states she has not been feeling ill recently.  When I asked about fevers or chills she said that she did have some yesterday but \"I am fine now\".  Denies URI type symptoms, no nausea vomiting, no shortness of breath or chest discomfort of any kind.  She states she is eating and drinking normally.    Allergies:  Allergies   Allergen Reactions    Alendronate Nausea     Stomach upset    Celebrex [Celecoxib] Other (See Comments)     Light headed    Other Environmental Allergy Other (See Comments)     Itchy,Runny Eyes    Oxybutynin Other (See Comments)     Felt High    Tolterodine      Other reaction(s): Intolerance-Can't Take  Was unable to sleep at night    Tramadol Other (See Comments)     Adverse  effects    Trospium Other (See Comments)     drowsy       Problem List:    Patient Active Problem List    Diagnosis Date Noted    UTI (urinary tract infection) 11/25/2024     Priority: Medium    Generalized weakness 11/25/2024     Priority: Medium    Alzheimer's disease (H) 02/20/2024     Priority: Medium    Periprosthetic fracture around internal prosthetic hip joint, initial encounter 10/21/2021     Priority: Medium    Multiple skin tears 02/25/2021     Priority: Medium    H/O dizziness 02/25/2021     Priority: Medium    Skin tear of left upper extremity 02/20/2021     Priority: Medium    Falls frequently 11/17/2020     Priority: Medium    Contusion of right hip, initial encounter 11/17/2020     Priority: Medium    General unsteadiness 11/17/2020     Priority: Medium    Closed wedge compression fracture of T9 vertebra with routine healing, subsequent " encounter 11/17/2020     Priority: Medium    History of 2019 novel coronavirus disease (COVID-19) 11/17/2020     Priority: Medium    Pneumonia due to 2019 novel coronavirus 11/01/2020     Priority: Medium    Hyponatremia 11/01/2020     Priority: Medium    COVID-19 virus infection 11/01/2020     Priority: Medium    Spinal stenosis of lumbar region, unspecified whether neurogenic claudication present 02/19/2020     Priority: Medium    Sepsis (H) 06/17/2019     Priority: Medium    Unsteady gait 05/21/2018     Priority: Medium    Urinary urgency 07/24/2017     Priority: Medium    H/O basal cell carcinoma excision 07/20/2016     Priority: Medium    Osteoarthritis of both hips 01/21/2015     Priority: Medium    Allergic rhinitis 03/16/2012     Priority: Medium    Urinary incontinence 11/01/2010     Priority: Medium     Formatting of this note might be different from the original.  [facility] Update 10/11      Osteoarthritis of foot joint 05/26/2009     Priority: Medium     Formatting of this note might be different from the original.  Right midfoot      Hearing loss 04/10/2008     Priority: Medium     Formatting of this note might be different from the original.  [facility] Update      Coronary atherosclerosis of native coronary artery 05/09/2007     Priority: Medium     Formatting of this note might be different from the original.  [facility] Update 10/11      Essential hypertension 05/09/2007     Priority: Medium     Formatting of this note might be different from the original.  [facility] Update      Hypothyroidism 05/09/2007     Priority: Medium     Overview:   tsh and t4 normal on 1/2015     Formatting of this note might be different from the original.  [facility] Update 10/11      Hyperlipidemia 06/26/2003     Priority: Medium     Formatting of this note might be different from the original.  [facility] Update 10/11      Osteoporosis 06/26/2003     Priority: Medium     Formatting of this note might be different from the original.  Has had DEXA x 2.  [facility]  Update      Sciatica 2002     Priority: Medium     Formatting of this note might be different from the original.  Apparently has hx. of coronary disease.  Is S/P angioplasty in .  Had normal EKG done 04 in [place].  [facility] Update 10/11    Formatting of this note might be different from the original.  [facility] Update 10/11    Formatting of this note might be different from the original.  [facility] MRI          Past Medical History:    Past Medical History:   Diagnosis Date    Hypertension     Hypothyroid     Osteoarthritis     ST elevation (STEMI) myocardial infarction (H)        Past Surgical History:    Past Surgical History:   Procedure Laterality Date    ARTHROPLASTY HIP Right     Dr Rodriguez    ARTHROPLASTY HIP Left     Dr. Rodriguez    COMBINED CYSTOSCOPY, URETEROSCOPY, LASER HOLMIUM LITHOTRIPSY URETER(S) Right 2019    Procedure: CYSTOSCOPY, RIGHT STENT REMOVAL, URETEROSCOPY, LASER HOLMIUM LITHOTRIPSY RIGHT URETER WITH STENT REPLACEMENT;  Surgeon: Shawn Cole MD;  Location:  OR    CYSTOSCOPY, RETROGRADES, INSERT STENT URETER(S), COMBINED Right 2019    Procedure: CYSTOSCOPY, WITH RETROGRADE PYELOGRAM AND URETERAL STENT INSERTION;  Surgeon: Shawn Cole MD;  Location:  OR       Family History:    Family History   Problem Relation Age of Onset    Heart Disease Mother 60         of mi    Heart Disease Father 60         of mi    Heart Disease Brother     Heart Disease Brother        Social History:  Marital Status:   [5]  Social History     Tobacco Use    Smoking status: Never    Smokeless tobacco: Never    Tobacco comments:     no ecig   Vaping Use    Vaping status: Every Day   Substance Use Topics    Alcohol use: Not Currently     Alcohol/week: 0.0 standard drinks of alcohol    Drug use: No        Medications:    cephALEXin (KEFLEX) 250 MG capsule  acetaminophen (TYLENOL) 650 MG CR tablet  alum & mag hydroxide-simethicone (MAALOX  ES) 400-400-40 MG/5ML SUSP suspension  artificial  "tears (GENTEAL) 0.1-0.2-0.3 % ophthalmic solution  calcium carbonate (TUMS) 500 MG chewable tablet  cetirizine (ZYRTEC) 10 MG tablet  cyanocobalamin (VITAMIN B-12) 500 MCG tablet  DEEP SEA NASAL SPRAY 0.65 % nasal spray  ferrous sulfate (FEROSUL) 325 (65 Fe) MG tablet  glycerin (ADULT) 2 g suppository  hypromellose-dextran (GENTEAL TEARS) 0.1-0.3 % ophthalmic solution  Incontinence Supply Disposable (DEPEND EASY FIT UNDERGARMENTS) MISC  Incontinence Supply Disposable (POISE PAD) PADS  levothyroxine (SYNTHROID/LEVOTHROID) 25 MCG tablet  levothyroxine (SYNTHROID/LEVOTHROID) 50 MCG tablet  Lidocaine (LIDOCARE) 4 % Patch  magnesium hydroxide (MILK OF MAGNESIA) 400 MG/5ML suspension  meclizine (ANTIVERT) 12.5 MG tablet  Menthol, Topical Analgesic, 4 % GEL  Multiple Vitamin (TAB-A-RADHA) TABS  naloxone (NARCAN) 4 MG/0.1ML nasal spray  Nutritional Supplement LIQD  ondansetron (ZOFRAN) 4 MG tablet  order for DME  order for DME  Probiotic Product (PROBIOTIC DAILY) CAPS  senna-docusate (SENOKOT-S/PERICOLACE) 8.6-50 MG tablet  tolnaftate (TINACTIN) 1 % external powder  trimethoprim (TRIMPEX) 100 MG tablet  Vitamins A & D (MEDPURA VITAMIN A & D) OINT          Review of Systems   Constitutional:  Negative for chills and fever.   HENT:  Negative for congestion.    Eyes:  Negative for visual disturbance.   Respiratory:  Negative for chest tightness and shortness of breath.    Cardiovascular:  Negative for chest pain.   Gastrointestinal:  Negative for nausea and vomiting.   Genitourinary:  Negative for dysuria.   Musculoskeletal:  Negative for arthralgias.   Skin:  Positive for color change and wound.   Neurological:  Negative for light-headedness.   Psychiatric/Behavioral:  Negative for agitation.        Physical Exam   BP: 116/73  Pulse: 80  Temp: 97.4  F (36.3  C)  Resp: 18  Height: 154.9 cm (5' 1\")  Weight: 57.6 kg (127 lb)  SpO2: 95 %      Physical Exam  Vitals and nursing note reviewed.   Constitutional:       Appearance: " Normal appearance.   HENT:      Head: Normocephalic and atraumatic.      Mouth/Throat:      Mouth: Mucous membranes are moist.   Eyes:      Conjunctiva/sclera: Conjunctivae normal.   Cardiovascular:      Rate and Rhythm: Normal rate and regular rhythm.      Heart sounds: Normal heart sounds.   Pulmonary:      Effort: Pulmonary effort is normal.      Breath sounds: Normal breath sounds.   Abdominal:      General: Abdomen is flat.   Musculoskeletal:      Comments: Posterior left calf area does have an area measuring perhaps 2 x 4 inches of erythema.  In the center of this is an open wound which is quite shallow, partial-thickness and not ended any deeper structures.  Surrounded by some fairly significant erythema.  There is an image in the media section of the chart.   Skin:     General: Skin is warm and dry.   Neurological:      Mental Status: She is alert and oriented to person, place, and time.   Psychiatric:         Mood and Affect: Mood normal.         Behavior: Behavior normal.         ED Course        Procedures                  Results for orders placed or performed during the hospital encounter of 01/19/25 (from the past 24 hours)   CBC with platelets differential    Narrative    The following orders were created for panel order CBC with platelets differential.  Procedure                               Abnormality         Status                     ---------                               -----------         ------                     CBC with platelets and d...[[id number]]  Abnormal            Final result                 Please view results for these tests on the individual orders.   Basic metabolic panel   Result Value Ref Range    Sodium 129 (L) 135 - 145 mmol/L    Potassium 4.5 3.4 - 5.3 mmol/L    Chloride 95 (L) 98 - 107 mmol/L    Carbon Dioxide (CO2) 22 22 - 29 mmol/L    Anion Gap 12 7 - 15 mmol/L    Urea Nitrogen 12.8 8.0 - 23.0 mg/dL    Creatinine 0.57 0.51 - 0.95 mg/dL    GFR Estimate 82 >60  mL/min/1.73m2    Calcium 9.0 8.8 - 10.4 mg/dL    Glucose 127 (H) 70 - 99 mg/dL   CRP inflammation   Result Value Ref Range    CRP Inflammation 19.50 (H) <5.00 mg/L   [place] Draw    Narrative    The following orders were created for panel order [place] Draw.  Procedure                               Abnormality         Status                     ---------                               -----------         ------                     Extra Blue Top Tube[[id number]]                              Final result               Extra Red Top Tube[[id number]]                               Final result               Extra Green Top (Lithium...[[id number]]                      Final result                 Please view results for these tests on the individual orders.   CBC with platelets and differential   Result Value Ref Range    WBC Count 8.9 4.0 - 11.0 10e3/uL    RBC Count 3.28 (L) 3.80 - 5.20 10e6/uL    Hemoglobin 10.2 (L) 11.7 - 15.7 g/dL    Hematocrit 31.9 (L) 35.0 - 47.0 %    MCV 97 78 - 100 fL    MCH 31.1 26.5 - 33.0 pg    MCHC 32.0 31.5 - 36.5 g/dL    RDW 19.9 (H) 10.0 - 15.0 %    Platelet Count 396 150 - 450 10e3/uL    % Neutrophils 87 %    % Lymphocytes 8 %    % Monocytes 4 %    % Eosinophils 0 %    % Basophils 0 %    % Immature Granulocytes 1 %    NRBCs per 100 WBC 0 <1 /100    Absolute Neutrophils 7.7 1.6 - 8.3 10e3/uL    Absolute Lymphocytes 0.7 (L) 0.8 - 5.3 10e3/uL    Absolute Monocytes 0.4 0.0 - 1.3 10e3/uL    Absolute Eosinophils 0.0 0.0 - 0.7 10e3/uL    Absolute Basophils 0.0 0.0 - 0.2 10e3/uL    Absolute Immature Granulocytes 0.1 <=0.4 10e3/uL    Absolute NRBCs 0.0 10e3/uL   Extra Blue Top Tube   Result Value Ref Range    Hold Specimen x    Extra Red Top Tube   Result Value Ref Range    Hold Specimen x    Extra Green Top (Lithium Heparin) Tube   Result Value Ref Range    Hold Specimen x        Medications   cephALEXin (KEFLEX) capsule 250 mg (250 mg Oral $Given 1/19/25 1380)       Assessments & Plan (with Medical  Decision Making)     I have reviewed the nursing notes.    I have reviewed the findings, diagnosis, plan and need for follow up with the patient.    Wound does look like it has some infection there.  Afebrile.  White blood count normal, however CRP is elevated.  Will give her some Keflex for this.  She is currently taking trimethoprim for prophylaxis for UTI.  She can stop this while on the Keflex, however started up again as soon as she finishes.  Return to clinic or ER if worse or not improving.      New Prescriptions    CEPHALEXIN (KEFLEX) 250 MG CAPSULE    Take 1 capsule (250 mg) by mouth 3 times daily for 10 days.       Final diagnoses:   Traumatic open wound of left lower leg with infection, initial encounter - superficial       1/19/2025   [facility] AND [facility]       Tahir Hardy MD  01/19/25 9114

## 2025-01-19 NOTE — ED NOTES
Staff updated that pt is being discharged and daughter will assist with ride home. Facility ensured they can help with getting pt out of car once back at facility

## 2025-01-19 NOTE — ED TRIAGE NOTES
"Pt arrives to ED via [facility]. Comes from [facility]. Staff called EMS to have wound check on LLE. Wound noted to back of L calf. Pt denies any other concerns at this time.     /73   Pulse 80   Temp 97.4  F (36.3  C) (Tympanic)   Resp 18   Ht 1.549 m (5' 1\")   Wt 57.6 kg (127 lb)   LMP 02/03/1984 (Approximate)   SpO2 95%   BMI 24.00 kg/m         Triage Assessment (Adult)       Row Name 01/19/25 1456          Triage Assessment    Airway WDL WDL        Respiratory WDL    Respiratory WDL WDL        Skin Circulation/Temperature WDL    Skin Circulation/Temperature WDL X  wound to LLE        Cardiac WDL    Cardiac WDL WDL        Peripheral/Neurovascular WDL    Peripheral Neurovascular WDL WDL        Cognitive/Neuro/Behavioral WDL    Cognitive/Neuro/Behavioral WDL X;orientation     Level of Consciousness confused     Arousal Level opens eyes spontaneously     Orientation disoriented to;time     Speech clear     Mood/Behavior calm;cooperative        Pupils (CN II)    Pupil PERRLA yes        Iza Coma Scale    Best Eye Response 4-->(E4) spontaneous     Best Motor Response 6-->(M6) obeys commands     Best Verbal Response 5-->(V5) oriented     Iza Coma Scale Score 15                     "

## 2025-01-19 NOTE — DISCHARGE INSTRUCTIONS
You can stop the trimethoprim while you are on the Keflex.  Started again when you finish this.  Return to ER if worse.

## 2025-02-04 ENCOUNTER — TELEPHONE (OUTPATIENT)
Dept: FAMILY MEDICINE | Facility: OTHER | Age: OVER 89
End: 2025-02-04
Payer: COMMERCIAL

## 2025-02-04 NOTE — TELEPHONE ENCOUNTER
The physical therapy evaluation and treatment that was ordered was completed on 2/4/25      Request for additional Home Care Physical Therapy orders as follows:      Continuation frequency =   ___1___  x week x  ___1___ week(s)    Effective date = 2/5/25      Continuation frequency =   ___2___  x week x  ___3___ week(s)    Effective date = 2/10/25      Interventions include:    Gait Training  Muscle strengthening exercises  Balance training  Transfer Training  Education - home safety  Instruction - home exercise program  Home safety assessment                Therapist: Erica Cardenas PT    Please respond with .[email or web address], if you are in agreement. Please sign with your comments and signature, if you are not in agreement. Route to the  Home Care pool.

## 2025-02-05 DIAGNOSIS — M15.9 OSTEOARTHRITIS OF MULTIPLE JOINTS, UNSPECIFIED OSTEOARTHRITIS TYPE: ICD-10-CM

## 2025-02-05 DIAGNOSIS — M48.061 SPINAL STENOSIS OF LUMBAR REGION, UNSPECIFIED WHETHER NEUROGENIC CLAUDICATION PRESENT: ICD-10-CM

## 2025-02-05 DIAGNOSIS — M15.0 PRIMARY OSTEOARTHRITIS INVOLVING MULTIPLE JOINTS: ICD-10-CM

## 2025-02-06 ENCOUNTER — NURSING HOME VISIT (OUTPATIENT)
Dept: GERIATRICS | Facility: OTHER | Age: OVER 89
End: 2025-02-06
Attending: NURSE PRACTITIONER
Payer: COMMERCIAL

## 2025-02-06 VITALS
RESPIRATION RATE: 15 BRPM | BODY MASS INDEX: 23.52 KG/M2 | DIASTOLIC BLOOD PRESSURE: 69 MMHG | OXYGEN SATURATION: 94 % | WEIGHT: 124.5 LBS | HEART RATE: 55 BPM | SYSTOLIC BLOOD PRESSURE: 109 MMHG | TEMPERATURE: 95.5 F

## 2025-02-06 DIAGNOSIS — S81.802D TRAUMATIC OPEN WOUND OF LEFT LOWER LEG WITH INFECTION, SUBSEQUENT ENCOUNTER: Primary | ICD-10-CM

## 2025-02-06 DIAGNOSIS — Z78.9 LIVING IN ASSISTED LIVING: ICD-10-CM

## 2025-02-06 DIAGNOSIS — L08.9 TRAUMATIC OPEN WOUND OF LEFT LOWER LEG WITH INFECTION, SUBSEQUENT ENCOUNTER: Primary | ICD-10-CM

## 2025-02-06 DIAGNOSIS — F02.80 ALZHEIMER'S DISEASE (H): ICD-10-CM

## 2025-02-06 DIAGNOSIS — G30.9 ALZHEIMER'S DISEASE (H): ICD-10-CM

## 2025-02-06 RX ORDER — MENTHOL 40 MG/ML
GEL TOPICAL
Qty: 150 ML | Refills: 0 | Status: SHIPPED | OUTPATIENT
Start: 2025-02-06

## 2025-02-06 ASSESSMENT — ENCOUNTER SYMPTOMS
CHILLS: 0
ARTHRALGIAS: 1
WOUND: 1
FATIGUE: 0
CHEST TIGHTNESS: 0
SHORTNESS OF BREATH: 0
CONFUSION: 1
WHEEZING: 0
WEAKNESS: 0
APPETITE CHANGE: 0
FEVER: 0
DIZZINESS: 0

## 2025-02-06 NOTE — PROGRESS NOTES
"Geno Horn is a 89 year old female being seen today for follow-up visit at [facility].    Assessment & Plan       ICD-10-CM    1. Traumatic open wound of left lower leg with infection, subsequent encounter  S81.802D     L08.9       2. Living in assisted living  Z78.9       3. Alzheimer's disease (H)  G30.9     F02.80       Follow-up visit to assess healing status of left calf wound.,    Geno was evaluated in our ER on January 19, 2025 after she was sent in with concerns for infection to the back of her left lower leg. She was unsure how she may have gotten the wound.  She was found to have an area of erythema with a open wound in the center to the left calf. She was prescribed PO Keflex for cellulitis, and completed her course.      Today on exam there continues to be a dry wound covered fully by a scab. There is no dressing on at present. No drainage or surrounding erythema. Last week wound care orders were written to include application of mupirocin followed by a foam border dressing. RN notes that Geno will take her dressing off at night. Given that she is not keeping the dressings on and the wound is fully scabbed over, wound care orders discontinued.     Today on exam, Geno also had left lower extremity edema. Upon further investigation, the lower extremity edema is not new and will \"come and go\". There is no erythema or increased warmth when compared to her right leg that would suggest an acute process such as DVT.    Geno uses compression stockings daily which have been on hold since she came back from the ER on 1/19/25. I suspect that some of the swelling is related to the withholding of the compression stocking while the wound was healing. Now that it has fully scabbed over, staff may resume compression stocking application daily along with continuing to have Geno elevate her legs throughout the day. If CHACE swelling worsens or her leg becomes more painful, staff will not hesitate to send her in for " further evaluation.    Geno brings forth no additional concerns during today's visit.    32 minutes spent by me on the date of the encounter doing chart review, history and exam, documentation and further activities per the note    Encouraged regular exercise as tolerated.     No follow-ups on file.    LARISA Schmidt [facility]     Code Status: DNR / DNI.   Health Care Power of : Extended Emergency Contact Information  Primary Emergency Contact: Daniel Horn  Address: [address]           [place] [address] [facility]  Mobile Phone: [phone]  Relation: Son  Secondary Emergency Contact: Adri Chaudhary  Address: [address] [place] [address] [facility]  Home Phone: [phone]  Mobile Phone: [phone]  Relation: Daughter     Allergies: Alendronate, Celebrex [celecoxib], Other environmental allergy, Oxybutynin, Tolterodine, Tramadol, and Trospium     Past Medical, Surgical, Family and Social History reviewed: YES.     Medications:   Current Outpatient Medications   Medication Sig Dispense Refill    acetaminophen (TYLENOL) 650 MG CR tablet TAKE 1 TABLET BY MOUTH THREE TIMES A DAY TAKE 1 TABLET BY MOUTH TWICE DAILY AS NEEDED 180 tablet 3    alum & mag hydroxide-simethicone (MAALOX  ES) 400-400-40 MG/5ML SUSP suspension Take 15 mLs by mouth 4 times daily as needed for indigestion.      artificial tears (GENTEAL) 0.1-0.2-0.3 % ophthalmic solution Place 1 drop into both eyes 3 times daily. 30 mL 3    calcium carbonate (TUMS) 500 MG chewable tablet Take 2 chew tab by mouth 4 times daily as needed for heartburn.      cetirizine (ZYRTEC) 10 MG tablet Take 0.5 tablets (5 mg) by mouth at bedtime. 45 tablet 3    cyanocobalamin (VITAMIN B-12) 500 MCG tablet Take 1 tablet (500 mcg) by mouth daily. 90 tablet 3    DEEP SEA NASAL SPRAY 0.65 % nasal spray SPRAY 2 SPRAYS INTO EACH NOSTRIL AS NEEDED 44 mL 11    ferrous sulfate (FEROSUL) 325 (65 Fe) MG tablet  Take 1 tablet (325 mg) by mouth daily. At 1200 pm 90 tablet 3    glycerin (ADULT) 2 g suppository Place 1 suppository rectally daily as needed for constipation.      hypromellose-dextran (GENTEAL TEARS) 0.1-0.3 % ophthalmic solution Place 1 drop into both eyes 3 times daily.      Incontinence Supply Disposable (DEPEND EASY FIT UNDERGARMENTS) MISC Medium size disposable under pants covered by insurance 100 each 11    Incontinence Supply Disposable (POISE PAD) PADS Change pad 3 x daily, #6 long Maxi pads 90 each 11    levothyroxine (SYNTHROID/LEVOTHROID) 25 MCG tablet Take 0.5 tablets (12.5 mcg) by mouth daily. With 50 mcg for total daily dose 62.5 mcg 45 tablet 3    levothyroxine (SYNTHROID/LEVOTHROID) 50 MCG tablet Take 1 tablet (50 mcg) by mouth daily. With 12.5 mcg for total daily dose 62.5 mcg 90 tablet 3    Lidocaine (LIDOCARE) 4 % Patch Apply one patch at a time to affected area. Not more than 3-4 times daily. Remove patch from skin after at most 8 hour application. Family to Provide.      magnesium hydroxide (MILK OF MAGNESIA) 400 MG/5ML suspension TAKE 15ML BY MOUTH ONCE DAILY AS NEEDED FOR NO BM IN 3 DAYS 473 mL 11    meclizine (ANTIVERT) 12.5 MG tablet Take 1 tablet (12.5 mg) by mouth 3 times daily as needed for dizziness 90 tablet 1    Menthol, Topical Analgesic, 4 % GEL Externally apply topically 3 times daily as needed (pain relief for minor muscle/joint aches) OKAY TO KEEP AT BEDSIDE 150 mL 3    Multiple Vitamin (TAB-A-RADHA) TABS Take 1 tablet by mouth daily at 1200 pm 90 tablet 3    mupirocin (BACTROBAN) 2 % external ointment Apply to affected area(s) q 3 days and PRN. 90 g 3    naloxone (NARCAN) 4 MG/0.1ML nasal spray Spray 1 spray (4 mg) into one nostril alternating nostrils as needed for opioid reversal every 2-3 minutes until assistance arrives 0.2 mL 1    Nutritional Supplement LIQD High calorie, high protein supplement drink  mL 3    ondansetron (ZOFRAN) 4 MG tablet Take 1 tablet (4 mg) by  mouth every 8 hours as needed for nausea. 12 tablet 2    order for DME Equipment being ordered: Spikes for cane 1 Units 1    order for DME Equipment being ordered: plastic underpants 1 Units 1    Probiotic Product (PROBIOTIC DAILY) CAPS Take 1 capsule by mouth daily. 90 capsule 3    senna-docusate (SENOKOT-S/PERICOLACE) 8.6-50 MG tablet 2 tabs in the morning and 1 tab at  tablet 3    tolnaftate (TINACTIN) 1 % external powder Apply topically 2 times daily as needed for itching or irritation OKAY TO KEEP BEDSIDE 90 g 2    trimethoprim (TRIMPEX) 100 MG tablet Take 1 tablet (100 mg) by mouth daily. For UTI prophylaxis. HOLD while taking other ABX for UTI. (Patient not taking: Reported on 1/16/2025) 90 tablet 3    Vitamins A & D (MEDPURA VITAMIN A & D) OINT APPLY TOPICALLY TO AFFECTED AREA(S) TWICE DAILY APPLY TOPICALLY TO AFFECTED AREA(S) AS NEEDED 113 g 5    Wound Dressings (ALLEVYN GENTLE BORDER LITE) PADS Externally apply 1 each topically every 3 days. 10 each 3                 Review of Systems   Constitutional:  Negative for appetite change, chills, fatigue and fever.   Respiratory:  Negative for chest tightness, shortness of breath and wheezing.    Cardiovascular:  Negative for chest pain.   Musculoskeletal:  Positive for arthralgias and gait problem.   Skin:  Positive for wound (left calf).   Allergic/Immunologic: Positive for environmental allergies.   Neurological:  Negative for dizziness and weakness.   Psychiatric/Behavioral:  Positive for confusion (baseline).    All other systems reviewed and are negative.      Toileting:    Continent of Bowel: Yes   Continent of Bladder: Yes, urinary urgency  Mobility: wheelchair    Recent Labs:   Recent Results (from the past 720 hours)   UA reflex to Microscopic    Collection Time: 01/08/25  7:30 AM   Result Value Ref Range    Color Urine Yellow Colorless, Straw, Light Yellow, Yellow    Appearance Urine Cloudy (A) Clear    Glucose Urine Negative Negative mg/dL     Bilirubin Urine Negative Negative    Ketones Urine Negative Negative mg/dL    Specific Gravity Urine 1.015 1.005 - 1.030    Blood Urine Negative Negative    pH Urine 6.0 5.0 - 9.0    Protein Albumin Urine Negative Negative mg/dL    Urobilinogen Urine Normal 0.2, 1.0, Normal mg/dL    Nitrite Urine Negative Negative    Leukocyte Esterase Urine Moderate (A) Negative    Bacteria Urine Few (A) None Seen /HPF    RBC Urine 10 (H) <=2 /HPF    WBC Urine >182 (H) <=5 /HPF    Squamous Epithelials Urine 140 (H) <=1 /HPF    WBC Clumps Urine Present (A) None Seen /HPF    Mucus Urine Present (A) None Seen /LPF   Urine Culture    Collection Time: 01/08/25  7:30 AM    Specimen: Urine, NOS   Result Value Ref Range    Culture >100,000 CFU/mL Escherichia coli (A)        Susceptibility    Escherichia coli - XIN     Ampicillin >16 Resistant      Ampicillin/ Sulbactam >16 Resistant      Aztreonam <=4 Susceptible      Cefazolin <=2 Susceptible      Cefepime <=2 Susceptible      Ceftazidime <=1 Susceptible      Ceftriaxone <=1 Susceptible      Ciprofloxacin <=0.25 Susceptible      Ertapenem <=0.5 Susceptible      Gentamicin <=2 Susceptible      Levofloxacin <=0.5 Susceptible      Nitrofurantoin <=32 Susceptible      Piperacillin/Tazobactam <=8 Susceptible      Tetracycline >8 Resistant      Tobramycin <=2 Susceptible      Trimethoprim/Sulfamethoxazole >2/38 Resistant      Cefuroxime <=4 Susceptible      Meropenem <=1 Susceptible    Basic metabolic panel    Collection Time: 01/19/25  3:03 PM   Result Value Ref Range    Sodium 129 (L) 135 - 145 mmol/L    Potassium 4.5 3.4 - 5.3 mmol/L    Chloride 95 (L) 98 - 107 mmol/L    Carbon Dioxide (CO2) 22 22 - 29 mmol/L    Anion Gap 12 7 - 15 mmol/L    Urea Nitrogen 12.8 8.0 - 23.0 mg/dL    Creatinine 0.57 0.51 - 0.95 mg/dL    GFR Estimate 82 >60 mL/min/1.73m2    Calcium 9.0 8.8 - 10.4 mg/dL    Glucose 127 (H) 70 - 99 mg/dL   CRP inflammation    Collection Time: 01/19/25  3:03 PM   Result Value Ref  Range    CRP Inflammation 19.50 (H) <5.00 mg/L   CBC with platelets and differential    Collection Time: 25  3:03 PM   Result Value Ref Range    WBC Count 8.9 4.0 - 11.0 10e3/uL    RBC Count 3.28 (L) 3.80 - 5.20 10e6/uL    Hemoglobin 10.2 (L) 11.7 - 15.7 g/dL    Hematocrit 31.9 (L) 35.0 - 47.0 %    MCV 97 78 - 100 fL    MCH 31.1 26.5 - 33.0 pg    MCHC 32.0 31.5 - 36.5 g/dL    RDW 19.9 (H) 10.0 - 15.0 %    Platelet Count 396 150 - 450 10e3/uL    % Neutrophils 87 %    % Lymphocytes 8 %    % Monocytes 4 %    % Eosinophils 0 %    % Basophils 0 %    % Immature Granulocytes 1 %    NRBCs per 100 WBC 0 <1 /100    Absolute Neutrophils 7.7 1.6 - 8.3 10e3/uL    Absolute Lymphocytes 0.7 (L) 0.8 - 5.3 10e3/uL    Absolute Monocytes 0.4 0.0 - 1.3 10e3/uL    Absolute Eosinophils 0.0 0.0 - 0.7 10e3/uL    Absolute Basophils 0.0 0.0 - 0.2 10e3/uL    Absolute Immature Granulocytes 0.1 <=0.4 10e3/uL    Absolute NRBCs 0.0 10e3/uL   Extra Blue Top Tube    Collection Time: 25  3:03 PM   Result Value Ref Range    Hold Specimen x    Extra Red Top Tube    Collection Time: 25  3:03 PM   Result Value Ref Range    Hold Specimen x    Extra Green Top (Lithium Heparin) Tube    Collection Time: 25  3:03 PM   Result Value Ref Range    Hold Specimen x            Current Therapies: none    Physical Exam  Vitals reviewed.   Cardiovascular:      Rate and Rhythm: Normal rate.      Pulses: Normal pulses.   Pulmonary:      Effort: Pulmonary effort is normal.      Breath sounds: Normal breath sounds.   Musculoskeletal:      Cervical back: Normal range of motion.      Right lower le+ Edema present.      Left lower le+ Pitting Edema present.   Skin:     General: Skin is warm and dry.      Findings: Wound (left calf, scab covering wound bed. No surrounding erythema. No drainage noted.) present.   Neurological:      Mental Status: She is alert. Mental status is at baseline.      Motor: Weakness present.      Gait: Gait abnormal  (uses wheelchair).   Psychiatric:         Cognition and Memory: Memory is impaired. She exhibits impaired recent memory and impaired remote memory.

## 2025-02-13 ENCOUNTER — NURSING HOME VISIT (OUTPATIENT)
Dept: GERIATRICS | Facility: OTHER | Age: OVER 89
End: 2025-02-13
Attending: NURSE PRACTITIONER
Payer: COMMERCIAL

## 2025-02-13 DIAGNOSIS — Z97.4 PRESENCE OF EXTERNAL HEARING AID: ICD-10-CM

## 2025-02-13 DIAGNOSIS — H61.21 IMPACTED CERUMEN OF RIGHT EAR: ICD-10-CM

## 2025-02-13 DIAGNOSIS — F02.80 ALZHEIMER'S DISEASE (H): Primary | ICD-10-CM

## 2025-02-13 DIAGNOSIS — G30.9 ALZHEIMER'S DISEASE (H): Primary | ICD-10-CM

## 2025-02-13 DIAGNOSIS — Z78.9 LIVES IN ASSISTED LIVING FACILITY: ICD-10-CM

## 2025-02-13 DIAGNOSIS — H91.93 BILATERAL HEARING LOSS, UNSPECIFIED HEARING LOSS TYPE: ICD-10-CM

## 2025-02-13 ASSESSMENT — ENCOUNTER SYMPTOMS
SHORTNESS OF BREATH: 0
FEVER: 0
ARTHRALGIAS: 1
WEAKNESS: 0
CONFUSION: 1
CHILLS: 0
FATIGUE: 0
WHEEZING: 0
DIZZINESS: 0
CHEST TIGHTNESS: 0
APPETITE CHANGE: 0

## 2025-02-13 NOTE — PROGRESS NOTES
Geno Horn is a 89 year old female being seen today for ear flush visit at [facility].    Assessment & Plan       ICD-10-CM    1. Alzheimer's disease (H)  G30.9     F02.80       2. Bilateral hearing loss, unspecified hearing loss type  H91.93       3. Impacted cerumen of right ear  H61.21 REMOVE IMPACTED CERUMEN      4. Presence of external hearing aid  Z97.4       5. Lives in assisted living facility  Z78.9         Geno is requesting ear examination and cerumen removal today. She has hearing loss and wears hearing aids daily.    Otoscopic exam positive for cerumen impaction of right ear. Left TM visualized, intact, pearly grey. Scant dried cerumen adhered to canal walls, which does not obstruct view.   Ear Cerumen Removal Procedure Note  Indication: Right ear impacted cerumen  Procedure: After placing the patient's head in the appropriate position, the patient's right ear canal was flushed using warm water. A lighted curette was also used to remove one 6 mm wax plug and one 4 mm wax plug. Right TM visualized, intact, pearly grey. The patient tolerated the procedure well.  Complications: None       Geno brings forth no additional concerns during today's visit.    20 minutes spent by me on the date of the encounter doing chart review, history and exam, documentation and further activities per the note    Encouraged regular exercise as tolerated.     Return if symptoms worsen or fail to improve.    LARISA Schmidt CNP  [facility]     Code Status: DNR / DNI.   Health Care Power of : Extended Emergency Contact Information  Primary Emergency Contact: Daniel Horn  Address: [address]           [place] [address] [facility]  Mobile Phone: [phone]  Relation: Son  Secondary Emergency Contact: Adri Chaudhary  Address: [address] [place] [address] [facility]  Home Phone: [phone]  Mobile Phone: [phone]  Relation: Daughter     Allergies:  Alendronate, Celebrex [celecoxib], Other environmental allergy, Oxybutynin, Tolterodine, Tramadol, and Trospium     Past Medical, Surgical, Family and Social History reviewed: YES.     Medications:   Current Outpatient Medications   Medication Sig Dispense Refill    acetaminophen (TYLENOL) 650 MG CR tablet TAKE 1 TABLET BY MOUTH THREE TIMES A DAY TAKE 1 TABLET BY MOUTH TWICE DAILY AS NEEDED 180 tablet 3    alum & mag hydroxide-simethicone (MAALOX  ES) 400-400-40 MG/5ML SUSP suspension Take 15 mLs by mouth 4 times daily as needed for indigestion.      artificial tears (GENTEAL) 0.1-0.2-0.3 % ophthalmic solution Place 1 drop into both eyes 3 times daily. 30 mL 3    BIOFREEZE COOL THE PAIN 4 % GEL APPLY TOPICALLY TO AFFECTED AREA(S) THREE TIMES DAILY AS NEEDED 150 mL 0    calcium carbonate (TUMS) 500 MG chewable tablet Take 2 chew tab by mouth 4 times daily as needed for heartburn.      cetirizine (ZYRTEC) 10 MG tablet Take 0.5 tablets (5 mg) by mouth at bedtime. 45 tablet 3    cyanocobalamin (VITAMIN B-12) 500 MCG tablet Take 1 tablet (500 mcg) by mouth daily. 90 tablet 3    DEEP SEA NASAL SPRAY 0.65 % nasal spray SPRAY 2 SPRAYS INTO EACH NOSTRIL AS NEEDED 44 mL 11    ferrous sulfate (FEROSUL) 325 (65 Fe) MG tablet Take 1 tablet (325 mg) by mouth daily. At 1200 pm 90 tablet 3    glycerin (ADULT) 2 g suppository Place 1 suppository rectally daily as needed for constipation.      hypromellose-dextran (GENTEAL TEARS) 0.1-0.3 % ophthalmic solution Place 1 drop into both eyes 3 times daily.      Incontinence Supply Disposable (DEPEND EASY FIT UNDERGARMENTS) MISC Medium size disposable under pants covered by insurance 100 each 11    Incontinence Supply Disposable (POISE PAD) PADS Change pad 3 x daily, #6 long Maxi pads 90 each 11    levothyroxine (SYNTHROID/LEVOTHROID) 25 MCG tablet Take 0.5 tablets (12.5 mcg) by mouth daily. With 50 mcg for total daily dose 62.5 mcg 45 tablet 3    levothyroxine (SYNTHROID/LEVOTHROID) 50  MCG tablet Take 1 tablet (50 mcg) by mouth daily. With 12.5 mcg for total daily dose 62.5 mcg 90 tablet 3    Lidocaine (LIDOCARE) 4 % Patch Apply one patch at a time to affected area. Not more than 3-4 times daily. Remove patch from skin after at most 8 hour application. Family to Provide.      magnesium hydroxide (MILK OF MAGNESIA) 400 MG/5ML suspension TAKE 15ML BY MOUTH ONCE DAILY AS NEEDED FOR NO BM IN 3 DAYS 473 mL 11    meclizine (ANTIVERT) 12.5 MG tablet Take 1 tablet (12.5 mg) by mouth 3 times daily as needed for dizziness 90 tablet 1    Multiple Vitamin (TAB-A-RADHA) TABS Take 1 tablet by mouth daily at 1200 pm 90 tablet 3    naloxone (NARCAN) 4 MG/0.1ML nasal spray Spray 1 spray (4 mg) into one nostril alternating nostrils as needed for opioid reversal every 2-3 minutes until assistance arrives 0.2 mL 1    Nutritional Supplement LIQD High calorie, high protein supplement drink  mL 3    ondansetron (ZOFRAN) 4 MG tablet Take 1 tablet (4 mg) by mouth every 8 hours as needed for nausea. 12 tablet 2    order for DME Equipment being ordered: Spikes for cane 1 Units 1    order for DME Equipment being ordered: plastic underpants 1 Units 1    Probiotic Product (PROBIOTIC DAILY) CAPS Take 1 capsule by mouth daily. 90 capsule 3    senna-docusate (SENOKOT-S/PERICOLACE) 8.6-50 MG tablet 2 tabs in the morning and 1 tab at  tablet 3    tolnaftate (TINACTIN) 1 % external powder Apply topically 2 times daily as needed for itching or irritation OKAY TO KEEP BEDSIDE 90 g 2    trimethoprim (TRIMPEX) 100 MG tablet Take 1 tablet (100 mg) by mouth daily. For UTI prophylaxis. HOLD while taking other ABX for UTI. 90 tablet 3    Vitamins A & D (MEDPURA VITAMIN A & D) OINT APPLY TOPICALLY TO AFFECTED AREA(S) TWICE DAILY APPLY TOPICALLY TO AFFECTED AREA(S) AS NEEDED 113 g 5    Wound Dressings (ALLEVYN GENTLE BORDER LITE) PADS Externally apply 1 each topically every 3 days. 10 each 3                   Review of Systems    Constitutional:  Negative for appetite change, chills, fatigue and fever.   HENT:  Positive for hearing loss. Negative for ear discharge and ear pain.    Respiratory:  Negative for chest tightness, shortness of breath and wheezing.    Cardiovascular:  Negative for chest pain.   Musculoskeletal:  Positive for arthralgias and gait problem.   Allergic/Immunologic: Positive for environmental allergies.   Neurological:  Negative for dizziness and weakness.   Psychiatric/Behavioral:  Positive for confusion (baseline).    All other systems reviewed and are negative.      Toileting:    Continent of Bowel: Yes   Continent of Bladder: Yes, urinary urgency  Mobility: wheelchair    Recent Labs: reviewed     Current Therapies: none    Physical Exam  Vitals reviewed.   HENT:      Right Ear: Tympanic membrane, ear canal and external ear normal. Decreased hearing noted. There is impacted cerumen.      Left Ear: Tympanic membrane, ear canal and external ear normal. Decreased hearing noted.   Cardiovascular:      Rate and Rhythm: Normal rate.      Pulses: Normal pulses.   Pulmonary:      Effort: Pulmonary effort is normal.      Breath sounds: Normal breath sounds.   Musculoskeletal:      Cervical back: Normal range of motion.   Skin:     General: Skin is warm and dry.   Neurological:      Mental Status: She is alert. Mental status is at baseline.      Motor: Weakness present.      Gait: Gait abnormal (uses wheelchair).   Psychiatric:         Cognition and Memory: Memory is impaired. She exhibits impaired recent memory and impaired remote memory.

## 2025-02-15 ENCOUNTER — HOSPITAL ENCOUNTER (EMERGENCY)
Facility: OTHER | Age: OVER 89
Discharge: HOME | End: 2025-02-15
Attending: PHYSICIAN ASSISTANT
Payer: COMMERCIAL

## 2025-02-15 ENCOUNTER — APPOINTMENT (OUTPATIENT)
Dept: GENERAL RADIOLOGY | Facility: OTHER | Age: OVER 89
End: 2025-02-15
Attending: PHYSICIAN ASSISTANT
Payer: COMMERCIAL

## 2025-02-15 VITALS
OXYGEN SATURATION: 96 % | RESPIRATION RATE: 18 BRPM | DIASTOLIC BLOOD PRESSURE: 65 MMHG | SYSTOLIC BLOOD PRESSURE: 110 MMHG | HEART RATE: 81 BPM | TEMPERATURE: 99.2 F

## 2025-02-15 DIAGNOSIS — J18.9 PNEUMONIA: ICD-10-CM

## 2025-02-15 LAB
ANION GAP SERPL CALCULATED.3IONS-SCNC: 11 MMOL/L (ref 7–15)
BASOPHILS # BLD AUTO: 0 10E3/UL (ref 0–0.2)
BASOPHILS NFR BLD AUTO: 1 %
BUN SERPL-MCNC: 13 MG/DL (ref 8–23)
CALCIUM SERPL-MCNC: 8.4 MG/DL (ref 8.8–10.4)
CHLORIDE SERPL-SCNC: 94 MMOL/L (ref 98–107)
CREAT SERPL-MCNC: 0.59 MG/DL (ref 0.51–0.95)
EGFRCR SERPLBLD CKD-EPI 2021: 81 ML/MIN/1.73M2
EOSINOPHIL # BLD AUTO: 0.1 10E3/UL (ref 0–0.7)
EOSINOPHIL NFR BLD AUTO: 1 %
ERYTHROCYTE [DISTWIDTH] IN BLOOD BY AUTOMATED COUNT: 19.3 % (ref 10–15)
FLUAV RNA SPEC QL NAA+PROBE: NEGATIVE
FLUBV RNA RESP QL NAA+PROBE: NEGATIVE
GLUCOSE SERPL-MCNC: 138 MG/DL (ref 70–99)
HCO3 SERPL-SCNC: 23 MMOL/L (ref 22–29)
HCT VFR BLD AUTO: 29.5 % (ref 35–47)
HGB BLD-MCNC: 9.6 G/DL (ref 11.7–15.7)
HOLD SPECIMEN: NORMAL
IMM GRANULOCYTES # BLD: 0 10E3/UL
IMM GRANULOCYTES NFR BLD: 1 %
LACTATE SERPL-SCNC: 1.4 MMOL/L (ref 0.7–2)
LYMPHOCYTES # BLD AUTO: 0.4 10E3/UL (ref 0.8–5.3)
LYMPHOCYTES NFR BLD AUTO: 9 %
MCH RBC QN AUTO: 31.4 PG (ref 26.5–33)
MCHC RBC AUTO-ENTMCNC: 32.5 G/DL (ref 31.5–36.5)
MCV RBC AUTO: 96 FL (ref 78–100)
MONOCYTES # BLD AUTO: 0.4 10E3/UL (ref 0–1.3)
MONOCYTES NFR BLD AUTO: 9 %
NEUTROPHILS # BLD AUTO: 3.5 10E3/UL (ref 1.6–8.3)
NEUTROPHILS NFR BLD AUTO: 80 %
NRBC # BLD AUTO: 0 10E3/UL
NRBC BLD AUTO-RTO: 0 /100
PLATELET # BLD AUTO: 308 10E3/UL (ref 150–450)
POTASSIUM SERPL-SCNC: 4.3 MMOL/L (ref 3.4–5.3)
PROCALCITONIN SERPL IA-MCNC: 0.07 NG/ML
RBC # BLD AUTO: 3.06 10E6/UL (ref 3.8–5.2)
RSV RNA SPEC NAA+PROBE: NEGATIVE
SARS-COV-2 RNA RESP QL NAA+PROBE: NEGATIVE
SODIUM SERPL-SCNC: 128 MMOL/L (ref 135–145)
TROPONIN T SERPL HS-MCNC: 32 NG/L
TROPONIN T SERPL HS-MCNC: 33 NG/L
WBC # BLD AUTO: 4.4 10E3/UL (ref 4–11)

## 2025-02-15 PROCEDURE — 80048 BASIC METABOLIC PNL TOTAL CA: CPT | Performed by: PHYSICIAN ASSISTANT

## 2025-02-15 PROCEDURE — 87637 SARSCOV2&INF A&B&RSV AMP PRB: CPT | Performed by: PHYSICIAN ASSISTANT

## 2025-02-15 PROCEDURE — 93010 ELECTROCARDIOGRAM REPORT: CPT | Performed by: INTERNAL MEDICINE

## 2025-02-15 PROCEDURE — 85025 COMPLETE CBC W/AUTO DIFF WBC: CPT | Performed by: PHYSICIAN ASSISTANT

## 2025-02-15 PROCEDURE — 99285 EMERGENCY DEPT VISIT HI MDM: CPT | Mod: 25 | Performed by: PHYSICIAN ASSISTANT

## 2025-02-15 PROCEDURE — 250N000013 HC RX MED GY IP 250 OP 250 PS 637: Performed by: PHYSICIAN ASSISTANT

## 2025-02-15 PROCEDURE — 87040 BLOOD CULTURE FOR BACTERIA: CPT | Performed by: PHYSICIAN ASSISTANT

## 2025-02-15 PROCEDURE — 36415 COLL VENOUS BLD VENIPUNCTURE: CPT | Performed by: PHYSICIAN ASSISTANT

## 2025-02-15 PROCEDURE — 71045 X-RAY EXAM CHEST 1 VIEW: CPT

## 2025-02-15 PROCEDURE — 83605 ASSAY OF LACTIC ACID: CPT | Performed by: PHYSICIAN ASSISTANT

## 2025-02-15 PROCEDURE — 99284 EMERGENCY DEPT VISIT MOD MDM: CPT | Performed by: PHYSICIAN ASSISTANT

## 2025-02-15 PROCEDURE — 93005 ELECTROCARDIOGRAM TRACING: CPT | Performed by: PHYSICIAN ASSISTANT

## 2025-02-15 PROCEDURE — 84145 PROCALCITONIN (PCT): CPT | Performed by: PHYSICIAN ASSISTANT

## 2025-02-15 PROCEDURE — 84484 ASSAY OF TROPONIN QUANT: CPT | Performed by: PHYSICIAN ASSISTANT

## 2025-02-15 RX ORDER — AZITHROMYCIN 250 MG/1
500 TABLET, FILM COATED ORAL ONCE
Status: COMPLETED | OUTPATIENT
Start: 2025-02-15 | End: 2025-02-15

## 2025-02-15 RX ORDER — SACCHAROMYCES BOULARDII 250 MG
250 CAPSULE ORAL 2 TIMES DAILY
Qty: 14 CAPSULE | Refills: 0 | Status: SHIPPED | OUTPATIENT
Start: 2025-02-15 | End: 2025-02-22

## 2025-02-15 RX ORDER — ACETAMINOPHEN 325 MG/1
650 TABLET ORAL ONCE
Status: COMPLETED | OUTPATIENT
Start: 2025-02-15 | End: 2025-02-15

## 2025-02-15 RX ORDER — SACCHAROMYCES BOULARDII 250 MG
250 CAPSULE ORAL ONCE
Status: COMPLETED | OUTPATIENT
Start: 2025-02-15 | End: 2025-02-15

## 2025-02-15 RX ORDER — AZITHROMYCIN 250 MG/1
250 TABLET, FILM COATED ORAL DAILY
Qty: 4 TABLET | Refills: 0 | Status: SHIPPED | OUTPATIENT
Start: 2025-02-15 | End: 2025-02-19

## 2025-02-15 RX ADMIN — AMOXICILLIN AND CLAVULANATE POTASSIUM 1 TABLET: 875; 125 TABLET, FILM COATED ORAL at 14:38

## 2025-02-15 RX ADMIN — Medication 250 MG: at 14:40

## 2025-02-15 RX ADMIN — AZITHROMYCIN DIHYDRATE 500 MG: 250 TABLET ORAL at 14:39

## 2025-02-15 RX ADMIN — ACETAMINOPHEN 650 MG: 325 TABLET, FILM COATED ORAL at 12:33

## 2025-02-15 ASSESSMENT — COLUMBIA-SUICIDE SEVERITY RATING SCALE - C-SSRS
2. HAVE YOU ACTUALLY HAD ANY THOUGHTS OF KILLING YOURSELF IN THE PAST MONTH?: NO
1. IN THE PAST MONTH, HAVE YOU WISHED YOU WERE DEAD OR WISHED YOU COULD GO TO SLEEP AND NOT WAKE UP?: NO
6. HAVE YOU EVER DONE ANYTHING, STARTED TO DO ANYTHING, OR PREPARED TO DO ANYTHING TO END YOUR LIFE?: NO

## 2025-02-15 ASSESSMENT — ACTIVITIES OF DAILY LIVING (ADL)
ADLS_ACUITY_SCORE: 61

## 2025-02-15 NOTE — DISCHARGE INSTRUCTIONS
You were seen in the emergency department today for chills sweats as well as a productive green cough.  There is a concern that you may have pneumonia you are being sent home on 2 medications called azithromycin and Augmentin as well as a probiotic to take while you are on antibiotics.  You are given your first doses of antibiotics here today.  Please start the rest tomorrow.  If any point you have severe shortness of breath please return to ER.

## 2025-02-15 NOTE — ED PROVIDER NOTES
History     Chief Complaint   Patient presents with    Cough    Shoulder Pain     HPI  Geno Horn is a 89 year old female who arrives to the emergency department complaints of a productive cough and fever.  Past medical history of hypertension, hypothyroidism, urinary urgency, sepsis, hyponatremia, frequent falls, sciatica, high cholesterol, osteoarthritis, urinary incontinence, Alzheimer disease, recent hospitalization for UTI sepsis.  Patient is also complaining of right shoulder pain that is chronic.  For the last few days patient has had a productive cough which has been green and white in nature she has had some increase shortness of breath.  She also endorses chills and sweats no new body aches no headaches clear runny nose no sore throat no difficulty swallowing no ear pain, she has had no chest pain no palpitations she does endorse that cough and slightly more shortness of breath she has no history of DVT PE no hemoptysis no abdominal pain no nausea no vomiting no changes of bowel or bladder habits.    Allergies:  Allergies   Allergen Reactions    Alendronate Nausea     Stomach upset    Celebrex [Celecoxib] Other (See Comments)     Light headed    Other Environmental Allergy Other (See Comments)     Itchy,Runny Eyes    Oxybutynin Other (See Comments)     Felt High    Tolterodine      Other reaction(s): Intolerance-Can't Take  Was unable to sleep at night    Tramadol Other (See Comments)     Adverse  effects    Trospium Other (See Comments)     drowsy       Problem List:    Patient Active Problem List    Diagnosis Date Noted    UTI (urinary tract infection) 11/25/2024     Priority: Medium    Generalized weakness 11/25/2024     Priority: Medium    Alzheimer's disease (H) 02/20/2024     Priority: Medium    Periprosthetic fracture around internal prosthetic hip joint, initial encounter 10/21/2021     Priority: Medium    Multiple skin tears 02/25/2021     Priority: Medium    H/O dizziness 02/25/2021      Priority: Medium    Skin tear of left upper extremity 02/20/2021     Priority: Medium    Falls frequently 11/17/2020     Priority: Medium    Contusion of right hip, initial encounter 11/17/2020     Priority: Medium    General unsteadiness 11/17/2020     Priority: Medium    Closed wedge compression fracture of T9 vertebra with routine healing, subsequent encounter 11/17/2020     Priority: Medium    History of 2019 novel coronavirus disease (COVID-19) 11/17/2020     Priority: Medium    Pneumonia due to 2019 novel coronavirus 11/01/2020     Priority: Medium    Hyponatremia 11/01/2020     Priority: Medium    COVID-19 virus infection 11/01/2020     Priority: Medium    Spinal stenosis of lumbar region, unspecified whether neurogenic claudication present 02/19/2020     Priority: Medium    Sepsis (H) 06/17/2019     Priority: Medium    Unsteady gait 05/21/2018     Priority: Medium    Urinary urgency 07/24/2017     Priority: Medium    H/O basal cell carcinoma excision 07/20/2016     Priority: Medium    Osteoarthritis of both hips 01/21/2015     Priority: Medium    Allergic rhinitis 03/16/2012     Priority: Medium    Urinary incontinence 11/01/2010     Priority: Medium     Formatting of this note might be different from the original.  IMO Update 10/11      Osteoarthritis of foot joint 05/26/2009     Priority: Medium     Formatting of this note might be different from the original.  Right midfoot      Hearing loss 04/10/2008     Priority: Medium     Formatting of this note might be different from the original.  IMO Update      Coronary atherosclerosis of native coronary artery 05/09/2007     Priority: Medium     Formatting of this note might be different from the original.  IMO Update 10/11      Essential hypertension 05/09/2007     Priority: Medium     Formatting of this note might be different from the original.  IMO Update      Hypothyroidism 05/09/2007     Priority: Medium     Overview:   tsh and t4 normal on 1/2015      Formatting of this note might be different from the original.  IMO Update 10/      Hyperlipidemia 2003     Priority: Medium     Formatting of this note might be different from the original.  IMO Update 10/      Osteoporosis 2003     Priority: Medium     Formatting of this note might be different from the original.  Has had DEXA x 2.  IMO Update      Sciatica 2002     Priority: Medium     Formatting of this note might be different from the original.  Apparently has hx. of coronary disease.  Is S/P angioplasty in .  Had normal EKG done 04 in [place].  IMO Update 10/11    Formatting of this note might be different from the original.  IMO Update 10/11    Formatting of this note might be different from the original.  [facility] MRI          Past Medical History:    Past Medical History:   Diagnosis Date    Hypertension     Hypothyroid     Osteoarthritis     ST elevation (STEMI) myocardial infarction (H)        Past Surgical History:    Past Surgical History:   Procedure Laterality Date    ARTHROPLASTY HIP Right     Dr Rodriguez    ARTHROPLASTY HIP Left     Dr. Rodriguez    COMBINED CYSTOSCOPY, URETEROSCOPY, LASER HOLMIUM LITHOTRIPSY URETER(S) Right 2019    Procedure: CYSTOSCOPY, RIGHT STENT REMOVAL, URETEROSCOPY, LASER HOLMIUM LITHOTRIPSY RIGHT URETER WITH STENT REPLACEMENT;  Surgeon: Shawn Cole MD;  Location:  OR    CYSTOSCOPY, RETROGRADES, INSERT STENT URETER(S), COMBINED Right 2019    Procedure: CYSTOSCOPY, WITH RETROGRADE PYELOGRAM AND URETERAL STENT INSERTION;  Surgeon: Shawn Cole MD;  Location:  OR       Family History:    Family History   Problem Relation Age of Onset    Heart Disease Mother 60         of mi    Heart Disease Father 60         of mi    Heart Disease Brother     Heart Disease Brother        Social History:  Marital Status:   [5]  Social History     Tobacco Use    Smoking status: Never    Smokeless tobacco: Never    Tobacco comments:      no ecig   Vaping Use    Vaping status: Every Day   Substance Use Topics    Alcohol use: Not Currently     Alcohol/week: 0.0 standard drinks of alcohol    Drug use: No        Medications:    amoxicillin-clavulanate (AUGMENTIN) 875-125 MG tablet  azithromycin (ZITHROMAX) 250 MG tablet  saccharomyces boulardii (FLORASTOR) 250 MG capsule  acetaminophen (TYLENOL) 650 MG CR tablet  alum & mag hydroxide-simethicone (MAALOX  ES) 400-400-40 MG/5ML SUSP suspension  artificial tears (GENTEAL) 0.1-0.2-0.3 % ophthalmic solution  BIOFREEZE COOL THE PAIN 4 % GEL  calcium carbonate (TUMS) 500 MG chewable tablet  cetirizine (ZYRTEC) 10 MG tablet  cyanocobalamin (VITAMIN B-12) 500 MCG tablet  DEEP SEA NASAL SPRAY 0.65 % nasal spray  ferrous sulfate (FEROSUL) 325 (65 Fe) MG tablet  glycerin (ADULT) 2 g suppository  hypromellose-dextran (GENTEAL TEARS) 0.1-0.3 % ophthalmic solution  Incontinence Supply Disposable (DEPEND EASY FIT UNDERGARMENTS) MISC  Incontinence Supply Disposable (POISE PAD) PADS  levothyroxine (SYNTHROID/LEVOTHROID) 25 MCG tablet  levothyroxine (SYNTHROID/LEVOTHROID) 50 MCG tablet  Lidocaine (LIDOCARE) 4 % Patch  magnesium hydroxide (MILK OF MAGNESIA) 400 MG/5ML suspension  meclizine (ANTIVERT) 12.5 MG tablet  Multiple Vitamin (TAB-A-RADHA) TABS  naloxone (NARCAN) 4 MG/0.1ML nasal spray  Nutritional Supplement LIQD  ondansetron (ZOFRAN) 4 MG tablet  order for DME  order for DME  Probiotic Product (PROBIOTIC DAILY) CAPS  senna-docusate (SENOKOT-S/PERICOLACE) 8.6-50 MG tablet  tolnaftate (TINACTIN) 1 % external powder  trimethoprim (TRIMPEX) 100 MG tablet  Vitamins A & D (MEDPURA VITAMIN A & D) OINT  Wound Dressings (ALLEVYN GENTLE BORDER LITE) PADS          Review of Systems ROS is listed in HPI    Physical Exam   BP: 114/66  Pulse: 84  Temp: 99.2  F (37.3  C)  Resp: 16  SpO2: 97 %      Physical Exam  General: alert, oriented to person, place, not to time patient does not know the year or the month she states she does  not keep track of these anymore history  Head: atraumatic  Eyes: PERRL, corneas clear and conjunctivae clear  Nose: Transudate rhinorrhea/nasal discharge  Mouth/Throat: no exudates, no erythema, no tonsillar enlargement, structures midline and no hoarseness  Neck: no tenderness, supple and no adenopathy  Chest/Pulmonary: Decreased lung sounds right lower lobe, no chest wall tenderness or deformities, no tachypnea and no cyanosis  Cardiovascular: S1, S2 normal, irregularly irregular, no murmur and no pedal edema  Abdomen: soft, non-tender, no guarding, no rebound tenderness  Musculoskeletal/Extremities: normal extremities, no edema, erythema, tenderness and 5/5 strength in all 4 extremities  Skin: no diaphoresis and skin color normal  Neuro: speech clear and gait stable  Psychiatric: affect/mood normal, cooperative, normal judgement/insight and memory intact    ED Course     ED Course as of 02/15/25 1429   Sat Feb 15, 2025   1209 CBC with platelets differential(!)  Chronic stable anemia no leukocytosis appropriate platelets   1215 EKG 12-lead, tracing only  EKG shows a sinus rhythm with frequent PVCs I did see at 1 point  every third he would be a PVC with appropriate compensatory pause there is no ST elevation there is inversion in T waves however in with antigen a level lateral leads she is not complaining of chest pain.   1302 Influenza A/B, RSV and SARS-CoV2 PCR (COVID-19) Nose  Negative COVID-negative influenza negative RSV   1317 XR Chest Port 1 View  IMPRESSION: Hazy diffuse bilateral interstitial opacities and retrocardiac left lung base consolidation suspicious for infection in the setting of cough. Dense 2 nodular opacities in both lower lungs measuring up to 7 mm indeterminate possibly calcified   granulomas. Recommend comparison with old imaging or nonemergent CT for confirmation. No pleural effusion. Normal heart size and pulmonary vascularity. Aortic calcifications.     1426 Repeat high-sensitivity  troponin increased less than 4 will discharge home will start treatment for possible community-acquired pneumonia with Augmentin as well as azithromycin will also place on probiotics.     Procedures                  Results for orders placed or performed during the hospital encounter of 02/15/25 (from the past 24 hours)   Nancy Draw    Narrative    The following orders were created for panel order Nancy Draw.  Procedure                               Abnormality         Status                     ---------                               -----------         ------                     Extra Blood Culture Bottle[[id number]]                       Final result               Extra Blue Top Tube[[id number]]                              Final result               Extra Red Top Tube[[id number]]                               Final result               Extra Green Top (Lithium...[[id number]]                      Final result               Extra Green Top (Lithium...[[id number]]                      Final result               Extra Purple Top Tube[[id number]]                            Final result                 Please view results for these tests on the individual orders.   Extra Blood Culture Bottle   Result Value Ref Range    Hold Specimen JIC    Extra Blue Top Tube   Result Value Ref Range    Hold Specimen JIC    Extra Red Top Tube   Result Value Ref Range    Hold Specimen JIC    Extra Green Top (Lithium Heparin) Tube   Result Value Ref Range    Hold Specimen JIC    Extra Green Top (Lithium Heparin) Tube   Result Value Ref Range    Hold Specimen JIC    Extra Purple Top Tube   Result Value Ref Range    Hold Specimen JIC    CBC with platelets differential    Narrative    The following orders were created for panel order CBC with platelets differential.  Procedure                               Abnormality         Status                     ---------                               -----------         ------                     CBC  with platelets and d...[[id number]]  Abnormal            Final result                 Please view results for these tests on the individual orders.   Basic metabolic panel   Result Value Ref Range    Sodium 128 (L) 135 - 145 mmol/L    Potassium 4.3 3.4 - 5.3 mmol/L    Chloride 94 (L) 98 - 107 mmol/L    Carbon Dioxide (CO2) 23 22 - 29 mmol/L    Anion Gap 11 7 - 15 mmol/L    Urea Nitrogen 13.0 8.0 - 23.0 mg/dL    Creatinine 0.59 0.51 - 0.95 mg/dL    GFR Estimate 81 >60 mL/min/1.73m2    Calcium 8.4 (L) 8.8 - 10.4 mg/dL    Glucose 138 (H) 70 - 99 mg/dL   Lactic acid whole blood with 1x repeat in 2 hr when >2   Result Value Ref Range    Lactic Acid, Initial 1.4 0.7 - 2.0 mmol/L   Procalcitonin   Result Value Ref Range    Procalcitonin 0.07 <0.50 ng/mL   Troponin T, High Sensitivity   Result Value Ref Range    Troponin T, High Sensitivity 32 (H) <=14 ng/L   CBC with platelets and differential   Result Value Ref Range    WBC Count 4.4 4.0 - 11.0 10e3/uL    RBC Count 3.06 (L) 3.80 - 5.20 10e6/uL    Hemoglobin 9.6 (L) 11.7 - 15.7 g/dL    Hematocrit 29.5 (L) 35.0 - 47.0 %    MCV 96 78 - 100 fL    MCH 31.4 26.5 - 33.0 pg    MCHC 32.5 31.5 - 36.5 g/dL    RDW 19.3 (H) 10.0 - 15.0 %    Platelet Count 308 150 - 450 10e3/uL    % Neutrophils 80 %    % Lymphocytes 9 %    % Monocytes 9 %    % Eosinophils 1 %    % Basophils 1 %    % Immature Granulocytes 1 %    NRBCs per 100 WBC 0 <1 /100    Absolute Neutrophils 3.5 1.6 - 8.3 10e3/uL    Absolute Lymphocytes 0.4 (L) 0.8 - 5.3 10e3/uL    Absolute Monocytes 0.4 0.0 - 1.3 10e3/uL    Absolute Eosinophils 0.1 0.0 - 0.7 10e3/uL    Absolute Basophils 0.0 0.0 - 0.2 10e3/uL    Absolute Immature Granulocytes 0.0 <=0.4 10e3/uL    Absolute NRBCs 0.0 10e3/uL   Influenza A/B, RSV and SARS-CoV2 PCR (COVID-19) Nose    Specimen: Nose; Swab   Result Value Ref Range    Influenza A PCR Negative Negative    Influenza B PCR Negative Negative    RSV PCR Negative Negative    SARS CoV2 PCR Negative Negative     Narrative    Testing was performed using the Xpert Xpress CoV2/Flu/RSV Assay on the [employer] GeneXpert Instrument. This test should be ordered for the detection of SARS-CoV2, influenza, and RSV viruses in individuals with signs and symptoms of respiratory tract infection. This test is for in vitro diagnostic use under the [place] FDA for laboratories certified under CLIA to perform high or moderate complexity testing. This test has been [place] FDA cleared. A negative result does not rule out the presence of PCR inhibitors in the specimen or target RNA in concentration below the limit of detection for the assay. If only one viral target is positive but coinfection with multiple targets is suspected, the sample should be re-tested with another FDA cleared, approved, or authorized test, if coninfection would change clinical management. This test was validated by the [facility] [employer]. These laboratories are certified under the Clinical Laboratory Improvement Amendments of 1988 (CLIA-88) as qualified to perfom high complexity laboratory testing.   XR Chest Port 1 View    Narrative    EXAM: XR CHEST PORT 1 VIEW  LOCATION: [facility]  DATE: 2/15/2025    INDICATION: cough, productive  COMPARISON: None.      Impression    IMPRESSION: Hazy diffuse bilateral interstitial opacities and retrocardiac left lung base consolidation suspicious for infection in the setting of cough. Dense 2 nodular opacities in both lower lungs measuring up to 7 mm indeterminate possibly calcified   granulomas. Recommend comparison with old imaging or nonemergent CT for confirmation. No pleural effusion. Normal heart size and pulmonary vascularity. Aortic calcifications.   Troponin T, High Sensitivity   Result Value Ref Range    Troponin T, High Sensitivity 33 (H) <=14 ng/L       Medications   azithromycin (ZITHROMAX) tablet 500 mg (has no administration in time range)   amoxicillin-clavulanate (AUGMENTIN) 875-125 MG per  tablet 1 tablet (has no administration in time range)   saccharomyces boulardii (FLORASTOR) capsule 250 mg (has no administration in time range)   acetaminophen (TYLENOL) tablet 650 mg (650 mg Oral $Given 2/15/25 1233)       Assessments & Plan (with Medical Decision Making)     I have reviewed the nursing notes.    I have reviewed the findings, diagnosis, plan and need for follow up with the patient.          Medical Decision Making    Differential diagnosis includes but is not limited to ACS, pneumonia, viral illness, AAA, PE, urinary tract infection and sepsis.    Patient has equal upper extremity pulses equal lower extremity pulses no palpable abdominal mass there is no confusion in addition to her chest pain and she has no chest pain which makes it less concern for thoracic dissection or a AAA.  She is not hypoxic she is not tachypneic she is not tachycardic she has no history of PE or DVT which makes me less concerned of PE.  At this time we will obtain a CBC BMP troponin procalcitonin twelve-lead EKG chest x-ray blood cultures urinalysis and viral swabs.        New Prescriptions    AMOXICILLIN-CLAVULANATE (AUGMENTIN) 875-125 MG TABLET    Take 1 tablet by mouth 2 times daily for 7 days.    AZITHROMYCIN (ZITHROMAX) 250 MG TABLET    Take 1 tablet (250 mg) by mouth daily for 4 days.    SACCHAROMYCES BOULARDII (FLORASTOR) 250 MG CAPSULE    Take 1 capsule (250 mg) by mouth 2 times daily for 7 days.       Final diagnoses:   Pneumonia       2/15/2025   [facility] AND [facility]       Adelso Parker PA-C  02/15/25 [id number]

## 2025-02-15 NOTE — ED TRIAGE NOTES
Patient being evaluated today for 1 week hx of cough, and a fall from her wheelchair today. She came from an assisted living facility via EMS. She reports falling forward from her wheelchair, but is unsure if she fell due to dizziness or LOC. She has had a productive cough and fatigue x 1 week. Patient is lethargic and and disoriented to time. Patient has a hx of UTIs. /66   Pulse 84   Temp 99.2  F (37.3  C) (Tympanic)   Resp 16   LMP 02/03/1984 (Approximate)   SpO2 97%        Triage Assessment (Adult)       Row Name 02/15/25 1144          Triage Assessment    Airway WDL WDL        Respiratory WDL    Respiratory WDL X;cough     Cough Frequency infrequent     Cough Type congested;weak        Skin Circulation/Temperature WDL    Skin Circulation/Temperature WDL WDL        Cardiac WDL    Cardiac WDL WDL        Peripheral/Neurovascular WDL    Peripheral Neurovascular WDL WDL        Cognitive/Neuro/Behavioral WDL    Cognitive/Neuro/Behavioral WDL X     Level of Consciousness lethargic;confused     Arousal Level arouses to voice     Orientation disoriented to;time     Speech clear     Mood/Behavior calm;cooperative

## 2025-02-16 LAB
ATRIAL RATE - MUSE: 86 BPM
DIASTOLIC BLOOD PRESSURE - MUSE: NORMAL MMHG
INTERPRETATION ECG - MUSE: NORMAL
P AXIS - MUSE: 45 DEGREES
PR INTERVAL - MUSE: 182 MS
QRS DURATION - MUSE: 86 MS
QT - MUSE: 412 MS
QTC - MUSE: 493 MS
R AXIS - MUSE: -42 DEGREES
SYSTOLIC BLOOD PRESSURE - MUSE: NORMAL MMHG
T AXIS - MUSE: 165 DEGREES
VENTRICULAR RATE- MUSE: 86 BPM

## 2025-02-19 ENCOUNTER — TELEPHONE (OUTPATIENT)
Dept: FAMILY MEDICINE | Facility: OTHER | Age: OVER 89
End: 2025-02-19
Payer: COMMERCIAL

## 2025-02-19 DIAGNOSIS — F02.80 ALZHEIMER'S DISEASE (H): ICD-10-CM

## 2025-02-19 DIAGNOSIS — M15.0 PRIMARY OSTEOARTHRITIS INVOLVING MULTIPLE JOINTS: Primary | ICD-10-CM

## 2025-02-19 DIAGNOSIS — S81.802D OPEN WOUND OF LEFT LOWER EXTREMITY, SUBSEQUENT ENCOUNTER: ICD-10-CM

## 2025-02-19 DIAGNOSIS — Z74.09 DECLINING MOBILITY: ICD-10-CM

## 2025-02-19 DIAGNOSIS — G30.9 ALZHEIMER'S DISEASE (H): ICD-10-CM

## 2025-02-19 DIAGNOSIS — S81.812A LACERATION OF LEFT LOWER EXTREMITY: ICD-10-CM

## 2025-02-19 NOTE — TELEPHONE ENCOUNTER
Sierra Ray NP was given home care or hospice form(s) for review and signature. Certification period effective 2/4/2025 to 4/4/2025.  Yana Butler RN on 2/19/2025 at 1:29 PM

## 2025-02-20 DIAGNOSIS — Z91.89 AT RISK FOR CLOSTRIDIOIDES DIFFICILE INFECTION: Primary | ICD-10-CM

## 2025-02-20 LAB
BACTERIA BLD CULT: NO GROWTH
BACTERIA BLD CULT: NO GROWTH

## 2025-02-20 RX ORDER — SACCHAROMYCES BOULARDII 250 MG
250 CAPSULE ORAL 2 TIMES DAILY
Qty: 60 CAPSULE | Refills: 1 | Status: SHIPPED | OUTPATIENT
Start: 2025-02-20

## 2025-02-25 DIAGNOSIS — R46.89 CHANGE IN BEHAVIOR: Primary | ICD-10-CM

## 2025-02-25 DIAGNOSIS — R32 DERMATITIS ASSOCIATED WITH MOISTURE FROM URINARY INCONTINENCE: ICD-10-CM

## 2025-02-25 DIAGNOSIS — Z87.440 HISTORY OF UTI: ICD-10-CM

## 2025-02-25 DIAGNOSIS — L25.8 DERMATITIS ASSOCIATED WITH MOISTURE FROM URINARY INCONTINENCE: ICD-10-CM

## 2025-02-26 ENCOUNTER — LAB REQUISITION (OUTPATIENT)
Dept: LAB | Facility: OTHER | Age: OVER 89
End: 2025-02-26
Payer: COMMERCIAL

## 2025-02-26 DIAGNOSIS — R35.0 FREQUENCY OF MICTURITION: ICD-10-CM

## 2025-02-26 DIAGNOSIS — R41.0 DISORIENTATION, UNSPECIFIED: ICD-10-CM

## 2025-02-26 LAB
ALBUMIN UR-MCNC: NEGATIVE MG/DL
APPEARANCE UR: CLEAR
BILIRUB UR QL STRIP: NEGATIVE
COLOR UR AUTO: YELLOW
GLUCOSE UR STRIP-MCNC: NEGATIVE MG/DL
HGB UR QL STRIP: NEGATIVE
KETONES UR STRIP-MCNC: NEGATIVE MG/DL
LEUKOCYTE ESTERASE UR QL STRIP: ABNORMAL
MUCOUS THREADS #/AREA URNS LPF: PRESENT /LPF
NITRATE UR QL: NEGATIVE
PH UR STRIP: 6.5 [PH] (ref 5–9)
RBC URINE: <1 /HPF
SP GR UR STRIP: 1.02 (ref 1–1.03)
SQUAMOUS EPITHELIAL: 1 /HPF
UROBILINOGEN UR STRIP-MCNC: NORMAL MG/DL
WBC URINE: 19 /HPF

## 2025-02-27 ENCOUNTER — NURSING HOME VISIT (OUTPATIENT)
Dept: GERIATRICS | Facility: OTHER | Age: OVER 89
End: 2025-02-27
Attending: NURSE PRACTITIONER
Payer: COMMERCIAL

## 2025-02-27 VITALS
SYSTOLIC BLOOD PRESSURE: 110 MMHG | WEIGHT: 123 LBS | BODY MASS INDEX: 23.24 KG/M2 | TEMPERATURE: 97.7 F | DIASTOLIC BLOOD PRESSURE: 68 MMHG | OXYGEN SATURATION: 93 %

## 2025-02-27 DIAGNOSIS — R53.81 DECLINING FUNCTIONAL STATUS: ICD-10-CM

## 2025-02-27 DIAGNOSIS — Z78.9 LIVING IN ASSISTED LIVING: ICD-10-CM

## 2025-02-27 DIAGNOSIS — Z51.5 COMFORT MEASURES ONLY STATUS: ICD-10-CM

## 2025-02-27 DIAGNOSIS — Z74.1 REQUIRES DAILY ASSISTANCE FOR ACTIVITIES OF DAILY LIVING (ADL) AND COMFORT NEEDS: ICD-10-CM

## 2025-02-27 DIAGNOSIS — R63.4 WEIGHT LOSS: ICD-10-CM

## 2025-02-27 DIAGNOSIS — I25.2 HISTORY OF ST ELEVATION MYOCARDIAL INFARCTION (STEMI): ICD-10-CM

## 2025-02-27 DIAGNOSIS — M48.061 SPINAL STENOSIS OF LUMBAR REGION, UNSPECIFIED WHETHER NEUROGENIC CLAUDICATION PRESENT: ICD-10-CM

## 2025-02-27 DIAGNOSIS — I25.10 ATHEROSCLEROSIS OF NATIVE CORONARY ARTERY, UNSPECIFIED WHETHER ANGINA PRESENT, UNSPECIFIED WHETHER NATIVE OR TRANSPLANTED HEART: Primary | ICD-10-CM

## 2025-02-27 DIAGNOSIS — Z71.89 GOALS OF CARE, COUNSELING/DISCUSSION: ICD-10-CM

## 2025-02-27 DIAGNOSIS — D64.9 ANEMIA, UNSPECIFIED TYPE: ICD-10-CM

## 2025-02-27 DIAGNOSIS — N30.00 ACUTE CYSTITIS WITHOUT HEMATURIA: ICD-10-CM

## 2025-02-27 DIAGNOSIS — S81.819D SKIN TEAR OF LOWER LEG WITHOUT COMPLICATION, SUBSEQUENT ENCOUNTER: ICD-10-CM

## 2025-02-27 DIAGNOSIS — F02.80 ALZHEIMER'S DISEASE (H): ICD-10-CM

## 2025-02-27 DIAGNOSIS — M62.81 GENERALIZED MUSCLE WEAKNESS: ICD-10-CM

## 2025-02-27 DIAGNOSIS — Z78.9 FREQUENT HOSPITAL ADMISSIONS: ICD-10-CM

## 2025-02-27 DIAGNOSIS — N39.46 MIXED INCONTINENCE: ICD-10-CM

## 2025-02-27 DIAGNOSIS — M15.0 PRIMARY OSTEOARTHRITIS INVOLVING MULTIPLE JOINTS: ICD-10-CM

## 2025-02-27 DIAGNOSIS — M15.9 OSTEOARTHRITIS OF MULTIPLE JOINTS, UNSPECIFIED OSTEOARTHRITIS TYPE: ICD-10-CM

## 2025-02-27 DIAGNOSIS — G30.9 ALZHEIMER'S DISEASE (H): ICD-10-CM

## 2025-02-27 RX ORDER — MENTHOL 40 MG/ML
GEL TOPICAL PRN
Qty: 150 ML | Refills: 3 | Status: SHIPPED | OUTPATIENT
Start: 2025-02-27

## 2025-02-27 NOTE — PROGRESS NOTES
Geno Horn is a 89 year old female being seen today for acute and follow up visit at [facility].    Code Status: DNR / DNI, Advance Directives: YES  Health Care Power of : Extended Emergency Contact Information  Primary Emergency Contact: Daniel Horn  Address: [address]           [place] [address] [employer]  Mobile Phone: [phone]  Relation: Son  Secondary Emergency Contact: Adri Chaudhary  Address: [address] [place] [address] [facility]  Home Phone: [phone]  Mobile Phone: [phone]  Relation: Daughter     Allergies: Alendronate, Celebrex [celecoxib], Other environmental allergy, Oxybutynin, Tolterodine, Tramadol, and Trospium     Chief Complaint / HPI: Follow-up on change in functional status with increased frequency of confusion from baseline.  Assisted living facility nursing staff have witnessed episodes dysarthria and unilateral weakness leaning towards the right side--lasting for minutes, generally weak and fatigued, often found falling asleep at the breakfast table or sometimes during a social event.  Changes and reduction in appetite weight loss about 12 pounds since September 2024  Baseline weight was 135 pounds 2023 in 2024--- currently at 123 pounds.  She has had a hospital admission for UTI and sepsis, to ED episodes within the last 3 months.  Not consistently recognizing familiar staff that she normally did.   Has been on home care therapy services to improve mobility, self transfers, strength--- Home care services planning to discharge as they are unable to make any further progress--- wheelchair dependent    With her history nursing staff requested sending in a UA UC that might possibly correlate with above changes in daily function and condition--- I did reach out to microbiology and await preliminary culture  As she is not dysuric, increase in frequency or incontinence or any other ismael signs of urinary tract infection that she has had in  the past.  Culture pending    Has multiple significant comorbidities including ASCVD, history of STEMI, multiple compression fractures with spinal stenosis, chronic anemia, osteoporosis with pathological compression fractures  Memory loss secondary to dementia.        Past Medical, Surgical, Family and Social History reviewed: YES.     Medications: Reviewed  Medications - recent changes:     Review of Systems:  General: positive for weight loss, fatigue, weakness  : positive for incontinence  Musculoskeletal: positive for back pain, arthritis, joint pain, and muscular weakness  Neurologic: positive for speech problems, memory problems, and behavior changes  Hematologic: positive for anemia and weight loss  All other systems negative  Toileting:    Continent of Bowel: No   Continent of Bladder: No  Mobility: wheelchair    Recent Labs:   Recent Results (from the past 240 hours)   UA Macroscopic with reflex to Microscopic and Culture    Collection Time: 02/26/25 11:35 AM    Specimen: Urine, NOS   Result Value Ref Range    Color Urine Yellow Colorless, Straw, Light Yellow, Yellow    Appearance Urine Clear Clear    Glucose Urine Negative Negative mg/dL    Bilirubin Urine Negative Negative    Ketones Urine Negative Negative mg/dL    Specific Gravity Urine 1.017 1.000 - 1.030    Blood Urine Negative Negative    pH Urine 6.5 5.0 - 9.0    Protein Albumin Urine Negative Negative mg/dL    Urobilinogen Urine Normal Normal, 2.0 mg/dL    Nitrite Urine Negative Negative    Leukocyte Esterase Urine Small (A) Negative    Mucus Urine Present (A) None Seen /LPF    RBC Urine <1 <=2 /HPF    WBC Urine 19 (H) <=5 /HPF    Squamous Epithelials Urine 1 <=1 /HPF        Pertinent Screening Tool results: Eprognosis 43 %  6 month mortality    Current Therapies: physical therapy and occupational therapy, through home care--- plan to discharge today    Exam:  Vital signs reviewed.   GENERAL APPEARANCE: alert and no acute distress  CV: regular  rate and rhythm, normal S1 S2, no S3 or S4, no murmur, click or rub, no peripheral edema   MS: no musculoskeletal defects are noted and wheelchair dependent  SKIN: Dry, small new skin tear lower leg less than a centimeter covered with border adhesive dressing  NEURO: mentation intact and speech normal, baseline confusion      Assessment and Plan:    Eprognosis 43 %  6 month mortality  Predictive scale 30 to 40%--due to wheelchair dependence and degree of fatigue and lethargy  FAST score 7 with loss of ambulatory ability and intermittent difficulty holding head up      Care conference with daughter Maria Antonia who is most involved in her care and facility RN regarding trending pattern loss of daily functional ability, weight loss, frequent hospitalization and ED episodes in the past 3 months,  Observed episodes of unilateral weakness and difficulty with speech along with above mentioned comorbidities, increased dependence for ADLs  Revisited goals of care, family does not want any diagnostic workup and would like to avoid any further ED episodes or hospitalizations and would like to focus on comfort and quality of life measures in her assisted living home.  We discussed palliative hospice services and family would like referral sent and would like to meet with hospice and proceed with admission to further support assisted living facility in alignment with goals for comfort and quality of life through final days.  Family has chosen [facility] hospice--- I did reach out to hospice admission director with referral and care coordination between family and assisted living facility nursing staff.      (I25.10) Atherosclerosis of native coronary artery, unspecified whether angina present, unspecified whether native or transplanted heart  (primary encounter diagnosis) significant vascular history may be triggering intermittent episodes of speech disturbance and unilateral weakness.    Plan: Hospice Referral            (R53.81)  Declining functional status--wheelchair dependent, increased dependence on facility staff for transfers and all ADLs    Plan: Hospice Referral            (Z71.89) Goals of care, counseling/discussion--- discussion as stated above family goals of care comfort focused and quality of life measures only--- very agreeable with shifting treatment to palliative  Home care therapy services plan to discontinue--we will coordinate discharge with hospice    Plan: Hospice Referral            (G30.9,  F02.80) Alzheimer's disease (H) memory loss secondary to Alzheimer's dementia likely contributory to neuro degenerative symptoms    Plan: Hospice Referral            (Z78.9) Living in assisted living--very appropriate placement at this stage to meet all essential ADL needs and comfort needs with 24/7 safety and monitoring and care availability      (S81.819D) Skin tear of lower leg without complication, subsequent encounter--recent small skin tear as reported by facility RN reports superficial less than a centimeter and facility staff are managing with dressing care--do not report any concerns with secondary infection or purulence    Plan: Hospice Referral            (R63.4) Weight loss--progressive weight loss over 10 pounds since November 2024, staff report diminished appetite on average eats maybe 25% of meals with encouragement    Plan: Hospice Referral            (N30.00) Acute cystitis without hematuria--- await final culture and will treat as indicated    Plan: Hospice Referral                   (M15.0) Primary osteoarthritis involving multiple joints--- refill Biofreeze as requested has been helpful at providing comfort to arthralgia pain    Plan: Hospice Referral, Menthol, Topical Analgesic,         (BIOFREEZE COOL THE PAIN) 4 % GEL            (M48.061) Spinal stenosis of lumbar region, unspecified whether neurogenic claudication present--chronic stenosis secondary to degenerative disc disease and multiple compression  fractures thoracic and lumbar levels    Plan: Hospice Referral, Menthol, Topical Analgesic,         (BIOFREEZE COOL THE PAIN) 4 % GEL            (N39.46) Mixed incontinence--- incontinence has increased over the past few months since hospitalization    Plan: Hospice Referral            (M62.81) Generalized muscle weakness--- noticeable declining physical functional status over past several months--particularly since November hospitalization unable to return to previous baseline musculoskeletal function with physical therapy services      (D64.9) Anemia, unspecified type--chronic on iron supplementation--    Plan: Hospice Referral            (Z74.1) Requires daily assistance for activities of daily living (ADL) and comfort needs--due to progressive weakness and fatigue     Plan: Hospice Referral            (I25.2) History of ST elevation myocardial infarction (STEMI) significant vascular disease and risk       (Z51.5) Comfort measures only status--- goals of care discussion and family would like to pursue comfort focused care and quality of life care in assisted living home through final days--- requesting referral to hospice palliative services    Plan: Hospice Referral            (Z78.9) Frequent hospital admissions--has had 3 hospitalization/ED episodes in the last 3 months    Over 2 hours spent in direct face-to-face visit, chart review, ordering of labs, goals of care conference with healthcare directive agent and facility RN, referral for hospice services and coordination of care between hospice, family and assisted living facility

## 2025-03-03 RX ORDER — LANOLIN AND PETROLATUM 136.4; 469.9 MG/G; MG/G
OINTMENT TOPICAL
Qty: 113 G | Refills: 5 | Status: SHIPPED | OUTPATIENT
Start: 2025-03-03

## 2025-03-03 NOTE — TELEPHONE ENCOUNTER
vitamin A & D (BABY) external ointment       Last Written Prescription Date:  7/8/24  Last Fill Quantity: 113g,   # refills: 5  Last Office Visit: 2/27/24  Future Office visit:       Routing refill request to provider for review/approval because:  Drug not on the [facility], [facility] or [facility] refill protocol or controlled substance Phuong Knight RN on 3/3/2025 at 6:07 AM

## 2025-03-04 ENCOUNTER — MEDICAL CORRESPONDENCE (OUTPATIENT)
Dept: HEALTH INFORMATION MANAGEMENT | Facility: OTHER | Age: OVER 89
End: 2025-03-04
Payer: COMMERCIAL

## 2025-03-17 DIAGNOSIS — Z91.89 AT RISK FOR CLOSTRIDIOIDES DIFFICILE INFECTION: ICD-10-CM

## 2025-03-18 RX ORDER — NICOTINE 14MG/24HR
250 PATCH, TRANSDERMAL 24 HOURS TRANSDERMAL 2 TIMES DAILY
Qty: 60 CAPSULE | Refills: 11 | Status: SHIPPED | OUTPATIENT
Start: 2025-03-18

## 2025-03-18 NOTE — TELEPHONE ENCOUNTER
[facility] OF [address]  sent Rx request for the following:      Requested Prescriptions   Pending Prescriptions Disp Refills    Saccharomyces boulardii (PROBIOTIC) 250 MG CAPS [Pharmacy Med Name: PROBIOTIC 250 MG CAP] 60 capsule 11     Sig: TAKE 1 CAPSULE BY MOUTH TWICE DAILY       There is no refill protocol information for this order        At risk for Clostridioides difficile infection [Z91.89]  - Primary     Last Prescription Date:   2/20/25  Last Fill Qty/Refills:         60, R-1    Last Office Visit:              2/27/25 ([facility] visit)   Future Office visit:            None    Unable to complete prescription refill per RN Medication Refill Policy.     Routing to covering provider for refill consideration, as PCP/provider is out of clinic >48 hours or Pt is completely out of medication and provider is out of the clinic today.      Vic Stephens RN on 3/18/2025 at 2:24 PM

## 2025-04-05 ENCOUNTER — HEALTH MAINTENANCE LETTER (OUTPATIENT)
Age: OVER 89
End: 2025-04-05

## 2025-04-09 ENCOUNTER — MEDICAL CORRESPONDENCE (OUTPATIENT)
Dept: HEALTH INFORMATION MANAGEMENT | Facility: OTHER | Age: OVER 89
End: 2025-04-09
Payer: COMMERCIAL

## 2025-05-22 ENCOUNTER — MEDICAL CORRESPONDENCE (OUTPATIENT)
Dept: HEALTH INFORMATION MANAGEMENT | Facility: OTHER | Age: OVER 89
End: 2025-05-22
Payer: COMMERCIAL

## 2025-06-23 DIAGNOSIS — M15.0 PRIMARY OSTEOARTHRITIS INVOLVING MULTIPLE JOINTS: ICD-10-CM

## 2025-06-24 DIAGNOSIS — M15.0 PRIMARY OSTEOARTHRITIS INVOLVING MULTIPLE JOINTS: Primary | ICD-10-CM

## 2025-06-24 RX ORDER — SENNOSIDES 8.6 MG
CAPSULE ORAL
Qty: 60 TABLET | Refills: 23 | Status: SHIPPED | OUTPATIENT
Start: 2025-06-24

## 2025-06-26 NOTE — TELEPHONE ENCOUNTER
[facility] sent Rx request for the following:      Requested Prescriptions   Pending Prescriptions Disp Refills    menthol (ICY HOT) 5 % PTCH       Sig: Apply 1 patch to affected area four times daily as needed, up to max 8 hours for each patch (muscle soreness)       There is no refill protocol information for this order          Last Prescription Date:   Historical  Last Fill Qty/Refills:         , R-    Last Office Visit:              2/27/25   Future Office visit:            none    Routing refill request to provider for review/approval because:  Drug not on the [facility] refill protocol     Dona Garcia RN on 6/26/2025 at 2:06 PM

## 2025-07-29 ENCOUNTER — APPOINTMENT (OUTPATIENT)
Dept: LAB | Facility: OTHER | Age: OVER 89
End: 2025-07-29
Attending: FAMILY MEDICINE
Payer: COMMERCIAL

## 2025-07-29 ENCOUNTER — LAB REQUISITION (OUTPATIENT)
Dept: LAB | Facility: OTHER | Age: OVER 89
End: 2025-07-29
Payer: COMMERCIAL

## 2025-07-29 LAB
ALBUMIN UR-MCNC: 100 MG/DL
APPEARANCE UR: ABNORMAL
BACTERIA #/AREA URNS HPF: ABNORMAL /HPF
BILIRUB UR QL STRIP: NEGATIVE
COLOR UR AUTO: ABNORMAL
GLUCOSE UR STRIP-MCNC: NEGATIVE MG/DL
HGB UR QL STRIP: ABNORMAL
KETONES UR STRIP-MCNC: NEGATIVE MG/DL
LEUKOCYTE ESTERASE UR QL STRIP: NEGATIVE
MUCOUS THREADS #/AREA URNS LPF: PRESENT /LPF
NITRATE UR QL: NEGATIVE
PH UR STRIP: 7.5 [PH] (ref 5–9)
RBC URINE: 20 /HPF
SP GR UR STRIP: 1.02 (ref 1–1.03)
SQUAMOUS EPITHELIAL: 19 /HPF
UROBILINOGEN UR STRIP-MCNC: NORMAL MG/DL
WBC CLUMPS #/AREA URNS HPF: PRESENT /HPF
WBC URINE: 8 /HPF
YEAST #/AREA URNS HPF: ABNORMAL /HPF

## 2025-07-29 PROCEDURE — 81001 URINALYSIS AUTO W/SCOPE: CPT | Performed by: FAMILY MEDICINE

## 2025-08-13 ENCOUNTER — MEDICAL CORRESPONDENCE (OUTPATIENT)
Dept: HEALTH INFORMATION MANAGEMENT | Facility: OTHER | Age: OVER 89
End: 2025-08-13
Payer: COMMERCIAL

## (undated) DEVICE — Device

## (undated) DEVICE — BASKET NITINOL TIPLESS HALO  1.5FRX120CM 554120

## (undated) DEVICE — PAD CHUX UNDERPAD 30X36" P3036C

## (undated) DEVICE — SHEATH URETERAL ACCESS NAVIGATOR HD 11/13FRX36CM M0062502220

## (undated) DEVICE — CATH INTERMITTENT CLEAN-CATH 16FR 16" VINYL LF 421716

## (undated) DEVICE — LASER FIBER HOLMIUM FLEXIVA TRACTIP 200UM M0068403960

## (undated) DEVICE — SOL WATER 1500ML

## (undated) DEVICE — GUIDEWIRE SENSOR DUAL FLEX STR 0.035"X150CM M0066703080

## (undated) DEVICE — GLOVE PROTEXIS POWDER FREE SMT 8.5 2D72PT85X

## (undated) DEVICE — PACK CYSTO SBA15CSFCA

## (undated) DEVICE — GUIDEWIRE AMPLATZ SUPER STIFF .038"X145CM M0066401061

## (undated) DEVICE — PUMP SYSTEM SINGLE ACTION M0067201000

## (undated) DEVICE — CATH FOLEY 18FR 5ML SIL165818

## (undated) DEVICE — TUOGHY BORST ADAPTER WITH SIDE ARM

## (undated) DEVICE — SLEEVE COMPRESSION SCD KNEE MED 74022

## (undated) DEVICE — CATH URETERAL 5FRX70CM OPEN END FLEX TIP G14521

## (undated) RX ORDER — REGADENOSON 0.08 MG/ML
INJECTION, SOLUTION INTRAVENOUS
Status: DISPENSED
Start: 2019-07-17

## (undated) RX ORDER — ACETAMINOPHEN 325 MG/1
TABLET ORAL
Status: DISPENSED
Start: 2020-11-16

## (undated) RX ORDER — ACETAMINOPHEN 325 MG/1
TABLET ORAL
Status: DISPENSED
Start: 2019-06-17

## (undated) RX ORDER — ACETAMINOPHEN 325 MG/1
TABLET ORAL
Status: DISPENSED
Start: 2025-02-15

## (undated) RX ORDER — SODIUM CHLORIDE 9 MG/ML
INJECTION, SOLUTION INTRAVENOUS
Status: DISPENSED
Start: 2019-06-17

## (undated) RX ORDER — FENTANYL CITRATE 50 UG/ML
INJECTION, SOLUTION INTRAMUSCULAR; INTRAVENOUS
Status: DISPENSED
Start: 2020-11-01

## (undated) RX ORDER — NEOMYCIN/BACITRACIN/POLYMYXINB 3.5-400-5K
OINTMENT (GRAM) TOPICAL
Status: DISPENSED
Start: 2019-05-21

## (undated) RX ORDER — FENTANYL CITRATE 50 UG/ML
INJECTION, SOLUTION INTRAMUSCULAR; INTRAVENOUS
Status: DISPENSED
Start: 2021-10-20

## (undated) RX ORDER — DEXAMETHASONE SODIUM PHOSPHATE 10 MG/ML
INJECTION, SOLUTION INTRAMUSCULAR; INTRAVENOUS
Status: DISPENSED
Start: 2022-08-01

## (undated) RX ORDER — LIDOCAINE HYDROCHLORIDE 20 MG/ML
INJECTION, SOLUTION EPIDURAL; INFILTRATION; INTRACAUDAL; PERINEURAL
Status: DISPENSED
Start: 2019-07-23

## (undated) RX ORDER — ACETAMINOPHEN 500 MG
TABLET ORAL
Status: DISPENSED
Start: 2020-10-29

## (undated) RX ORDER — CEFTRIAXONE SODIUM 1 G
VIAL (EA) INJECTION
Status: DISPENSED
Start: 2024-11-25

## (undated) RX ORDER — LIDOCAINE HYDROCHLORIDE 10 MG/ML
INJECTION, SOLUTION INFILTRATION; PERINEURAL
Status: DISPENSED
Start: 2022-08-01

## (undated) RX ORDER — ONDANSETRON 2 MG/ML
INJECTION INTRAMUSCULAR; INTRAVENOUS
Status: DISPENSED
Start: 2019-07-23

## (undated) RX ORDER — LIDOCAINE HYDROCHLORIDE 10 MG/ML
INJECTION, SOLUTION EPIDURAL; INFILTRATION; INTRACAUDAL; PERINEURAL
Status: DISPENSED
Start: 2019-07-23

## (undated) RX ORDER — CEFTRIAXONE SODIUM 1 G/50ML
INJECTION, SOLUTION INTRAVENOUS
Status: DISPENSED
Start: 2019-06-17

## (undated) RX ORDER — CEFTRIAXONE SODIUM 1 G/50ML
INJECTION, SOLUTION INTRAVENOUS
Status: DISPENSED
Start: 2019-07-23

## (undated) RX ORDER — SODIUM CHLORIDE 9 MG/ML
INJECTION, SOLUTION INTRAVENOUS
Status: DISPENSED
Start: 2020-10-29

## (undated) RX ORDER — TRIAMCINOLONE ACETONIDE 40 MG/ML
INJECTION, SUSPENSION INTRA-ARTICULAR; INTRAMUSCULAR
Status: DISPENSED
Start: 2021-06-08

## (undated) RX ORDER — MAGNESIUM SULFATE HEPTAHYDRATE 40 MG/ML
INJECTION, SOLUTION INTRAVENOUS
Status: DISPENSED
Start: 2020-10-29

## (undated) RX ORDER — ACETAMINOPHEN 650 MG/1
SUPPOSITORY RECTAL
Status: DISPENSED
Start: 2019-06-17

## (undated) RX ORDER — AZITHROMYCIN 250 MG/1
TABLET, FILM COATED ORAL
Status: DISPENSED
Start: 2020-11-01

## (undated) RX ORDER — BUPIVACAINE HYDROCHLORIDE 5 MG/ML
INJECTION, SOLUTION EPIDURAL; INTRACAUDAL
Status: DISPENSED
Start: 2021-06-08

## (undated) RX ORDER — PROPOFOL 10 MG/ML
INJECTION, EMULSION INTRAVENOUS
Status: DISPENSED
Start: 2019-07-23

## (undated) RX ORDER — LIDOCAINE HYDROCHLORIDE 10 MG/ML
INJECTION, SOLUTION INFILTRATION; PERINEURAL
Status: DISPENSED
Start: 2021-06-08

## (undated) RX ORDER — LIDOCAINE HYDROCHLORIDE 10 MG/ML
INJECTION, SOLUTION INFILTRATION; PERINEURAL
Status: DISPENSED
Start: 2020-02-25

## (undated) RX ORDER — AZITHROMYCIN 250 MG/1
TABLET, FILM COATED ORAL
Status: DISPENSED
Start: 2025-02-15

## (undated) RX ORDER — PROPOFOL 10 MG/ML
INJECTION, EMULSION INTRAVENOUS
Status: DISPENSED
Start: 2019-06-17

## (undated) RX ORDER — BUPIVACAINE HYDROCHLORIDE 5 MG/ML
INJECTION, SOLUTION EPIDURAL; INTRACAUDAL
Status: DISPENSED
Start: 2022-08-01

## (undated) RX ORDER — LIDOCAINE HYDROCHLORIDE 20 MG/ML
INJECTION, SOLUTION EPIDURAL; INFILTRATION; INTRACAUDAL; PERINEURAL
Status: DISPENSED
Start: 2019-06-17

## (undated) RX ORDER — HYDROCODONE BITARTRATE AND ACETAMINOPHEN 5; 325 MG/1; MG/1
TABLET ORAL
Status: DISPENSED
Start: 2020-11-17

## (undated) RX ORDER — SODIUM CHLORIDE 9 MG/ML
INJECTION, SOLUTION INTRAVENOUS
Status: DISPENSED
Start: 2020-11-01

## (undated) RX ORDER — ACETAMINOPHEN 500 MG
TABLET ORAL
Status: DISPENSED
Start: 2020-11-01

## (undated) RX ORDER — LIDOCAINE HYDROCHLORIDE 10 MG/ML
INJECTION, SOLUTION EPIDURAL; INFILTRATION; INTRACAUDAL; PERINEURAL
Status: DISPENSED
Start: 2019-05-21

## (undated) RX ORDER — SODIUM CHLORIDE 9 MG/ML
INJECTION, SOLUTION INTRAVENOUS
Status: DISPENSED
Start: 2020-11-16

## (undated) RX ORDER — CEFUROXIME AXETIL 250 MG/1
TABLET ORAL
Status: DISPENSED
Start: 2019-07-31

## (undated) RX ORDER — ONDANSETRON 2 MG/ML
INJECTION INTRAMUSCULAR; INTRAVENOUS
Status: DISPENSED
Start: 2019-06-17

## (undated) RX ORDER — CEFUROXIME AXETIL 250 MG/1
TABLET ORAL
Status: DISPENSED
Start: 2020-01-15